# Patient Record
Sex: FEMALE | Race: WHITE | NOT HISPANIC OR LATINO | Employment: OTHER | URBAN - METROPOLITAN AREA
[De-identification: names, ages, dates, MRNs, and addresses within clinical notes are randomized per-mention and may not be internally consistent; named-entity substitution may affect disease eponyms.]

---

## 2017-01-17 ENCOUNTER — APPOINTMENT (EMERGENCY)
Dept: CT IMAGING | Facility: HOSPITAL | Age: 62
End: 2017-01-17
Payer: COMMERCIAL

## 2017-01-17 ENCOUNTER — HOSPITAL ENCOUNTER (EMERGENCY)
Facility: HOSPITAL | Age: 62
Discharge: HOME | End: 2017-01-17
Attending: EMERGENCY MEDICINE | Admitting: EMERGENCY MEDICINE
Payer: COMMERCIAL

## 2017-01-17 ENCOUNTER — APPOINTMENT (EMERGENCY)
Dept: RADIOLOGY | Facility: HOSPITAL | Age: 62
End: 2017-01-17
Payer: COMMERCIAL

## 2017-01-17 VITALS
SYSTOLIC BLOOD PRESSURE: 136 MMHG | BODY MASS INDEX: 42.11 KG/M2 | TEMPERATURE: 98.3 F | DIASTOLIC BLOOD PRESSURE: 68 MMHG | RESPIRATION RATE: 16 BRPM | WEIGHT: 200.62 LBS | HEIGHT: 58 IN | OXYGEN SATURATION: 98 % | HEART RATE: 83 BPM

## 2017-01-17 DIAGNOSIS — S32.020A COMPRESSION FRACTURE OF L2 LUMBAR VERTEBRA, CLOSED, INITIAL ENCOUNTER (HCC): Primary | ICD-10-CM

## 2017-01-17 DIAGNOSIS — S80.01XA CONTUSION OF RIGHT KNEE, INITIAL ENCOUNTER: ICD-10-CM

## 2017-01-17 LAB
ANION GAP BLD CALC-SCNC: 19 MMOL/L (ref 4–13)
BUN BLD-MCNC: 13 MG/DL (ref 5–25)
CA-I BLD-SCNC: 1.18 MMOL/L (ref 1.12–1.32)
CHLORIDE BLD-SCNC: 96 MMOL/L (ref 100–108)
CREAT BLD-MCNC: 0.7 MG/DL (ref 0.6–1.3)
GFR SERPL CREATININE-BSD FRML MDRD: >60 ML/MIN/1.73SQ M
GLUCOSE SERPL-MCNC: 102 MG/DL (ref 65–140)
HCT VFR BLD CALC: 40 % (ref 34.8–46.1)
HGB BLDA-MCNC: 13.6 G/DL (ref 11.5–15.4)
PCO2 BLD: 29 MMOL/L (ref 21–32)
POTASSIUM BLD-SCNC: 4.1 MMOL/L (ref 3.5–5.3)
SODIUM BLD-SCNC: 139 MMOL/L (ref 136–145)
SPECIMEN SOURCE: ABNORMAL

## 2017-01-17 PROCEDURE — 85014 HEMATOCRIT: CPT

## 2017-01-17 PROCEDURE — 99284 EMERGENCY DEPT VISIT MOD MDM: CPT

## 2017-01-17 PROCEDURE — 73564 X-RAY EXAM KNEE 4 OR MORE: CPT

## 2017-01-17 PROCEDURE — 74177 CT ABD & PELVIS W/CONTRAST: CPT

## 2017-01-17 PROCEDURE — 80047 BASIC METABLC PNL IONIZED CA: CPT

## 2017-01-17 RX ORDER — PANTOPRAZOLE SODIUM 40 MG/1
40 TABLET, DELAYED RELEASE ORAL DAILY
COMMUNITY
End: 2018-06-08 | Stop reason: SDUPTHER

## 2017-01-17 RX ORDER — HYDROCHLOROTHIAZIDE 25 MG/1
25 TABLET ORAL DAILY
COMMUNITY
End: 2018-03-08 | Stop reason: SDUPTHER

## 2017-01-17 RX ORDER — ALPRAZOLAM 0.5 MG/1
0.5 TABLET ORAL
COMMUNITY
End: 2018-06-14 | Stop reason: SDUPTHER

## 2017-01-17 RX ORDER — LEVALBUTEROL TARTRATE 45 UG/1
1-2 AEROSOL, METERED ORAL EVERY 4 HOURS PRN
COMMUNITY
End: 2019-01-14

## 2017-01-17 RX ORDER — TRAMADOL HYDROCHLORIDE 50 MG/1
50 TABLET ORAL EVERY 6 HOURS PRN
COMMUNITY
End: 2017-01-17 | Stop reason: ALTCHOICE

## 2017-01-17 RX ORDER — POTASSIUM CHLORIDE 750 MG/1
10 TABLET, FILM COATED, EXTENDED RELEASE ORAL 2 TIMES DAILY
COMMUNITY
End: 2018-06-14

## 2017-01-17 RX ORDER — LEVOTHYROXINE SODIUM 0.07 MG/1
88 TABLET ORAL DAILY
COMMUNITY
End: 2018-06-14 | Stop reason: SDUPTHER

## 2017-01-17 RX ORDER — QUINAPRIL 10 MG/1
10 TABLET ORAL
COMMUNITY
End: 2020-08-22 | Stop reason: HOSPADM

## 2017-01-17 RX ORDER — OXYCODONE HYDROCHLORIDE AND ACETAMINOPHEN 5; 325 MG/1; MG/1
1 TABLET ORAL EVERY 4 HOURS PRN
Qty: 12 TABLET | Refills: 0 | Status: SHIPPED | OUTPATIENT
Start: 2017-01-17 | End: 2018-06-14

## 2017-01-17 RX ORDER — LEVOTHYROXINE SODIUM 0.05 MG/1
50 TABLET ORAL DAILY
COMMUNITY
End: 2018-01-20 | Stop reason: HOSPADM

## 2017-01-17 RX ORDER — TRAMADOL HYDROCHLORIDE 50 MG/1
100 TABLET ORAL ONCE
Status: COMPLETED | OUTPATIENT
Start: 2017-01-17 | End: 2017-01-17

## 2017-01-17 RX ADMIN — IOHEXOL 100 ML: 350 INJECTION, SOLUTION INTRAVENOUS at 12:20

## 2017-01-17 RX ADMIN — TRAMADOL HYDROCHLORIDE 100 MG: 50 TABLET, COATED ORAL at 11:41

## 2017-01-19 ENCOUNTER — GENERIC CONVERSION - ENCOUNTER (OUTPATIENT)
Dept: OTHER | Facility: OTHER | Age: 62
End: 2017-01-19

## 2017-02-10 ENCOUNTER — GENERIC CONVERSION - ENCOUNTER (OUTPATIENT)
Dept: OTHER | Facility: OTHER | Age: 62
End: 2017-02-10

## 2017-02-16 ENCOUNTER — GENERIC CONVERSION - ENCOUNTER (OUTPATIENT)
Dept: OTHER | Facility: OTHER | Age: 62
End: 2017-02-16

## 2017-03-17 ENCOUNTER — GENERIC CONVERSION - ENCOUNTER (OUTPATIENT)
Dept: OTHER | Facility: OTHER | Age: 62
End: 2017-03-17

## 2017-03-31 ENCOUNTER — GENERIC CONVERSION - ENCOUNTER (OUTPATIENT)
Dept: OTHER | Facility: OTHER | Age: 62
End: 2017-03-31

## 2017-04-04 ENCOUNTER — GENERIC CONVERSION - ENCOUNTER (OUTPATIENT)
Dept: OTHER | Facility: OTHER | Age: 62
End: 2017-04-04

## 2017-04-18 ENCOUNTER — GENERIC CONVERSION - ENCOUNTER (OUTPATIENT)
Dept: OTHER | Facility: OTHER | Age: 62
End: 2017-04-18

## 2017-05-02 ENCOUNTER — GENERIC CONVERSION - ENCOUNTER (OUTPATIENT)
Dept: OTHER | Facility: OTHER | Age: 62
End: 2017-05-02

## 2017-05-04 ENCOUNTER — GENERIC CONVERSION - ENCOUNTER (OUTPATIENT)
Dept: OTHER | Facility: OTHER | Age: 62
End: 2017-05-04

## 2017-05-04 LAB — AMBIG ABBREV LP DEFAULT (HISTORICAL): NORMAL

## 2017-05-05 LAB
ALBUMIN SERPL BCP-MCNC: 4.3 G/DL (ref 3.6–4.8)
ALP SERPL-CCNC: 82 IU/L (ref 39–117)
ALT SERPL W P-5'-P-CCNC: 13 IU/L (ref 0–32)
AST SERPL W P-5'-P-CCNC: 18 IU/L (ref 0–40)
BASOPHILS # BLD AUTO: 0 %
BASOPHILS # BLD AUTO: 0 X10E3/UL (ref 0–0.2)
BILIRUB SERPL-MCNC: 0.3 MG/DL (ref 0–1.2)
BILIRUBIN DIRECT (HISTORICAL): 0.09 MG/DL (ref 0–0.4)
BUN SERPL-MCNC: 16 MG/DL (ref 8–27)
BUN/CREA RATIO (HISTORICAL): 21 (ref 12–28)
CALCIUM SERPL-MCNC: 9.3 MG/DL (ref 8.7–10.3)
CHLORIDE SERPL-SCNC: 94 MMOL/L (ref 96–106)
CHOLEST SERPL-MCNC: 175 MG/DL (ref 100–199)
CO2 SERPL-SCNC: 27 MMOL/L (ref 18–29)
CREAT SERPL-MCNC: 0.76 MG/DL (ref 0.57–1)
DEPRECATED RDW RBC AUTO: 14.9 % (ref 12.3–15.4)
EGFR AFRICAN AMERICAN (HISTORICAL): 97 ML/MIN/1.73
EGFR-AMERICAN CALC (HISTORICAL): 84 ML/MIN/1.73
EOSINOPHIL # BLD AUTO: 0.2 X10E3/UL (ref 0–0.4)
EOSINOPHIL # BLD AUTO: 3 %
GLUCOSE SERPL-MCNC: 90 MG/DL (ref 65–99)
HCT VFR BLD AUTO: 37.6 % (ref 34–46.6)
HDLC SERPL-MCNC: 58 MG/DL
HGB BLD-MCNC: 12.2 G/DL (ref 11.1–15.9)
IMM.GRANULOCYTES (CD4/8) (HISTORICAL): 0 %
IMM.GRANULOCYTES (CD4/8) (HISTORICAL): 0 X10E3/UL (ref 0–0.1)
LDLC SERPL CALC-MCNC: 97 MG/DL (ref 0–99)
LYMPHOCYTES # BLD AUTO: 3.9 X10E3/UL (ref 0.7–3.1)
LYMPHOCYTES # BLD AUTO: 43 %
MCH RBC QN AUTO: 27.1 PG (ref 26.6–33)
MCHC RBC AUTO-ENTMCNC: 32.4 G/DL (ref 31.5–35.7)
MCV RBC AUTO: 83 FL (ref 79–97)
MONOCYTES # BLD AUTO: 0.8 X10E3/UL (ref 0.1–0.9)
MONOCYTES (HISTORICAL): 8 %
NEUTROPHILS # BLD AUTO: 4.1 X10E3/UL (ref 1.4–7)
NEUTROPHILS # BLD AUTO: 46 %
PLATELET # BLD AUTO: 266 X10E3/UL (ref 150–379)
POTASSIUM SERPL-SCNC: 4 MMOL/L (ref 3.5–5.2)
RBC (HISTORICAL): 4.51 X10E6/UL (ref 3.77–5.28)
SODIUM SERPL-SCNC: 140 MMOL/L (ref 134–144)
TOTAL PROTEIN (HISTORICAL): 7.3 G/DL (ref 6–8.5)
TRIGL SERPL-MCNC: 99 MG/DL (ref 0–149)
TSH SERPL DL<=0.05 MIU/L-ACNC: 4.15 UIU/ML (ref 0.45–4.5)
VLDLC SERPL CALC-MCNC: 20 MG/DL (ref 5–40)
WBC # BLD AUTO: 9 X10E3/UL (ref 3.4–10.8)

## 2017-05-11 ENCOUNTER — APPOINTMENT (OUTPATIENT)
Dept: LAB | Facility: CLINIC | Age: 62
End: 2017-05-11
Payer: COMMERCIAL

## 2017-05-11 ENCOUNTER — ALLSCRIPTS OFFICE VISIT (OUTPATIENT)
Dept: OTHER | Facility: OTHER | Age: 62
End: 2017-05-11

## 2017-05-11 DIAGNOSIS — R07.9 CHEST PAIN: ICD-10-CM

## 2017-05-11 LAB — DEPRECATED D DIMER PPP: 1159 NG/ML (FEU) (ref 0–424)

## 2017-05-11 PROCEDURE — 85379 FIBRIN DEGRADATION QUANT: CPT

## 2017-05-11 PROCEDURE — 36415 COLL VENOUS BLD VENIPUNCTURE: CPT

## 2017-05-12 ENCOUNTER — HOSPITAL ENCOUNTER (OUTPATIENT)
Dept: CT IMAGING | Facility: HOSPITAL | Age: 62
Discharge: HOME | End: 2017-05-12
Attending: INTERNAL MEDICINE
Payer: COMMERCIAL

## 2017-05-12 DIAGNOSIS — R07.9 CHEST PAIN: ICD-10-CM

## 2017-05-12 PROCEDURE — 71275 CT ANGIOGRAPHY CHEST: CPT

## 2017-05-12 RX ADMIN — IOHEXOL 85 ML: 350 INJECTION, SOLUTION INTRAVENOUS at 13:40

## 2017-05-17 ENCOUNTER — HOSPITAL ENCOUNTER (OUTPATIENT)
Dept: NON INVASIVE DIAGNOSTICS | Facility: CLINIC | Age: 62
Discharge: HOME | End: 2017-05-17
Payer: COMMERCIAL

## 2017-05-17 DIAGNOSIS — R07.9 CHEST PAIN: ICD-10-CM

## 2017-05-17 LAB
ARRHY DURING EX: NORMAL
CHEST PAIN STATEMENT: NORMAL
MAX DIASTOLIC BP: 64 MMHG
MAX HEART RATE: 114 BPM
MAX PREDICTED HEART RATE: 158 BPM
MAX. SYSTOLIC BP: 120 MMHG
PROTOCOL NAME: NORMAL
REASON FOR TERMINATION: NORMAL
TARGET HR FORMULA: NORMAL
TIME IN EXERCISE PHASE: 180 S

## 2017-05-17 PROCEDURE — A9502 TC99M TETROFOSMIN: HCPCS

## 2017-05-17 PROCEDURE — 78452 HT MUSCLE IMAGE SPECT MULT: CPT

## 2017-05-17 PROCEDURE — 93017 CV STRESS TEST TRACING ONLY: CPT

## 2017-05-17 RX ADMIN — REGADENOSON 0.4 MG: 0.08 INJECTION, SOLUTION INTRAVENOUS at 14:26

## 2017-08-03 ENCOUNTER — GENERIC CONVERSION - ENCOUNTER (OUTPATIENT)
Dept: OTHER | Facility: OTHER | Age: 62
End: 2017-08-03

## 2017-08-16 ENCOUNTER — GENERIC CONVERSION - ENCOUNTER (OUTPATIENT)
Dept: OTHER | Facility: OTHER | Age: 62
End: 2017-08-16

## 2017-09-25 ENCOUNTER — ALLSCRIPTS OFFICE VISIT (OUTPATIENT)
Dept: OTHER | Facility: OTHER | Age: 62
End: 2017-09-25

## 2017-09-25 DIAGNOSIS — M46.1 SACROILIITIS, NOT ELSEWHERE CLASSIFIED (HCC): ICD-10-CM

## 2017-10-19 ENCOUNTER — GENERIC CONVERSION - ENCOUNTER (OUTPATIENT)
Dept: OTHER | Facility: OTHER | Age: 62
End: 2017-10-19

## 2017-10-24 ENCOUNTER — GENERIC CONVERSION - ENCOUNTER (OUTPATIENT)
Dept: OTHER | Facility: OTHER | Age: 62
End: 2017-10-24

## 2017-11-01 DIAGNOSIS — I10 ESSENTIAL (PRIMARY) HYPERTENSION: ICD-10-CM

## 2017-11-01 DIAGNOSIS — E78.5 HYPERLIPIDEMIA: ICD-10-CM

## 2017-11-01 DIAGNOSIS — E03.9 HYPOTHYROIDISM: ICD-10-CM

## 2017-11-01 DIAGNOSIS — R73.01 IMPAIRED FASTING GLUCOSE: ICD-10-CM

## 2017-11-02 ENCOUNTER — GENERIC CONVERSION - ENCOUNTER (OUTPATIENT)
Dept: OTHER | Facility: OTHER | Age: 62
End: 2017-11-02

## 2017-11-07 ENCOUNTER — GENERIC CONVERSION - ENCOUNTER (OUTPATIENT)
Dept: OTHER | Facility: OTHER | Age: 62
End: 2017-11-07

## 2017-11-27 ENCOUNTER — TRANSCRIBE ORDERS (OUTPATIENT)
Dept: ADMINISTRATIVE | Facility: HOSPITAL | Age: 62
End: 2017-11-27

## 2017-11-27 ENCOUNTER — APPOINTMENT (OUTPATIENT)
Dept: LAB | Facility: HOSPITAL | Age: 62
End: 2017-11-27
Attending: INTERNAL MEDICINE
Payer: COMMERCIAL

## 2017-11-27 DIAGNOSIS — R73.01 IMPAIRED FASTING GLUCOSE: ICD-10-CM

## 2017-11-27 DIAGNOSIS — E78.5 HYPERLIPIDEMIA: ICD-10-CM

## 2017-11-27 DIAGNOSIS — E03.9 HYPOTHYROIDISM: ICD-10-CM

## 2017-11-27 DIAGNOSIS — I10 ESSENTIAL (PRIMARY) HYPERTENSION: ICD-10-CM

## 2017-11-27 LAB
ALBUMIN SERPL BCP-MCNC: 3.5 G/DL (ref 3.5–5)
ALP SERPL-CCNC: 77 U/L (ref 46–116)
ALT SERPL W P-5'-P-CCNC: 26 U/L (ref 12–78)
ANION GAP SERPL CALCULATED.3IONS-SCNC: 5 MMOL/L (ref 4–13)
AST SERPL W P-5'-P-CCNC: 23 U/L (ref 5–45)
BASOPHILS # BLD AUTO: 0.04 THOUSANDS/ΜL (ref 0–0.1)
BASOPHILS NFR BLD AUTO: 1 % (ref 0–1)
BILIRUB DIRECT SERPL-MCNC: 0.1 MG/DL (ref 0–0.2)
BILIRUB SERPL-MCNC: 0.3 MG/DL (ref 0.2–1)
BUN SERPL-MCNC: 14 MG/DL (ref 5–25)
CALCIUM SERPL-MCNC: 9.1 MG/DL (ref 8.3–10.1)
CHLORIDE SERPL-SCNC: 100 MMOL/L (ref 100–108)
CHOLEST SERPL-MCNC: 202 MG/DL (ref 50–200)
CO2 SERPL-SCNC: 33 MMOL/L (ref 21–32)
CREAT SERPL-MCNC: 0.78 MG/DL (ref 0.6–1.3)
EOSINOPHIL # BLD AUTO: 0.26 THOUSAND/ΜL (ref 0–0.61)
EOSINOPHIL NFR BLD AUTO: 3 % (ref 0–6)
ERYTHROCYTE [DISTWIDTH] IN BLOOD BY AUTOMATED COUNT: 14.6 % (ref 11.6–15.1)
EST. AVERAGE GLUCOSE BLD GHB EST-MCNC: 117 MG/DL
GFR SERPL CREATININE-BSD FRML MDRD: 82 ML/MIN/1.73SQ M
GLUCOSE P FAST SERPL-MCNC: 104 MG/DL (ref 65–99)
HBA1C MFR BLD: 5.7 % (ref 4.2–6.3)
HCT VFR BLD AUTO: 40.6 % (ref 34.8–46.1)
HDLC SERPL-MCNC: 53 MG/DL (ref 40–60)
HGB BLD-MCNC: 12.5 G/DL (ref 11.5–15.4)
LDLC SERPL CALC-MCNC: 105 MG/DL (ref 0–100)
LYMPHOCYTES # BLD AUTO: 2.72 THOUSANDS/ΜL (ref 0.6–4.47)
LYMPHOCYTES NFR BLD AUTO: 35 % (ref 14–44)
MCH RBC QN AUTO: 26.9 PG (ref 26.8–34.3)
MCHC RBC AUTO-ENTMCNC: 30.8 G/DL (ref 31.4–37.4)
MCV RBC AUTO: 88 FL (ref 82–98)
MONOCYTES # BLD AUTO: 0.72 THOUSAND/ΜL (ref 0.17–1.22)
MONOCYTES NFR BLD AUTO: 9 % (ref 4–12)
NEUTROPHILS # BLD AUTO: 3.97 THOUSANDS/ΜL (ref 1.85–7.62)
NEUTS SEG NFR BLD AUTO: 51 % (ref 43–75)
NRBC BLD AUTO-RTO: 0 /100 WBCS
PLATELET # BLD AUTO: 253 THOUSANDS/UL (ref 149–390)
PMV BLD AUTO: 8.7 FL (ref 8.9–12.7)
POTASSIUM SERPL-SCNC: 4 MMOL/L (ref 3.5–5.3)
PROT SERPL-MCNC: 8.1 G/DL (ref 6.4–8.2)
RBC # BLD AUTO: 4.64 MILLION/UL (ref 3.81–5.12)
SODIUM SERPL-SCNC: 138 MMOL/L (ref 136–145)
TRIGL SERPL-MCNC: 221 MG/DL
TSH SERPL DL<=0.05 MIU/L-ACNC: 9.82 UIU/ML (ref 0.36–3.74)
WBC # BLD AUTO: 7.75 THOUSAND/UL (ref 4.31–10.16)

## 2017-11-27 PROCEDURE — 80076 HEPATIC FUNCTION PANEL: CPT

## 2017-11-27 PROCEDURE — 83036 HEMOGLOBIN GLYCOSYLATED A1C: CPT

## 2017-11-27 PROCEDURE — 80048 BASIC METABOLIC PNL TOTAL CA: CPT

## 2017-11-27 PROCEDURE — 36415 COLL VENOUS BLD VENIPUNCTURE: CPT

## 2017-11-27 PROCEDURE — 84443 ASSAY THYROID STIM HORMONE: CPT

## 2017-11-27 PROCEDURE — 85025 COMPLETE CBC W/AUTO DIFF WBC: CPT

## 2017-11-27 PROCEDURE — 80061 LIPID PANEL: CPT

## 2017-11-30 ENCOUNTER — ALLSCRIPTS OFFICE VISIT (OUTPATIENT)
Dept: OTHER | Facility: OTHER | Age: 62
End: 2017-11-30

## 2017-12-05 NOTE — PROGRESS NOTES
Assessment    1  Asthma (493 90) (J45 909)   2  Allergic rhinitis (477 9) (J30 9)   3  Depression (311) (F32 9)   4  Hypothyroidism (244 9) (E03 9)   5  Hypertension (401 9) (I10)   6  Hyperlipidemia (272 4) (E78 5)   7  Impaired fasting glucose (790 21) (R73 01)    Plan  Acute maxillary sinusitis    · Doxycycline Monohydrate 100 MG Oral Tablet; TAKE 1 TABLET EVERY 12 HOURS DAILY  Asthma    · Dulera 100-5 MCG/ACT Inhalation Aerosol; INHALE 2 PUFFS AT 12 HOUR INTERVALS  (MORNING AND EVENING)  Depression    · Escitalopram Oxalate 10 MG Oral Tablet (Lexapro); TAKE 1/2  TABLET EVERY DAY  Hyperlipidemia, Hypertension, Hypothyroidism, Impaired fasting glucose    · (1) CBC/ PLT (NO DIFF); Status:Active; Requested for:58Gou2642;    · (1) COMPREHENSIVE METABOLIC PANEL; Status:Active; Requested for:26Kaa2464;    · (1) HEMOGLOBIN A1C; Status:Active; Requested for:27Rmj1638;    · (1) LIPID PANEL, FASTING; Status:Active; Requested for:71Ojp6797;    · (1) TSH; Status:Active; Requested for:10Fia3479;   Hypothyroidism    · Levothyroxine Sodium 75 MCG Oral Tablet; take 1 tablet every day   · (1) TSH WITH FT4 REFLEX; Status:Active; Requested for:66Ewl4645;   Impaired fasting glucose    · Famotidine 10 MG Oral Tablet; TAKE 1 TABLET AT BEDTIME  Morbid or severe obesity due to excess calories    · 1 - Bonnie Robbins MD  (General Surgery) Co-Management  *  Status: Active  Requested for:  44KOF6911  Care Summary provided  : Yes  Peripheral neuropathy    · From  DULoxetine HCl - 60 MG Oral Capsule Delayed Release Particles take 1 capsule  daily To DULoxetine HCl - 60 MG Oral Capsule Delayed Release Particles take 1 capsule daily +  30MG TO TOTAL 90MG    Discussion/Summary  Discussion Summary:   1 hypertension- stable on rx  2  gerd stable on current medication  3  depression continue cymbalta and add lexapro follow-up 6 weeks  4  Sinusitis- start antibiotics continue current regimen  5  Hyperlipidemia- stable on current medication  6  Hypothryroidism- increase to 75mcg daily and recheck 6 weeks  7  Chronic pain following with back specialist  Lab 6 months  follow up in 6 weeks  Chief Complaint  Chief Complaint Free Text Note Form: routine follow up and review labs      History of Present Illness  HPI: NASAL CONGESTION  WHEEZING AM AND PM FOR OVER 1 MONTH  TAKING NASAL SPRAY AND DULERA  ALLERGY/PULM IN BETHLHEM  STILL W/ COUGHING AND BLOWING GREEN STUFF    GAINED WEIGHT B/C STRESS OF     CHRONIC BACK PAIN FOLLOWING W/ SPECIALIST  ARTHRITIS IN BOTH HIPS AND BACK, PELVIS ARTHRITIS  ALL PAIN W/ WEIGHT GAIN, VERY TIRED  CONSIDERING SURGERY for obesity    very depresssed      Review of Systems  Complete-Female:   Constitutional: as noted in HPI  Eyes: as noted in HPI    ENT: as noted in HPI  Cardiovascular: as noted in HPI  Respiratory: as noted in HPI  Gastrointestinal: as noted in HPI  Active Problems    1  Abdominal discomfort, epigastric (789 06) (R10 13)   2  Abnormal mammogram (793 80) (R92 8)   3  Acute maxillary sinusitis (461 0) (J01 00)   4  Allergic rhinitis (477 9) (J30 9)   5  Anxiety (300 00) (F41 9)   6  Asthma (493 90) (J45 909)   7  BRBPR (bright red blood per rectum) (569 3) (K62 5)   8  Breast pain (611 71) (N64 4)   9  Candidal intertrigo (112 3) (B37 2)   10  Chest pain (786 50) (R07 9)   11  Common cold (460) (J00)   12  Diarrhea (787 91) (R19 7)   13  Flu vaccine need (V04 81) (Z23)   14  Generalized pain (780 96) (R52)   15  Herpes simplex infection (054 9) (B00 9)   16  Hiccups (786 8) (R06 6)   17  Hyperlipidemia (272 4) (E78 5)   18  Hypertension (401 9) (I10)   19  Hypothyroidism (244 9) (E03 9)   20  Impaired fasting glucose (790 21) (R73 01)   21  Morbid or severe obesity due to excess calories (278 01) (E66 01)   22  Nausea (787 02) (R11 0)   23  Need for pneumococcal vaccine (V03 82) (Z23)   24  Need for prophylactic vaccination and inoculation against influenza (V04 81) (Z23)   25   Need for zoster vaccine (V04 89) (Z23)   26  Overactive bladder (596 51) (N32 81)   27  Peripheral neuropathy (356 9) (G62 9)   28  Preop examination (V72 84) (Z01 818)   29  Rash (782 1) (R21)   30  Sacroiliitis (720 2) (M46 1)   31  Sciatica (724 3) (M54 30)   32  Screening for breast cancer (V76 10) (Z12 31)   33  Screening for cervical cancer (V76 2) (Z12 4)   34  Skin rash (782 1) (R21)   35  Urticaria, idiopathic (708 1) (L50 1)   36  Yeast infection of the vagina (112 1) (B37 3)    Past Medical History    1  History of Acute laryngopharyngitis (465 0) (J06 0)   2  History of Acute upper respiratory infection (465 9) (J06 9)   3  History of Cough (786 2) (R05)   4  History of Depression (311) (F32 9)   5  History of acute sinusitis (V12 69) (Z87 09)   6  History of allergic rhinitis (V12 69) (Z87 09)   7  History of anemia (V12 3) (Z86 2)   8  History of chest pain (V13 89) (Z87 898)   9  History of gastroesophageal reflux (GERD) (V12 79) (Z87 19)   10  History of herpes simplex infection (V12 09) (Z86 19)   11  History of hyperlipidemia (V12 29) (Z86 39)   12  History of hyperlipidemia (V12 29) (Z86 39)   13  History of hypertension (V12 59) (Z86 79)   14  History of hypertension (V12 59) (Z86 79)   15  History of hypothyroidism (V12 29) (Z86 39)   16  History of hypothyroidism (V12 29) (Z86 39)   17  History of obesity (V13 89) (Z86 39)   18  History of Joint Pain In Both Knees (719 46)   19  History of Nervousness (799 21) (R45 0)   20  History of Osteoarthritis (V13 4)  Active Problems And Past Medical History Reviewed: The active problems and past medical history were reviewed and updated today  Surgical History    1  History of Incisional Breast Biopsy   2  History of Knee Arthroplasty   3  History of Rotator Cuff Repair  Surgical History Reviewed: The surgical history was reviewed and updated today  Family History  Mother    1  Family history of Coronary Artery Disease (V17 49)   2   Family history of Essential Hypertension  Father    3  Family history of Coronary Artery Disease (V17 49)  Family History Reviewed: The family history was reviewed and updated today  Social History    · Living Independently With Spouse   · Never A Smoker   · Never A Smoker   · No drug use   · Social alcohol use (Z78 9)   · Tobacco non-user (V49 89) (Z78 9)  Social History Reviewed: The social history was reviewed and updated today  Current Meds   1  ALPRAZolam 1 MG Oral Tablet; TAKE ONE HALF (1/2) TO ONE (1) TABLET(S)THREE TIMES DAILY   AS NEEDED FOR ANXIETY; Therapy: 68QIF5064 to (Last Rx:24Nlp0728) Ordered   2  Aspirin EC 81 MG Oral Tablet Delayed Release; Take 1 tablet daily Recorded   3  Benadryl 25 MG TABS Recorded   4  Calmoseptine 0 44-20 6 % External Ointment; APPLY 0 5 INCH Every 4 hours PRN irritation and itch; Therapy: 74GPQ0354 to (Last Rx:10Yhr7858)  Requested for: 94Hhy8102 Ordered   5  Cholestyramine 4 GM Oral Packet; 1 PACKET 1 TO 3 TIMES DAILY AS NEEDED; Therapy: 90Hqn5594 to (Last Rx:07Xyw9866)  Requested for: 68Bwx7770 Ordered   6  Clotrimazole-Betamethasone 1-0 05 % External Cream; APPLY SPARINGLY TO AFFECTED AREA in   groin 2-3 TIMES A DAY as needed; Therapy: 91Cak2429 to (Last Rx:95Ned5637)  Requested for: 82Mgv4583 Ordered   7  DULoxetine HCl - 60 MG Oral Capsule Delayed Release Particles; take 1 capsule daily; Therapy: 90TBU6761 to (Jenniffer Goodpasture)  Requested for: 09UJM5556; Last Rx:67Wia8516   Ordered   8  HydroCHLOROthiazide 25 MG Oral Tablet; TAKE ONE (1) TABLET(S) DAILY; Therapy: 63JSP7015 to (Evaluate:82Jnu0673)  Requested for: 58NHS2774; Last Rx:37Npi7839   Ordered   9  Levothyroxine Sodium 75 MCG Oral Tablet; take 1 tablet every day; Therapy: 94HJJ2235 to (Evaluate:72Bqo2122)  Requested for: 15Gbt4453; Last Rx:20Sxt0101   Ordered   10  Nasacort Allergy 24HR 55 MCG/ACT Nasal Aerosol; Therapy: 27GBB0383 to Recorded   11   Nystop 162826 UNIT/GM External Powder; APPLY SPARINGLY TO THE AFFECTED AREA TWICE A    DAY; Therapy: 63JJB6693 to (Evaluate:23Nol4948)  Requested for: 84Xsr9726; Last Rx:84Brw4348    Ordered   12  Pantoprazole Sodium 40 MG Oral Tablet Delayed Release; Take 1 tablet by mouth  daily; Therapy: 62ZXR9471 to (Evaluate:20Mcj3583)  Requested for: 60QLX4395; Last Rx:21Aae1844    Ordered   13  Potassium Chloride Swati ER 20 MEQ Oral Tablet Extended Release; Take 1 tablet by mouth  daily; Therapy: 25Awa4846 to (Evaluate:57Hxl1318)  Requested for: 10JBL0492; Last Rx:37Lzi9165    Ordered   14  Quinapril HCl - 20 MG Oral Tablet; Take 1 tablet by mouth  every day; Therapy: 11ZIW1937 to (Evaluate:41Vtr8431)  Requested for: 65TLQ8991; Last Rx:62Obk5980    Ordered   15  Simvastatin 40 MG Oral Tablet; TAKE ONE (1) TABLET(S) DAILY; Therapy: 61FJE9869 to (Evaluate:01Ygb0468)  Requested for: 60KQZ0553; Last Rx:97Ejx9942    Ordered   16  TraMADol HCl - 50 MG Oral Tablet; TAKE ONE (1) TABLET(S) THREE TIMES DAILY; Therapy: 09Kos2401 to (Evaluate:37Kau6281)  Requested for: 21Nov2017; Last Rx:32Qgh2831    Ordered   17  Valtrex 1 GM Oral Tablet; TAKE 2 TABLETS TWICE DAILY; Last Rx:64Hrl8412 Ordered   18  Xopenex HFA 45 MCG/ACT Inhalation Aerosol; INHALE 1 TO 2 PUFFS EVERY 4 TO 6 HOURS AS    NEEDED Recorded  Medication List Reviewed: The medication list was reviewed and updated today  Allergies    1  Ciprofloxacin HCl TABS   2  cyproheptadine   3  NSAIDs   4  Oxybutynin Chloride TABS   5  Penicillins   6  Sulfa Drugs   7  Vancomycin HCl CAPS    Vitals  Vital Signs    Recorded: 99COF7711 02:52PM Recorded: 06LVE3533 02:00PM   Heart Rate  287   Systolic 481 985   Diastolic 72 82   Height  4 ft 10 in   Weight  220 lb 8 0 oz   BMI Calculated  46 08   BSA Calculated  1 9   O2 Saturation  98     Physical Exam    Constitutional   General appearance: No acute distress, well appearing and well nourished      Eyes   Conjunctiva and lids: No swelling, erythema or discharge  Ears, Nose, Mouth, and Throat   Otoscopic examination: Abnormal   Slight bulging  Nasal mucosa, septum, and turbinates: Abnormal   Erythematous edematous with thick yellow mucus more on the right than left  Oropharynx: Abnormal   Couple stone in  Pulmonary   Auscultation of lungs: Clear to auscultation  Cardiovascular   Auscultation of heart: Normal rate and rhythm, normal S1 and S2, without murmurs  Examination of extremities for edema and/or varicosities: Normal     Abdomen   Abdomen: Non-tender, no masses  Skin   Skin and subcutaneous tissue: Normal without rashes or lesions  Health Management  BRBPR (bright red blood per rectum)   COLONOSCOPY; every 5 years; Last 47LWB3291; Next Due: 89YNX4858; Active  Health Maintenance   *VB - Foot Exam; every 1 year; Last 23DLN5735; Next Due: 04IVP3557; Overdue    Future Appointments    Date/Time Provider Specialty Site   01/15/2018 02:45 PM Osmin Pack Internal Medicine [facility]   06/14/2018 03:15 PM PRASHANT Gardner   Internal Medicine [facility]     Signatures   Electronically signed by : Osmin Bradley; Nov 30 2017  3:34PM EST                       (Author)    Electronically signed by : PRASHANT Schwartz ; Nov 30 2017  7:05PM EST

## 2017-12-19 ENCOUNTER — HOSPITAL ENCOUNTER (OUTPATIENT)
Dept: RADIOLOGY | Facility: HOSPITAL | Age: 62
Discharge: HOME | End: 2017-12-22
Payer: COMMERCIAL

## 2017-12-19 ENCOUNTER — GENERIC CONVERSION - ENCOUNTER (OUTPATIENT)
Dept: OTHER | Facility: OTHER | Age: 62
End: 2017-12-19

## 2017-12-19 ENCOUNTER — TRANSCRIBE ORDERS (OUTPATIENT)
Dept: ADMINISTRATIVE | Facility: HOSPITAL | Age: 62
End: 2017-12-19

## 2017-12-19 DIAGNOSIS — R05.9 COUGH: ICD-10-CM

## 2017-12-19 PROCEDURE — 71020 HB CHEST X-RAY 2VW FRONTAL&LATL: CPT

## 2017-12-27 DIAGNOSIS — E03.9 HYPOTHYROIDISM: ICD-10-CM

## 2018-01-09 ENCOUNTER — APPOINTMENT (OUTPATIENT)
Dept: LAB | Facility: HOSPITAL | Age: 63
End: 2018-01-09
Payer: COMMERCIAL

## 2018-01-09 DIAGNOSIS — R73.01 IMPAIRED FASTING GLUCOSE: ICD-10-CM

## 2018-01-09 DIAGNOSIS — E78.5 HYPERLIPIDEMIA: ICD-10-CM

## 2018-01-09 DIAGNOSIS — I10 ESSENTIAL (PRIMARY) HYPERTENSION: ICD-10-CM

## 2018-01-09 DIAGNOSIS — E03.9 HYPOTHYROIDISM: ICD-10-CM

## 2018-01-09 LAB — TSH SERPL DL<=0.05 MIU/L-ACNC: 2.86 UIU/ML (ref 0.36–3.74)

## 2018-01-09 PROCEDURE — 84443 ASSAY THYROID STIM HORMONE: CPT

## 2018-01-09 PROCEDURE — 36415 COLL VENOUS BLD VENIPUNCTURE: CPT

## 2018-01-13 VITALS
HEART RATE: 80 BPM | DIASTOLIC BLOOD PRESSURE: 80 MMHG | HEIGHT: 58 IN | BODY MASS INDEX: 41.38 KG/M2 | WEIGHT: 197.13 LBS | RESPIRATION RATE: 14 BRPM | SYSTOLIC BLOOD PRESSURE: 124 MMHG

## 2018-01-14 VITALS
BODY MASS INDEX: 45.44 KG/M2 | HEART RATE: 108 BPM | DIASTOLIC BLOOD PRESSURE: 82 MMHG | SYSTOLIC BLOOD PRESSURE: 122 MMHG | WEIGHT: 216.5 LBS | HEIGHT: 58 IN | OXYGEN SATURATION: 97 %

## 2018-01-14 VITALS
BODY MASS INDEX: 46.29 KG/M2 | HEIGHT: 58 IN | SYSTOLIC BLOOD PRESSURE: 138 MMHG | OXYGEN SATURATION: 98 % | DIASTOLIC BLOOD PRESSURE: 72 MMHG | WEIGHT: 220.5 LBS | HEART RATE: 116 BPM

## 2018-01-15 ENCOUNTER — GENERIC CONVERSION - ENCOUNTER (OUTPATIENT)
Dept: OTHER | Facility: OTHER | Age: 63
End: 2018-01-15

## 2018-01-15 ENCOUNTER — HOSPITAL ENCOUNTER (INPATIENT)
Facility: HOSPITAL | Age: 63
LOS: 4 days | Discharge: HOME | DRG: 189 | End: 2018-01-20
Attending: EMERGENCY MEDICINE | Admitting: INTERNAL MEDICINE
Payer: COMMERCIAL

## 2018-01-15 ENCOUNTER — APPOINTMENT (EMERGENCY)
Dept: RADIOLOGY | Facility: HOSPITAL | Age: 63
DRG: 189 | End: 2018-01-15
Payer: COMMERCIAL

## 2018-01-15 DIAGNOSIS — R05.3 PERSISTENT COUGH FOR 3 WEEKS OR LONGER: ICD-10-CM

## 2018-01-15 DIAGNOSIS — J45.901 ASTHMA EXACERBATION: Primary | ICD-10-CM

## 2018-01-15 LAB
ANION GAP SERPL CALCULATED.3IONS-SCNC: 7 MMOL/L (ref 4–13)
BASE EX.OXY STD BLDV CALC-SCNC: 60.5 % (ref 60–80)
BASE EXCESS BLDV CALC-SCNC: 6 MMOL/L
BASOPHILS # BLD AUTO: 0.04 THOUSANDS/ΜL (ref 0–0.1)
BASOPHILS NFR BLD AUTO: 0 % (ref 0–1)
BUN SERPL-MCNC: 16 MG/DL (ref 5–25)
CALCIUM SERPL-MCNC: 9.4 MG/DL (ref 8.3–10.1)
CHLORIDE SERPL-SCNC: 98 MMOL/L (ref 100–108)
CO2 SERPL-SCNC: 34 MMOL/L (ref 21–32)
CREAT SERPL-MCNC: 0.88 MG/DL (ref 0.6–1.3)
EOSINOPHIL # BLD AUTO: 0.34 THOUSAND/ΜL (ref 0–0.61)
EOSINOPHIL NFR BLD AUTO: 4 % (ref 0–6)
ERYTHROCYTE [DISTWIDTH] IN BLOOD BY AUTOMATED COUNT: 14.8 % (ref 11.6–15.1)
GFR SERPL CREATININE-BSD FRML MDRD: 71 ML/MIN/1.73SQ M
GLUCOSE SERPL-MCNC: 98 MG/DL (ref 65–140)
HCO3 BLDV-SCNC: 31.8 MMOL/L (ref 24–30)
HCT VFR BLD AUTO: 38.6 % (ref 34.8–46.1)
HGB BLD-MCNC: 12.2 G/DL (ref 11.5–15.4)
LYMPHOCYTES # BLD AUTO: 3.32 THOUSANDS/ΜL (ref 0.6–4.47)
LYMPHOCYTES NFR BLD AUTO: 35 % (ref 14–44)
MCH RBC QN AUTO: 27.1 PG (ref 26.8–34.3)
MCHC RBC AUTO-ENTMCNC: 31.6 G/DL (ref 31.4–37.4)
MCV RBC AUTO: 86 FL (ref 82–98)
MONOCYTES # BLD AUTO: 0.81 THOUSAND/ΜL (ref 0.17–1.22)
MONOCYTES NFR BLD AUTO: 8 % (ref 4–12)
NEUTROPHILS # BLD AUTO: 5.07 THOUSANDS/ΜL (ref 1.85–7.62)
NEUTS SEG NFR BLD AUTO: 53 % (ref 43–75)
NRBC BLD AUTO-RTO: 0 /100 WBCS
NT-PROBNP SERPL-MCNC: 32 PG/ML
O2 CT BLDV-SCNC: 11.4 ML/DL
PCO2 BLDV: 50.9 MM HG (ref 42–50)
PH BLDV: 7.41 [PH] (ref 7.3–7.4)
PLATELET # BLD AUTO: 235 THOUSANDS/UL (ref 149–390)
PMV BLD AUTO: 8.6 FL (ref 8.9–12.7)
PO2 BLDV: 29.6 MM HG (ref 35–45)
POTASSIUM SERPL-SCNC: 4 MMOL/L (ref 3.5–5.3)
RBC # BLD AUTO: 4.51 MILLION/UL (ref 3.81–5.12)
SODIUM SERPL-SCNC: 139 MMOL/L (ref 136–145)
TROPONIN I SERPL-MCNC: <0.02 NG/ML
WBC # BLD AUTO: 9.63 THOUSAND/UL (ref 4.31–10.16)

## 2018-01-15 PROCEDURE — 96374 THER/PROPH/DIAG INJ IV PUSH: CPT

## 2018-01-15 PROCEDURE — 82805 BLOOD GASES W/O2 SATURATION: CPT | Performed by: EMERGENCY MEDICINE

## 2018-01-15 PROCEDURE — 94644 CONT INHLJ TX 1ST HOUR: CPT

## 2018-01-15 PROCEDURE — 85025 COMPLETE CBC W/AUTO DIFF WBC: CPT | Performed by: EMERGENCY MEDICINE

## 2018-01-15 PROCEDURE — 36415 COLL VENOUS BLD VENIPUNCTURE: CPT | Performed by: EMERGENCY MEDICINE

## 2018-01-15 PROCEDURE — 84484 ASSAY OF TROPONIN QUANT: CPT | Performed by: EMERGENCY MEDICINE

## 2018-01-15 PROCEDURE — 94640 AIRWAY INHALATION TREATMENT: CPT

## 2018-01-15 PROCEDURE — 71046 X-RAY EXAM CHEST 2 VIEWS: CPT

## 2018-01-15 PROCEDURE — 83880 ASSAY OF NATRIURETIC PEPTIDE: CPT | Performed by: EMERGENCY MEDICINE

## 2018-01-15 PROCEDURE — 93005 ELECTROCARDIOGRAM TRACING: CPT | Performed by: EMERGENCY MEDICINE

## 2018-01-15 PROCEDURE — 80048 BASIC METABOLIC PNL TOTAL CA: CPT | Performed by: EMERGENCY MEDICINE

## 2018-01-15 PROCEDURE — 94760 N-INVAS EAR/PLS OXIMETRY 1: CPT

## 2018-01-15 RX ORDER — SODIUM CHLORIDE FOR INHALATION 0.9 %
3 VIAL, NEBULIZER (ML) INHALATION ONCE
Status: COMPLETED | OUTPATIENT
Start: 2018-01-15 | End: 2018-01-15

## 2018-01-15 RX ORDER — METHYLPREDNISOLONE SODIUM SUCCINATE 125 MG/2ML
125 INJECTION, POWDER, LYOPHILIZED, FOR SOLUTION INTRAMUSCULAR; INTRAVENOUS ONCE
Status: COMPLETED | OUTPATIENT
Start: 2018-01-15 | End: 2018-01-15

## 2018-01-15 RX ADMIN — ALBUTEROL SULFATE 10 MG: 2.5 SOLUTION RESPIRATORY (INHALATION) at 23:03

## 2018-01-15 RX ADMIN — ISODIUM CHLORIDE 3 ML: 0.03 SOLUTION RESPIRATORY (INHALATION) at 23:03

## 2018-01-15 RX ADMIN — METHYLPREDNISOLONE SODIUM SUCCINATE 125 MG: 125 INJECTION, POWDER, FOR SOLUTION INTRAMUSCULAR; INTRAVENOUS at 23:09

## 2018-01-15 RX ADMIN — IPRATROPIUM BROMIDE 1 MG: 0.5 SOLUTION RESPIRATORY (INHALATION) at 23:03

## 2018-01-16 PROBLEM — J45.901 ASTHMA EXACERBATION: Status: ACTIVE | Noted: 2018-01-16

## 2018-01-16 PROBLEM — I10 HTN (HYPERTENSION): Chronic | Status: ACTIVE | Noted: 2018-01-16

## 2018-01-16 LAB
ANION GAP SERPL CALCULATED.3IONS-SCNC: 12 MMOL/L (ref 4–13)
ATRIAL RATE: 89 BPM
BUN SERPL-MCNC: 15 MG/DL (ref 5–25)
CALCIUM SERPL-MCNC: 9 MG/DL (ref 8.3–10.1)
CHLORIDE SERPL-SCNC: 100 MMOL/L (ref 100–108)
CO2 SERPL-SCNC: 28 MMOL/L (ref 21–32)
CREAT SERPL-MCNC: 0.99 MG/DL (ref 0.6–1.3)
ERYTHROCYTE [DISTWIDTH] IN BLOOD BY AUTOMATED COUNT: 14.8 % (ref 11.6–15.1)
GFR SERPL CREATININE-BSD FRML MDRD: 61 ML/MIN/1.73SQ M
GLUCOSE P FAST SERPL-MCNC: 171 MG/DL (ref 65–99)
GLUCOSE SERPL-MCNC: 171 MG/DL (ref 65–140)
HCT VFR BLD AUTO: 39 % (ref 34.8–46.1)
HGB BLD-MCNC: 12.3 G/DL (ref 11.5–15.4)
MCH RBC QN AUTO: 27 PG (ref 26.8–34.3)
MCHC RBC AUTO-ENTMCNC: 31.5 G/DL (ref 31.4–37.4)
MCV RBC AUTO: 86 FL (ref 82–98)
P AXIS: 51 DEGREES
PLATELET # BLD AUTO: 254 THOUSANDS/UL (ref 149–390)
PMV BLD AUTO: 8.9 FL (ref 8.9–12.7)
POTASSIUM SERPL-SCNC: 3.7 MMOL/L (ref 3.5–5.3)
PR INTERVAL: 192 MS
QRS AXIS: 98 DEGREES
QRSD INTERVAL: 86 MS
QT INTERVAL: 378 MS
QTC INTERVAL: 459 MS
RBC # BLD AUTO: 4.56 MILLION/UL (ref 3.81–5.12)
SODIUM SERPL-SCNC: 140 MMOL/L (ref 136–145)
T WAVE AXIS: 27 DEGREES
VENTRICULAR RATE: 89 BPM
WBC # BLD AUTO: 11.24 THOUSAND/UL (ref 4.31–10.16)

## 2018-01-16 PROCEDURE — 85027 COMPLETE CBC AUTOMATED: CPT | Performed by: PHYSICIAN ASSISTANT

## 2018-01-16 PROCEDURE — 36415 COLL VENOUS BLD VENIPUNCTURE: CPT | Performed by: PHYSICIAN ASSISTANT

## 2018-01-16 PROCEDURE — 99284 EMERGENCY DEPT VISIT MOD MDM: CPT

## 2018-01-16 PROCEDURE — 94640 AIRWAY INHALATION TREATMENT: CPT

## 2018-01-16 PROCEDURE — 80048 BASIC METABOLIC PNL TOTAL CA: CPT | Performed by: PHYSICIAN ASSISTANT

## 2018-01-16 RX ORDER — ASPIRIN 81 MG/1
81 TABLET, CHEWABLE ORAL DAILY
Status: DISCONTINUED | OUTPATIENT
Start: 2018-01-16 | End: 2018-01-20 | Stop reason: HOSPADM

## 2018-01-16 RX ORDER — HYDROCHLOROTHIAZIDE 25 MG/1
25 TABLET ORAL DAILY
Status: DISCONTINUED | OUTPATIENT
Start: 2018-01-16 | End: 2018-01-20 | Stop reason: HOSPADM

## 2018-01-16 RX ORDER — LEVALBUTEROL 1.25 MG/.5ML
1.25 SOLUTION, CONCENTRATE RESPIRATORY (INHALATION) EVERY 8 HOURS PRN
Status: DISCONTINUED | OUTPATIENT
Start: 2018-01-16 | End: 2018-01-16

## 2018-01-16 RX ORDER — ACETAMINOPHEN 325 MG/1
650 TABLET ORAL EVERY 6 HOURS PRN
Status: DISCONTINUED | OUTPATIENT
Start: 2018-01-16 | End: 2018-01-20 | Stop reason: HOSPADM

## 2018-01-16 RX ORDER — LEVALBUTEROL 1.25 MG/.5ML
1.25 SOLUTION, CONCENTRATE RESPIRATORY (INHALATION)
Status: DISCONTINUED | OUTPATIENT
Start: 2018-01-17 | End: 2018-01-20 | Stop reason: HOSPADM

## 2018-01-16 RX ORDER — QUINAPRIL 10 MG/1
10 TABLET ORAL
Status: DISCONTINUED | OUTPATIENT
Start: 2018-01-16 | End: 2018-01-20 | Stop reason: HOSPADM

## 2018-01-16 RX ORDER — SODIUM CHLORIDE FOR INHALATION 0.9 %
3 VIAL, NEBULIZER (ML) INHALATION
Status: DISCONTINUED | OUTPATIENT
Start: 2018-01-17 | End: 2018-01-20 | Stop reason: HOSPADM

## 2018-01-16 RX ORDER — CALCIUM CARBONATE 200(500)MG
1000 TABLET,CHEWABLE ORAL DAILY PRN
Status: DISCONTINUED | OUTPATIENT
Start: 2018-01-16 | End: 2018-01-20 | Stop reason: HOSPADM

## 2018-01-16 RX ORDER — POTASSIUM CHLORIDE 750 MG/1
10 TABLET, FILM COATED, EXTENDED RELEASE ORAL 2 TIMES DAILY
Status: DISCONTINUED | OUTPATIENT
Start: 2018-01-16 | End: 2018-01-16 | Stop reason: SDUPTHER

## 2018-01-16 RX ORDER — ONDANSETRON 2 MG/ML
4 INJECTION INTRAMUSCULAR; INTRAVENOUS EVERY 6 HOURS PRN
Status: DISCONTINUED | OUTPATIENT
Start: 2018-01-16 | End: 2018-01-20 | Stop reason: HOSPADM

## 2018-01-16 RX ORDER — METHYLPREDNISOLONE SODIUM SUCCINATE 40 MG/ML
40 INJECTION, POWDER, LYOPHILIZED, FOR SOLUTION INTRAMUSCULAR; INTRAVENOUS EVERY 12 HOURS SCHEDULED
Status: DISCONTINUED | OUTPATIENT
Start: 2018-01-16 | End: 2018-01-19

## 2018-01-16 RX ORDER — SODIUM CHLORIDE FOR INHALATION 0.9 %
VIAL, NEBULIZER (ML) INHALATION
Status: COMPLETED
Start: 2018-01-16 | End: 2018-01-16

## 2018-01-16 RX ORDER — OXYCODONE HYDROCHLORIDE AND ACETAMINOPHEN 5; 325 MG/1; MG/1
1 TABLET ORAL EVERY 4 HOURS PRN
Status: DISCONTINUED | OUTPATIENT
Start: 2018-01-16 | End: 2018-01-20 | Stop reason: HOSPADM

## 2018-01-16 RX ORDER — LEVOTHYROXINE SODIUM 0.07 MG/1
75 TABLET ORAL
Status: DISCONTINUED | OUTPATIENT
Start: 2018-01-16 | End: 2018-01-20 | Stop reason: HOSPADM

## 2018-01-16 RX ORDER — PANTOPRAZOLE SODIUM 40 MG/1
40 TABLET, DELAYED RELEASE ORAL
Status: DISCONTINUED | OUTPATIENT
Start: 2018-01-16 | End: 2018-01-20 | Stop reason: HOSPADM

## 2018-01-16 RX ORDER — POTASSIUM CHLORIDE 750 MG/1
10 TABLET, EXTENDED RELEASE ORAL 2 TIMES DAILY
Status: DISCONTINUED | OUTPATIENT
Start: 2018-01-16 | End: 2018-01-20 | Stop reason: HOSPADM

## 2018-01-16 RX ORDER — ALPRAZOLAM 0.5 MG/1
0.5 TABLET ORAL
Status: DISCONTINUED | OUTPATIENT
Start: 2018-01-16 | End: 2018-01-20 | Stop reason: HOSPADM

## 2018-01-16 RX ORDER — ALBUTEROL SULFATE 90 UG/1
2 AEROSOL, METERED RESPIRATORY (INHALATION) EVERY 4 HOURS PRN
Status: DISCONTINUED | OUTPATIENT
Start: 2018-01-16 | End: 2018-01-20 | Stop reason: HOSPADM

## 2018-01-16 RX ADMIN — LEVALBUTEROL HYDROCHLORIDE 1.25 MG: 1.25 SOLUTION, CONCENTRATE RESPIRATORY (INHALATION) at 13:53

## 2018-01-16 RX ADMIN — QUINAPRIL 10 MG: 10 TABLET ORAL at 21:12

## 2018-01-16 RX ADMIN — CEFTRIAXONE 1000 MG: 1 INJECTION, SOLUTION INTRAVENOUS at 05:29

## 2018-01-16 RX ADMIN — PANTOPRAZOLE SODIUM 40 MG: 40 TABLET, DELAYED RELEASE ORAL at 05:30

## 2018-01-16 RX ADMIN — ASPIRIN 81 MG 81 MG: 81 TABLET ORAL at 08:36

## 2018-01-16 RX ADMIN — POTASSIUM CHLORIDE 10 MEQ: 750 TABLET, EXTENDED RELEASE ORAL at 19:30

## 2018-01-16 RX ADMIN — ACETAMINOPHEN 650 MG: 325 TABLET ORAL at 15:51

## 2018-01-16 RX ADMIN — POTASSIUM CHLORIDE 10 MEQ: 750 TABLET, FILM COATED, EXTENDED RELEASE ORAL at 08:38

## 2018-01-16 RX ADMIN — METHYLPREDNISOLONE SODIUM SUCCINATE 40 MG: 40 INJECTION, POWDER, FOR SOLUTION INTRAMUSCULAR; INTRAVENOUS at 21:12

## 2018-01-16 RX ADMIN — ISODIUM CHLORIDE 3 ML: 0.03 SOLUTION RESPIRATORY (INHALATION) at 13:53

## 2018-01-16 RX ADMIN — HYDROCHLOROTHIAZIDE 25 MG: 25 TABLET ORAL at 08:36

## 2018-01-16 RX ADMIN — METHYLPREDNISOLONE SODIUM SUCCINATE 40 MG: 40 INJECTION, POWDER, FOR SOLUTION INTRAMUSCULAR; INTRAVENOUS at 08:35

## 2018-01-16 RX ADMIN — LEVOTHYROXINE SODIUM 75 MCG: 75 TABLET ORAL at 05:30

## 2018-01-16 RX ADMIN — ENOXAPARIN SODIUM 40 MG: 40 INJECTION SUBCUTANEOUS at 08:35

## 2018-01-16 RX ADMIN — IPRATROPIUM BROMIDE 0.5 MG: 0.5 SOLUTION RESPIRATORY (INHALATION) at 13:53

## 2018-01-16 NOTE — SOCIAL WORK
Spoke with patient in the ER and she states she lives with her  in a house with full flight of steps which she uses without difficulty  She is independent with ADL's and ambulation  She has a walker and a cane and a shower chair  She does use a cane to ambulate  She has used Odalis in the past  There is no history of mental illness or substance abuse  She purchases her medications at [facility] in [place] and can afford all of her medication  Her  is her POA  Patient drives and  can transfer home at [place]  Patient refuses to sign Observation Letter because she states she was on Observation before and it caused a big bill  I explained she is on Observation Level of care and needed to discuss this with her Doctor

## 2018-01-16 NOTE — CASE MANAGEMENT
Initial Clinical Review    Admission: Date/Time/Statement: IP Order entered 1/16   1139  Admitting Physician Catia Sahu [facility]    Estimated length of stay More than 2 Midnights    Certification I certify that inpatient services are medically necessary for this patient for a duration of greater than two midnights  See H&P and MD Progress Notes for additional information about the patient's course of treatment  OBS 1/16  0148    ED: Date/Time/Mode of Arrival:   ED Arrival Information     Expected Arrival Acuity Means of Arrival Escorted By Service Admission Type    - 1/15/2018 16:49 Urgent Walk-In Spouse General Medicine Urgent    Arrival Complaint    asthma          Chief Complaint:   Chief Complaint   Patient presents with    Cough     Pt reports being sent via PCP for evaluation of "fluid around heart or in lungs"  Pt reports productive coughing and wheezing for 1 month  History of Illness: Gabriella Stoll is a 58 y o  female who presents with complaints of persistent cough for the past month  Patient states she has persistent wheezing  Patient states that despite being on a dose of doxycycline and azithromycin she still has the same symptoms  Patient denies any fever, chills, abdominal pain, nausea, vomiting  Patient has history of asthma  PE : Pulmonary/Chest: No accessory muscle usage  Tachypnea noted  She is in respiratory distress (mild)  She has decreased breath sounds (significant)  She has wheezes (mild)     Speaking in 7-9 word sentences        ED Vital Signs:   ED Triage Vitals   Temperature Pulse Respirations Blood Pressure SpO2   01/15/18 1702 01/15/18 1702 01/15/18 1702 01/15/18 1702 01/15/18 1702   98 5 °F (36 9 °C) 102 22 149/68 96 %      Temp Source Heart Rate Source Patient Position - Orthostatic VS BP Location FiO2 (%)   01/15/18 1702 01/15/18 1702 01/15/18 1702 01/15/18 1702 --   Temporal Monitor Sitting Left arm       Pain Score       01/15/18 2939 8        Wt Readings from Last 1 Encounters:   01/15/18 99 8 kg (220 lb)       Vital Signs (abnormal): wnl    Abnormal Labs/Diagnostic Test Results: cl   98, Co2  34   pH, Juanito 7 414 (H) 7 300 - 7 400     pCO2, Juanito 50 9 (H) 42 0 - 50 0 mm Hg     pO2, Juanito 29 6 (L) 35 0 - 45 0 mm Hg     HCO3, Juanito 31 8 (H) 24 - 30 mmol/L     Base Excess, Juanito 6 0 mmol/L    CXR - wnl EKG - NSR       ED Treatment:   Medication Administration from 01/15/2018 1529 to 01/16/2018 1031       Date/Time Order Dose Route Action Action by Comments     01/15/2018 2303 albuterol inhalation solution 10 mg 10 mg Nebulization Given Barbi Epperson, RT      01/15/2018 2303 ipratropium (ATROVENT) 0 02 % inhalation solution 1 mg 1 mg Nebulization Given Barbi Epperson, RT      01/15/2018 2303 sodium chloride 0 9 % inhalation solution 3 mL 3 mL Nebulization Given Barbi Epperson, RT      01/15/2018 2309 methylPREDNISolone sodium succinate (Solu-MEDROL) injection 125 mg 125 mg Intravenous Given Enid Cushing, RN      01/16/2018 0836 aspirin chewable tablet 81 mg 81 mg Oral Given Karena Wheeler RN      01/16/2018 0836 hydrochlorothiazide (HYDRODIURIL) tablet 25 mg 25 mg Oral Given Karena Wheeler RN      01/16/2018 0530 levothyroxine tablet 75 mcg 75 mcg Oral Given Jacinta Salinas, MITUL      01/16/2018 0530 pantoprazole (PROTONIX) EC tablet 40 mg 40 mg Oral Given Enid Cushing, RN      01/16/2018 0838 potassium chloride (K-DUR) CR tablet 10 mEq 10 mEq Oral Given Karena Wheeler RN      01/16/2018 0835 methylPREDNISolone sodium succinate (Solu-MEDROL) injection 40 mg 40 mg Intravenous Given Karena Wheeler RN      01/16/2018 0529 cefTRIAXone (ROCEPHIN) IVPB (premix) 1,000 mg 1,000 mg Intravenous New Bag Jacinta Salinas, RN      01/16/2018 0835 enoxaparin (LOVENOX) subcutaneous injection 40 mg 40 mg Subcutaneous Given Karena Wheeler RN      01/16/2018 0838 fluticasone-salmeterol (ADVAIR) 250-50 mcg/dose inhaler 1 puff 1 puff Inhalation Not Given Jannet Corona RN           Past Medical/Surgical History: Active Ambulatory Problems     Diagnosis Date Noted    No Active Ambulatory Problems     Resolved Ambulatory Problems     Diagnosis Date Noted    No Resolved Ambulatory Problems     Past Medical History:   Diagnosis Date    Asthma     Disease of thyroid gland     GERD (gastroesophageal reflux disease)     Hypertension        Admitting Diagnosis: Cough [R05]    Age/Sex: 58 y o  female   Assessment/Plan:   Hospital Problem List:    Principal Problem:    Asthma exacerbation  Active Problems:    HTN (hypertension)   Plan for the Primary Problem(s):  · Asthma exacerbation  ? Admit  ? Respiratory protocol  ? IV Rocephin  ? Nebulizer treatment  ? IV Solu-Medrol  ? Advair   Plan for Additional Problems:   · Hypertension - continue HCTZ and quinapril     VTE Prophylaxis: Enoxaparin (Lovenox)  / sequential compression device   Code Status:  Full  POLST: There is no POLST form on file for this patient (pre-hospital)   Anticipated Length of Stay:  Patient will be admitted on an Observation basis with an anticipated length of stay of  less than 2 midnights     Justification for Hospital Stay:  Respiratory support, IV antibiotics         Admission Orders:  Scheduled Meds:   aspirin 81 mg Oral Daily   cefTRIAXone 1,000 mg Intravenous Q24H   enoxaparin 40 mg Subcutaneous Daily   fluticasone-salmeterol 1 puff Inhalation Q12H ALONZO   hydrochlorothiazide 25 mg Oral Daily   levothyroxine 75 mcg Oral Early Morning   methylPREDNISolone sodium succinate 40 mg Intravenous Q12H ALONZO   pantoprazole 40 mg Oral Early Morning   potassium chloride 10 mEq Oral BID   quinapril 10 mg Oral HS     Continuous Infusions:    PRN Meds:   acetaminophen    ALPRAZolam    calcium carbonate    levalbuterol **AND** ipratropium    ondansetron    oxyCODONE-acetaminophen     Reg diet   SCD  Up w/ assist   Bmp, cbc x3 days   Gluc   161, wbc 11 24  1/16 plt ct       Thank you,  [address] [facility] in the [facility] by [address] for 2017  Network Utilization Review Department  Phone: [phone]; Fax [phone]  ATTENTION: The Network Utilization Review Department is now centralized for our 7 Facilities  Make a note that we have a new phone and fax numbers for our Department  Please call with any questions or concerns to [phone] and carefully follow the prompts so that you are directed to the right person  All voicemails are confidential  Fax any determinations, approvals, denials, and requests for initial or continue stay review clinical to [phone]  Due to HIGH CALL volume, it would be easier if you could please send faxed requests to expedite your requests and in part, help us provide discharge notifications faster

## 2018-01-16 NOTE — PLAN OF CARE
Problem: DISCHARGE PLANNING  Goal: Discharge to home or other facility with appropriate resources  INTERVENTIONS:  - Identify barriers to discharge w/patient and caregiver  - Arrange for needed discharge resources and transportation as appropriate  - Identify discharge learning needs (meds, wound care, etc )  - Arrange for interpretive services to assist at discharge as needed  - Refer to Case Management Department for coordinating discharge planning if the patient needs post-hospital services based on physician/advanced practitioner order or complex needs related to functional status, cognitive ability, or social support system   Outcome: Progressing  Spoke with patient in the ER and she states she lives with her  in a house with full flight of steps which she uses without difficulty  She is independent with ADL's and ambulation  She has a walker and a cane and a shower chair  She does use a cane to ambulate  She has used Odalis in the past  There is no history of mental illness or substance abuse  She purchases her medications at [employer] in [place] and can afford all of her medication  Her  is her POA  Patient drives and  can transfer home at [place]  Patient refuses to sign Observation Letter because she states she was on Observation before and it caused a big bill  I explained she is on Observation Level of care and needed to discuss this with her Doctor

## 2018-01-16 NOTE — PROGRESS NOTES
[facility] Progress Note  Patient: Kelsy Edwards 58 y o  female   MRN: [id number]  PCP: Abraham Bueno MD  Unit/Bed#: ED 18 Encounter: [id number]  Date Of Visit: 18    Assessment:    Principal Problem:    Asthma exacerbation  Active Problems:    HTN (hypertension)      Plan:    · 1  SOB secondary to asthma exacerbation vs  Acute bronchitis- patient has been treated for 2 rounds oral abx as outpatient but is has cough and SOB-  Patient still wheezing  Will place on IV steroids, dounebs and IV abx  · 2  HTN- on quinapril  · 3  Hypothyroidism- on levothyroxine  · 4  H/o Asthma       VTE Pharmacologic Prophylaxis:   Pharmacologic: Enoxaparin (Lovenox)  Mechanical VTE Prophylaxis in Place: Yes    Patient Centered Rounds: I have performed bedside rounds with nursing staff today  Discussions with Specialists or Other Care Team Provider:     Education and Discussions with Family / Patient:     Time Spent for Care: 20 minutes  More than 50% of total time spent on counseling and coordination of care as described above  Current Length of Stay: 0 day(s)    Current Patient Status: Inpatient   Certification Statement: The patient will continue to require additional inpatient hospital stay due to SOB     Discharge Plan / Estimated Discharge Date: Once SOB improves    Code Status: Level 1 - Full Code      Subjective:   Patient seen and examined at bedside  Patient still wheezing    Objective:     Vitals:   Temp (24hrs), Av 5 °F (36 9 °C), Min:98 5 °F (36 9 °C), Max:98 5 °F (36 9 °C)    HR:  [100-102] 100  Resp:  [19-24] 19  BP: (112-149)/(58-87) 112/58  SpO2:  [95 %-97 %] 95 %  Body mass index is 45 98 kg/m²  Input and Output Summary (last 24 hours):     No intake or output data in the 24 hours ending 18 1311    Physical Exam:     Physical Exam   Constitutional: She is oriented to person, place, and time  She appears well-developed and well-nourished     HENT:   Head: Normocephalic and atraumatic  Eyes: Conjunctivae and EOM are normal  Pupils are equal, round, and reactive to light  Neck: Normal range of motion  Neck supple  No JVD present  No tracheal deviation present  No thyromegaly present  Cardiovascular: Normal rate, regular rhythm and normal heart sounds  Exam reveals no gallop and no friction rub  No murmur heard  Pulmonary/Chest: Effort normal  No respiratory distress  She has wheezes  She has no rales  She exhibits no tenderness  Abdominal: Soft  Bowel sounds are normal  She exhibits no distension  There is no tenderness  There is no rebound  Musculoskeletal: Normal range of motion  She exhibits no edema  Neurological: She is alert and oriented to person, place, and time  Vitals reviewed  Additional Data:     Labs:      Results from last 7 days  Lab Units 01/16/18  0636 01/15/18  2306   WBC Thousand/uL 11 24* 9 63   HEMOGLOBIN g/dL 12 3 12 2   HEMATOCRIT % 39 0 38 6   PLATELETS Thousands/uL 254 235   NEUTROS PCT %  --  53   LYMPHS PCT %  --  35   MONOS PCT %  --  8   EOS PCT %  --  4       Results from last 7 days  Lab Units 01/16/18  0636   SODIUM mmol/L 140   POTASSIUM mmol/L 3 7   CHLORIDE mmol/L 100   CO2 mmol/L 28   BUN mg/dL 15   CREATININE mg/dL 0 99   CALCIUM mg/dL 9 0   GLUCOSE RANDOM mg/dL 171*           * I Have Reviewed All Lab Data Listed Above  * Additional Pertinent Lab Tests Reviewed:  [facility] Admission Reviewed    Imaging:    Imaging Reports Reviewed Today Include:   Imaging Personally Reviewed by Myself Includes:      Recent Cultures (last 7 days):           Last 24 Hours Medication List:     aspirin 81 mg Oral Daily   cefTRIAXone 1,000 mg Intravenous Q24H   enoxaparin 40 mg Subcutaneous Daily   fluticasone-salmeterol 1 puff Inhalation Q12H [facility]   hydrochlorothiazide 25 mg Oral Daily   levothyroxine 75 mcg Oral Early Morning   methylPREDNISolone sodium succinate 40 mg Intravenous Q12H [facility]   pantoprazole 40 mg Oral Early Morning   potassium chloride 10 mEq Oral BID   quinapril 10 mg Oral HS        Today, Patient Was Seen By: Mitesh Kemp MD    ** Please Note: This note has been constructed using a voice recognition system   **

## 2018-01-16 NOTE — ED PROVIDER NOTES
History  Chief Complaint   Patient presents with    Cough     Pt reports being sent via PCP for evaluation of "fluid around heart or in lungs"  Pt reports productive coughing and wheezing for 1 month  HPI    Prior to Admission Medications   Prescriptions Last Dose Informant Patient Reported? Taking? ALPRAZolam (XANAX) 0 5 mg tablet   Yes No   Sig: Take 0 5 mg by mouth daily at bedtime as needed for anxiety   Mometasone Furo-Formoterol Fum (DULERA) 100-5 MCG/ACT AERO   Yes No   Sig: Inhale   aspirin 81 MG tablet   Yes No   Sig: Take 81 mg by mouth daily   hydrochlorothiazide (HYDRODIURIL) 25 mg tablet   Yes No   Sig: Take 25 mg by mouth daily   levalbuterol (XOPENEX HFA) 45 mcg/act inhaler   Yes No   Sig: Inhale 1-2 puffs every 4 (four) hours as needed for wheezing   levothyroxine (SYNTHROID) 50 mcg tablet   Yes No   Sig: Take 50 mcg by mouth daily   levothyroxine 75 mcg tablet   Yes No   Sig: Take 75 mcg by mouth daily   oxyCODONE-acetaminophen (PERCOCET) 5-325 mg per tablet   No No   Sig: Take 1 tablet by mouth every 4 (four) hours as needed for moderate pain for up to 12 doses Max Daily Amount: 6 tablets   pantoprazole (PROTONIX) 40 mg tablet   Yes No   Sig: Take 40 mg by mouth daily   potassium chloride (K-DUR) 10 mEq tablet   Yes No   Sig: Take 10 mEq by mouth 2 (two) times a day   quinapril (ACCUPRIL) 10 mg tablet   Yes No   Sig: Take 10 mg by mouth daily at bedtime      Facility-Administered Medications: None       Past Medical History:   Diagnosis Date    Asthma     Disease of thyroid gland     GERD (gastroesophageal reflux disease)     Hypertension        Past Surgical History:   Procedure Laterality Date    HYSTERECTOMY      ROTATOR CUFF REPAIR      SHOULDER SURGERY         History reviewed  No pertinent family history  I have reviewed and agree with the history as documented      Social History   Substance Use Topics    Smoking status: Never Smoker    Smokeless tobacco: Never Used   Vena Moshe Alcohol use Yes      Comment: socially         Review of Systems    Physical Exam  ED Triage Vitals   Temperature Pulse Respirations Blood Pressure SpO2   01/15/18 1702 01/15/18 1702 01/15/18 1702 01/15/18 1702 01/15/18 1702   98 5 °F (36 9 °C) 102 22 149/68 96 %      Temp Source Heart Rate Source Patient Position - Orthostatic VS BP Location FiO2 (%)   01/15/18 1702 01/15/18 1702 01/15/18 1702 01/15/18 1702 --   Temporal Monitor Sitting Left arm       Pain Score       01/15/18 2216       8           Orthostatic Vital Signs  Vitals:    01/15/18 1702 01/15/18 2216   BP: 149/68 147/87   Pulse: 102 101   Patient Position - Orthostatic VS: Sitting Sitting       Physical Exam   Constitutional: She is oriented to person, place, and time  She appears well-developed and well-nourished  She appears distressed  HENT:   Head: Normocephalic and atraumatic  Mouth/Throat: Oropharynx is clear and moist    Eyes: Conjunctivae are normal  Pupils are equal, round, and reactive to light  Neck: Normal range of motion  No tracheal deviation present  Cardiovascular: Normal rate, regular rhythm, normal heart sounds and intact distal pulses  Pulmonary/Chest: No accessory muscle usage  Tachypnea noted  She is in respiratory distress (mild)  She has decreased breath sounds (significant)  She has wheezes (mild)  Speaking in 7-9 word sentences   Abdominal: Soft  Bowel sounds are normal  She exhibits no distension  There is no tenderness  Musculoskeletal: She exhibits no edema  Lymphadenopathy:     She has no cervical adenopathy  Neurological: She is alert and oriented to person, place, and time  She has normal strength  GCS eye subscore is 4  GCS verbal subscore is 5  GCS motor subscore is 6  Skin: Skin is warm and dry  Psychiatric: She has a normal mood and affect  Her behavior is normal    Nursing note and vitals reviewed        ED Medications  Medications   albuterol inhalation solution 10 mg (not administered) ipratropium (ATROVENT) 0 02 % inhalation solution 1 mg (not administered)   sodium chloride 0 9 % inhalation solution 3 mL (not administered)   methylPREDNISolone sodium succinate (Solu-MEDROL) injection 125 mg (not administered)       Diagnostic Studies  Results Reviewed     Procedure Component Value Units Date/Time    Platelet count [[id number]]     Lab Status:  No result Specimen:  Blood     Basic metabolic panel [[id number]]  (Abnormal) Collected:  01/15/18 2306    Lab Status:  Final result Specimen:  Blood from Arm, Left Updated:  01/15/18 2333     Sodium 139 mmol/L      Potassium 4 0 mmol/L      Chloride 98 (L) mmol/L      CO2 34 (H) mmol/L      Anion Gap 7 mmol/L      BUN 16 mg/dL      Creatinine 0 88 mg/dL      Glucose 98 mg/dL      Calcium 9 4 mg/dL      eGFR 71 ml/min/1 73sq m     Narrative:         National Kidney Disease Education Program recommendations are as follows:  GFR calculation is accurate only with a steady state creatinine  Chronic Kidney disease less than 60 ml/min/1 73 sq  meters  Kidney failure less than 15 ml/min/1 73 sq  meters  B-type natriuretic peptide [[id number]]  (Normal) Collected:  01/15/18 2306    Lab Status:  Final result Specimen:  Blood from Arm, Left Updated:  01/15/18 2333     NT-proBNP 32 pg/mL     Troponin I [[id number]]  (Normal) Collected:  01/15/18 2306    Lab Status:  Final result Specimen:  Blood from Arm, Left Updated:  01/15/18 2331     Troponin I <0 02 ng/mL     Narrative:         [employer] Chemistry analyzer 99% cutoff is > 0 04 ng/mL in network labs    o cTnI 99% cutoff is useful only when applied to patients in the clinical setting of myocardial ischemia  o cTnI 99% cutoff should be interpreted in the context of clinical history, ECG findings and possibly cardiac imaging to establish correct diagnosis  o cTnI 99% cutoff may be suggestive but clearly not indicative of a coronary event without the clinical setting of myocardial ischemia      Blood gas, venous [[id number]]  (Abnormal) Collected:  01/15/18 2306    Lab Status:  Final result Specimen:  Blood from Arm, Left Updated:  01/15/18 2312     pH, Juanito 7 414 (H)     pCO2, Juanito 50 9 (H) mm Hg      pO2, Juanito 29 6 (L) mm Hg      HCO3, Juanito 31 8 (H) mmol/L      Base Excess, Juanito 6 0 mmol/L      O2 Content, Juanito 11 4 ml/dL      O2 HGB, VENOUS 60 5 %     CBC and differential [[id number]]  (Abnormal) Collected:  01/15/18 2306    Lab Status:  Final result Specimen:  Blood from Arm, Left Updated:  01/15/18 2311     WBC 9 63 Thousand/uL      RBC 4 51 Million/uL      Hemoglobin 12 2 g/dL      Hematocrit 38 6 %      MCV 86 fL      MCH 27 1 pg      MCHC 31 6 g/dL      RDW 14 8 %      MPV 8 6 (L) fL      Platelets 796 Thousands/uL      nRBC 0 /100 WBCs      Neutrophils Relative 53 %      Lymphocytes Relative 35 %      Monocytes Relative 8 %      Eosinophils Relative 4 %      Basophils Relative 0 %      Neutrophils Absolute 5 07 Thousands/µL      Lymphocytes Absolute 3 32 Thousands/µL      Monocytes Absolute 0 81 Thousand/µL      Eosinophils Absolute 0 34 Thousand/µL      Basophils Absolute 0 04 Thousands/µL                  No orders to display              Procedures  ECG 12 Lead Documentation  Date/Time: 1/15/2018 11:21 PM  Performed by: Onel Hernández  Authorized by: Onel Hernández     Indications / Diagnosis:  SOB  ECG reviewed by me, the ED Provider: yes    Patient location:  ED  Previous ECG:     Previous ECG:  Compared to current    Comparison ECG info:  08/26/13    Similarity:  No change  Interpretation:     Interpretation: non-specific    Rate:     ECG rate:  89    ECG rate assessment: normal    Rhythm:     Rhythm: sinus rhythm    Ectopy:     Ectopy: none    QRS:     QRS axis:  Right    QRS intervals:  Normal  Conduction:     Conduction: normal    ST segments:     ST segments:  Normal  T waves:     T waves: normal      CriticalCare Time  Performed by: Onel Hernández  Authorized by: Hasmukh Florian A     Critical care provider statement:     Critical care time (minutes):  30    Critical care time was exclusive of:  Separately billable procedures and treating other patients and teaching time    Critical care was necessary to treat or prevent imminent or life-threatening deterioration of the following conditions:  Respiratory failure    Critical care was time spent personally by me on the following activities:  Obtaining history from patient or surrogate, development of treatment plan with patient or surrogate, discussions with consultants, evaluation of patient's response to treatment, examination of patient, re-evaluation of patient's condition, ordering and review of radiographic studies, ordering and review of laboratory studies and ordering and performing treatments and interventions    I assumed direction of critical care for this patient from another provider in my specialty: no             Phone Contacts  ED Phone Contact    ED Course  ED Course                                MDM  Number of Diagnoses or Management Options  Asthma exacerbation: new and requires workup  Persistent cough for 3 weeks or longer: new and requires workup  Diagnosis management comments: This is a 19-year-old female who presents here today with a persistent cough at home  She states shortly before Christmas she developed fevers, URI symptoms, and a cough  She states it started initially as upper URI symptoms and then progressed into my lungs    She had been on two rounds of antibiotics, and steroids, initially felt like she was getting better, however her cough persisted  She has been intermittently short of breath over the past several weeks  Prior to today, she had not seen her doctor since December, despite her persistent symptoms    She states she followed up with her doctor today and was told that it sounded like she might have fluid on either her lungs or her heart, and she needed to be evaluated in the emergency department with extensive testing and several days admission  She denies any fevers  Her cough is nonproductive  She occasionally has trouble breathing, and this is worse with exertion  She denies any chest pain, lower extremity edema, weight gain, other complaints  She has been using her rescue inhaler about three to 4 times a day, with mild improvement of her symptoms  It has been several weeks since she was on steroids  She does have a history of asthma, and states it is tends to act up in the setting of infections  She denies prior admissions for her asthma before  She denies any underlying cardiac disease  She has no known sick contacts  ROS: Otherwise negative, unless stated as above  She is well-appearing, in mild respiratory distress  She is speaking in about 7-9 word sentences, and has significantly decreased breath sounds with mild wheezing  The remainder of her exam is unremarkable  This is most consistent with viral URI causing persistent cough and asthma exacerbation  She has no symptoms suggestive of CHF exacerbation, particularly without history of this  She does not sound like she is fluid overloaded on exam   We will treat her here with nebulizers and steroids, get a chest x-ray and lab work to evaluate  Her chest x-ray was reviewed by myself, and shows no acute abnormalities  The bicarb was slightly elevated on her BMP, but the remainder of her labs are unremarkable  She did feel better after nebulizers, but walked to the bathroom and became short of breath again  She still has normal vital signs, and has significantly improved air movement, though still has mild wheezing on exam   She is speaking easily in full sentences  Given her complaints of persistent symptoms and continued wheezing here, we will admit her as observation for further management of her symptoms         Amount and/or Complexity of Data Reviewed  Clinical lab tests: reviewed and ordered  Tests in the radiology section of CPT®: ordered and reviewed  Discuss the patient with other providers: yes  Independent visualization of images, tracings, or specimens: yes      CritCare Time    Disposition  Final diagnoses:   Asthma exacerbation   Persistent cough for 3 weeks or longer     Time reflects when diagnosis was documented in both MDM as applicable and the Disposition within this note     Time User Action Codes Description Comment    1/16/2018  1:14 AM Mahoney-Tesoriero, Launie Schirmer Add [J45 901] Asthma exacerbation     1/16/2018  1:14 AM Mahoney-Tesoriero, Launie Schirmer Add [R05] Persistent cough     1/16/2018  1:15 AM Mahoney-Tesoriero, Saintclair Collier [R05] Persistent cough     1/16/2018  1:15 AM Mahoney-Tesoriero, Launie Schirmer Add [R05] Persistent cough for 3 weeks or longer       ED Disposition     ED Disposition Condition Comment    Admit  Case was discussed with Manuel Rosales and the patient's admission status was agreed to be Admission Status: observation status to the service of Dr García Poth   Follow-up Information    None       Patient's Medications   Discharge Prescriptions    No medications on file     No discharge procedures on file      ED Provider  Electronically Signed by           Jesusita Sneed MD  01/16/18 3444       Jesusita Sneed MD  01/16/18 7292

## 2018-01-16 NOTE — H&P
History and Physical - [facility] Internal Medicine    Patient Information: Chris Jolly 58 y o  female MRN: [id number]  Unit/Bed#: ED 12 Encounter: [id number]  Admitting Physician: Heidi Paulson PA-C  PCP: Fadi Vasquez MD  Date of Admission:  01/16/18    Assessment/Plan:    Hospital Problem List:     Principal Problem:    Asthma exacerbation  Active Problems:    HTN (hypertension)      Plan for the Primary Problem(s):  · Asthma exacerbation  · Admit  · Respiratory protocol  · IV Rocephin  · Nebulizer treatment  · IV Solu-Medrol  · Advair    Plan for Additional Problems:   · Hypertension - continue HCTZ and quinapril    VTE Prophylaxis: Enoxaparin (Lovenox)  / sequential compression device   Code Status:  Full  POLST: There is no POLST form on file for this patient (pre-hospital)    Anticipated Length of Stay:  Patient will be admitted on an Observation basis with an anticipated length of stay of  less than 2 midnights  Justification for Hospital Stay:  Respiratory support, IV antibiotics    Total Time for Visit, including Counseling / Coordination of Care: 30 minutes  Greater than 50% of this total time spent on direct patient counseling and coordination of care  Chief Complaint:   Cough and shortness of breath    History of Present Illness:    Chris Jolly is a 58 y o  female who presents with complaints of persistent cough for the past month  Patient states she has persistent wheezing  Patient states that despite being on a dose of doxycycline and azithromycin she still has the same symptoms  Patient denies any fever, chills, abdominal pain, nausea, vomiting  Patient has history of asthma  Review of Systems:    Review of Systems   Respiratory: Positive for shortness of breath  All other systems reviewed and are negative        Past Medical and Surgical History:     Past Medical History:   Diagnosis Date    Asthma     Disease of thyroid gland     GERD (gastroesophageal reflux disease)     Hypertension        Past Surgical History:   Procedure Laterality Date    HYSTERECTOMY      ROTATOR CUFF REPAIR      SHOULDER SURGERY         Meds/Allergies:    Prior to Admission medications    Medication Sig Start Date End Date Taking? Authorizing Provider   ALPRAZolam [place]) 0 5 mg tablet Take 0 5 mg by mouth daily at bedtime as needed for anxiety    Historical Provider, MD   aspirin 81 MG tablet Take 81 mg by mouth daily    Historical Provider, MD   hydrochlorothiazide (HYDRODIURIL) 25 mg tablet Take 25 mg by mouth daily    Historical Provider, MD   levalbuterol (XOPENEX HFA) 45 mcg/act inhaler Inhale 1-2 puffs every 4 (four) hours as needed for wheezing    Historical Provider, MD   levothyroxine (SYNTHROID) 50 mcg tablet Take 50 mcg by mouth daily    Historical Provider, MD   levothyroxine 75 mcg tablet Take 75 mcg by mouth daily    Historical Provider, MD   Mometasone Furo-Formoterol Fum (DULERA) 100-5 MCG/ACT AERO Inhale    Historical Provider, MD   oxyCODONE-acetaminophen (PERCOCET) 5-325 mg per tablet Take 1 tablet by mouth every 4 (four) hours as needed for moderate pain for up to 12 doses Max Daily Amount: 6 tablets 1/17/17   Hammad Monteiro MD   pantoprazole (PROTONIX) 40 mg tablet Take 40 mg by mouth daily    Historical Provider, MD   potassium chloride (K-DUR) 10 mEq tablet Take 10 mEq by mouth 2 (two) times a day    Historical Provider, MD   quinapril (ACCUPRIL) 10 mg tablet Take 10 mg by mouth daily at bedtime    Historical Provider, MD     I have reviewed home medications with patient personally  Allergies:    Allergies   Allergen Reactions    Ciprofloxacin Hives    Penicillins Hives    Sulfa Antibiotics Hives    Vancomycin Hives       Social History:     Marital Status: /Civil Union   Occupation:  Retired  Patient Pre-hospital Living Situation:  Lives at home  Patient Pre-hospital Level of Mobility:  Ambulatory  Patient Pre-hospital Diet Restrictions: None  Substance Use History:   History   Alcohol Use    Yes     Comment: socially      History   Smoking Status    Never Smoker   Smokeless Tobacco    Never Used     History   Drug Use No       Family History:    History reviewed  No pertinent family history  Physical Exam:     Vitals:   Blood Pressure: 112/58 (01/16/18 0015)  Pulse: 100 (01/16/18 0015)  Temperature: 98 5 °F (36 9 °C) (01/15/18 1702)  Temp Source: Temporal (01/15/18 1702)  Respirations: 19 (01/16/18 0015)  Weight - Scale: 99 8 kg (220 lb) (01/15/18 1702)  SpO2: 95 % (01/16/18 0015)    Physical Exam   Constitutional: She is oriented to person, place, and time  She appears well-developed and well-nourished  HENT:   Head: Normocephalic and atraumatic  Right Ear: External ear normal    Left Ear: External ear normal    Nose: Nose normal    Mouth/Throat: Oropharynx is clear and moist    Eyes: Conjunctivae and EOM are normal  Pupils are equal, round, and reactive to light  Neck: Normal range of motion  Cardiovascular: Normal rate, regular rhythm and normal heart sounds  Pulmonary/Chest: Effort normal  She has wheezes  expiratory wheezing noted bilateral bases   Abdominal: Soft  Bowel sounds are normal    Musculoskeletal: Normal range of motion  Neurological: She is alert and oriented to person, place, and time  Skin: Skin is warm and dry  Psychiatric: She has a normal mood and affect  Her behavior is normal  Judgment and thought content normal    Vitals reviewed  Additional Data:     Lab Results: I have personally reviewed pertinent reports          Results from last 7 days  Lab Units 01/15/18  2306   WBC Thousand/uL 9 63   HEMOGLOBIN g/dL 12 2   HEMATOCRIT % 38 6   PLATELETS Thousands/uL 235   NEUTROS PCT % 53   LYMPHS PCT % 35   MONOS PCT % 8   EOS PCT % 4       Results from last 7 days  Lab Units 01/15/18  2306   SODIUM mmol/L 139   POTASSIUM mmol/L 4 0   CHLORIDE mmol/L 98*   CO2 mmol/L 34*   BUN mg/dL 16   CREATININE mg/dL 0 88   CALCIUM mg/dL 9 4   GLUCOSE RANDOM mg/dL 98           Imaging: I have personally reviewed pertinent reports  Xr Chest 2 Views    Result Date: 1/16/2018  Narrative: CHEST INDICATION:  SOB  History taken directly from the electronic ordering system  COMPARISON:  12/19/2017  VIEWS:  Frontal and lateral projections IMAGES:  2 FINDINGS:     Cardiomediastinal silhouette appears unremarkable  The lungs are clear  No pneumothorax or pleural effusion  Visualized osseous structures appear within normal limits for the patient's age  Impression: No active pulmonary disease  Workstation performed: [id number]     Xr Chest Pa & Lateral    Result Date: 12/19/2017  Narrative: CHEST INDICATION:  Coughing congestion  COMPARISON:  7/14/2014 VIEWS:  Frontal and lateral projections IMAGES:  2 FINDINGS:     Cardiomediastinal silhouette appears unremarkable  The lungs are clear  No pneumothorax or pleural effusion  Visualized osseous structures appear within normal limits for the patient's age  Impression: No active pulmonary disease  Workstation performed: [id number]       EKG, Pathology, and Other Studies Reviewed on Admission:   · EKG: NSR    Allscripts / Epic Records Reviewed: Yes     ** Please Note: This note has been constructed using a voice recognition system   **

## 2018-01-17 ENCOUNTER — APPOINTMENT (INPATIENT)
Dept: NON INVASIVE DIAGNOSTICS | Facility: HOSPITAL | Age: 63
DRG: 189 | End: 2018-01-17
Payer: COMMERCIAL

## 2018-01-17 LAB
ANION GAP SERPL CALCULATED.3IONS-SCNC: 10 MMOL/L (ref 4–13)
BASOPHILS # BLD AUTO: 0.01 THOUSANDS/ΜL (ref 0–0.1)
BASOPHILS NFR BLD AUTO: 0 % (ref 0–1)
BUN SERPL-MCNC: 20 MG/DL (ref 5–25)
CALCIUM SERPL-MCNC: 9.4 MG/DL (ref 8.3–10.1)
CHLORIDE SERPL-SCNC: 100 MMOL/L (ref 100–108)
CO2 SERPL-SCNC: 29 MMOL/L (ref 21–32)
CREAT SERPL-MCNC: 1 MG/DL (ref 0.6–1.3)
EOSINOPHIL # BLD AUTO: 0 THOUSAND/ΜL (ref 0–0.61)
EOSINOPHIL NFR BLD AUTO: 0 % (ref 0–6)
ERYTHROCYTE [DISTWIDTH] IN BLOOD BY AUTOMATED COUNT: 15.4 % (ref 11.6–15.1)
GFR SERPL CREATININE-BSD FRML MDRD: 61 ML/MIN/1.73SQ M
GLUCOSE SERPL-MCNC: 184 MG/DL (ref 65–140)
HCT VFR BLD AUTO: 40.4 % (ref 34.8–46.1)
HGB BLD-MCNC: 12.7 G/DL (ref 11.5–15.4)
LYMPHOCYTES # BLD AUTO: 1.48 THOUSANDS/ΜL (ref 0.6–4.47)
LYMPHOCYTES NFR BLD AUTO: 10 % (ref 14–44)
MCH RBC QN AUTO: 26.7 PG (ref 26.8–34.3)
MCHC RBC AUTO-ENTMCNC: 31.4 G/DL (ref 31.4–37.4)
MCV RBC AUTO: 85 FL (ref 82–98)
MONOCYTES # BLD AUTO: 0.47 THOUSAND/ΜL (ref 0.17–1.22)
MONOCYTES NFR BLD AUTO: 3 % (ref 4–12)
NEUTROPHILS # BLD AUTO: 13.22 THOUSANDS/ΜL (ref 1.85–7.62)
NEUTS SEG NFR BLD AUTO: 86 % (ref 43–75)
NRBC BLD AUTO-RTO: 0 /100 WBCS
PLATELET # BLD AUTO: 307 THOUSANDS/UL (ref 149–390)
PMV BLD AUTO: 9 FL (ref 8.9–12.7)
POTASSIUM SERPL-SCNC: 3.7 MMOL/L (ref 3.5–5.3)
RBC # BLD AUTO: 4.75 MILLION/UL (ref 3.81–5.12)
SODIUM SERPL-SCNC: 139 MMOL/L (ref 136–145)
WBC # BLD AUTO: 15.32 THOUSAND/UL (ref 4.31–10.16)

## 2018-01-17 PROCEDURE — 93306 TTE W/DOPPLER COMPLETE: CPT

## 2018-01-17 PROCEDURE — 94760 N-INVAS EAR/PLS OXIMETRY 1: CPT

## 2018-01-17 PROCEDURE — 85025 COMPLETE CBC W/AUTO DIFF WBC: CPT | Performed by: INTERNAL MEDICINE

## 2018-01-17 PROCEDURE — 80048 BASIC METABOLIC PNL TOTAL CA: CPT | Performed by: INTERNAL MEDICINE

## 2018-01-17 PROCEDURE — 94640 AIRWAY INHALATION TREATMENT: CPT

## 2018-01-17 RX ORDER — DULOXETIN HYDROCHLORIDE 30 MG/1
90 CAPSULE, DELAYED RELEASE ORAL DAILY
Status: DISCONTINUED | OUTPATIENT
Start: 2018-01-17 | End: 2018-01-20 | Stop reason: HOSPADM

## 2018-01-17 RX ORDER — LORATADINE 10 MG/1
10 TABLET ORAL DAILY
Status: DISCONTINUED | OUTPATIENT
Start: 2018-01-17 | End: 2018-01-20 | Stop reason: HOSPADM

## 2018-01-17 RX ORDER — DULOXETIN HYDROCHLORIDE 60 MG/1
60 CAPSULE, DELAYED RELEASE ORAL DAILY
COMMUNITY
End: 2018-09-13 | Stop reason: SDUPTHER

## 2018-01-17 RX ADMIN — LEVALBUTEROL HYDROCHLORIDE 1.25 MG: 1.25 SOLUTION, CONCENTRATE RESPIRATORY (INHALATION) at 21:29

## 2018-01-17 RX ADMIN — ISODIUM CHLORIDE 3 ML: 0.03 SOLUTION RESPIRATORY (INHALATION) at 01:18

## 2018-01-17 RX ADMIN — ISODIUM CHLORIDE 3 ML: 0.03 SOLUTION RESPIRATORY (INHALATION) at 14:28

## 2018-01-17 RX ADMIN — POTASSIUM CHLORIDE 10 MEQ: 750 TABLET, EXTENDED RELEASE ORAL at 08:50

## 2018-01-17 RX ADMIN — POTASSIUM CHLORIDE 10 MEQ: 750 TABLET, EXTENDED RELEASE ORAL at 19:09

## 2018-01-17 RX ADMIN — ALPRAZOLAM 0.5 MG: 0.5 TABLET ORAL at 23:06

## 2018-01-17 RX ADMIN — METHYLPREDNISOLONE SODIUM SUCCINATE 40 MG: 40 INJECTION, POWDER, FOR SOLUTION INTRAMUSCULAR; INTRAVENOUS at 21:32

## 2018-01-17 RX ADMIN — HYDROCORTISONE 2.5%: 25 CREAM TOPICAL at 20:16

## 2018-01-17 RX ADMIN — PANTOPRAZOLE SODIUM 40 MG: 40 TABLET, DELAYED RELEASE ORAL at 05:08

## 2018-01-17 RX ADMIN — LEVALBUTEROL HYDROCHLORIDE 1.25 MG: 1.25 SOLUTION, CONCENTRATE RESPIRATORY (INHALATION) at 01:18

## 2018-01-17 RX ADMIN — ISODIUM CHLORIDE 3 ML: 0.03 SOLUTION RESPIRATORY (INHALATION) at 08:29

## 2018-01-17 RX ADMIN — ENOXAPARIN SODIUM 40 MG: 40 INJECTION SUBCUTANEOUS at 08:50

## 2018-01-17 RX ADMIN — LORATADINE 10 MG: 10 TABLET ORAL at 14:33

## 2018-01-17 RX ADMIN — ISODIUM CHLORIDE 3 ML: 0.03 SOLUTION RESPIRATORY (INHALATION) at 21:29

## 2018-01-17 RX ADMIN — LEVALBUTEROL HYDROCHLORIDE 1.25 MG: 1.25 SOLUTION, CONCENTRATE RESPIRATORY (INHALATION) at 14:29

## 2018-01-17 RX ADMIN — DULOXETINE 90 MG: 30 CAPSULE, DELAYED RELEASE ORAL at 11:36

## 2018-01-17 RX ADMIN — METHYLPREDNISOLONE SODIUM SUCCINATE 40 MG: 40 INJECTION, POWDER, FOR SOLUTION INTRAMUSCULAR; INTRAVENOUS at 08:50

## 2018-01-17 RX ADMIN — ASPIRIN 81 MG 81 MG: 81 TABLET ORAL at 08:50

## 2018-01-17 RX ADMIN — LEVALBUTEROL HYDROCHLORIDE 1.25 MG: 1.25 SOLUTION, CONCENTRATE RESPIRATORY (INHALATION) at 08:29

## 2018-01-17 RX ADMIN — CEFTRIAXONE 1000 MG: 1 INJECTION, SOLUTION INTRAVENOUS at 04:56

## 2018-01-17 RX ADMIN — HYDROCHLOROTHIAZIDE 25 MG: 25 TABLET ORAL at 08:50

## 2018-01-17 RX ADMIN — ALPRAZOLAM 0.5 MG: 0.5 TABLET ORAL at 00:24

## 2018-01-17 RX ADMIN — QUINAPRIL 10 MG: 10 TABLET ORAL at 21:32

## 2018-01-17 RX ADMIN — LEVOTHYROXINE SODIUM 75 MCG: 75 TABLET ORAL at 05:08

## 2018-01-17 NOTE — CONSULTS
Consultation - Pulmonary Medicine   Kelsy Edwards 58 y o  female MRN: [id number]  Unit/Bed#: -01 Encounter: [id number]      Assessment:  1  Mild persistent asthma in acute exacerbation   2  Acute tracheobronchitis    Plan:   1  Mild persistent asthma in acute exacerbation secondary to Acute tracheobronchitis  - currently saturating 96% on room air  - reviewed chest x-ray with no acute pulmonary process; will hold off on CT of chest on less patient's symptoms do not improve/worsen  - agree with IV Solu-Medrol 40 mg Q 12  - continue Advair 1 puff twice daily while in hospital, switch to [place] upon discharge  - continue Q 6 nebulizer treatments  - IV ceftriaxone added by SLIM, day 2; symptoms likely viral, however, not unreasonable to continue antibiotics given length of symptoms  - add in peak flows  - add in loratadine for allergies   - BNP normal, troponin normal; leukocytosis increased likely secondary to initiation of steroid  - echo pending    History of Present Illness   Physician Requesting Consult: Mitesh Kemp MD  Reason for Consult / Principal Problem:  Asthma exacerbation  Hx and PE limited by:  None  HPI: Kelsy Edwards is a 58y o  year old female never smoker with significant past medical history of asthma since childhood and hypertension who originally presented to the ED for worsening shortness of breath  Patient states her symptoms started at the beginning of December with an acute sinus infection for which she was given doxycycline  Her symptoms completely resolved within a week and she was feeling well  However, eventually she started again with the cough with associated fever, chills, myalgias and overall worsening of her asthma  She found herself using her rescue inhaler/nebulizer frequently  She was seen in the PCP office and was given a course of azithromycin  Patient states she felt like she never fully recovered from this    She again went into the PCP given worsening shortness of breath/wheezing unrelieved by her rescue inhaler  At this time, given concern for volume overload patient was sent to the ED for further evaluation  In the ED, patient was given nebulizer treatments and IV Solu-Medrol with improvement in her symptoms  Chest x-ray was normal  Patient was admitted to the med surge floor given persistence of symptoms despite adequate treatment in the ED  BNP and troponin was normal     Patient states she has been under a lot of stress over the last couple months doing with her 's cancer diagnosis and multiple surgeries  In regards to her asthma, patient follows with an allergist in [place]  She has been well controlled overall with last hospitalization being years ago  She takes Dulera 2 puffs twice daily every day regardless of symptoms  She uses her rescue inhaler/nebulizer infrequently (every few weeks) normally  She is not currently on any medications for her allergies  Inpatient consult to Pulmonology  Consult performed by: Ashley Guzman ordered by: Lazaro Pearl          Review of Systems   Constitutional: Positive for activity change and fatigue  Negative for chills and fever  HENT: Positive for congestion, postnasal drip and sinus pressure  Eyes: Negative for visual disturbance  Respiratory: Positive for cough, chest tightness, shortness of breath and wheezing  Cardiovascular: Negative for leg swelling  Gastrointestinal: Negative for abdominal pain, constipation and diarrhea  Genitourinary: Negative for difficulty urinating  Musculoskeletal: Negative for gait problem  Allergic/Immunologic: Negative for immunocompromised state  Neurological: Negative for dizziness and light-headedness  Hematological: Negative for adenopathy  Psychiatric/Behavioral: Negative for agitation and confusion         Historical Information   Past Medical History:   Diagnosis Date    Asthma     Disease of thyroid gland     GERD (gastroesophageal reflux disease)     Hypertension      Past Surgical History:   Procedure Laterality Date    HYSTERECTOMY      ROTATOR CUFF REPAIR      SHOULDER SURGERY       Social History   History   Alcohol Use    Yes     Comment: socially      History   Drug Use No     History   Smoking Status    Never Smoker   Smokeless Tobacco    Never Used     Occupational History:   company    Family History: Strong family history of asthma    Meds/Allergies   all current active meds have been reviewed    Allergies   Allergen Reactions    Ciprofloxacin Hives    Penicillins Hives    Sulfa Antibiotics Hives    Vancomycin Hives       Objective   Vitals: Blood pressure 151/82, pulse (!) 116, temperature 98 3 °F (36 8 °C), temperature source Oral, resp  rate 18, height 4' 10" (1 473 m), weight 101 kg (222 lb 3 6 oz), SpO2 96 %  ,Body mass index is 46 44 kg/m²  Intake/Output Summary (Last 24 hours) at 01/17/18 1243  Last data filed at 01/16/18 2019   Gross per 24 hour   Intake              360 ml   Output                0 ml   Net              360 ml     Invasive Devices     Peripheral Intravenous Line            Peripheral IV 01/15/18 Left Antecubital 1 day                Physical Exam   Constitutional: She is oriented to person, place, and time  Obese, no acute distress   HENT:   Head: Normocephalic and atraumatic  Neck: Normal range of motion  Cardiovascular: Normal rate and regular rhythm  No murmur heard  Pulmonary/Chest: Effort normal    Slightly diminished breath sounds bilaterally with prolonged expiratory phase/forced expiratory wheeze; no rhonchi   Abdominal: Soft  Very mild diffuse abdominal tenderness   Musculoskeletal: Normal range of motion  She exhibits no edema or tenderness  Lymphadenopathy:     She has no cervical adenopathy  Neurological: She is alert and oriented to person, place, and time  Skin: Skin is warm and dry  Psychiatric: She has a normal mood and affect   Her behavior is normal        Lab Results:   I have personally reviewed pertinent lab results  , BNP: No results found for: BNP, CBC:   Lab Results   Component Value Date    WBC 15 32 (H) 01/17/2018    HGB 12 7 01/17/2018    HCT 40 4 01/17/2018    MCV 85 01/17/2018     01/17/2018    MCH 26 7 (L) 01/17/2018    MCHC 31 4 01/17/2018    RDW 15 4 (H) 01/17/2018    MPV 9 0 01/17/2018    NRBC 0 01/17/2018   , CMP:   Lab Results   Component Value Date     01/17/2018    K 3 7 01/17/2018     01/17/2018    CO2 29 01/17/2018    ANIONGAP 10 01/17/2018    BUN 20 01/17/2018    CREATININE 1 00 01/17/2018    GLUCOSE 184 (H) 01/17/2018    CALCIUM 9 4 01/17/2018    EGFR 61 01/17/2018   , PT/INR: No results found for: PT, INR, Troponin: No results found for: TROPONINI  Imaging Studies: I have personally reviewed pertinent reports  EKG, Pathology, and Other Studies: I have personally reviewed pertinent reports  VTE Prophylaxis: Enoxaparin (Lovenox)    Code Status: Level 1 - Full Code  Advance Directive and Living Will:      Power of :    POLST:      Counseling/Coordination of Care: Total floor / unit time spent today Thirty-five minutes  Greater than 50% of total time was spent with the patient and / or family counseling and / or coordination of care   A description of the counseling / coordination of care: Discussion with SLIM, discussion with attending physician, review of diagnostic studies in detail with patient, discussion of treatment plan and regimen, review of clinical history and impressions

## 2018-01-17 NOTE — RESULT NOTES
Verified Results  (1) CBC/ PLT (NO DIFF) [id number] 02:38PM Ho Barton     Test Name Result Flag Reference   WBC 10 9 x10E3/uL H 3 4-10 8   RBC 4 71 x10E6/uL  3 77-5 28   Hemoglobin 12 9 g/dL  11 1-15 9   Hematocrit 40 5 %  34 0-46  6   MCV 86 fL  79-97   MCH 27 4 pg  26 6-33 0   MCHC 31 9 g/dL  31 5-35 7   RDW 15 3 %  12 3-15 4   Platelets 200 U66H0/LJ  150-379     (LC) Sedimentation Rate-Westergren [id number] 02:38PM Ho Barton     Test Name Result Flag Reference   Sedimentation Rate-Westergren 29 mm/hr  0-40

## 2018-01-17 NOTE — PLAN OF CARE
DISCHARGE PLANNING     Discharge to home or other facility with appropriate resources Progressing        INFECTION - ADULT     Absence or prevention of progression during hospitalization Progressing        Knowledge Deficit     Patient/family/caregiver demonstrates understanding of disease process, treatment plan, medications, and discharge instructions Progressing        PAIN - ADULT     Verbalizes/displays adequate comfort level or baseline comfort level Progressing        Potential for Falls     Patient will remain free of falls Progressing        RESPIRATORY - ADULT     Achieves optimal ventilation and oxygenation Progressing        SAFETY ADULT     Maintain or return to baseline ADL function Progressing     Maintain or return mobility status to optimal level Progressing

## 2018-01-17 NOTE — PROGRESS NOTES
[facility] Progress Note  Patient: Radha Cartwright 58 y o  female   MRN: [id number]  PCP: Vishnu Melara MD  Unit/Bed#: -01 Encounter: [id number]  Date Of Visit: 18    Assessment:    Principal Problem:    Asthma exacerbation  Active Problems:    HTN (hypertension)      Plan:    · 1  SOB secondary to asthma exacerbation vs  Acute bronchitis- Patient wheezing improving  Will continue IV steroids, dounebs and IV abx  Pulmonary following  · 2  HTN- on quinapril  · 3  Hypothyroidism- on levothyroxine  · 4  H/o Asthma  · 5  Morbid obesity secondary to excess calories  VTE Pharmacologic Prophylaxis:   Pharmacologic: Enoxaparin (Lovenox)  Mechanical VTE Prophylaxis in Place: Yes    Patient Centered Rounds: I have performed bedside rounds with nursing staff today  Discussions with Specialists or Other Care Team Provider:     Education and Discussions with Family / Patient:     Time Spent for Care: 20 minutes  More than 50% of total time spent on counseling and coordination of care as described above  Current Length of Stay: 1 day(s)    Current Patient Status: Inpatient   Certification Statement: The patient will continue to require additional inpatient hospital stay due to SOB     Discharge Plan / Estimated Discharge Date: Once SOB improves    Code Status: Level 1 - Full Code      Subjective:   Patient seen and examined at bedside  Patient still wheezing    Objective:     Vitals:   Temp (24hrs), Av 7 °F (37 1 °C), Min:98 3 °F (36 8 °C), Max:99 °F (37 2 °C)    HR:  [112-123] 116  Resp:  [18-22] 18  BP: (138-167)/(74-87) 151/82  SpO2:  [93 %-98 %] 96 %  Body mass index is 46 44 kg/m²  Input and Output Summary (last 24 hours):        Intake/Output Summary (Last 24 hours) at 18 1441  Last data filed at 18 2019   Gross per 24 hour   Intake              360 ml   Output                0 ml   Net              360 ml       Physical Exam:     Physical Exam Constitutional: She is oriented to person, place, and time  She appears well-developed and well-nourished  HENT:   Head: Normocephalic and atraumatic  Eyes: Conjunctivae and EOM are normal  Pupils are equal, round, and reactive to light  Neck: Normal range of motion  Neck supple  No JVD present  No tracheal deviation present  No thyromegaly present  Cardiovascular: Normal rate, regular rhythm and normal heart sounds  Exam reveals no gallop and no friction rub  No murmur heard  Pulmonary/Chest: Effort normal  No respiratory distress  She has wheezes  She has no rales  She exhibits no tenderness  Abdominal: Soft  Bowel sounds are normal  She exhibits no distension  There is no tenderness  There is no rebound  Musculoskeletal: Normal range of motion  She exhibits no edema  Neurological: She is alert and oriented to person, place, and time  Vitals reviewed  Additional Data:     Labs:      Results from last 7 days  Lab Units 01/17/18  0436   WBC Thousand/uL 15 32*   HEMOGLOBIN g/dL 12 7   HEMATOCRIT % 40 4   PLATELETS Thousands/uL 307   NEUTROS PCT % 86*   LYMPHS PCT % 10*   MONOS PCT % 3*   EOS PCT % 0       Results from last 7 days  Lab Units 01/17/18  0436   SODIUM mmol/L 139   POTASSIUM mmol/L 3 7   CHLORIDE mmol/L 100   CO2 mmol/L 29   BUN mg/dL 20   CREATININE mg/dL 1 00   CALCIUM mg/dL 9 4   GLUCOSE RANDOM mg/dL 184*           * I Have Reviewed All Lab Data Listed Above  * Additional Pertinent Lab Tests Reviewed:  [facility]    Imaging:    Imaging Reports Reviewed Today Include:   Imaging Personally Reviewed by Myself Includes:      Recent Cultures (last 7 days):           Last 24 Hours Medication List:     aspirin 81 mg Oral Daily   cefTRIAXone 1,000 mg Intravenous Q24H   DULoxetine 90 mg Oral Daily   enoxaparin 40 mg Subcutaneous Daily   fluticasone-salmeterol 1 puff Inhalation Q12H [facility]   hydrochlorothiazide 25 mg Oral Daily   levalbuterol 1 25 mg Nebulization Q6H   levothyroxine 75 mcg Oral Early Morning   loratadine 10 mg Oral Daily   methylPREDNISolone sodium succinate 40 mg Intravenous Q12H [facility]   pantoprazole 40 mg Oral Early Morning   potassium chloride 10 mEq Oral BID   quinapril 10 mg Oral HS   sodium chloride 3 mL Nebulization Q6H        Today, Patient Was Seen By: Ann Troy MD    ** Please Note: This note has been constructed using a voice recognition system   **

## 2018-01-18 LAB
ANION GAP SERPL CALCULATED.3IONS-SCNC: 11 MMOL/L (ref 4–13)
BASOPHILS # BLD AUTO: 0.02 THOUSANDS/ΜL (ref 0–0.1)
BASOPHILS NFR BLD AUTO: 0 % (ref 0–1)
BUN SERPL-MCNC: 21 MG/DL (ref 5–25)
CALCIUM SERPL-MCNC: 9.1 MG/DL (ref 8.3–10.1)
CHLORIDE SERPL-SCNC: 102 MMOL/L (ref 100–108)
CO2 SERPL-SCNC: 28 MMOL/L (ref 21–32)
CREAT SERPL-MCNC: 0.8 MG/DL (ref 0.6–1.3)
EOSINOPHIL # BLD AUTO: 0 THOUSAND/ΜL (ref 0–0.61)
EOSINOPHIL NFR BLD AUTO: 0 % (ref 0–6)
ERYTHROCYTE [DISTWIDTH] IN BLOOD BY AUTOMATED COUNT: 15.8 % (ref 11.6–15.1)
GFR SERPL CREATININE-BSD FRML MDRD: 79 ML/MIN/1.73SQ M
GLUCOSE SERPL-MCNC: 153 MG/DL (ref 65–140)
HCT VFR BLD AUTO: 40.5 % (ref 34.8–46.1)
HGB BLD-MCNC: 12.7 G/DL (ref 11.5–15.4)
LYMPHOCYTES # BLD AUTO: 1.8 THOUSANDS/ΜL (ref 0.6–4.47)
LYMPHOCYTES NFR BLD AUTO: 14 % (ref 14–44)
MCH RBC QN AUTO: 26.7 PG (ref 26.8–34.3)
MCHC RBC AUTO-ENTMCNC: 31.4 G/DL (ref 31.4–37.4)
MCV RBC AUTO: 85 FL (ref 82–98)
MONOCYTES # BLD AUTO: 0.65 THOUSAND/ΜL (ref 0.17–1.22)
MONOCYTES NFR BLD AUTO: 5 % (ref 4–12)
NEUTROPHILS # BLD AUTO: 10.02 THOUSANDS/ΜL (ref 1.85–7.62)
NEUTS SEG NFR BLD AUTO: 80 % (ref 43–75)
NRBC BLD AUTO-RTO: 0 /100 WBCS
PLATELET # BLD AUTO: 287 THOUSANDS/UL (ref 149–390)
PMV BLD AUTO: 9.3 FL (ref 8.9–12.7)
POTASSIUM SERPL-SCNC: 3.9 MMOL/L (ref 3.5–5.3)
RBC # BLD AUTO: 4.75 MILLION/UL (ref 3.81–5.12)
SODIUM SERPL-SCNC: 141 MMOL/L (ref 136–145)
WBC # BLD AUTO: 12.58 THOUSAND/UL (ref 4.31–10.16)

## 2018-01-18 PROCEDURE — 94760 N-INVAS EAR/PLS OXIMETRY 1: CPT

## 2018-01-18 PROCEDURE — 94150 VITAL CAPACITY TEST: CPT

## 2018-01-18 PROCEDURE — 94640 AIRWAY INHALATION TREATMENT: CPT

## 2018-01-18 PROCEDURE — 80048 BASIC METABOLIC PNL TOTAL CA: CPT | Performed by: INTERNAL MEDICINE

## 2018-01-18 PROCEDURE — 85025 COMPLETE CBC W/AUTO DIFF WBC: CPT | Performed by: INTERNAL MEDICINE

## 2018-01-18 RX ORDER — BUDESONIDE AND FORMOTEROL FUMARATE DIHYDRATE 80; 4.5 UG/1; UG/1
2 AEROSOL RESPIRATORY (INHALATION)
Status: DISCONTINUED | OUTPATIENT
Start: 2018-01-18 | End: 2018-01-20 | Stop reason: HOSPADM

## 2018-01-18 RX ADMIN — BUDESONIDE AND FORMOTEROL FUMARATE DIHYDRATE 2 PUFF: 80; 4.5 AEROSOL RESPIRATORY (INHALATION) at 21:30

## 2018-01-18 RX ADMIN — LEVOTHYROXINE SODIUM 75 MCG: 75 TABLET ORAL at 06:05

## 2018-01-18 RX ADMIN — POTASSIUM CHLORIDE 10 MEQ: 750 TABLET, EXTENDED RELEASE ORAL at 17:31

## 2018-01-18 RX ADMIN — METHYLPREDNISOLONE SODIUM SUCCINATE 40 MG: 40 INJECTION, POWDER, FOR SOLUTION INTRAMUSCULAR; INTRAVENOUS at 21:31

## 2018-01-18 RX ADMIN — POTASSIUM CHLORIDE 10 MEQ: 750 TABLET, EXTENDED RELEASE ORAL at 10:04

## 2018-01-18 RX ADMIN — ACETAMINOPHEN 650 MG: 325 TABLET ORAL at 22:29

## 2018-01-18 RX ADMIN — DULOXETINE 90 MG: 30 CAPSULE, DELAYED RELEASE ORAL at 10:03

## 2018-01-18 RX ADMIN — LEVALBUTEROL HYDROCHLORIDE 1.25 MG: 1.25 SOLUTION, CONCENTRATE RESPIRATORY (INHALATION) at 15:14

## 2018-01-18 RX ADMIN — ISODIUM CHLORIDE 3 ML: 0.03 SOLUTION RESPIRATORY (INHALATION) at 15:15

## 2018-01-18 RX ADMIN — ISODIUM CHLORIDE 3 ML: 0.03 SOLUTION RESPIRATORY (INHALATION) at 21:05

## 2018-01-18 RX ADMIN — ASPIRIN 81 MG 81 MG: 81 TABLET ORAL at 10:03

## 2018-01-18 RX ADMIN — CEFTRIAXONE 1000 MG: 1 INJECTION, SOLUTION INTRAVENOUS at 04:24

## 2018-01-18 RX ADMIN — ISODIUM CHLORIDE 3 ML: 0.03 SOLUTION RESPIRATORY (INHALATION) at 02:29

## 2018-01-18 RX ADMIN — LEVALBUTEROL HYDROCHLORIDE 1.25 MG: 1.25 SOLUTION, CONCENTRATE RESPIRATORY (INHALATION) at 21:06

## 2018-01-18 RX ADMIN — ISODIUM CHLORIDE 3 ML: 0.03 SOLUTION RESPIRATORY (INHALATION) at 08:00

## 2018-01-18 RX ADMIN — LORATADINE 10 MG: 10 TABLET ORAL at 10:03

## 2018-01-18 RX ADMIN — METHYLPREDNISOLONE SODIUM SUCCINATE 40 MG: 40 INJECTION, POWDER, FOR SOLUTION INTRAMUSCULAR; INTRAVENOUS at 10:03

## 2018-01-18 RX ADMIN — ALPRAZOLAM 0.5 MG: 0.5 TABLET ORAL at 21:31

## 2018-01-18 RX ADMIN — ENOXAPARIN SODIUM 40 MG: 40 INJECTION SUBCUTANEOUS at 10:03

## 2018-01-18 RX ADMIN — QUINAPRIL 10 MG: 10 TABLET ORAL at 21:31

## 2018-01-18 RX ADMIN — LEVALBUTEROL HYDROCHLORIDE 1.25 MG: 1.25 SOLUTION, CONCENTRATE RESPIRATORY (INHALATION) at 02:29

## 2018-01-18 RX ADMIN — LEVALBUTEROL HYDROCHLORIDE 1.25 MG: 1.25 SOLUTION, CONCENTRATE RESPIRATORY (INHALATION) at 08:00

## 2018-01-18 RX ADMIN — HYDROCHLOROTHIAZIDE 25 MG: 25 TABLET ORAL at 10:03

## 2018-01-18 RX ADMIN — PANTOPRAZOLE SODIUM 40 MG: 40 TABLET, DELAYED RELEASE ORAL at 06:06

## 2018-01-18 NOTE — PROGRESS NOTES
Progress Note - Pulmonary   Dion Persaud 58 y o  female MRN: [id number]  Unit/Bed#: -01 Encounter: [id number]    Assessment:  1  Mild persistent asthma in acute exacerbation   2  Acute tracheobronchitis    Plan:  1  Mild persistent asthma in acute exacerbation secondary to Acute tracheobronchitis  - currently saturating 96% on room air  - reviewed chest x-ray with no acute pulmonary process; will hold off on CT of chest unless patient's symptoms do not improve/worsen  - continue with IV Solu-Medrol 40 mg Q 12 another day, hopefully can decrease in a m   - continue Advair 1 puff twice daily while in hospital, switch to [place] upon discharge  - continue Q 6 nebulizer treatments  - IV ceftriaxone day 3; symptoms likely viral, however, not unreasonable to continue antibiotics given length of symptoms  - continue to monitor peak flows  - continue loratadine   - BNP normal, troponin normal; leukocytosis increased likely secondary to initiation of steroid  - echo normal with LVEF of 60% with no regional wall motion abnormalities    Chief Complaint:   Shortness of breath    Subjective:   Patient states her breathing has improved overall  However, she continues to have some wheezing  She is feeling very fatigued today and is falling asleep during the interview  She states she finally slept a little bit last night after not sleeping for 48 hours  Objective:     Vitals: Blood pressure 145/57, pulse 98, temperature 98 2 °F (36 8 °C), temperature source Oral, resp  rate 18, height 4' 10" (1 473 m), weight 101 kg (222 lb 3 6 oz), SpO2 97 %  ,Body mass index is 46 44 kg/m²        Intake/Output Summary (Last 24 hours) at 01/18/18 0904  Last data filed at 01/17/18 1900   Gross per 24 hour   Intake              240 ml   Output              475 ml   Net             -235 ml       Invasive Devices     Peripheral Intravenous Line            Peripheral IV 01/15/18 Left Antecubital 2 days                Physical Exam: BP 145/57 (BP Location: Right arm)   Pulse 98   Temp 98 2 °F (36 8 °C) (Oral)   Resp 18   Ht 4' 10" (1 473 m)   Wt 101 kg (222 lb 3 6 oz)   SpO2 97%   BMI 46 44 kg/m²   General appearance: alert and oriented, in no acute distress, cooperative and fatigued  Head: Normocephalic, without obvious abnormality, atraumatic  Lungs: Diffuse mild expiratory wheezes the upper and lower lobes  Heart: regular rate and rhythm, S1, S2 normal, no murmur, click, rub or gallop  Extremities: extremities normal, warm and well-perfused; no cyanosis, clubbing, or edema  Skin: Skin color, texture, turgor normal  No rashes or lesions  Neurologic: Mental status: Alert, oriented, thought content appropriate     Labs: I have personally reviewed pertinent lab results  , CBC:   Lab Results   Component Value Date    WBC 12 58 (H) 01/18/2018    HGB 12 7 01/18/2018    HCT 40 5 01/18/2018    MCV 85 01/18/2018     01/18/2018    MCH 26 7 (L) 01/18/2018    MCHC 31 4 01/18/2018    RDW 15 8 (H) 01/18/2018    MPV 9 3 01/18/2018    NRBC 0 01/18/2018   , CMP:   Lab Results   Component Value Date     01/18/2018    K 3 9 01/18/2018     01/18/2018    CO2 28 01/18/2018    ANIONGAP 11 01/18/2018    BUN 21 01/18/2018    CREATININE 0 80 01/18/2018    GLUCOSE 153 (H) 01/18/2018    CALCIUM 9 1 01/18/2018    EGFR 79 01/18/2018     Imaging and other studies: I have personally reviewed pertinent reports

## 2018-01-18 NOTE — PROGRESS NOTES
[facility] Progress Note  Patient: Sia Duarte 58 y o  female   MRN: [id number]  PCP: Joanne Mayfield MD  Unit/Bed#: -01 Encounter: [id number]  Date Of Visit: 18    Assessment:    Principal Problem:    Asthma exacerbation  Active Problems:    HTN (hypertension)      Plan:    · 1  SOB secondary to asthma exacerbation vs  Acute bronchitis- Patient wheezing improving  Will continue IV steroids, dounebs and IV abx  Pulmonary following and will possibly decrease steroids in am   Advair discontinued because patient cannot take it due to adverse reaction  Patient to continue Lubertharesh Baileyung on discharge  Patient 2d echo EF 60%  · 2  HTN- on quinapril  · 3  Hypothyroidism- on levothyroxine  · 4  H/o Asthma  · 5  Morbid obesity secondary to excess calories  VTE Pharmacologic Prophylaxis:   Pharmacologic: Enoxaparin (Lovenox)  Mechanical VTE Prophylaxis in Place: Yes    Patient Centered Rounds: I have performed bedside rounds with nursing staff today  Discussions with Specialists or Other Care Team Provider:     Education and Discussions with Family / Patient:     Time Spent for Care: 20 minutes  More than 50% of total time spent on counseling and coordination of care as described above  Current Length of Stay: 2 day(s)    Current Patient Status: Inpatient   Certification Statement: The patient will continue to require additional inpatient hospital stay due to SOB     Discharge Plan / Estimated Discharge Date: Once SOB improves    Code Status: Level 1 - Full Code      Subjective:   Patient seen and examined at bedside  Patient has no new complaints  Objective:     Vitals:   Temp (24hrs), Av 2 °F (36 8 °C), Min:97 9 °F (36 6 °C), Max:98 6 °F (37 °C)    HR:  [] 98  Resp:  [18] 18  BP: (140-175)/(57-80) 145/57  SpO2:  [92 %-97 %] 97 %  Body mass index is 46 44 kg/m²  Input and Output Summary (last 24 hours):        Intake/Output Summary (Last 24 hours) at 01/18/18 1334  Last data filed at 01/18/18 0700   Gross per 24 hour   Intake              500 ml   Output              475 ml   Net               25 ml       Physical Exam:     Physical Exam   Constitutional: She is oriented to person, place, and time  She appears well-developed and well-nourished  HENT:   Head: Normocephalic and atraumatic  Eyes: Conjunctivae and EOM are normal  Pupils are equal, round, and reactive to light  Neck: Normal range of motion  Neck supple  No JVD present  No tracheal deviation present  No thyromegaly present  Cardiovascular: Normal rate, regular rhythm and normal heart sounds  Exam reveals no gallop and no friction rub  No murmur heard  Pulmonary/Chest: Effort normal  No respiratory distress  She has wheezes  She has no rales  She exhibits no tenderness  Abdominal: Soft  Bowel sounds are normal  She exhibits no distension  There is no tenderness  There is no rebound  Musculoskeletal: Normal range of motion  She exhibits no edema  Neurological: She is alert and oriented to person, place, and time  Vitals reviewed  Additional Data:     Labs:      Results from last 7 days  Lab Units 01/18/18  0627   WBC Thousand/uL 12 58*   HEMOGLOBIN g/dL 12 7   HEMATOCRIT % 40 5   PLATELETS Thousands/uL 287   NEUTROS PCT % 80*   LYMPHS PCT % 14   MONOS PCT % 5   EOS PCT % 0       Results from last 7 days  Lab Units 01/18/18  0627   SODIUM mmol/L 141   POTASSIUM mmol/L 3 9   CHLORIDE mmol/L 102   CO2 mmol/L 28   BUN mg/dL 21   CREATININE mg/dL 0 80   CALCIUM mg/dL 9 1   GLUCOSE RANDOM mg/dL 153*           * I Have Reviewed All Lab Data Listed Above  * Additional Pertinent Lab Tests Reviewed:  [facility] Admission Reviewed    Imaging:    Imaging Reports Reviewed Today Include:   Imaging Personally Reviewed by Myself Includes:      Recent Cultures (last 7 days):           Last 24 Hours Medication List:     aspirin 81 mg Oral Daily   cefTRIAXone 1,000 mg Intravenous Q24H   DULoxetine 90 mg Oral Daily   enoxaparin 40 mg Subcutaneous Daily   hydrochlorothiazide 25 mg Oral Daily   levalbuterol 1 25 mg Nebulization Q6H   levothyroxine 75 mcg Oral Early Morning   loratadine 10 mg Oral Daily   methylPREDNISolone sodium succinate 40 mg Intravenous Q12H ALONZO   pantoprazole 40 mg Oral Early Morning   potassium chloride 10 mEq Oral BID   quinapril 10 mg Oral HS   sodium chloride 3 mL Nebulization Q6H        Today, Patient Was Seen By: Reyes Pierre MD    ** Please Note: This note has been constructed using a voice recognition system   **

## 2018-01-19 LAB
ANION GAP SERPL CALCULATED.3IONS-SCNC: 7 MMOL/L (ref 4–13)
BASOPHILS # BLD AUTO: 0.01 THOUSANDS/ΜL (ref 0–0.1)
BASOPHILS NFR BLD AUTO: 0 % (ref 0–1)
BUN SERPL-MCNC: 20 MG/DL (ref 5–25)
CALCIUM SERPL-MCNC: 9.1 MG/DL (ref 8.3–10.1)
CHLORIDE SERPL-SCNC: 101 MMOL/L (ref 100–108)
CO2 SERPL-SCNC: 32 MMOL/L (ref 21–32)
CREAT SERPL-MCNC: 0.83 MG/DL (ref 0.6–1.3)
EOSINOPHIL # BLD AUTO: 0.01 THOUSAND/ΜL (ref 0–0.61)
EOSINOPHIL NFR BLD AUTO: 0 % (ref 0–6)
ERYTHROCYTE [DISTWIDTH] IN BLOOD BY AUTOMATED COUNT: 15.8 % (ref 11.6–15.1)
GFR SERPL CREATININE-BSD FRML MDRD: 76 ML/MIN/1.73SQ M
GLUCOSE SERPL-MCNC: 168 MG/DL (ref 65–140)
HCT VFR BLD AUTO: 42.3 % (ref 34.8–46.1)
HGB BLD-MCNC: 12.9 G/DL (ref 11.5–15.4)
LYMPHOCYTES # BLD AUTO: 1.93 THOUSANDS/ΜL (ref 0.6–4.47)
LYMPHOCYTES NFR BLD AUTO: 16 % (ref 14–44)
MCH RBC QN AUTO: 26.3 PG (ref 26.8–34.3)
MCHC RBC AUTO-ENTMCNC: 30.5 G/DL (ref 31.4–37.4)
MCV RBC AUTO: 86 FL (ref 82–98)
MONOCYTES # BLD AUTO: 0.55 THOUSAND/ΜL (ref 0.17–1.22)
MONOCYTES NFR BLD AUTO: 5 % (ref 4–12)
NEUTROPHILS # BLD AUTO: 9.55 THOUSANDS/ΜL (ref 1.85–7.62)
NEUTS SEG NFR BLD AUTO: 78 % (ref 43–75)
NRBC BLD AUTO-RTO: 0 /100 WBCS
PLATELET # BLD AUTO: 296 THOUSANDS/UL (ref 149–390)
PMV BLD AUTO: 9 FL (ref 8.9–12.7)
POTASSIUM SERPL-SCNC: 4 MMOL/L (ref 3.5–5.3)
RBC # BLD AUTO: 4.91 MILLION/UL (ref 3.81–5.12)
SODIUM SERPL-SCNC: 140 MMOL/L (ref 136–145)
WBC # BLD AUTO: 12.17 THOUSAND/UL (ref 4.31–10.16)

## 2018-01-19 PROCEDURE — 80048 BASIC METABOLIC PNL TOTAL CA: CPT | Performed by: INTERNAL MEDICINE

## 2018-01-19 PROCEDURE — 94760 N-INVAS EAR/PLS OXIMETRY 1: CPT

## 2018-01-19 PROCEDURE — 94640 AIRWAY INHALATION TREATMENT: CPT

## 2018-01-19 PROCEDURE — 94150 VITAL CAPACITY TEST: CPT

## 2018-01-19 PROCEDURE — 85025 COMPLETE CBC W/AUTO DIFF WBC: CPT | Performed by: INTERNAL MEDICINE

## 2018-01-19 RX ORDER — METHYLPREDNISOLONE SODIUM SUCCINATE 40 MG/ML
40 INJECTION, POWDER, LYOPHILIZED, FOR SOLUTION INTRAMUSCULAR; INTRAVENOUS DAILY
Status: DISCONTINUED | OUTPATIENT
Start: 2018-01-20 | End: 2018-01-20 | Stop reason: HOSPADM

## 2018-01-19 RX ORDER — FLUTICASONE PROPIONATE 50 MCG
1 SPRAY, SUSPENSION (ML) NASAL DAILY
Status: DISCONTINUED | OUTPATIENT
Start: 2018-01-19 | End: 2018-01-20 | Stop reason: HOSPADM

## 2018-01-19 RX ADMIN — LORATADINE 10 MG: 10 TABLET ORAL at 10:14

## 2018-01-19 RX ADMIN — LEVOTHYROXINE SODIUM 75 MCG: 75 TABLET ORAL at 05:12

## 2018-01-19 RX ADMIN — HYDROCHLOROTHIAZIDE 25 MG: 25 TABLET ORAL at 10:14

## 2018-01-19 RX ADMIN — PANTOPRAZOLE SODIUM 40 MG: 40 TABLET, DELAYED RELEASE ORAL at 05:12

## 2018-01-19 RX ADMIN — LEVALBUTEROL HYDROCHLORIDE 1.25 MG: 1.25 SOLUTION, CONCENTRATE RESPIRATORY (INHALATION) at 08:08

## 2018-01-19 RX ADMIN — BUDESONIDE AND FORMOTEROL FUMARATE DIHYDRATE 2 PUFF: 80; 4.5 AEROSOL RESPIRATORY (INHALATION) at 21:25

## 2018-01-19 RX ADMIN — ISODIUM CHLORIDE 3 ML: 0.03 SOLUTION RESPIRATORY (INHALATION) at 14:05

## 2018-01-19 RX ADMIN — ALPRAZOLAM 0.5 MG: 0.5 TABLET ORAL at 12:48

## 2018-01-19 RX ADMIN — CEFTRIAXONE 1000 MG: 1 INJECTION, SOLUTION INTRAVENOUS at 03:29

## 2018-01-19 RX ADMIN — ISODIUM CHLORIDE 3 ML: 0.03 SOLUTION RESPIRATORY (INHALATION) at 20:44

## 2018-01-19 RX ADMIN — DULOXETINE 90 MG: 30 CAPSULE, DELAYED RELEASE ORAL at 10:14

## 2018-01-19 RX ADMIN — ISODIUM CHLORIDE 3 ML: 0.03 SOLUTION RESPIRATORY (INHALATION) at 08:08

## 2018-01-19 RX ADMIN — LEVALBUTEROL HYDROCHLORIDE 1.25 MG: 1.25 SOLUTION, CONCENTRATE RESPIRATORY (INHALATION) at 02:41

## 2018-01-19 RX ADMIN — ENOXAPARIN SODIUM 40 MG: 40 INJECTION SUBCUTANEOUS at 10:13

## 2018-01-19 RX ADMIN — POTASSIUM CHLORIDE 10 MEQ: 750 TABLET, EXTENDED RELEASE ORAL at 17:21

## 2018-01-19 RX ADMIN — QUINAPRIL 10 MG: 10 TABLET ORAL at 21:25

## 2018-01-19 RX ADMIN — ALPRAZOLAM 0.5 MG: 0.5 TABLET ORAL at 23:17

## 2018-01-19 RX ADMIN — LEVALBUTEROL HYDROCHLORIDE 1.25 MG: 1.25 SOLUTION, CONCENTRATE RESPIRATORY (INHALATION) at 14:05

## 2018-01-19 RX ADMIN — METHYLPREDNISOLONE SODIUM SUCCINATE 40 MG: 40 INJECTION, POWDER, FOR SOLUTION INTRAMUSCULAR; INTRAVENOUS at 10:15

## 2018-01-19 RX ADMIN — LEVALBUTEROL HYDROCHLORIDE 1.25 MG: 1.25 SOLUTION, CONCENTRATE RESPIRATORY (INHALATION) at 20:44

## 2018-01-19 RX ADMIN — ISODIUM CHLORIDE 3 ML: 0.03 SOLUTION RESPIRATORY (INHALATION) at 02:41

## 2018-01-19 RX ADMIN — FLUTICASONE PROPIONATE 1 SPRAY: 50 SPRAY, METERED NASAL at 12:48

## 2018-01-19 RX ADMIN — BUDESONIDE AND FORMOTEROL FUMARATE DIHYDRATE 2 PUFF: 80; 4.5 AEROSOL RESPIRATORY (INHALATION) at 10:15

## 2018-01-19 RX ADMIN — POTASSIUM CHLORIDE 10 MEQ: 750 TABLET, EXTENDED RELEASE ORAL at 10:14

## 2018-01-19 RX ADMIN — ASPIRIN 81 MG 81 MG: 81 TABLET ORAL at 10:15

## 2018-01-19 NOTE — PLAN OF CARE
DISCHARGE PLANNING     Discharge to home or other facility with appropriate resources Not Progressing        INFECTION - ADULT     Absence or prevention of progression during hospitalization Not Progressing        Knowledge Deficit     Patient/family/caregiver demonstrates understanding of disease process, treatment plan, medications, and discharge instructions Not Progressing        PAIN - ADULT     Verbalizes/displays adequate comfort level or baseline comfort level Not Progressing        Potential for Falls     Patient will remain free of falls Not Progressing        RESPIRATORY - ADULT     Achieves optimal ventilation and oxygenation Not Progressing        SAFETY ADULT     Maintain or return to baseline ADL function Not Progressing     Maintain or return mobility status to optimal level Not Progressing

## 2018-01-19 NOTE — PROGRESS NOTES
Progress Note - Pulmonary   Bob Pires 58 y o  female MRN: [id number]  Unit/Bed#: -01 Encounter: [id number]    Assessment:  1   Mild persistent asthma in acute exacerbation   2   Acute tracheobronchitis     Plan:  1   Mild persistent asthma in acute exacerbation secondary to Acute tracheobronchitis  - currently saturating 96% on room air  - reviewed chest x-ray with no acute pulmonary process  - decrease IV Solu-Medrol 40 mg to q day   - continue Symbicort 2 puffs twice daily while in hospital, switch to [facility] upon discharge  - continue Q 6 nebulizer treatments  - IV ceftriaxone day 4; symptoms likely viral, however, not unreasonable to continue antibiotics given length of symptoms  - continue to monitor peak flows; 325 LPM today  - continue loratadine; add in Flonase nasal spray   - BNP normal, troponin normal; leukocytosis increased likely secondary to initiation of steroid  - echo normal with LVEF of 60% with no regional wall motion abnormalities    Anticipate discharge in the next 24-36 hours    Chief Complaint:   Shortness of breath    Subjective:   Patient states she was feeling better yesterday until she found out that her paternal aunt   She is very upset about this and this precipitated her asthma/breathing difficulties last night  She required a dose of Xanax to calm herself down  States cough has significantly improved since admission  Breathing is also better  Objective:     Vitals: Blood pressure 145/70, pulse 80, temperature 98 1 °F (36 7 °C), temperature source Oral, resp  rate 18, height 4' 10" (1 473 m), weight 101 kg (222 lb 3 6 oz), SpO2 98 %  ,Body mass index is 46 44 kg/m²      No intake or output data in the 24 hours ending 18 1025    Invasive Devices     Peripheral Intravenous Line            Peripheral IV 18 Right Antecubital 1 day                Physical Exam: /70 (BP Location: Right arm)   Pulse 80   Temp 98 1 °F (36 7 °C) (Oral)   Resp 18   Ht 4' 10" (1 473 m)   Wt 101 kg (222 lb 3 6 oz)   SpO2 98%   BMI 46 44 kg/m²   General appearance: alert and oriented, in no acute distress and cooperative  Head: Normocephalic, without obvious abnormality, atraumatic  Lungs: Mild occasional expiratory wheeze with prolonged expiratory phase  Heart: regular rate and rhythm, S1, S2 normal, no murmur, click, rub or gallop  Extremities: extremities normal, warm and well-perfused; no cyanosis, clubbing, or edema  Skin: Skin color, texture, turgor normal  No rashes or lesions  Neurologic: Mental status: Alert, oriented, thought content appropriate     Labs: I have personally reviewed pertinent lab results  , CBC:   Lab Results   Component Value Date    WBC 12 17 (H) 01/19/2018    HGB 12 9 01/19/2018    HCT 42 3 01/19/2018    MCV 86 01/19/2018     01/19/2018    MCH 26 3 (L) 01/19/2018    MCHC 30 5 (L) 01/19/2018    RDW 15 8 (H) 01/19/2018    MPV 9 0 01/19/2018    NRBC 0 01/19/2018   , CMP:   Lab Results   Component Value Date     01/19/2018    K 4 0 01/19/2018     01/19/2018    CO2 32 01/19/2018    ANIONGAP 7 01/19/2018    BUN 20 01/19/2018    CREATININE 0 83 01/19/2018    GLUCOSE 168 (H) 01/19/2018    CALCIUM 9 1 01/19/2018    EGFR 76 01/19/2018     Imaging and other studies: I have personally reviewed pertinent reports

## 2018-01-19 NOTE — PROGRESS NOTES
Patient noted crying hysterically  Patient reports that she just found out that her family member passed away and wasn't told about it  Remained with person for 10mins to console her  Administered Xanax to help calm her down  Now talking to family on phone  Will continue to monitor  Nelson Weems RN  01/18/18  09:12PM  ==============================================================================================================================================================  Patient is now calmed  No further crying noted  Says she will be okay  Call bell in reach  Will continue to monitor    Nelson Weems RN  01/18/18  10:15PM

## 2018-01-19 NOTE — CASE MANAGEMENT
Continued Stay Review    Date: 1/19    Vital Signs: /70 (BP Location: Right arm)   Pulse 80   Temp 98 1 °F (36 7 °C) (Oral)   Resp 18   Ht 4' 10" (1 473 m)   Wt 101 kg (222 lb 3 6 oz)   SpO2 96%   BMI 46 44 kg/m²     Medications:   Scheduled Meds:   aspirin 81 mg Oral Daily   budesonide-formoterol 2 puff Inhalation BID   cefTRIAXone 1,000 mg Intravenous Q24H   DULoxetine 90 mg Oral Daily   enoxaparin 40 mg Subcutaneous Daily   fluticasone 1 spray Each Nare Daily   hydrochlorothiazide 25 mg Oral Daily   levalbuterol 1 25 mg Nebulization Q6H   levothyroxine 75 mcg Oral Early Morning   loratadine 10 mg Oral Daily   [START ON 1/20/2018] methylPREDNISolone sodium succinate 40 mg Intravenous Daily   pantoprazole 40 mg Oral Early Morning   potassium chloride 10 mEq Oral BID   quinapril 10 mg Oral HS   sodium chloride 3 mL Nebulization Q6H     Continuous Infusions:    PRN Meds:   acetaminophen    albuterol    ALPRAZolam    calcium carbonate    hydrocortisone    ondansetron    oxyCODONE-acetaminophen    witch hazel-glycerin    Abnormal Labs/Diagnostic Results:    WBC 12 17 (H) 4 31 - 10 16 Thousand/uL     RBC 4 91 3 81 - 5 12 Million/uL     Hemoglobin 12 9 11 5 - 15 4 g/dL     Hematocrit 42 3 34 8 - 46 1 %              Age/Sex: 58 y o  female     · Assessment/Plan: 1  SOB secondary to asthma exacerbation vs  Acute bronchitis- Patient wheezing improving  Will continue IV steroids, dounebs and IV abx  Pulmonary following and will possibly decrease steroids in am   Advair discontinued because patient cannot take it due to adverse reaction  Patient to continue Charissa Garcia on discharge  Patient 2d echo EF 60%  · 2  HTN- on quinapril  · 3  Hypothyroidism- on levothyroxine  · 4  H/o Asthma  · 5  Morbid obesity secondary to excess calories         VTE Pharmacologic Prophylaxis:   Pharmacologic: Enoxaparin (Lovenox)  Mechanical VTE Prophylaxis in Place:  Yes       Current Length of Stay: 2 day(s)     Current Patient Status: Inpatient     Certification Statement: The patient will continue to require additional inpatient hospital stay due to SOB     Discharge Plan: TBD      ============================================================================================================  Pulmonology progress note 1/19    Assessment:  1   Persistent asthma in acute exacerbation   2   Acute tracheobronchitis     Plan:  1  Persistent asthma in acute exacerbation secondary to Acute tracheobronchitis  - decrease IV Solu-Medrol 40 mg to q day   - continue Symbicort 2 puffs twice daily while in hospital, switch to [facility] upon discharge  - continue Q 6 nebulizer treatments  - IV ceftriaxone day 4; symptoms likely viral, however, not unreasonable to continue antibiotics given length of symptoms  - continue to monitor peak flows; 325 LPM today  - continue loratadine; add in Flonase nasal spray   - Chief Complaint:   Shortness of breath      Thank you,  [address] [facility] in the [facility] by [address] for 2017  Network Utilization Review Department  Phone: [phone]; Fax [phone]  ATTENTION: The Network Utilization Review Department is now centralized for our 7 Facilities  Make a note that we have a new phone and fax numbers for our Department  Please call with any questions or concerns to [phone] and carefully follow the prompts so that you are directed to the right person  All voicemails are confidential  Fax any determinations, approvals, denials, and requests for initial or continue stay review clinical to [phone]  Due to HIGH CALL volume, it would be easier if you could please send faxed requests to expedite your requests and in part, help us provide discharge notifications faster

## 2018-01-20 VITALS
SYSTOLIC BLOOD PRESSURE: 150 MMHG | BODY MASS INDEX: 46.65 KG/M2 | RESPIRATION RATE: 18 BRPM | WEIGHT: 222.22 LBS | TEMPERATURE: 98 F | OXYGEN SATURATION: 99 % | HEIGHT: 58 IN | HEART RATE: 99 BPM | DIASTOLIC BLOOD PRESSURE: 75 MMHG

## 2018-01-20 PROCEDURE — 94760 N-INVAS EAR/PLS OXIMETRY 1: CPT

## 2018-01-20 PROCEDURE — 94640 AIRWAY INHALATION TREATMENT: CPT

## 2018-01-20 RX ORDER — ALPRAZOLAM 0.5 MG/1
0.5 TABLET ORAL ONCE
Status: COMPLETED | OUTPATIENT
Start: 2018-01-20 | End: 2018-01-20

## 2018-01-20 RX ORDER — PREDNISONE 10 MG/1
10 TABLET ORAL DAILY
Qty: 30 TABLET | Refills: 0 | Status: SHIPPED | OUTPATIENT
Start: 2018-01-20 | End: 2018-06-14

## 2018-01-20 RX ADMIN — ISODIUM CHLORIDE 3 ML: 0.03 SOLUTION RESPIRATORY (INHALATION) at 07:52

## 2018-01-20 RX ADMIN — METHYLPREDNISOLONE SODIUM SUCCINATE 40 MG: 40 INJECTION, POWDER, FOR SOLUTION INTRAMUSCULAR; INTRAVENOUS at 08:58

## 2018-01-20 RX ADMIN — CEFTRIAXONE 1000 MG: 1 INJECTION, SOLUTION INTRAVENOUS at 04:07

## 2018-01-20 RX ADMIN — PANTOPRAZOLE SODIUM 40 MG: 40 TABLET, DELAYED RELEASE ORAL at 06:04

## 2018-01-20 RX ADMIN — HYDROCHLOROTHIAZIDE 25 MG: 25 TABLET ORAL at 08:58

## 2018-01-20 RX ADMIN — LEVALBUTEROL HYDROCHLORIDE 1.25 MG: 1.25 SOLUTION, CONCENTRATE RESPIRATORY (INHALATION) at 02:05

## 2018-01-20 RX ADMIN — ENOXAPARIN SODIUM 40 MG: 40 INJECTION SUBCUTANEOUS at 08:58

## 2018-01-20 RX ADMIN — LEVOTHYROXINE SODIUM 75 MCG: 75 TABLET ORAL at 06:04

## 2018-01-20 RX ADMIN — LEVALBUTEROL HYDROCHLORIDE 1.25 MG: 1.25 SOLUTION, CONCENTRATE RESPIRATORY (INHALATION) at 07:52

## 2018-01-20 RX ADMIN — DULOXETINE 90 MG: 30 CAPSULE, DELAYED RELEASE ORAL at 08:58

## 2018-01-20 RX ADMIN — ALPRAZOLAM 0.5 MG: 0.5 TABLET ORAL at 12:10

## 2018-01-20 RX ADMIN — ASPIRIN 81 MG 81 MG: 81 TABLET ORAL at 08:58

## 2018-01-20 RX ADMIN — POTASSIUM CHLORIDE 10 MEQ: 750 TABLET, EXTENDED RELEASE ORAL at 08:58

## 2018-01-20 RX ADMIN — BUDESONIDE AND FORMOTEROL FUMARATE DIHYDRATE 2 PUFF: 80; 4.5 AEROSOL RESPIRATORY (INHALATION) at 08:57

## 2018-01-20 RX ADMIN — ISODIUM CHLORIDE 3 ML: 0.03 SOLUTION RESPIRATORY (INHALATION) at 02:05

## 2018-01-20 RX ADMIN — LORATADINE 10 MG: 10 TABLET ORAL at 08:58

## 2018-01-20 RX ADMIN — FLUTICASONE PROPIONATE 1 SPRAY: 50 SPRAY, METERED NASAL at 08:59

## 2018-01-20 NOTE — DISCHARGE SUMMARY
[facility]    Patient Information: Chad Sanchez 58 y o  female MRN: [id number]  Unit/Bed#: -01 Encounter: [id number]    Discharging Physician / Practitioner: Mana Abbott MD  PCP: Gilma Sena MD  Admission Date: 1/15/2018  Discharge Date: 01/20/18    Reason for Admission: SOB    Discharge Diagnoses:     Principal Problem:    Asthma exacerbation  Active Problems:    HTN (hypertension)  Resolved Problems:    * No resolved hospital problems  *      Consultations During Hospital Stay:  · pulmonary    Procedures Performed:     · none    Significant Findings / Test Results:     · CXR-  · No active pulmonary disease    Incidental Findings:   ·      Test Results Pending at Discharge (will require follow up):   ·      Outpatient Tests Requested:  ·     Complications:  none    Hospital Course:     Chad Sanchez is a 58 y o  female patient who originally presented to the hospital on 1/15/2018 due to with complaints of increased SOB and found to have asthma exacerbation  Patient also had acute respiratory failure with hypoxia, POA  Pulmonary was consulted and patient was placed on IV antibiotics, dounebs and IV steroids  Her SOB gradually improved  Patient is currently hemodynamically stable and will be discharged home on steroid taper  Patient to followup with her pulmonologist as outpatient  Condition at Discharge: stable     Discharge Day Visit / Exam:     Subjective:  Patient seen and examined at bedside  Patient SOB improved  Vitals: Blood Pressure: 150/75 (01/20/18 0700)  Pulse: 99 (01/20/18 0700)  Temperature: 98 °F (36 7 °C) (01/20/18 0700)  Temp Source: Oral (01/20/18 0700)  Respirations: 18 (01/20/18 0700)  Height: 4' 10" (147 3 cm) (01/16/18 1835)  Weight - Scale: 101 kg (222 lb 3 6 oz) (01/16/18 1835)  SpO2: 99 % (01/20/18 0753)  Exam:   Physical Exam   Constitutional: She is oriented to person, place, and time   She appears well-developed and well-nourished  HENT:   Head: Normocephalic and atraumatic  Eyes: Conjunctivae and EOM are normal  Pupils are equal, round, and reactive to light  Neck: Normal range of motion  Neck supple  No JVD present  No tracheal deviation present  No thyromegaly present  Cardiovascular: Normal rate, regular rhythm and normal heart sounds  Exam reveals no gallop and no friction rub  No murmur heard  Pulmonary/Chest: Effort normal and breath sounds normal  No respiratory distress  She has no wheezes  She has no rales  Abdominal: Soft  Bowel sounds are normal  She exhibits no distension  There is no tenderness  There is no rebound  Musculoskeletal: Normal range of motion  She exhibits no edema  Neurological: She is alert and oriented to person, place, and time  Vitals reviewed  Discussion with Family:     Discharge instructions/Information to patient and family:   See after visit summary for information provided to patient and family  Provisions for Follow-Up Care:  See after visit summary for information related to follow-up care and any pertinent home health orders  Disposition:     Home    For Discharges to [facility] SNF:   · Not Applicable to this Patient - Not Applicable to this Patient    Planned Readmission: none     Discharge Statement:  I spent 50 minutes discharging the patient  This time was spent on the day of discharge  I had direct contact with the patient on the day of discharge  Greater than 50% of the total time was spent examining patient, answering all patient questions, arranging and discussing plan of care with patient as well as directly providing post-discharge instructions  Additional time then spent on discharge activities  Discharge Medications:  See after visit summary for reconciled discharge medications provided to patient and family        ** Please Note: This note has been constructed using a voice recognition system **

## 2018-01-22 ENCOUNTER — GENERIC CONVERSION - ENCOUNTER (OUTPATIENT)
Dept: OTHER | Facility: OTHER | Age: 63
End: 2018-01-22

## 2018-01-23 ENCOUNTER — GENERIC CONVERSION - ENCOUNTER (OUTPATIENT)
Dept: OTHER | Facility: OTHER | Age: 63
End: 2018-01-23

## 2018-01-24 ENCOUNTER — TRANSITIONAL CARE MANAGEMENT (OUTPATIENT)
Dept: INTERNAL MEDICINE CLINIC | Facility: CLINIC | Age: 63
End: 2018-01-24

## 2018-01-24 VITALS
SYSTOLIC BLOOD PRESSURE: 136 MMHG | HEART RATE: 114 BPM | BODY MASS INDEX: 47.73 KG/M2 | WEIGHT: 227.38 LBS | DIASTOLIC BLOOD PRESSURE: 74 MMHG | HEIGHT: 58 IN

## 2018-01-24 VITALS
SYSTOLIC BLOOD PRESSURE: 158 MMHG | HEIGHT: 58 IN | OXYGEN SATURATION: 96 % | BODY MASS INDEX: 45.58 KG/M2 | TEMPERATURE: 99 F | WEIGHT: 217.13 LBS | DIASTOLIC BLOOD PRESSURE: 80 MMHG | HEART RATE: 127 BPM

## 2018-01-25 NOTE — CASE MANAGEMENT
Notification of Discharge  This is a Notification of Discharge from our facility [address]  Please be advised that this patient has been discharge from our facility  Below you will find the admission and discharge date and time including the patients disposition  PRESENTATION DATE: 1/15/2018 10:13 PM  IP ADMISSION DATE: 1/16/18 1139  DISCHARGE DATE: 1/20/2018  1:01 PM  DISPOSITION: [facility] in the [facility] by [place] for 2017  Network Utilization Review Department  Phone: [phone]; Fax [phone]  ATTENTION: The Network Utilization Review Department is now centralized for our 7 Facilities  Make a note that we have a new phone and fax numbers for our Department  Please call with any questions or concerns to [phone] and carefully follow the prompts so that you are directed to the right person  All voicemails are confidential  Fax any determinations, approvals, denials, and requests for initial or continue stay review clinical to [phone]  Due to HIGH CALL volume, it would be easier if you could please send faxed requests to expedite your requests and in part, help us provide discharge notifications faster

## 2018-01-30 NOTE — MISCELLANEOUS
Assessment   1  Asthma exacerbation (493 92) (J45 901)1   2  Hypertension (401 9) (I10)1   3  Impaired fasting glucose (790 21) (R73 01)1   4  Hypokalemia (276 8) (E87 6)1      1 Amended By: Dalila Trammell; Jan 23 2018 4:57 PM EST    Discussion/Summary  Discussion Summary:   Significantly improved, continue prednisone taper as directed by pulmonologist  Call asap f any worsening of symptoms    Routine f/u after labs in May, sooner as needed  1        1 Amended By: Dalila Trammell; Jan 23 2018 4:57 PM EST    Chief Complaint  Chief Complaint Free Text Note Form: Hospital f/u1    Chief Complaint Chronic Condition [facility]:       1 Amended By: Dalila Trammell; Jan 23 2018 4:37 PM EST    History of Present Illness  TCM Communication [facility]: The patient is being contacted for follow-up after hospitalization and 1/22-spoke to pt, she is requesting CARMEN with Dr Faisal Antoine only  Taks sent to Dr Faisal Antoine for appt inquiry  Hospital records were reviewed  She was hospitalized at [facility])  The date of admission: 1/15/18, date of discharge: 1/20/18  Diagnosis: SOB=asthma exacerbation  She was discharged to home  Medications reviewed and updated today  Follow-up appointments with other specialists: pulm  Symptoms: weakness  c/o Counseling was provided to the patient  Topics counseled included importance of compliance with treatment  Communication performed and completed by   HPI: 80% better   Seen by pulm yesterday and he rapidly tapered the prenisone over next 5 days  Having issues w/ increased gerd and burning anus  Started w/ thrush, so had swish and swallow nystatin, now better  Lost aunt while hospitalized, also was off cymbalta a few days, hyperventillated and delayed d/c     HTN was poorly controlled d/t stress and steroids  Anxiety improved w/ increased xanax tid  Lost 13 lbs1        1 Amended By: Dalila Trammell; Jan 23 2018 4:47 PM EST    Review of Systems  Complete-Female:   Constitutional:1  No fever, no chills, feels well, no tiredness, no recent weight gain or weight loss1   Eyes:1  No complaints of eye pain, no red eyes, no eyesight problems, no discharge, no dry eyes, no itching of eyes1   ENT:1  no complaints of earache, no loss of hearing, no nose bleeds, no nasal discharge, no sore throat, no hoarseness1   Cardiovascular:1  No complaints of slow heart rate, no fast heart rate, no chest pain, no palpitations, no leg claudication, no lower extremity edema1   Respiratory:1  cough1  and shortness of breath during exertion1 , but no shortness of breath1 , no orthopnea1 , no wheezing1  and no PND1   Gastrointestinal:1  as noted in [address] [facility]   Genitourinary:1  No complaints of dysuria, no incontinence, no pelvic pain, no dysmenorrhea, no vaginal discharge or bleeding1   Musculoskeletal:1  as noted in [facility]   Integumentary:1  as noted in [facility]   Neurological:1  No complaints of headache, no confusion, no convulsions, no numbness, no dizziness or fainting, no tingling, no limb weakness, no difficulty walking1   Psychiatric:1  Not suicidal, no sleep disturbance, no anxiety or depression, no change in personality, no emotional problems1         1 Amended By: Michelle Almeida; Jan 23 2018 4:51 PM EST    Active Problems   1  Abdominal discomfort, epigastric (789 06) (R10 13)  2  Abnormal mammogram (793 80) (R92 8)  3  Acute maxillary sinusitis (461 0) (J01 00)  4  Allergic rhinitis (477 9) (J30 9)  5  Anxiety (300 00) (F41 9)  6  Asthma (493 90) (J45 909)  7  BRBPR (bright red blood per rectum) (569 3) (K62 5)  8  Breast pain (611 71) (N64 4)  9  Candidal intertrigo (112 3) (B37 2)  10  Chest pain (786 50) (R07 9)  11  Common cold (460) (J00)  12  Cough (786 2) (R05)  13  Depression (311) (F32 9)  14  Diarrhea (787 91) (R19 7)  15  Flu vaccine need (V04 81) (Z23)  16  Generalized pain (780 96) (R52)  17  Herpes simplex infection (054 9) (B00 9)  18  Hiccups (786 8) (R06 6)  19  Hyperlipidemia (272 4) (E78 5)  20  Hypertension (401 9) (I10)  21  Hypothyroidism (244 9) (E03 9)  22  Impaired fasting glucose (790 21) (R73 01)  23  Morbid or severe obesity due to excess calories (278 01) (E66 01)  24  Nausea (787 02) (R11 0)  25  Need for pneumococcal vaccine (V03 82) (Z23)  26  Need for prophylactic vaccination and inoculation against influenza (V04 81) (Z23)  27  Need for zoster vaccine (V04 89) (Z23)  28  Overactive bladder (596 51) (N32 81)  29  Peripheral neuropathy (356 9) (G62 9)  30  Preop examination (V72 84) (Z01 818)  31  Rash (782 1) (R21)  32  Sacroiliitis (720 2) (M46 1)  33  Sciatica (724 3) (M54 30)  34  Screening for breast cancer (V76 10) (Z12 31)  35  Screening for cervical cancer (V76 2) (Z12 4)  36  Skin rash (782 1) (R21)  37  Urticaria, idiopathic (708 1) (L50 1)  38  Wheeze (786 07) (R06 2)  39  Yeast infection of the vagina (112 1) (B37 3)    Past Medical History   1  History of Acute laryngopharyngitis (465 0) (J06 0)  2  History of Acute upper respiratory infection (465 9) (J06 9)  3  History of Cough (786 2) (R05)  4  History of Depression (311) (F32 9)  5  History of acute sinusitis (V12 69) (Z87 09)  6  History of allergic rhinitis (V12 69) (Z87 09)  7  History of anemia (V12 3) (Z86 2)  8  History of chest pain (V13 89) (Z87 898)  9  History of gastroesophageal reflux (GERD) (V12 79) (Z87 19)  10  History of herpes simplex infection (V12 09) (Z86 19)  11  History of hyperlipidemia (V12 29) (Z86 39)  12  History of hyperlipidemia (V12 29) (Z86 39)  13  History of hypertension (V12 59) (Z86 79)  14  History of hypertension (V12 59) (Z86 79)  15  History of hypothyroidism (V12 29) (Z86 39)  16  History of hypothyroidism (V12 29) (Z86 39)  17  History of obesity (V12 29) (Z86 39)  18  History of Joint Pain In Both Knees (719 46)  19  History of Nervousness (799 21) (R45 0)  20  History of Osteoarthritis (V13 4)    Surgical History   1  History of Incisional Breast Biopsy  2   History of Knee Arthroplasty  3  History of Rotator Cuff Repair  Surgical History Reviewed: The surgical history was reviewed and updated today  1        1 Amended By: Elizabeth Colbert; Jan 23 2018 4:51 PM EST    Family History  Mother   1  Family history of Coronary Artery Disease (V17 49)  2  Family history of Essential Hypertension  Father   3  Family history of Coronary Artery Disease (V17 49)  Family History Reviewed: The family history was reviewed and updated today  1        1 Amended By: Elizabeth Colbert; Jan 23 2018 4:51 PM EST    Social History    · Living Independently With Spouse   · Never A Smoker   · Never A Smoker   · No drug use   · Social alcohol use (Z78 9)   · Tobacco non-user (V49 89) (Z78 9)    Social History Reviewed: The social history was reviewed and updated today  1        1 Amended By: Elizabeth Colbert; Jan 23 2018 4:51 PM EST    Current Meds  1  ALPRAZolam 1 MG Oral Tablet; TAKE ONE HALF (1/2) TO ONE (1) TABLET(S)THREE   TIMES DAILY AS NEEDED FOR ANXIETY; Therapy: 40GZR4883 to (Last Rx:66Vre7417) Ordered  2  Aspirin EC 81 MG Oral Tablet Delayed Release; Take 1 tablet daily Recorded  3  Benadryl 25 MG TABS Recorded  4  Calmoseptine 0 44-20 6 % External Ointment; APPLY 0 5 INCH Every 4 hours PRN   irritation and itch; Therapy: 37PIM3894 to (Last Rx:79Qnp5649)  Requested for: 92Twe4703 Ordered  5  Cholestyramine 4 GM Oral Packet; 1 PACKET 1 TO 3 TIMES DAILY AS NEEDED; Therapy: 51Qwo0072 to (Last Rx:86Ten5202)  Requested for: 50Bdy1825 Ordered  6  Clotrimazole-Betamethasone 1-0 05 % External Cream; APPLY SPARINGLY TO   AFFECTED AREA in groin 2-3 TIMES A DAY as needed; Therapy: 61Dae4776 to (Last Rx:72Rtc8671)  Requested for: 69Hgo6015 Ordered  7  Dulera 100-5 MCG/ACT Inhalation Aerosol; INHALE 2 PUFFS AT 12 HOUR INTERVALS   (MORNING AND EVENING); Therapy: 82HYR3135 to (Last Rx:30Nov2017) Ordered  8   DULoxetine HCl - 60 MG Oral Capsule Delayed Release Particles; take 1 capsule daily +   30MG TO TOTAL 90MG; Therapy: 96MDH3070 to ([facility])  Requested for: 14PBR5675; Last   Rx:30Nov2017 Ordered  9  Escitalopram Oxalate 10 MG Oral Tablet; TAKE 1/2  TABLET EVERY DAY; Therapy: 30ZPK2908 to (Evaluate:30Mar2018)  Requested for: 07DGZ2195; Last   Rx:30Nov2017 Ordered  10  Famotidine 10 MG Oral Tablet; TAKE 1 TABLET AT BEDTIME; Therapy: 61GKW6781 to (Evaluate:68Qgg8095); Last Rx:30Nov2017 Ordered  11  HydroCHLOROthiazide 25 MG Oral Tablet; TAKE ONE (1) TABLET(S) DAILY; Therapy: 35VMQ1812 to (Evaluate:19Vxw9844)  Requested for: 07KOU4218; Last    Rx:90Twb3865 Ordered  12  Levothyroxine Sodium 75 MCG Oral Tablet; take 1 tablet every day; Therapy: 80MRE1322 to ([facility])  Requested for: 90EMN6003; Last    Rx:30Nov2017 Ordered  13  Nasacort Allergy 24HR 55 MCG/ACT Nasal Aerosol; Therapy: 66UWP0857 to Recorded  14  Nystop 455965 UNIT/GM External Powder; APPLY SPARINGLY TO THE AFFECTED AREA    TWICE A DAY; Therapy: 80GTL4289 to (Evaluate:35Jpl1378)  Requested for: 23Oct2017; Last    Rx:23Oct2017 Ordered  15  Pantoprazole Sodium 40 MG Oral Tablet Delayed Release; Take 1 tablet by mouth  daily; Therapy: 87MWR7383 to (Evaluate:30Jun2018)  Requested for: 53NBC0219; Last    Rx:60Uas2318 Ordered  16  Potassium Chloride Swati ER 20 MEQ Oral Tablet Extended Release; Take 1 tablet by    mouth  daily; Therapy: 71Cjt4996 to (Evaluate:04Tel0285)  Requested for: 22ESC6678; Last    Rx:83Imt6441 Ordered  17  PredniSONE 10 MG Oral Tablet; take 3 tabs daily x 2 days then 2 tabs daily x 2 days then    1 tab x 1 day; Therapy: 75BJX1205 to (Last Rx:17Juk3907)  Requested for: 07CRL5740 Ordered  18  Promethazine-Codeine 6 25-10 MG/5ML Oral Syrup; TAKE ONE (1) OR TWO (2)    TEASPOONFULEVERY 6 HOURS AS NEEDED; Therapy: 01PVS0941 to (Last Rx:03Jan2018) Ordered  19  Quinapril HCl - 20 MG Oral Tablet; Take 1 tablet by mouth  every day;     Therapy: 68EPL5410 to (Evaluate:15Nov2018)  Requested for: 37QMT7718; Last    Rx:20Nov2017 Ordered  20  Simvastatin 40 MG Oral Tablet; TAKE ONE (1) TABLET(S) DAILY; Therapy: 58WGI9567 to (Evaluate:01Yyh5916)  Requested for: 50JJP7468; Last    Rx:02Oct2017 Ordered  21  TraMADol HCl - 50 MG Oral Tablet; TAKE ONE (1) TABLET(S) THREE TIMES DAILY; Therapy: 53Bep9615 to (Evaluate:32Xlz3820)  Requested for: 21Nov2017; Last    Rx:21Nov2017 Ordered  22  Valtrex 1 GM Oral Tablet; TAKE 2 TABLETS TWICE DAILY; Last Rx:25Mar2014 Ordered  23  Xopenex HFA 45 MCG/ACT Inhalation Aerosol; INHALE 1 TO 2 PUFFS EVERY 4 TO 6    HOURS AS NEEDED Recorded  Medication List Reviewed: The medication list was reviewed and updated today  1        1 Amended By: Leilani Zarate; Jan 23 2018 4:51 PM EST    Allergies   1  Ciprofloxacin HCl TABS  2  cyproheptadine  3  NSAIDs  4  Oxybutynin Chloride TABS  5  Penicillins  6  Sulfa Drugs  7  Vancomycin HCl CAPS    Physical Exam    Constitutional1    General appearance: Abnormal  1  Morbidly obese1   Eyes1    Conjunctiva and lids: No swelling, erythema or discharge  1    Pupils and irises: Equal, round and reactive to light  1    Ears, Nose, Mouth, and Throat1    External inspection of ears and nose: Normal 1    Oropharynx: Normal with no erythema, edema, exudate or lesions  1    Pulmonary1    Respiratory effort: No increased work of breathing or signs of respiratory distress  1    Auscultation of lungs: Clear to auscultation  1    Cardiovascular1    Auscultation of heart: Normal rate and rhythm, normal S1 and S2, without murmurs  1    Examination of extremities for edema and/or varicosities: Normal 1    Carotid pulses: Normal 1    Lymphatic1    Palpation of lymph nodes in neck: No lymphadenopathy  1    Musculoskeletal1    Gait and station: Normal 1    Neurologic1    Cranial nerves: Cranial nerves 2-12 intact  1    Psychiatric1    Orientation to person, place, and time: Normal 1    Mood and affect: Normal 1          1 Amended By: Leilani Zarate; Jan 23 2018 4:51 PM EST    Health Management  BRBPR (bright red blood per rectum)   COLONOSCOPY; every 5 years; Last 00FPK8226; Next Due: 97JCV1924; Active  Health Maintenance   *VB - Foot Exam; every 1 year; Last 11REM1964; Next Due: 84YHN1371; Overdue    Future Appointments    Date/Time Provider Specialty Site   06/14/2018 03:15 PM PRASHANT Hawkins   Internal Medicine [facility]     Signatures   Electronically signed by : Bennye Halsted, M D ; Jan 22 2018  7:55PM EST                          Electronically signed by : Bennye Halsted, M D ; Jan 23 2018  4:59PM EST                       (Author)

## 2018-03-08 DIAGNOSIS — I10 ESSENTIAL HYPERTENSION: Primary | ICD-10-CM

## 2018-03-08 RX ORDER — HYDROCHLOROTHIAZIDE 25 MG/1
TABLET ORAL
Qty: 90 TABLET | Refills: 3 | Status: SHIPPED | OUTPATIENT
Start: 2018-03-08 | End: 2019-01-19 | Stop reason: SDUPTHER

## 2018-04-05 ENCOUNTER — TRANSCRIBE ORDERS (OUTPATIENT)
Dept: ADMINISTRATIVE | Facility: HOSPITAL | Age: 63
End: 2018-04-05

## 2018-04-05 ENCOUNTER — APPOINTMENT (OUTPATIENT)
Dept: LAB | Facility: HOSPITAL | Age: 63
End: 2018-04-05
Payer: COMMERCIAL

## 2018-04-05 DIAGNOSIS — J01.00 ACUTE MAXILLARY SINUSITIS, RECURRENCE NOT SPECIFIED: ICD-10-CM

## 2018-04-05 DIAGNOSIS — J45.40 MODERATE PERSISTENT ASTHMA, UNSPECIFIED WHETHER COMPLICATED: ICD-10-CM

## 2018-04-05 DIAGNOSIS — J45.40 MODERATE PERSISTENT ASTHMA, UNSPECIFIED WHETHER COMPLICATED: Primary | ICD-10-CM

## 2018-04-05 DIAGNOSIS — B37.9 YEAST INFECTION: Primary | ICD-10-CM

## 2018-04-05 LAB
BASOPHILS # BLD AUTO: 0.05 THOUSANDS/ΜL (ref 0–0.1)
BASOPHILS NFR BLD AUTO: 1 % (ref 0–1)
EOSINOPHIL # BLD AUTO: 0.26 THOUSAND/ΜL (ref 0–0.61)
EOSINOPHIL NFR BLD AUTO: 2 % (ref 0–6)
ERYTHROCYTE [DISTWIDTH] IN BLOOD BY AUTOMATED COUNT: 14.8 % (ref 11.6–15.1)
HCT VFR BLD AUTO: 40.8 % (ref 34.8–46.1)
HGB BLD-MCNC: 12.9 G/DL (ref 11.5–15.4)
LYMPHOCYTES # BLD AUTO: 2.94 THOUSANDS/ΜL (ref 0.6–4.47)
LYMPHOCYTES NFR BLD AUTO: 27 % (ref 14–44)
MCH RBC QN AUTO: 27.3 PG (ref 26.8–34.3)
MCHC RBC AUTO-ENTMCNC: 31.6 G/DL (ref 31.4–37.4)
MCV RBC AUTO: 86 FL (ref 82–98)
MONOCYTES # BLD AUTO: 0.92 THOUSAND/ΜL (ref 0.17–1.22)
MONOCYTES NFR BLD AUTO: 8 % (ref 4–12)
NEUTROPHILS # BLD AUTO: 6.73 THOUSANDS/ΜL (ref 1.85–7.62)
NEUTS SEG NFR BLD AUTO: 61 % (ref 43–75)
NRBC BLD AUTO-RTO: 0 /100 WBCS
PLATELET # BLD AUTO: 248 THOUSANDS/UL (ref 149–390)
PMV BLD AUTO: 8.6 FL (ref 8.9–12.7)
RBC # BLD AUTO: 4.73 MILLION/UL (ref 3.81–5.12)
WBC # BLD AUTO: 10.95 THOUSAND/UL (ref 4.31–10.16)

## 2018-04-05 PROCEDURE — 36415 COLL VENOUS BLD VENIPUNCTURE: CPT

## 2018-04-05 PROCEDURE — 85025 COMPLETE CBC W/AUTO DIFF WBC: CPT

## 2018-04-05 RX ORDER — NYSTATIN 100000 [USP'U]/G
POWDER TOPICAL
Qty: 60 G | Refills: 3 | Status: SHIPPED | OUTPATIENT
Start: 2018-04-05 | End: 2018-08-28 | Stop reason: SDUPTHER

## 2018-04-18 DIAGNOSIS — G62.9 NEUROPATHY: Primary | ICD-10-CM

## 2018-04-18 RX ORDER — DULOXETIN HYDROCHLORIDE 30 MG/1
30 CAPSULE, DELAYED RELEASE ORAL DAILY
Qty: 90 CAPSULE | Refills: 3 | Status: SHIPPED | OUTPATIENT
Start: 2018-04-18 | End: 2019-01-02 | Stop reason: SDUPTHER

## 2018-05-07 DIAGNOSIS — L30.9 DERMATITIS: Primary | ICD-10-CM

## 2018-05-07 RX ORDER — CLOTRIMAZOLE AND BETAMETHASONE DIPROPIONATE 10; .64 MG/G; MG/G
CREAM TOPICAL
Qty: 45 G | Refills: 1 | Status: SHIPPED | OUTPATIENT
Start: 2018-05-07 | End: 2019-01-02 | Stop reason: SDUPTHER

## 2018-05-22 DIAGNOSIS — G89.29 OTHER CHRONIC PAIN: Primary | ICD-10-CM

## 2018-05-24 RX ORDER — TRAMADOL HYDROCHLORIDE 50 MG/1
TABLET ORAL
Qty: 270 TABLET | Refills: 2 | Status: SHIPPED | OUTPATIENT
Start: 2018-05-24 | End: 2018-12-29 | Stop reason: SDUPTHER

## 2018-05-30 DIAGNOSIS — E78.5 HYPERLIPIDEMIA: ICD-10-CM

## 2018-05-30 DIAGNOSIS — I10 ESSENTIAL (PRIMARY) HYPERTENSION: ICD-10-CM

## 2018-05-30 DIAGNOSIS — R73.01 IMPAIRED FASTING GLUCOSE: ICD-10-CM

## 2018-05-30 DIAGNOSIS — E03.9 HYPOTHYROIDISM: ICD-10-CM

## 2018-06-03 DIAGNOSIS — I10 ESSENTIAL HYPERTENSION: Primary | ICD-10-CM

## 2018-06-04 RX ORDER — POTASSIUM CHLORIDE 20 MEQ/1
TABLET, EXTENDED RELEASE ORAL
Qty: 90 TABLET | Refills: 3 | Status: SHIPPED | OUTPATIENT
Start: 2018-06-04 | End: 2019-04-28 | Stop reason: SDUPTHER

## 2018-06-08 DIAGNOSIS — K21.9 GASTROESOPHAGEAL REFLUX DISEASE WITHOUT ESOPHAGITIS: Primary | ICD-10-CM

## 2018-06-08 RX ORDER — PANTOPRAZOLE SODIUM 40 MG/1
TABLET, DELAYED RELEASE ORAL
Qty: 90 TABLET | Refills: 3 | Status: SHIPPED | OUTPATIENT
Start: 2018-06-08 | End: 2019-07-07 | Stop reason: SDUPTHER

## 2018-06-12 ENCOUNTER — APPOINTMENT (OUTPATIENT)
Dept: LAB | Facility: HOSPITAL | Age: 63
End: 2018-06-12
Payer: COMMERCIAL

## 2018-06-12 DIAGNOSIS — E78.5 HYPERLIPIDEMIA: ICD-10-CM

## 2018-06-12 DIAGNOSIS — R73.01 IMPAIRED FASTING GLUCOSE: ICD-10-CM

## 2018-06-12 DIAGNOSIS — I10 ESSENTIAL (PRIMARY) HYPERTENSION: ICD-10-CM

## 2018-06-12 DIAGNOSIS — E03.9 HYPOTHYROIDISM: ICD-10-CM

## 2018-06-12 LAB
ALBUMIN SERPL BCP-MCNC: 3.3 G/DL (ref 3.5–5)
ALP SERPL-CCNC: 83 U/L (ref 46–116)
ALT SERPL W P-5'-P-CCNC: 23 U/L (ref 12–78)
ANION GAP SERPL CALCULATED.3IONS-SCNC: 6 MMOL/L (ref 4–13)
AST SERPL W P-5'-P-CCNC: 17 U/L (ref 5–45)
BILIRUB SERPL-MCNC: 0.4 MG/DL (ref 0.2–1)
BUN SERPL-MCNC: 15 MG/DL (ref 5–25)
CALCIUM SERPL-MCNC: 9.3 MG/DL (ref 8.3–10.1)
CHLORIDE SERPL-SCNC: 99 MMOL/L (ref 100–108)
CHOLEST SERPL-MCNC: 205 MG/DL (ref 50–200)
CO2 SERPL-SCNC: 34 MMOL/L (ref 21–32)
CREAT SERPL-MCNC: 0.83 MG/DL (ref 0.6–1.3)
ERYTHROCYTE [DISTWIDTH] IN BLOOD BY AUTOMATED COUNT: 14.7 % (ref 11.6–15.1)
EST. AVERAGE GLUCOSE BLD GHB EST-MCNC: 126 MG/DL
GFR SERPL CREATININE-BSD FRML MDRD: 75 ML/MIN/1.73SQ M
GLUCOSE P FAST SERPL-MCNC: 98 MG/DL (ref 65–99)
HBA1C MFR BLD: 6 % (ref 4.2–6.3)
HCT VFR BLD AUTO: 41.4 % (ref 34.8–46.1)
HDLC SERPL-MCNC: 56 MG/DL (ref 40–60)
HGB BLD-MCNC: 12.7 G/DL (ref 11.5–15.4)
LDLC SERPL CALC-MCNC: 122 MG/DL (ref 0–100)
MCH RBC QN AUTO: 26.3 PG (ref 26.8–34.3)
MCHC RBC AUTO-ENTMCNC: 30.7 G/DL (ref 31.4–37.4)
MCV RBC AUTO: 86 FL (ref 82–98)
NONHDLC SERPL-MCNC: 149 MG/DL
PLATELET # BLD AUTO: 245 THOUSANDS/UL (ref 149–390)
PMV BLD AUTO: 8.6 FL (ref 8.9–12.7)
POTASSIUM SERPL-SCNC: 4.1 MMOL/L (ref 3.5–5.3)
PROT SERPL-MCNC: 7.9 G/DL (ref 6.4–8.2)
RBC # BLD AUTO: 4.83 MILLION/UL (ref 3.81–5.12)
SODIUM SERPL-SCNC: 139 MMOL/L (ref 136–145)
T4 FREE SERPL-MCNC: 0.74 NG/DL (ref 0.76–1.46)
TRIGL SERPL-MCNC: 135 MG/DL
TSH SERPL DL<=0.05 MIU/L-ACNC: 4.06 UIU/ML (ref 0.36–3.74)
WBC # BLD AUTO: 8.41 THOUSAND/UL (ref 4.31–10.16)

## 2018-06-12 PROCEDURE — 36415 COLL VENOUS BLD VENIPUNCTURE: CPT

## 2018-06-12 PROCEDURE — 84443 ASSAY THYROID STIM HORMONE: CPT

## 2018-06-12 PROCEDURE — 80053 COMPREHEN METABOLIC PANEL: CPT

## 2018-06-12 PROCEDURE — 84439 ASSAY OF FREE THYROXINE: CPT

## 2018-06-12 PROCEDURE — 83036 HEMOGLOBIN GLYCOSYLATED A1C: CPT

## 2018-06-12 PROCEDURE — 80061 LIPID PANEL: CPT

## 2018-06-12 PROCEDURE — 85027 COMPLETE CBC AUTOMATED: CPT

## 2018-06-12 RX ORDER — SIMVASTATIN 40 MG
1 TABLET ORAL DAILY
COMMUNITY
Start: 2014-07-25 | End: 2018-09-28 | Stop reason: SDUPTHER

## 2018-06-12 RX ORDER — ESCITALOPRAM OXALATE 10 MG/1
TABLET ORAL
COMMUNITY
Start: 2017-11-30 | End: 2018-10-21 | Stop reason: SDUPTHER

## 2018-06-13 RX ORDER — CHOLESTYRAMINE 4 G/9G
POWDER, FOR SUSPENSION ORAL AS NEEDED
COMMUNITY
Start: 2014-08-22

## 2018-06-13 RX ORDER — FAMOTIDINE 10 MG
1 TABLET ORAL
Status: ON HOLD | COMMUNITY
Start: 2017-11-30 | End: 2020-08-13

## 2018-06-13 RX ORDER — MONTELUKAST SODIUM 10 MG/1
10 TABLET ORAL EVERY EVENING
Refills: 5 | COMMUNITY
Start: 2018-03-28 | End: 2018-06-14

## 2018-06-13 RX ORDER — TRIAMCINOLONE ACETONIDE 55 UG/1
SPRAY, METERED NASAL AS NEEDED
COMMUNITY
Start: 2017-05-11

## 2018-06-14 ENCOUNTER — OFFICE VISIT (OUTPATIENT)
Dept: INTERNAL MEDICINE CLINIC | Facility: CLINIC | Age: 63
End: 2018-06-14
Payer: COMMERCIAL

## 2018-06-14 VITALS
BODY MASS INDEX: 47.02 KG/M2 | DIASTOLIC BLOOD PRESSURE: 80 MMHG | HEIGHT: 58 IN | HEART RATE: 100 BPM | WEIGHT: 224 LBS | OXYGEN SATURATION: 96 % | SYSTOLIC BLOOD PRESSURE: 142 MMHG

## 2018-06-14 DIAGNOSIS — J45.21 MILD INTERMITTENT ASTHMA WITH EXACERBATION: ICD-10-CM

## 2018-06-14 DIAGNOSIS — E03.9 ACQUIRED HYPOTHYROIDISM: ICD-10-CM

## 2018-06-14 DIAGNOSIS — F32.A DEPRESSION, UNSPECIFIED DEPRESSION TYPE: ICD-10-CM

## 2018-06-14 DIAGNOSIS — N64.89 BREAST HEMATOMA: ICD-10-CM

## 2018-06-14 DIAGNOSIS — I10 ESSENTIAL HYPERTENSION: Chronic | ICD-10-CM

## 2018-06-14 DIAGNOSIS — F41.9 ANXIETY: Primary | ICD-10-CM

## 2018-06-14 DIAGNOSIS — E78.2 MIXED HYPERLIPIDEMIA: ICD-10-CM

## 2018-06-14 DIAGNOSIS — E03.9 HYPOTHYROIDISM, UNSPECIFIED TYPE: Primary | ICD-10-CM

## 2018-06-14 DIAGNOSIS — G61.9 INFLAMMATORY NEUROPATHY (HCC): ICD-10-CM

## 2018-06-14 DIAGNOSIS — J30.1 SEASONAL ALLERGIC RHINITIS DUE TO POLLEN: ICD-10-CM

## 2018-06-14 DIAGNOSIS — B37.2 CANDIDAL INTERTRIGO: ICD-10-CM

## 2018-06-14 DIAGNOSIS — E66.01 MORBID OBESITY (HCC): ICD-10-CM

## 2018-06-14 DIAGNOSIS — R73.01 IMPAIRED FASTING GLUCOSE: ICD-10-CM

## 2018-06-14 DIAGNOSIS — L98.9 NON-HEALING SKIN LESION: ICD-10-CM

## 2018-06-14 PROCEDURE — 99215 OFFICE O/P EST HI 40 MIN: CPT | Performed by: INTERNAL MEDICINE

## 2018-06-14 RX ORDER — ALPRAZOLAM 0.5 MG/1
0.5 TABLET ORAL
Qty: 90 TABLET | Refills: 0 | Status: SHIPPED | OUTPATIENT
Start: 2018-06-14 | End: 2018-06-19 | Stop reason: SDUPTHER

## 2018-06-14 RX ORDER — LEVOTHYROXINE SODIUM 88 UG/1
88 TABLET ORAL DAILY
Qty: 90 TABLET | Refills: 3 | Status: SHIPPED | OUTPATIENT
Start: 2018-06-14 | End: 2018-08-13 | Stop reason: SDUPTHER

## 2018-06-14 NOTE — PATIENT INSTRUCTIONS
Lab data reviewed in detail in compared to prior     Depression and anxiety -continue on Cymbalta and Lexapro with  Alprazolam as needed    Impaired fasting glucose -A1c up from 5 7 to 6 0- continue with low carb diet, increased aerobic exercise and weight loss efforts as below    Hypothyroidism -TSH is elevated and low free T4, patient has fatigue and there has been recent weight gain, will increase levothyroxine to 88 mcg daily, okay to take an extra 75 mcg tablet once per week until you run out and then start the 88  Repeat your TSH in 6 weeks and we will phone you with the results    Hypertension is stable on present regimen    Hyperlipidemia - LDL has increased despite simvastatin -can consider changing to atorvastatin if not improved by follow-up     Peripheral neuropathy- continue on Cymbalta     Chronic back and lower extremity pains working with Orthopedics  Continue with analgesics as needed, weight loss efforts as below    Morbid obesity -I am in agreement with bariatric surgery consideration  Weight loss can help with all of the above  Continue with dietary modification, increased exercise as  Discussed and learn what to can from the seminar in July  Contact me with any questions along the way  Nonhealing skin lesion on forehead -referral to Dermatology    History consistent with breast hematoma -this can show up on mammogram, mammogram due next month, will follow accordingly, it may be reasonable to wait an extra month before doing the mammogram to avoid confusion by scarring  Routine office follow-up after labs in 6 months, but sooner as needed

## 2018-06-14 NOTE — PROGRESS NOTES
Assessment/Plan:    Patient Instructions   Lab data reviewed in detail in compared to prior     Depression and anxiety -continue on Cymbalta and Lexapro with  Alprazolam as needed    Impaired fasting glucose -A1c up from 5 7 to 6 0- continue with low carb diet, increased aerobic exercise and weight loss efforts as below    Hypothyroidism -TSH is elevated and low free T4, patient has fatigue and there has been recent weight gain, will increase levothyroxine to 88 mcg daily, okay to take an extra 75 mcg tablet once per week until you run out and then start the 88  Repeat your TSH in 6 weeks and we will phone you with the results    Hypertension is stable on present regimen    Hyperlipidemia - LDL has increased despite simvastatin -can consider changing to atorvastatin if not improved by follow-up     Peripheral neuropathy- continue on Cymbalta     Chronic back and lower extremity pains working with Orthopedics  Continue with analgesics as needed, weight loss efforts as below    Morbid obesity -I am in agreement with bariatric surgery consideration  Weight loss can help with all of the above  Continue with dietary modification, increased exercise as  Discussed and learn what to can from the seminar in July  Contact me with any questions along the way  Nonhealing skin lesion on forehead -referral to Dermatology    History consistent with breast hematoma -this can show up on mammogram, mammogram due next month, will follow accordingly, it may be reasonable to wait an extra month before doing the mammogram to avoid confusion by scarring  Routine office follow-up after labs in 6 months, but sooner as needed  Diagnoses and all orders for this visit:    Anxiety  -     ALPRAZolam (XANAX) 0 5 mg tablet; Take 1 tablet (0 5 mg total) by mouth daily at bedtime as needed for anxiety    Impaired fasting glucose  -     Hemoglobin A1c (w/out EAG);  Future    Acquired hypothyroidism  -     levothyroxine 88 mcg tablet; Take 1 tablet (88 mcg total) by mouth daily  -     TSH, 3rd generation with Free T4 reflex; Future    Mild intermittent asthma with exacerbation    Seasonal allergic rhinitis due to pollen    Essential hypertension  -     TSH, 3rd generation with Free T4 reflex; Future  -     CBC; Future  -     Comprehensive metabolic panel; Future    Inflammatory neuropathy (HCC)    Candidal intertrigo    Depression, unspecified depression type    Mixed hyperlipidemia  -     Lipid panel; Future    Morbid obesity ([address] )    Breast hematoma    Non-healing skin lesion  -     Ambulatory referral to Dermatology; Future    Other orders  -     escitalopram (LEXAPRO) 10 mg tablet; Take by mouth  -     simvastatin (ZOCOR) 40 mg tablet; Take 1 tablet by mouth daily  -     cholestyramine (QUESTRAN) 4 g packet; Take by mouth  -     famotidine (PEPCID) 10 mg tablet; Take 1 tablet by mouth  -     Discontinue: montelukast (SINGULAIR) 10 mg tablet; Take 10 mg by mouth every evening  -     Triamcinolone Acetonide (NASACORT ALLERGY 24HR) 55 MCG/ACT AERO; into each nostril  -     Multiple Vitamins-Minerals (OCUVITE ADULT 50+ PO); Take by mouth         Subjective:      Patient ID: Ale Chairez is a 61 y o  female    Depressed re multiple deaths, Ramiro's surgery, chronic pains in back and R leg  Working w/ Dr Portia Browning for back at [facility] who rec'd gastric sleeve  Working w/ Dr Sharri Aparicio for knee/leg and states knee looks fine  Taking tramadol for pain, no oxy  Started walking regimen, but limited by pain  Just started 2 d per week x 2 wks  Lost a few lbs  No candy, cut soda  Asthma flaring, doesn't tolerate singulair or antihistamine  Depression-taking rx but situation  Hypothyroid-taking rx, recent increase  HTN/HPL-taking rx as directed  Home bp's ~120/70  Injured R breast falling in bedroom  Notes irritated lesion on forehead x several mos             Current Outpatient Prescriptions:     ALPRAZolam (XANAX) 0 5 mg tablet, Take 1 tablet (0 5 mg total) by mouth daily at bedtime as needed for anxiety, Disp: 90 tablet, Rfl: 0    aspirin 81 MG tablet, Take 81 mg by mouth daily, Disp: , Rfl:     cholestyramine (QUESTRAN) 4 g packet, Take by mouth, Disp: , Rfl:     clotrimazole-betamethasone (LOTRISONE) 1-0 05 % cream, APPLY SPARINGLY TO AFFECTED AREA IN GROIN 2-3 TIMES A DAY AS NEEDED, Disp: 45 g, Rfl: 1    DULoxetine (CYMBALTA) 30 mg delayed release capsule, Take 1 capsule (30 mg total) by mouth daily, Disp: 90 capsule, Rfl: 3    DULoxetine (CYMBALTA) 60 mg delayed release capsule, Take 60 mg by mouth daily 90 mg, Disp: , Rfl:     escitalopram (LEXAPRO) 10 mg tablet, Take by mouth, Disp: , Rfl:     famotidine (PEPCID) 10 mg tablet, Take 1 tablet by mouth, Disp: , Rfl:     hydrochlorothiazide (HYDRODIURIL) 25 mg tablet, TAKE ONE TABLET BY MOUTH EVERY DAY, Disp: 90 tablet, Rfl: 3    levalbuterol (XOPENEX HFA) 45 mcg/act inhaler, Inhale 1-2 puffs every 4 (four) hours as needed for wheezing, Disp: , Rfl:     levothyroxine 88 mcg tablet, Take 1 tablet (88 mcg total) by mouth daily, Disp: 90 tablet, Rfl: 3    Mometasone Furo-Formoterol Fum (DULERA) 100-5 MCG/ACT AERO, Inhale, Disp: , Rfl:     Multiple Vitamins-Minerals (OCUVITE ADULT 50+ PO), Take by mouth, Disp: , Rfl:     NYSTATIN powder, APPLY SPARINGLY TO THE AFFECTED AREA TWICE A DAY, Disp: 60 g, Rfl: 3    pantoprazole (PROTONIX) 40 mg tablet, TAKE 1 TABLET BY MOUTH  DAILY, Disp: 90 tablet, Rfl: 3    potassium chloride (K-DUR,KLOR-CON) 20 mEq tablet, TAKE 1 TABLET BY MOUTH  DAILY, Disp: 90 tablet, Rfl: 3    quinapril (ACCUPRIL) 10 mg tablet, Take 10 mg by mouth daily at bedtime, Disp: , Rfl:     simvastatin (ZOCOR) 40 mg tablet, Take 1 tablet by mouth daily, Disp: , Rfl:     traMADol (ULTRAM) 50 mg tablet, TAKE ONE TABLET BY MOUTH THREE TIMES A DAY, Disp: 270 tablet, Rfl: 2    Triamcinolone Acetonide (NASACORT ALLERGY 24HR) 55 MCG/ACT AERO, into each nostril, Disp: , Rfl: Recent Results (from the past 1008 hour(s))   CBC    Collection Time: 06/12/18 12:37 PM   Result Value Ref Range    WBC 8 41 4 31 - 10 16 Thousand/uL    RBC 4 83 3 81 - 5 12 Million/uL    Hemoglobin 12 7 11 5 - 15 4 g/dL    Hematocrit 41 4 34 8 - 46 1 %    MCV 86 82 - 98 fL    MCH 26 3 (L) 26 8 - 34 3 pg    MCHC 30 7 (L) 31 4 - 37 4 g/dL    RDW 14 7 11 6 - 15 1 %    Platelets 990 161 - 922 Thousands/uL    MPV 8 6 (L) 8 9 - 12 7 fL   Comprehensive metabolic panel    Collection Time: 06/12/18 12:37 PM   Result Value Ref Range    Sodium 139 136 - 145 mmol/L    Potassium 4 1 3 5 - 5 3 mmol/L    Chloride 99 (L) 100 - 108 mmol/L    CO2 34 (H) 21 - 32 mmol/L    Anion Gap 6 4 - 13 mmol/L    BUN 15 5 - 25 mg/dL    Creatinine 0 83 0 60 - 1 30 mg/dL    Glucose, Fasting 98 65 - 99 mg/dL    Calcium 9 3 8 3 - 10 1 mg/dL    AST 17 5 - 45 U/L    ALT 23 12 - 78 U/L    Alkaline Phosphatase 83 46 - 116 U/L    Total Protein 7 9 6 4 - 8 2 g/dL    Albumin 3 3 (L) 3 5 - 5 0 g/dL    Total Bilirubin 0 40 0 20 - 1 00 mg/dL    eGFR 75 ml/min/1 73sq m   Hemoglobin A1c    Collection Time: 06/12/18 12:37 PM   Result Value Ref Range    Hemoglobin A1C 6 0 4 2 - 6 3 %     mg/dl   Lipid panel    Collection Time: 06/12/18 12:37 PM   Result Value Ref Range    Cholesterol 205 (H) 50 - 200 mg/dL    Triglycerides 135 <=150 mg/dL    HDL, Direct 56 40 - 60 mg/dL    LDL Calculated 122 (H) 0 - 100 mg/dL    Non-HDL-Chol (CHOL-HDL) 149 mg/dl   TSH, 3rd generation with T4 reflex    Collection Time: 06/12/18 12:37 PM   Result Value Ref Range    TSH 3RD GENERATON 4 057 (H) 0 358 - 3 740 uIU/mL   T4, free    Collection Time: 06/12/18 12:37 PM   Result Value Ref Range    Free T4 0 74 (L) 0 76 - 1 46 ng/dL       The following portions of the patient's history were reviewed and updated as appropriate: allergies, current medications, past family history, past medical history, past social history, past surgical history and problem list      Review of Systems Constitutional: Negative for appetite change, chills, diaphoresis, fatigue, fever and unexpected weight change  HENT: Negative for congestion, hearing loss and rhinorrhea  Eyes: Negative for visual disturbance  Respiratory: Negative for cough, chest tightness, shortness of breath and wheezing  Cardiovascular: Negative for chest pain, palpitations and leg swelling  Gastrointestinal: Negative for abdominal pain and blood in stool  Endocrine: Negative for cold intolerance, heat intolerance, polydipsia and polyuria  Genitourinary: Negative for difficulty urinating, dysuria, frequency and urgency  Musculoskeletal: Positive for arthralgias and back pain  Negative for myalgias  Skin: Negative for rash  Non healing lesion on forehead   Neurological: Negative for dizziness, weakness, light-headedness and headaches  Hematological: Bruises/bleeds easily  Psychiatric/Behavioral: Negative for dysphoric mood and sleep disturbance           Objective:      Vitals:    06/14/18 1513   BP: 142/80   Pulse: 100   SpO2: 96%          Physical Exam   Skin:   Macular erythematous lesion ~8mm on center of forehead

## 2018-06-19 ENCOUNTER — TELEPHONE (OUTPATIENT)
Dept: INTERNAL MEDICINE CLINIC | Facility: CLINIC | Age: 63
End: 2018-06-19

## 2018-06-19 DIAGNOSIS — F41.9 ANXIETY: ICD-10-CM

## 2018-06-19 RX ORDER — ALPRAZOLAM 0.5 MG/1
TABLET ORAL
Qty: 90 TABLET | Refills: 1 | Status: SHIPPED | OUTPATIENT
Start: 2018-06-19 | End: 2019-01-14

## 2018-06-19 NOTE — TELEPHONE ENCOUNTER
Patient called and said that the Script for Alprazolam sent to [facility] was never received and asked it be sent again

## 2018-06-19 NOTE — TELEPHONE ENCOUNTER
EVELYN Proctor: Ricky Butler takes Potassium Chloride 20 mcg 1 x day  Also she made the appt with the dermatologist you recommended but they can't see her until 12/4  Is there someone else you can recommend her to or should she just wait for the 6 mos? Let her know any suggestions or what you want her to do

## 2018-07-23 ENCOUNTER — TELEPHONE (OUTPATIENT)
Dept: INTERNAL MEDICINE CLINIC | Facility: CLINIC | Age: 63
End: 2018-07-23

## 2018-07-23 DIAGNOSIS — F41.9 ANXIETY: Primary | ICD-10-CM

## 2018-07-23 RX ORDER — ALPRAZOLAM 1 MG/1
TABLET ORAL
Qty: 90 TABLET | Refills: 3 | Status: SHIPPED | OUTPATIENT
Start: 2018-07-23 | End: 2018-11-28 | Stop reason: SDUPTHER

## 2018-07-23 NOTE — TELEPHONE ENCOUNTER
She requested her Xanax to be sent to [facility]    But she said it went to mail order    And she said it wasn't written right    And needs to be    1/2 - 1 tab 0 5 mg TID as needed @ bedtime    90 day supply  Nathanael Burch said no refill till mid aug  But she's already out of the med        [facility]   [phone]  Elisabeth Sanford

## 2018-08-01 ENCOUNTER — APPOINTMENT (OUTPATIENT)
Dept: LAB | Facility: HOSPITAL | Age: 63
End: 2018-08-01
Attending: INTERNAL MEDICINE
Payer: COMMERCIAL

## 2018-08-01 ENCOUNTER — TELEPHONE (OUTPATIENT)
Dept: INTERNAL MEDICINE CLINIC | Facility: CLINIC | Age: 63
End: 2018-08-01

## 2018-08-01 DIAGNOSIS — E03.9 HYPOTHYROIDISM, UNSPECIFIED TYPE: ICD-10-CM

## 2018-08-01 LAB — TSH SERPL DL<=0.05 MIU/L-ACNC: 1.88 UIU/ML (ref 0.36–3.74)

## 2018-08-01 PROCEDURE — 36415 COLL VENOUS BLD VENIPUNCTURE: CPT

## 2018-08-01 PROCEDURE — 84443 ASSAY THYROID STIM HORMONE: CPT

## 2018-08-01 NOTE — TELEPHONE ENCOUNTER
----- Message from Alejandra Francis MD sent at 8/1/2018  6:04 PM EDT -----  Notify-thyroid has normalized, continue current dose

## 2018-08-01 NOTE — TELEPHONE ENCOUNTER
Called patient to inform her of lab result  Spoke to patient  Franklyn Herrmann and informed him of message  Kate Mac is not sure what the current dose should be  Please specify what current dose should be

## 2018-08-02 NOTE — TELEPHONE ENCOUNTER
CALLED PT  STATED SHE WAS TOLD TO TAKE 75MCG'S Q DAY FOR 6 WEEKS EXCEPT ON FRIDAYS TO TAKE 2 PILLS, WHICH SHE DID AND THEN AFTER THE 6 WEEKS TO HAVE BW AND IF LEVELED OUT SHE IS TO TAKE THE 88MCG'S , SHE DID TAKE AN 88MCG TODAY -PLEASE ADVISE IF SHE IS TO STAY ON THE 88MCG'S

## 2018-08-08 ENCOUNTER — OFFICE VISIT (OUTPATIENT)
Dept: INTERNAL MEDICINE CLINIC | Facility: CLINIC | Age: 63
End: 2018-08-08
Payer: COMMERCIAL

## 2018-08-08 VITALS
WEIGHT: 219 LBS | SYSTOLIC BLOOD PRESSURE: 140 MMHG | BODY MASS INDEX: 45.97 KG/M2 | HEIGHT: 58 IN | DIASTOLIC BLOOD PRESSURE: 80 MMHG | HEART RATE: 106 BPM

## 2018-08-08 DIAGNOSIS — J45.21 MILD INTERMITTENT ASTHMA WITH EXACERBATION: ICD-10-CM

## 2018-08-08 DIAGNOSIS — M62.08 DIASTASIS RECTI: Primary | ICD-10-CM

## 2018-08-08 DIAGNOSIS — R58 ECCHYMOSIS: ICD-10-CM

## 2018-08-08 DIAGNOSIS — R10.13 EPIGASTRIC PAIN: ICD-10-CM

## 2018-08-08 PROCEDURE — 3008F BODY MASS INDEX DOCD: CPT | Performed by: INTERNAL MEDICINE

## 2018-08-08 PROCEDURE — 99214 OFFICE O/P EST MOD 30 MIN: CPT | Performed by: INTERNAL MEDICINE

## 2018-08-08 NOTE — PROGRESS NOTES
Assessment/Plan:    Patient Instructions    Spontaneous ecchymosis  Is of unclear etiology, may be precipitated by coughing versus acute stretch of the diastasis  Will check lab work and stat CT to rule out any acute intra-abdominal pathology  I will call you with results to determine the next step  Diagnoses and all orders for this visit:    Diastasis recti  -     CBC; Future  -     Comprehensive metabolic panel; Future  -     Amylase; Future  -     Lipase; Future  -     CT abdomen pelvis wo contrast; Future    Ecchymosis  -     CBC; Future  -     Comprehensive metabolic panel; Future  -     Amylase; Future  -     Lipase; Future  -     CT abdomen pelvis wo contrast; Future    Epigastric pain  -     CBC; Future  -     Comprehensive metabolic panel; Future  -     Amylase; Future  -     Lipase; Future  -     CT abdomen pelvis wo contrast; Future    Mild intermittent asthma with exacerbation         Subjective:      Patient ID: Bob Pires is a 61 y o  female    Abisai Rojoe presents today for evaluation of black and blue abdomen which she noticed 3 or 4 days ago  She denies any trauma to the abdomen  She denies any nausea or vomiting  She does note that her asthma has been flaring and she has been coughing and wheezing quite a bit  She has noted over the last several months a large lump in the center of her abdomen that she only really notices when she lies back  She has been somewhat constipated lately and she has been straining to move her bowels lately            Current Outpatient Prescriptions:     ALPRAZolam (XANAX) 0 5 mg tablet, TAKE 1 TABLET BY MOUTH  DAILY AT BEDTIME AS NEEDED  FOR ANXIETY, Disp: 90 tablet, Rfl: 1    ALPRAZolam (XANAX) 1 mg tablet, TAKE 1/2 TO 1 TABLET BY MOUTH THREE TIMES A DAY AS NEEDED, Disp: 90 tablet, Rfl: 3    aspirin 81 MG tablet, Take 81 mg by mouth daily, Disp: , Rfl:     cholestyramine (QUESTRAN) 4 g packet, Take by mouth, Disp: , Rfl:    clotrimazole-betamethasone (LOTRISONE) 1-0 05 % cream, APPLY SPARINGLY TO AFFECTED AREA IN GROIN 2-3 TIMES A DAY AS NEEDED, Disp: 45 g, Rfl: 1    DULoxetine (CYMBALTA) 30 mg delayed release capsule, Take 1 capsule (30 mg total) by mouth daily, Disp: 90 capsule, Rfl: 3    DULoxetine (CYMBALTA) 60 mg delayed release capsule, Take 60 mg by mouth daily 90 mg, Disp: , Rfl:     escitalopram (LEXAPRO) 10 mg tablet, Take by mouth, Disp: , Rfl:     famotidine (PEPCID) 10 mg tablet, Take 1 tablet by mouth, Disp: , Rfl:     hydrochlorothiazide (HYDRODIURIL) 25 mg tablet, TAKE ONE TABLET BY MOUTH EVERY DAY, Disp: 90 tablet, Rfl: 3    levalbuterol (XOPENEX HFA) 45 mcg/act inhaler, Inhale 1-2 puffs every 4 (four) hours as needed for wheezing, Disp: , Rfl:     levothyroxine 88 mcg tablet, Take 1 tablet (88 mcg total) by mouth daily, Disp: 90 tablet, Rfl: 3    Mometasone Furo-Formoterol Fum (DULERA) 100-5 MCG/ACT AERO, Inhale, Disp: , Rfl:     Multiple Vitamins-Minerals (OCUVITE ADULT 50+ PO), Take by mouth, Disp: , Rfl:     NYSTATIN powder, APPLY SPARINGLY TO THE AFFECTED AREA TWICE A DAY, Disp: 60 g, Rfl: 3    pantoprazole (PROTONIX) 40 mg tablet, TAKE 1 TABLET BY MOUTH  DAILY, Disp: 90 tablet, Rfl: 3    potassium chloride (K-DUR,KLOR-CON) 20 mEq tablet, TAKE 1 TABLET BY MOUTH  DAILY, Disp: 90 tablet, Rfl: 3    quinapril (ACCUPRIL) 10 mg tablet, Take 10 mg by mouth daily at bedtime, Disp: , Rfl:     simvastatin (ZOCOR) 40 mg tablet, Take 1 tablet by mouth daily, Disp: , Rfl:     traMADol (ULTRAM) 50 mg tablet, TAKE ONE TABLET BY MOUTH THREE TIMES A DAY, Disp: 270 tablet, Rfl: 2    Triamcinolone Acetonide (NASACORT ALLERGY 24HR) 55 MCG/ACT AERO, into each nostril, Disp: , Rfl:     Recent Results (from the past 1008 hour(s))   TSH, 3rd generation with T4 reflex    Collection Time: 08/01/18  3:22 PM   Result Value Ref Range    TSH 3RD GENERATON 1 879 0 358 - 3 740 uIU/mL       The following portions of the patient's history were reviewed and updated as appropriate: allergies, current medications, past family history, past medical history, past social history, past surgical history and problem list      Review of Systems   Constitutional: Negative for appetite change, chills, diaphoresis, fatigue, fever and unexpected weight change  HENT: Negative for congestion, hearing loss and rhinorrhea  Eyes: Negative for visual disturbance  Respiratory: Negative for cough, chest tightness, shortness of breath and wheezing  Cardiovascular: Negative for chest pain, palpitations and leg swelling  Gastrointestinal: Negative for abdominal pain and blood in stool  Endocrine: Negative for cold intolerance, heat intolerance, polydipsia and polyuria  Genitourinary: Negative for difficulty urinating, dysuria, frequency and urgency  Musculoskeletal: Negative for arthralgias and myalgias  Skin: Negative for rash  Neurological: Negative for dizziness, weakness, light-headedness and headaches  Hematological: Bruises/bleeds easily  Psychiatric/Behavioral: Negative for dysphoric mood and sleep disturbance  Objective:      Vitals:    08/08/18 1840   BP: 140/80   Pulse: (!) 106          Physical Exam   Constitutional: She is oriented to person, place, and time  She appears well-developed and well-nourished  HENT:   Head: Normocephalic and atraumatic  Nose: Nose normal    Mouth/Throat: Oropharynx is clear and moist    Eyes: Conjunctivae and EOM are normal  Pupils are equal, round, and reactive to light  No scleral icterus  Neck: Normal range of motion  Neck supple  No JVD present  No tracheal deviation present  No thyromegaly present  Cardiovascular: Normal rate, regular rhythm and intact distal pulses  Exam reveals no gallop and no friction rub  No murmur heard  Pulmonary/Chest: Effort normal and breath sounds normal  No respiratory distress  She has no wheezes  She has no rales  Abdominal: Soft   Bowel sounds are normal  She exhibits distension  She exhibits no mass  There is tenderness  There is no rebound and no guarding  Large midline diastasis w/o evidence of hernia  Ecchymosis across mid abdomen in a band 5 x 15 cm  No periumbilical or flank ecchymosis   Musculoskeletal: She exhibits no edema or tenderness  Lymphadenopathy:     She has no cervical adenopathy  Neurological: She is alert and oriented to person, place, and time  No cranial nerve deficit  Skin: Skin is warm and dry  No rash noted  No erythema  Psychiatric: She has a normal mood and affect   Her behavior is normal  Judgment and thought content normal

## 2018-08-08 NOTE — PATIENT INSTRUCTIONS
Spontaneous ecchymosis  Is of unclear etiology, may be precipitated by coughing versus acute stretch of the diastasis  Will check lab work and stat CT to rule out any acute intra-abdominal pathology  I will call you with results to determine the next step

## 2018-08-09 ENCOUNTER — APPOINTMENT (OUTPATIENT)
Dept: LAB | Facility: HOSPITAL | Age: 63
End: 2018-08-09
Attending: INTERNAL MEDICINE
Payer: COMMERCIAL

## 2018-08-09 ENCOUNTER — TELEPHONE (OUTPATIENT)
Dept: INTERNAL MEDICINE CLINIC | Facility: CLINIC | Age: 63
End: 2018-08-09

## 2018-08-09 ENCOUNTER — HOSPITAL ENCOUNTER (OUTPATIENT)
Dept: CT IMAGING | Facility: HOSPITAL | Age: 63
Discharge: HOME | End: 2018-08-09
Attending: INTERNAL MEDICINE
Payer: COMMERCIAL

## 2018-08-09 DIAGNOSIS — M62.08 DIASTASIS RECTI: ICD-10-CM

## 2018-08-09 DIAGNOSIS — R10.13 EPIGASTRIC PAIN: ICD-10-CM

## 2018-08-09 DIAGNOSIS — R58 ECCHYMOSIS: ICD-10-CM

## 2018-08-09 LAB
ALBUMIN SERPL BCP-MCNC: 3.4 G/DL (ref 3.5–5)
ALP SERPL-CCNC: 80 U/L (ref 46–116)
ALT SERPL W P-5'-P-CCNC: 17 U/L (ref 12–78)
AMYLASE SERPL-CCNC: 26 IU/L (ref 25–115)
ANION GAP SERPL CALCULATED.3IONS-SCNC: 6 MMOL/L (ref 4–13)
AST SERPL W P-5'-P-CCNC: 11 U/L (ref 5–45)
BILIRUB SERPL-MCNC: 0.4 MG/DL (ref 0.2–1)
BUN SERPL-MCNC: 12 MG/DL (ref 5–25)
CALCIUM SERPL-MCNC: 9.2 MG/DL (ref 8.3–10.1)
CHLORIDE SERPL-SCNC: 100 MMOL/L (ref 100–108)
CO2 SERPL-SCNC: 34 MMOL/L (ref 21–32)
CREAT SERPL-MCNC: 0.78 MG/DL (ref 0.6–1.3)
ERYTHROCYTE [DISTWIDTH] IN BLOOD BY AUTOMATED COUNT: 15.8 % (ref 11.6–15.1)
GFR SERPL CREATININE-BSD FRML MDRD: 81 ML/MIN/1.73SQ M
GLUCOSE SERPL-MCNC: 97 MG/DL (ref 65–140)
HCT VFR BLD AUTO: 41.7 % (ref 34.8–46.1)
HGB BLD-MCNC: 12.8 G/DL (ref 11.5–15.4)
LIPASE SERPL-CCNC: 86 U/L (ref 73–393)
MCH RBC QN AUTO: 26.7 PG (ref 26.8–34.3)
MCHC RBC AUTO-ENTMCNC: 30.7 G/DL (ref 31.4–37.4)
MCV RBC AUTO: 87 FL (ref 82–98)
PLATELET # BLD AUTO: 268 THOUSANDS/UL (ref 149–390)
PMV BLD AUTO: 9.1 FL (ref 8.9–12.7)
POTASSIUM SERPL-SCNC: 3.8 MMOL/L (ref 3.5–5.3)
PROT SERPL-MCNC: 8 G/DL (ref 6.4–8.2)
RBC # BLD AUTO: 4.79 MILLION/UL (ref 3.81–5.12)
SODIUM SERPL-SCNC: 140 MMOL/L (ref 136–145)
WBC # BLD AUTO: 9.07 THOUSAND/UL (ref 4.31–10.16)

## 2018-08-09 PROCEDURE — 74176 CT ABD & PELVIS W/O CONTRAST: CPT

## 2018-08-09 PROCEDURE — 36415 COLL VENOUS BLD VENIPUNCTURE: CPT

## 2018-08-09 PROCEDURE — 82150 ASSAY OF AMYLASE: CPT

## 2018-08-09 PROCEDURE — 80053 COMPREHEN METABOLIC PANEL: CPT

## 2018-08-09 PROCEDURE — 85027 COMPLETE CBC AUTOMATED: CPT

## 2018-08-09 PROCEDURE — 83690 ASSAY OF LIPASE: CPT

## 2018-08-09 RX ADMIN — IOHEXOL 50 ML: 240 INJECTION, SOLUTION INTRATHECAL; INTRAVASCULAR; INTRAVENOUS; ORAL at 12:38

## 2018-08-09 NOTE — TELEPHONE ENCOUNTER
Kaylee Jain from [facility] radiology called to let Dr Jose Newman know pt's STAT CT is finalized   Please advise

## 2018-08-09 NOTE — TELEPHONE ENCOUNTER
----- Message from Ann Alaniz MD sent at 8/9/2018  5:23 PM EDT -----  Please notify labs are unremarkable  CT shows no evidence of hernia or any intra-abdominal process or organs that are out of the norm  She does have some swelling in the soft tissue where she has her bruising and the diastasis as we discussed  No further workup or treatment is indicated at this time  Just monitor symptoms

## 2018-08-13 DIAGNOSIS — E03.9 ACQUIRED HYPOTHYROIDISM: ICD-10-CM

## 2018-08-13 RX ORDER — LEVOTHYROXINE SODIUM 88 UG/1
88 TABLET ORAL DAILY
Qty: 90 TABLET | Refills: 0 | Status: SHIPPED | OUTPATIENT
Start: 2018-08-13 | End: 2018-08-30 | Stop reason: SDUPTHER

## 2018-08-21 ENCOUNTER — TELEPHONE (OUTPATIENT)
Dept: INTERNAL MEDICINE CLINIC | Facility: CLINIC | Age: 63
End: 2018-08-21

## 2018-08-21 NOTE — TELEPHONE ENCOUNTER
This seems to be an ongoing problem, I know marlon called the other day and asked the  but I dont know if we every got an answer  You can either call pt or look thru old message   I would assume the med list is correct but I havent seen her lately

## 2018-08-21 NOTE — TELEPHONE ENCOUNTER
[facility] CALLED AND SAID THAT THEY NEED TO VERIFY THE STRENGTH AND DIRECTIONS ON THE LEVOTHYROXINE MEDICATION    [address]   [phone]

## 2018-08-22 NOTE — TELEPHONE ENCOUNTER
CALLED [facility] AND THEY ALREADY HAD SHIPPED OUT THE 80 MCG'S AS THIS IS WHAT PT  IS TAKING SPOKE TO  AND HE VERIFIED DOSE

## 2018-08-28 DIAGNOSIS — B37.9 YEAST INFECTION: ICD-10-CM

## 2018-08-28 RX ORDER — NYSTATIN 100000 [USP'U]/G
POWDER TOPICAL
Qty: 60 G | Refills: 3 | Status: SHIPPED | OUTPATIENT
Start: 2018-08-28 | End: 2019-01-27 | Stop reason: SDUPTHER

## 2018-08-30 ENCOUNTER — TELEPHONE (OUTPATIENT)
Dept: INTERNAL MEDICINE CLINIC | Facility: CLINIC | Age: 63
End: 2018-08-30

## 2018-08-30 DIAGNOSIS — E03.9 ACQUIRED HYPOTHYROIDISM: ICD-10-CM

## 2018-08-30 RX ORDER — LEVOTHYROXINE SODIUM 88 UG/1
TABLET ORAL
Qty: 90 TABLET | Refills: 3 | Status: SHIPPED | OUTPATIENT
Start: 2018-08-30 | End: 2019-07-10 | Stop reason: SDUPTHER

## 2018-09-13 DIAGNOSIS — F32.A ANXIETY AND DEPRESSION: Primary | ICD-10-CM

## 2018-09-13 DIAGNOSIS — F41.9 ANXIETY AND DEPRESSION: Primary | ICD-10-CM

## 2018-09-13 RX ORDER — DULOXETIN HYDROCHLORIDE 60 MG/1
60 CAPSULE, DELAYED RELEASE ORAL DAILY
Qty: 90 CAPSULE | Refills: 1 | Status: SHIPPED | OUTPATIENT
Start: 2018-09-13 | End: 2018-09-17 | Stop reason: SDUPTHER

## 2018-09-13 NOTE — TELEPHONE ENCOUNTER
Pharmacy called [phone] option 2    Needing a refill for DULOXETINE 60MG    Please fax to [phone]

## 2018-09-17 DIAGNOSIS — F41.9 ANXIETY AND DEPRESSION: ICD-10-CM

## 2018-09-17 DIAGNOSIS — F32.A ANXIETY AND DEPRESSION: ICD-10-CM

## 2018-09-17 RX ORDER — DULOXETIN HYDROCHLORIDE 60 MG/1
60 CAPSULE, DELAYED RELEASE ORAL DAILY
Qty: 90 CAPSULE | Refills: 0 | Status: SHIPPED | OUTPATIENT
Start: 2018-09-17 | End: 2019-01-02 | Stop reason: SDUPTHER

## 2018-09-28 DIAGNOSIS — E78.5 HYPERLIPIDEMIA, UNSPECIFIED HYPERLIPIDEMIA TYPE: Primary | ICD-10-CM

## 2018-10-01 RX ORDER — SIMVASTATIN 40 MG
TABLET ORAL
Qty: 90 TABLET | Refills: 3 | Status: SHIPPED | OUTPATIENT
Start: 2018-10-01 | End: 2019-09-09 | Stop reason: SDUPTHER

## 2018-10-21 DIAGNOSIS — F32.A DEPRESSION, UNSPECIFIED DEPRESSION TYPE: Primary | ICD-10-CM

## 2018-10-22 RX ORDER — ESCITALOPRAM OXALATE 10 MG/1
TABLET ORAL
Qty: 30 TABLET | Refills: 3 | Status: SHIPPED | OUTPATIENT
Start: 2018-10-22 | End: 2019-08-09 | Stop reason: SDUPTHER

## 2018-11-21 ENCOUNTER — HOSPITAL ENCOUNTER (EMERGENCY)
Facility: HOSPITAL | Age: 63
Discharge: HOME | End: 2018-11-21
Attending: EMERGENCY MEDICINE | Admitting: EMERGENCY MEDICINE
Payer: COMMERCIAL

## 2018-11-21 ENCOUNTER — APPOINTMENT (EMERGENCY)
Dept: RADIOLOGY | Facility: HOSPITAL | Age: 63
End: 2018-11-21
Payer: COMMERCIAL

## 2018-11-21 VITALS
TEMPERATURE: 98.1 F | HEIGHT: 58 IN | DIASTOLIC BLOOD PRESSURE: 76 MMHG | HEART RATE: 106 BPM | OXYGEN SATURATION: 95 % | RESPIRATION RATE: 18 BRPM | SYSTOLIC BLOOD PRESSURE: 148 MMHG | WEIGHT: 218.92 LBS | BODY MASS INDEX: 45.95 KG/M2

## 2018-11-21 DIAGNOSIS — S69.92XA INJURY OF LEFT HAND, INITIAL ENCOUNTER: Primary | ICD-10-CM

## 2018-11-21 PROCEDURE — 73130 X-RAY EXAM OF HAND: CPT

## 2018-11-21 PROCEDURE — 99283 EMERGENCY DEPT VISIT LOW MDM: CPT

## 2018-11-21 RX ORDER — NAPROXEN 250 MG/1
500 TABLET ORAL ONCE
Status: COMPLETED | OUTPATIENT
Start: 2018-11-21 | End: 2018-11-21

## 2018-11-21 RX ORDER — ACETAMINOPHEN 325 MG/1
975 TABLET ORAL ONCE
Status: COMPLETED | OUTPATIENT
Start: 2018-11-21 | End: 2018-11-21

## 2018-11-21 RX ORDER — TRAMADOL HYDROCHLORIDE 50 MG/1
50 TABLET ORAL EVERY 6 HOURS PRN
Qty: 15 TABLET | Refills: 0 | Status: SHIPPED | OUTPATIENT
Start: 2018-11-21 | End: 2018-11-26

## 2018-11-21 RX ADMIN — NAPROXEN 500 MG: 250 TABLET ORAL at 17:49

## 2018-11-21 RX ADMIN — ACETAMINOPHEN 975 MG: 325 TABLET, FILM COATED ORAL at 17:48

## 2018-11-21 NOTE — DISCHARGE INSTRUCTIONS
Please return if you worsening or other concerning symptoms view health set pain or discomfort 1 week please follow up with Orthopedics for re-evaluation as instructed as discussed      Contusion in [facility]:   A contusion is a bruise that appears on your skin after an injury  A bruise happens when small blood vessels tear but skin does not  When blood vessels tear, blood leaks into nearby tissue, such as soft tissue or muscle  DISCHARGE INSTRUCTIONS:   Return to the emergency department if:   · You have new trouble moving the injured area  · You have tingling or numbness in or near the injured area  · Your hand or foot below the bruise gets cold or turns pale  Contact your healthcare provider if:   · You find a new lump in the injured area  · Your symptoms do not improve with treatment after 4 to 5 days  · You have questions or concerns about your condition or care  Medicines: You may need any of the following:  · NSAIDs  help decrease swelling and pain or fever  This medicine is available with or without a doctor's order  NSAIDs can cause stomach bleeding or kidney problems in certain people  If you take blood thinner medicine, always ask your healthcare provider if NSAIDs are safe for you  Always read the medicine label and follow directions  · Prescription pain medicine  may be given  Do not wait until the pain is severe before you take your medicine  · Take your medicine as directed  Contact your healthcare provider if you think your medicine is not helping or if you have side effects  Tell him of her if you are allergic to any medicine  Keep a list of the medicines, vitamins, and herbs you take  Include the amounts, and when and why you take them  Bring the list or the pill bottles to follow-up visits  Carry your medicine list with you in case of an emergency  Follow up with your healthcare provider as directed:   You may need to return within a week to check your injury again  Write down your questions so you remember to ask them during your visits  Help a contusion heal:   · Rest the injured area  or use it less than usual  If you bruised your leg or foot, you may need crutches or a cane to help you walk  This will help you keep weight off your injured body part  · Apply ice  to decrease swelling and pain  Ice may also help prevent tissue damage  Use an ice pack, or put crushed ice in a plastic bag  Cover it with a towel and place it on your bruise for 15 to 20 minutes every hour or as directed  · Use compression  to support the area and decrease swelling  Wrap an elastic bandage around the area over the bruised muscle  Make sure the bandage is not too tight  You should be able to fit 1 finger between the bandage and your skin  · Elevate (raise) your injured body part  above the level of your heart to help decrease pain and swelling  Use pillows, blankets, or rolled towels to elevate the area as often as you can  · Do not drink alcohol  as directed  Alcohol may slow healing  · Do not stretch injured muscles  right after your injury  Ask your healthcare provider when and how you may safely stretch after your injury  Gentle stretches can help increase your flexibility  · Do not massage the area or put heating pads  on the bruise right after your injury  Heat and massage may slow healing  Your healthcare provider may tell you to apply heat after several days  At that time, heat will start to help the injury heal   Prevent another contusion:   · Stretch and warm up before you play sports or exercise  · Wear protective gear when you play sports  Examples are shin guards and padding  · If you begin a new physical activity, start slowly to give your body a chance to adjust   © 2017 [employer] Information is for End User's use only and may not be sold, redistributed or otherwise used for commercial purposes   All illustrations and images included in [employer] are the copyrighted property of [employer] , [employer]  or [employer]  The above information is an  only  It is not intended as medical advice for individual conditions or treatments  Talk to your doctor, nurse or pharmacist before following any medical regimen to see if it is safe and effective for you

## 2018-11-21 NOTE — ED PROVIDER NOTES
History  Chief Complaint   Patient presents with    Fall     Patient states she had a fall at home in the glass sliding door with the dog approx 2 hours ago  Patient went to Dr Nereida Martins office who advised her to come here to the ED for furthur evaluation  Patient has noticeable swelling and bruising of her left hand  66-year-old female right-hand dominant with a history of arthritis on baby aspirin presenting for evaluation of left hand injury  Patient was at home approximately 3-4 hours ago when 1 of her dogs ran into her causing her to stumble and strike her left hand on the sliding glass window and subsequently a grill  She did not fall the ground strike her head or lose consciousness although she had immediate pain localized to the dorsum of her left hand, is nonradiating is worse when she tries to make a fist, it is better with ice she has not taken anything for this although she did put ice on it  The area of concern is localized to the dorsum of her left hand only over the 3rd through 5th metacarpals with significant ecchymosis  She otherwise denies weakness paresthesias or anesthesia other muscle skeletal complaints or injuries otherwise denies a complete review systems as noted  Prior to Admission Medications   Prescriptions Last Dose Informant Patient Reported? Taking?    ALPRAZolam (XANAX) 0 5 mg tablet   No No   Sig: TAKE 1 TABLET BY MOUTH  DAILY AT BEDTIME AS NEEDED  FOR ANXIETY   ALPRAZolam (XANAX) 1 mg tablet   No No   Sig: TAKE 1/2 TO 1 TABLET BY MOUTH THREE TIMES A DAY AS NEEDED   DULoxetine (CYMBALTA) 30 mg delayed release capsule   No No   Sig: Take 1 capsule (30 mg total) by mouth daily   DULoxetine (CYMBALTA) 60 mg delayed release capsule   No No   Sig: Take 1 capsule (60 mg total) by mouth daily for 90 days 90 mg   Mometasone Furo-Formoterol Fum (DULERA) 100-5 MCG/ACT AERO   Yes No   Sig: Inhale   Multiple Vitamins-Minerals (OCUVITE ADULT 50+ PO)   Yes No   Sig: Take by mouth   NYSTATIN powder   No No   Sig: APPLY SPARINGLY TO THE AFFECTED AREA TWICE A DAY   Triamcinolone Acetonide (NASACORT ALLERGY 24HR) 55 MCG/ACT AERO   Yes No   Sig: into each nostril   aspirin 81 MG tablet   Yes No   Sig: Take 81 mg by mouth daily   cholestyramine (QUESTRAN) 4 g packet   Yes No   Sig: Take by mouth   clotrimazole-betamethasone (LOTRISONE) 1-0 05 % cream   No No   Sig: APPLY SPARINGLY TO AFFECTED AREA IN GROIN 2-3 TIMES A DAY AS NEEDED   escitalopram (LEXAPRO) 10 mg tablet   No No   Sig: TAKE 1/2 TABLET ONCE DAILY   famotidine (PEPCID) 10 mg tablet   Yes No   Sig: Take 1 tablet by mouth   hydrochlorothiazide (HYDRODIURIL) 25 mg tablet   No No   Sig: TAKE ONE TABLET BY MOUTH EVERY DAY   levalbuterol (XOPENEX HFA) 45 mcg/act inhaler   Yes No   Sig: Inhale 1-2 puffs every 4 (four) hours as needed for wheezing   levothyroxine 88 mcg tablet   No No   Sig: TAKE 1 TABLET BY MOUTH  DAILY   pantoprazole (PROTONIX) 40 mg tablet   No No   Sig: TAKE 1 TABLET BY MOUTH  DAILY   potassium chloride (K-DUR,KLOR-CON) 20 mEq tablet   No No   Sig: TAKE 1 TABLET BY MOUTH  DAILY   quinapril (ACCUPRIL) 10 mg tablet   Yes No   Sig: Take 10 mg by mouth daily at bedtime   simvastatin (ZOCOR) 40 mg tablet   No No   Sig: TAKE ONE TABLET BY MOUTH EVERY DAY   traMADol (ULTRAM) 50 mg tablet   No No   Sig: TAKE ONE TABLET BY MOUTH THREE TIMES A DAY      Facility-Administered Medications: None       Past Medical History:   Diagnosis Date    Allergic rhinitis     Anemia     Depression     Disease of thyroid gland     GERD (gastroesophageal reflux disease)     Hyperlipemia     Hypertension     Hypothyroidism     Last assessed: 3/25/14    Obesity     Osteoarthritis     Pre-operative laboratory examination        Past Surgical History:   Procedure Laterality Date    HYSTERECTOMY      INCISIONAL BREAST BIOPSY      KNEE ARTHROPLASTY      ROTATOR CUFF REPAIR      SHOULDER SURGERY         Family History   Problem Relation Age of Onset    Coronary artery disease Mother     Hypertension Mother         Essential    Coronary artery disease Father      I have reviewed and agree with the history as documented  Social History   Substance Use Topics    Smoking status: Never Smoker    Smokeless tobacco: Never Used    Alcohol use Yes      Comment: socially         Review of Systems   Constitutional: Negative for activity change, appetite change, chills and fever  Respiratory: Negative for cough and shortness of breath  Cardiovascular: Negative for chest pain and palpitations  Gastrointestinal: Negative for abdominal pain, nausea and vomiting  Musculoskeletal: Positive for joint swelling  Negative for arthralgias, back pain and myalgias  Skin: Positive for color change  Negative for rash and wound  Neurological: Negative for dizziness, weakness, light-headedness and numbness  Hematological: Negative for adenopathy  Does not bruise/bleed easily  Psychiatric/Behavioral: Negative for agitation and behavioral problems  All other systems reviewed and are negative  Physical Exam  Physical Exam   Constitutional: She is oriented to person, place, and time  She appears well-developed and well-nourished  No distress  Very well appearing pleasant in NAD   HENT:   Head: Normocephalic and atraumatic  Eyes: Pupils are equal, round, and reactive to light  EOM are normal    Neck: Normal range of motion  Neck supple  No tracheal deviation present  Cardiovascular: Normal rate, regular rhythm and normal heart sounds  Exam reveals no gallop and no friction rub  No murmur heard  Pulmonary/Chest: Effort normal and breath sounds normal  She has no wheezes  She has no rales     Musculoskeletal:   Patient has moderate swelling over the dorsum of her left 3rd 4th and 5th metacarpals with minimal associated tenderness skin is intact there is no tenderness or pain on the palmar surface of her hand there is no tenderness over the wrist or the anatomic snuffbox distal fingers are well perfused again skin is intact there are no other acute traumatic ischemic infectious or inflammatory findings on exam consistent with likely subcutaneous hematoma   Neurological: She is alert and oriented to person, place, and time  No cranial nerve deficit  She exhibits normal muscle tone  Coordination normal    Skin: Skin is warm and dry  No rash noted  Psychiatric: She has a normal mood and affect  Her behavior is normal    Nursing note and vitals reviewed  Vital Signs  ED Triage Vitals   Temperature Pulse Respirations Blood Pressure SpO2   11/21/18 1738 11/21/18 1733 11/21/18 1733 11/21/18 1733 11/21/18 1733   98 1 °F (36 7 °C) (!) 106 18 148/76 95 %      Temp Source Heart Rate Source Patient Position - Orthostatic VS BP Location FiO2 (%)   11/21/18 1738 11/21/18 1733 11/21/18 1733 11/21/18 1733 --   Oral Monitor Sitting Right arm       Pain Score       11/21/18 1748       8           Vitals:    11/21/18 1733   BP: 148/76   Pulse: (!) 106   Patient Position - Orthostatic VS: Sitting       Visual Acuity      ED Medications  Medications   acetaminophen (TYLENOL) tablet 975 mg (975 mg Oral Given 11/21/18 1748)   naproxen (NAPROSYN) tablet 500 mg (500 mg Oral Given 11/21/18 1749)       Diagnostic Studies  Results Reviewed     None                 XR hand 3+ views LEFT   Final Result by Lydia Gottlieb MD (11/21 1844)      No acute osseous abnormality  Moderate soft tissue swelling at the dorsal metacarpal region        Workstation performed: [id number]                    Procedures  Procedures       Phone Contacts  ED Phone Contact    ED Course  ED Course as of Nov 24 2114 Wed Nov 21, 2018   3258 Patient placed in volar splint for comfort by technician neurovascularly intact after evaluation by myself understands agrees to discharge return follow-up instructions                                MDM  Number of Diagnoses or Management Options  Injury of left hand, initial encounter:   Diagnosis management comments: 43-year-old female on baby aspirin accidentally struck her left hand on glass door did not break, had pain and swelling localized to the dorsum of her hand no other injuries complaints or concerns on exam here she is afebrile normal vital signs the exception of mildly elevated heart rate she is clinically well-appearing her complete head-to-toe exam was significant for some mild-to-moderate swelling over the dorsum of her left hand which likely represents closed hematoma/contusion, no bony abnormality on x-ray patient has some mild discomfort with range of motion, placed in volar splint for comfort neurovascularly intact after evaluation by myself will continue NSAIDs pain control ice elevation, Hand surgery follow-up/orthopedic follow-up in 1 week if still symptomatic close return instructions patient agreeable plan    CritCare Time    Disposition  Final diagnoses:   Injury of left hand, initial encounter     Time reflects when diagnosis was documented in both MDM as applicable and the Disposition within this note     Time User Action Codes Description Comment    11/21/2018  6:46 PM Steff Burgess Add [[id number]] Injury of left hand, initial encounter       ED Disposition     ED Disposition Condition Comment    Discharge  Natalie Barahona discharge to home/self care      Condition at discharge: Good        Follow-up Information     Follow up With Specialties Details Why Contact Info Additional Information    [address] [place] [address] Emergency Department Emergency Medicine  If symptoms worsen [address]  [phone] [place] [facility], [address] [place], [place], [address] [facility] [place] [facility] In 1 week As needed [address] [facility] [address] [id number]  [facility] [address] [facility], [address], [place], [place], [address]          Discharge Medication List as of 11/21/2018  6:48 PM      START taking these medications    Details   !! traMADol (ULTRAM) 50 mg tablet Take 1 tablet (50 mg total) by mouth every 6 (six) hours as needed for moderate pain for up to 5 days, Starting Wed 11/21/2018, Until Mon 11/26/2018, Print       !! - Potential duplicate medications found  Please discuss with provider  CONTINUE these medications which have NOT CHANGED    Details   !! ALPRAZolam (XANAX) 0 5 mg tablet TAKE 1 TABLET BY MOUTH  DAILY AT BEDTIME AS NEEDED  FOR ANXIETY, Normal      !! ALPRAZolam (XANAX) 1 mg tablet TAKE 1/2 TO 1 TABLET BY MOUTH THREE TIMES A DAY AS NEEDED, Normal      aspirin 81 MG tablet Take 81 mg by mouth daily, Until Discontinued, Historical Med      cholestyramine (QUESTRAN) 4 g packet Take by mouth, Starting Fri 8/22/2014, Historical Med      clotrimazole-betamethasone (LOTRISONE) 1-0 05 % cream APPLY SPARINGLY TO AFFECTED AREA IN GROIN 2-3 TIMES A DAY AS NEEDED, Normal      !! DULoxetine (CYMBALTA) 30 mg delayed release capsule Take 1 capsule (30 mg total) by mouth daily, Starting Wed 4/18/2018, Print      !!  DULoxetine (CYMBALTA) 60 mg delayed release capsule Take 1 capsule (60 mg total) by mouth daily for 90 days 90 mg, Starting Mon 9/17/2018, Until Sun 12/16/2018, Normal      escitalopram (LEXAPRO) 10 mg tablet TAKE 1/2 TABLET ONCE DAILY, Normal      famotidine (PEPCID) 10 mg tablet Take 1 tablet by mouth, Starting u 11/30/2017, Historical Med      hydrochlorothiazide (HYDRODIURIL) 25 mg tablet TAKE ONE TABLET BY MOUTH EVERY DAY, Normal      levalbuterol (XOPENEX HFA) 45 mcg/act inhaler Inhale 1-2 puffs every 4 (four) hours as needed for wheezing, Until Discontinued, Historical Med      levothyroxine 88 mcg tablet TAKE 1 TABLET BY MOUTH  DAILY, Normal      Mometasone Furo-Formoterol Fum (DULERA) 100-5 MCG/ACT AERO Inhale, Until Discontinued, Historical Med      Multiple Vitamins-Minerals (OCUVITE ADULT 50+ PO) Take by mouth, Historical Med      NYSTATIN powder APPLY SPARINGLY TO THE AFFECTED AREA TWICE A DAY, Normal      pantoprazole (PROTONIX) 40 mg tablet TAKE 1 TABLET BY MOUTH  DAILY, Normal      potassium chloride (K-DUR,KLOR-CON) 20 mEq tablet TAKE 1 TABLET BY MOUTH  DAILY, Normal      quinapril (ACCUPRIL) 10 mg tablet Take 10 mg by mouth daily at bedtime, Until Discontinued, Historical Med      simvastatin (ZOCOR) 40 mg tablet TAKE ONE TABLET BY MOUTH EVERY DAY, Normal      !! traMADol (ULTRAM) 50 mg tablet TAKE ONE TABLET BY MOUTH THREE TIMES A DAY, Normal      Triamcinolone Acetonide (NASACORT ALLERGY 24HR) 55 MCG/ACT AERO into each nostril, Starting u 5/11/2017, Historical Med       !! - Potential duplicate medications found  Please discuss with provider  No discharge procedures on file      ED Provider  Electronically Signed by           Saqib Abdalla DO  11/24/18 1296

## 2018-11-28 DIAGNOSIS — F41.9 ANXIETY: ICD-10-CM

## 2018-11-29 RX ORDER — ALPRAZOLAM 1 MG/1
TABLET ORAL
Qty: 90 TABLET | Refills: 3 | Status: SHIPPED | OUTPATIENT
Start: 2018-11-29 | End: 2019-04-01 | Stop reason: SDUPTHER

## 2018-12-06 ENCOUNTER — APPOINTMENT (OUTPATIENT)
Dept: OCCUPATIONAL THERAPY | Facility: CLINIC | Age: 63
End: 2018-12-06
Payer: COMMERCIAL

## 2018-12-10 ENCOUNTER — EVALUATION (OUTPATIENT)
Dept: OCCUPATIONAL THERAPY | Facility: CLINIC | Age: 63
End: 2018-12-10
Payer: COMMERCIAL

## 2018-12-10 DIAGNOSIS — M79.642 PAIN IN LEFT HAND: Primary | ICD-10-CM

## 2018-12-10 PROCEDURE — 97165 OT EVAL LOW COMPLEX 30 MIN: CPT

## 2018-12-10 PROCEDURE — G8984 CARRY CURRENT STATUS: HCPCS

## 2018-12-10 PROCEDURE — G8985 CARRY GOAL STATUS: HCPCS

## 2018-12-10 PROCEDURE — 97035 APP MDLTY 1+ULTRASOUND EA 15: CPT

## 2018-12-10 NOTE — PROGRESS NOTES
OT Evaluation     Today's date: 12/10/2018  Patient name: Tk Gallo  : 1955  MRN: [id number]  Referring provider: Shira Grove MD  Dx:   Encounter Diagnosis     ICD-10-CM    1  Pain in left hand M79 [id number]            18:  Patient requested discharge from therapy on 18  Therapist spoke to patient's  on 18 and he stated patient was experiencing severe depression and was nt able to participate in therapy at this time  Therapist left message for Dr Binu Celestin on 18 informing him of patient's health status  Discharge to HEP at this time  Assessment  Assessment details: This is a 61year old, right hand dominant female being seen for persistent left hand pain and edema following an injury sustained in a fall against a glass door on 18  Patient did not sustain any fractures, but she has brawny edema on the dorsum of the left hand along the metacarpals of digits 2-5  This area is also dusky in color and painful with palpation  Patient has impaired left hand ROM and strength  Minor deficit noted in wrist ROM  Patient reports she is not able to use the left hand for most ADL tasks due to pain and cannot lift even a coffee cup  This patient is a good candidate for OT services to decrease pain and edema and increase left hand ROM and strength for increased function  Impairments: abnormal or restricted ROM, activity intolerance, impaired physical strength, lacks appropriate home exercise program and pain with function  Other impairment: Edema  Functional limitations: Impaired ADLs/IADLsBarriers to therapy: Hx arthritis  Understanding of Dx/Px/POC: good   Prognosis: good    Goals  ST GOALS ( 4 weeks)  1  Patient will be independent in HEP for ROM  2  Patient will demonstrate 30 degree improvement in DHALIWAL left digits 2-5  3  Patient will demonstrate left wrist AROM WNL  4   Patient will report average pain level of 5/10 in the left hand    LT GOALS ( 8 weeks)  1  Patient will demonstrate composite fist in the left hand for independence in self care tasks  2  Patient will demonstrate left hand  and pinch strength within 20% of the right hand to be MI for home management tasks  3  Patient will report an average pain level of 2-3/10 in the left hand during ADL tasks  4  Patient will demonstrate resolution of left hand edema    Plan  Patient would benefit from: skilled occupational therapy  Planned modality interventions: thermotherapy: hydrocollator packs, thermotherapy: paraffin bath and ultrasound  Planned therapy interventions: activity modification, compression, fine motor coordination training, home exercise program, therapeutic activities, therapeutic exercise, strengthening, patient education, manual therapy and joint mobilization  Frequency: 2-3x/wk  Duration in weeks: 8  Plan of Care beginning date: 12/10/2018  Plan of Care expiration date: 3/4/2019  Treatment plan discussed with: patient        Subjective Evaluation    History of Present Illness  Date of onset: 11/21/2018  Mechanism of injury: trauma  Mechanism of injury: 58-year-old female right-hand dominant with a history of arthritis  On 11/21/18 she was at home when one of her dogs ran into her causing her to stumble and strike her left hand on the sliding glass window and subsequently a grill  She did not fall the ground strike her head or lose consciousness although she had immediate pain localized to the dorsum of her left hand on the 3rd through 5th metacarpals  She had significant ecchymosis and edema in this area  Patient seen in the ED   xrays negative  Patient placed in a volar hand splint and referred to a hand surgeon  She was seen by Dr Bubba Cockayne  X rays repeated and again negative for fracture or dislocation  Patient received a compression glove and exercises at another hand therapy facility  Patient has persistent pain and edema and weakness in the left hand   She presents this date for OT evaluation and treatment  Recurrent probem    Quality of life: fair    Pain  Current pain ratin  At best pain rating: 3  At worst pain ratin  Quality: dull ache, discomfort, needle-like and pulling  Relieving factors: medications and change in position  Aggravating factors: lifting (dependent positioning)  Progression: improved    Social Support  Lives in: multiple-level home  Lives with: spouse    Employment status: not working  Hand dominance: right  Life stress: Multiple health problems      Diagnostic Tests  X-ray: normal  Treatments  Previous treatment: immobilization and occupational therapy  Current treatment: occupational therapy  Patient Goals  Patient goals for therapy: decreased edema, decreased pain, increased motion, increased strength and independence with ADLs/IADLs  Patient goal: Be able to use my had again to hold a cup and do other things        Objective     Observations     Left Wrist/Hand   Positive for edema and trophic changes  Additional Observation Details  Dusky color dorsum of the hand 6x6 cm area  Edematous/fibrous in this area    Palpation     Additional Palpation Details  EDC, EDM painful with palpation    Neurological Testing     Sensation     Wrist/Hand   Left   Diminished: light touch    Additional Neurological Details  [place] Monofilaments:  Volar left hand WNL thumb, diminished light touch D2-5    Dorsum left diminished protective sensation    Active Range of Motion     Left Wrist   Wrist flexion: 55 degrees   Wrist extension: 65 degrees   Radial deviation: 0 degrees   Ulnar deviation: 25 degrees     Left Digits    Flexion   Index     MCP: 65    PIP: 95    DIP: 0  Middle     MCP: 45    PIP: 90    DIP: 0  Ring     MCP: 45    PIP: 85    DIP: 45  Little     MCP: 40    PIP: 75    DIP: 45  Extension   Index     MCP: -10  Middle     MCP: -10  Ring     MCP: -10  Little     MCP: -10    PIP: 15    Additional Active Range of Motion Details  DHALIWAL Index = 150, long = 125, ring = 160, small = 135  DIPs index and long fingers fused in extension    Strength/Myotome Testing     Left Wrist/Hand      (2nd hand position)     Trial 1: 15    Thumb Strength  Key/Lateral Pinch     Trail 1: 3  Tip/Two-Point Pinch     Trial 1: 2  Palmar/Three-Point Pinch     Trial 1: 3    Right Wrist/Hand      (2nd hand position)     Trial 1: 10    Thumb Strength   Key/Lateral Pinch     Trial 1: 3  Tip/Two-Point Pinch     Trial 1: 6  Palmar/Three-Point Pinch     Trial 1: 4 5    Additional Strength Details  Dusky color dorsum of the hand 6x6 cm area  Edematous/fibrous in this area    Swelling     Left Wrist/Hand   Circumference MCP: 18 8 cm    Additional Swelling Details  Palm at 1st web space = 19 8 cm    Right Wrist/Hand   Circumference MCP: 18 cm  Circumference wrist: 15 5 cm    Additional Swelling Details  Palm at 1st webspace = 19 8 cm          Precautions Arthritis    Specialty Daily Treatment Diary     Manual  12/10       Edema massage 3'    Issued ribbed padding to wear under isotoner glove                                           Exercise Diary                                                                                                                                                                             Modalities 12/10       US to dorsum L hand @ 50%, 3 3 MHz, 0 8w/cm2 8'

## 2018-12-10 NOTE — LETTER
2018    Stefani Leiva MD  [facility] [place] [address]    Patient: Raman Elizondo   YOB: 1955   Date of Visit: 12/10/2018     Encounter Diagnosis     ICD-10-CM    1  Pain in left hand M79 [id number]        Dear Dr Maged Gomez:    Please review the attached Plan of Care from [place] [address] recent visit  Please verify that you agree therapy should continue by signing the attached document and sending it back to our office  If you have any questions or concerns, please don't hesitate to call  Sincerely,    Jerald Hutchinson OT      Referring Provider:     I certify that I have read the below Plan of Care and certify the need for these services furnished under this plan of treatment while under my care  Stefani Leiva MD  [facility]: [phone]        OT Evaluation     Today's date: 12/10/2018  Patient name: Raman Elizondo  : 1955  MRN: [id number]  Referring provider: Winston Marie MD  Dx:   Encounter Diagnosis     ICD-10-CM    1  Pain in left hand M79 642                   Assessment  Assessment details: This is a 61year old, right hand dominant female being seen for persistent left hand pain and edema following an injury sustained in a fall against a glass door on 18  Patient did not sustain any fractures, but she has brawny edema on the dorsum of the left hand along the metacarpals of digits 2-5  This area is also dusky in color and painful with palpation  Patient has impaired left hand ROM and strength  Minor deficit noted in wrist ROM  Patient reports she is not able to use the left hand for most ADL tasks due to pain and cannot lift even a coffee cup    This patient is a good candidate for OT services to decrease pain and edema and increase left hand ROM and strength for increased function  Impairments: abnormal or restricted ROM, activity intolerance, impaired physical strength, lacks appropriate home exercise program and pain with function  Other impairment: Edema  Functional limitations: Impaired ADLs/IADLsBarriers to therapy: Hx arthritis  Understanding of Dx/Px/POC: good   Prognosis: good    Goals  ST GOALS ( 4 weeks)  1  Patient will be independent in HEP for ROM  2  Patient will demonstrate 30 degree improvement in DHALIWAL left digits 2-5  3  Patient will demonstrate left wrist AROM WNL  4   Patient will report average pain level of 5/10 in the left hand    LT GOALS ( 8 weeks)  1  Patient will demonstrate composite fist in the left hand for independence in self care tasks  2  Patient will demonstrate left hand  and pinch strength within 20% of the right hand to be MI for home management tasks  3  Patient will report an average pain level of 2-3/10 in the left hand during ADL tasks  4  Patient will demonstrate resolution of left hand edema    Plan  Patient would benefit from: skilled occupational therapy  Planned modality interventions: thermotherapy: hydrocollator packs, thermotherapy: paraffin bath and ultrasound  Planned therapy interventions: activity modification, compression, fine motor coordination training, home exercise program, therapeutic activities, therapeutic exercise, strengthening, patient education, manual therapy and joint mobilization  Frequency: 2-3x/wk  Duration in weeks: 8  Plan of Care beginning date: 12/10/2018  Plan of Care expiration date: 3/4/2019  Treatment plan discussed with: patient        Subjective Evaluation    History of Present Illness  Date of onset: 11/21/2018  Mechanism of injury: trauma  Mechanism of injury: 60-year-old female right-hand dominant with a history of arthritis  On 11/21/18 she was at home when one of her dogs ran into her causing her to stumble and strike her left hand on the sliding glass window and subsequently a grill    She did not fall the ground strike her head or lose consciousness although she had immediate pain localized to the dorsum of her left hand on the 3rd through 5th metacarpals  She had significant ecchymosis and edema in this area  Patient seen in the ED   xrays negative  Patient placed in a volar hand splint and referred to a hand surgeon  She was seen by Dr Arango Spore  X rays repeated and again negative for fracture or dislocation  Patient received a compression glove and exercises at another hand therapy facility  Patient has persistent pain and edema and weakness in the left hand  She presents this date for OT evaluation and treatment  Recurrent probem    Quality of life: fair    Pain  Current pain ratin  At best pain rating: 3  At worst pain ratin  Quality: dull ache, discomfort, needle-like and pulling  Relieving factors: medications and change in position  Aggravating factors: lifting (dependent positioning)  Progression: improved    Social Support  Lives in: multiple-level home  Lives with: spouse    Employment status: not working  Hand dominance: right  Life stress: Multiple health problems      Diagnostic Tests  X-ray: normal  Treatments  Previous treatment: immobilization and occupational therapy  Current treatment: occupational therapy  Patient Goals  Patient goals for therapy: decreased edema, decreased pain, increased motion, increased strength and independence with ADLs/IADLs  Patient goal: Be able to use my had again to hold a cup and do other things        Objective     Observations     Left Wrist/Hand   Positive for edema and trophic changes  Additional Observation Details  Dusky color dorsum of the hand 6x6 cm area  Edematous/fibrous in this area    Palpation     Additional Palpation Details  EDC, EDM painful with palpation    Neurological Testing     Sensation     Wrist/Hand   Left   Diminished: light touch    Additional Neurological Details  Molena-Macro Monofilaments:  Volar left hand WNL thumb, diminished light touch D2-5    Dorsum left diminished protective sensation    Active Range of Motion     Left Wrist   Wrist flexion: 55 degrees   Wrist extension: 65 degrees   Radial deviation: 0 degrees   Ulnar deviation: 25 degrees     Left Digits    Flexion   Index     MCP: 65    PIP: 95    DIP: 0  Middle     MCP: 45    PIP: 90    DIP: 0  Ring     MCP: 45    PIP: 85    DIP: 45  Little     MCP: 40    PIP: 75    DIP: 45  Extension   Index     MCP: -10  Middle     MCP: -10  Ring     MCP: -10  Little     MCP: -10    PIP: 15    Additional Active Range of Motion Details  DHALIWAL Index = 150, long = 125, ring = 160, small = 135  DIPs index and long fingers fused in extension    Strength/Myotome Testing     Left Wrist/Hand      (2nd hand position)     Trial 1: 15    Thumb Strength  Key/Lateral Pinch     Trail 1: 3  Tip/Two-Point Pinch     Trial 1: 2  Palmar/Three-Point Pinch     Trial 1: 3    Right Wrist/Hand      (2nd hand position)     Trial 1: 10    Thumb Strength   Key/Lateral Pinch     Trial 1: 3  Tip/Two-Point Pinch     Trial 1: 6  Palmar/Three-Point Pinch     Trial 1: 4 5    Additional Strength Details  Dusky color dorsum of the hand 6x6 cm area  Edematous/fibrous in this area    Swelling     Left Wrist/Hand   Circumference MCP: 18 8 cm    Additional Swelling Details  Palm at 1st web space = 19 8 cm    Right Wrist/Hand   Circumference MCP: 18 cm  Circumference wrist: 15 5 cm    Additional Swelling Details  Palm at 1st webspace = 19 8 cm          Precautions Arthritis    Specialty Daily Treatment Diary     Manual  12/10       Edema massage 3'    Issued ribbed padding to wear under isotoner glove                                           Exercise Diary                                                                                                                                                                             Modalities 12/10       US to dorsum L hand @ 50%, 3 3 MHz, 0 8w/cm2 8'

## 2018-12-11 ENCOUNTER — TRANSCRIBE ORDERS (OUTPATIENT)
Dept: OCCUPATIONAL THERAPY | Facility: CLINIC | Age: 63
End: 2018-12-11

## 2018-12-11 DIAGNOSIS — M79.642 PAIN IN LEFT HAND: Primary | ICD-10-CM

## 2018-12-13 ENCOUNTER — APPOINTMENT (OUTPATIENT)
Dept: OCCUPATIONAL THERAPY | Facility: CLINIC | Age: 63
End: 2018-12-13
Payer: COMMERCIAL

## 2018-12-14 ENCOUNTER — TELEPHONE (OUTPATIENT)
Dept: OCCUPATIONAL THERAPY | Facility: CLINIC | Age: 63
End: 2018-12-14

## 2018-12-14 NOTE — TELEPHONE ENCOUNTER
Therapist spoke to patient's   He states she is going through severe depression and will not seek help for her depression  Therapist informed  she will contact Dr Tariq Burks about the patient's depression as a reason for not attending therapy

## 2018-12-17 ENCOUNTER — APPOINTMENT (OUTPATIENT)
Dept: OCCUPATIONAL THERAPY | Facility: CLINIC | Age: 63
End: 2018-12-17
Payer: COMMERCIAL

## 2018-12-20 ENCOUNTER — APPOINTMENT (OUTPATIENT)
Dept: OCCUPATIONAL THERAPY | Facility: CLINIC | Age: 63
End: 2018-12-20
Payer: COMMERCIAL

## 2018-12-26 ENCOUNTER — APPOINTMENT (OUTPATIENT)
Dept: OCCUPATIONAL THERAPY | Facility: CLINIC | Age: 63
End: 2018-12-26
Payer: COMMERCIAL

## 2018-12-29 DIAGNOSIS — G89.29 OTHER CHRONIC PAIN: ICD-10-CM

## 2018-12-31 RX ORDER — TRAMADOL HYDROCHLORIDE 50 MG/1
TABLET ORAL
Qty: 270 TABLET | Refills: 2 | Status: SHIPPED | OUTPATIENT
Start: 2018-12-31 | End: 2019-05-14 | Stop reason: SDUPTHER

## 2019-01-02 DIAGNOSIS — F32.A ANXIETY AND DEPRESSION: ICD-10-CM

## 2019-01-02 DIAGNOSIS — G62.9 NEUROPATHY: ICD-10-CM

## 2019-01-02 DIAGNOSIS — F41.9 ANXIETY AND DEPRESSION: ICD-10-CM

## 2019-01-02 DIAGNOSIS — L30.9 DERMATITIS: ICD-10-CM

## 2019-01-02 RX ORDER — DULOXETIN HYDROCHLORIDE 60 MG/1
CAPSULE, DELAYED RELEASE ORAL
Qty: 90 CAPSULE | Refills: 0 | Status: SHIPPED | OUTPATIENT
Start: 2019-01-02 | End: 2019-01-14 | Stop reason: SDUPTHER

## 2019-01-02 RX ORDER — CLOTRIMAZOLE AND BETAMETHASONE DIPROPIONATE 10; .64 MG/G; MG/G
CREAM TOPICAL
Qty: 45 G | Refills: 1 | Status: SHIPPED | OUTPATIENT
Start: 2019-01-02 | End: 2019-07-10 | Stop reason: SDUPTHER

## 2019-01-02 RX ORDER — DULOXETIN HYDROCHLORIDE 30 MG/1
CAPSULE, DELAYED RELEASE ORAL
Qty: 90 CAPSULE | Refills: 2 | Status: SHIPPED | OUTPATIENT
Start: 2019-01-02 | End: 2019-01-14 | Stop reason: SDUPTHER

## 2019-01-10 ENCOUNTER — APPOINTMENT (OUTPATIENT)
Dept: LAB | Facility: HOSPITAL | Age: 64
End: 2019-01-10
Attending: INTERNAL MEDICINE
Payer: COMMERCIAL

## 2019-01-10 DIAGNOSIS — R73.01 IMPAIRED FASTING GLUCOSE: ICD-10-CM

## 2019-01-10 DIAGNOSIS — E78.2 MIXED HYPERLIPIDEMIA: ICD-10-CM

## 2019-01-10 DIAGNOSIS — I10 ESSENTIAL HYPERTENSION: Chronic | ICD-10-CM

## 2019-01-10 LAB
ALBUMIN SERPL BCP-MCNC: 3.5 G/DL (ref 3.5–5)
ALP SERPL-CCNC: 93 U/L (ref 46–116)
ALT SERPL W P-5'-P-CCNC: 19 U/L (ref 12–78)
ANION GAP SERPL CALCULATED.3IONS-SCNC: 8 MMOL/L (ref 4–13)
AST SERPL W P-5'-P-CCNC: 14 U/L (ref 5–45)
BILIRUB SERPL-MCNC: 0.4 MG/DL (ref 0.2–1)
BUN SERPL-MCNC: 12 MG/DL (ref 5–25)
CALCIUM SERPL-MCNC: 9.6 MG/DL (ref 8.3–10.1)
CHLORIDE SERPL-SCNC: 99 MMOL/L (ref 100–108)
CHOLEST SERPL-MCNC: 185 MG/DL (ref 50–200)
CO2 SERPL-SCNC: 32 MMOL/L (ref 21–32)
CREAT SERPL-MCNC: 0.73 MG/DL (ref 0.6–1.3)
EST. AVERAGE GLUCOSE BLD GHB EST-MCNC: 120 MG/DL
GFR SERPL CREATININE-BSD FRML MDRD: 88 ML/MIN/1.73SQ M
GLUCOSE P FAST SERPL-MCNC: 86 MG/DL (ref 65–99)
HBA1C MFR BLD: 5.8 % (ref 4.2–6.3)
HDLC SERPL-MCNC: 54 MG/DL (ref 40–60)
LDLC SERPL CALC-MCNC: 106 MG/DL (ref 0–100)
NONHDLC SERPL-MCNC: 131 MG/DL
POTASSIUM SERPL-SCNC: 4 MMOL/L (ref 3.5–5.3)
PROT SERPL-MCNC: 8.2 G/DL (ref 6.4–8.2)
SODIUM SERPL-SCNC: 139 MMOL/L (ref 136–145)
TRIGL SERPL-MCNC: 124 MG/DL
TSH SERPL DL<=0.05 MIU/L-ACNC: 1.82 UIU/ML (ref 0.36–3.74)

## 2019-01-10 PROCEDURE — 84443 ASSAY THYROID STIM HORMONE: CPT

## 2019-01-10 PROCEDURE — 80053 COMPREHEN METABOLIC PANEL: CPT

## 2019-01-10 PROCEDURE — 83036 HEMOGLOBIN GLYCOSYLATED A1C: CPT

## 2019-01-10 PROCEDURE — 36415 COLL VENOUS BLD VENIPUNCTURE: CPT

## 2019-01-10 PROCEDURE — 80061 LIPID PANEL: CPT

## 2019-01-14 ENCOUNTER — OFFICE VISIT (OUTPATIENT)
Dept: INTERNAL MEDICINE CLINIC | Facility: CLINIC | Age: 64
End: 2019-01-14
Payer: COMMERCIAL

## 2019-01-14 VITALS
WEIGHT: 213.2 LBS | HEIGHT: 58 IN | SYSTOLIC BLOOD PRESSURE: 134 MMHG | BODY MASS INDEX: 44.75 KG/M2 | DIASTOLIC BLOOD PRESSURE: 80 MMHG | RESPIRATION RATE: 12 BRPM | HEART RATE: 88 BPM

## 2019-01-14 DIAGNOSIS — E66.01 MORBID OBESITY (HCC): ICD-10-CM

## 2019-01-14 DIAGNOSIS — F41.9 ANXIETY: ICD-10-CM

## 2019-01-14 DIAGNOSIS — F41.9 ANXIETY AND DEPRESSION: ICD-10-CM

## 2019-01-14 DIAGNOSIS — F32.A ANXIETY AND DEPRESSION: ICD-10-CM

## 2019-01-14 DIAGNOSIS — E78.5 HYPERLIPIDEMIA, UNSPECIFIED HYPERLIPIDEMIA TYPE: ICD-10-CM

## 2019-01-14 DIAGNOSIS — Z01.818 PREOP EXAMINATION: ICD-10-CM

## 2019-01-14 DIAGNOSIS — I10 ESSENTIAL HYPERTENSION: Chronic | ICD-10-CM

## 2019-01-14 DIAGNOSIS — F32.1 CURRENT MODERATE EPISODE OF MAJOR DEPRESSIVE DISORDER WITHOUT PRIOR EPISODE (HCC): ICD-10-CM

## 2019-01-14 DIAGNOSIS — G62.9 NEUROPATHY: ICD-10-CM

## 2019-01-14 DIAGNOSIS — R73.01 IMPAIRED FASTING GLUCOSE: ICD-10-CM

## 2019-01-14 DIAGNOSIS — E03.9 ACQUIRED HYPOTHYROIDISM: Primary | ICD-10-CM

## 2019-01-14 PROCEDURE — 3008F BODY MASS INDEX DOCD: CPT | Performed by: INTERNAL MEDICINE

## 2019-01-14 PROCEDURE — 99215 OFFICE O/P EST HI 40 MIN: CPT | Performed by: INTERNAL MEDICINE

## 2019-01-14 PROCEDURE — 3079F DIAST BP 80-89 MM HG: CPT | Performed by: INTERNAL MEDICINE

## 2019-01-14 PROCEDURE — 1036F TOBACCO NON-USER: CPT | Performed by: INTERNAL MEDICINE

## 2019-01-14 PROCEDURE — 93000 ELECTROCARDIOGRAM COMPLETE: CPT | Performed by: INTERNAL MEDICINE

## 2019-01-14 PROCEDURE — 3075F SYST BP GE 130 - 139MM HG: CPT | Performed by: INTERNAL MEDICINE

## 2019-01-14 RX ORDER — DULOXETIN HYDROCHLORIDE 30 MG/1
30 CAPSULE, DELAYED RELEASE ORAL DAILY
Qty: 90 CAPSULE | Refills: 1 | Status: SHIPPED | OUTPATIENT
Start: 2019-01-14 | End: 2020-03-09 | Stop reason: SDUPTHER

## 2019-01-14 RX ORDER — ALBUTEROL SULFATE 90 UG/1
2 AEROSOL, METERED RESPIRATORY (INHALATION) EVERY 6 HOURS PRN
COMMUNITY

## 2019-01-14 RX ORDER — DULOXETIN HYDROCHLORIDE 60 MG/1
60 CAPSULE, DELAYED RELEASE ORAL DAILY
Qty: 90 CAPSULE | Refills: 1 | Status: SHIPPED | OUTPATIENT
Start: 2019-01-14 | End: 2019-10-16 | Stop reason: SDUPTHER

## 2019-01-14 NOTE — PATIENT INSTRUCTIONS
Lab data reviewed in detail and compared to prior    Morbid obesity complicated by below-patient has decided against bariatric surgery, continue with dieting and weight loss efforts, congratulations on your success thus far, do not lose momentum    Depressive disorder-continue Cymbalta and Lexapro was Xanax as needed, referral to local therapist    Hypertension and hyperlipidemia stable on present regimen    Impaired fasting glucose improved with A1c down to 5 8    Hypothyroidism stable on present regimen    Recurrent sinusitis-continue with humidification, Neti pot and nasal steroid    GERD stable on pantoprazole and famotidine    Preop for blepharoplasty-EKG reviewed and stable, I see no medical contraindication to proceeding with planned surgery    Routine follow-up after labs in 6 months, sooner as needed

## 2019-01-14 NOTE — PROGRESS NOTES
Assessment/Plan:    Patient Instructions   Lab data reviewed in detail and compared to prior    Morbid obesity complicated by below-patient has decided against bariatric surgery, continue with dieting and weight loss efforts, congratulations on your success thus far, do not lose momentum    Depressive disorder-continue Cymbalta and Lexapro was Xanax as needed, referral to local therapist    Hypertension and hyperlipidemia stable on present regimen    Impaired fasting glucose improved with A1c down to 5 8    Hypothyroidism stable on present regimen    Recurrent sinusitis-continue with humidification, Neti pot and nasal steroid    GERD stable on pantoprazole and famotidine    Preop for blepharoplasty-EKG reviewed and stable, I see no medical contraindication to proceeding with planned surgery    Routine follow-up after labs in 6 months, sooner as needed  Diagnoses and all orders for this visit:    Acquired hypothyroidism  -     TSH, 3rd generation with Free T4 reflex; Future    Hyperlipidemia, unspecified hyperlipidemia type  -     Lipid panel; Future    Neuropathy  -     DULoxetine (CYMBALTA) 30 mg delayed release capsule; Take 1 capsule (30 mg total) by mouth daily    Anxiety and depression  -     DULoxetine (CYMBALTA) 60 mg delayed release capsule; Take 1 capsule (60 mg total) by mouth daily    Impaired fasting glucose  -     Hemoglobin A1C; Future    Essential hypertension  -     CBC and differential; Future  -     Comprehensive metabolic panel; Future    Current moderate episode of major depressive disorder without prior episode ([facility] [address] )    Anxiety    Morbid obesity ([facility] [address] )    Preop examination  -     POCT ECG    Other orders  -     albuterol (PROAIR HFA) 90 mcg/act inhaler; Inhale 2 puffs every 6 (six) hours as needed for wheezing         Subjective:      Patient ID: Michaela Schuster is a 61 y o  female    Notes recurrent sinus infection over past 3 wks  Notes same every yr when heat comes on  Humidifying, nasal steroids, neti pot, saline spray  No f/c  Seems to be recurrent now  Depressed re family issues  Brother in law recently passed  Taking rx as directed  Xanax    HTN-taking rx as directed, home bp's    HPL-Tolerating rx  GERD-stable on rx  Taking pantoprazole am, pepcid pm       Hypothyroid-taking rx as directed  Chronic pain in feet r/t arthritis  Recent fall from trip over door jam w/ bruising in R shin  Chronic lbp w/ R radiculopathy + PN  Obesity-decided against bariatric surgery b/c she can't bare to consider another surgery  Loosing wt gradually, following Atkins like diet              Current Outpatient Prescriptions:     albuterol (PROAIR HFA) 90 mcg/act inhaler, Inhale 2 puffs every 6 (six) hours as needed for wheezing, Disp: , Rfl:     ALPRAZolam (XANAX) 1 mg tablet, TAKE 1/2-1 TABLET THREE TIMES A DAY AS NEEDED, Disp: 90 tablet, Rfl: 3    aspirin 81 MG tablet, Take 81 mg by mouth daily, Disp: , Rfl:     cholestyramine (QUESTRAN) 4 g packet, Take by mouth, Disp: , Rfl:     clotrimazole-betamethasone (LOTRISONE) 1-0 05 % cream, APPLY SPARINGLY TO AFFECTED AREA IN GROIN 2-3 TIMES A DAY AS NEEDED, Disp: 45 g, Rfl: 1    DULoxetine (CYMBALTA) 30 mg delayed release capsule, Take 1 capsule (30 mg total) by mouth daily, Disp: 90 capsule, Rfl: 1    DULoxetine (CYMBALTA) 60 mg delayed release capsule, Take 1 capsule (60 mg total) by mouth daily, Disp: 90 capsule, Rfl: 1    escitalopram (LEXAPRO) 10 mg tablet, TAKE 1/2 TABLET ONCE DAILY, Disp: 30 tablet, Rfl: 3    famotidine (PEPCID) 10 mg tablet, Take 1 tablet by mouth, Disp: , Rfl:     hydrochlorothiazide (HYDRODIURIL) 25 mg tablet, TAKE ONE TABLET BY MOUTH EVERY DAY, Disp: 90 tablet, Rfl: 3    levothyroxine 88 mcg tablet, TAKE 1 TABLET BY MOUTH  DAILY, Disp: 90 tablet, Rfl: 3    Mometasone Furo-Formoterol Fum (DULERA) 100-5 MCG/ACT AERO, Inhale, Disp: , Rfl:     Multiple Vitamins-Minerals ([facility] ADULT 50+ PO), Take by mouth, Disp: , Rfl:     NYSTATIN powder, APPLY SPARINGLY TO THE AFFECTED AREA TWICE A DAY, Disp: 60 g, Rfl: 3    pantoprazole (PROTONIX) 40 mg tablet, TAKE 1 TABLET BY MOUTH  DAILY, Disp: 90 tablet, Rfl: 3    potassium chloride (K-DUR,KLOR-CON) 20 mEq tablet, TAKE 1 TABLET BY MOUTH  DAILY, Disp: 90 tablet, Rfl: 3    quinapril (ACCUPRIL) 10 mg tablet, Take 10 mg by mouth daily at bedtime, Disp: , Rfl:     simvastatin (ZOCOR) 40 mg tablet, TAKE ONE TABLET BY MOUTH EVERY DAY, Disp: 90 tablet, Rfl: 3    traMADol (ULTRAM) 50 mg tablet, TAKE ONE TABLET BY MOUTH THREE TIMES A DAY, Disp: 270 tablet, Rfl: 2    Triamcinolone Acetonide (NASACORT ALLERGY 24HR) 55 MCG/ACT AERO, into each nostril, Disp: , Rfl:     Recent Results (from the past 1008 hour(s))   TSH, 3rd generation with Free T4 reflex    Collection Time: 01/10/19 12:54 PM   Result Value Ref Range    TSH 3RD GENERATON 1 823 0 358 - 3 740 uIU/mL   Comprehensive metabolic panel    Collection Time: 01/10/19 12:54 PM   Result Value Ref Range    Sodium 139 136 - 145 mmol/L    Potassium 4 0 3 5 - 5 3 mmol/L    Chloride 99 (L) 100 - 108 mmol/L    CO2 32 21 - 32 mmol/L    ANION GAP 8 4 - 13 mmol/L    BUN 12 5 - 25 mg/dL    Creatinine 0 73 0 60 - 1 30 mg/dL    Glucose, Fasting 86 65 - 99 mg/dL    Calcium 9 6 8 3 - 10 1 mg/dL    AST 14 5 - 45 U/L    ALT 19 12 - 78 U/L    Alkaline Phosphatase 93 46 - 116 U/L    Total Protein 8 2 6 4 - 8 2 g/dL    Albumin 3 5 3 5 - 5 0 g/dL    Total Bilirubin 0 40 0 20 - 1 00 mg/dL    eGFR 88 ml/min/1 73sq m   Lipid panel    Collection Time: 01/10/19 12:54 PM   Result Value Ref Range    Cholesterol 185 50 - 200 mg/dL    Triglycerides 124 <=150 mg/dL    HDL, Direct 54 40 - 60 mg/dL    LDL Calculated 106 (H) 0 - 100 mg/dL    Non-HDL-Chol (CHOL-HDL) 131 mg/dl   Hemoglobin A1C    Collection Time: 01/10/19 12:54 PM   Result Value Ref Range    Hemoglobin A1C 5 8 4 2 - 6 3 %     mg/dl       The following portions of the patient's history were reviewed and updated as appropriate: allergies, current medications, past family history, past medical history, past social history, past surgical history and problem list      Review of Systems   Constitutional: Negative for appetite change, chills, diaphoresis, fatigue, fever and unexpected weight change  HENT: Negative for congestion, hearing loss and rhinorrhea  Eyes: Negative for visual disturbance  Respiratory: Negative for cough, chest tightness, shortness of breath and wheezing  Cardiovascular: Negative for chest pain, palpitations and leg swelling  Gastrointestinal: Negative for abdominal pain and blood in stool  Endocrine: Negative for cold intolerance, heat intolerance, polydipsia and polyuria  Genitourinary: Negative for difficulty urinating, dysuria, frequency and urgency  Musculoskeletal: Negative for arthralgias and myalgias  Skin: Negative for rash  Neurological: Negative for dizziness, weakness, light-headedness and headaches  Hematological: Does not bruise/bleed easily  Psychiatric/Behavioral: Positive for dysphoric mood  Negative for sleep disturbance  Objective:      Vitals:    01/14/19 1450   BP: 134/80   Pulse: 88   Resp: 12          Physical Exam   Constitutional: She is oriented to person, place, and time  She appears well-developed and well-nourished  HENT:   Head: Normocephalic and atraumatic  Nose: Nose normal    Mouth/Throat: Oropharynx is clear and moist    Eyes: Pupils are equal, round, and reactive to light  Conjunctivae and EOM are normal  No scleral icterus  Neck: Normal range of motion  Neck supple  No JVD present  No tracheal deviation present  No thyromegaly present  Cardiovascular: Normal rate, regular rhythm and intact distal pulses  Exam reveals no gallop and no friction rub  No murmur heard  Pulmonary/Chest: Effort normal and breath sounds normal  No respiratory distress  She has no wheezes   She has no rales    Musculoskeletal: She exhibits no edema or deformity  Lymphadenopathy:     She has no cervical adenopathy  Neurological: She is alert and oriented to person, place, and time  No cranial nerve deficit  Skin: Skin is warm and dry  No rash noted  No erythema  No pallor  Psychiatric: She has a normal mood and affect   Her behavior is normal  Judgment and thought content normal

## 2019-01-19 DIAGNOSIS — I10 ESSENTIAL HYPERTENSION: ICD-10-CM

## 2019-01-21 RX ORDER — QUINAPRIL 20 MG/1
TABLET ORAL
Qty: 90 TABLET | Refills: 3 | Status: SHIPPED | OUTPATIENT
Start: 2019-01-21 | End: 2019-05-31

## 2019-01-21 RX ORDER — HYDROCHLOROTHIAZIDE 25 MG/1
TABLET ORAL
Qty: 90 TABLET | Refills: 3 | Status: SHIPPED | OUTPATIENT
Start: 2019-01-21 | End: 2019-11-27 | Stop reason: SDUPTHER

## 2019-01-27 DIAGNOSIS — B37.9 YEAST INFECTION: ICD-10-CM

## 2019-01-28 RX ORDER — NYSTATIN 100000 [USP'U]/G
POWDER TOPICAL
Qty: 60 G | Refills: 3 | Status: SHIPPED | OUTPATIENT
Start: 2019-01-28 | End: 2019-06-08 | Stop reason: SDUPTHER

## 2019-04-01 DIAGNOSIS — F41.9 ANXIETY: ICD-10-CM

## 2019-04-01 RX ORDER — ALPRAZOLAM 1 MG/1
TABLET ORAL
Qty: 90 TABLET | Refills: 3 | Status: SHIPPED | OUTPATIENT
Start: 2019-04-01 | End: 2019-05-31

## 2019-04-02 DIAGNOSIS — F41.9 ANXIETY: ICD-10-CM

## 2019-04-02 RX ORDER — ALPRAZOLAM 1 MG/1
TABLET ORAL
Qty: 90 TABLET | Refills: 3 | Status: SHIPPED | OUTPATIENT
Start: 2019-04-02 | End: 2019-07-10 | Stop reason: SDUPTHER

## 2019-04-23 ENCOUNTER — TELEPHONE (OUTPATIENT)
Dept: INTERNAL MEDICINE CLINIC | Facility: CLINIC | Age: 64
End: 2019-04-23

## 2019-04-23 DIAGNOSIS — M79.672 LEFT FOOT PAIN: Primary | ICD-10-CM

## 2019-04-24 ENCOUNTER — HOSPITAL ENCOUNTER (OUTPATIENT)
Dept: RADIOLOGY | Facility: HOSPITAL | Age: 64
Discharge: HOME | End: 2019-04-24
Attending: INTERNAL MEDICINE
Payer: COMMERCIAL

## 2019-04-24 DIAGNOSIS — M79.672 LEFT FOOT PAIN: ICD-10-CM

## 2019-04-24 PROCEDURE — 73630 X-RAY EXAM OF FOOT: CPT

## 2019-04-26 ENCOUNTER — TELEPHONE (OUTPATIENT)
Dept: INTERNAL MEDICINE CLINIC | Facility: CLINIC | Age: 64
End: 2019-04-26

## 2019-04-28 DIAGNOSIS — I10 ESSENTIAL HYPERTENSION: ICD-10-CM

## 2019-04-29 RX ORDER — POTASSIUM CHLORIDE 20 MEQ/1
TABLET, EXTENDED RELEASE ORAL
Qty: 90 TABLET | Refills: 3 | Status: SHIPPED | OUTPATIENT
Start: 2019-04-29 | End: 2020-03-05

## 2019-05-02 ENCOUNTER — TELEPHONE (OUTPATIENT)
Dept: INTERNAL MEDICINE CLINIC | Facility: CLINIC | Age: 64
End: 2019-05-02

## 2019-05-14 DIAGNOSIS — G89.29 OTHER CHRONIC PAIN: ICD-10-CM

## 2019-05-14 RX ORDER — TRAMADOL HYDROCHLORIDE 50 MG/1
50 TABLET ORAL 3 TIMES DAILY
Qty: 270 TABLET | Refills: 2 | Status: SHIPPED | OUTPATIENT
Start: 2019-05-14 | End: 2020-03-31

## 2019-05-28 ENCOUNTER — TELEPHONE (OUTPATIENT)
Dept: INTERNAL MEDICINE CLINIC | Facility: CLINIC | Age: 64
End: 2019-05-28

## 2019-05-31 ENCOUNTER — CONSULT (OUTPATIENT)
Dept: INTERNAL MEDICINE CLINIC | Facility: CLINIC | Age: 64
End: 2019-05-31
Payer: COMMERCIAL

## 2019-05-31 ENCOUNTER — TELEPHONE (OUTPATIENT)
Dept: INTERNAL MEDICINE CLINIC | Facility: CLINIC | Age: 64
End: 2019-05-31

## 2019-05-31 VITALS
SYSTOLIC BLOOD PRESSURE: 130 MMHG | HEIGHT: 58 IN | BODY MASS INDEX: 44.71 KG/M2 | TEMPERATURE: 98.9 F | HEART RATE: 80 BPM | DIASTOLIC BLOOD PRESSURE: 74 MMHG | WEIGHT: 213 LBS | OXYGEN SATURATION: 96 %

## 2019-05-31 DIAGNOSIS — D05.12 DUCTAL CARCINOMA IN SITU (DCIS) OF LEFT BREAST: ICD-10-CM

## 2019-05-31 DIAGNOSIS — Z01.818 PREOPERATIVE CLEARANCE: Primary | ICD-10-CM

## 2019-05-31 PROCEDURE — 99214 OFFICE O/P EST MOD 30 MIN: CPT | Performed by: PHYSICIAN ASSISTANT

## 2019-05-31 RX ORDER — METRONIDAZOLE 500 MG/1
TABLET ORAL
COMMUNITY
End: 2019-05-31

## 2019-05-31 RX ORDER — METHOCARBAMOL 750 MG/1
TABLET, FILM COATED ORAL
COMMUNITY
End: 2019-05-31

## 2019-05-31 RX ORDER — CLOTRIMAZOLE 1 %
CREAM (GRAM) TOPICAL AS NEEDED
COMMUNITY

## 2019-05-31 RX ORDER — VALACYCLOVIR HYDROCHLORIDE 1 G/1
TABLET, FILM COATED ORAL AS NEEDED
Status: ON HOLD | COMMUNITY
End: 2020-08-13

## 2019-05-31 RX ORDER — ERYTHROMYCIN 5 MG/G
OINTMENT OPHTHALMIC AS NEEDED
Refills: 3 | COMMUNITY
Start: 2019-04-12 | End: 2021-08-12 | Stop reason: ALTCHOICE

## 2019-06-08 DIAGNOSIS — B37.9 YEAST INFECTION: ICD-10-CM

## 2019-06-10 RX ORDER — NYSTATIN 100000 [USP'U]/G
POWDER TOPICAL
Qty: 60 G | Refills: 3 | Status: SHIPPED | OUTPATIENT
Start: 2019-06-10 | End: 2019-10-12 | Stop reason: SDUPTHER

## 2019-07-01 ENCOUNTER — APPOINTMENT (OUTPATIENT)
Dept: LAB | Facility: HOSPITAL | Age: 64
End: 2019-07-01
Attending: INTERNAL MEDICINE
Payer: COMMERCIAL

## 2019-07-01 DIAGNOSIS — I10 ESSENTIAL HYPERTENSION: Chronic | ICD-10-CM

## 2019-07-01 DIAGNOSIS — R73.01 IMPAIRED FASTING GLUCOSE: ICD-10-CM

## 2019-07-01 DIAGNOSIS — E03.9 ACQUIRED HYPOTHYROIDISM: ICD-10-CM

## 2019-07-01 DIAGNOSIS — E78.5 HYPERLIPIDEMIA, UNSPECIFIED HYPERLIPIDEMIA TYPE: ICD-10-CM

## 2019-07-01 LAB
ALBUMIN SERPL BCP-MCNC: 3.4 G/DL (ref 3.5–5)
ALP SERPL-CCNC: 90 U/L (ref 46–116)
ALT SERPL W P-5'-P-CCNC: 15 U/L (ref 12–78)
ANION GAP SERPL CALCULATED.3IONS-SCNC: 9 MMOL/L (ref 4–13)
AST SERPL W P-5'-P-CCNC: 7 U/L (ref 5–45)
BASOPHILS # BLD AUTO: 0.05 THOUSANDS/ΜL (ref 0–0.1)
BASOPHILS NFR BLD AUTO: 1 % (ref 0–1)
BILIRUB SERPL-MCNC: 0.4 MG/DL (ref 0.2–1)
BUN SERPL-MCNC: 13 MG/DL (ref 5–25)
CALCIUM SERPL-MCNC: 9.2 MG/DL (ref 8.3–10.1)
CHLORIDE SERPL-SCNC: 99 MMOL/L (ref 100–108)
CHOLEST SERPL-MCNC: 200 MG/DL (ref 50–200)
CO2 SERPL-SCNC: 32 MMOL/L (ref 21–32)
CREAT SERPL-MCNC: 0.75 MG/DL (ref 0.6–1.3)
EOSINOPHIL # BLD AUTO: 0.31 THOUSAND/ΜL (ref 0–0.61)
EOSINOPHIL NFR BLD AUTO: 4 % (ref 0–6)
ERYTHROCYTE [DISTWIDTH] IN BLOOD BY AUTOMATED COUNT: 14.5 % (ref 11.6–15.1)
EST. AVERAGE GLUCOSE BLD GHB EST-MCNC: 131 MG/DL
GFR SERPL CREATININE-BSD FRML MDRD: 85 ML/MIN/1.73SQ M
GLUCOSE P FAST SERPL-MCNC: 94 MG/DL (ref 65–99)
HBA1C MFR BLD: 6.2 % (ref 4.2–6.3)
HCT VFR BLD AUTO: 41.5 % (ref 34.8–46.1)
HDLC SERPL-MCNC: 47 MG/DL (ref 40–60)
HGB BLD-MCNC: 12.8 G/DL (ref 11.5–15.4)
IMM GRANULOCYTES # BLD AUTO: 0.03 THOUSAND/UL (ref 0–0.2)
IMM GRANULOCYTES NFR BLD AUTO: 0 % (ref 0–2)
LDLC SERPL CALC-MCNC: 115 MG/DL (ref 0–100)
LYMPHOCYTES # BLD AUTO: 2.62 THOUSANDS/ΜL (ref 0.6–4.47)
LYMPHOCYTES NFR BLD AUTO: 30 % (ref 14–44)
MCH RBC QN AUTO: 27.1 PG (ref 26.8–34.3)
MCHC RBC AUTO-ENTMCNC: 30.8 G/DL (ref 31.4–37.4)
MCV RBC AUTO: 88 FL (ref 82–98)
MONOCYTES # BLD AUTO: 0.81 THOUSAND/ΜL (ref 0.17–1.22)
MONOCYTES NFR BLD AUTO: 9 % (ref 4–12)
NEUTROPHILS # BLD AUTO: 4.93 THOUSANDS/ΜL (ref 1.85–7.62)
NEUTS SEG NFR BLD AUTO: 56 % (ref 43–75)
NONHDLC SERPL-MCNC: 153 MG/DL
NRBC BLD AUTO-RTO: 0 /100 WBCS
PLATELET # BLD AUTO: 294 THOUSANDS/UL (ref 149–390)
PMV BLD AUTO: 9 FL (ref 8.9–12.7)
POTASSIUM SERPL-SCNC: 3.8 MMOL/L (ref 3.5–5.3)
PROT SERPL-MCNC: 8.1 G/DL (ref 6.4–8.2)
RBC # BLD AUTO: 4.72 MILLION/UL (ref 3.81–5.12)
SODIUM SERPL-SCNC: 140 MMOL/L (ref 136–145)
TRIGL SERPL-MCNC: 190 MG/DL
TSH SERPL DL<=0.05 MIU/L-ACNC: 3.01 UIU/ML (ref 0.36–3.74)
WBC # BLD AUTO: 8.75 THOUSAND/UL (ref 4.31–10.16)

## 2019-07-01 PROCEDURE — 80061 LIPID PANEL: CPT

## 2019-07-01 PROCEDURE — 83036 HEMOGLOBIN GLYCOSYLATED A1C: CPT

## 2019-07-01 PROCEDURE — 36415 COLL VENOUS BLD VENIPUNCTURE: CPT

## 2019-07-01 PROCEDURE — 85025 COMPLETE CBC W/AUTO DIFF WBC: CPT

## 2019-07-01 PROCEDURE — 84443 ASSAY THYROID STIM HORMONE: CPT

## 2019-07-01 PROCEDURE — 80053 COMPREHEN METABOLIC PANEL: CPT

## 2019-07-07 DIAGNOSIS — K21.9 GASTROESOPHAGEAL REFLUX DISEASE WITHOUT ESOPHAGITIS: ICD-10-CM

## 2019-07-08 RX ORDER — PANTOPRAZOLE SODIUM 40 MG/1
TABLET, DELAYED RELEASE ORAL
Qty: 90 TABLET | Refills: 3 | Status: SHIPPED | OUTPATIENT
Start: 2019-07-08 | End: 2020-03-31

## 2019-07-10 ENCOUNTER — OFFICE VISIT (OUTPATIENT)
Dept: INTERNAL MEDICINE CLINIC | Facility: CLINIC | Age: 64
End: 2019-07-10
Payer: COMMERCIAL

## 2019-07-10 VITALS
RESPIRATION RATE: 16 BRPM | HEART RATE: 110 BPM | BODY MASS INDEX: 44.67 KG/M2 | HEIGHT: 58 IN | WEIGHT: 212.8 LBS | DIASTOLIC BLOOD PRESSURE: 88 MMHG | SYSTOLIC BLOOD PRESSURE: 146 MMHG

## 2019-07-10 DIAGNOSIS — F32.1 CURRENT MODERATE EPISODE OF MAJOR DEPRESSIVE DISORDER WITHOUT PRIOR EPISODE (HCC): ICD-10-CM

## 2019-07-10 DIAGNOSIS — E03.9 ACQUIRED HYPOTHYROIDISM: ICD-10-CM

## 2019-07-10 DIAGNOSIS — D05.12 DUCTAL CARCINOMA IN SITU (DCIS) OF LEFT BREAST: ICD-10-CM

## 2019-07-10 DIAGNOSIS — E78.2 MIXED HYPERLIPIDEMIA: ICD-10-CM

## 2019-07-10 DIAGNOSIS — R73.01 IMPAIRED FASTING GLUCOSE: Primary | ICD-10-CM

## 2019-07-10 DIAGNOSIS — J45.21 MILD INTERMITTENT ASTHMA WITH EXACERBATION: ICD-10-CM

## 2019-07-10 DIAGNOSIS — M62.08 DIASTASIS RECTI: ICD-10-CM

## 2019-07-10 DIAGNOSIS — I10 ESSENTIAL HYPERTENSION: Chronic | ICD-10-CM

## 2019-07-10 DIAGNOSIS — F41.9 ANXIETY: ICD-10-CM

## 2019-07-10 DIAGNOSIS — G61.9 INFLAMMATORY NEUROPATHY (HCC): ICD-10-CM

## 2019-07-10 DIAGNOSIS — L30.9 DERMATITIS: ICD-10-CM

## 2019-07-10 PROCEDURE — 3008F BODY MASS INDEX DOCD: CPT | Performed by: INTERNAL MEDICINE

## 2019-07-10 PROCEDURE — 99214 OFFICE O/P EST MOD 30 MIN: CPT | Performed by: INTERNAL MEDICINE

## 2019-07-10 PROCEDURE — 1036F TOBACCO NON-USER: CPT | Performed by: INTERNAL MEDICINE

## 2019-07-10 RX ORDER — CLOTRIMAZOLE AND BETAMETHASONE DIPROPIONATE 10; .64 MG/G; MG/G
CREAM TOPICAL
Qty: 45 G | Refills: 1 | Status: SHIPPED | OUTPATIENT
Start: 2019-07-10 | End: 2019-10-08 | Stop reason: SDUPTHER

## 2019-07-10 RX ORDER — LEVOTHYROXINE SODIUM 88 UG/1
88 TABLET ORAL DAILY
Qty: 90 TABLET | Refills: 3 | Status: SHIPPED | OUTPATIENT
Start: 2019-07-10 | End: 2020-07-23 | Stop reason: SDUPTHER

## 2019-07-10 RX ORDER — ALPRAZOLAM 1 MG/1
1 TABLET ORAL 3 TIMES DAILY PRN
Qty: 90 TABLET | Refills: 3 | Status: SHIPPED | OUTPATIENT
Start: 2019-07-10 | End: 2020-04-23

## 2019-07-10 NOTE — PATIENT INSTRUCTIONS
Lab data reviewed in detail and compared prior, surgical pathology reports and bone density reviewed    DCIS following at [facility] with Surgical and Medical Oncology with plans to complete radiation and 5 years of tamoxifen  No medical contraindication to proceeding with tamoxifen  Bone density was excellent  Anxiety-continue on Cymbalta with alprazolam as needed  Continue with therapy  Impaired fasting glucose-A1c up from 5 8 to 6 2, get back to exercise regimen when able, weight loss efforts as able, low carb diet  Hypertension and hyperlipidemia are stable on present regimen    Diastasis recti-we reviewed CT scan from August of 2018 indicating mild diastasis recti consistent with patient's abdominal complaint and bulge  No further workup or treatment at this time  Monitor symptoms  Pathophysiology was discussed  Asthma and allergic rhinitis following with Dr Kanu Castillo    Hypothyroidism stable on present regimen    GERD stable with pantoprazole and Pepcid    Routine follow-up after labs in 6 months, sooner as needed

## 2019-07-10 NOTE — PROGRESS NOTES
Assessment/Plan:    Diagnoses and all orders for this visit:    Impaired fasting glucose  -     Hemoglobin A1C; Future    Anxiety  -     ALPRAZolam (XANAX) 1 mg tablet; Take 1 tablet (1 mg total) by mouth 3 (three) times a day as needed for anxiety    Acquired hypothyroidism  -     levothyroxine 88 mcg tablet; Take 1 tablet (88 mcg total) by mouth daily    Mild intermittent asthma with exacerbation    Essential hypertension  -     CBC and differential; Future  -     Comprehensive metabolic panel; Future  -     TSH, 3rd generation with Free T4 reflex; Future    Inflammatory neuropathy (HCC)    Mixed hyperlipidemia  -     Lipid panel; Future    Ductal carcinoma in situ (DCIS) of left breast    Current moderate episode of major depressive disorder without prior episode ([facility] [address] )    Diastasis recti         Patient Instructions   Lab data reviewed in detail and compared prior, surgical pathology reports and bone density reviewed    DCIS following at [facility] with Surgical and Medical Oncology with plans to complete radiation and 5 years of tamoxifen  No medical contraindication to proceeding with tamoxifen  Bone density was excellent  Anxiety-continue on Cymbalta with alprazolam as needed  Continue with therapy  Impaired fasting glucose-A1c up from 5 8 to 6 2, get back to exercise regimen when able, weight loss efforts as able, low carb diet  Hypertension and hyperlipidemia are stable on present regimen    Diastasis recti-we reviewed CT scan from August of 2018 indicating mild diastasis recti consistent with patient's abdominal complaint and bulge  No further workup or treatment at this time  Monitor symptoms  Pathophysiology was discussed  Asthma and allergic rhinitis following with Dr Lanie Elizalde    Hypothyroidism stable on present regimen    GERD stable with pantoprazole and Pepcid    Routine follow-up after labs in 6 months, sooner as needed        Subjective:      Patient ID: Ale Patel is a 59 y o  female    Very anxious re recent dx dcis, had surgery w/ Dr Vik Limon, plans XRT in 1 mo  Following w/ Dr Juliana Farias for medical oncology  Plans to start tamoxifen x 5 yrs  Seeing a therapist now, Chikis Soni has her working on limiting family issues  Had panic attack in [facility] [address]  Concerned w/ epigastric pain and bulge  HTN-taking rx as directed, home bp's have been high which she relates to stress  HPL-Tolerating rx  GERD-stable on rx  Taking pantoprazole am, pepcid pm       Hypothyroid-taking rx as directed  Asthma/allergy-following w/ Dr Frannie Sparks, taking rx as directed  Chronic pain in feet r/t arthritis  Chronic lbp w/ R radiculopathy + PN  Stable w/ current regimen    Obesity-decided against bariatric surgery, wt has been stable after recent loss  Following [facility] plan           Current Outpatient Medications:     albuterol (PROAIR HFA) 90 mcg/act inhaler, Inhale 2 puffs every 6 (six) hours as needed for wheezing, Disp: , Rfl:     ALPRAZolam (XANAX) 1 mg tablet, Take 1 tablet (1 mg total) by mouth 3 (three) times a day as needed for anxiety, Disp: 90 tablet, Rfl: 3    aspirin 81 MG tablet, Take 81 mg by mouth daily, Disp: , Rfl:     cholestyramine (QUESTRAN) 4 g packet, Take by mouth, Disp: , Rfl:     clotrimazole (LOTRIMIN) 1 % cream, clotrimazole 1 % topical cream, Disp: , Rfl:     clotrimazole-betamethasone (LOTRISONE) 1-0 05 % cream, APPLY SPARINGLY TO THE AFFECTED AREA IN GROIN 2-3 TIMES A DAY AS NEEDED, Disp: 45 g, Rfl: 1    DULoxetine (CYMBALTA) 30 mg delayed release capsule, Take 1 capsule (30 mg total) by mouth daily, Disp: 90 capsule, Rfl: 1    DULoxetine (CYMBALTA) 60 mg delayed release capsule, Take 1 capsule (60 mg total) by mouth daily, Disp: 90 capsule, Rfl: 1    erythromycin (ILOTYCIN) ophthalmic ointment, APPLY THREE TIMES A DAY TO WOUND, Disp: , Rfl: 3    escitalopram (LEXAPRO) 10 mg tablet, TAKE 1/2 TABLET ONCE DAILY, Disp: 30 tablet, Rfl: 3   famotidine (PEPCID) 10 mg tablet, Take 1 tablet by mouth, Disp: , Rfl:     hydrochlorothiazide (HYDRODIURIL) 25 mg tablet, TAKE 1 TABLET BY MOUTH  DAILY, Disp: 90 tablet, Rfl: 3    levothyroxine 88 mcg tablet, Take 1 tablet (88 mcg total) by mouth daily, Disp: 90 tablet, Rfl: 3    Mometasone Furo-Formoterol Fum (DULERA) 100-5 MCG/ACT AERO, Inhale, Disp: , Rfl:     Multiple Vitamins-Minerals (OCUVITE ADULT 50+ PO), Take by mouth, Disp: , Rfl:     NYSTATIN powder, APPLY SPARINGLY TO THE AFFECTED AREA TWICE A DAY, Disp: 60 g, Rfl: 3    pantoprazole (PROTONIX) 40 mg tablet, TAKE 1 TABLET BY MOUTH  DAILY, Disp: 90 tablet, Rfl: 3    potassium chloride (K-DUR,KLOR-CON) 20 mEq tablet, TAKE 1 TABLET BY MOUTH  DAILY, Disp: 90 tablet, Rfl: 3    quinapril (ACCUPRIL) 10 mg tablet, Take 10 mg by mouth daily at bedtime, Disp: , Rfl:     simvastatin (ZOCOR) 40 mg tablet, TAKE ONE TABLET BY MOUTH EVERY DAY, Disp: 90 tablet, Rfl: 3    traMADol (ULTRAM) 50 mg tablet, Take 1 tablet (50 mg total) by mouth 3 (three) times a day, Disp: 270 tablet, Rfl: 2    Triamcinolone Acetonide (NASACORT ALLERGY 24HR) 55 MCG/ACT AERO, into each nostril, Disp: , Rfl:     valACYclovir (VALTREX) 1,000 mg tablet, valacyclovir 1 gram tablet  TAKE 2 TABLETS TWICE A DAY, Disp: , Rfl:     Recent Results (from the past 1008 hour(s))   CBC and differential    Collection Time: 07/01/19 11:09 AM   Result Value Ref Range    WBC 8 75 4 31 - 10 16 Thousand/uL    RBC 4 72 3 81 - 5 12 Million/uL    Hemoglobin 12 8 11 5 - 15 4 g/dL    Hematocrit 41 5 34 8 - 46 1 %    MCV 88 82 - 98 fL    MCH 27 1 26 8 - 34 3 pg    MCHC 30 8 (L) 31 4 - 37 4 g/dL    RDW 14 5 11 6 - 15 1 %    MPV 9 0 8 9 - 12 7 fL    Platelets 564 483 - 330 Thousands/uL    nRBC 0 /100 WBCs    Neutrophils Relative 56 43 - 75 %    Immat GRANS % 0 0 - 2 %    Lymphocytes Relative 30 14 - 44 %    Monocytes Relative 9 4 - 12 %    Eosinophils Relative 4 0 - 6 %    Basophils Relative 1 0 - 1 % Neutrophils Absolute 4 93 1 85 - 7 62 Thousands/µL    Immature Grans Absolute 0 03 0 00 - 0 20 Thousand/uL    Lymphocytes Absolute 2 62 0 60 - 4 47 Thousands/µL    Monocytes Absolute 0 81 0 17 - 1 22 Thousand/µL    Eosinophils Absolute 0 31 0 00 - 0 61 Thousand/µL    Basophils Absolute 0 05 0 00 - 0 10 Thousands/µL   Comprehensive metabolic panel    Collection Time: 07/01/19 11:09 AM   Result Value Ref Range    Sodium 140 136 - 145 mmol/L    Potassium 3 8 3 5 - 5 3 mmol/L    Chloride 99 (L) 100 - 108 mmol/L    CO2 32 21 - 32 mmol/L    ANION GAP 9 4 - 13 mmol/L    BUN 13 5 - 25 mg/dL    Creatinine 0 75 0 60 - 1 30 mg/dL    Glucose, Fasting 94 65 - 99 mg/dL    Calcium 9 2 8 3 - 10 1 mg/dL    AST 7 5 - 45 U/L    ALT 15 12 - 78 U/L    Alkaline Phosphatase 90 46 - 116 U/L    Total Protein 8 1 6 4 - 8 2 g/dL    Albumin 3 4 (L) 3 5 - 5 0 g/dL    Total Bilirubin 0 40 0 20 - 1 00 mg/dL    eGFR 85 ml/min/1 73sq m   Lipid panel    Collection Time: 07/01/19 11:09 AM   Result Value Ref Range    Cholesterol 200 50 - 200 mg/dL    Triglycerides 190 (H) <=150 mg/dL    HDL, Direct 47 40 - 60 mg/dL    LDL Calculated 115 (H) 0 - 100 mg/dL    Non-HDL-Chol (CHOL-HDL) 153 mg/dl   Hemoglobin A1C    Collection Time: 07/01/19 11:09 AM   Result Value Ref Range    Hemoglobin A1C 6 2 4 2 - 6 3 %     mg/dl   TSH, 3rd generation with Free T4 reflex    Collection Time: 07/01/19 11:09 AM   Result Value Ref Range    TSH 3RD GENERATON 3 006 0 358 - 3 740 uIU/mL       The following portions of the patient's history were reviewed and updated as appropriate: allergies, current medications, past family history, past medical history, past social history, past surgical history and problem list      Review of Systems   Constitutional: Negative for appetite change, chills, diaphoresis, fatigue, fever and unexpected weight change  HENT: Negative for congestion, hearing loss and rhinorrhea  Eyes: Negative for visual disturbance     Respiratory: Negative for cough, chest tightness, shortness of breath and wheezing  Cardiovascular: Negative for chest pain, palpitations and leg swelling  Gastrointestinal: Negative for abdominal pain and blood in stool  Endocrine: Negative for cold intolerance, heat intolerance, polydipsia and polyuria  Genitourinary: Negative for difficulty urinating, dysuria, frequency and urgency  Musculoskeletal: Positive for arthralgias and myalgias  Skin: Negative for rash  Neurological: Negative for dizziness, weakness, light-headedness and headaches  Hematological: Does not bruise/bleed easily  Psychiatric/Behavioral: Negative for dysphoric mood and sleep disturbance  Objective:      Vitals:    07/10/19 1320   BP: 146/88   Pulse: (!) 110   Resp: 16          Physical Exam   Constitutional: She is oriented to person, place, and time  She appears well-developed and well-nourished  HENT:   Head: Normocephalic and atraumatic  Nose: Nose normal    Mouth/Throat: Oropharynx is clear and moist    Eyes: Pupils are equal, round, and reactive to light  Conjunctivae and EOM are normal  No scleral icterus  Neck: Normal range of motion  Neck supple  No JVD present  No tracheal deviation present  No thyromegaly present  Cardiovascular: Normal rate, regular rhythm and intact distal pulses  Exam reveals no gallop and no friction rub  No murmur heard  Pulmonary/Chest: Effort normal and breath sounds normal  No respiratory distress  She has no wheezes  She has no rales  Abdominal: Soft  Bowel sounds are normal  She exhibits distension  She exhibits no mass  There is no tenderness  There is no rebound and no guarding  Diastasis recti, no obvious hernia   Musculoskeletal: She exhibits no edema, tenderness or deformity  Lymphadenopathy:     She has no cervical adenopathy  Neurological: She is alert and oriented to person, place, and time  No cranial nerve deficit  Skin: Skin is warm and dry  No rash noted   No erythema  No pallor  Psychiatric: She has a normal mood and affect   Her behavior is normal  Judgment and thought content normal

## 2019-07-28 DIAGNOSIS — F41.9 ANXIETY: ICD-10-CM

## 2019-07-29 RX ORDER — ALPRAZOLAM 1 MG/1
TABLET ORAL
Qty: 90 TABLET | Refills: 3 | Status: SHIPPED | OUTPATIENT
Start: 2019-07-29 | End: 2019-10-08 | Stop reason: ALTCHOICE

## 2019-08-09 DIAGNOSIS — F32.A DEPRESSION, UNSPECIFIED DEPRESSION TYPE: ICD-10-CM

## 2019-08-09 RX ORDER — ESCITALOPRAM OXALATE 10 MG/1
TABLET ORAL
Qty: 30 TABLET | Refills: 3 | Status: SHIPPED | OUTPATIENT
Start: 2019-08-09 | End: 2019-10-08 | Stop reason: ALTCHOICE

## 2019-09-09 DIAGNOSIS — E78.5 HYPERLIPIDEMIA, UNSPECIFIED HYPERLIPIDEMIA TYPE: ICD-10-CM

## 2019-09-09 RX ORDER — SIMVASTATIN 40 MG
TABLET ORAL
Qty: 90 TABLET | Refills: 3 | Status: SHIPPED | OUTPATIENT
Start: 2019-09-09 | End: 2020-09-14

## 2019-10-07 ENCOUNTER — TELEPHONE (OUTPATIENT)
Dept: INTERNAL MEDICINE CLINIC | Facility: CLINIC | Age: 64
End: 2019-10-07

## 2019-10-07 NOTE — TELEPHONE ENCOUNTER
CALLED PT  AND IS NOT HAVING FEVER AND HAS CALLED DR HATCH AND WILL WAIT FOR HER CALL BACK AND IF SHE NEEDS US SHE WILL CALL BACK

## 2019-10-07 NOTE — TELEPHONE ENCOUNTER
Patient called says she has been having a reaction to her tamoxifen  Cold sweats, jung palpitations, fatigue, lathargic, shakes, diarrhea  She stated she called Dr Shiela Agee and she advised her to stop the medication right away which was Thursday  Patient said she is still feeling the same  Spoke to [place] she stated it would be better for the patient to contact Dr Shiela Agee  Patient wanted Dr Jessica Garza to know what was going on

## 2019-10-07 NOTE — TELEPHONE ENCOUNTER
I'm in the loop  I would expect her symptoms to improve in a few days, but Dr Corbin Yen would be more knowledgeable in that regard  If she is having fever or signs of infection, I would like to see her

## 2019-10-08 ENCOUNTER — APPOINTMENT (OUTPATIENT)
Dept: LAB | Facility: CLINIC | Age: 64
End: 2019-10-08
Payer: COMMERCIAL

## 2019-10-08 ENCOUNTER — OFFICE VISIT (OUTPATIENT)
Dept: INTERNAL MEDICINE CLINIC | Facility: CLINIC | Age: 64
End: 2019-10-08
Payer: COMMERCIAL

## 2019-10-08 VITALS
BODY MASS INDEX: 44.67 KG/M2 | SYSTOLIC BLOOD PRESSURE: 124 MMHG | DIASTOLIC BLOOD PRESSURE: 80 MMHG | RESPIRATION RATE: 16 BRPM | HEIGHT: 58 IN | HEART RATE: 100 BPM | WEIGHT: 212.8 LBS

## 2019-10-08 DIAGNOSIS — L30.9 DERMATITIS: ICD-10-CM

## 2019-10-08 DIAGNOSIS — M79.10 MYALGIA: ICD-10-CM

## 2019-10-08 DIAGNOSIS — R50.9 FEVER, UNSPECIFIED FEVER CAUSE: Primary | ICD-10-CM

## 2019-10-08 DIAGNOSIS — R50.9 FEVER, UNSPECIFIED FEVER CAUSE: ICD-10-CM

## 2019-10-08 LAB
ALBUMIN SERPL BCP-MCNC: 3.7 G/DL (ref 3.5–5)
ALP SERPL-CCNC: 63 U/L (ref 46–116)
ALT SERPL W P-5'-P-CCNC: 25 U/L (ref 12–78)
ANION GAP SERPL CALCULATED.3IONS-SCNC: 5 MMOL/L (ref 4–13)
AST SERPL W P-5'-P-CCNC: 23 U/L (ref 5–45)
BASOPHILS # BLD AUTO: 0.04 THOUSANDS/ΜL (ref 0–0.1)
BASOPHILS NFR BLD AUTO: 1 % (ref 0–1)
BILIRUB SERPL-MCNC: 0.34 MG/DL (ref 0.2–1)
BILIRUB UR QL STRIP: NEGATIVE
BUN SERPL-MCNC: 15 MG/DL (ref 5–25)
CALCIUM SERPL-MCNC: 9.6 MG/DL (ref 8.3–10.1)
CHLORIDE SERPL-SCNC: 102 MMOL/L (ref 100–108)
CLARITY UR: CLEAR
CO2 SERPL-SCNC: 29 MMOL/L (ref 21–32)
COLOR UR: YELLOW
CREAT SERPL-MCNC: 0.85 MG/DL (ref 0.6–1.3)
CRP SERPL QL: 5.1 MG/L
EOSINOPHIL # BLD AUTO: 0.18 THOUSAND/ΜL (ref 0–0.61)
EOSINOPHIL NFR BLD AUTO: 3 % (ref 0–6)
ERYTHROCYTE [DISTWIDTH] IN BLOOD BY AUTOMATED COUNT: 15.2 % (ref 11.6–15.1)
ERYTHROCYTE [SEDIMENTATION RATE] IN BLOOD: 17 MM/HOUR (ref 0–20)
GFR SERPL CREATININE-BSD FRML MDRD: 73 ML/MIN/1.73SQ M
GLUCOSE SERPL-MCNC: 117 MG/DL (ref 65–140)
GLUCOSE UR STRIP-MCNC: NEGATIVE MG/DL
HCT VFR BLD AUTO: 42.9 % (ref 34.8–46.1)
HGB BLD-MCNC: 13.2 G/DL (ref 11.5–15.4)
HGB UR QL STRIP.AUTO: NEGATIVE
IMM GRANULOCYTES # BLD AUTO: 0.03 THOUSAND/UL (ref 0–0.2)
IMM GRANULOCYTES NFR BLD AUTO: 0 % (ref 0–2)
KETONES UR STRIP-MCNC: NEGATIVE MG/DL
LDH SERPL-CCNC: 161 U/L (ref 81–234)
LEUKOCYTE ESTERASE UR QL STRIP: NEGATIVE
LYMPHOCYTES # BLD AUTO: 1.44 THOUSANDS/ΜL (ref 0.6–4.47)
LYMPHOCYTES NFR BLD AUTO: 21 % (ref 14–44)
MAGNESIUM SERPL-MCNC: 2.2 MG/DL (ref 1.6–2.6)
MCH RBC QN AUTO: 27.7 PG (ref 26.8–34.3)
MCHC RBC AUTO-ENTMCNC: 30.8 G/DL (ref 31.4–37.4)
MCV RBC AUTO: 90 FL (ref 82–98)
MONOCYTES # BLD AUTO: 0.57 THOUSAND/ΜL (ref 0.17–1.22)
MONOCYTES NFR BLD AUTO: 8 % (ref 4–12)
NEUTROPHILS # BLD AUTO: 4.53 THOUSANDS/ΜL (ref 1.85–7.62)
NEUTS SEG NFR BLD AUTO: 67 % (ref 43–75)
NITRITE UR QL STRIP: NEGATIVE
NRBC BLD AUTO-RTO: 0 /100 WBCS
PH UR STRIP.AUTO: 6 [PH]
PLATELET # BLD AUTO: 258 THOUSANDS/UL (ref 149–390)
PMV BLD AUTO: 9.3 FL (ref 8.9–12.7)
POTASSIUM SERPL-SCNC: 4.1 MMOL/L (ref 3.5–5.3)
PROT SERPL-MCNC: 8.1 G/DL (ref 6.4–8.2)
PROT UR STRIP-MCNC: NEGATIVE MG/DL
RBC # BLD AUTO: 4.76 MILLION/UL (ref 3.81–5.12)
SODIUM SERPL-SCNC: 136 MMOL/L (ref 136–145)
SP GR UR STRIP.AUTO: 1.02 (ref 1–1.03)
T4 FREE SERPL-MCNC: 0.85 NG/DL (ref 0.76–1.46)
TSH SERPL DL<=0.05 MIU/L-ACNC: 5.07 UIU/ML (ref 0.36–3.74)
UROBILINOGEN UR QL STRIP.AUTO: 0.2 E.U./DL
WBC # BLD AUTO: 6.79 THOUSAND/UL (ref 4.31–10.16)

## 2019-10-08 PROCEDURE — 84439 ASSAY OF FREE THYROXINE: CPT

## 2019-10-08 PROCEDURE — 86140 C-REACTIVE PROTEIN: CPT

## 2019-10-08 PROCEDURE — 86618 LYME DISEASE ANTIBODY: CPT

## 2019-10-08 PROCEDURE — 83615 LACTATE (LD) (LDH) ENZYME: CPT

## 2019-10-08 PROCEDURE — 36415 COLL VENOUS BLD VENIPUNCTURE: CPT

## 2019-10-08 PROCEDURE — 84443 ASSAY THYROID STIM HORMONE: CPT

## 2019-10-08 PROCEDURE — 85652 RBC SED RATE AUTOMATED: CPT

## 2019-10-08 PROCEDURE — 87086 URINE CULTURE/COLONY COUNT: CPT

## 2019-10-08 PROCEDURE — 83735 ASSAY OF MAGNESIUM: CPT

## 2019-10-08 PROCEDURE — 99213 OFFICE O/P EST LOW 20 MIN: CPT | Performed by: NURSE PRACTITIONER

## 2019-10-08 PROCEDURE — 81003 URINALYSIS AUTO W/O SCOPE: CPT | Performed by: NURSE PRACTITIONER

## 2019-10-08 PROCEDURE — 85025 COMPLETE CBC W/AUTO DIFF WBC: CPT

## 2019-10-08 PROCEDURE — 3008F BODY MASS INDEX DOCD: CPT | Performed by: NURSE PRACTITIONER

## 2019-10-08 PROCEDURE — 80053 COMPREHEN METABOLIC PANEL: CPT

## 2019-10-08 RX ORDER — CLOTRIMAZOLE AND BETAMETHASONE DIPROPIONATE 10; .64 MG/G; MG/G
CREAM TOPICAL 2 TIMES DAILY
Qty: 45 G | Refills: 3 | Status: SHIPPED | OUTPATIENT
Start: 2019-10-08 | End: 2020-07-16

## 2019-10-08 NOTE — PATIENT INSTRUCTIONS
History of DCIS recently placed on tamoxifen  About a month after starting medication she experience most full side effects or what she thinks are side effects including profound sweating fatigue body aches and pain diarrhea  She has been off medication for approximately a week and with no improvement in symptoms  She did discuss with Hematology who recommend continue off medication she is scheduled to see them again approximately 1 week  At this time it is unclear of all of her symptoms are secondary to side effects of tamoxifen versus maybe a viral syndrome  Check labs as noted we will follow results by phone, I recommend holding her quinapril and hydrochlorothiazide in the setting of diarrhea until labs are completed    Increase hydration rest Tylenol as needed

## 2019-10-08 NOTE — PROGRESS NOTES
Assessment/Plan:    Patient Instructions     History of DCIS recently placed on tamoxifen  About a month after starting medication she experience most full side effects or what she thinks are side effects including profound sweating fatigue body aches and pain diarrhea  She has been off medication for approximately a week and with no improvement in symptoms  She did discuss with Hematology who recommend continue off medication she is scheduled to see them again approximately 1 week  At this time it is unclear of all of her symptoms are secondary to side effects of tamoxifen versus maybe a viral syndrome  Check labs as noted we will follow results by phone, I recommend holding her quinapril and hydrochlorothiazide in the setting of diarrhea until labs are completed  Increase hydration rest Tylenol as needed         Diagnoses and all orders for this visit:    Fever, unspecified fever cause  -     CBC and differential; Future  -     Comprehensive metabolic panel; Future  -     Sedimentation rate, automated; Future  -     TSH, 3rd generation with Free T4 reflex; Future  -     Urinalysis with reflex to microscopic  -     Urine culture; Future  -     C-reactive protein; Future  -     LD,Blood; Future  -     Lyme Antibody Profile with reflex to WB; Future    Dermatitis  -     clotrimazole-betamethasone (LOTRISONE) 1-0 05 % cream; Apply topically 2 (two) times a day    Myalgia  -     Magnesium; Future  -     LD,Blood; Future  -     Lyme Antibody Profile with reflex to WB; Future         Subjective:      Patient ID: Luis Hare is a 59 y o  female     Patient has not been feeling well for about 2-3 weeks  She states that about a month ago she was placed on some  tamoxifen and did not notice any issues then gradually she started to notice generally feeling achy, fatigued then diarrhea now she is breaking out in sweats has hot flashes feels cold chills  No fevers    She stopped medication on Thursday symptoms are not improving does have history it to history of IBS and diarrhea but she has not had a flare in a while  No other change in medication  No bug bites tick bites or rashes          Current Outpatient Medications:     albuterol (PROAIR HFA) 90 mcg/act inhaler, Inhale 2 puffs every 6 (six) hours as needed for wheezing, Disp: , Rfl:     ALPRAZolam (XANAX) 1 mg tablet, Take 1 tablet (1 mg total) by mouth 3 (three) times a day as needed for anxiety, Disp: 90 tablet, Rfl: 3    aspirin 81 MG tablet, Take 81 mg by mouth daily, Disp: , Rfl:     cholestyramine (QUESTRAN) 4 g packet, Take by mouth, Disp: , Rfl:     clotrimazole (LOTRIMIN) 1 % cream, as needed , Disp: , Rfl:     clotrimazole-betamethasone (LOTRISONE) 1-0 05 % cream, Apply topically 2 (two) times a day, Disp: 45 g, Rfl: 3    DULoxetine (CYMBALTA) 30 mg delayed release capsule, Take 1 capsule (30 mg total) by mouth daily, Disp: 90 capsule, Rfl: 1    DULoxetine (CYMBALTA) 60 mg delayed release capsule, Take 1 capsule (60 mg total) by mouth daily, Disp: 90 capsule, Rfl: 1    erythromycin (ILOTYCIN) ophthalmic ointment, APPLY THREE TIMES A DAY TO WOUND, Disp: , Rfl: 3    famotidine (PEPCID) 10 mg tablet, Take 1 tablet by mouth, Disp: , Rfl:     hydrochlorothiazide (HYDRODIURIL) 25 mg tablet, TAKE 1 TABLET BY MOUTH  DAILY, Disp: 90 tablet, Rfl: 3    levothyroxine 88 mcg tablet, Take 1 tablet (88 mcg total) by mouth daily, Disp: 90 tablet, Rfl: 3    Mometasone Furo-Formoterol Fum (DULERA) 100-5 MCG/ACT AERO, Inhale, Disp: , Rfl:     Multiple Vitamins-Minerals (OCUVITE ADULT 50+ PO), Take by mouth, Disp: , Rfl:     NYSTATIN powder, APPLY SPARINGLY TO THE AFFECTED AREA TWICE A DAY, Disp: 60 g, Rfl: 3    pantoprazole (PROTONIX) 40 mg tablet, TAKE 1 TABLET BY MOUTH  DAILY, Disp: 90 tablet, Rfl: 3    potassium chloride (K-DUR,KLOR-CON) 20 mEq tablet, TAKE 1 TABLET BY MOUTH  DAILY, Disp: 90 tablet, Rfl: 3    quinapril (ACCUPRIL) 10 mg tablet, Take 10 mg by mouth daily at bedtime, Disp: , Rfl:     simvastatin (ZOCOR) 40 mg tablet, TAKE ONE TABLET BY MOUTH EVERY DAY, Disp: 90 tablet, Rfl: 3    traMADol (ULTRAM) 50 mg tablet, Take 1 tablet (50 mg total) by mouth 3 (three) times a day, Disp: 270 tablet, Rfl: 2    Triamcinolone Acetonide (NASACORT ALLERGY 24HR) 55 MCG/ACT AERO, into each nostril, Disp: , Rfl:     valACYclovir (VALTREX) 1,000 mg tablet, valacyclovir 1 gram tablet  TAKE 2 TABLETS TWICE A DAY, Disp: , Rfl:     No results found for this or any previous visit (from the past 1008 hour(s))  The following portions of the patient's history were reviewed and updated as appropriate: allergies, current medications, past family history, past medical history, past social history, past surgical history and problem list      Review of Systems   Constitutional: Positive for diaphoresis and fatigue  Negative for appetite change, chills, fever and unexpected weight change  HENT: Positive for postnasal drip  Negative for sneezing  Eyes: Negative for visual disturbance  Respiratory: Negative for chest tightness and shortness of breath  Cardiovascular: Negative for chest pain, palpitations and leg swelling  Gastrointestinal: Positive for diarrhea  Negative for abdominal pain and blood in stool  Endocrine: Negative for cold intolerance, heat intolerance, polydipsia, polyphagia and polyuria  Genitourinary: Positive for frequency  Negative for difficulty urinating, dysuria and urgency  Musculoskeletal: Positive for arthralgias, back pain and myalgias  Skin: Negative for rash and wound  Neurological: Positive for dizziness, weakness and light-headedness  Negative for headaches  Hematological: Negative for adenopathy  Psychiatric/Behavioral: Negative for confusion, dysphoric mood and sleep disturbance  The patient is not nervous/anxious            Objective:      /80 (BP Location: Right arm, Patient Position: Sitting, Cuff Size: Standard) Pulse 100   Resp 16   Ht 4' 10" (1 473 m)   Wt 96 5 kg (212 lb 12 8 oz)   BMI 44 48 kg/m²        Physical Exam   Constitutional: She is oriented to person, place, and time  She appears well-developed  No distress  HENT:   Head: Normocephalic and atraumatic  Nose: Nose normal    Mouth/Throat: Oropharynx is clear and moist    Eyes: Pupils are equal, round, and reactive to light  Conjunctivae and EOM are normal    Neck: Normal range of motion  Neck supple  No JVD present  No tracheal deviation present  No thyromegaly present  Cardiovascular: Normal rate, regular rhythm, normal heart sounds and intact distal pulses  Exam reveals no gallop and no friction rub  No murmur heard  Pulmonary/Chest: Effort normal and breath sounds normal  No respiratory distress  She has no wheezes  She has no rales  Abdominal: Soft  Bowel sounds are normal  She exhibits no distension  There is no tenderness  Musculoskeletal: Normal range of motion  She exhibits no edema  Lymphadenopathy:     She has no cervical adenopathy  Neurological: She is alert and oriented to person, place, and time  No cranial nerve deficit  Skin: Skin is warm  No rash noted  She is diaphoretic  Psychiatric: She has a normal mood and affect  Her behavior is normal  Judgment and thought content normal    BMI Counseling: Body mass index is 44 48 kg/m²  The BMI is above normal  Nutrition recommendations include decreasing portion sizes, consuming healthier snacks and limiting drinks that contain sugar  Exercise recommendations include exercising 3-5 times per week  No pharmacotherapy was ordered

## 2019-10-09 ENCOUNTER — TELEPHONE (OUTPATIENT)
Dept: INTERNAL MEDICINE CLINIC | Facility: CLINIC | Age: 64
End: 2019-10-09

## 2019-10-09 LAB
B BURGDOR IGG+IGM SER-ACNC: <0.91 ISR (ref 0–0.9)
BACTERIA UR CULT: NORMAL

## 2019-10-09 NOTE — TELEPHONE ENCOUNTER
Yes we are aware and delaney already address her concerns in the message  We don't see any evidence of infection- it may be viral and if that is the case we just try the sx ie fluids, tylenol, rest, BRAT diet

## 2019-10-09 NOTE — TELEPHONE ENCOUNTER
Spoke w/pt, Dr Virginia Deluna message relayed in full  Pt stated she saw Jaye Aranda yesterday, but now feels worse today than yesterday  C/o:  Shakes, chills, fever, hot flashes, sweating, weakness, tingling of feet, no appetite, weakness; intermittent diarrhea x 3 weeks, dry cough and sore throat  Pt stated she called Dr Ken Kaufman (oncology-pt has stage 1 breast CA)) last week, who said if may be the flu or reaction to tamoxifen  Dr Ken Kaufman instructed pt to d/c tamoxifen 10/3, which she did, but is still getting progressively worse    Lab results in chart  Please advise

## 2019-10-09 NOTE — TELEPHONE ENCOUNTER
Notify -labs are fine, no evidence of significant infection  She may be coming down with a bit of a cold for which no prescription is necessary, can use over-the-counter cold remedies    She can start back on her antihypertensives as long as blood pressure is not less than 787 systolic

## 2019-10-09 NOTE — TELEPHONE ENCOUNTER
Patient called- she was in to see [facility] yesterday  She said she has all her symptoms she told Becki about yesterday but also, overnight her throat began to burn badly, she's now coughing and her fever spiked to 100 4  She said no meds were prescribed yesterday- [facility] wanted to see how her labs came back  Patient stated she had them done yesterday  [facility] ph# [phone]    Ph[facility]acy: [facility] in [address] [place] [address] meds are prescribed

## 2019-10-09 NOTE — TELEPHONE ENCOUNTER
CALLED PT   AND WAS NOTIFIED AND ALSO STATED THE TORN MUSCLE IN STOMACH IS BOTHERING HER , HAS BEEN IN BED ALL DAY TODAY

## 2019-10-10 NOTE — TELEPHONE ENCOUNTER
Spoke to pt now w/ uri, nasal congestion, cough, st, fevers and chills   Increaeing fluids, resting- will call tomorrow w/ how she is doing

## 2019-10-11 ENCOUNTER — HOSPITAL ENCOUNTER (OUTPATIENT)
Dept: CT IMAGING | Facility: HOSPITAL | Age: 64
Discharge: HOME | End: 2019-10-11
Payer: COMMERCIAL

## 2019-10-11 ENCOUNTER — TELEPHONE (OUTPATIENT)
Dept: INTERNAL MEDICINE CLINIC | Facility: CLINIC | Age: 64
End: 2019-10-11

## 2019-10-11 ENCOUNTER — HOSPITAL ENCOUNTER (OUTPATIENT)
Dept: RADIOLOGY | Facility: HOSPITAL | Age: 64
Discharge: HOME | End: 2019-10-11
Payer: COMMERCIAL

## 2019-10-11 DIAGNOSIS — R50.9 FEVER, UNSPECIFIED FEVER CAUSE: ICD-10-CM

## 2019-10-11 DIAGNOSIS — R10.9 ABDOMINAL PAIN, UNSPECIFIED ABDOMINAL LOCATION: ICD-10-CM

## 2019-10-11 DIAGNOSIS — R05.9 COUGH: Primary | ICD-10-CM

## 2019-10-11 DIAGNOSIS — R05.9 COUGH: ICD-10-CM

## 2019-10-11 PROCEDURE — 74176 CT ABD & PELVIS W/O CONTRAST: CPT

## 2019-10-11 PROCEDURE — 71046 X-RAY EXAM CHEST 2 VIEWS: CPT

## 2019-10-11 NOTE — TELEPHONE ENCOUNTER
Does she think she needs to go to ER- I only ask b/c maybe she would feel better w/ some iv fluids and then they could do a cxr and CT abdomen  Orin Gupta

## 2019-10-11 NOTE — TELEPHONE ENCOUNTER
----- Message from Sandra Pablo, [address]  sent at 10/11/2019  4:24 PM EDT -----  Her ct and cxr are normal  So it all must be viral  Continue with rest, tylenol and hydration

## 2019-10-11 NOTE — TELEPHONE ENCOUNTER
No pre-cert for STAT x-ray ordered today by [employer]  As long as it's done out pt     3:08PM [facility] directly  Call ref#: [id number]  10/11  [place]  [address]  Not through [facility] w/[place]

## 2019-10-11 NOTE — TELEPHONE ENCOUNTER
Stalin Rowe from [place] [address] [facility] called to let Porsha Lara know that the STAT x-ray results are in Epic and the STAT CT Abd/Pelvis is also in Epic

## 2019-10-11 NOTE — TELEPHONE ENCOUNTER
Called pt   And was notified and does not want to go to er will go for cxr and ct scan asking for you to put these orders in and she will go for these later this afternoon

## 2019-10-11 NOTE — TELEPHONE ENCOUNTER
PATIENT CALLED AND SAID HER COUGH IS VERY CROUPY, STILL HAS STOMACH PAIN, HOT FLASHES, NUMBNESS IN FEET, LOW GRADE FEVER   STILL HAS THE PAIN IN THE MUSCLE IN HER BACK    HER #   [phone]

## 2019-10-12 DIAGNOSIS — B37.9 YEAST INFECTION: ICD-10-CM

## 2019-10-14 RX ORDER — NYSTATIN 100000 [USP'U]/G
POWDER TOPICAL
Qty: 60 G | Refills: 3 | Status: SHIPPED | OUTPATIENT
Start: 2019-10-14 | End: 2020-02-20

## 2019-10-15 ENCOUNTER — TELEPHONE (OUTPATIENT)
Dept: INTERNAL MEDICINE CLINIC | Facility: CLINIC | Age: 64
End: 2019-10-15

## 2019-10-15 DIAGNOSIS — J45.909 UNCOMPLICATED ASTHMA, UNSPECIFIED ASTHMA SEVERITY, UNSPECIFIED WHETHER PERSISTENT: Primary | ICD-10-CM

## 2019-10-15 RX ORDER — PREDNISONE 10 MG/1
TABLET ORAL
Qty: 18 TABLET | Refills: 0 | Status: SHIPPED | OUTPATIENT
Start: 2019-10-15 | End: 2020-01-14

## 2019-10-15 NOTE — TELEPHONE ENCOUNTER
Pt was in last week  Still not feeling better, she has taken albuterol, OTC musinex, inhaler etc  Is there something else  She is still very sick, bad respirotory deep cough, didn't sleep, ribs hurt    Call [phone]

## 2019-10-16 DIAGNOSIS — F32.A ANXIETY AND DEPRESSION: ICD-10-CM

## 2019-10-16 DIAGNOSIS — F41.9 ANXIETY AND DEPRESSION: ICD-10-CM

## 2019-10-16 RX ORDER — DULOXETIN HYDROCHLORIDE 60 MG/1
60 CAPSULE, DELAYED RELEASE ORAL DAILY
Qty: 90 CAPSULE | Refills: 1 | Status: SHIPPED | OUTPATIENT
Start: 2019-10-16 | End: 2020-04-10

## 2019-10-17 ENCOUNTER — TELEPHONE (OUTPATIENT)
Dept: INTERNAL MEDICINE CLINIC | Facility: CLINIC | Age: 64
End: 2019-10-17

## 2019-10-17 DIAGNOSIS — R05.9 COUGH: Primary | ICD-10-CM

## 2019-10-17 RX ORDER — AZITHROMYCIN 250 MG/1
TABLET, FILM COATED ORAL
Qty: 6 TABLET | Refills: 0 | Status: SHIPPED | OUTPATIENT
Start: 2019-10-17 | End: 2019-10-22

## 2019-10-17 NOTE — TELEPHONE ENCOUNTER
Since she start the prednisone everything is loosening up a lot of coughing, yellow mucus  Still very tired, temp is   She was told to call John Kat today 48 hours     Pt # [phone]

## 2019-11-27 DIAGNOSIS — I10 ESSENTIAL HYPERTENSION: ICD-10-CM

## 2019-11-28 RX ORDER — QUINAPRIL 20 MG/1
TABLET ORAL
Qty: 90 TABLET | Refills: 3 | Status: SHIPPED | OUTPATIENT
Start: 2019-11-28 | End: 2020-01-14

## 2019-11-28 RX ORDER — HYDROCHLOROTHIAZIDE 25 MG/1
TABLET ORAL
Qty: 90 TABLET | Refills: 3 | Status: SHIPPED | OUTPATIENT
Start: 2019-11-28 | End: 2020-08-22 | Stop reason: HOSPADM

## 2019-12-06 ENCOUNTER — TELEPHONE (OUTPATIENT)
Dept: INTERNAL MEDICINE CLINIC | Facility: CLINIC | Age: 64
End: 2019-12-06

## 2019-12-06 DIAGNOSIS — G61.9 INFLAMMATORY NEUROPATHY (HCC): Primary | ICD-10-CM

## 2019-12-06 NOTE — TELEPHONE ENCOUNTER
Pt has been dealing with neuropathy in her right leg, pain now getting worse   Wants a referral to see a neurologist      Mentioned she does not want to see Dr Katia Ortiz     [phone]

## 2019-12-22 DIAGNOSIS — F41.9 ANXIETY: ICD-10-CM

## 2019-12-23 RX ORDER — ALPRAZOLAM 1 MG/1
TABLET ORAL
Qty: 90 TABLET | Refills: 3 | Status: SHIPPED | OUTPATIENT
Start: 2019-12-23 | End: 2020-01-14

## 2020-01-06 ENCOUNTER — TELEPHONE (OUTPATIENT)
Dept: NEUROLOGY | Facility: CLINIC | Age: 65
End: 2020-01-06

## 2020-01-06 NOTE — TELEPHONE ENCOUNTER
Best contact number for [facility]MIOGKN:[phone]    Emergency Contact name and number:    Referring provider:James Proctor    Referring provider Telephone:________________    Primary Care Provider Name: Swapnil Kay    Reason for Appointment/Dx: Inflammatory neuropathy    Neurology Location patient would like to be seen:    Order received? Yes                                                 Records Received? No    Have you ever seen another Neurologist? No    Insurance [facility]    ID/Policy #:    Secondary Insurance:    ID/Policy#: Workman's Comp/ Accident/ School  Information      Workman's Comp/Accident/School related?  No    If yes name of Insurance company:    Date of Injury:    Open Claim:N     Name and Telephone Number:    Notes:Deepthi Mars/lashawn pt pack sent                    Appointment date:03/20/20 12:00pm _____________________________

## 2020-01-07 ENCOUNTER — APPOINTMENT (OUTPATIENT)
Dept: LAB | Facility: HOSPITAL | Age: 65
End: 2020-01-07
Attending: INTERNAL MEDICINE
Payer: COMMERCIAL

## 2020-01-07 DIAGNOSIS — I10 ESSENTIAL HYPERTENSION: Chronic | ICD-10-CM

## 2020-01-07 DIAGNOSIS — E78.2 MIXED HYPERLIPIDEMIA: ICD-10-CM

## 2020-01-07 DIAGNOSIS — R73.01 IMPAIRED FASTING GLUCOSE: ICD-10-CM

## 2020-01-07 LAB
ALBUMIN SERPL BCP-MCNC: 3.5 G/DL (ref 3.5–5)
ALP SERPL-CCNC: 79 U/L (ref 46–116)
ALT SERPL W P-5'-P-CCNC: 19 U/L (ref 12–78)
ANION GAP SERPL CALCULATED.3IONS-SCNC: 9 MMOL/L (ref 4–13)
AST SERPL W P-5'-P-CCNC: 17 U/L (ref 5–45)
BASOPHILS # BLD AUTO: 0.03 THOUSANDS/ΜL (ref 0–0.1)
BASOPHILS NFR BLD AUTO: 0 % (ref 0–1)
BILIRUB SERPL-MCNC: 0.4 MG/DL (ref 0.2–1)
BUN SERPL-MCNC: 15 MG/DL (ref 5–25)
CALCIUM SERPL-MCNC: 8.9 MG/DL (ref 8.3–10.1)
CHLORIDE SERPL-SCNC: 100 MMOL/L (ref 100–108)
CHOLEST SERPL-MCNC: 192 MG/DL (ref 50–200)
CO2 SERPL-SCNC: 30 MMOL/L (ref 21–32)
CREAT SERPL-MCNC: 0.73 MG/DL (ref 0.6–1.3)
EOSINOPHIL # BLD AUTO: 0.22 THOUSAND/ΜL (ref 0–0.61)
EOSINOPHIL NFR BLD AUTO: 3 % (ref 0–6)
ERYTHROCYTE [DISTWIDTH] IN BLOOD BY AUTOMATED COUNT: 13.9 % (ref 11.6–15.1)
EST. AVERAGE GLUCOSE BLD GHB EST-MCNC: 111 MG/DL
GFR SERPL CREATININE-BSD FRML MDRD: 87 ML/MIN/1.73SQ M
GLUCOSE P FAST SERPL-MCNC: 98 MG/DL (ref 65–99)
HBA1C MFR BLD: 5.5 % (ref 4.2–6.3)
HCT VFR BLD AUTO: 41.8 % (ref 34.8–46.1)
HDLC SERPL-MCNC: 51 MG/DL
HGB BLD-MCNC: 13 G/DL (ref 11.5–15.4)
IMM GRANULOCYTES # BLD AUTO: 0.03 THOUSAND/UL (ref 0–0.2)
IMM GRANULOCYTES NFR BLD AUTO: 0 % (ref 0–2)
LDLC SERPL CALC-MCNC: 119 MG/DL (ref 0–100)
LYMPHOCYTES # BLD AUTO: 2.17 THOUSANDS/ΜL (ref 0.6–4.47)
LYMPHOCYTES NFR BLD AUTO: 30 % (ref 14–44)
MCH RBC QN AUTO: 28 PG (ref 26.8–34.3)
MCHC RBC AUTO-ENTMCNC: 31.1 G/DL (ref 31.4–37.4)
MCV RBC AUTO: 90 FL (ref 82–98)
MONOCYTES # BLD AUTO: 0.63 THOUSAND/ΜL (ref 0.17–1.22)
MONOCYTES NFR BLD AUTO: 9 % (ref 4–12)
NEUTROPHILS # BLD AUTO: 4.17 THOUSANDS/ΜL (ref 1.85–7.62)
NEUTS SEG NFR BLD AUTO: 58 % (ref 43–75)
NONHDLC SERPL-MCNC: 141 MG/DL
NRBC BLD AUTO-RTO: 0 /100 WBCS
PLATELET # BLD AUTO: 243 THOUSANDS/UL (ref 149–390)
PMV BLD AUTO: 8.7 FL (ref 8.9–12.7)
POTASSIUM SERPL-SCNC: 3.8 MMOL/L (ref 3.5–5.3)
PROT SERPL-MCNC: 7.9 G/DL (ref 6.4–8.2)
RBC # BLD AUTO: 4.65 MILLION/UL (ref 3.81–5.12)
SODIUM SERPL-SCNC: 139 MMOL/L (ref 136–145)
TRIGL SERPL-MCNC: 109 MG/DL
TSH SERPL DL<=0.05 MIU/L-ACNC: 3.34 UIU/ML (ref 0.36–3.74)
WBC # BLD AUTO: 7.25 THOUSAND/UL (ref 4.31–10.16)

## 2020-01-07 PROCEDURE — 83036 HEMOGLOBIN GLYCOSYLATED A1C: CPT

## 2020-01-07 PROCEDURE — 80053 COMPREHEN METABOLIC PANEL: CPT

## 2020-01-07 PROCEDURE — 85025 COMPLETE CBC W/AUTO DIFF WBC: CPT

## 2020-01-07 PROCEDURE — 84443 ASSAY THYROID STIM HORMONE: CPT

## 2020-01-07 PROCEDURE — 80061 LIPID PANEL: CPT

## 2020-01-07 PROCEDURE — 36415 COLL VENOUS BLD VENIPUNCTURE: CPT

## 2020-01-14 ENCOUNTER — OFFICE VISIT (OUTPATIENT)
Dept: INTERNAL MEDICINE CLINIC | Facility: CLINIC | Age: 65
End: 2020-01-14
Payer: COMMERCIAL

## 2020-01-14 VITALS
DIASTOLIC BLOOD PRESSURE: 74 MMHG | HEART RATE: 100 BPM | RESPIRATION RATE: 14 BRPM | BODY MASS INDEX: 44.04 KG/M2 | SYSTOLIC BLOOD PRESSURE: 110 MMHG | HEIGHT: 58 IN | WEIGHT: 209.8 LBS

## 2020-01-14 DIAGNOSIS — E78.2 MIXED HYPERLIPIDEMIA: ICD-10-CM

## 2020-01-14 DIAGNOSIS — I10 ESSENTIAL HYPERTENSION: Chronic | ICD-10-CM

## 2020-01-14 DIAGNOSIS — J06.9 VIRAL URI: ICD-10-CM

## 2020-01-14 DIAGNOSIS — F41.9 ANXIETY: ICD-10-CM

## 2020-01-14 DIAGNOSIS — D05.12 DUCTAL CARCINOMA IN SITU (DCIS) OF LEFT BREAST: ICD-10-CM

## 2020-01-14 DIAGNOSIS — G61.9 INFLAMMATORY NEUROPATHY (HCC): ICD-10-CM

## 2020-01-14 DIAGNOSIS — Z11.4 SCREENING FOR HIV (HUMAN IMMUNODEFICIENCY VIRUS): ICD-10-CM

## 2020-01-14 DIAGNOSIS — J30.1 SEASONAL ALLERGIC RHINITIS DUE TO POLLEN: ICD-10-CM

## 2020-01-14 DIAGNOSIS — E03.9 ACQUIRED HYPOTHYROIDISM: Primary | ICD-10-CM

## 2020-01-14 DIAGNOSIS — R73.01 IMPAIRED FASTING GLUCOSE: ICD-10-CM

## 2020-01-14 DIAGNOSIS — E66.01 MORBID OBESITY (HCC): ICD-10-CM

## 2020-01-14 PROCEDURE — 3074F SYST BP LT 130 MM HG: CPT | Performed by: INTERNAL MEDICINE

## 2020-01-14 PROCEDURE — 3078F DIAST BP <80 MM HG: CPT | Performed by: INTERNAL MEDICINE

## 2020-01-14 PROCEDURE — 4040F PNEUMOC VAC/ADMIN/RCVD: CPT | Performed by: INTERNAL MEDICINE

## 2020-01-14 PROCEDURE — 99214 OFFICE O/P EST MOD 30 MIN: CPT | Performed by: INTERNAL MEDICINE

## 2020-01-14 PROCEDURE — 3008F BODY MASS INDEX DOCD: CPT | Performed by: INTERNAL MEDICINE

## 2020-01-14 PROCEDURE — 1036F TOBACCO NON-USER: CPT | Performed by: INTERNAL MEDICINE

## 2020-01-14 NOTE — PATIENT INSTRUCTIONS
Lab data reviewed in detail and compared prior    Impaired fasting glucose-dramatic improvement in A1c, continue with low carb diet and weight loss efforts    Hypertension and hyperlipidemia stable on present regimen    Anxiety stable with Cymbalta and alprazolam    Lumbar radiculopathy and neuropathy-continue on Cymbalta, seeing neurology in March    DCIS status post lumpectomy and XRT following with Oncology with negative mammogram on January 7th    Viral URI-exam unremarkable, continue nasal spray and antihistamine as needed      GERD stable with pantoprazole    Hypothyroidism at goal on levothyroxine    Health maintenance-HIV screening discussed and accepted    Routine follow-up after labs in 6 months, sooner as needed

## 2020-01-14 NOTE — PROGRESS NOTES
Assessment/Plan:    Diagnoses and all orders for this visit:    Acquired hypothyroidism  -     TSH, 3rd generation with Free T4 reflex; Future    Impaired fasting glucose  -     Hemoglobin A1C; Future    Seasonal allergic rhinitis due to pollen    Essential hypertension  -     CBC and differential; Future  -     Comprehensive metabolic panel; Future    Inflammatory neuropathy (HCC)    Anxiety    Mixed hyperlipidemia  -     Lipid panel; Future    Morbid obesity ([facility] [address] )    Ductal carcinoma in situ (DCIS) of left breast    Screening for HIV (human immunodeficiency virus)  -     HIV 1/2 AG-AB combo; Future    Viral URI         Patient Instructions   Lab data reviewed in detail and compared prior    Impaired fasting glucose-dramatic improvement in A1c, continue with low carb diet and weight loss efforts    Hypertension and hyperlipidemia stable on present regimen    Anxiety stable with Cymbalta and alprazolam    Lumbar radiculopathy and neuropathy-continue on Cymbalta, seeing neurology in March    DCIS status post lumpectomy and XRT following with Oncology with negative mammogram on January 7th    Viral URI-exam unremarkable, continue nasal spray and antihistamine as needed  GERD stable with pantoprazole    Hypothyroidism at goal on levothyroxine    Health maintenance-HIV screening discussed and accepted    Routine follow-up after labs in 6 months, sooner as needed      Subjective:      Patient ID: Preethi Purdy is a 59 y o  female    Here to f/u MMP and review labs  Gained wt after dx breast ca d/t stress eating, but was able to lose it  Notes cold sx x 1 wk, 101 fever 4 days ago, using nasocort, benadryl or xyzal,     DCIS- had surgery w/ Dr Callie Waller, completed XRT  Following w/ Dr Argentina Carmona for medical oncology  Stopped Tamoxifen d/t side effects, no rx  Neg mammo last week  HTN-taking rx as directed, home bp's have been stable    HPL-Tolerating rx  GERD-stable on rx   Taking pantoprazole am, pepcid pm       Hypothyroid-taking rx as directed  Asthma/allergy-following w/ Dr Latoya Campbell, taking rx as directed  Chronic pain in feet r/t arthritis  Chronic lbp w/ R radiculopathy + PN getting worse, seeing neuro in March  Following w/ orhto w/ post tka pain in right tibia  Obesity-decided against bariatric surgery, wt has been stable after recent loss  Following Atkins plan  Wasn't able to get Shingrix yet  BMI Counseling: Body mass index is 43 85 kg/m²  The BMI is above normal  Nutrition recommendations include decreasing portion sizes  Exercise recommendations include exercising 3-5 times per week             Current Outpatient Medications:     albuterol (PROAIR HFA) 90 mcg/act inhaler, Inhale 2 puffs every 6 (six) hours as needed for wheezing, Disp: , Rfl:     ALPRAZolam (XANAX) 1 mg tablet, Take 1 tablet (1 mg total) by mouth 3 (three) times a day as needed for anxiety, Disp: 90 tablet, Rfl: 3    aspirin 81 MG tablet, Take 81 mg by mouth daily, Disp: , Rfl:     cholestyramine (QUESTRAN) 4 g packet, Take by mouth, Disp: , Rfl:     clotrimazole (LOTRIMIN) 1 % cream, as needed , Disp: , Rfl:     clotrimazole-betamethasone (LOTRISONE) 1-0 05 % cream, Apply topically 2 (two) times a day, Disp: 45 g, Rfl: 3    DULoxetine (CYMBALTA) 30 mg delayed release capsule, Take 1 capsule (30 mg total) by mouth daily, Disp: 90 capsule, Rfl: 1    DULoxetine (CYMBALTA) 60 mg delayed release capsule, Take 1 capsule (60 mg total) by mouth daily, Disp: 90 capsule, Rfl: 1    famotidine (PEPCID) 10 mg tablet, Take 1 tablet by mouth, Disp: , Rfl:     hydrochlorothiazide (HYDRODIURIL) 25 mg tablet, TAKE 1 TABLET BY MOUTH  DAILY, Disp: 90 tablet, Rfl: 3    levothyroxine 88 mcg tablet, Take 1 tablet (88 mcg total) by mouth daily, Disp: 90 tablet, Rfl: 3    Mometasone Furo-Formoterol Fum (DULERA) 100-5 MCG/ACT AERO, Inhale, Disp: , Rfl:     Multiple Vitamins-Minerals (OCUVITE ADULT 50+ PO), Take by mouth, Disp: , Rfl:     NYSTATIN powder, APPLY SPARINGLY TO THE AFFECTED AREA TWICE A DAY, Disp: 60 g, Rfl: 3    pantoprazole (PROTONIX) 40 mg tablet, TAKE 1 TABLET BY MOUTH  DAILY, Disp: 90 tablet, Rfl: 3    potassium chloride (K-DUR,KLOR-CON) 20 mEq tablet, TAKE 1 TABLET BY MOUTH  DAILY, Disp: 90 tablet, Rfl: 3    quinapril (ACCUPRIL) 10 mg tablet, Take 10 mg by mouth daily at bedtime, Disp: , Rfl:     simvastatin (ZOCOR) 40 mg tablet, TAKE ONE TABLET BY MOUTH EVERY DAY, Disp: 90 tablet, Rfl: 3    traMADol (ULTRAM) 50 mg tablet, Take 1 tablet (50 mg total) by mouth 3 (three) times a day, Disp: 270 tablet, Rfl: 2    Triamcinolone Acetonide (NASACORT ALLERGY 24HR) 55 MCG/ACT AERO, into each nostril, Disp: , Rfl:     valACYclovir (VALTREX) 1,000 mg tablet, valacyclovir 1 gram tablet  TAKE 2 TABLETS TWICE A DAY, Disp: , Rfl:     erythromycin (ILOTYCIN) ophthalmic ointment, APPLY THREE TIMES A DAY TO WOUND, Disp: , Rfl: 3    Recent Results (from the past 1008 hour(s))   CBC and differential    Collection Time: 01/07/20 11:58 AM   Result Value Ref Range    WBC 7 25 4 31 - 10 16 Thousand/uL    RBC 4 65 3 81 - 5 12 Million/uL    Hemoglobin 13 0 11 5 - 15 4 g/dL    Hematocrit 41 8 34 8 - 46 1 %    MCV 90 82 - 98 fL    MCH 28 0 26 8 - 34 3 pg    MCHC 31 1 (L) 31 4 - 37 4 g/dL    RDW 13 9 11 6 - 15 1 %    MPV 8 7 (L) 8 9 - 12 7 fL    Platelets 059 399 - 960 Thousands/uL    nRBC 0 /100 WBCs    Neutrophils Relative 58 43 - 75 %    Immat GRANS % 0 0 - 2 %    Lymphocytes Relative 30 14 - 44 %    Monocytes Relative 9 4 - 12 %    Eosinophils Relative 3 0 - 6 %    Basophils Relative 0 0 - 1 %    Neutrophils Absolute 4 17 1 85 - 7 62 Thousands/µL    Immature Grans Absolute 0 03 0 00 - 0 20 Thousand/uL    Lymphocytes Absolute 2 17 0 60 - 4 47 Thousands/µL    Monocytes Absolute 0 63 0 17 - 1 22 Thousand/µL    Eosinophils Absolute 0 22 0 00 - 0 61 Thousand/µL    Basophils Absolute 0 03 0 00 - 0 10 Thousands/µL   Comprehensive metabolic panel    Collection Time: 01/07/20 11:58 AM   Result Value Ref Range    Sodium 139 136 - 145 mmol/L    Potassium 3 8 3 5 - 5 3 mmol/L    Chloride 100 100 - 108 mmol/L    CO2 30 21 - 32 mmol/L    ANION GAP 9 4 - 13 mmol/L    BUN 15 5 - 25 mg/dL    Creatinine 0 73 0 60 - 1 30 mg/dL    Glucose, Fasting 98 65 - 99 mg/dL    Calcium 8 9 8 3 - 10 1 mg/dL    AST 17 5 - 45 U/L    ALT 19 12 - 78 U/L    Alkaline Phosphatase 79 46 - 116 U/L    Total Protein 7 9 6 4 - 8 2 g/dL    Albumin 3 5 3 5 - 5 0 g/dL    Total Bilirubin 0 40 0 20 - 1 00 mg/dL    eGFR 87 ml/min/1 73sq m   Lipid panel    Collection Time: 01/07/20 11:58 AM   Result Value Ref Range    Cholesterol 192 50 - 200 mg/dL    Triglycerides 109 <=150 mg/dL    HDL, Direct 51 >=40 mg/dL    LDL Calculated 119 (H) 0 - 100 mg/dL    Non-HDL-Chol (CHOL-HDL) 141 mg/dl   Hemoglobin A1C    Collection Time: 01/07/20 11:58 AM   Result Value Ref Range    Hemoglobin A1C 5 5 4 2 - 6 3 %     mg/dl   TSH, 3rd generation with Free T4 reflex    Collection Time: 01/07/20 11:58 AM   Result Value Ref Range    TSH 3RD GENERATON 3 337 0 358 - 3 740 uIU/mL       The following portions of the patient's history were reviewed and updated as appropriate: allergies, current medications, past family history, past medical history, past social history, past surgical history and problem list      Review of Systems   Constitutional: Negative for appetite change, chills, diaphoresis, fatigue, fever and unexpected weight change  HENT: Positive for postnasal drip and rhinorrhea  Negative for congestion and hearing loss  Eyes: Negative for visual disturbance  Respiratory: Positive for cough  Negative for chest tightness, shortness of breath and wheezing  Cardiovascular: Negative for chest pain, palpitations and leg swelling  Gastrointestinal: Negative for abdominal pain and blood in stool  Endocrine: Negative for cold intolerance, heat intolerance, polydipsia and polyuria  Genitourinary: Negative for difficulty urinating, dysuria, frequency and urgency  Musculoskeletal: Positive for arthralgias  Negative for myalgias  Skin: Negative for rash  Neurological: Negative for dizziness, weakness, light-headedness and headaches  Hematological: Does not bruise/bleed easily  Psychiatric/Behavioral: Negative for dysphoric mood and sleep disturbance  Objective:      Vitals:    01/14/20 1356   BP: 110/74   Pulse: 100   Resp: 14          Physical Exam   Constitutional: She is oriented to person, place, and time  She appears well-developed and well-nourished  HENT:   Head: Normocephalic and atraumatic  Nose: Nose normal    Mouth/Throat: Oropharynx is clear and moist    Eyes: Pupils are equal, round, and reactive to light  Conjunctivae and EOM are normal  No scleral icterus  Neck: Normal range of motion  Neck supple  No JVD present  No tracheal deviation present  No thyromegaly present  Cardiovascular: Normal rate, regular rhythm and intact distal pulses  Exam reveals no gallop and no friction rub  No murmur heard  Pulmonary/Chest: Effort normal and breath sounds normal  No respiratory distress  She has no wheezes  She has no rales  Musculoskeletal: She exhibits no edema or deformity  Lymphadenopathy:     She has no cervical adenopathy  Neurological: She is alert and oriented to person, place, and time  No cranial nerve deficit  Skin: Skin is warm and dry  No rash noted  No erythema  No pallor  Psychiatric: She has a normal mood and affect   Her behavior is normal  Judgment and thought content normal

## 2020-02-20 ENCOUNTER — TRANSCRIBE ORDERS (OUTPATIENT)
Dept: ADMINISTRATIVE | Facility: HOSPITAL | Age: 65
End: 2020-02-20

## 2020-02-20 DIAGNOSIS — B37.9 YEAST INFECTION: ICD-10-CM

## 2020-02-20 DIAGNOSIS — M47.817 LUMBOSACRAL SPONDYLOSIS WITHOUT MYELOPATHY: Primary | ICD-10-CM

## 2020-02-20 RX ORDER — NYSTATIN 100000 [USP'U]/G
POWDER TOPICAL
Qty: 60 G | Refills: 3 | Status: SHIPPED | OUTPATIENT
Start: 2020-02-20 | End: 2020-07-16

## 2020-02-23 ENCOUNTER — HOSPITAL ENCOUNTER (OUTPATIENT)
Dept: MRI IMAGING | Facility: HOSPITAL | Age: 65
Discharge: HOME | End: 2020-02-23
Payer: COMMERCIAL

## 2020-02-23 DIAGNOSIS — M47.817 LUMBOSACRAL SPONDYLOSIS WITHOUT MYELOPATHY: ICD-10-CM

## 2020-02-23 PROCEDURE — 72148 MRI LUMBAR SPINE W/O DYE: CPT

## 2020-03-02 ENCOUNTER — TELEPHONE (OUTPATIENT)
Dept: INTERNAL MEDICINE CLINIC | Facility: CLINIC | Age: 65
End: 2020-03-02

## 2020-03-02 NOTE — TELEPHONE ENCOUNTER
Just an EVELYN Perez She wanted to let  know that case management is available to patient, through her ins ,  In case there is anything they can assist with

## 2020-03-04 DIAGNOSIS — I10 ESSENTIAL HYPERTENSION: ICD-10-CM

## 2020-03-05 RX ORDER — POTASSIUM CHLORIDE 20 MEQ/1
TABLET, EXTENDED RELEASE ORAL
Qty: 90 TABLET | Refills: 3 | Status: SHIPPED | OUTPATIENT
Start: 2020-03-05 | End: 2020-08-22 | Stop reason: HOSPADM

## 2020-03-09 DIAGNOSIS — G62.9 NEUROPATHY: ICD-10-CM

## 2020-03-09 RX ORDER — DULOXETIN HYDROCHLORIDE 30 MG/1
30 CAPSULE, DELAYED RELEASE ORAL DAILY
Qty: 90 CAPSULE | Refills: 1 | Status: ON HOLD | OUTPATIENT
Start: 2020-03-09 | End: 2020-08-13

## 2020-03-24 DIAGNOSIS — F41.9 ANXIETY: ICD-10-CM

## 2020-03-24 RX ORDER — ALPRAZOLAM 1 MG/1
TABLET ORAL
Qty: 90 TABLET | Refills: 3 | OUTPATIENT
Start: 2020-03-24

## 2020-03-31 DIAGNOSIS — G89.29 OTHER CHRONIC PAIN: ICD-10-CM

## 2020-03-31 DIAGNOSIS — K21.9 GASTROESOPHAGEAL REFLUX DISEASE WITHOUT ESOPHAGITIS: ICD-10-CM

## 2020-03-31 RX ORDER — PANTOPRAZOLE SODIUM 40 MG/1
TABLET, DELAYED RELEASE ORAL
Qty: 90 TABLET | Refills: 3 | Status: SHIPPED | OUTPATIENT
Start: 2020-03-31 | End: 2021-05-07

## 2020-03-31 RX ORDER — TRAMADOL HYDROCHLORIDE 50 MG/1
TABLET ORAL
Qty: 90 TABLET | Refills: 3 | Status: SHIPPED | OUTPATIENT
Start: 2020-03-31 | End: 2020-09-16 | Stop reason: SDUPTHER

## 2020-04-10 DIAGNOSIS — F32.A ANXIETY AND DEPRESSION: ICD-10-CM

## 2020-04-10 DIAGNOSIS — F41.9 ANXIETY AND DEPRESSION: ICD-10-CM

## 2020-04-10 RX ORDER — DULOXETIN HYDROCHLORIDE 60 MG/1
CAPSULE, DELAYED RELEASE ORAL
Qty: 90 CAPSULE | Refills: 0 | Status: SHIPPED | OUTPATIENT
Start: 2020-04-10 | End: 2020-08-06 | Stop reason: SDUPTHER

## 2020-04-16 ENCOUNTER — TELEPHONE (OUTPATIENT)
Dept: NEUROLOGY | Facility: CLINIC | Age: 65
End: 2020-04-16

## 2020-04-23 DIAGNOSIS — F41.9 ANXIETY: ICD-10-CM

## 2020-04-23 RX ORDER — ALPRAZOLAM 1 MG/1
TABLET ORAL
Qty: 90 TABLET | Refills: 3 | Status: SHIPPED | OUTPATIENT
Start: 2020-04-23 | End: 2020-07-22

## 2020-05-13 ENCOUNTER — TELEPHONE (OUTPATIENT)
Dept: INTERNAL MEDICINE CLINIC | Facility: CLINIC | Age: 65
End: 2020-05-13

## 2020-05-14 ENCOUNTER — HOSPITAL ENCOUNTER (OUTPATIENT)
Dept: CT IMAGING | Facility: HOSPITAL | Age: 65
Discharge: HOME | End: 2020-05-14
Attending: INTERNAL MEDICINE
Payer: MEDICARE

## 2020-05-14 ENCOUNTER — OFFICE VISIT (OUTPATIENT)
Dept: INTERNAL MEDICINE CLINIC | Facility: CLINIC | Age: 65
End: 2020-05-14
Payer: MEDICARE

## 2020-05-14 ENCOUNTER — TELEPHONE (OUTPATIENT)
Dept: INTERNAL MEDICINE CLINIC | Facility: CLINIC | Age: 65
End: 2020-05-14

## 2020-05-14 VITALS
WEIGHT: 215.8 LBS | TEMPERATURE: 98.2 F | DIASTOLIC BLOOD PRESSURE: 86 MMHG | HEART RATE: 102 BPM | RESPIRATION RATE: 14 BRPM | BODY MASS INDEX: 45.3 KG/M2 | HEIGHT: 58 IN | SYSTOLIC BLOOD PRESSURE: 148 MMHG

## 2020-05-14 DIAGNOSIS — K11.7 DROOLING: ICD-10-CM

## 2020-05-14 DIAGNOSIS — S09.93XA FACIAL INJURY, INITIAL ENCOUNTER: Primary | ICD-10-CM

## 2020-05-14 DIAGNOSIS — S09.93XA FACIAL INJURY, INITIAL ENCOUNTER: ICD-10-CM

## 2020-05-14 DIAGNOSIS — M62.830 BACK SPASM: ICD-10-CM

## 2020-05-14 PROCEDURE — 1036F TOBACCO NON-USER: CPT | Performed by: INTERNAL MEDICINE

## 2020-05-14 PROCEDURE — 3079F DIAST BP 80-89 MM HG: CPT | Performed by: INTERNAL MEDICINE

## 2020-05-14 PROCEDURE — 99214 OFFICE O/P EST MOD 30 MIN: CPT | Performed by: INTERNAL MEDICINE

## 2020-05-14 PROCEDURE — 3077F SYST BP >= 140 MM HG: CPT | Performed by: INTERNAL MEDICINE

## 2020-05-14 PROCEDURE — 1100F PTFALLS ASSESS-DOCD GE2>/YR: CPT | Performed by: INTERNAL MEDICINE

## 2020-05-14 PROCEDURE — 3008F BODY MASS INDEX DOCD: CPT | Performed by: INTERNAL MEDICINE

## 2020-05-14 PROCEDURE — 3288F FALL RISK ASSESSMENT DOCD: CPT | Performed by: INTERNAL MEDICINE

## 2020-05-14 PROCEDURE — 70486 CT MAXILLOFACIAL W/O DYE: CPT

## 2020-05-14 PROCEDURE — 4040F PNEUMOC VAC/ADMIN/RCVD: CPT | Performed by: INTERNAL MEDICINE

## 2020-07-10 ENCOUNTER — TELEPHONE (OUTPATIENT)
Dept: INTERNAL MEDICINE CLINIC | Facility: CLINIC | Age: 65
End: 2020-07-10

## 2020-07-10 NOTE — TELEPHONE ENCOUNTER
Britta wanted to give Dr Lucas Zelaya an update on patient prior to her upcoming appt    She has been back & forth, since last April, when she started to see her    She was having family conflicts, then was making progress,  then she got breast ca  Her treatment interrupted them getting together (but took care of the ca) they resumed counseling couple mo's later    She became more depressed    2 x's fell out of bed in last mo    Not due to dizziness   she slipped   hurt her back was getting inj's for pain    Was starting to go out again, then COVID happened    She now gets anxious when she has to go out     Would  prescribe an anti anxiety & depression med ? ??  She does take something PRN    But until she can get through the depression, she may need med on a regular basis  Her bed is her safe place and she spends a lot of time there  Britta also wanted to thank  for the referrals he sends her

## 2020-07-13 ENCOUNTER — TELEPHONE (OUTPATIENT)
Dept: INTERNAL MEDICINE CLINIC | Facility: CLINIC | Age: 65
End: 2020-07-13

## 2020-07-13 NOTE — TELEPHONE ENCOUNTER
FYI:    Pt is seeing psychologist Jayant Carbone   For her anxiety/ depression    She may be calling dr Moni Quiroz tomorrow      Pt rescheduled appt to thelver 07/23/20 @ 2:00 [place]

## 2020-07-15 DIAGNOSIS — L30.9 DERMATITIS: ICD-10-CM

## 2020-07-15 DIAGNOSIS — B37.9 YEAST INFECTION: ICD-10-CM

## 2020-07-16 ENCOUNTER — TELEPHONE (OUTPATIENT)
Dept: INTERNAL MEDICINE CLINIC | Facility: CLINIC | Age: 65
End: 2020-07-16

## 2020-07-16 RX ORDER — NYSTATIN 100000 [USP'U]/G
POWDER TOPICAL
Qty: 60 G | Refills: 3 | Status: SHIPPED | OUTPATIENT
Start: 2020-07-16 | End: 2020-07-20

## 2020-07-16 RX ORDER — CLOTRIMAZOLE AND BETAMETHASONE DIPROPIONATE 10; .64 MG/G; MG/G
CREAM TOPICAL
Qty: 45 G | Refills: 1 | Status: SHIPPED | OUTPATIENT
Start: 2020-07-16 | End: 2020-07-20

## 2020-07-16 NOTE — TELEPHONE ENCOUNTER
Spinal injection procedure steroid injection  Immediately had bad reaction  Dr Angie ANDERSON pain management doc for spine  She was having hot flashes, hyper, sweating, upset stomach and shaking  Taking benadryl as of now  Feeling a little better  She is also having depression through all of this as well

## 2020-07-16 NOTE — TELEPHONE ENCOUNTER
Notified patient  Patient states that she will go and get her blood done on Monday if she feels better from the pain

## 2020-07-16 NOTE — TELEPHONE ENCOUNTER
I spoke with Dr Mone Mercado today    Let her know I am aware and that symptoms generally pass within a few days though it can take longer for some  They do generally improve with time

## 2020-07-17 DIAGNOSIS — L30.9 DERMATITIS: ICD-10-CM

## 2020-07-17 DIAGNOSIS — B37.9 YEAST INFECTION: ICD-10-CM

## 2020-07-20 RX ORDER — NYSTATIN 100000 [USP'U]/G
POWDER TOPICAL
Qty: 60 G | Refills: 3 | Status: SHIPPED | OUTPATIENT
Start: 2020-07-20 | End: 2021-03-12

## 2020-07-20 RX ORDER — CLOTRIMAZOLE AND BETAMETHASONE DIPROPIONATE 10; .64 MG/G; MG/G
CREAM TOPICAL
Qty: 45 G | Refills: 1 | Status: SHIPPED | OUTPATIENT
Start: 2020-07-20 | End: 2020-12-21 | Stop reason: SDUPTHER

## 2020-07-22 ENCOUNTER — TELEPHONE (OUTPATIENT)
Dept: ADMINISTRATIVE | Facility: OTHER | Age: 65
End: 2020-07-22

## 2020-07-22 ENCOUNTER — TELEPHONE (OUTPATIENT)
Dept: INTERNAL MEDICINE CLINIC | Facility: CLINIC | Age: 65
End: 2020-07-22

## 2020-07-22 DIAGNOSIS — F41.9 ANXIETY: ICD-10-CM

## 2020-07-22 RX ORDER — ALPRAZOLAM 1 MG/1
TABLET ORAL
Qty: 90 TABLET | Refills: 3 | Status: SHIPPED | OUTPATIENT
Start: 2020-07-22 | End: 2020-11-19

## 2020-07-22 NOTE — TELEPHONE ENCOUNTER
Upon review of the In Basket request we were able to locate, review, and update the patient chart as requested for Mammogram     Any additional questions or concerns should be emailed to the Practice Liaisons via [email or web address] email, please do not reply via In Basket      Thank you  Collin Anton MA

## 2020-07-22 NOTE — TELEPHONE ENCOUNTER
----- Message from Jennifer Mcallister MA sent at 7/22/2020 10:42 AM EDT -----  Regarding: [facility] internal; mammo and dexa  Contact: [phone]  07/22/20 10:42 AM    Hello, our patient Marcellus Padilla has had DEXA Scan and Mammogram completed/performed  Please assist in updating the patient chart by pulling the Care Everywhere (CE) document  The date of service is 05/08/2020 and 07/01/2019       Thank you,  Jennifer Mcallister MA  [facility]

## 2020-07-23 ENCOUNTER — APPOINTMENT (OUTPATIENT)
Dept: LAB | Facility: HOSPITAL | Age: 65
End: 2020-07-23
Attending: INTERNAL MEDICINE
Payer: MEDICARE

## 2020-07-23 ENCOUNTER — OFFICE VISIT (OUTPATIENT)
Dept: INTERNAL MEDICINE CLINIC | Facility: CLINIC | Age: 65
End: 2020-07-23
Payer: MEDICARE

## 2020-07-23 VITALS
HEIGHT: 58 IN | HEART RATE: 92 BPM | SYSTOLIC BLOOD PRESSURE: 128 MMHG | TEMPERATURE: 98.4 F | WEIGHT: 213.6 LBS | BODY MASS INDEX: 44.84 KG/M2 | DIASTOLIC BLOOD PRESSURE: 86 MMHG | RESPIRATION RATE: 14 BRPM

## 2020-07-23 DIAGNOSIS — Z23 ENCOUNTER FOR IMMUNIZATION: ICD-10-CM

## 2020-07-23 DIAGNOSIS — F32.1 CURRENT MODERATE EPISODE OF MAJOR DEPRESSIVE DISORDER WITHOUT PRIOR EPISODE (HCC): ICD-10-CM

## 2020-07-23 DIAGNOSIS — F41.9 ANXIETY: ICD-10-CM

## 2020-07-23 DIAGNOSIS — M54.9 BACK PAIN, UNSPECIFIED BACK LOCATION, UNSPECIFIED BACK PAIN LATERALITY, UNSPECIFIED CHRONICITY: ICD-10-CM

## 2020-07-23 DIAGNOSIS — E78.2 MIXED HYPERLIPIDEMIA: ICD-10-CM

## 2020-07-23 DIAGNOSIS — D05.12 DUCTAL CARCINOMA IN SITU (DCIS) OF LEFT BREAST: ICD-10-CM

## 2020-07-23 DIAGNOSIS — R73.01 IMPAIRED FASTING GLUCOSE: ICD-10-CM

## 2020-07-23 DIAGNOSIS — J45.21 MILD INTERMITTENT ASTHMA WITH EXACERBATION: ICD-10-CM

## 2020-07-23 DIAGNOSIS — E03.9 ACQUIRED HYPOTHYROIDISM: ICD-10-CM

## 2020-07-23 DIAGNOSIS — Z11.4 SCREENING FOR HIV (HUMAN IMMUNODEFICIENCY VIRUS): ICD-10-CM

## 2020-07-23 DIAGNOSIS — Z78.0 POSTMENOPAUSAL: ICD-10-CM

## 2020-07-23 DIAGNOSIS — H44.001 INFECTION OF RIGHT EYE: ICD-10-CM

## 2020-07-23 DIAGNOSIS — E03.9 ACQUIRED HYPOTHYROIDISM: Primary | ICD-10-CM

## 2020-07-23 DIAGNOSIS — I10 ESSENTIAL HYPERTENSION: Chronic | ICD-10-CM

## 2020-07-23 LAB
ALBUMIN SERPL BCP-MCNC: 3.4 G/DL (ref 3.5–5)
ALP SERPL-CCNC: 86 U/L (ref 46–116)
ALT SERPL W P-5'-P-CCNC: 19 U/L (ref 12–78)
ANION GAP SERPL CALCULATED.3IONS-SCNC: 5 MMOL/L (ref 4–13)
AST SERPL W P-5'-P-CCNC: 14 U/L (ref 5–45)
BASOPHILS # BLD AUTO: 0.03 THOUSANDS/ΜL (ref 0–0.1)
BASOPHILS NFR BLD AUTO: 0 % (ref 0–1)
BILIRUB SERPL-MCNC: 0.4 MG/DL (ref 0.2–1)
BUN SERPL-MCNC: 14 MG/DL (ref 5–25)
CALCIUM SERPL-MCNC: 9.2 MG/DL (ref 8.3–10.1)
CHLORIDE SERPL-SCNC: 101 MMOL/L (ref 100–108)
CHOLEST SERPL-MCNC: 220 MG/DL (ref 50–200)
CO2 SERPL-SCNC: 34 MMOL/L (ref 21–32)
CREAT SERPL-MCNC: 0.78 MG/DL (ref 0.6–1.3)
EOSINOPHIL # BLD AUTO: 0.19 THOUSAND/ΜL (ref 0–0.61)
EOSINOPHIL NFR BLD AUTO: 2 % (ref 0–6)
ERYTHROCYTE [DISTWIDTH] IN BLOOD BY AUTOMATED COUNT: 14.9 % (ref 11.6–15.1)
EST. AVERAGE GLUCOSE BLD GHB EST-MCNC: 123 MG/DL
GFR SERPL CREATININE-BSD FRML MDRD: 80 ML/MIN/1.73SQ M
GLUCOSE P FAST SERPL-MCNC: 92 MG/DL (ref 65–99)
HBA1C MFR BLD: 5.9 %
HCT VFR BLD AUTO: 44 % (ref 34.8–46.1)
HDLC SERPL-MCNC: 67 MG/DL
HGB BLD-MCNC: 13.5 G/DL (ref 11.5–15.4)
IMM GRANULOCYTES # BLD AUTO: 0.06 THOUSAND/UL (ref 0–0.2)
IMM GRANULOCYTES NFR BLD AUTO: 1 % (ref 0–2)
LDLC SERPL CALC-MCNC: 131 MG/DL (ref 0–100)
LYMPHOCYTES # BLD AUTO: 2.87 THOUSANDS/ΜL (ref 0.6–4.47)
LYMPHOCYTES NFR BLD AUTO: 27 % (ref 14–44)
MCH RBC QN AUTO: 27.3 PG (ref 26.8–34.3)
MCHC RBC AUTO-ENTMCNC: 30.7 G/DL (ref 31.4–37.4)
MCV RBC AUTO: 89 FL (ref 82–98)
MONOCYTES # BLD AUTO: 0.8 THOUSAND/ΜL (ref 0.17–1.22)
MONOCYTES NFR BLD AUTO: 8 % (ref 4–12)
NEUTROPHILS # BLD AUTO: 6.66 THOUSANDS/ΜL (ref 1.85–7.62)
NEUTS SEG NFR BLD AUTO: 62 % (ref 43–75)
NONHDLC SERPL-MCNC: 153 MG/DL
NRBC BLD AUTO-RTO: 0 /100 WBCS
PLATELET # BLD AUTO: 284 THOUSANDS/UL (ref 149–390)
PMV BLD AUTO: 8.2 FL (ref 8.9–12.7)
POTASSIUM SERPL-SCNC: 4.1 MMOL/L (ref 3.5–5.3)
PROT SERPL-MCNC: 8.1 G/DL (ref 6.4–8.2)
RBC # BLD AUTO: 4.94 MILLION/UL (ref 3.81–5.12)
SODIUM SERPL-SCNC: 140 MMOL/L (ref 136–145)
T4 FREE SERPL-MCNC: 0.88 NG/DL (ref 0.76–1.46)
TRIGL SERPL-MCNC: 108 MG/DL
TSH SERPL DL<=0.05 MIU/L-ACNC: 5.22 UIU/ML (ref 0.36–3.74)
WBC # BLD AUTO: 10.61 THOUSAND/UL (ref 4.31–10.16)

## 2020-07-23 PROCEDURE — 36415 COLL VENOUS BLD VENIPUNCTURE: CPT

## 2020-07-23 PROCEDURE — 83036 HEMOGLOBIN GLYCOSYLATED A1C: CPT

## 2020-07-23 PROCEDURE — 4040F PNEUMOC VAC/ADMIN/RCVD: CPT | Performed by: NURSE PRACTITIONER

## 2020-07-23 PROCEDURE — 3074F SYST BP LT 130 MM HG: CPT | Performed by: NURSE PRACTITIONER

## 2020-07-23 PROCEDURE — 84443 ASSAY THYROID STIM HORMONE: CPT

## 2020-07-23 PROCEDURE — 3008F BODY MASS INDEX DOCD: CPT | Performed by: NURSE PRACTITIONER

## 2020-07-23 PROCEDURE — 99214 OFFICE O/P EST MOD 30 MIN: CPT | Performed by: NURSE PRACTITIONER

## 2020-07-23 PROCEDURE — 3079F DIAST BP 80-89 MM HG: CPT | Performed by: NURSE PRACTITIONER

## 2020-07-23 PROCEDURE — 90670 PCV13 VACCINE IM: CPT | Performed by: NURSE PRACTITIONER

## 2020-07-23 PROCEDURE — 80053 COMPREHEN METABOLIC PANEL: CPT

## 2020-07-23 PROCEDURE — 85025 COMPLETE CBC W/AUTO DIFF WBC: CPT

## 2020-07-23 PROCEDURE — 84439 ASSAY OF FREE THYROXINE: CPT

## 2020-07-23 PROCEDURE — 87389 HIV-1 AG W/HIV-1&-2 AB AG IA: CPT

## 2020-07-23 PROCEDURE — G0009 ADMIN PNEUMOCOCCAL VACCINE: HCPCS | Performed by: NURSE PRACTITIONER

## 2020-07-23 PROCEDURE — 80061 LIPID PANEL: CPT

## 2020-07-23 PROCEDURE — 1036F TOBACCO NON-USER: CPT | Performed by: NURSE PRACTITIONER

## 2020-07-23 RX ORDER — METHOCARBAMOL 500 MG/1
500 TABLET, FILM COATED ORAL 4 TIMES DAILY
Qty: 60 TABLET | Refills: 0
Start: 2020-07-23 | End: 2021-07-08 | Stop reason: SDUPTHER

## 2020-07-23 RX ORDER — LEVOTHYROXINE SODIUM 88 UG/1
TABLET ORAL
Qty: 90 TABLET | Refills: 3
Start: 2020-07-23 | End: 2020-09-28 | Stop reason: SDUPTHER

## 2020-07-23 NOTE — PATIENT INSTRUCTIONS
Breast cancer stable no evidence of reoccurance    Lumbago s/p injection following w/ Dr Bethanie Hernandez recently passed away - increase stress folloiwng w/ therapy    Hypertension- stable on current     Hyperlipidemia - slight bump in LDL- continue lifestyle modification    Health maintenance - up to date    Hypothyroidism- increase to 88mcg daily except on Sunday take 2 tablets

## 2020-07-23 NOTE — PROGRESS NOTES
Assessment/Plan:    Patient Instructions   Breast cancer stable no evidence of reoccurance    Lumbago s/p injection following w/ Dr Kerry Mart - dog recently passed away - increase stress folloiwng w/ therapy    Hypertension- stable on current     Hyperlipidemia - slight bump in LDL- continue lifestyle modification    Health maintenance - up to date    Hypothyroidism- increase to 88mcg daily except on Sunday take 2 tablets          Diagnoses and all orders for this visit:    Acquired hypothyroidism  -     TSH, 3rd generation with Free T4 reflex; Future  -     levothyroxine 88 mcg tablet; Take 1 po daily except on Sunday take 2    Postmenopausal    Encounter for immunization  -     PNEUMOCOCCAL CONJUGATE VACCINE 13-VALENT GREATER THAN 6 MONTHS    Impaired fasting glucose    Mild intermittent asthma with exacerbation    Anxiety    Ductal carcinoma in situ (DCIS) of left breast    Back pain, unspecified back location, unspecified back pain laterality, unspecified chronicity  -     methocarbamol (ROBAXIN) 500 mg tablet; Take 1 tablet (500 mg total) by mouth 4 (four) times a day    Infection of right eye  -     Ambulatory referral to Ophthalmology; Future    [facility]    Other orders  -     Cancel: DXA bone density spine hip and pelvis; Future         Subjective:      Patient ID: Mary Mallory is a 72 y o  female    Continued lower back pain radiating into groin and down the leg following   Following w/ Dr Stan Bailon for epidural injections of the lower back had 4 injections  Had on July 10th- had reaction to injection - shaking, hot and cold in bed 8 days     Breast ca stable following w onc  Chronic pain   Taking lt4 daily  Asthma stable on current medication  Taking chol meds  No home bps but taking meds   Lots of stress following w/ therapist Deanne Ibrahim  Dog passed away/fighting w/   Eye infection started eye oint slowly getting better        Current Outpatient Medications:     albuterol (PROAIR HFA) 90 mcg/act inhaler, Inhale 2 puffs every 6 (six) hours as needed for wheezing, Disp: , Rfl:     ALPRAZolam (XANAX) 1 mg tablet, TAKE 1/2-1 TABLET THREE TIMES A DAY AS NEEDED, Disp: 90 tablet, Rfl: 3    aspirin 81 MG tablet, Take 81 mg by mouth daily, Disp: , Rfl:     clotrimazole (LOTRIMIN) 1 % cream, as needed , Disp: , Rfl:     clotrimazole-betamethasone (LOTRISONE) 1-0 05 % cream, APPLY SPARINGLY TO THE AFFECTED AREA IN GROIN 2-3 TIMES A DAY AS NEEDED, Disp: 45 g, Rfl: 1    DULoxetine (CYMBALTA) 30 mg delayed release capsule, Take 1 capsule (30 mg total) by mouth daily, Disp: 90 capsule, Rfl: 1    DULoxetine (CYMBALTA) 60 mg delayed release capsule, Take 1 capsule (60 mg total) by mouth every day, Disp: 90 capsule, Rfl: 0    famotidine (PEPCID) 10 mg tablet, Take 1 tablet by mouth, Disp: , Rfl:     hydrochlorothiazide (HYDRODIURIL) 25 mg tablet, TAKE 1 TABLET BY MOUTH  DAILY, Disp: 90 tablet, Rfl: 3    levothyroxine 88 mcg tablet, Take 1 po daily except on Sunday take 2, Disp: 90 tablet, Rfl: 3    Mometasone Furo-Formoterol Fum (DULERA) 100-5 MCG/ACT AERO, Inhale, Disp: , Rfl:     Multiple Vitamins-Minerals (OCUVITE ADULT 50+ PO), Take by mouth, Disp: , Rfl:     NYSTATIN powder, APPLY TO AFFECTED AREA(S) SPARINGLY  TWICE A DAY, Disp: 60 g, Rfl: 3    pantoprazole (PROTONIX) 40 mg tablet, TAKE 1 TABLET BY MOUTH  DAILY, Disp: 90 tablet, Rfl: 3    potassium chloride (K-DUR,KLOR-CON) 20 mEq tablet, TAKE 1 TABLET BY MOUTH  DAILY, Disp: 90 tablet, Rfl: 3    quinapril (ACCUPRIL) 10 mg tablet, Take 10 mg by mouth daily at bedtime, Disp: , Rfl:     simvastatin (ZOCOR) 40 mg tablet, TAKE ONE TABLET BY MOUTH EVERY DAY, Disp: 90 tablet, Rfl: 3    traMADol (ULTRAM) 50 mg tablet, TAKE 1 TABLET BY MOUTH 3  TIMES A DAY, Disp: 90 tablet, Rfl: 3    Triamcinolone Acetonide (NASACORT ALLERGY 24HR) 55 MCG/ACT AERO, into each nostril as needed , Disp: , Rfl:     cholestyramine (QUESTRAN) 4 g packet, Take by mouth, Disp: , Rfl:     erythromycin (ILOTYCIN) ophthalmic ointment, APPLY THREE TIMES A DAY TO WOUND, Disp: , Rfl: 3    methocarbamol (ROBAXIN) 500 mg tablet, Take 1 tablet (500 mg total) by mouth 4 (four) times a day, Disp: 60 tablet, Rfl: 0    valACYclovir (VALTREX) 1,000 mg tablet, as needed , Disp: , Rfl:     Recent Results (from the past 1008 hour(s))   CBC and differential    Collection Time: 07/23/20 12:25 PM   Result Value Ref Range    WBC 10 61 (H) 4 31 - 10 16 Thousand/uL    RBC 4 94 3 81 - 5 12 Million/uL    Hemoglobin 13 5 11 5 - 15 4 g/dL    Hematocrit 44 0 34 8 - 46 1 %    MCV 89 82 - 98 fL    MCH 27 3 26 8 - 34 3 pg    MCHC 30 7 (L) 31 4 - 37 4 g/dL    RDW 14 9 11 6 - 15 1 %    MPV 8 2 (L) 8 9 - 12 7 fL    Platelets 395 678 - 552 Thousands/uL    nRBC 0 /100 WBCs    Neutrophils Relative 62 43 - 75 %    Immat GRANS % 1 0 - 2 %    Lymphocytes Relative 27 14 - 44 %    Monocytes Relative 8 4 - 12 %    Eosinophils Relative 2 0 - 6 %    Basophils Relative 0 0 - 1 %    Neutrophils Absolute 6 66 1 85 - 7 62 Thousands/µL    Immature Grans Absolute 0 06 0 00 - 0 20 Thousand/uL    Lymphocytes Absolute 2 87 0 60 - 4 47 Thousands/µL    Monocytes Absolute 0 80 0 17 - 1 22 Thousand/µL    Eosinophils Absolute 0 19 0 00 - 0 61 Thousand/µL    Basophils Absolute 0 03 0 00 - 0 10 Thousands/µL   Comprehensive metabolic panel    Collection Time: 07/23/20 12:25 PM   Result Value Ref Range    Sodium 140 136 - 145 mmol/L    Potassium 4 1 3 5 - 5 3 mmol/L    Chloride 101 100 - 108 mmol/L    CO2 34 (H) 21 - 32 mmol/L    ANION GAP 5 4 - 13 mmol/L    BUN 14 5 - 25 mg/dL    Creatinine 0 78 0 60 - 1 30 mg/dL    Glucose, Fasting 92 65 - 99 mg/dL    Calcium 9 2 8 3 - 10 1 mg/dL    AST 14 5 - 45 U/L    ALT 19 12 - 78 U/L    Alkaline Phosphatase 86 46 - 116 U/L    Total Protein 8 1 6 4 - 8 2 g/dL    Albumin 3 4 (L) 3 5 - 5 0 g/dL    Total Bilirubin 0  40 0 20 - 1 00 mg/dL    eGFR 80 ml/min/1 73sq m   Lipid panel    Collection Time: 07/23/20 12:25 PM   Result Value Ref Range    Cholesterol 220 (H) 50 - 200 mg/dL    Triglycerides 108 <=150 mg/dL    HDL, Direct 67 >=40 mg/dL    LDL Calculated 131 (H) 0 - 100 mg/dL    Non-HDL-Chol (CHOL-HDL) 153 mg/dl   TSH, 3rd generation with Free T4 reflex    Collection Time: 07/23/20 12:25 PM   Result Value Ref Range    TSH 3RD GENERATON 5 224 (H) 0 358 - 3 740 uIU/mL       The following portions of the patient's history were reviewed and updated as appropriate: allergies, current medications, past family history, past medical history, past social history, past surgical history and problem list      Review of Systems   Constitutional: Negative for appetite change, chills, diaphoresis, fatigue, fever and unexpected weight change  HENT: Negative for postnasal drip and sneezing  Eyes: Negative for visual disturbance  Respiratory: Negative for chest tightness and shortness of breath  Cardiovascular: Negative for chest pain, palpitations and leg swelling  Gastrointestinal: Negative for abdominal pain and blood in stool  Endocrine: Negative for cold intolerance, heat intolerance, polydipsia, polyphagia and polyuria  Genitourinary: Negative for difficulty urinating, dysuria, frequency and urgency  Musculoskeletal: Positive for arthralgias and back pain  Negative for myalgias  Skin: Negative for rash and wound  Neurological: Negative for dizziness, weakness, light-headedness and headaches  Hematological: Negative for adenopathy  Psychiatric/Behavioral: Negative for confusion, dysphoric mood and sleep disturbance  The patient is not nervous/anxious            Objective:      /86 (BP Location: Left arm, Patient Position: Sitting, Cuff Size: Standard)   Pulse 92   Temp 98 4 °F (36 9 °C) (Temporal) Comment: no nsaids  Resp 14   Ht 4' 10" (1 473 m)   Wt 96 9 kg (213 lb 9 6 oz)   BMI 44 64 kg/m² Physical Exam   Constitutional: She is oriented to person, place, and time  She appears well-developed  No distress  HENT:   Head: Normocephalic and atraumatic  Nose: Nose normal    Mouth/Throat: Oropharynx is clear and moist    Eyes: Pupils are equal, round, and reactive to light  Conjunctivae and EOM are normal    Neck: Normal range of motion  Neck supple  No JVD present  No tracheal deviation present  No thyromegaly present  Cardiovascular: Normal rate, regular rhythm, normal heart sounds and intact distal pulses  Exam reveals no gallop and no friction rub  No murmur heard  Pulmonary/Chest: Effort normal and breath sounds normal  No respiratory distress  She has no wheezes  She has no rales  Abdominal: Soft  Bowel sounds are normal  She exhibits no distension  There is no tenderness  Musculoskeletal: Normal range of motion  She exhibits no edema  Lymphadenopathy:     She has no cervical adenopathy  Neurological: She is alert and oriented to person, place, and time  No cranial nerve deficit  Skin: Skin is warm and dry  No rash noted  She is not diaphoretic  Psychiatric: She has a normal mood and affect   Her behavior is normal  Judgment and thought content normal

## 2020-07-24 LAB — HIV 1+2 AB+HIV1 P24 AG SERPL QL IA: NORMAL

## 2020-07-27 ENCOUNTER — TELEPHONE (OUTPATIENT)
Dept: INTERNAL MEDICINE CLINIC | Facility: CLINIC | Age: 65
End: 2020-07-27

## 2020-07-27 DIAGNOSIS — R73.01 IMPAIRED FASTING GLUCOSE: ICD-10-CM

## 2020-07-27 DIAGNOSIS — E03.9 ACQUIRED HYPOTHYROIDISM: ICD-10-CM

## 2020-07-27 DIAGNOSIS — Z11.59 NEED FOR HEPATITIS C SCREENING TEST: Primary | ICD-10-CM

## 2020-07-27 DIAGNOSIS — E78.2 MIXED HYPERLIPIDEMIA: ICD-10-CM

## 2020-07-27 NOTE — TELEPHONE ENCOUNTER
----- Message from Leander Urbano, 10 Kami Amezcua sent at 7/27/2020 12:38 PM EDT -----  Notify sugar was a little higher at 5 9 we will continue to monitor  Her chol was  A little higher as well- diet and weight loss encouraged  Recheck labs in 6 months   pleae  Mail slip

## 2020-08-06 DIAGNOSIS — F32.A ANXIETY AND DEPRESSION: ICD-10-CM

## 2020-08-06 DIAGNOSIS — F41.9 ANXIETY AND DEPRESSION: ICD-10-CM

## 2020-08-06 RX ORDER — DULOXETIN HYDROCHLORIDE 60 MG/1
CAPSULE, DELAYED RELEASE ORAL
Qty: 90 CAPSULE | Refills: 3 | Status: ON HOLD | OUTPATIENT
Start: 2020-08-06 | End: 2020-08-13

## 2020-08-06 NOTE — TELEPHONE ENCOUNTER
Refill : DULoxetine (CYMBALTA) 60 mg delayed release capsule    90 day supply    [facility] # [phone]

## 2020-08-11 ENCOUNTER — TELEPHONE (OUTPATIENT)
Dept: INTERNAL MEDICINE CLINIC | Facility: CLINIC | Age: 65
End: 2020-08-11

## 2020-08-11 NOTE — TELEPHONE ENCOUNTER
Pt had a panic attack after last Fri, had something removed from her eyelid with [facility]  Asthma issues; since then- when she moves she huffs and puffs  When she presses on middle of stomach; she feels like something is there- are muscles pressing on stomach    She's also shaky and sweaty from the shots she gets in her back at the beginning of every month    What should she do? Should she make an appt?

## 2020-08-12 ENCOUNTER — APPOINTMENT (EMERGENCY)
Dept: CT IMAGING | Facility: HOSPITAL | Age: 65
DRG: 175 | End: 2020-08-12
Payer: MEDICARE

## 2020-08-12 ENCOUNTER — APPOINTMENT (INPATIENT)
Dept: CT IMAGING | Facility: HOSPITAL | Age: 65
DRG: 175 | End: 2020-08-12
Payer: MEDICARE

## 2020-08-12 ENCOUNTER — HOSPITAL ENCOUNTER (INPATIENT)
Facility: HOSPITAL | Age: 65
LOS: 10 days | Discharge: HOME HEALTH | DRG: 175 | End: 2020-08-22
Attending: EMERGENCY MEDICINE | Admitting: ANESTHESIOLOGY
Payer: MEDICARE

## 2020-08-12 ENCOUNTER — TELEPHONE (OUTPATIENT)
Dept: ADMINISTRATIVE | Facility: OTHER | Age: 65
End: 2020-08-12

## 2020-08-12 ENCOUNTER — TELEPHONE (OUTPATIENT)
Dept: INTERNAL MEDICINE CLINIC | Facility: CLINIC | Age: 65
End: 2020-08-12

## 2020-08-12 ENCOUNTER — APPOINTMENT (INPATIENT)
Dept: ULTRASOUND IMAGING | Facility: HOSPITAL | Age: 65
DRG: 175 | End: 2020-08-12
Payer: MEDICARE

## 2020-08-12 DIAGNOSIS — J96.01 ACUTE RESPIRATORY FAILURE WITH HYPOXIA (HCC): ICD-10-CM

## 2020-08-12 DIAGNOSIS — J18.9 RIGHT MIDDLE LOBE PNEUMONIA: ICD-10-CM

## 2020-08-12 DIAGNOSIS — N17.9 AKI (ACUTE KIDNEY INJURY) (HCC): ICD-10-CM

## 2020-08-12 DIAGNOSIS — I26.09 PULMONARY EMBOLISM WITH ACUTE COR PULMONALE (HCC): Primary | ICD-10-CM

## 2020-08-12 DIAGNOSIS — K21.9 GERD (GASTROESOPHAGEAL REFLUX DISEASE): ICD-10-CM

## 2020-08-12 DIAGNOSIS — I10 ESSENTIAL HYPERTENSION: Chronic | ICD-10-CM

## 2020-08-12 DIAGNOSIS — R65.10 SIRS (SYSTEMIC INFLAMMATORY RESPONSE SYNDROME) (HCC): ICD-10-CM

## 2020-08-12 DIAGNOSIS — R31.0 GROSS HEMATURIA: ICD-10-CM

## 2020-08-12 PROBLEM — R73.9 HYPERGLYCEMIA: Status: ACTIVE | Noted: 2020-08-12

## 2020-08-12 PROBLEM — J45.909 ASTHMA: Status: ACTIVE | Noted: 2018-01-16

## 2020-08-12 PROBLEM — I26.99 ACUTE PULMONARY EMBOLISM (HCC): Status: ACTIVE | Noted: 2020-08-12

## 2020-08-12 LAB
ALBUMIN SERPL BCP-MCNC: 3.2 G/DL (ref 3.5–5)
ALP SERPL-CCNC: 92 U/L (ref 46–116)
ALT SERPL W P-5'-P-CCNC: 46 U/L (ref 12–78)
ANION GAP SERPL CALCULATED.3IONS-SCNC: 12 MMOL/L (ref 4–13)
APTT PPP: 22 SECONDS (ref 23–37)
AST SERPL W P-5'-P-CCNC: 42 U/L (ref 5–45)
BASE EX.OXY STD BLDV CALC-SCNC: 91.3 % (ref 60–80)
BASE EXCESS BLDV CALC-SCNC: -3.2 MMOL/L
BASOPHILS # BLD AUTO: 0.04 THOUSANDS/ΜL (ref 0–0.1)
BASOPHILS NFR BLD AUTO: 0 % (ref 0–1)
BILIRUB SERPL-MCNC: 0.4 MG/DL (ref 0.2–1)
BUN SERPL-MCNC: 18 MG/DL (ref 5–25)
CALCIUM SERPL-MCNC: 8.7 MG/DL (ref 8.3–10.1)
CHLORIDE SERPL-SCNC: 103 MMOL/L (ref 100–108)
CO2 SERPL-SCNC: 26 MMOL/L (ref 21–32)
CREAT SERPL-MCNC: 0.87 MG/DL (ref 0.6–1.3)
EOSINOPHIL # BLD AUTO: 0.11 THOUSAND/ΜL (ref 0–0.61)
EOSINOPHIL NFR BLD AUTO: 1 % (ref 0–6)
ERYTHROCYTE [DISTWIDTH] IN BLOOD BY AUTOMATED COUNT: 15.8 % (ref 11.6–15.1)
GFR SERPL CREATININE-BSD FRML MDRD: 70 ML/MIN/1.73SQ M
GLUCOSE SERPL-MCNC: 158 MG/DL (ref 65–140)
GLUCOSE SERPL-MCNC: 260 MG/DL (ref 65–140)
HCO3 BLDV-SCNC: 22.8 MMOL/L (ref 24–30)
HCT VFR BLD AUTO: 42.7 % (ref 34.8–46.1)
HGB BLD-MCNC: 13.3 G/DL (ref 11.5–15.4)
IMM GRANULOCYTES # BLD AUTO: 0.11 THOUSAND/UL (ref 0–0.2)
IMM GRANULOCYTES NFR BLD AUTO: 1 % (ref 0–2)
INR PPP: 1.09 (ref 0.84–1.19)
LACTATE SERPL-SCNC: 2.4 MMOL/L (ref 0.5–2)
LACTATE SERPL-SCNC: 2.7 MMOL/L (ref 0.5–2)
LIPASE SERPL-CCNC: 62 U/L (ref 73–393)
LYMPHOCYTES # BLD AUTO: 2.4 THOUSANDS/ΜL (ref 0.6–4.47)
LYMPHOCYTES NFR BLD AUTO: 17 % (ref 14–44)
MCH RBC QN AUTO: 27.9 PG (ref 26.8–34.3)
MCHC RBC AUTO-ENTMCNC: 31.1 G/DL (ref 31.4–37.4)
MCV RBC AUTO: 90 FL (ref 82–98)
MONOCYTES # BLD AUTO: 0.9 THOUSAND/ΜL (ref 0.17–1.22)
MONOCYTES NFR BLD AUTO: 6 % (ref 4–12)
NEUTROPHILS # BLD AUTO: 10.9 THOUSANDS/ΜL (ref 1.85–7.62)
NEUTS SEG NFR BLD AUTO: 75 % (ref 43–75)
NRBC BLD AUTO-RTO: 0 /100 WBCS
NT-PROBNP SERPL-MCNC: 4969 PG/ML
O2 CT BLDV-SCNC: 18.6 ML/DL
PCO2 BLDV: 44.6 MM HG (ref 42–50)
PH BLDV: 7.33 [PH] (ref 7.3–7.4)
PLATELET # BLD AUTO: 271 THOUSANDS/UL (ref 149–390)
PMV BLD AUTO: 8.8 FL (ref 8.9–12.7)
PO2 BLDV: 75.1 MM HG (ref 35–45)
POTASSIUM SERPL-SCNC: 4.2 MMOL/L (ref 3.5–5.3)
PROCALCITONIN SERPL-MCNC: <0.05 NG/ML
PROT SERPL-MCNC: 7.3 G/DL (ref 6.4–8.2)
PROTHROMBIN TIME: 13.6 SECONDS (ref 11.6–14.5)
RBC # BLD AUTO: 4.77 MILLION/UL (ref 3.81–5.12)
SARS-COV-2 RNA RESP QL NAA+PROBE: NEGATIVE
SODIUM SERPL-SCNC: 141 MMOL/L (ref 136–145)
TROPONIN I SERPL-MCNC: 0.08 NG/ML
WBC # BLD AUTO: 14.46 THOUSAND/UL (ref 4.31–10.16)

## 2020-08-12 PROCEDURE — 85730 THROMBOPLASTIN TIME PARTIAL: CPT | Performed by: PHYSICIAN ASSISTANT

## 2020-08-12 PROCEDURE — 87635 SARS-COV-2 COVID-19 AMP PRB: CPT | Performed by: PHYSICIAN ASSISTANT

## 2020-08-12 PROCEDURE — 83880 ASSAY OF NATRIURETIC PEPTIDE: CPT | Performed by: PHYSICIAN ASSISTANT

## 2020-08-12 PROCEDURE — 93005 ELECTROCARDIOGRAM TRACING: CPT

## 2020-08-12 PROCEDURE — 36415 COLL VENOUS BLD VENIPUNCTURE: CPT | Performed by: PHYSICIAN ASSISTANT

## 2020-08-12 PROCEDURE — 84484 ASSAY OF TROPONIN QUANT: CPT | Performed by: PHYSICIAN ASSISTANT

## 2020-08-12 PROCEDURE — 87040 BLOOD CULTURE FOR BACTERIA: CPT | Performed by: PHYSICIAN ASSISTANT

## 2020-08-12 PROCEDURE — 94760 N-INVAS EAR/PLS OXIMETRY 1: CPT

## 2020-08-12 PROCEDURE — 70450 CT HEAD/BRAIN W/O DYE: CPT

## 2020-08-12 PROCEDURE — 96366 THER/PROPH/DIAG IV INF ADDON: CPT

## 2020-08-12 PROCEDURE — 96365 THER/PROPH/DIAG IV INF INIT: CPT

## 2020-08-12 PROCEDURE — 99291 CRITICAL CARE FIRST HOUR: CPT | Performed by: PHYSICIAN ASSISTANT

## 2020-08-12 PROCEDURE — 82805 BLOOD GASES W/O2 SATURATION: CPT | Performed by: PHYSICIAN ASSISTANT

## 2020-08-12 PROCEDURE — 99292 CRITICAL CARE ADDL 30 MIN: CPT | Performed by: ANESTHESIOLOGY

## 2020-08-12 PROCEDURE — G1004 CDSM NDSC: HCPCS

## 2020-08-12 PROCEDURE — 83605 ASSAY OF LACTIC ACID: CPT | Performed by: PHYSICIAN ASSISTANT

## 2020-08-12 PROCEDURE — 80053 COMPREHEN METABOLIC PANEL: CPT | Performed by: PHYSICIAN ASSISTANT

## 2020-08-12 PROCEDURE — 85610 PROTHROMBIN TIME: CPT | Performed by: PHYSICIAN ASSISTANT

## 2020-08-12 PROCEDURE — 96361 HYDRATE IV INFUSION ADD-ON: CPT

## 2020-08-12 PROCEDURE — 87147 CULTURE TYPE IMMUNOLOGIC: CPT | Performed by: PHYSICIAN ASSISTANT

## 2020-08-12 PROCEDURE — 3E05317 INTRODUCTION OF OTHER THROMBOLYTIC INTO PERIPHERAL ARTERY, PERCUTANEOUS APPROACH: ICD-10-PCS | Performed by: INTERNAL MEDICINE

## 2020-08-12 PROCEDURE — 83690 ASSAY OF LIPASE: CPT | Performed by: PHYSICIAN ASSISTANT

## 2020-08-12 PROCEDURE — 96367 TX/PROPH/DG ADDL SEQ IV INF: CPT

## 2020-08-12 PROCEDURE — 74177 CT ABD & PELVIS W/CONTRAST: CPT

## 2020-08-12 PROCEDURE — 71275 CT ANGIOGRAPHY CHEST: CPT

## 2020-08-12 PROCEDURE — 85025 COMPLETE CBC W/AUTO DIFF WBC: CPT | Performed by: PHYSICIAN ASSISTANT

## 2020-08-12 PROCEDURE — 96368 THER/DIAG CONCURRENT INF: CPT

## 2020-08-12 PROCEDURE — 84145 PROCALCITONIN (PCT): CPT | Performed by: PHYSICIAN ASSISTANT

## 2020-08-12 PROCEDURE — 82948 REAGENT STRIP/BLOOD GLUCOSE: CPT

## 2020-08-12 PROCEDURE — 94660 CPAP INITIATION&MGMT: CPT

## 2020-08-12 PROCEDURE — 99291 CRITICAL CARE FIRST HOUR: CPT

## 2020-08-12 RX ORDER — LEVALBUTEROL 1.25 MG/.5ML
1.25 SOLUTION, CONCENTRATE RESPIRATORY (INHALATION) EVERY 8 HOURS PRN
Status: DISCONTINUED | OUTPATIENT
Start: 2020-08-12 | End: 2020-08-13

## 2020-08-12 RX ORDER — ALBUTEROL SULFATE 2.5 MG/3ML
2 SOLUTION RESPIRATORY (INHALATION) ONCE
Status: COMPLETED | OUTPATIENT
Start: 2020-08-12 | End: 2020-08-12

## 2020-08-12 RX ORDER — PRAVASTATIN SODIUM 80 MG/1
80 TABLET ORAL
Status: DISCONTINUED | OUTPATIENT
Start: 2020-08-13 | End: 2020-08-22 | Stop reason: HOSPADM

## 2020-08-12 RX ORDER — HEPARIN SODIUM 1000 [USP'U]/ML
8000 INJECTION, SOLUTION INTRAVENOUS; SUBCUTANEOUS ONCE
Status: COMPLETED | OUTPATIENT
Start: 2020-08-12 | End: 2020-08-12

## 2020-08-12 RX ORDER — FLUTICASONE FUROATE AND VILANTEROL 100; 25 UG/1; UG/1
1 POWDER RESPIRATORY (INHALATION)
Status: DISCONTINUED | OUTPATIENT
Start: 2020-08-13 | End: 2020-08-22 | Stop reason: HOSPADM

## 2020-08-12 RX ORDER — FAMOTIDINE 20 MG/1
10 TABLET, FILM COATED ORAL DAILY
Status: DISCONTINUED | OUTPATIENT
Start: 2020-08-13 | End: 2020-08-22 | Stop reason: HOSPADM

## 2020-08-12 RX ORDER — HEPARIN SODIUM 1000 [USP'U]/ML
8000 INJECTION, SOLUTION INTRAVENOUS; SUBCUTANEOUS
Status: DISCONTINUED | OUTPATIENT
Start: 2020-08-12 | End: 2020-08-12

## 2020-08-12 RX ORDER — DULOXETIN HYDROCHLORIDE 30 MG/1
30 CAPSULE, DELAYED RELEASE ORAL DAILY
Status: DISCONTINUED | OUTPATIENT
Start: 2020-08-13 | End: 2020-08-13

## 2020-08-12 RX ORDER — METHYLPREDNISOLONE SOD SUCC 125 MG
1 VIAL (EA) INJECTION ONCE
Status: COMPLETED | OUTPATIENT
Start: 2020-08-12 | End: 2020-08-12

## 2020-08-12 RX ORDER — LORAZEPAM 2 MG/ML
0.5 INJECTION INTRAMUSCULAR ONCE
Status: DISCONTINUED | OUTPATIENT
Start: 2020-08-12 | End: 2020-08-12

## 2020-08-12 RX ORDER — LEVOTHYROXINE SODIUM 88 UG/1
88 TABLET ORAL
Status: DISCONTINUED | OUTPATIENT
Start: 2020-08-13 | End: 2020-08-22 | Stop reason: HOSPADM

## 2020-08-12 RX ORDER — SODIUM CHLORIDE 9 MG/ML
50 INJECTION, SOLUTION INTRAVENOUS ONCE
Status: DISCONTINUED | OUTPATIENT
Start: 2020-08-12 | End: 2020-08-19

## 2020-08-12 RX ORDER — HEPARIN SODIUM 10000 [USP'U]/100ML
3-30 INJECTION, SOLUTION INTRAVENOUS
Status: DISCONTINUED | OUTPATIENT
Start: 2020-08-12 | End: 2020-08-12

## 2020-08-12 RX ORDER — HEPARIN SODIUM 1000 [USP'U]/ML
4000 INJECTION, SOLUTION INTRAVENOUS; SUBCUTANEOUS
Status: DISCONTINUED | OUTPATIENT
Start: 2020-08-12 | End: 2020-08-12

## 2020-08-12 RX ORDER — MAGNESIUM SULFATE HEPTAHYDRATE 40 MG/ML
2 INJECTION, SOLUTION INTRAVENOUS ONCE
Status: COMPLETED | OUTPATIENT
Start: 2020-08-12 | End: 2020-08-12

## 2020-08-12 RX ORDER — CHLORHEXIDINE GLUCONATE 0.12 MG/ML
15 RINSE ORAL EVERY 12 HOURS SCHEDULED
Status: DISCONTINUED | OUTPATIENT
Start: 2020-08-12 | End: 2020-08-12

## 2020-08-12 RX ADMIN — MAGNESIUM SULFATE IN WATER 2 G: 40 INJECTION, SOLUTION INTRAVENOUS at 16:50

## 2020-08-12 RX ADMIN — HEPARIN SODIUM AND DEXTROSE 18 UNITS/KG/HR: 10000; 5 INJECTION INTRAVENOUS at 18:47

## 2020-08-12 RX ADMIN — HEPARIN SODIUM 8000 UNITS: 1000 INJECTION INTRAVENOUS; SUBCUTANEOUS at 18:47

## 2020-08-12 RX ADMIN — IOHEXOL 100 ML: 350 INJECTION, SOLUTION INTRAVENOUS at 17:50

## 2020-08-12 RX ADMIN — ALTEPLASE 100 MG: KIT at 21:10

## 2020-08-12 RX ADMIN — CEFTRIAXONE SODIUM 1000 MG: 10 INJECTION, POWDER, FOR SOLUTION INTRAVENOUS at 18:07

## 2020-08-12 RX ADMIN — AZITHROMYCIN MONOHYDRATE 500 MG: 500 INJECTION, POWDER, LYOPHILIZED, FOR SOLUTION INTRAVENOUS at 18:33

## 2020-08-12 RX ADMIN — SODIUM CHLORIDE 1000 ML: 0.9 INJECTION, SOLUTION INTRAVENOUS at 16:51

## 2020-08-12 NOTE — TELEPHONE ENCOUNTER
FYI  is taking her to [facility] having trouble breathing  Pt has appt tomorrow with Kimi Abts at 1:30 what should she do

## 2020-08-12 NOTE — TELEPHONE ENCOUNTER
Patient is at [facility] being admitted for pneumonia   I cancelled her appt for 8/12

## 2020-08-12 NOTE — ED PROVIDER NOTES
History  Chief Complaint   Patient presents with    Shortness of Breath     patient present to ED with shortness of breath x2 days     72yo female with a history of breast cancer currently in remission, asthma, hypertension, hyperlipidemia, GERD, and depression presenting via EMS for evaluation of shortness of breath for the past few days  Patient states she bent over to pick something up when she felt epigastric pain in the area of her ventral hernia  Since that time, she has been having shortness of breath  Her dyspnea was worsening to the point where she could barely walk today  Patient called her PCP and was advised to call 911  Patient was found to be hypoxic to the low 80s when EMS arrived  She was placed on oxygen and given nebulizer treatments and Solumedrol prior to arrival  Patient reports her breathing has improved after receiving the nebulizer treatments  EMS also reports improvement in breath sounds  She continues to have epigastric pain  Patient denies fevers, chills, cough, vomiting, diarrhea  No known sick contacts  History provided by:  Patient, medical records and EMS personnel   used: No    Shortness of Breath   Severity:  Severe  Onset quality:  Gradual  Duration:  2 days  Timing:  Constant  Progression:  Worsening  Chronicity:  New  Relieved by:  Nothing  Worsened by:  Exertion  Ineffective treatments:  Inhaler  Associated symptoms: wheezing    Associated symptoms: no abdominal pain, no chest pain, no cough, no diaphoresis, no ear pain, no fever, no hemoptysis, no neck pain, no rash, no sore throat, no sputum production, no syncope and no vomiting        Prior to Admission Medications   Prescriptions Last Dose Informant Patient Reported? Taking?    ALPRAZolam (XANAX) 1 mg tablet 8/11/2020 at 2300 Self No Yes   Sig: TAKE 1/2-1 TABLET THREE TIMES A DAY AS NEEDED   DULoxetine (CYMBALTA) 30 mg delayed release capsule  Self No No   Sig: Take 1 capsule (30 mg total) by mouth daily   DULoxetine (CYMBALTA) 60 mg delayed release capsule  Self No No   Si po qd   Mometasone Furo-Formoterol Fum (DULERA) 100-5 MCG/ACT AERO 2020 at 1530 Self Yes Yes   Sig: Inhale   Multiple Vitamins-Minerals (OCUVITE ADULT 50+ PO)  Self Yes No   Sig: Take by mouth   NYSTATIN powder  Self No No   Sig: APPLY TO AFFECTED AREA(S) SPARINGLY  TWICE A DAY   Triamcinolone Acetonide (Nasacort Allergy 24HR) 55 MCG/ACT AERO  Self Yes No   Sig: into each nostril as needed    albuterol (PROAIR HFA) 90 mcg/act inhaler 2020 at 1530 Self Yes Yes   Sig: Inhale 2 puffs every 6 (six) hours as needed for wheezing   aspirin 81 MG tablet 2020 at Unknown time Self Yes Yes   Sig: Take 81 mg by mouth daily   cholestyramine (QUESTRAN) 4 g packet Past Month at Unknown time Self Yes Yes   Sig: Take by mouth   clotrimazole (LOTRIMIN) 1 % cream  Self Yes No   Sig: as needed    clotrimazole-betamethasone (LOTRISONE) 1-0 05 % cream  Self No No   Sig: APPLY SPARINGLY TO THE AFFECTED AREA IN GROIN 2-3 TIMES A DAY AS NEEDED   erythromycin (ILOTYCIN) ophthalmic ointment  Self Yes No   Sig: APPLY THREE TIMES A DAY TO WOUND   famotidine (PEPCID) 10 mg tablet  Self Yes No   Sig: Take 1 tablet by mouth   hydrochlorothiazide (HYDRODIURIL) 25 mg tablet  Self No No   Sig: TAKE 1 TABLET BY MOUTH  DAILY   levothyroxine 88 mcg tablet  Self No No   Sig: Take 1 po daily except on  take 2   methocarbamol (ROBAXIN) 500 mg tablet  Self No No   Sig: Take 1 tablet (500 mg total) by mouth 4 (four) times a day   pantoprazole (PROTONIX) 40 mg tablet  Self No No   Sig: TAKE 1 TABLET BY MOUTH  DAILY   potassium chloride (K-DUR,KLOR-CON) 20 mEq tablet  Self No No   Sig: TAKE 1 TABLET BY MOUTH  DAILY   quinapril (ACCUPRIL) 10 mg tablet  Self Yes No   Sig: Take 10 mg by mouth daily at bedtime   simvastatin (ZOCOR) 40 mg tablet  Self No No   Sig: TAKE ONE TABLET BY MOUTH EVERY DAY   traMADol (ULTRAM) 50 mg tablet  Self No No   Sig: TAKE 1 TABLET BY MOUTH 3  TIMES A DAY   valACYclovir (VALTREX) 1,000 mg tablet  Self Yes No   Sig: as needed       Facility-Administered Medications: None       Past Medical History:   Diagnosis Date    Allergic rhinitis     Anemia     Cancer (HCC)     breast cancer, Left side    Depression     Disease of thyroid gland     GERD (gastroesophageal reflux disease)     Hyperlipemia     Hypertension     Hypothyroidism     Last assessed: 3/25/14    Obesity     Osteoarthritis     Pre-operative laboratory examination        Past Surgical History:   Procedure Laterality Date    HAND SURGERY Left 03/2020    HYSTERECTOMY      INCISIONAL BREAST BIOPSY      KNEE ARTHROPLASTY      ROTATOR CUFF REPAIR      SHOULDER SURGERY      TRIGGER FINGER RELEASE      TRIGGER FINGER RELEASE         Family History   Problem Relation Age of Onset    Coronary artery disease Mother     Hypertension Mother         Essential    Coronary artery disease Father      I have reviewed and agree with the history as documented  E-Cigarette/Vaping    E-Cigarette Use Never User      E-Cigarette/Vaping Substances    Nicotine No     THC No     CBD No     Flavoring No     Other No     Unknown No      Social History     Tobacco Use    Smoking status: Never Smoker    Smokeless tobacco: Never Used   Substance Use Topics    Alcohol use: Yes     Frequency: Monthly or less     Drinks per session: 1 or 2     Binge frequency: Never     Comment: socially     Drug use: No       Review of Systems   Constitutional: Positive for fatigue  Negative for diaphoresis and fever  HENT: Negative for drooling, ear pain and sore throat  Eyes: Negative for discharge and redness  Respiratory: Positive for shortness of breath and wheezing  Negative for cough, hemoptysis and sputum production  Cardiovascular: Negative for chest pain, palpitations and syncope  Gastrointestinal: Negative for abdominal pain and vomiting     Musculoskeletal: Negative for neck pain and neck stiffness  Skin: Negative for color change and rash  Allergic/Immunologic: Negative for immunocompromised state  Neurological: Positive for weakness  Negative for syncope  Psychiatric/Behavioral: Negative for confusion  The patient is not nervous/anxious  Physical Exam  Physical Exam  Vitals signs and nursing note reviewed  Constitutional:       Appearance: She is well-developed  She is ill-appearing  She is not diaphoretic  Comments: Patient tachypneic with conversational dyspnea   HENT:      Head: Normocephalic and atraumatic  Right Ear: External ear normal       Left Ear: External ear normal       Nose: Nose normal    Eyes:      General: No scleral icterus  Right eye: No discharge  Left eye: No discharge  Conjunctiva/sclera: Conjunctivae normal    Neck:      Musculoskeletal: Normal range of motion and neck supple  Cardiovascular:      Rate and Rhythm: Regular rhythm  Tachycardia present  Heart sounds: Normal heart sounds  No murmur  Comments: HR in 130s, regular  Pulmonary:      Effort: Pulmonary effort is normal  Tachypnea present  No respiratory distress  Breath sounds: Normal breath sounds  No stridor  No wheezing or rales  Abdominal:      General: Bowel sounds are normal  There is no distension  Palpations: Abdomen is soft  Tenderness: There is no abdominal tenderness  There is no guarding  Hernia: A hernia is present  Hernia is present in the ventral area  Musculoskeletal: Normal range of motion  General: No deformity  Right lower leg: No edema  Left lower leg: No edema  Lymphadenopathy:      Cervical: No cervical adenopathy  Skin:     General: Skin is warm and dry  Neurological:      General: No focal deficit present  Mental Status: She is alert and oriented to person, place, and time  She is not disoriented  GCS: GCS eye subscore is 4  GCS verbal subscore is 5   GCS motor subscore is 6     Psychiatric:         Behavior: Behavior normal          Vital Signs  ED Triage Vitals   Temperature Pulse Respirations Blood Pressure SpO2   08/12/20 1607 08/12/20 1607 08/12/20 1607 08/12/20 1607 08/12/20 1607   98 2 °F (36 8 °C) (!) 125 (!) 30 113/81 93 %      Temp Source Heart Rate Source Patient Position - Orthostatic VS BP Location FiO2 (%)   08/12/20 1607 08/12/20 1607 08/12/20 1607 08/12/20 1607 08/12/20 2000   Oral Monitor Lying Right arm 70      Pain Score       --                  Vitals:    08/12/20 1800 08/12/20 1830 08/12/20 1900 08/12/20 1945   BP: 128/75 117/70 116/83 124/97   Pulse: (!) 130 (!) 135 (!) 136 (!) 133   Patient Position - Orthostatic VS:  Sitting Lying Lying         Visual Acuity      ED Medications  Medications   ALTEPLASE (ACTIVASE) IV INFUSION (PE) infusion 100 mg (has no administration in time range)     Followed by   sodium chloride 0 9 % infusion (has no administration in time range)   magnesium sulfate 2 g/50 mL IVPB (premix) 2 g (0 g Intravenous Stopped 8/12/20 1720)   albuterol (FOR EMS ONLY) (2 5 mg/3 mL) 0 083 % inhalation solution 5 mg (0 mg Does not apply Given to EMS 8/12/20 1619)   methylPREDNISolone sodium succinate (FOR EMS ONLY) (Solu-MEDROL) 125 MG injection 125 mg (0 mg Does not apply Given to EMS 8/12/20 1619)   ceftriaxone (ROCEPHIN) 1 g/50 mL in dextrose IVPB (0 mg Intravenous Stopped 8/12/20 1830)   iohexol (OMNIPAQUE) 350 MG/ML injection (MULTI-DOSE) 100 mL (100 mL Intravenous Given 8/12/20 1750)   azithromycin (ZITHROMAX) 500 mg in sodium chloride 0 9% 250mL IVPB 500 mg (0 mg Intravenous Stopped 8/12/20 2012)   heparin (porcine) injection 8,000 Units (8,000 Units Intravenous Given 8/12/20 1847)       Diagnostic Studies  Results Reviewed     Procedure Component Value Units Date/Time    Blood gas, venous [[id number]]  (Abnormal) Collected:  08/12/20 2020    Lab Status:  Final result Specimen:  Blood from Hand, Right Updated:  08/12/20 2026     pH, Juanito 7 327     pCO2, Juanito 44 6 mm Hg      pO2, Juanito 75 1 mm Hg      HCO3, Juanito 22 8 mmol/L      Base Excess, Juanito -3 2 mmol/L      O2 Content, Juanito 18 6 ml/dL      O2 HGB, VENOUS 91 3 %     Blood culture #1 [[id number]] Collected:  08/12/20 1621    Lab Status:  Preliminary result Specimen:  Blood from Hand, Right Updated:  08/12/20 2001     Blood Culture Received in Microbiology Lab  Culture in Progress  Blood culture #2 [[id number]] Collected:  08/12/20 1626    Lab Status:  Preliminary result Specimen:  Blood from Hand, Right Updated:  08/12/20 2001     Blood Culture Received in Microbiology Lab  Culture in Progress  Lactic acid 2 Hours [[id number]]  (Abnormal) Collected:  08/12/20 1821    Lab Status:  Final result Specimen:  Blood from Arm, Left Updated:  08/12/20 1911     LACTIC ACID 2 4 mmol/L     Narrative:       Result may be elevated if tourniquet was used during collection  APTT [[id number]]     Lab Status:  No result Specimen:  Blood     Novel Coronavirus Carson Juarez [facility] [[id number]]  (Normal) Collected:  08/12/20 1626    Lab Status:  Final result Specimen:  Nares from Nose Updated:  08/12/20 1726     SARS-CoV-2 Negative    Narrative: The specimen collection materials, transport medium, and/or testing methodology utilized in the production of these test results have been proven to be reliable in a limited validation with an abbreviated program under the Emergency Utilization Authorization provided by the FDA  Testing reported as "Presumptive positive" will be confirmed with secondary testing with a reference laboratory to ensure result accuracy  Clinical caution and judgement should be used with the interpretation of these results with consideration of the clinical impression and other laboratory testing  Testing reported as "Positive" or "Negative" has been proven to be accurate according to standard laboratory validation requirements    All testing is performed with control materials showing appropriate reactivity at standard intervals  Lipase [[id number]]  (Abnormal) Collected:  08/12/20 1621    Lab Status:  Final result Specimen:  Blood from Hand, Right Updated:  08/12/20 1719     Lipase 62 u/L     Comprehensive metabolic panel [[id number]]  (Abnormal) Collected:  08/12/20 1621    Lab Status:  Final result Specimen:  Blood from Hand, Right Updated:  08/12/20 1719     Sodium 141 mmol/L      Potassium 4 2 mmol/L      Chloride 103 mmol/L      CO2 26 mmol/L      ANION GAP 12 mmol/L      BUN 18 mg/dL      Creatinine 0 87 mg/dL      Glucose 158 mg/dL      Calcium 8 7 mg/dL      AST 42 U/L      ALT 46 U/L      Alkaline Phosphatase 92 U/L      Total Protein 7 3 g/dL      Albumin 3 2 g/dL      Total Bilirubin 0 40 mg/dL      eGFR 70 ml/min/1 73sq m     Narrative:       [facility] guidelines for Chronic Kidney Disease (CKD):     Stage 1 with normal or high GFR (GFR > 90 mL/min/1 73 square meters)    Stage 2 Mild CKD (GFR = 60-89 mL/min/1 73 square meters)    Stage 3A Moderate CKD (GFR = 45-59 mL/min/1 73 square meters)    Stage 3B Moderate CKD (GFR = 30-44 mL/min/1 73 square meters)    Stage 4 Severe CKD (GFR = 15-29 mL/min/1 73 square meters)    Stage 5 End Stage CKD (GFR <15 mL/min/1 73 square meters)  Note: GFR calculation is accurate only with a steady state creatinine    NT-BNP PRO [[id number]]  (Abnormal) Collected:  08/12/20 1621    Lab Status:  Final result Specimen:  Blood from Hand, Right Updated:  08/12/20 1719     NT-proBNP 4,969 pg/mL     Lactic acid [[id number]]  (Abnormal) Collected:  08/12/20 1621    Lab Status:  Final result Specimen:  Blood from Arm, Right Updated:  08/12/20 1704     LACTIC ACID 2 7 mmol/L     Narrative:       Result may be elevated if tourniquet was used during collection      Troponin I [[id number]]  (Abnormal) Collected:  08/12/20 1621    Lab Status:  Final result Specimen:  Blood from Arm, Right Updated:  08/12/20 1659     Troponin I 0 08 ng/mL Protime-INR [[id number]]  (Normal) Collected:  08/12/20 1621    Lab Status:  Final result Specimen:  Blood from Arm, Right Updated:  08/12/20 1658     Protime 13 6 seconds      INR 1 09    APTT [[id number]]  (Abnormal) Collected:  08/12/20 1621    Lab Status:  Final result Specimen:  Blood from Arm, Right Updated:  08/12/20 1658     PTT 22 seconds     CBC and differential [[id number]]  (Abnormal) Collected:  08/12/20 1621    Lab Status:  Final result Specimen:  Blood from Arm, Right Updated:  08/12/20 1635     WBC 14 46 Thousand/uL      RBC 4 77 Million/uL      Hemoglobin 13 3 g/dL      Hematocrit 42 7 %      MCV 90 fL      MCH 27 9 pg      MCHC 31 1 g/dL      RDW 15 8 %      MPV 8 8 fL      Platelets 519 Thousands/uL      nRBC 0 /100 WBCs      Neutrophils Relative 75 %      Immat GRANS % 1 %      Lymphocytes Relative 17 %      Monocytes Relative 6 %      Eosinophils Relative 1 %      Basophils Relative 0 %      Neutrophils Absolute 10 90 Thousands/µL      Immature Grans Absolute 0 11 Thousand/uL      Lymphocytes Absolute 2 40 Thousands/µL      Monocytes Absolute 0 90 Thousand/µL      Eosinophils Absolute 0 11 Thousand/µL      Basophils Absolute 0 04 Thousands/µL     Procalcitonin [[id number]] Collected:  08/12/20 1621    Lab Status: In process Specimen:  Blood from Arm, Right Updated:  08/12/20 1629                 CT head without contrast   Final Result by Heidi Finley MD (08/12 2043)      No acute intracranial abnormality  Workstation performed: [id number]         PE Study with CT Abdomen and Pelvis with contrast   Final Result by Adolfo Garcia MD (08/12 1835)      PULMONARY ARTERIAL TREE:  Bilateral pulmonary emboli in the main, lobar, segmental and subsegmental branches  RV/LV ratio measures 1 7 suggesting right heart strain  LUNGS:  Right middle lobe consolidation could relate to pneumonia and/or pulmonary infarct   Posttreatment follow-up to radiographic resolution is recommended with unenhanced chest CT in 3 months               I personally discussed this study with Micheline Mccray on 8/12/2020 at 6:33 PM                                   Workstation performed: [id number]                    Procedures  ECG 12 Lead Documentation Only    Date/Time: 8/12/2020 10:09 PM  Performed by: Jasiel Miner PA-C  Authorized by: Jasiel Miner PA-C     Indications / Diagnosis:  SOB  ECG reviewed by me, the ED Provider: yes    Patient location:  ED  Previous ECG:     Previous ECG:  Compared to current    Comparison ECG info:  1/15/18    Similarity:  Changes noted  Interpretation:     Interpretation: abnormal    Rate:     ECG rate:  144    ECG rate assessment: tachycardic    Rhythm:     Rhythm: sinus rhythm    Ectopy:     Ectopy: none    QRS:     QRS axis:  Right  Conduction:     Conduction: normal    ST segments:     ST segments:  Normal  T waves:     T waves: non-specific    CriticalCare Time  Performed by: Jasiel Miner PA-C  Authorized by: Jasiel Miner PA-C     Critical care provider statement:     Critical care time (minutes):  45    Critical care time was exclusive of:  Separately billable procedures and treating other patients    Critical care was necessary to treat or prevent imminent or life-threatening deterioration of the following conditions:  Respiratory failure (Submassive PE that required TPA administration)    Critical care was time spent personally by me on the following activities:  Blood draw for specimens, obtaining history from patient or surrogate, ordering and performing treatments and interventions, development of treatment plan with patient or surrogate, ordering and review of laboratory studies, discussions with consultants, ordering and review of radiographic studies, re-evaluation of patient's condition, evaluation of patient's response to treatment, review of old charts and examination of patient             ED Course  ED Course as of Aug 12 2050   Wed Aug 12, 2020   1703 Troponin I(!): 0 08   1707 LACTIC ACID(!!): 2 7   1734 Lactic acid noted to be elevated at 2 7  Patient is increasingly tachypneic after receiving 500cc IV fluids here  Patient received 1L fluid by EMS  She remains of 4L NC  Risks of further fluid administration outweigh benefits  Will hold on further fluid resuscitation  1846 Critical care to evaluate patient at bedside  1948 On re-evaluation, patient is now complaining of worsening chest pain and appears diaphoretic  Critical care notified  Patient being placed on HFNC        2007 Critical care at bedside  Patient appears lethargic  VBG and CT head ordered  Given worsening of clinical picture, plan for systemic TPA  US AUDIT      Most Recent Value   Initial Alcohol Screen: US AUDIT-C    1  How often do you have a drink containing alcohol? 2 Filed at: 08/12/2020 1626   2  How many drinks containing alcohol do you have on a typical day you are drinking? 0 Filed at: 08/12/2020 1626   3b  FEMALE Any Age, or MALE 65+: How often do you have 4 or more drinks on one occassion? 0 Filed at: 08/12/2020 1626   Audit-C Score  2 Filed at: 08/12/2020 1626                  DALIA/DAST-10      Most Recent Value   How many times in the past year have you    Used an illegal drug or used a prescription medication for non-medical reasons? Never Filed at: 08/12/2020 1626              Initial Sepsis Screening     Row Name 08/12/20 1835                Is the patient's history suggestive of a new or worsening infection? (!) Yes (Proceed)  -MM        Suspected source of infection  pneumonia  -MM        Are two or more of the following signs & symptoms of infection both present and new to the patient?   (!) Yes (Proceed)  -MM        Indicate SIRS criteria  Tachycardia > 90 bpm;Tachypnea > 20 resp per min;Leukocytosis (WBC > 78972 IJL)  -MM        If the answer is yes to both questions, suspicion of sepsis is present          If severe sepsis is present AND tissue hypoperfusion perists in the hour after fluid resuscitation or lactate > 4, the patient meets criteria for SEPTIC SHOCK          Are any of the following organ dysfunction criteria present within 6 hours of suspected infection and SIRS criteria that are NOT considered to be chronic conditions? (!) Yes  -MM        Organ dysfunction  Lactate > 2 0 mmol/L  -MM        Date of presentation of severe sepsis          Time of presentation of severe sepsis          Tissue hypoperfusion persists in the hour after crystalloid fluid administration, evidenced, by either:          Was hypotension present within one hour of the conclusion of crystalloid fluid administration?           Date of presentation of septic shock          Time of presentation of septic shock            User Key  (r) = Recorded By, (t) = Taken By, (c) = Cosigned By    Initials Name Provider Type    RUSLAN Watts PA-C Physician Assistant            Nabeel Ramirez Criteria for PE      Most Recent Value   Wells' Criteria for PE   Clinical signs and symptoms of DVT  0 Filed at: 08/12/2020 1618   PE is primary diagnosis or equally likely  0 Filed at: 08/12/2020 1618   HR >100  1 5 Filed at: 08/12/2020 1618   Immobilization at least 3 days or Surgery in the previous 4 weeks  0 Filed at: 08/12/2020 1618   Previous, objectively diagnosed PE or DVT  0 Filed at: 08/12/2020 1618   Hemoptysis  0 Filed at: 08/12/2020 1618   Malignancy with treatment within 6 months or palliative  1 Filed at: 08/12/2020 1618   Wells' Criteria Total  2 5 Filed at: 08/12/2020 1618                  MDM  Number of Diagnoses or Management Options  Acute respiratory failure with hypoxia ([address] ): new and requires workup  Pulmonary embolism with acute cor pulmonale ([address] ): new and requires workup  Right middle lobe pneumonia: new and requires workup  SIRS (systemic inflammatory response syndrome) ([address] ): new and requires workup  Diagnosis management comments: 70-year-old female with a remote history of breast cancer and asthma presenting via EMS for evaluation of shortness of breath  Patient has been having dyspnea for the past few days  She acutely worsened today and call 911  EMS found patient to be hypoxic in the low 80s  Patient was given a nebulizer treatment and Solu-Medrol prior to arrival with improvement of symptoms  Patient is tachypneic and tachycardic to the 130s on initial evaluation  Lungs are clear  Patient placed on 3 L nasal cannula for hypoxia  Differential diagnosis includes was not limited to:  Pneumonia, COVID-19, ACS, PE, asthma exacerbation, pneumothorax, pleural effusion, heart failure exacerbation, sepsis, dehydration, anxiety    Initial ED plan: Check cardiac labs, blood cultures, lactate, COVID-19 swab, and CTA CAP  IV fluid bolus and magnesium for possible bronchospasm  Final assessment: Labs significant for an elevated troponin at 0 08 and BNP of 4900  White count 14 and lactic acid 2 7  EKG reveals sinus tachycardia with HR of 144 with nonspecific T wave changes and right axis deviation  CTA chest reveals bilateral pulmonary embolism with right heart strain and RML infiltrate  Patient given 500cc IV fluids and became increasingly dyspneic  Risks of additional fluids outweigh benefits and fluids held  Patient given IV Rocephin and azithro for CAP treatment  She was started on a heparin gtt  Patient discussed with critical care attending, Dr Ramin Denny  While in ED, patient acutely worsened and became diaphoretic with increased respiratory effort  Patient transitioned to HFNC and decision was made to administer TPA for submassive PE  Patient and  updated on care plan  Patient admitted to ICU for further management          Amount and/or Complexity of Data Reviewed  Clinical lab tests: ordered and reviewed  Tests in the radiology section of CPT®: ordered and reviewed  Tests in the medicine section of CPT®: ordered and reviewed  Discussion of test results with the performing providers: yes  Decide to obtain previous medical records or to obtain history from someone other than the patient: yes  Obtain history from someone other than the patient: yes  Review and summarize past medical records: yes  Discuss the patient with other providers: yes  Independent visualization of images, tracings, or specimens: yes    Risk of Complications, Morbidity, and/or Mortality  Presenting problems: high  Diagnostic procedures: high  Management options: high          Disposition  Final diagnoses:   Pulmonary embolism with acute cor pulmonale (HCC)   Right middle lobe pneumonia   SIRS (systemic inflammatory response syndrome) ([facility] [address] )   Acute respiratory failure with hypoxia ([address] )     Time reflects when diagnosis was documented in both MDM as applicable and the Disposition within this note     Time User Action Codes Description Comment    8/12/2020  6:51  [facility] [address], [place] [I26 [id number]] Bilateral pulmonary embolism ([facility] [address] )     8/12/2020  6:51  [facility] [address] [I26 [id number]] Bilateral pulmonary embolism ([facility] [address] )     8/12/2020  6:51  [facility] [place] [I26 [id number]] Pulmonary embolism with acute cor pulmonale ([facility] [address] )     8/12/2020  6:51  [facility] [place] [J18 [id number]] Right middle lobe pneumonia     8/12/2020  6:52 PM Maryam Figueroa Add [R65 [id number]] SIRS (systemic inflammatory response syndrome) ([facility] [address] )     8/12/2020  7:49  [facility] [address] [J96 [id number]] Acute respiratory failure with hypoxia [facility])       ED Disposition     ED Disposition Condition Date/Time Comment    Admit Stable Wed Aug 12, 2020  8:11 PM Case was discussed with Dr Madan Chamberlain and the patient's admission status was agreed to be Admission Status: inpatient status to the service of Dr Madan Chamberlain   Follow-up Information    None         Patient's Medications   Discharge Prescriptions    No medications on file     No discharge procedures on file      PDMP Review       Value Time User PDMP Reviewed  Yes 7/22/2020  1:33 PM Morgan Garcia MD          ED Provider  Electronically Signed by           Elisa Norton PA-C  08/12/20 2216

## 2020-08-13 ENCOUNTER — APPOINTMENT (INPATIENT)
Dept: ULTRASOUND IMAGING | Facility: HOSPITAL | Age: 65
DRG: 175 | End: 2020-08-13
Payer: MEDICARE

## 2020-08-13 ENCOUNTER — APPOINTMENT (INPATIENT)
Dept: CT IMAGING | Facility: HOSPITAL | Age: 65
DRG: 175 | End: 2020-08-13
Payer: MEDICARE

## 2020-08-13 ENCOUNTER — APPOINTMENT (INPATIENT)
Dept: NON INVASIVE DIAGNOSTICS | Facility: HOSPITAL | Age: 65
DRG: 175 | End: 2020-08-13
Payer: MEDICARE

## 2020-08-13 PROBLEM — R65.10 SIRS (SYSTEMIC INFLAMMATORY RESPONSE SYNDROME) (HCC): Status: ACTIVE | Noted: 2020-08-13

## 2020-08-13 LAB
ANION GAP SERPL CALCULATED.3IONS-SCNC: 9 MMOL/L (ref 4–13)
APTT PPP: 160 SECONDS (ref 23–37)
APTT PPP: 65 SECONDS (ref 23–37)
APTT PPP: 77 SECONDS (ref 23–37)
APTT PPP: 77 SECONDS (ref 23–37)
BASOPHILS # BLD AUTO: 0.01 THOUSANDS/ΜL (ref 0–0.1)
BASOPHILS NFR BLD AUTO: 0 % (ref 0–1)
BUN SERPL-MCNC: 16 MG/DL (ref 5–25)
CA-I BLD-SCNC: 1.16 MMOL/L (ref 1.12–1.32)
CALCIUM SERPL-MCNC: 8.9 MG/DL (ref 8.3–10.1)
CHLORIDE SERPL-SCNC: 106 MMOL/L (ref 100–108)
CO2 SERPL-SCNC: 30 MMOL/L (ref 21–32)
CREAT SERPL-MCNC: 0.96 MG/DL (ref 0.6–1.3)
EOSINOPHIL # BLD AUTO: 0 THOUSAND/ΜL (ref 0–0.61)
EOSINOPHIL NFR BLD AUTO: 0 % (ref 0–6)
ERYTHROCYTE [DISTWIDTH] IN BLOOD BY AUTOMATED COUNT: 15.9 % (ref 11.6–15.1)
GFR SERPL CREATININE-BSD FRML MDRD: 62 ML/MIN/1.73SQ M
GLUCOSE SERPL-MCNC: 111 MG/DL (ref 65–140)
GLUCOSE SERPL-MCNC: 118 MG/DL (ref 65–140)
GLUCOSE SERPL-MCNC: 127 MG/DL (ref 65–140)
GLUCOSE SERPL-MCNC: 148 MG/DL (ref 65–140)
GLUCOSE SERPL-MCNC: 168 MG/DL (ref 65–140)
HCT VFR BLD AUTO: 40.6 % (ref 34.8–46.1)
HGB BLD-MCNC: 12.7 G/DL (ref 11.5–15.4)
IMM GRANULOCYTES # BLD AUTO: 0.09 THOUSAND/UL (ref 0–0.2)
IMM GRANULOCYTES NFR BLD AUTO: 1 % (ref 0–2)
INR PPP: 1.69 (ref 0.84–1.19)
LYMPHOCYTES # BLD AUTO: 1.1 THOUSANDS/ΜL (ref 0.6–4.47)
LYMPHOCYTES NFR BLD AUTO: 9 % (ref 14–44)
MAGNESIUM SERPL-MCNC: 2.4 MG/DL (ref 1.6–2.6)
MCH RBC QN AUTO: 27.9 PG (ref 26.8–34.3)
MCHC RBC AUTO-ENTMCNC: 31.3 G/DL (ref 31.4–37.4)
MCV RBC AUTO: 89 FL (ref 82–98)
MONOCYTES # BLD AUTO: 0.31 THOUSAND/ΜL (ref 0.17–1.22)
MONOCYTES NFR BLD AUTO: 3 % (ref 4–12)
NEUTROPHILS # BLD AUTO: 10.63 THOUSANDS/ΜL (ref 1.85–7.62)
NEUTS SEG NFR BLD AUTO: 87 % (ref 43–75)
NRBC BLD AUTO-RTO: 0 /100 WBCS
PHOSPHATE SERPL-MCNC: 4.7 MG/DL (ref 2.3–4.1)
PLATELET # BLD AUTO: 248 THOUSANDS/UL (ref 149–390)
PMV BLD AUTO: 8.7 FL (ref 8.9–12.7)
POTASSIUM SERPL-SCNC: 3.7 MMOL/L (ref 3.5–5.3)
PROTHROMBIN TIME: 19.1 SECONDS (ref 11.6–14.5)
RBC # BLD AUTO: 4.55 MILLION/UL (ref 3.81–5.12)
SODIUM SERPL-SCNC: 145 MMOL/L (ref 136–145)
TROPONIN I SERPL-MCNC: 0.11 NG/ML
WBC # BLD AUTO: 12.14 THOUSAND/UL (ref 4.31–10.16)

## 2020-08-13 PROCEDURE — 80048 BASIC METABOLIC PNL TOTAL CA: CPT | Performed by: PHYSICIAN ASSISTANT

## 2020-08-13 PROCEDURE — 93306 TTE W/DOPPLER COMPLETE: CPT | Performed by: INTERNAL MEDICINE

## 2020-08-13 PROCEDURE — 84100 ASSAY OF PHOSPHORUS: CPT | Performed by: PHYSICIAN ASSISTANT

## 2020-08-13 PROCEDURE — 85025 COMPLETE CBC W/AUTO DIFF WBC: CPT | Performed by: PHYSICIAN ASSISTANT

## 2020-08-13 PROCEDURE — 82330 ASSAY OF CALCIUM: CPT | Performed by: PHYSICIAN ASSISTANT

## 2020-08-13 PROCEDURE — 93306 TTE W/DOPPLER COMPLETE: CPT

## 2020-08-13 PROCEDURE — 83735 ASSAY OF MAGNESIUM: CPT | Performed by: PHYSICIAN ASSISTANT

## 2020-08-13 PROCEDURE — 85610 PROTHROMBIN TIME: CPT | Performed by: PHYSICIAN ASSISTANT

## 2020-08-13 PROCEDURE — 94760 N-INVAS EAR/PLS OXIMETRY 1: CPT

## 2020-08-13 PROCEDURE — 84484 ASSAY OF TROPONIN QUANT: CPT | Performed by: PHYSICIAN ASSISTANT

## 2020-08-13 PROCEDURE — 82948 REAGENT STRIP/BLOOD GLUCOSE: CPT

## 2020-08-13 PROCEDURE — 94660 CPAP INITIATION&MGMT: CPT

## 2020-08-13 PROCEDURE — 85730 THROMBOPLASTIN TIME PARTIAL: CPT | Performed by: NURSE PRACTITIONER

## 2020-08-13 PROCEDURE — 93970 EXTREMITY STUDY: CPT | Performed by: SURGERY

## 2020-08-13 PROCEDURE — 93970 EXTREMITY STUDY: CPT

## 2020-08-13 PROCEDURE — 99233 SBSQ HOSP IP/OBS HIGH 50: CPT | Performed by: PHYSICIAN ASSISTANT

## 2020-08-13 PROCEDURE — 85730 THROMBOPLASTIN TIME PARTIAL: CPT | Performed by: PHYSICIAN ASSISTANT

## 2020-08-13 RX ORDER — DULOXETIN HYDROCHLORIDE 30 MG/1
90 CAPSULE, DELAYED RELEASE ORAL DAILY
Status: DISCONTINUED | OUTPATIENT
Start: 2020-08-14 | End: 2020-08-13

## 2020-08-13 RX ORDER — ALBUTEROL SULFATE 2.5 MG/3ML
2.5 SOLUTION RESPIRATORY (INHALATION) EVERY 6 HOURS PRN
Status: DISCONTINUED | OUTPATIENT
Start: 2020-08-13 | End: 2020-08-22 | Stop reason: HOSPADM

## 2020-08-13 RX ORDER — ALPRAZOLAM 0.25 MG/1
0.25 TABLET ORAL 2 TIMES DAILY PRN
Status: DISCONTINUED | OUTPATIENT
Start: 2020-08-13 | End: 2020-08-22 | Stop reason: HOSPADM

## 2020-08-13 RX ORDER — HEPARIN SODIUM 10000 [USP'U]/100ML
3-30 INJECTION, SOLUTION INTRAVENOUS
Status: DISCONTINUED | OUTPATIENT
Start: 2020-08-13 | End: 2020-08-20

## 2020-08-13 RX ORDER — POTASSIUM CHLORIDE 20 MEQ/1
40 TABLET, EXTENDED RELEASE ORAL ONCE
Status: COMPLETED | OUTPATIENT
Start: 2020-08-13 | End: 2020-08-13

## 2020-08-13 RX ORDER — ALBUTEROL SULFATE 90 UG/1
2 AEROSOL, METERED RESPIRATORY (INHALATION) EVERY 4 HOURS PRN
Status: DISCONTINUED | OUTPATIENT
Start: 2020-08-13 | End: 2020-08-22 | Stop reason: HOSPADM

## 2020-08-13 RX ORDER — DULOXETIN HYDROCHLORIDE 60 MG/1
90 CAPSULE, DELAYED RELEASE ORAL DAILY
COMMUNITY
End: 2020-08-25 | Stop reason: SDUPTHER

## 2020-08-13 RX ADMIN — LEVOTHYROXINE SODIUM 88 MCG: 88 TABLET ORAL at 06:16

## 2020-08-13 RX ADMIN — ALPRAZOLAM 0.25 MG: 0.25 TABLET ORAL at 14:09

## 2020-08-13 RX ADMIN — FAMOTIDINE 10 MG: 20 TABLET, FILM COATED ORAL at 09:51

## 2020-08-13 RX ADMIN — ALPRAZOLAM 0.25 MG: 0.25 TABLET ORAL at 22:43

## 2020-08-13 RX ADMIN — HEPARIN SODIUM AND DEXTROSE 15 UNITS/KG/HR: 10000; 5 INJECTION INTRAVENOUS at 15:47

## 2020-08-13 RX ADMIN — FLUTICASONE FUROATE AND VILANTEROL TRIFENATATE 1 PUFF: 100; 25 POWDER RESPIRATORY (INHALATION) at 08:48

## 2020-08-13 RX ADMIN — DULOXETINE HYDROCHLORIDE 90 MG: 60 CAPSULE, DELAYED RELEASE ORAL at 10:07

## 2020-08-13 RX ADMIN — POTASSIUM CHLORIDE 40 MEQ: 1500 TABLET, EXTENDED RELEASE ORAL at 06:16

## 2020-08-13 RX ADMIN — PRAVASTATIN SODIUM 80 MG: 80 TABLET ORAL at 15:48

## 2020-08-13 RX ADMIN — HEPARIN SODIUM AND DEXTROSE 18 UNITS/KG/HR: 10000; 5 INJECTION INTRAVENOUS at 04:45

## 2020-08-13 NOTE — PLAN OF CARE
Problem: Potential for Falls  Goal: Patient will remain free of falls  Description: INTERVENTIONS:  - Assess patient frequently for physical needs  -  Identify cognitive and physical deficits and behaviors that affect risk of falls    -  [place] fall precautions as indicated by assessment   - Educate patient/family on patient safety including physical limitations  - Instruct patient to call for assistance with activity based on assessment  - Modify environment to reduce risk of injury  - Consider OT/PT consult to assist with strengthening/mobility  Outcome: Progressing     Problem: DISCHARGE PLANNING - CARE MANAGEMENT  Goal: Discharge to post-acute care or home with appropriate resources  Description: INTERVENTIONS:  - Conduct assessment to determine patient/family and health care team treatment goals, and need for post-acute services based on payer coverage, community resources, and patient preferences, and barriers to discharge  - Address psychosocial, clinical, and financial barriers to discharge as identified in assessment in conjunction with the patient/family and health care team  - Arrange appropriate level of post-acute services according to patients   needs and preference and payer coverage in collaboration with the physician and health care team  - Communicate with and update the patient/family, physician, and health care team regarding progress on the discharge plan  - Arrange appropriate transportation to post-acute venues  Outcome: Progressing     Problem: PAIN - ADULT  Goal: Verbalizes/displays adequate comfort level or baseline comfort level  Description: Interventions:  - Encourage patient to monitor pain and request assistance  - Assess pain using appropriate pain scale  - Administer analgesics based on type and severity of pain and evaluate response  - Implement non-pharmacological measures as appropriate and evaluate response  - Consider cultural and social influences on pain and pain management  - Notify physician/advanced practitioner if interventions unsuccessful or patient reports new pain  Outcome: Progressing     Problem: SAFETY ADULT  Goal: Patient will remain free of falls  Description: INTERVENTIONS:  - Assess patient frequently for physical needs  -  Identify cognitive and physical deficits and behaviors that affect risk of falls    -  [place] fall precautions as indicated by assessment   - Educate patient/family on patient safety including physical limitations  - Instruct patient to call for assistance with activity based on assessment  - Modify environment to reduce risk of injury  - Consider OT/PT consult to assist with strengthening/mobility  Outcome: Progressing  Goal: Maintain or return to baseline ADL function  Description: INTERVENTIONS:  -  Assess patient's ability to carry out ADLs; assess patient's baseline for ADL function and identify physical deficits which impact ability to perform ADLs (bathing, care of mouth/teeth, toileting, grooming, dressing, etc )  - Assess/evaluate cause of self-care deficits   - Assess range of motion  - Assess patient's mobility; develop plan if impaired  - Assess patient's need for assistive devices and provide as appropriate  - Encourage maximum independence but intervene and supervise when necessary  - Involve family in performance of ADLs  - Assess for home care needs following discharge   - Consider OT consult to assist with ADL evaluation and planning for discharge  - Provide patient education as appropriate  Outcome: Progressing  Goal: Maintain or return mobility status to optimal level  Description: INTERVENTIONS:  - Assess patient's baseline mobility status (ambulation, transfers, stairs, etc )    - Identify cognitive and physical deficits and behaviors that affect mobility  - Identify mobility aids required to assist with transfers and/or ambulation (gait belt, sit-to-stand, lift, walker, cane, etc )  - [place] fall precautions as indicated by assessment  - Record patient progress and toleration of activity level on Mobility SBAR; progress patient to next Phase/Stage  - Instruct patient to call for assistance with activity based on assessment  - Consider rehabilitation consult to assist with strengthening/weightbearing, etc   Outcome: Progressing     Problem: DISCHARGE PLANNING  Goal: Discharge to home or other facility with appropriate resources  Description: INTERVENTIONS:  - Identify barriers to discharge w/patient and caregiver  - Arrange for needed discharge resources and transportation as appropriate  - Identify discharge learning needs (meds, wound care, etc )  - Arrange for interpretive services to assist at discharge as needed  - Refer to Case Management Department for coordinating discharge planning if the patient needs post-hospital services based on physician/advanced practitioner order or complex needs related to functional status, cognitive ability, or social support system  Outcome: Progressing     Problem: Knowledge Deficit  Goal: Patient/family/caregiver demonstrates understanding of disease process, treatment plan, medications, and discharge instructions  Description: Complete learning assessment and assess knowledge base    Interventions:  - Provide teaching at level of understanding  - Provide teaching via preferred learning methods  Outcome: Progressing     Problem: RESPIRATORY - ADULT  Goal: Achieves optimal ventilation and oxygenation  Description: INTERVENTIONS:  - Assess for changes in respiratory status  - Assess for changes in mentation and behavior  - Position to facilitate oxygenation and minimize respiratory effort  - Oxygen administered by appropriate delivery if ordered  - Initiate smoking cessation education as indicated  - Encourage broncho-pulmonary hygiene including cough, deep breathe, Incentive Spirometry  - Assess the need for suctioning and aspirate as needed  - Assess and instruct to report SOB or any respiratory difficulty  - Respiratory Therapy support as indicated  Outcome: Progressing     Problem: METABOLIC, FLUID AND ELECTROLYTES - ADULT  Goal: Electrolytes maintained within normal limits  Description: INTERVENTIONS:  - Monitor labs and assess patient for signs and symptoms of electrolyte imbalances  - Administer electrolyte replacement as ordered  - Monitor response to electrolyte replacements, including repeat lab results as appropriate  - Instruct patient on fluid and nutrition as appropriate  Outcome: Progressing  Goal: Fluid balance maintained  Description: INTERVENTIONS:  - Monitor labs   - Monitor I/O and WT  - Instruct patient on fluid and nutrition as appropriate  - Assess for signs & symptoms of volume excess or deficit  Outcome: Progressing  Goal: Glucose maintained within target range  Description: INTERVENTIONS:  - Monitor Blood Glucose as ordered  - Assess for signs and symptoms of hyperglycemia and hypoglycemia  - Administer ordered medications to maintain glucose within target range  - Assess nutritional intake and initiate nutrition service referral as needed  Outcome: Progressing     Problem: HEMATOLOGIC - ADULT  Goal: Maintains hematologic stability  Description: INTERVENTIONS  - Assess for signs and symptoms of bleeding or hemorrhage  - Monitor labs  - Administer supportive blood products/factors as ordered and appropriate  Outcome: Progressing

## 2020-08-13 NOTE — ASSESSMENT & PLAN NOTE
· Suspect all 2/2 pulmonary embolism  · Do not suspect active infection  · Procalcitonin negative  · Monitor fever curve, WBC count  · Lactic mildly elevated and downtrending

## 2020-08-13 NOTE — PROGRESS NOTES
Pt skin assessed in AM with DTIs noticed on buttocks  Picture taken, documented, Allevyn applied, and pressure offloaded with wedge  According to night shift RNs only slight redness noted during 0400 assessment  Pt stated she was on a bedpan during the night and a piece of plastic was poking her  When she told the RN it was removed and a new bedpan placed  CC AP made aware and because pt received TPA and Heparin infusion there is an increased risk of bruising which most likely precipitated the DTIs  Wound care consulted

## 2020-08-13 NOTE — PLAN OF CARE
Problem: Potential for Falls  Goal: Patient will remain free of falls  Description: INTERVENTIONS:  - Assess patient frequently for physical needs  -  Identify cognitive and physical deficits and behaviors that affect risk of falls    -  [place] fall precautions as indicated by assessment   - Educate patient/family on patient safety including physical limitations  - Instruct patient to call for assistance with activity based on assessment  - Modify environment to reduce risk of injury  - Consider OT/PT consult to assist with strengthening/mobility  Outcome: Progressing     Problem: DISCHARGE PLANNING - CARE MANAGEMENT  Goal: Discharge to post-acute care or home with appropriate resources  Description: INTERVENTIONS:  - Conduct assessment to determine patient/family and health care team treatment goals, and need for post-acute services based on payer coverage, community resources, and patient preferences, and barriers to discharge  - Address psychosocial, clinical, and financial barriers to discharge as identified in assessment in conjunction with the patient/family and health care team  - Arrange appropriate level of post-acute services according to patients   needs and preference and payer coverage in collaboration with the physician and health care team  - Communicate with and update the patient/family, physician, and health care team regarding progress on the discharge plan  - Arrange appropriate transportation to post-acute venues  Outcome: Progressing     Problem: PAIN - ADULT  Goal: Verbalizes/displays adequate comfort level or baseline comfort level  Description: Interventions:  - Encourage patient to monitor pain and request assistance  - Assess pain using appropriate pain scale  - Administer analgesics based on type and severity of pain and evaluate response  - Implement non-pharmacological measures as appropriate and evaluate response  - Consider cultural and social influences on pain and pain management  - Notify physician/advanced practitioner if interventions unsuccessful or patient reports new pain  Outcome: Progressing     Problem: SAFETY ADULT  Goal: Patient will remain free of falls  Description: INTERVENTIONS:  - Assess patient frequently for physical needs  -  Identify cognitive and physical deficits and behaviors that affect risk of falls    -  [place] fall precautions as indicated by assessment   - Educate patient/family on patient safety including physical limitations  - Instruct patient to call for assistance with activity based on assessment  - Modify environment to reduce risk of injury  - Consider OT/PT consult to assist with strengthening/mobility  Outcome: Progressing  Goal: Maintain or return to baseline ADL function  Description: INTERVENTIONS:  -  Assess patient's ability to carry out ADLs; assess patient's baseline for ADL function and identify physical deficits which impact ability to perform ADLs (bathing, care of mouth/teeth, toileting, grooming, dressing, etc )  - Assess/evaluate cause of self-care deficits   - Assess range of motion  - Assess patient's mobility; develop plan if impaired  - Assess patient's need for assistive devices and provide as appropriate  - Encourage maximum independence but intervene and supervise when necessary  - Involve family in performance of ADLs  - Assess for home care needs following discharge   - Consider OT consult to assist with ADL evaluation and planning for discharge  - Provide patient education as appropriate  Outcome: Progressing  Goal: Maintain or return mobility status to optimal level  Description: INTERVENTIONS:  - Assess patient's baseline mobility status (ambulation, transfers, stairs, etc )    - Identify cognitive and physical deficits and behaviors that affect mobility  - Identify mobility aids required to assist with transfers and/or ambulation (gait belt, sit-to-stand, lift, walker, cane, etc )  - [place] fall precautions as indicated by assessment  - Record patient progress and toleration of activity level on Mobility SBAR; progress patient to next Phase/Stage  - Instruct patient to call for assistance with activity based on assessment  - Consider rehabilitation consult to assist with strengthening/weightbearing, etc   Outcome: Progressing     Problem: DISCHARGE PLANNING  Goal: Discharge to home or other facility with appropriate resources  Description: INTERVENTIONS:  - Identify barriers to discharge w/patient and caregiver  - Arrange for needed discharge resources and transportation as appropriate  - Identify discharge learning needs (meds, wound care, etc )  - Arrange for interpretive services to assist at discharge as needed  - Refer to Case Management Department for coordinating discharge planning if the patient needs post-hospital services based on physician/advanced practitioner order or complex needs related to functional status, cognitive ability, or social support system  Outcome: Progressing     Problem: Knowledge Deficit  Goal: Patient/family/caregiver demonstrates understanding of disease process, treatment plan, medications, and discharge instructions  Description: Complete learning assessment and assess knowledge base    Interventions:  - Provide teaching at level of understanding  - Provide teaching via preferred learning methods  Outcome: Progressing     Problem: RESPIRATORY - ADULT  Goal: Achieves optimal ventilation and oxygenation  Description: INTERVENTIONS:  - Assess for changes in respiratory status  - Assess for changes in mentation and behavior  - Position to facilitate oxygenation and minimize respiratory effort  - Oxygen administered by appropriate delivery if ordered  - Initiate smoking cessation education as indicated  - Encourage broncho-pulmonary hygiene including cough, deep breathe, Incentive Spirometry  - Assess the need for suctioning and aspirate as needed  - Assess and instruct to report SOB or any respiratory difficulty  - Respiratory Therapy support as indicated  Outcome: Progressing     Problem: METABOLIC, FLUID AND ELECTROLYTES - ADULT  Goal: Electrolytes maintained within normal limits  Description: INTERVENTIONS:  - Monitor labs and assess patient for signs and symptoms of electrolyte imbalances  - Administer electrolyte replacement as ordered  - Monitor response to electrolyte replacements, including repeat lab results as appropriate  - Instruct patient on fluid and nutrition as appropriate  Outcome: Progressing  Goal: Fluid balance maintained  Description: INTERVENTIONS:  - Monitor labs   - Monitor I/O and WT  - Instruct patient on fluid and nutrition as appropriate  - Assess for signs & symptoms of volume excess or deficit  Outcome: Progressing  Goal: Glucose maintained within target range  Description: INTERVENTIONS:  - Monitor Blood Glucose as ordered  - Assess for signs and symptoms of hyperglycemia and hypoglycemia  - Administer ordered medications to maintain glucose within target range  - Assess nutritional intake and initiate nutrition service referral as needed  Outcome: Progressing     Problem: HEMATOLOGIC - ADULT  Goal: Maintains hematologic stability  Description: INTERVENTIONS  - Assess for signs and symptoms of bleeding or hemorrhage  - Monitor labs  - Administer supportive blood products/factors as ordered and appropriate  Outcome: Progressing

## 2020-08-13 NOTE — ASSESSMENT & PLAN NOTE
· Does not appear to be in acute exacerbation  · Takes dulera and prn albuterol at home  · [facility] non-formulary, will initiate breo-ellipta while inpatient  · PRN levalbuterol

## 2020-08-13 NOTE — PROGRESS NOTES
Progress Note - [employer] 1955, 72 y o  female MRN: [id number]    Unit/Bed#:  Encounter: [id number]    Primary Care Provider: Sindhu Mckeon MD   Date and time admitted to hospital: 8/12/2020  4:03 PM         SIRS (systemic inflammatory response syndrome) ([facility] [address] )  Assessment & Plan  · Suspect all 2/2 pulmonary embolism  · Do not suspect active infection  · WBC 12 from 14  · Procalcitonin negative  · Monitor fever curve, WBC count  · Lactic mildly elevated and downtrending    Hyperglycemia  Assessment & Plan  · Most recent Hgb A1C in 07/2020 was 5 9  · Will start SSI    Ductal carcinoma in situ (DCIS) of left breast  Assessment & Plan  · S/p left lumpectomy 06/06/2019  · Finished radiation therapy on 09/13/2019  · Initially on tamoxifen which was d/c'd 2/2 adverse effects  · Most recent mammogram in 05/2020 was benign    Hypothyroidism  Assessment & Plan  · Continue home synthroid    Hyperlipidemia  Assessment & Plan  · Simvastatin non-formulary  · Continue pravastatin while inpatient    Anxiety  Assessment & Plan  · Continue home duloxetine  · PRN low-dose lorazepam    HTN (hypertension)  Assessment & Plan  · Holding home anti-HTN during acute resuscitation period  · Will consider re-introducing as patient stabilizes  · PRN hydralazine for SBP >160    Asthma  Assessment & Plan  · Does not appear to be in acute exacerbation  · Takes dulera and prn albuterol at home  · [facility] non-formulary, will initiate breo-ellipta while inpatient  · PRN levalbuterol    * Acute pulmonary embolism (HCC)  Assessment & Plan  · Sub-massive bilateral pulmonary emboli on CTA chest  · Risk factors include hx breast cancer, limited mobility  · Patient did notice some cramping in R  Calf over past several days  · SBP stable in 110s-140s in ED  · Initiated on heparin gtt while in ED  · Evidence of R   Heart strain on CT (RV/LV ratio 1 7), elevated troponin (0 08), BNP 4969, and tachycardic in 130s  · RV dilation and septal bowing noted on cardiac POCUS  · Despite stable HD status, patient with increasing respiratory distress and decision made to proceed with systemic tPA  · CT Head pre-tPA negative for intracranial hemorrhage  · Patient c/o throbbing headache 20 minutes into tPA infusion  · No changes in neuro exam  · Repeat CT Head at that time negative for intracranial hemorrhage  · Administered 100mg alteplase  · Significant improvement in respiratory status  · Continue heparin gtt  · Close monitoring of respiratory status  · Obtain formal TTE  · Obtain bilateral LE duplex      ----------------------------------------------------------------------------------------  HPI/24hr events:  Patient received full 100 mg dose of alteplase  Heparin drip restarted  Patient with significant improvement in work of breathing  Minimal high-flow nasal cannula settings and likely will be able to be transitioned traditional nasal cannula  Disposition: Continue Critical Care   Code Status: Level 1 - Full Code  ---------------------------------------------------------------------------------------  SUBJECTIVE  Reports that breathing feels much improved from initial presentation to ED  Does admit still to some pain over hernia site      Review of Systems  Review of systems was reviewed and negative unless stated above in HPI/24-hour events   ---------------------------------------------------------------------------------------  OBJECTIVE    Vitals   Vitals:    20 0345 20 0400 20 0430 08/13/20 0445   BP: 120/62 126/68 112/57 107/59   BP Location: Left arm Left arm Left arm Left arm   Pulse: 96 97 99 97   Resp: 20 20 21 (!) 37   Temp:   98 2 °F (36 8 °C)    TempSrc:       SpO2: 98% 98% 96% 96%   Weight:         Temp (24hrs), Av 1 °F (36 7 °C), Min:97 9 °F (36 6 °C), Max:98 3 °F (36 8 °C)  Current: Temperature: 98 2 °F (36 8 °C)          Respiratory:  SpO2: SpO2: 96 %, SpO2 Activity: SpO2 Activity: At Rest, SpO2 Device: O2 Device: High flow nasal cannula(vapotherm )  Nasal Cannula O2 Flow Rate (L/min): 2 L/min    Invasive/non-invasive ventilation settings   Respiratory    Lab Data (Last 4 hours)    None         O2/Vent Data (Last 4 hours)      08/13 0400          Non-Invasive Ventilation Mode HFNC  Comment: vapotherm                    Physical Exam  Vitals signs reviewed  Constitutional:       General: She is not in acute distress  Appearance: She is obese  HENT:      Head: Normocephalic and atraumatic  Nose:      Comments: Dried blood around nares     Mouth/Throat:      Mouth: Mucous membranes are moist    Eyes:      Pupils: Pupils are equal, round, and reactive to light  Cardiovascular:      Rate and Rhythm: Normal rate and regular rhythm  Heart sounds: No murmur  Pulmonary:      Effort: No respiratory distress  Breath sounds: No wheezing, rhonchi or rales  Comments: Mild tachypnea  Abdominal:      General: Abdomen is flat  Bowel sounds are normal  There is no distension  Palpations: Abdomen is soft  Tenderness: There is abdominal tenderness (over hernia)  Hernia: A hernia (ventral) is present  Musculoskeletal:      Right lower leg: No edema  Left lower leg: No edema  Skin:     General: Skin is warm  Capillary Refill: Capillary refill takes less than 2 seconds  Neurological:      General: No focal deficit present  Mental Status: She is alert and oriented to person, place, and time           Laboratory and Diagnostics:  Results from last 7 days   Lab Units 08/13/20  0450 08/12/20  1621   WBC Thousand/uL 12 14* 14 46*   HEMOGLOBIN g/dL 12 7 13 3   HEMATOCRIT % 40 6 42 7   PLATELETS Thousands/uL 248 271   NEUTROS PCT % 87* 75   MONOS PCT % 3* 6     Results from last 7 days   Lab Units 08/12/20  1621   SODIUM mmol/L 141   POTASSIUM mmol/L 4 2   CHLORIDE mmol/L 103   CO2 mmol/L 26   ANION GAP mmol/L 12   BUN mg/dL 18   CREATININE mg/dL 0 87   CALCIUM mg/dL 8 7 GLUCOSE RANDOM mg/dL 158*   ALT U/L 46   AST U/L 42   ALK PHOS U/L 92   ALBUMIN g/dL 3 2*   TOTAL BILIRUBIN mg/dL 0 40          Results from last 7 days   Lab Units 08/13/20  0119 08/12/20  1621   INR   --  1 09   PTT seconds 65* 22*      Results from last 7 days   Lab Units 08/12/20  1621   TROPONIN I ng/mL 0 08*     Results from last 7 days   Lab Units 08/12/20  1821 08/12/20  1621   LACTIC ACID mmol/L 2 4* 2 7*     ABG:    VBG:  Results from last 7 days   Lab Units 08/12/20  2020   PH RONIT  7 327   PCO2 RONIT mm Hg 44 6   PO2 RONIT mm Hg 75 1*   HCO3 RONIT mmol/L 22 8*   BASE EXC RONIT mmol/L -3 2     Results from last 7 days   Lab Units 08/12/20  1621   PROCALCITONIN ng/ml <0 05       Micro  Results from last 7 days   Lab Units 08/12/20  1626 08/12/20  1621   BLOOD CULTURE  Received in Microbiology Lab  Culture in Progress  Received in Microbiology Lab  Culture in Progress  Imaging: I have personally reviewed pertinent reports  and I have personally reviewed pertinent films in PACS    Intake and Output  I/O       08/11 0701 - 08/12 0700 08/12 0701 - 08/13 0700    IV Piggyback  1750    Total Intake(mL/kg)  1750 (17 3)    Urine (mL/kg/hr)  100    Total Output  100    Net  +1650                Height and Weights      IBW: -92 5 kg  Body mass index is 46 77 kg/m²  Weight (last 2 days)     Date/Time   Weight    08/12/20 2330   101 (223 77)    08/12/20 1607   101 (223 55)                Nutrition       Diet Orders   (From admission, onward)             Start     Ordered    08/12/20 2056  Diet NPO  Diet effective now     Question Answer Comment   Diet Type NPO    RD to adjust diet per protocol?  No        08/12/20 2055                  Active Medications  Scheduled Meds:  Current Facility-Administered Medications   Medication Dose Route Frequency Provider Last Rate    albuterol  2 puff Inhalation Q4H PRN Bailey Gilman MD      albuterol  2 5 mg Nebulization Q6H PRN Bailey Gilman MD      DULoxetine  30 mg Oral Daily [place], [place]      famotidine  10 mg Oral Daily [place]      fluticasone-vilanterol  1 puff Inhalation Daily [place]      heparin (porcine)  3-30 Units/kg/hr (Order-Specific) Intravenous Titrated [place]      insulin lispro  1-6 Units Subcutaneous Q6H [address] [place]      levothyroxine  88 mcg Oral Early Morning [place]      pravastatin  80 mg Oral Daily With [employer] PRASHANT Acosta PA-C      sodium chloride  50 mL/hr Intravenous Once Soraida Hurst PA-C       Continuous Infusions:  heparin (porcine), 3-30 Units/kg/hr (Order-Specific)      PRN Meds:   albuterol, 2 puff, Q4H PRN  albuterol, 2 5 mg, Q6H PRN        Invasive Devices Review  Invasive Devices     Peripheral Intravenous Line            Peripheral IV 08/12/20 Dorsal (posterior); Right Hand 1 day    Peripheral IV 08/12/20 Left Antecubital 1 day    Peripheral IV 08/12/20 Right Antecubital 1 day                Rationale for remaining devices: n/a  ---------------------------------------------------------------------------------------  Advance Directive and Living Will:      Power of :    POLST:    ---------------------------------------------------------------------------------------  Care Time Delivered:   Upon my evaluation, this patient had a high probability of imminent or life-threatening deterioration due to AHRF, which required my direct attention, intervention, and personal management  I have personally provided 14 minutes (05:15 to 05:29) of critical care time, exclusive of procedures, teaching, family meetings, and any prior time recorded by providers other than myself  Alanna Jackson PA-C      Portions of the record may have been created with voice recognition software  Occasional wrong word or "sound a like" substitutions may have occurred due to the inherent limitations of voice recognition software    Read the chart carefully and recognize, using context, where substitutions have occurred

## 2020-08-13 NOTE — RESPIRATORY THERAPY NOTE
08/13/20 0400   Non-Invasive Information   Interface HFNC prongs   Non-Invasive Ventilation Mode HFNC  (vapotherm )   $ Pulse Oximetry Spot Check Charge Completed   Resp Comments p talseep kati well    Non-Invasive Settings   FiO2 (%) 40   Flow (lpm) 20   Temperature (Set) 33   Non-Invasive Readings   Heater Temperature (Obs) 33   Skin Intervention Skin intact   Maintenance   Water bag changed No

## 2020-08-13 NOTE — ED NOTES
Spoke to [place] from ICU asking when should I bring patient upstairs was told once CT is read they will call me   Pt then can get restarted on TPA and brought right up to ICU      Nadeem Lamar RN  08/12/20 [id number]

## 2020-08-13 NOTE — ASSESSMENT & PLAN NOTE
· Holding home anti-HTN during acute resuscitation period  · Will consider re-introducing as patient stabilizes  · PRN hydralazine for SBP >160

## 2020-08-13 NOTE — SOCIAL WORK
Pt resides with Franco Sosa in a 2 story home with the use of a cane, roller walker, shower chair and 4 steps to enter the home  Pt reported being independent with ADLs, had [facility] in the past but no hx of rehab, mental health or drug/alcohol placements  Pt denied having a living will, reported the use of [facility] in [place] and has [facility] as a PCP  Family to transport upon d/c     CM reviewed d/c planning process including the following: identifying help at home, patient preference for d/c planning needs, Discharge Lounge, [facility] Meds to Bed program, availability of treatment team to discuss questions or concerns patient and/or family may have regarding understanding medications and recognizing signs and symptoms once discharged  CM also encouraged patient to follow up with all recommended appointments after discharge  Patient advised of importance for patient and family to participate in managing patients medical well being

## 2020-08-13 NOTE — ED NOTES
Pt states she is having a headache and its worse then what she's had during the time she's been here  Informed BRIGETTE Brower and was told to hold alteplase        Valeria Landry RN  08/12/20 [id number]

## 2020-08-13 NOTE — RESPIRATORY THERAPY NOTE
Transitioned from [place] to [facility] then weaned to Patrick Carranza  Pt  Tolerating well  No distress

## 2020-08-13 NOTE — ASSESSMENT & PLAN NOTE
· Suspect all 2/2 pulmonary embolism  · Do not suspect active infection  · WBC 12 from 14  · Procalcitonin negative  · Monitor fever curve, WBC count  · Lactic mildly elevated and downtrending

## 2020-08-13 NOTE — ASSESSMENT & PLAN NOTE
· Does not appear to be in acute exacerbation  · Takes dulera and prn albuterol at home  · [facility] non-formulary, will initiate breo-ellipta while inpatient  · PRN levalbuterol

## 2020-08-13 NOTE — H&P
H&P- Gilberto [place] 1955, 72 y o  female MRN: [id number]    Unit/Bed#:  Encounter: [id number]    Primary Care Provider: Melany Can MD   Date and time admitted to hospital: 8/12/2020  4:03 PM        SIRS (systemic inflammatory response syndrome) ([facility] [address] )  Assessment & Plan  · Suspect all 2/2 pulmonary embolism  · Do not suspect active infection  · Procalcitonin negative  · Monitor fever curve, WBC count  · Lactic mildly elevated and downtrending    Hyperglycemia  Assessment & Plan  · Most recent Hgb A1C in 07/2020 was 5 9  · Will start SSI    Ductal carcinoma in situ (DCIS) of left breast  Assessment & Plan  · S/p left lumpectomy 06/06/2019  · Finished radiation therapy on 09/13/2019  · Initially on tamoxifen which was d/c'd 2/2 adverse effects  · Most recent mammogram in 05/2020 was benign    Hypothyroidism  Assessment & Plan  · Continue home synthroid    Hyperlipidemia  Assessment & Plan  · Simvastatin non-formulary  · Continue pravastatin while inpatient    Anxiety  Assessment & Plan  · Continue home duloxetine  · PRN low-dose lorazepam    HTN (hypertension)  Assessment & Plan  · Holding home anti-HTN during acute resuscitation period  · Will consider re-introducing as patient stabilizes  · PRN hydralazine for SBP >160    Asthma  Assessment & Plan  · Does not appear to be in acute exacerbation  · Takes dulera and prn albuterol at home  · [facility] non-formulary, will initiate breo-ellipta while inpatient  · PRN levalbuterol    * Acute pulmonary embolism (HCC)  Assessment & Plan  · Sub-massive bilateral pulmonary emboli on CTA chest  · Risk factors include hx breast cancer, limited mobility  · Patient did notice some cramping in R  Calf over past several days  · SBP stable in 110s-140s in ED  · Initiated on heparin gtt while in ED  · Evidence of R   Heart strain on CT (RV/LV ratio 1 7), elevated troponin (0 08), BNP 4969, and tachycardic in 130s  · RV dilation and septal bowing noted on cardiac POCUS  · Despite stable HD status, patient with increasing respiratory distress and decision made to proceed with systemic tPA  · CT Head pre-tPA negative for intracranial hemorrhage  · Patient c/o throbbing headache 20 minutes into tPA infusion  · No changes in neuro exam  · Repeat CT Head at that time negative for intracranial hemorrhage  · Plan to finish alteplase infusion and then restart heparin gtt following  · Close monitoring of respiratory status  · Obtain formal TTE  · Obtain bilateral LE duplex    -------------------------------------------------------------------------------------------------------------  Chief Complaint: Shortness of breath    History of Present Illness   HX and PE limited by: n/a  Thang Jimenez is a 72 y o  female with PMH HTN, HLD, asthma, anxiety, breast cancer s/p left lumpectomy and radiation tx who presents to [facility] ED c/o shortness of breath over past several days  Patient states that she initially bent over to pick something up off the ground when she had worsening epigastric pain in the area of her ventral hernia  Since then, SOB has progressively worsened to the point that she could barely walk anywhere today  PCP advised patient to call 911  EMS found patient to be hypoxic with SpO2 in low 80s and concerned that she was possibly having asthma exacerbation- administered nebulizer txs and solumedrol  In ED, patient with elevated troponin, elevated BNP, and significant tachycardia in 130s  Underwent CTA C/A/P which revealed bilatera pulmonary emboli in the main, lobar, segmental, and subsegmental branches with RV/LV ratio 1 7 suggesting right heart strain  Additionally showed RML consolidation suspected to be pulmonary infarct  Patient started on heparin gtt  Critical care attending and I evaluated patient in ED  Required 5L NC, however appeared to be in respiratory distress with tachypnea in 30s and noticeable work of breathing   Bedside cardiac POCUS revealed RV dilation and septal bowing  Discussion had with IR physician and [facility] intensivist for possible thrombolysis despite stable hemodynamics  Decision to observe on heparin gtt and provide thrombolysis if decompensation vs  Initiate thrombolysis now  On re-evaluation of patient, worsening clinical status with increased work of breathing, lethargy, and diaphoresis  Required initiation of HFNC  Risks and benefits of thrombolysis discussed with patient and   Decision made to proceed with thrombolysis  CT Head pre-thrombolysis negative for intracranial hemorrhage  20 minutes into alteplase infusion, patient c/o severe throbbing headache  Alteplase infusion stopped  Neuro examination non-focal  Repeat CT head obtained which was negative for acute intracranial hemorrhage  Alteplase infusion re-initiated  Patient reported some improvement in SOB since initiation of thrombolysis  Admitted to ICU for close respiratory, neurological, and hemodynamic monitoring  History obtained from chart review and the patient   -------------------------------------------------------------------------------------------------------------  Dispo: Admit to Critical Care     Code Status: Level 1 - Full Code  --------------------------------------------------------------------------------------------------------------  Review of Systems    A 12-point, complete review of systems was reviewed and negative except as stated above     Physical Exam  Constitutional:       General: She is in acute distress  Appearance: She is obese  She is diaphoretic  HENT:      Head: Normocephalic and atraumatic  Mouth/Throat:      Mouth: Mucous membranes are moist    Eyes:      Extraocular Movements: Extraocular movements intact  Pupils: Pupils are equal, round, and reactive to light  Neck:      Musculoskeletal: Normal range of motion  Cardiovascular:      Rate and Rhythm: Regular rhythm  Tachycardia present  Pulses: Normal pulses        Heart sounds: No murmur  No friction rub  Pulmonary:      Effort: Respiratory distress present  Breath sounds: No wheezing, rhonchi or rales  Abdominal:      General: Abdomen is flat  Bowel sounds are normal  There is no distension  Tenderness: There is abdominal tenderness (Over hernia)  There is no guarding  Hernia: A hernia (Sub-xiphoid ventral) is present  Musculoskeletal:      Right lower leg: No edema  Left lower leg: No edema  Skin:     General: Skin is warm  Capillary Refill: Capillary refill takes less than 2 seconds  Neurological:      General: No focal deficit present  Mental Status: She is alert and oriented to person, place, and time  Motor: Weakness (Some weakness in R  hand which patient states is chronic from multiple surgeries) present        --------------------------------------------------------------------------------------------------------------  Vitals:   Vitals:    08/12/20 2230 08/12/20 2245 08/12/20 2301 08/12/20 2317   BP: 123/66 119/65 122/66    BP Location: Left arm Left arm Right arm    Pulse: (!) 113 (!) 114 (!) 114    Resp: 22 (!) 24 22    Temp:       TempSrc:       SpO2: 99% 98% 97% 100%   Weight:         Temp  Min: 98 2 °F (36 8 °C)  Max: 98 2 °F (36 8 °C)  IBW: -92 5 kg     Body mass index is 46 72 kg/m²      Laboratory and Diagnostics:  Results from last 7 days   Lab Units 08/12/20  1621   WBC Thousand/uL 14 46*   HEMOGLOBIN g/dL 13 3   HEMATOCRIT % 42 7   PLATELETS Thousands/uL 271   NEUTROS PCT % 75   MONOS PCT % 6     Results from last 7 days   Lab Units 08/12/20  1621   SODIUM mmol/L 141   POTASSIUM mmol/L 4 2   CHLORIDE mmol/L 103   CO2 mmol/L 26   ANION GAP mmol/L 12   BUN mg/dL 18   CREATININE mg/dL 0 87   CALCIUM mg/dL 8 7   GLUCOSE RANDOM mg/dL 158*   ALT U/L 46   AST U/L 42   ALK PHOS U/L 92   ALBUMIN g/dL 3 2*   TOTAL BILIRUBIN mg/dL 0 40          Results from last 7 days   Lab Units 08/12/20  1621   INR  1 09   PTT seconds 22* Results from last 7 days   Lab Units 08/12/20  1621   TROPONIN I ng/mL 0 08*     Results from last 7 days   Lab Units 08/12/20  1821 08/12/20  1621   LACTIC ACID mmol/L 2 4* 2 7*     ABG:    VBG:  Results from last 7 days   Lab Units 08/12/20 2020   PH RONIT  7 327   PCO2 RONIT mm Hg 44 6   PO2 RONIT mm Hg 75 1*   HCO3 RONIT mmol/L 22 8*   BASE EXC RONIT mmol/L -3 2     Results from last 7 days   Lab Units 08/12/20  1621   PROCALCITONIN ng/ml <0 05       Micro:  Results from last 7 days   Lab Units 08/12/20  1626 08/12/20  1621   BLOOD CULTURE  Received in Microbiology Lab  Culture in Progress  Received in Microbiology Lab  Culture in Progress  EKG: Sinus tachycardia, non-specific T-wave changes  Imaging: I have personally reviewed pertinent reports     and I have personally reviewed pertinent films in PACS      Historical Information   Past Medical History:   Diagnosis Date    Allergic rhinitis     Anemia     Cancer ([address] )     breast cancer, Left side    Depression     Disease of thyroid gland     GERD (gastroesophageal reflux disease)     Hyperlipemia     Hypertension     Hypothyroidism     Last assessed: 3/25/14    Obesity     Osteoarthritis     Pre-operative laboratory examination      Past Surgical History:   Procedure Laterality Date    HAND SURGERY Left 03/2020    HYSTERECTOMY      INCISIONAL BREAST BIOPSY      KNEE ARTHROPLASTY      ROTATOR CUFF REPAIR      SHOULDER SURGERY      TRIGGER FINGER RELEASE      TRIGGER FINGER RELEASE       Social History   Social History     Substance and Sexual Activity   Alcohol Use Yes    Frequency: Monthly or less    Drinks per session: 1 or 2    Binge frequency: Never    Comment: socially      Social History     Substance and Sexual Activity   Drug Use No     Social History     Tobacco Use   Smoking Status Never Smoker   Smokeless Tobacco Never Used     Family History:   Family History   Problem Relation Age of Onset    Coronary artery disease Mother  Hypertension Mother         Essential    Coronary artery disease Father      I have reviewed this patient's family history and commented on sigificant items within the HPI      Medications:  Current Facility-Administered Medications   Medication Dose Route Frequency    DULoxetine (CYMBALTA) delayed release capsule 30 mg  30 mg Oral Daily    famotidine (PEPCID) tablet 10 mg  10 mg Oral Daily    fluticasone-vilanterol (BREO ELLIPTA) 100-25 mcg/inh inhaler 1 puff  1 puff Inhalation Daily    insulin lispro (HumaLOG) 100 units/mL subcutaneous injection 1-6 Units  1-6 Units Subcutaneous Q6H [facility]    levalbuterol (XOPENEX) inhalation solution 1 25 mg  1 25 mg Nebulization Q8H PRN    levothyroxine tablet 88 mcg  88 mcg Oral Early Morning    pravastatin (PRAVACHOL) tablet 80 mg  80 mg Oral Daily With Dinner    sodium chloride 0 9 % infusion  50 mL/hr Intravenous Once     Home medications:  Prior to Admission Medications   Prescriptions Last Dose Informant Patient Reported? Taking?    ALPRAZolam (XANAX) 1 mg tablet 2020 at 2300 Self No Yes   Sig: TAKE 1/2-1 TABLET THREE TIMES A DAY AS NEEDED   DULoxetine (CYMBALTA) 30 mg delayed release capsule  Self No No   Sig: Take 1 capsule (30 mg total) by mouth daily   DULoxetine (CYMBALTA) 60 mg delayed release capsule  Self No No   Si po qd   Mometasone Furo-Formoterol Fum (DULERA) 100-5 MCG/ACT AERO 2020 at 1530 Self Yes Yes   Sig: Inhale   Multiple Vitamins-Minerals (OCUVITE ADULT 50+ PO)  Self Yes No   Sig: Take by mouth   NYSTATIN powder  Self No No   Sig: APPLY TO AFFECTED AREA(S) SPARINGLY  TWICE A DAY   Triamcinolone Acetonide (Nasacort Allergy 24HR) 55 MCG/ACT AERO  Self Yes No   Sig: into each nostril as needed    albuterol (PROAIR HFA) 90 mcg/act inhaler 2020 at 1530 Self Yes Yes   Sig: Inhale 2 puffs every 6 (six) hours as needed for wheezing   aspirin 81 MG tablet 2020 at Unknown time Self Yes Yes   Sig: Take 81 mg by mouth daily cholestyramine (QUESTRAN) 4 g packet Past Month at Unknown time Self Yes Yes   Sig: Take by mouth   clotrimazole (LOTRIMIN) 1 % cream  Self Yes No   Sig: as needed    clotrimazole-betamethasone (LOTRISONE) 1-0 05 % cream  Self No No   Sig: APPLY SPARINGLY TO THE AFFECTED AREA IN GROIN 2-3 TIMES A DAY AS NEEDED   erythromycin (ILOTYCIN) ophthalmic ointment  Self Yes No   Sig: APPLY THREE TIMES A DAY TO WOUND   famotidine (PEPCID) 10 mg tablet  Self Yes No   Sig: Take 1 tablet by mouth   hydrochlorothiazide (HYDRODIURIL) 25 mg tablet  Self No No   Sig: TAKE 1 TABLET BY MOUTH  DAILY   levothyroxine 88 mcg tablet  Self No No   Sig: Take 1 po daily except on Sunday take 2   methocarbamol (ROBAXIN) 500 mg tablet  Self No No   Sig: Take 1 tablet (500 mg total) by mouth 4 (four) times a day   pantoprazole (PROTONIX) 40 mg tablet  Self No No   Sig: TAKE 1 TABLET BY MOUTH  DAILY   potassium chloride (K-DUR,KLOR-CON) 20 mEq tablet  Self No No   Sig: TAKE 1 TABLET BY MOUTH  DAILY   quinapril (ACCUPRIL) 10 mg tablet  Self Yes No   Sig: Take 10 mg by mouth daily at bedtime   simvastatin (ZOCOR) 40 mg tablet  Self No No   Sig: TAKE ONE TABLET BY MOUTH EVERY DAY   traMADol (ULTRAM) 50 mg tablet  Self No No   Sig: TAKE 1 TABLET BY MOUTH 3  TIMES A DAY   valACYclovir (VALTREX) 1,000 mg tablet  Self Yes No   Sig: as needed       Facility-Administered Medications: None     Allergies:   Allergies   Allergen Reactions    Tamoxifen Other (See Comments)     Headaches, stomach pain, sweats,     Nsaids Hives    Singulair [Montelukast] Swelling    Ciprofloxacin Hives    Penicillins Hives    Sulfa Antibiotics Hives    Vancomycin Hives       ------------------------------------------------------------------------------------------------------------  Advance Directive and Living Will:      Power of :    POLST: ------------------------------------------------------------------------------------------------------------  Anticipated Length of Stay is > 2 midnights    Care Time Delivered:   Upon my evaluation, this patient had a high probability of imminent or life-threatening deterioration due to submassive pulmonary embolism, acute hypoxic respiratory failure, which required my direct attention, intervention, and personal management  I have personally provided 53 minutes (20:42 to 21:35) of critical care time, exclusive of procedures, teaching, family meetings, and any prior time recorded by providers other than myself  Wesley Tran PA-C        Portions of the record may have been created with voice recognition software  Occasional wrong word or "sound a like" substitutions may have occurred due to the inherent limitations of voice recognition software    Read the chart carefully and recognize, using context, where substitutions have occurred

## 2020-08-13 NOTE — ASSESSMENT & PLAN NOTE
· Sub-massive bilateral pulmonary emboli on CTA chest  · Risk factors include hx breast cancer, limited mobility  · Patient did notice some cramping in R  Calf over past several days  · SBP stable in 110s-140s in ED  · Initiated on heparin gtt while in ED  · Evidence of R   Heart strain on CT (RV/LV ratio 1 7), elevated troponin (0 08), BNP 4969, and tachycardic in 130s  · RV dilation and septal bowing noted on cardiac POCUS  · Despite stable HD status, patient with increasing respiratory distress and decision made to proceed with systemic tPA  · CT Head pre-tPA negative for intracranial hemorrhage  · Patient c/o throbbing headache 20 minutes into tPA infusion  · No changes in neuro exam  · Repeat CT Head at that time negative for intracranial hemorrhage  · Administered 100mg alteplase  · Significant improvement in respiratory status  · Continue heparin gtt  · Close monitoring of respiratory status  · Obtain formal TTE  · Obtain bilateral LE duplex

## 2020-08-13 NOTE — ASSESSMENT & PLAN NOTE
· Sub-massive bilateral pulmonary emboli on CTA chest  · Risk factors include hx breast cancer, limited mobility  · Patient did notice some cramping in R  Calf over past several days  · SBP stable in 110s-140s in ED  · Initiated on heparin gtt while in ED  · Evidence of R   Heart strain on CT (RV/LV ratio 1 7), elevated troponin (0 08), BNP 4969, and tachycardic in 130s  · RV dilation and septal bowing noted on cardiac POCUS  · Despite stable HD status, patient with increasing respiratory distress and decision made to proceed with systemic tPA  · CT Head pre-tPA negative for intracranial hemorrhage  · Patient c/o throbbing headache 20 minutes into tPA infusion  · No changes in neuro exam  · Repeat CT Head at that time negative for intracranial hemorrhage  · Plan to finish alteplase infusion and then restart heparin gtt following  · Close monitoring of respiratory status  · Obtain formal TTE  · Obtain bilateral LE duplex

## 2020-08-13 NOTE — PROGRESS NOTES
Critical care asked to see Ms Puga for Submassive PE  On initial encounter in the ED, patient was tachypnic, diaphoretic, with NC5L sp02 95%  HR 130s SR, BP 140s/60s  Labs reviewed  Notable for tropinemia, elevated lactic acid, elevated BNP  Bedside cardiac ultrasound notable for small LV with RV-> LV septal bowing on parasternal long axis  IVC dilated with no respiratory variation  Unable to obtain good view of apical 4 chamber but RV appeared dilated with preserved to slightly decreased RV sys fx  Case discussed with IR attending and [facility] ICU  Despite her normal BP I feel that patient is clinically decompensating from clot burden and would benefit from thrombolysis  Given her hypoxemia and somnolence I do not feel she would be safe to transfer without ETT  In addition, given exam and ultrasound findings, I am worried about positive pressure ventilation in setting of RV dysfx  For these reasons I feel patient would benefit from systemic tPA here at [facility]  The risks/alternatives were discussed in detail with patient and her   Of note, patient recently had superficial eyelid surgery on 8/7  We discussed risk of bleeding from eyelid and family wishes to proceed  CT head obtained Prior to initiation of tPA  Read is pending      Critical care time 68 minutes

## 2020-08-13 NOTE — RESPIRATORY THERAPY NOTE
RT Protocol Note  Michaela Schuster 72 y o  female MRN: [id number]  Unit/Bed#:  Encounter: [id number]    Assessment    Principal Problem:    Acute pulmonary embolism (HCC)  Active Problems:    Asthma    HTN (hypertension)    Anxiety    Hyperlipidemia    Hypothyroidism    Ductal carcinoma in situ (DCIS) of left breast    Hyperglycemia    SIRS (systemic inflammatory response syndrome) (HCC)      Home Pulmonary Medications:    Home Devices/Therapy: (P) Other (Comment)(MDI albuterol, Albuterol neb prn, Dulera daily )    Past Medical History:   Diagnosis Date    Allergic rhinitis     Anemia     Cancer (HCC)     breast cancer, Left side    Depression     Disease of thyroid gland     GERD (gastroesophageal reflux disease)     Hyperlipemia     Hypertension     Hypothyroidism     Last assessed: 3/25/14    Obesity     Osteoarthritis     Pre-operative laboratory examination      Social History     Socioeconomic History    Marital status: /Civil Union     Spouse name: Not on file    Number of children: Not on file    Years of education: Not on file    Highest education level: Not on file   Occupational History    Not on file   Social Needs    Financial resource strain: Not on file    Food insecurity     Worry: Not on file     Inability: Not on file   [employer] needs     Medical: Not on file     Non-medical: Not on file   Tobacco Use    Smoking status: Never Smoker    Smokeless tobacco: Never Used   Substance and Sexual Activity    Alcohol use: Yes     Frequency: Monthly or less     Drinks per session: 1 or 2     Binge frequency: Never     Comment: socially     Drug use: No    Sexual activity: Not Currently     Partners: Male   Lifestyle    Physical activity     Days per week: 0 days     Minutes per session: 0 min    Stress: Very much   Relationships    Social connections     Talks on phone: Not on file     Gets together: Not on file     Attends Yazidi service: Not on file Active member of club or organization: Not on file     Attends meetings of clubs or organizations: Not on file     Relationship status: Not on file    Intimate partner violence     Fear of current or ex partner: Not on file     Emotionally abused: Not on file     Physically abused: Not on file     Forced sexual activity: Not on file   Other Topics Concern    Not on file   Social History Narrative    Living independently w/spouse       Subjective         Objective    Physical Exam:   Assessment Type: Assess only  General Appearance: Alert, Awake  Respiratory Pattern: Normal, Dyspnea with exertion  Chest Assessment: Chest expansion symmetrical  Bilateral Breath Sounds: Clear  Cough: Strong, Dry, Non-productive  O2 Device: HFNC     Vitals:  Blood pressure 126/68, pulse 97, temperature 98 1 °F (36 7 °C), resp  rate 20, weight 101 kg (223 lb 12 3 oz), SpO2 98 %  Imaging and other studies: I have personally reviewed pertinent reports        O2 Device: HFNC      Plan    Respiratory Plan: (P) Home Bronchodilator Patient pathway        Resp Comments: pt alseep kati well

## 2020-08-13 NOTE — ED NOTES
[facility]     Clement Mclaughlin RN  08/12/20 1962

## 2020-08-13 NOTE — ASSESSMENT & PLAN NOTE
· S/p left lumpectomy 06/06/2019  · Finished radiation therapy on 09/13/2019  · Initially on tamoxifen which was d/c'd 2/2 adverse effects  · Most recent mammogram in 05/2020 was benign

## 2020-08-14 ENCOUNTER — APPOINTMENT (INPATIENT)
Dept: CT IMAGING | Facility: HOSPITAL | Age: 65
DRG: 175 | End: 2020-08-14
Payer: MEDICARE

## 2020-08-14 PROBLEM — S30.0XXA TRAUMATIC ECCHYMOSIS OF BUTTOCK: Status: ACTIVE | Noted: 2020-08-14

## 2020-08-14 PROBLEM — R31.0 GROSS HEMATURIA: Status: ACTIVE | Noted: 2020-08-14

## 2020-08-14 PROBLEM — R78.81 GRAM-POSITIVE BACTEREMIA: Status: ACTIVE | Noted: 2020-08-14

## 2020-08-14 PROBLEM — R19.7 ACUTE DIARRHEA: Status: ACTIVE | Noted: 2020-08-14

## 2020-08-14 LAB
ANION GAP SERPL CALCULATED.3IONS-SCNC: 7 MMOL/L (ref 4–13)
APTT PPP: 84 SECONDS (ref 23–37)
ATRIAL RATE: 133 BPM
ATRIAL RATE: 416 BPM
ATRIAL RATE: 98 BPM
BACTERIA UR QL AUTO: ABNORMAL /HPF
BASOPHILS # BLD AUTO: 0.03 THOUSANDS/ΜL (ref 0–0.1)
BASOPHILS NFR BLD AUTO: 0 % (ref 0–1)
BILIRUB UR QL STRIP: ABNORMAL
BUN SERPL-MCNC: 15 MG/DL (ref 5–25)
CALCIUM SERPL-MCNC: 8.5 MG/DL (ref 8.3–10.1)
CHLORIDE SERPL-SCNC: 104 MMOL/L (ref 100–108)
CLARITY UR: CLEAR
CO2 SERPL-SCNC: 31 MMOL/L (ref 21–32)
COLOR UR: YELLOW
CREAT SERPL-MCNC: 0.84 MG/DL (ref 0.6–1.3)
EOSINOPHIL # BLD AUTO: 0.05 THOUSAND/ΜL (ref 0–0.61)
EOSINOPHIL NFR BLD AUTO: 0 % (ref 0–6)
ERYTHROCYTE [DISTWIDTH] IN BLOOD BY AUTOMATED COUNT: 16 % (ref 11.6–15.1)
GFR SERPL CREATININE-BSD FRML MDRD: 73 ML/MIN/1.73SQ M
GLUCOSE SERPL-MCNC: 106 MG/DL (ref 65–140)
GLUCOSE SERPL-MCNC: 116 MG/DL (ref 65–140)
GLUCOSE SERPL-MCNC: 116 MG/DL (ref 65–140)
GLUCOSE SERPL-MCNC: 245 MG/DL (ref 65–140)
GLUCOSE SERPL-MCNC: 83 MG/DL (ref 65–140)
GLUCOSE SERPL-MCNC: 97 MG/DL (ref 65–140)
GLUCOSE UR STRIP-MCNC: NEGATIVE MG/DL
HCT VFR BLD AUTO: 38.2 % (ref 34.8–46.1)
HCT VFR BLD AUTO: 38.6 % (ref 34.8–46.1)
HGB BLD-MCNC: 11.7 G/DL (ref 11.5–15.4)
HGB BLD-MCNC: 12.1 G/DL (ref 11.5–15.4)
HGB UR QL STRIP.AUTO: ABNORMAL
IMM GRANULOCYTES # BLD AUTO: 0.08 THOUSAND/UL (ref 0–0.2)
IMM GRANULOCYTES NFR BLD AUTO: 1 % (ref 0–2)
KETONES UR STRIP-MCNC: NEGATIVE MG/DL
LDH SERPL-CCNC: 218 U/L (ref 81–234)
LEUKOCYTE ESTERASE UR QL STRIP: ABNORMAL
LYMPHOCYTES # BLD AUTO: 3.37 THOUSANDS/ΜL (ref 0.6–4.47)
LYMPHOCYTES NFR BLD AUTO: 24 % (ref 14–44)
MCH RBC QN AUTO: 27.7 PG (ref 26.8–34.3)
MCHC RBC AUTO-ENTMCNC: 30.6 G/DL (ref 31.4–37.4)
MCV RBC AUTO: 90 FL (ref 82–98)
MONOCYTES # BLD AUTO: 0.84 THOUSAND/ΜL (ref 0.17–1.22)
MONOCYTES NFR BLD AUTO: 6 % (ref 4–12)
NEUTROPHILS # BLD AUTO: 9.59 THOUSANDS/ΜL (ref 1.85–7.62)
NEUTS SEG NFR BLD AUTO: 69 % (ref 43–75)
NITRITE UR QL STRIP: NEGATIVE
NON-SQ EPI CELLS URNS QL MICRO: ABNORMAL /HPF
NRBC BLD AUTO-RTO: 0 /100 WBCS
P AXIS: 30 DEGREES
PH UR STRIP.AUTO: 7 [PH]
PLATELET # BLD AUTO: 252 THOUSANDS/UL (ref 149–390)
PMV BLD AUTO: 8.6 FL (ref 8.9–12.7)
POTASSIUM SERPL-SCNC: 3.5 MMOL/L (ref 3.5–5.3)
PR INTERVAL: 202 MS
PROCALCITONIN SERPL-MCNC: <0.05 NG/ML
PROT UR STRIP-MCNC: ABNORMAL MG/DL
QRS AXIS: 122 DEGREES
QRS AXIS: 125 DEGREES
QRS AXIS: 98 DEGREES
QRSD INTERVAL: 82 MS
QRSD INTERVAL: 82 MS
QRSD INTERVAL: 90 MS
QT INTERVAL: 352 MS
QT INTERVAL: 354 MS
QT INTERVAL: 396 MS
QTC INTERVAL: 451 MS
QTC INTERVAL: 545 MS
QTC INTERVAL: 589 MS
RBC # BLD AUTO: 4.23 MILLION/UL (ref 3.81–5.12)
RBC #/AREA URNS AUTO: ABNORMAL /HPF
SODIUM SERPL-SCNC: 142 MMOL/L (ref 136–145)
SP GR UR STRIP.AUTO: 1.01 (ref 1–1.03)
T WAVE AXIS: 21 DEGREES
T WAVE AXIS: 33 DEGREES
T WAVE AXIS: 34 DEGREES
UROBILINOGEN UR QL STRIP.AUTO: 0.2 E.U./DL
VENTRICULAR RATE: 133 BPM
VENTRICULAR RATE: 144 BPM
VENTRICULAR RATE: 98 BPM
WBC # BLD AUTO: 13.96 THOUSAND/UL (ref 4.31–10.16)
WBC #/AREA URNS AUTO: ABNORMAL /HPF

## 2020-08-14 PROCEDURE — 87493 C DIFF AMPLIFIED PROBE: CPT | Performed by: INTERNAL MEDICINE

## 2020-08-14 PROCEDURE — G1004 CDSM NDSC: HCPCS

## 2020-08-14 PROCEDURE — 80048 BASIC METABOLIC PNL TOTAL CA: CPT | Performed by: PHYSICIAN ASSISTANT

## 2020-08-14 PROCEDURE — 99233 SBSQ HOSP IP/OBS HIGH 50: CPT | Performed by: INTERNAL MEDICINE

## 2020-08-14 PROCEDURE — 94664 DEMO&/EVAL PT USE INHALER: CPT

## 2020-08-14 PROCEDURE — 85730 THROMBOPLASTIN TIME PARTIAL: CPT | Performed by: PHYSICIAN ASSISTANT

## 2020-08-14 PROCEDURE — 93010 ELECTROCARDIOGRAM REPORT: CPT | Performed by: INTERNAL MEDICINE

## 2020-08-14 PROCEDURE — 84145 PROCALCITONIN (PCT): CPT | Performed by: INTERNAL MEDICINE

## 2020-08-14 PROCEDURE — 97166 OT EVAL MOD COMPLEX 45 MIN: CPT

## 2020-08-14 PROCEDURE — 81001 URINALYSIS AUTO W/SCOPE: CPT | Performed by: INTERNAL MEDICINE

## 2020-08-14 PROCEDURE — 99223 1ST HOSP IP/OBS HIGH 75: CPT | Performed by: NURSE PRACTITIONER

## 2020-08-14 PROCEDURE — 87040 BLOOD CULTURE FOR BACTERIA: CPT | Performed by: INTERNAL MEDICINE

## 2020-08-14 PROCEDURE — 85025 COMPLETE CBC W/AUTO DIFF WBC: CPT | Performed by: PHYSICIAN ASSISTANT

## 2020-08-14 PROCEDURE — 85018 HEMOGLOBIN: CPT | Performed by: PHYSICIAN ASSISTANT

## 2020-08-14 PROCEDURE — 97163 PT EVAL HIGH COMPLEX 45 MIN: CPT

## 2020-08-14 PROCEDURE — 83615 LACTATE (LD) (LDH) ENZYME: CPT | Performed by: INTERNAL MEDICINE

## 2020-08-14 PROCEDURE — 85014 HEMATOCRIT: CPT | Performed by: PHYSICIAN ASSISTANT

## 2020-08-14 PROCEDURE — 70450 CT HEAD/BRAIN W/O DYE: CPT

## 2020-08-14 PROCEDURE — 93005 ELECTROCARDIOGRAM TRACING: CPT

## 2020-08-14 PROCEDURE — 74176 CT ABD & PELVIS W/O CONTRAST: CPT

## 2020-08-14 PROCEDURE — 94760 N-INVAS EAR/PLS OXIMETRY 1: CPT

## 2020-08-14 PROCEDURE — 82948 REAGENT STRIP/BLOOD GLUCOSE: CPT

## 2020-08-14 RX ORDER — PANTOPRAZOLE SODIUM 40 MG/1
40 TABLET, DELAYED RELEASE ORAL
Status: DISCONTINUED | OUTPATIENT
Start: 2020-08-14 | End: 2020-08-22 | Stop reason: HOSPADM

## 2020-08-14 RX ORDER — NYSTATIN 100000 [USP'U]/G
POWDER TOPICAL 2 TIMES DAILY
Status: DISCONTINUED | OUTPATIENT
Start: 2020-08-14 | End: 2020-08-22 | Stop reason: HOSPADM

## 2020-08-14 RX ORDER — LANOLIN ALCOHOL/MO/W.PET/CERES
3 CREAM (GRAM) TOPICAL
Status: DISCONTINUED | OUTPATIENT
Start: 2020-08-14 | End: 2020-08-22 | Stop reason: HOSPADM

## 2020-08-14 RX ORDER — TRAMADOL HYDROCHLORIDE 50 MG/1
50 TABLET ORAL EVERY 6 HOURS PRN
Status: DISCONTINUED | OUTPATIENT
Start: 2020-08-14 | End: 2020-08-22 | Stop reason: HOSPADM

## 2020-08-14 RX ORDER — VANCOMYCIN HYDROCHLORIDE 1 G/200ML
15 INJECTION, SOLUTION INTRAVENOUS EVERY 12 HOURS
Status: DISCONTINUED | OUTPATIENT
Start: 2020-08-14 | End: 2020-08-14

## 2020-08-14 RX ADMIN — PANTOPRAZOLE SODIUM 40 MG: 40 TABLET, DELAYED RELEASE ORAL at 12:31

## 2020-08-14 RX ADMIN — VANCOMYCIN HYDROCHLORIDE 1000 MG: 1 INJECTION, SOLUTION INTRAVENOUS at 12:31

## 2020-08-14 RX ADMIN — DULOXETINE HYDROCHLORIDE 90 MG: 60 CAPSULE, DELAYED RELEASE ORAL at 09:21

## 2020-08-14 RX ADMIN — INSULIN LISPRO 3 UNITS: 100 INJECTION, SOLUTION INTRAVENOUS; SUBCUTANEOUS at 12:48

## 2020-08-14 RX ADMIN — HEPARIN SODIUM AND DEXTROSE 15 UNITS/KG/HR: 10000; 5 INJECTION INTRAVENOUS at 09:25

## 2020-08-14 RX ADMIN — ALPRAZOLAM 0.25 MG: 0.25 TABLET ORAL at 09:22

## 2020-08-14 RX ADMIN — PRAVASTATIN SODIUM 80 MG: 80 TABLET ORAL at 17:29

## 2020-08-14 RX ADMIN — LEVOTHYROXINE SODIUM 88 MCG: 88 TABLET ORAL at 06:36

## 2020-08-14 RX ADMIN — MELATONIN 3 MG: 3 TAB ORAL at 23:16

## 2020-08-14 RX ADMIN — NYSTATIN 1 APPLICATION: 100000 POWDER TOPICAL at 17:30

## 2020-08-14 RX ADMIN — FLUTICASONE FUROATE AND VILANTEROL TRIFENATATE 1 PUFF: 100; 25 POWDER RESPIRATORY (INHALATION) at 11:15

## 2020-08-14 RX ADMIN — TRAMADOL HYDROCHLORIDE 50 MG: 50 TABLET, FILM COATED ORAL at 17:29

## 2020-08-14 RX ADMIN — NYSTATIN: 100000 POWDER TOPICAL at 11:15

## 2020-08-14 RX ADMIN — FAMOTIDINE 10 MG: 20 TABLET, FILM COATED ORAL at 09:20

## 2020-08-14 RX ADMIN — ALPRAZOLAM 0.25 MG: 0.25 TABLET ORAL at 23:16

## 2020-08-14 NOTE — ASSESSMENT & PLAN NOTE
1/2 set of blood culture positive for Gram-positive cocci in clusters  Although patient remained afebrile, she is having mild leukocytosis, tachycardia and tachypnea that could be attributed to pulmonary embolism also  We have started patient on vancomycin  Check procalcitonin  Repeat blood cultures

## 2020-08-14 NOTE — ASSESSMENT & PLAN NOTE
On physical exam today patient has some ecchymotic lesions on the buttocks that seem to be combination of IV heparin use and increase pressure points due to remained immobile bile for prolonged periods of times  There was no evidence of skin breaks at any point  Continue local wound care, monitor closely

## 2020-08-14 NOTE — PROGRESS NOTES
Vancomycin Assessment    Bola Martin is a 72 y o  female who is ordered vancomycin 1500 mg IV q12h for bacteremia     Relevant clinical data and objective history reviewed:  Creatinine   Date Value Ref Range Status   08/14/2020 0 84 0 60 - 1 30 mg/dL Final     Comment:     Standardized to IDMS reference method   08/13/2020 0 96 0 60 - 1 30 mg/dL Final     Comment:     Standardized to IDMS reference method   08/12/2020 0 87 0 60 - 1 30 mg/dL Final     Comment:     Standardized to IDMS reference method   05/04/2017 0 76 0 57 - 1 00 mg/dL Final   11/01/2016 0 81 0 57 - 1 00 mg/dL Final   05/06/2016 0 69 0 57 - 1 00 mg/dL Final     /70 (BP Location: Left arm)   Pulse 98   Temp 98 7 °F (37 1 °C) (Oral)   Resp 13   Ht 4' 10" (1 473 m)   Wt 101 kg (223 lb 12 3 oz)   SpO2 97%   BMI 46 77 kg/m²   I/O last 3 completed shifts: In: 714 4 [I V :464 4; IV Piggyback:250]  Out: 7768 [Urine:1375]  Lab Results   Component Value Date/Time    BUN 15 08/14/2020 06:41 AM    BUN 16 05/04/2017 10:31 AM    WBC 13 96 (H) 08/14/2020 06:41 AM    WBC 9 0 05/04/2017 10:31 AM    HGB 11 7 08/14/2020 06:41 AM    HGB 12 2 05/04/2017 10:31 AM    HCT 38 2 08/14/2020 06:41 AM    HCT 37 6 05/04/2017 10:31 AM    MCV 90 08/14/2020 06:41 AM    MCV 83 05/04/2017 10:31 AM     08/14/2020 06:41 AM     05/04/2017 10:31 AM     Temp Readings from Last 3 Encounters:   08/14/20 98 7 °F (37 1 °C) (Oral)   07/23/20 98 4 °F (36 9 °C) (Temporal)   05/14/20 98 2 °F (36 8 °C) (Oral)     Vancomycin Days of Therapy: 1    Assessment/Plan  The patient is currently on vancomycin utilizing scheduled dosing based on adjusted body weight (due to obesity)  Baseline risks associated with therapy include: concomitant nephrotoxic medications  The patient is ordered 1500 mg IV q12h and after clinical evaluation will be changed to 1000 mg IV q12h    Pharmacy will also follow closely for s/sx of nephrotoxicity, infusion reactions, and appropriateness of therapy  BMP and CBC will be ordered per protocol  Plan for trough as patient approaches steady state, 30 min before dose due at 2330 tomorrow 8-  Due to infection severity, will target a trough of 15-20 (appropriate for most indications)   Pharmacy will continue to follow the patients culture results and clinical progress daily      Antoine Kilpatrick, Pharmacist

## 2020-08-14 NOTE — ASSESSMENT & PLAN NOTE
Patient breathing ambient air, saturating 97%  On physical exam lungs were clear to auscultation  Continue Breo Ellipta and albuterol nebulizers

## 2020-08-14 NOTE — WOUND OSTOMY CARE
Progress Note - Wound   Fannin Nipper 72 y o  female MRN: [id number]  Unit/Bed#:  Encounter: [id number]      Assessment: Wound care consulted for assessment of wounds to the b/l buttocks and shoulder  Patient seen in bed in the ICU  On low air loss mattress  Patient admitted with acute pulmonary embolism  Patient is alert and oriented x 4, cooperative and agreeable for the assessment  Patient self reports she is continent and ambulatory at baseline  She reports she has been having frequently BMs during her hospital stay  Minimal assist of one with turning for the assessment  B/l heels are intact with no redness or wounds  1  R posterior shoulder with area of intact ecchymosis - no open aspects  Skin surrounding the ecchymosis is intact with no redness  Outline drawn by ICU team  Dispersing edges noted on the original photo  Patient was given TPA for treatment of her PE and is currently on heparin, likely attributing to her bruising easily  Low suspicion for DTI- patient is not bed-bound, and is not located over a bony prominence of the shoulder prominence or scapular, there is also no pain with palpation, no temperature difference, firmness/boggyness noted to the area   This area was documented within 24 hours of admission  Will need to monitor for s/s of evolution  Patient likes that the area is protect with a foam dressing  2  B/l buttocks/sacrum - scattered areas of intact irregular shaped ecchymosis with dispersing edges  All measured together  No open aspects  Skin surrounding the areas of ecchymosis is intact with no redness  Patient was given TPA for treatment of her PE and is currently on heparin, likely attributing to her bruising easily  Patient believes these were caused from her getting on and off of the bedpan to have multiple BMs and the bed pan being hard plastic    Low suspicion for DTI- patient is not bed-bound, areas are scattered/irregular shaped and located over the fleshy aspects of the buttocks, and there is also no pain with palpation, no temperature differences, firmness/boggyness noted to the area  This area was documented within 24 hours of admission  Will need to monitor for s/s of evolution  Due to location will recommend hydraguard cream, and foam dressings were soiled/dislodged at time of assessment  No maceration to the sacrum/buttocks  - the mid sacrum is intact and dry with no redness, ecchymosis or wounds - will recommend foam dressing to this location for prevention  If patient soils too often (greater than once per shift) nursing can remove and use hydraguard as well  Educated patient on the importance of frequent offloading of pressure via turning, repositioning and weight-shifting  Verbalized understanding of plan of care  No induration, fluctuance, odor, warmth, redness, or purulence noted to the above noted areas assessed  New dressings applied  Patient tolerated well  Primary nurse aware of plan of care  See flow sheets for more detailed assessment findings  Will follow along  Plan:   1  Apply hydraguard to b/l buttocks and heels BID and PRN for prevention and protection  2  Apply skin nourishing cream the entire skin daily for moisture  3  Turn and reposition patient every  2 hours   4  Elevate heels off of bed with pillows to offload pressure   5  Apply EHOB waffle cushion to chair when OOB, if able  6  Allevyn foam to sacrum and R shoulder ecchymotic area, rbittaney w/P, peel foam check skin integrity q-shift  Change every other day and as needed for soilage/dislodgement  If patient soils sacral dressing too often (greater than once per shift) please d/c and use hydraguard cream BID and PRN  7  Monitor the b/l buttocks and R shoulder closely - any changes please notify wound care  8  Will reassess patient next week for follow-up   Please call with questions/concerns      Objective:    Vitals: Blood pressure 133/70, pulse 98, temperature 98 7 °F (37 1 °C), temperature source Oral, resp  rate 13, height 4' 10" (1 473 m), weight 101 kg (223 lb 12 3 oz), SpO2 97 %  ,Body mass index is 46 77 kg/m²  Wound 08/13/20 Shoulder Right;Posterior (Active)   Wound Image    08/14/20 0756   Wound Description Dry; Intact; Light purple 08/14/20 0756   Theresa-wound Assessment Dry; Intact 08/14/20 0756   Wound Length (cm) 2 5 cm 08/14/20 0756   Wound Width (cm) 2 5 cm 08/14/20 0756   Wound Depth (cm) 0 cm 08/14/20 0756   Wound Surface Area (cm^2) 6 25 cm^2 08/14/20 0756   Wound Volume (cm^3) 0 cm^3 08/14/20 0756   Calculated Wound Volume (cm^3) 0 cm^3 08/14/20 0756   Tunneling 0 cm 08/14/20 0756   Tunneling in depth located at 0 08/14/20 0756   Undermining 0 08/14/20 0756   Undermining is depth extending from 0 08/14/20 0756   Closure None 08/14/20 0756   Drainage Amount None 08/14/20 0756   Non-staged Wound Description Not applicable 16/22/75 8411   Treatments Cleansed;Site care 08/14/20 0756   Dressing Foam, Silicon (eg  Allevyn, etc) 08/14/20 0756   Wound packed? No 08/14/20 0756   Packing- # removed 0 08/14/20 0756   Packing- # inserted 0 08/14/20 0756   Dressing Changed New 08/14/20 0756   Patient Tolerance Tolerated well 08/14/20 0756   Dressing Status Clean;Dry; Intact 08/14/20 0756       Wound 08/14/20 Buttocks Left;Right (Active)   Wound Description Dry; Intact; Light purple 08/14/20 0756   Theresa-wound Assessment Dry; Intact 08/14/20 0756   Wound Length (cm) 10 cm 08/14/20 0756   Wound Width (cm) 14 cm 08/14/20 0756   Wound Depth (cm) 0 cm 08/14/20 0756   Wound Surface Area (cm^2) 140 cm^2 08/14/20 0756   Wound Volume (cm^3) 0 cm^3 08/14/20 0756   Calculated Wound Volume (cm^3) 0 cm^3 08/14/20 0756   Tunneling 0 cm 08/14/20 0756   Tunneling in depth located at 0 08/14/20 0756   Undermining 0 08/14/20 0756   Undermining is depth extending from 0 08/14/20 0756   Closure None 08/14/20 0756   Drainage Amount None 08/14/20 0756   Non-staged Wound Description Not applicable 94/55/49 0756   Treatments Cleansed;Site care;Elevated 08/14/20 0756   Dressing Moisture barrier 08/14/20 0756   Wound packed? No 08/14/20 0756   Packing- # removed 0 08/14/20 0756   Packing- # inserted 0 08/14/20 0756   Patient Tolerance Tolerated well 08/14/20 0756       Recommendations written as orders  AVS updated    Gonzalo Hadley RN BSN CWON

## 2020-08-14 NOTE — PROGRESS NOTES
Progress Note - Teri Phelps 1955, 72 y o  female MRN: [id number]    Unit/Bed#:  Encounter: [id number]    Primary Care Provider: Nilda Zendejas MD   Date and time admitted to hospital: 8/12/2020  4:03 PM        * Acute pulmonary embolism [facility])  Assessment & Plan  CTA Chest Bilateral pulmonary emboli in the main, lobar, segmental and subsegmental branches  Status post tPA on August 12, 7770 with no complications  Patient currently on IV heparin drip  Breathing on ambient air, saturating 97% at the time of assessment  Lungs clear to auscultation bilaterally  For now will continue heparin drip, if patient remained hemodynamically stable will consider transitioning to p o  NOACs within the next 24 hours  Continue monitoring hemodynamics parameters    Gram-positive bacteremia  Assessment & Plan  1/2 set of blood culture positive for Gram-positive cocci in clusters  Although patient remained afebrile, she is having mild leukocytosis, tachycardia and tachypnea that could be attributed to pulmonary embolism also  We have started patient on vancomycin  Check procalcitonin  Repeat blood cultures  Acute diarrhea  Assessment & Plan  Patient complains of having lower abdominal pain, as well as multiple episodes of loose bowel movement  Patient has remained afebrile, mild elevation of WBC count-13 9  Tenderness to palpation over the lower abdomen with no rebound or guarding  Will order C difficile toxin PCR  Traumatic ecchymosis of buttock  Assessment & Plan  On physical exam today patient has some ecchymotic lesions on the buttocks that seem to be combination of IV heparin use and increase pressure points due to remained immobile bile for prolonged periods of times  There was no evidence of skin breaks at any point  Continue local wound care, monitor closely      Gross hematuria  Assessment & Plan  Patient complained of gross hematuria that could be secondary to the use of tPA and subsequently being on IV heparin drip for pulmonary embolism  Patient's hemoglobin has remained stable since admission, HGB today 11 7  Benefits of anticoagulation so far outweighed the risks  Will order urinalysis with reflex culture  Urology has been involved in patient care, CT abdomen and pelvis has been ordered by urologist   Follow CT abdomen results as well as urology recommendations, monitor for active bleeding  Repeat hemoglobin hematocrit on a m  Labs  SIRS (systemic inflammatory response syndrome) (HCC)  Assessment & Plan  During the last 24 hours patient has remained tachycardic with maximum heart rate recorded 103, tachypneic with respiratory rate of 23, patient WBC count elevated at 13 9   1/2 sets of blood culture Gram-positive cocci in cluster  Will repeat blood cultures since patient remained afebrile and recorded tachycardia, tachypnea and mild leukocytosis could be reactive in the setting of pulmonary embolism  Hypothyroidism  Assessment & Plan  Continue levothyroxine 80 mcg oral daily  Hyperlipidemia  Assessment & Plan  Continue pravastatin 80 mg oral daily  Anxiety  Assessment & Plan  On assessment today patient looks a little bit anxious use  Continue duloxetine 90 mg oral daily, alprazolam 0 25 mg twice a day as needed  Asthma  Assessment & Plan  Patient breathing ambient air, saturating 97%  On physical exam lungs were clear to auscultation  Continue Breo Ellipta and albuterol nebulizers  I have discussed with patient the blood cx results for Gram Positive Cocci in clusters, patient was made aware that could be true infection but also could be contaminant  We have also discussed with her the Hx of Hives with prior vancomycin administration  Patient states what she had was Red Man Syndrome with no anaphylaxis or other severe complications  Risk and benefits of starting Vancomycin were discussed with patient on details, she was agreeable to start Vancomycin  Will start vancomycin at low infusion rate,  If any reaction happens will stop Vancomycin and will consider Daptomycin  VTE Pharmacologic Prophylaxis:   Pharmacologic: Heparin Drip for PE  Mechanical VTE Prophylaxis in Place: Yes    Discussions with Specialists or Other Care Team Provider:   Urologist, involved in patient care due to gross hematuria  Education and Discussions with Family / Patient:  Patient's  is been informed of patient current condition assessment and plan  Current Length of Stay: 2 day(s)    Current Patient Status: Inpatient       Code Status: Level 1 - Full Code      Subjective:   Patient has been seen at bedside, she was alert and cooperative, patient was breathing in him being here, saturating 97%  Vital signs at the time of encounter and overnight hemodynamically stable  Denies any shortness of breath  Patient complain of lower abdominal pain, diarrhea  and gross hematuria  Has remained afebrile  Objective:     Vitals:   Temp (24hrs), Av 2 °F (36 8 °C), Min:97 7 °F (36 5 °C), Max:98 7 °F (37 1 °C)    Temp:  [97 7 °F (36 5 °C)-98 7 °F (37 1 °C)] 98 7 °F (37 1 °C)  HR:  [] 98  Resp:  [13-30] 13  BP: ()/(57-78) 133/70  SpO2:  [93 %-100 %] 97 %  Body mass index is 46 77 kg/m²  Input and Output Summary (last 24 hours): Intake/Output Summary (Last 24 hours) at 2020 1040  Last data filed at 2020 0600  Gross per 24 hour   Intake 318 ml   Output 650 ml   Net -332 ml       Physical Exam:     Physical Exam  Constitutional:       Appearance: She is obese  She is not toxic-appearing or diaphoretic  HENT:      Head: Normocephalic and atraumatic  Eyes:      General: No scleral icterus  Conjunctiva/sclera: Conjunctivae normal       Pupils: Pupils are equal, round, and reactive to light  Neck:      Musculoskeletal: Normal range of motion  No neck rigidity or muscular tenderness     Cardiovascular:      Rate and Rhythm: Normal rate and regular rhythm  Pulses: Normal pulses  Heart sounds: No murmur  No friction rub  No gallop  Pulmonary:      Effort: Pulmonary effort is normal  No respiratory distress  Breath sounds: Normal breath sounds  No wheezing, rhonchi or rales  Abdominal:      General: Bowel sounds are normal  There is no distension  Palpations: Abdomen is soft  Tenderness: There is abdominal tenderness (Tenderness to palpation on the lower abdomen with no rebound or guarding)  There is no guarding or rebound  Musculoskeletal: Normal range of motion  General: No swelling, tenderness or deformity  Skin:     General: Skin is warm and dry  Capillary Refill: Capillary refill takes less than 2 seconds  Findings: Lesion (Ecchymotic lesions on the buttocks) present  Comments: There was no evidence of skin breaks  Neurological:      Mental Status: She is alert and oriented to person, place, and time  Psychiatric:         Mood and Affect: Mood normal          Additional Data:     Labs:    Results from last 7 days   Lab Units 08/14/20  0641   WBC Thousand/uL 13 96*   HEMOGLOBIN g/dL 11 7   HEMATOCRIT % 38 2   PLATELETS Thousands/uL 252   NEUTROS PCT % 69   LYMPHS PCT % 24   MONOS PCT % 6   EOS PCT % 0     Results from last 7 days   Lab Units 08/14/20  0641  08/12/20  1621   POTASSIUM mmol/L 3 5   < > 4 2   CHLORIDE mmol/L 104   < > 103   CO2 mmol/L 31   < > 26   BUN mg/dL 15   < > 18   CREATININE mg/dL 0 84   < > 0 87   CALCIUM mg/dL 8 5   < > 8 7   ALK PHOS U/L  --   --  92   ALT U/L  --   --  46   AST U/L  --   --  42    < > = values in this interval not displayed  Results from last 7 days   Lab Units 08/13/20  0836   INR  1 69*       * I Have Reviewed All Lab Data Listed Above  * Additional Pertinent Lab Tests Reviewed:  [facility]    Imaging:    Imaging Reports Reviewed Today Include:  Overnight patient underwent CT of the head, patient was complaining of headache there was concern for intracranial bleeding in the setting of recent use of tPA and continue IV heparin drip for PE   CT head showed no evidence of intracranial bleeding  Recent Cultures (last 7 days):     Results from last 7 days   Lab Units 08/12/20  1626 08/12/20  1621   BLOOD CULTURE   --  No Growth at 24 hrs  GRAM STAIN RESULT  Gram positive cocci in clusters*  --        Last 24 Hours Medication List:   Current Facility-Administered Medications   Medication Dose Route Frequency Provider Last Rate    albuterol  2 puff Inhalation Q4H PRN Surprise Isaban, CRNP      albuterol  2 5 mg Nebulization Q6H PRN Surprise Isaban, CRNP      ALPRAZolam  0 25 mg Oral BID PRN Wilfredo Isaban, CRNP      DULoxetine  90 mg Oral Daily Wilfredo Isaban, CRNP      famotidine  10 mg Oral Daily Surprise Isaban, CRNP      fluticasone-vilanterol  1 puff Inhalation Daily Wilfredo Isaban, CRNP      heparin (porcine)  3-30 Units/kg/hr (Order-Specific) Intravenous Titrated Wilfredo Isaban, CRNP 15 Units/kg/hr (08/14/20 0925)    insulin lispro  1-5 Units Subcutaneous HS Cheron Geraldo CrossMJ      insulin lispro  1-6 Units Subcutaneous TID AC Rigo Mari PA-C      levothyroxine  88 mcg Oral Early Morning Wilfredo Botello, BERNARD      nystatin   Topical BID Juanitaon Darfur CrossMJ      pravastatin  80 mg Oral Daily With [employer], CRNP      sodium chloride  50 mL/hr Intravenous Once Wilfredo Botello, CEZARNP      vancomycin  15 mg/kg Intravenous Q12H Bruce Thakur MD          Today, Patient Was Seen By: Bruce Thakur MD    Please Note: This note has been constructed using a voice recognition system

## 2020-08-14 NOTE — ASSESSMENT & PLAN NOTE
On assessment today patient looks a little bit anxious use  Continue duloxetine 90 mg oral daily, alprazolam 0 25 mg twice a day as needed

## 2020-08-14 NOTE — CONSULTS
CONSULT    Patient Name: Darek Prado  Patient MRN: [id number]  Date of Service: 8/14/2020   Date / Time Note Created: 8/14/2020 10:39 AM   Referring Provider: Nigel Richard MD    Provider Creating Note: BERNARD Blanco    PCP: Nigel Richard  Attending Provider:  Nigel Richard MD    Reason for Consult: Hematuria    HPI --Darek Prado is a very pleasant 80-year-old female with history of urinary retention x1 during hospitalization in the past but without prior history of  surgical manipulation, chronic/recurrent UTI, nephrolithiasis or other urologic derangement; admitted for PE  Patient was started on heparin infusion in the ICU  Maximum PTT exceeding 160  Patient reports the onset of gross hematuria last evening  She denies hematuria prior to admission, flank, abdominal or suprapubic pain  She remains afebrile, normotensive and pain controlled  CT of the abdomen and pelvis pending  Urologic consultation requested by Internal Medicine service for further management recommendations  Source:the patient and chart         Active Problems:    Patient Active Problem List   Diagnosis    Asthma    HTN (hypertension)    Allergic rhinitis    Anxiety    Candidal intertrigo    Current moderate episode of major depressive disorder without prior episode (HCC)    Herpes simplex infection    Hyperlipidemia    Hypothyroidism    Impaired fasting glucose    Morbid obesity (HCC)    Peripheral neuropathy    Ductal carcinoma in situ (DCIS) of left breast    Diastasis recti    Acute pulmonary embolism (HCC)    Hyperglycemia    SIRS (systemic inflammatory response syndrome) (HCC)    Gross hematuria    Traumatic ecchymosis of buttock    Acute diarrhea             Impressions  Hematuria--iatrogenic and secondary to supratherapeutic PTT while on heparin drip for treatment/management of PE    Recommendations  1   3-- way jeffries    2  Manually irrigate catheter until pink efflux     3  Maintain jeffries catheter to CBI  4  Manually irrigate every 2-4hrs to prevent cath obstruction   5  Monitor H&H   6  Ancef x3 for frequent  manipulation  7  CT of the abdomen and pelvis pending  8  If urine does not improve or patient shows deterioration will perform cystoscopy, clot evacuation & fulguration  Patient does not meet clinical criteria for emergent  surgical intervention at this time  Bleeding will likely be self-limited and will resolve once PTT is within therapeutic window  9  Will follow & update  10  Patient will require non urgent cystoscopic examination as an outpatient once discharged              Past Medical History:   Diagnosis Date    Allergic rhinitis     Anemia     Cancer ([address] )     breast cancer, Left side    Depression     Disease of thyroid gland     GERD (gastroesophageal reflux disease)     Hyperlipemia     Hypertension     Hypothyroidism     Last assessed: 3/25/14    Obesity     Osteoarthritis     Pre-operative laboratory examination        Past Surgical History:   Procedure Laterality Date    HAND SURGERY Left 03/2020    HYSTERECTOMY      INCISIONAL BREAST BIOPSY      JOINT REPLACEMENT      KNEE ARTHROPLASTY      ROTATOR CUFF REPAIR      SHOULDER SURGERY      TRIGGER FINGER RELEASE      TRIGGER FINGER RELEASE         Family History   Problem Relation Age of Onset    Coronary artery disease Mother     Hypertension Mother         Essential    Coronary artery disease Father        Social History     Socioeconomic History    Marital status: /Civil Union     Spouse name: Not on file    Number of children: Not on file    Years of education: Not on file    Highest education level: Not on file   Occupational History    Not on file   Social Needs    Financial resource strain: Not on file    Food insecurity     Worry: Not on file     Inability: Not on file    Transportation needs     Medical: Not on file     Non-medical: Not on file   Tobacco Use  Smoking status: Never Smoker    Smokeless tobacco: Never Used   Substance and Sexual Activity    Alcohol use: Yes     Frequency: Monthly or less     Drinks per session: 1 or 2     Binge frequency: Never     Comment: socially     Drug use: No    Sexual activity: Not Currently     Partners: Male   Lifestyle    Physical activity     Days per week: 0 days     Minutes per session: 0 min    Stress: Very much   Relationships    Social connections     Talks on phone: Not on file     Gets together: Not on file     Attends [employer] service: Not on file     Active member of club or organization: Not on file     Attends meetings of clubs or organizations: Not on file     Relationship status: Not on file    Intimate partner violence     Fear of current or ex partner: Not on file     Emotionally abused: Not on file     Physically abused: Not on file     Forced sexual activity: Not on file   Other Topics Concern    Not on file   Social History Narrative    Living independently w/spouse       Allergies   Allergen Reactions    Tamoxifen Other (See Comments)     Headaches, stomach pain, sweats,     Nsaids Hives    Singulair [Montelukast] Swelling    Ciprofloxacin Hives    Penicillins Hives    Sulfa Antibiotics Hives    Vancomycin Hives       Review of Systems  Review of Systems - General ROS: negative for - chills or fever  Respiratory ROS: no cough, shortness of breath, or wheezing  Cardiovascular ROS: positive for - chest pain and dyspnea on exertion  Gastrointestinal ROS: positive for - abdominal pain and resolved  Genito-Urinary ROS: positive for - hematuria  negative for - change in urinary stream, dysuria, pelvic pain or urinary frequency/urgency  Neurological ROS: no TIA or stroke symptoms       Chart Review   Allergies, current medications, history, problem list    Vital Signs  /70 (BP Location: Left arm)   Pulse 98   Temp 98 7 °F (37 1 °C) (Oral)   Resp 13   Ht 4' 10" (1 473 m)   Wt 101 kg (223 lb 12 3 oz)   SpO2 97%   BMI 46 77 kg/m²     Physical Exam  General appearance: alert and oriented, in no acute distress, appears stated age, cooperative and no distress  Head: Normocephalic, without obvious abnormality, atraumatic  Neck: no adenopathy, no carotid bruit, no JVD, supple, symmetrical, trachea midline and thyroid not enlarged, symmetric, no tenderness/mass/nodules  Lungs: clear to auscultation bilaterally  Heart: regular rate and rhythm, S1, S2 normal, no murmur, click, rub or gallop  Abdomen: soft, non-tender; bowel sounds normal; no masses,  no organomegaly  Extremities: extremities normal, warm and well-perfused; no cyanosis, clubbing, or edema  Pulses: 2+ and symmetric  Neurologic: Grossly normal  No urinary drains     Laboratory Studies  Lab Results   Component Value Date    HGBA1C 5 9 (H) 07/23/2020    HGBA1C 5 8 (H) 05/06/2016     05/04/2017    K 3 5 08/14/2020    K 4 0 05/04/2017     08/14/2020    CL 94 (L) 05/04/2017    CO2 31 08/14/2020    CO2 27 05/04/2017    CO2 29 01/17/2017    GLUCOSE 90 05/04/2017    CREATININE 0 84 08/14/2020    CREATININE 0 76 05/04/2017    BUN 15 08/14/2020    BUN 16 05/04/2017    MG 2 4 08/13/2020    PHOS 4 7 (H) 08/13/2020     Lab Results   Component Value Date    WBC 13 96 (H) 08/14/2020    WBC 9 0 05/04/2017    RBC 4 23 08/14/2020    HGB 11 7 08/14/2020    HGB 12 2 05/04/2017    HCT 38 2 08/14/2020    HCT 37 6 05/04/2017    MCV 90 08/14/2020    MCV 83 05/04/2017    MCH 27 7 08/14/2020    MCH 27 1 05/04/2017    RDW 16 0 (H) 08/14/2020    RDW 14 9 05/04/2017     08/14/2020     05/04/2017       Imaging and Other Studies  )Ct Head Wo Contrast    Result Date: 8/14/2020  Narrative: CT BRAIN - WITHOUT CONTRAST INDICATION:   Follow-up CT status post TPA  COMPARISON:  Head CT from August 12, 2020  TECHNIQUE:  CT examination of the brain was performed    In addition to axial images, coronal 2D reformatted images were created and submitted for interpretation  Radiation dose length product (DLP) for this visit:  742 mGy-cm   This examination, like all CT scans performed in the [facility], was performed utilizing techniques to minimize radiation dose exposure, including the use of iterative reconstruction and automated exposure control  IMAGE QUALITY:  Diagnostic  FINDINGS: PARENCHYMA:  Stable attenuation of the parenchyma  No intracranial hemorrhage or acute transcortical infarction  Mild periventricular hypoattenuation is suggestive of chronic microangiopathy  VENTRICLES AND EXTRA-AXIAL SPACES:  Ventricles and extra-axial CSF spaces are prominent commensurate with the degree of volume loss  No hydrocephalus  No acute extra-axial hemorrhage  VISUALIZED ORBITS AND PARANASAL SINUSES: Orbits are unremarkable  Rightward nasal septal deviation  CALVARIUM AND EXTRACRANIAL SOFT TISSUES:  Normal      Impression: No acute intracranial abnormality  Mild cerebral chronic microangiopathy  Workstation performed: [id number]     Ct Head Wo Contrast    Result Date: 8/12/2020  Narrative: CT BRAIN - WITHOUT CONTRAST INDICATION:   Headache, acute, normal neuro exam  COMPARISON:  CT head dated 8/12/2020 TECHNIQUE:  CT examination of the brain was performed  In addition to axial images, coronal 2D reformatted images were created and submitted for interpretation  Radiation dose length product (DLP) for this visit:  763 19 mGy-cm   This examination, like all CT scans performed in the [facility], was performed utilizing techniques to minimize radiation dose exposure, including the use of iterative  reconstruction and automated exposure control  IMAGE QUALITY:  Diagnostic  FINDINGS: PARENCHYMA: Decreased attenuation is noted in periventricular and subcortical white matter demonstrating an appearance that is statistically most likely to represent mild microangiopathic change  No CT signs of acute territorial infarction    No intracranial mass, mass effect or midline shift  No acute parenchymal hemorrhage  Gray-white differentiation appears maintained  VENTRICLES AND EXTRA-AXIAL SPACES:  Normal for the patient's age  VISUALIZED ORBITS AND PARANASAL SINUSES:  Unremarkable  CALVARIUM AND EXTRACRANIAL SOFT TISSUES:  Normal      Impression: No acute intracranial abnormality is seen  Workstation performed: [id number]     Ct Head Without Contrast    Result Date: 8/12/2020  Narrative: CT BRAIN - WITHOUT CONTRAST INDICATION:   Altered mental status  COMPARISON:  5/14/2020  TECHNIQUE:  CT examination of the brain was performed  In addition to axial images, coronal 2D reformatted images were created and submitted for interpretation  Radiation dose length product (DLP) for this visit:  785 mGy-cm   This examination, like all CT scans performed in the [facility], was performed utilizing techniques to minimize radiation dose exposure, including the use of iterative reconstruction and automated exposure control  IMAGE QUALITY:  Diagnostic  FINDINGS: PARENCHYMA:  No evidence of acute vascular territorial infarction  Minimal periventricular hypoattenuating foci suggesting early microangiopathic disease  No acute intracranial hemorrhage or mass effect  VENTRICLES AND EXTRA-AXIAL SPACES:  No hydrocephalus fracture axial collection  Few punctate foci of gas in the bilateral cavernous sinuses and gas and at least one visualized left facial vein, likely secondary to recent venous injection  VISUALIZED ORBITS AND PARANASAL SINUSES:  Intact globes and orbits  Clear paranasal sinuses  CALVARIUM AND EXTRACRANIAL SOFT TISSUES:  No lytic or blastic lesion  Impression: No acute intracranial abnormality   Workstation performed: [id number]     Pe Study With Ct Abdomen And Pelvis With Contrast    Result Date: 8/12/2020  Narrative: CT PULMONARY ANGIOGRAM OF THE CHEST AND CT ABDOMEN AND PELVIS WITH INTRAVENOUS CONTRAST INDICATION:   PE suspected, high pretest prob Epigastric pain, known ventral hernia  COMPARISON:  None  TECHNIQUE:  CT examination of the chest, abdomen and pelvis was performed  Thin section CT angiographic technique was used in the chest in order to evaluate for pulmonary embolus and coronal 3D MIP postprocessing was performed on the acquisition scanner  Axial, sagittal, and coronal 2D reformatted images were created from the source data and submitted for interpretation  Radiation dose length product (DLP) for this visit:  1478 24 mGy-cm   This examination, like all CT scans performed in the [facility], was performed utilizing techniques to minimize radiation dose exposure, including the use of iterative reconstruction and automated exposure control  IV Contrast:  100 mL of iohexol (OMNIPAQUE) Enteric Contrast:  Enteric contrast was not administered  FINDINGS: CHEST PULMONARY ARTERIAL TREE:  Bilateral pulmonary emboli in the main, lobar, segmental and subsegmental branches  RV/LV ratio measures 1 7 suggesting right heart strain  LUNGS:  Right middle lobe consolidation could relate to pneumonia and/or pulmonary infarct  Posttreatment follow-up to radiographic resolution is recommended with unenhanced chest CT in 3 months  PLEURA:  Unremarkable  HEART/AORTA:  Unremarkable for patient's age  MEDIASTINUM AND DODIE:  Unremarkable  CHEST WALL AND LOWER NECK:   Unremarkable  ABDOMEN LIVER/BILIARY TREE:  Unremarkable  GALLBLADDER:  No calcified gallstones  No pericholecystic inflammatory change  SPLEEN:  Unremarkable  PANCREAS:  Unremarkable  ADRENAL GLANDS:  Unremarkable  KIDNEYS/URETERS:  Unremarkable  No hydronephrosis  STOMACH AND BOWEL:  Unremarkable  APPENDIX:  No findings to suggest appendicitis  ABDOMINOPELVIC CAVITY:  No ascites  No pneumoperitoneum  No lymphadenopathy  VESSELS:  Unremarkable for patient's age  PELVIS REPRODUCTIVE ORGANS:  Unremarkable for patient's age  URINARY BLADDER:  Unremarkable  ABDOMINAL WALL/INGUINAL REGIONS:  Unremarkable  OSSEOUS STRUCTURES:  Superior endplate L2 mild depression is unchanged likely related to Schmorl's nodes as seen on prior MRI October 23, 2020  Impression: PULMONARY ARTERIAL TREE:  Bilateral pulmonary emboli in the main, lobar, segmental and subsegmental branches  RV/LV ratio measures 1 7 suggesting right heart strain  LUNGS:  Right middle lobe consolidation could relate to pneumonia and/or pulmonary infarct  Posttreatment follow-up to radiographic resolution is recommended with unenhanced chest CT in 3 months  I personally discussed this study with Neymar Walter on 8/12/2020 at 6:33 PM   Workstation performed: [id number]     Vas Lower Limb Venous Duplex Study, Complete Bilateral    Result Date: 8/13/2020  Narrative:  [facility] REPORT [facility]INICAL: Indications: Bilateral Pulmonary Emboli [I26 99]  Right Limb Pain [M79 609] calf  Patient has a history of breast cancer (in remission)  Risk Factors The patient has history of Obesity  She has no history of DVT or Recent Trauma  The patient current BMI is 46 6, Weight is 223 lb and height is 58 in  FINDINGS:  Segment         Right            Left                            Impression       Impression       CFV             Normal (Patent)  Normal (Patent)  GSV Prox Thigh  Not Visualized                    Peroneal        Not Visualized                       CONCLUSION:  Impression: RIGHT LOWER LIMB: No evidence of acute or chronic deep vein thrombosis  No evidence of superficial thrombophlebitis noted  Doppler evaluation shows a normal response to augmentation maneuvers  Popliteal, posterior tibial and anterior tibial arterial Doppler waveforms are biphasic  LEFT LOWER LIMB: No evidence of acute or chronic deep vein thrombosis  No evidence of superficial thrombophlebitis noted  Doppler evaluation shows a normal response to augmentation maneuvers   Popliteal, posterior tibial and anterior tibial arterial Doppler waveforms are biphasic  Tech Note: Pulsatile waveforms seen in the Common Femoral Veins bilaterally which may suggest heart failure or tricuspid valve insufficiency  Technical findings were given to covering RN at time of study    SIGNATURE: Electronically Signed by: Emily Gomez MD, RPVI on 2020-08-13 04:54:43 PM        Medications   Current Facility-Administered Medications   Medication Dose Route Frequency Provider Last Rate    albuterol  2 puff Inhalation Q4H PRN BERNARD Haji      albuterol  2 5 mg Nebulization Q6H PRN BERNARD Haji      ALPRAZolam  0 25 mg Oral BID PRN BERNARD Haji      DULoxetine  90 mg Oral Daily BERNARD Haji      famotidine  10 mg Oral Daily BERNARD Haji      fluticasone-vilanterol  1 puff Inhalation Daily BERNARD Haji      heparin (porcine)  3-30 Units/kg/hr (Order-Specific) Intravenous Titrated BERNARD Haji 15 Units/kg/hr (08/14/20 0925)    insulin lispro  1-5 Units Subcutaneous HS Ed Acosta PA-C      insulin lispro  1-6 Units Subcutaneous TID AC Rigo Mari PA-C      levothyroxine  88 mcg Oral Early Morning BERNARD Haji      nystatin   Topical BID Ed Mari PA-C      pravastatin  80 mg Oral Daily With [employer], BERNARD      sodium chloride  50 mL/hr Intravenous Once BERNARD Haji      vancomycin  15 mg/kg Intravenous Q12H 4250 MD Bryanna Figueredo CRNP

## 2020-08-14 NOTE — PLAN OF CARE
Problem: Potential for Falls  Goal: Patient will remain free of falls  Description: INTERVENTIONS:  - Assess patient frequently for physical needs  -  Identify cognitive and physical deficits and behaviors that affect risk of falls    -  [place] fall precautions as indicated by assessment   - Educate patient/family on patient safety including physical limitations  - Instruct patient to call for assistance with activity based on assessment  - Modify environment to reduce risk of injury  - Consider OT/PT consult to assist with strengthening/mobility  Outcome: Progressing     Problem: DISCHARGE PLANNING - CARE MANAGEMENT  Goal: Discharge to post-acute care or home with appropriate resources  Description: INTERVENTIONS:  - Conduct assessment to determine patient/family and health care team treatment goals, and need for post-acute services based on payer coverage, community resources, and patient preferences, and barriers to discharge  - Address psychosocial, clinical, and financial barriers to discharge as identified in assessment in conjunction with the patient/family and health care team  - Arrange appropriate level of post-acute services according to patients   needs and preference and payer coverage in collaboration with the physician and health care team  - Communicate with and update the patient/family, physician, and health care team regarding progress on the discharge plan  - Arrange appropriate transportation to post-acute venues  Outcome: Progressing     Problem: PAIN - ADULT  Goal: Verbalizes/displays adequate comfort level or baseline comfort level  Description: Interventions:  - Encourage patient to monitor pain and request assistance  - Assess pain using appropriate pain scale  - Administer analgesics based on type and severity of pain and evaluate response  - Implement non-pharmacological measures as appropriate and evaluate response  - Consider cultural and social influences on pain and pain management  - Notify physician/advanced practitioner if interventions unsuccessful or patient reports new pain  Outcome: Progressing     Problem: SAFETY ADULT  Goal: Patient will remain free of falls  Description: INTERVENTIONS:  - Assess patient frequently for physical needs  -  Identify cognitive and physical deficits and behaviors that affect risk of falls    -  [place] fall precautions as indicated by assessment   - Educate patient/family on patient safety including physical limitations  - Instruct patient to call for assistance with activity based on assessment  - Modify environment to reduce risk of injury  - Consider OT/PT consult to assist with strengthening/mobility  Outcome: Progressing  Goal: Maintain or return to baseline ADL function  Description: INTERVENTIONS:  -  Assess patient's ability to carry out ADLs; assess patient's baseline for ADL function and identify physical deficits which impact ability to perform ADLs (bathing, care of mouth/teeth, toileting, grooming, dressing, etc )  - Assess/evaluate cause of self-care deficits   - Assess range of motion  - Assess patient's mobility; develop plan if impaired  - Assess patient's need for assistive devices and provide as appropriate  - Encourage maximum independence but intervene and supervise when necessary  - Involve family in performance of ADLs  - Assess for home care needs following discharge   - Consider OT consult to assist with ADL evaluation and planning for discharge  - Provide patient education as appropriate  Outcome: Progressing  Goal: Maintain or return mobility status to optimal level  Description: INTERVENTIONS:  - Assess patient's baseline mobility status (ambulation, transfers, stairs, etc )    - Identify cognitive and physical deficits and behaviors that affect mobility  - Identify mobility aids required to assist with transfers and/or ambulation (gait belt, sit-to-stand, lift, walker, cane, etc )  - [place] fall precautions as indicated by assessment  - Record patient progress and toleration of activity level on Mobility SBAR; progress patient to next Phase/Stage  - Instruct patient to call for assistance with activity based on assessment  - Consider rehabilitation consult to assist with strengthening/weightbearing, etc   Outcome: Progressing     Problem: DISCHARGE PLANNING  Goal: Discharge to home or other facility with appropriate resources  Description: INTERVENTIONS:  - Identify barriers to discharge w/patient and caregiver  - Arrange for needed discharge resources and transportation as appropriate  - Identify discharge learning needs (meds, wound care, etc )  - Arrange for interpretive services to assist at discharge as needed  - Refer to Case Management Department for coordinating discharge planning if the patient needs post-hospital services based on physician/advanced practitioner order or complex needs related to functional status, cognitive ability, or social support system  Outcome: Progressing     Problem: Knowledge Deficit  Goal: Patient/family/caregiver demonstrates understanding of disease process, treatment plan, medications, and discharge instructions  Description: Complete learning assessment and assess knowledge base    Interventions:  - Provide teaching at level of understanding  - Provide teaching via preferred learning methods  Outcome: Progressing     Problem: RESPIRATORY - ADULT  Goal: Achieves optimal ventilation and oxygenation  Description: INTERVENTIONS:  - Assess for changes in respiratory status  - Assess for changes in mentation and behavior  - Position to facilitate oxygenation and minimize respiratory effort  - Oxygen administered by appropriate delivery if ordered  - Initiate smoking cessation education as indicated  - Encourage broncho-pulmonary hygiene including cough, deep breathe, Incentive Spirometry  - Assess the need for suctioning and aspirate as needed  - Assess and instruct to report SOB or any respiratory difficulty  - Respiratory Therapy support as indicated  Outcome: Progressing     Problem: METABOLIC, FLUID AND ELECTROLYTES - ADULT  Goal: Electrolytes maintained within normal limits  Description: INTERVENTIONS:  - Monitor labs and assess patient for signs and symptoms of electrolyte imbalances  - Administer electrolyte replacement as ordered  - Monitor response to electrolyte replacements, including repeat lab results as appropriate  - Instruct patient on fluid and nutrition as appropriate  Outcome: Progressing  Goal: Fluid balance maintained  Description: INTERVENTIONS:  - Monitor labs   - Monitor I/O and WT  - Instruct patient on fluid and nutrition as appropriate  - Assess for signs & symptoms of volume excess or deficit  Outcome: Progressing  Goal: Glucose maintained within target range  Description: INTERVENTIONS:  - Monitor Blood Glucose as ordered  - Assess for signs and symptoms of hyperglycemia and hypoglycemia  - Administer ordered medications to maintain glucose within target range  - Assess nutritional intake and initiate nutrition service referral as needed  Outcome: Progressing     Problem: HEMATOLOGIC - ADULT  Goal: Maintains hematologic stability  Description: INTERVENTIONS  - Assess for signs and symptoms of bleeding or hemorrhage  - Monitor labs  - Administer supportive blood products/factors as ordered and appropriate  Outcome: Progressing

## 2020-08-14 NOTE — ASSESSMENT & PLAN NOTE
CTA Chest Bilateral pulmonary emboli in the main, lobar, segmental and subsegmental branches  Status post tPA on August 12, 7090 with no complications  Patient currently on IV heparin drip  Breathing on ambient air, saturating 97% at the time of assessment  Lungs clear to auscultation bilaterally  For now will continue heparin drip, if patient remained hemodynamically stable will consider transitioning to p o  NOACs within the next 24 hours    Continue monitoring hemodynamics parameters

## 2020-08-14 NOTE — PLAN OF CARE
Problem: OCCUPATIONAL THERAPY ADULT  Goal: Performs self-care activities at highest level of function for planned discharge setting  See evaluation for individualized goals  Description: Treatment Interventions: ADL retraining, Functional transfer training, UE strengthening/ROM, Endurance training, Patient/family training, Energy conservation, Activityengagement          See flowsheet documentation for full assessment, interventions and recommendations  Note: Limitation: Decreased ADL status, Decreased endurance, Decreased self-care trans, Decreased high-level ADLs  Prognosis: Good  Assessment: Patient is a 72 y o  female seen for OT evaluation s/p admit to [address] [place] [address] on 8/12/2020 w/Acute pulmonary embolism ([facility] [address] )  Commorbidities affecting patient's functional performance at time of assessment include: gram-positive bacteremia, acute diarrhea, traumatic ecchymosis of buttock, gross hematuria, SIRS, hypothyroidism, hyperlipidemia, anxiety, and asthma  Orders placed for OT evaluation and treatment  Performed at least two patient identifiers during session including name and wristband  Prior to admission, patient was living with her spouse in a two-story home with 4 MARIBEL and the bedroom and bathroom on the second level  Personal factors affecting patient at time of initial evaluation include: steps to enter, difficulty performing ADLs and difficulty performing IADLs  Upon evaluation, patient requires supervision assist for UB ADLs, minimal  assist for LB ADLs, transfers and functional ambulation in room and bathroom with supervision assist, with the use of Rolling Walker  Occupational performance is affected by the following deficits: dynamic sit/ stand balance deficit with poor standing tolerance time for self care and functional mobility, decreased activity tolerance and postural control and postural alignment deficit, requiring external assistance to complete transitional movements   Therapist completed expanded review of medical records and additional review of physical, cognitive or psychosocial history, clinical examination identifying 3-5 performance deficits, clinical decision making of a moderate complexity, consistent with moderate complexity level evaluation  Patient to benefit from continued Occupational Therapy treatment while in the hospital to address deficits as defined above and maximize level of functional independence with ADLs and functional mobility  Occupational Performance areas to address include: grooming , bathing/ shower, dressing, toilet hygiene, transfer to all surfaces, functional mobility, community mobility, health maintenance, medication routine/ management, IADLS: Household maintenance, IADLs: safety procedures, IADLs: meal prep/ clean up, Leisure Participation and Social participation  From OT standpoint, recommendation at time of d/c would be Home with family support and Home OT  OT Discharge Recommendation: Home with skilled therapy(Home with skilled OT)  OT - OK to Discharge:  Yes

## 2020-08-14 NOTE — ASSESSMENT & PLAN NOTE
Patient complains of having lower abdominal pain, as well as multiple episodes of loose bowel movement  Patient has remained afebrile, mild elevation of WBC count-13 9  Tenderness to palpation over the lower abdomen with no rebound or guarding  Will order C difficile toxin PCR

## 2020-08-14 NOTE — PHYSICAL THERAPY NOTE
Physical Therapy Evaluation     Patient's Name: Marcellus Padilla    Admitting Diagnosis  Shortness of breath [R06 02]  SIRS (systemic inflammatory response syndrome) (HCC) [R65 10]  Acute respiratory failure with hypoxia (HCC) [J96 01]  Right middle lobe pneumonia [J18 9]  Pulmonary embolism with acute cor pulmonale ([facility]) [I26 09]    Problem List  Patient Active Problem List   Diagnosis    Asthma    HTN (hypertension)    Allergic rhinitis    Anxiety    Candidal intertrigo    Current moderate episode of major depressive disorder without prior episode ([facility] [place] [facility] )    Herpes simplex infection    Hyperlipidemia    Hypothyroidism    Impaired fasting glucose    Morbid obesity ([email or web address]    Peripheral neuropathy    Ductal carcinoma in situ (DCIS) of left breast    Diastasis recti    Acute pulmonary embolism (HCC)    Hyperglycemia    SIRS (systemic inflammatory response syndrome) ([facility])    Gross hematuria    Traumatic ecchymosis of buttock    Acute diarrhea    Gram-positive bacteremia       Past Medical History  Past Medical History:   Diagnosis Date    Allergic rhinitis     Anemia     Cancer ([email or web address]     breast cancer, Left side    Depression     Disease of thyroid gland     GERD (gastroesophageal reflux disease)     Hyperlipemia     Hypertension     Hypothyroidism     Last assessed: 3/25/14    Obesity     Osteoarthritis     Pre-operative laboratory examination        Past Surgical History  Past Surgical History:   Procedure Laterality Date    HAND SURGERY Left 03/2020    HYSTERECTOMY      INCISIONAL BREAST BIOPSY      JOINT REPLACEMENT      KNEE ARTHROPLASTY      ROTATOR CUFF REPAIR      SHOULDER SURGERY      TRIGGER FINGER RELEASE      TRIGGER FINGER RELEASE            08/14/20 1113   Note Type   Note type Eval only   Pain Assessment   Pain Assessment Tool 0-10   Pain Score 6   Pain Location/Orientation Location: Abdomen; Location: Back   Pain Onset/Description Descriptor: Dull;Descriptor: Discomfort   Home Living   Type of [facility] Two level;Bed/bath upstairs; Performs ADLs on one level  (4 MARIBEL  also full bathroom on 1st floor)   Bathroom Shower/Tub Tub/shower unit  (bathroom being remodeled)   Bathroom Equipment Grab bars around toilet; Shower chair   Bathroom Accessibility Accessible   Home Equipment Walker;Cane   Additional Comments pt uses cane at baseline indoors/outdoors   Prior Function   Level of [place] Independent with ADLs and functional mobility   Lives With Spouse  ( working from home)   Arnie [address] 32 in the last 6 months 1 to 4  (2-3 falls per pt, "when I go to run and my leg doesn't go with")   Comments (+) driving   Restrictions/Precautions   Weight Bearing Precautions Per Order No   Braces or Orthoses   (none per pt)   Other Precautions Chair Alarm; Bed Alarm;Multiple lines; Fall Risk;Pain;Contact/isolation  (back safety precautions; r/o cdiff)   General   Additional Pertinent History pt reports she had gotten shots in her back @ [facility]   Family/Caregiver Present No   Cognition   Overall Cognitive Status WFL   Arousal/Participation Alert   Orientation Level Oriented X4   Memory Within functional limits   Following Commands Follows all commands and directions without difficulty   Comments pt agreeable to PT evaluation   RUE Assessment   RUE Assessment   (defer to OT eval for comments)   LUE Assessment   LUE Assessment   (defer to OT eval for comments)   RLE Assessment   RLE Assessment WFL  (grossly 3+/5)   LLE Assessment   LLE Assessment WFL  (grossly 3+/5)   Coordination   Movements are Fluid and Coordinated 1   Sensation WFL   Light Touch   RLE Light Touch Grossly intact   LLE Light Touch Grossly intact   Bed Mobility   Supine to Sit 5  Supervision  (log roll technique)   Additional items Assist x 1;HOB elevated; Increased time required;Verbal cues   Transfers   Sit to Stand 5  Supervision Stand to Sit 5  Supervision   Ambulation/Elevation   Gait pattern Decreased foot clearance; Short stride; Step to   Gait Assistance 5  Supervision   Additional items Assist x 1;Verbal cues   Assistive Device Rolling walker   Distance 15'   Stair Management Assistance Not tested   Balance   Static Sitting Good   Dynamic Sitting Fair +   Static Standing Fair   Dynamic Standing Fair   Ambulatory Fair   Endurance Deficit   Endurance Deficit Yes   Activity Tolerance   Activity Tolerance Patient limited by fatigue   Medical Staff Made Aware OT Christiano Sherwood   Nurse Made Aware RN Ml Sarah verbalized pt appropriate to see, made aware of session outcome/recs   Assessment   Prognosis Good   Problem List Decreased strength;Decreased endurance; Impaired balance;Decreased mobility;Pain   Assessment Pt is 72 y o  female seen for PT evaluation on 8/14/2020 s/p admit to [facility] on 8/12/2020 w/ Acute pulmonary embolism ([facility] [address] )  PT consulted to assess pt's functional mobility and d/c needs  Order placed for PT eval and tx, w/ up w/ A order  Performed at least 2 patient identifiers during session: Name and wristband  Comorbidities affecting pt's physical performance at time of assessment include: HTN, HLD, asthma, anxiety, breast cancer s/p left lumpectomy and radiation tx  PTA, pt was independent w/ all functional mobility w/ cane, ambulates unrestricted distances and all terrain, has 4 MARIBEL and lives w/ spouse in 2 level home  Personal factors affecting pt at time of IE include: inaccessible home environment, ambulating w/ assistive device, stairs to enter home, inability to navigate community distances and positive fall history   Please find objective findings from PT assessment regarding body systems outlined above with impairments and limitations including weakness, impaired balance, decreased endurance, gait deviations, decreased activity tolerance and fall risk, as well as mobility assessment (need for SBA assist w/ all phases of mobility when usually ambulating independently and need for cueing for mobility technique)  The following objective measures performed on IE also reveal limitations: Barthel Index: 55/100 and Modified Carloz: 3 (moderate disability)  Pt's clinical presentation is currently unstable/unpredictable seen in pt's presentation of abnormal lab value(s), need for input for task focus and mobility technique and ongoing medical assessment  Pt to benefit from continued PT tx to address deficits as defined above and maximize level of functional independent mobility and consistency  From PT/mobility standpoint, recommendation at time of d/c would be Home PT with family support pending progress in order to facilitate return to PLOF  Barriers to Discharge Inaccessible home environment   Goals   Patient Goals "to get more exercise"   STG Expiration Date 08/24/20   Short Term Goal #1 In 7-10 days: Increase bilateral LE strength 1/2 grade to facilitate independent mobility, Perform all bed mobility tasks modified independent to decrease caregiver burden, Perform all transfers modified independent to improve independence, Ambulate > 150 ft  with least restrictive assistive device modified independent w/o LOB and w/ normalized gait pattern 100% of the time, Navigate 13 stairs modified independent with unilateral handrail to facilitate return to previous living environment, Increase all balance 1/2 grade to decrease risk for falls, Tolerate 4 hr OOB to faciliate upright tolerance, Improve Barthel Index score to 70 or greater to facilitate independence and PT provider will perform functional balance assessment to determine fall risk   PT Treatment Day 0   Plan   Treatment/Interventions Functional transfer training;LE strengthening/ROM; Elevations; Therapeutic exercise; Endurance training;Patient/family training;Equipment eval/education; Bed mobility;Gait training;Spoke to case management;Spoke to nursing   PT Frequency (3-5x/wk)   Recommendation   PT Discharge Recommendation Home with skilled therapy; Return to previous environment with social support  (home PT)   Equipment Recommended Walker  (RW)   PT - OK to Discharge No   Modified [place] Scale   Modified [place] Scale 3   Barthel Index   Feeding 10   Bathing 0   Grooming Score 0   Dressing Score 10   Bladder Score 10   Bowels Score 10   Toilet Use Score 5   Transfers (Bed/Chair) Score 10   Mobility (Level Surface) Score 0   Stairs Score 0   Barthel Index Score 55         Mode Hanna, PT, DPT

## 2020-08-14 NOTE — ASSESSMENT & PLAN NOTE
During the last 24 hours patient has remained tachycardic with maximum heart rate recorded 103, tachypneic with respiratory rate of 23, patient WBC count elevated at 13 9   1/2 sets of blood culture Gram-positive cocci in cluster  Will repeat blood cultures since patient remained afebrile and recorded tachycardia, tachypnea and mild leukocytosis could be reactive in the setting of pulmonary embolism

## 2020-08-14 NOTE — ASSESSMENT & PLAN NOTE
Patient complained of gross hematuria that could be secondary to the use of tPA and subsequently being on IV heparin drip for pulmonary embolism  Patient's hemoglobin has remained stable since admission, HGB today 11 7  Benefits of anticoagulation so far outweighed the risks  Will order urinalysis with reflex culture  Urology has been involved in patient care, CT abdomen and pelvis has been ordered by urologist   Follow CT abdomen results as well as urology recommendations, monitor for active bleeding  Repeat hemoglobin hematocrit on a m  Labs

## 2020-08-14 NOTE — OCCUPATIONAL THERAPY NOTE
Occupational Therapy Evaluation Note        Patient Name: Teri Phelps  DTPVN'V Date: 8/14/2020 08/14/20 1112   Note Type   Note type Eval only   Restrictions/Precautions   Weight Bearing Precautions Per Order No   Braces or Orthoses Other (Comment)  (none per pt)   Other Precautions Chair Alarm; Bed Alarm;Multiple lines; Fall Risk;Contact/isolation  (Back safety precautions, r/o c diff)   Pain Assessment   Pain Assessment Tool 0-10   Pain Score 6   Pain Location/Orientation Location: Abdomen; Location: Back   Pain Onset/Description Descriptor: Dull;Descriptor: Discomfort   Hospital Pain Intervention(s) Repositioned; Ambulation/increased activity   Multiple Pain Sites No   Home Living   Type of Home House   Home Layout Two level;Bed/bath upstairs; Performs ADLs on one level; Other (Comment)  (4 MARIBEL, full bathroom on 1st floor)   Bathroom Shower/Tub Tub/shower unit  (bathroom being remodeled)   Bathroom Toilet Standard   Bathroom Equipment Grab bars around toilet; Shower chair   Bathroom Accessibility Accessible   Home Equipment Walker;Cane   Additional Comments pt uses cane at baseline both in home and in community   Prior Function   Level of [place] Independent with ADLs and functional mobility   Lives With Spouse  ( working from home)   Arnie Mark 32 in the last 6 months 1 to 4  (2-3 falls per pt, reports R leg gives out)   Vocational Retired   Comments pt drives   Lifestyle   Autonomy Per patient, patient lives with her spouse in a two-story home with the bedroom and bathroom upstairs and 4 MARIBEL  Patient reports that she is independent in both ADLs and IADLs prior to admission  Patient reports 2-3 recent falls     Reciprocal Relationships Supportive spouse   Service to Others Retired   Intrinsic Gratification Patient enjoys taking care of her dogs   ADL   Eating Assistance 5  Supervision/Setup   Grooming Assistance 5 Supervision/Setup   UB Bathing Assistance 5  Supervision/Setup   LB Bathing Assistance 4  Minimal Assistance   UB Dressing Assistance 5  Supervision/Setup   LB [place]  4  Minimal Assistance   Functional Assistance 4  Minimal Assistance   Additional Comments ADL assist levels based on pt's functional performance during OT evaluation   Bed Mobility   Rolling R 5  Supervision   Additional items Assist x 1; Increased time required;Verbal cues   Supine to Sit 5  Supervision   Additional items Assist x 1;HOB elevated; Increased time required;Verbal cues   Additional Comments Pt received lying supine in bed upon OT arrival; at end of session: pt seated OOB to recliner chair w/ all needs within reach and pt with verbal understanding to utilize call bell prior to standing up   Transfers   Sit to Stand 5  Supervision   Additional items Assist x 1;Verbal cues   Stand to Sit 5  Supervision   Additional items Assist x 1;Verbal cues   Additional Comments Performed w/ RW   Functional Mobility   Functional Mobility 5  Supervision   Additional items Rolling walker   Balance   Static Sitting Good   Dynamic Sitting Fair +   Static Standing Fair   Dynamic Standing Fair   Ambulatory Fair -   Activity Tolerance   Activity Tolerance Patient limited by fatigue   Medical Staff Made Aware PT Cresenciajesusita Schwab   Nurse Made Aware MITUL Bergeron confirmed pt appropriate for therapy and made aware of session outcomes   RUE Assessment   RUE Assessment WFL  (AROM and strength WFL)   LUE Assessment   LUE Assessment WFL  (AROM and strength WFL)   Hand Function   Gross Motor Coordination Functional   Fine Motor Coordination Functional   Sensation   Light Touch No apparent deficits   Vision-Basic Assessment   Current Vision Wears glasses only for reading   Cognition   Overall Cognitive Status [facility]   Arousal/Participation Alert; Responsive; Cooperative   Attention Attends with cues to redirect   Orientation Level Oriented X4   Memory Within functional limits   Following Commands Follows all commands and directions without difficulty   Comments Pt agreeable to OT evaluation   Assessment   Limitation Decreased ADL status; Decreased endurance;Decreased self-care trans;Decreased high-level ADLs   Prognosis Good   Assessment Patient is a 72 y o  female seen for OT evaluation s/p admit to [facility] on 8/12/2020 w/Acute pulmonary embolism ([address] )  Commorbidities affecting patient's functional performance at time of assessment include: gram-positive bacteremia, acute diarrhea, traumatic ecchymosis of buttock, gross hematuria, SIRS, hypothyroidism, hyperlipidemia, anxiety, and asthma  Orders placed for OT evaluation and treatment  Performed at least two patient identifiers during session including name and wristband  Prior to admission, patient was living with her spouse in a two-story home with 4 MARIBEL and the bedroom and bathroom on the second level  Personal factors affecting patient at time of initial evaluation include: steps to enter, difficulty performing ADLs and difficulty performing IADLs  Upon evaluation, patient requires supervision assist for UB ADLs, minimal  assist for LB ADLs, transfers and functional ambulation in room and bathroom with supervision assist, with the use of Rolling Walker  Occupational performance is affected by the following deficits: dynamic sit/ stand balance deficit with poor standing tolerance time for self care and functional mobility, decreased activity tolerance and postural control and postural alignment deficit, requiring external assistance to complete transitional movements  Therapist completed expanded review of medical records and additional review of physical, cognitive or psychosocial history, clinical examination identifying 3-5 performance deficits, clinical decision making of a moderate complexity, consistent with moderate complexity level evaluation   Patient to benefit from continued Occupational Therapy treatment while in the hospital to address deficits as defined above and maximize level of functional independence with ADLs and functional mobility  Occupational Performance areas to address include: grooming , bathing/ shower, dressing, toilet hygiene, transfer to all surfaces, functional mobility, community mobility, health maintenance, medication routine/ management, IADLS: Household maintenance, IADLs: safety procedures, IADLs: meal prep/ clean up, Leisure Participation and Social participation  From OT standpoint, recommendation at time of d/c would be Home with family support and Home OT  Goals   Patient Goals "to get more exercise"   Plan   Treatment Interventions ADL retraining;Functional transfer training;UE strengthening/ROM; Endurance training;Patient/family training;Energy conservation; Activityengagement   Goal Expiration Date 08/28/20   OT Frequency 2-3x/wk   Recommendation   OT Discharge Recommendation Home with skilled therapy  (Home with skilled OT)   OT - OK to Discharge Yes   Barthel Index   Feeding 10   Bathing 0   Grooming Score 5   Dressing Score 10   Bladder Score 10   Bowels Score 10   Toilet Use Score 5   Transfers (Bed/Chair) Score 10   Mobility (Level Surface) Score 0   Stairs Score 0   Barthel Index Score 60   Modified [place] Scale   Modified Carloz Scale 3     Occupational Therapy Goals to be completed in 7-14 Days:    1- Patient will verbalize and demonstrate use of energy conservation/ deep breathing technique and work simplification skills during functional activity with no verbal cues  2- Patient will verbalize and demonstrate good body mechanics and joint protection techniques during ADLs/ IADLs with no verbal cues  3- Patient will increase OOB/ sitting tolerance to 4-6 hours per day for increased participation in self care and leisure tasks with no s/s of exertion      4- Patient will identify s/s of exertion during ADL and functional mobility with no verbal cues  5-Patient will verbalize/ demonstrate compensatory strategies to recover from exertion with no verbal cues  6-Patient will increase standing tolerance time to 10 minutes with No UE support to complete sink level ADLs @ Mod I level  7- Patient will increase sitting tolerance at edge of bed to 30 minutes to complete UB ADLs @ Indep  level  8- Patient/ Family will demonstrate competency with UE Home Exercise Program     9- Patient will perform functional transfers at Mod I level with least restrictive AD      Katt Simpson, OTR/L

## 2020-08-14 NOTE — DISCHARGE INSTR - OTHER ORDERS
1  Apply hydraguard to b/l buttocks and heels BID and PRN for prevention and protection  2  Apply skin nourishing cream the entire skin daily for moisture  3  Turn and reposition patient every  2 hours   4  Elevate heels off of bed with pillows to offload pressure   5  Apply EHOB waffle cushion to chair when OOB, if able  6  Allevyn foam to sacrum and R shoulder ecchymotic area, brittaney w/P, peel foam check skin integrity q-shift  Change every other day and as needed for soilage/dislodgement  If patient soils sacral dressing too often (greater than once per shift) please d/c and use hydraguard cream BID and PRN

## 2020-08-14 NOTE — PLAN OF CARE
Problem: PHYSICAL THERAPY ADULT  Goal: Performs mobility at highest level of function for planned discharge setting  See evaluation for individualized goals  Description: Treatment/Interventions: Functional transfer training, LE strengthening/ROM, Elevations, Therapeutic exercise, Endurance training, Patient/family training, Equipment eval/education, Bed mobility, Gait training, Spoke to case management, Spoke to nursing  Equipment Recommended: Walker(RW)       See flowsheet documentation for full assessment, interventions and recommendations  Note: Prognosis: Good  Problem List: Decreased strength, Decreased endurance, Impaired balance, Decreased mobility, Pain  Assessment: Pt is 72 y o  female seen for PT evaluation on 8/14/2020 s/p admit to [facility] on 8/12/2020 w/ Acute pulmonary embolism ([facility] [address] )  PT consulted to assess pt's functional mobility and d/c needs  Order placed for PT eval and tx, w/ up w/ A order  Performed at least 2 patient identifiers during session: Name and wristband  Comorbidities affecting pt's physical performance at time of assessment include: HTN, HLD, asthma, anxiety, breast cancer s/p left lumpectomy and radiation tx  PTA, pt was independent w/ all functional mobility w/ cane, ambulates unrestricted distances and all terrain, has 4 MARIBEL and lives w/ spouse in 2 level home  Personal factors affecting pt at time of IE include: inaccessible home environment, ambulating w/ assistive device, stairs to enter home, inability to navigate community distances and positive fall history  Please find objective findings from PT assessment regarding body systems outlined above with impairments and limitations including weakness, impaired balance, decreased endurance, gait deviations, decreased activity tolerance and fall risk, as well as mobility assessment (need for SBA assist w/ all phases of mobility when usually ambulating independently and need for cueing for mobility technique)   The following objective measures performed on IE also reveal limitations: Barthel Index: 55/100 and Modified Carloz: 3 (moderate disability)  Pt's clinical presentation is currently unstable/unpredictable seen in pt's presentation of abnormal lab value(s), need for input for task focus and mobility technique and ongoing medical assessment  Pt to benefit from continued PT tx to address deficits as defined above and maximize level of functional independent mobility and consistency  From PT/mobility standpoint, recommendation at time of d/c would be Home PT with family support pending progress in order to facilitate return to PLOF  Barriers to Discharge: Inaccessible home environment     PT Discharge Recommendation: Home with skilled therapy, Return to previous environment with social support(home PT)     PT - OK to Discharge: No    See flowsheet documentation for full assessment

## 2020-08-15 LAB
ALBUMIN SERPL BCP-MCNC: 2.8 G/DL (ref 3.5–5)
ALP SERPL-CCNC: 76 U/L (ref 46–116)
ALT SERPL W P-5'-P-CCNC: 33 U/L (ref 12–78)
ANION GAP SERPL CALCULATED.3IONS-SCNC: 6 MMOL/L (ref 4–13)
APTT PPP: 80 SECONDS (ref 23–37)
AST SERPL W P-5'-P-CCNC: 16 U/L (ref 5–45)
BACTERIA BLD CULT: ABNORMAL
BASOPHILS # BLD AUTO: 0.05 THOUSANDS/ΜL (ref 0–0.1)
BASOPHILS NFR BLD AUTO: 0 % (ref 0–1)
BILIRUB SERPL-MCNC: 0.4 MG/DL (ref 0.2–1)
BUN SERPL-MCNC: 9 MG/DL (ref 5–25)
C DIFF TOX GENS STL QL NAA+PROBE: NEGATIVE
CALCIUM SERPL-MCNC: 8.8 MG/DL (ref 8.3–10.1)
CHLORIDE SERPL-SCNC: 105 MMOL/L (ref 100–108)
CO2 SERPL-SCNC: 31 MMOL/L (ref 21–32)
CREAT SERPL-MCNC: 0.78 MG/DL (ref 0.6–1.3)
EOSINOPHIL # BLD AUTO: 0.17 THOUSAND/ΜL (ref 0–0.61)
EOSINOPHIL NFR BLD AUTO: 2 % (ref 0–6)
ERYTHROCYTE [DISTWIDTH] IN BLOOD BY AUTOMATED COUNT: 15.8 % (ref 11.6–15.1)
GFR SERPL CREATININE-BSD FRML MDRD: 80 ML/MIN/1.73SQ M
GLUCOSE SERPL-MCNC: 105 MG/DL (ref 65–140)
GLUCOSE SERPL-MCNC: 113 MG/DL (ref 65–140)
GLUCOSE SERPL-MCNC: 159 MG/DL (ref 65–140)
GLUCOSE SERPL-MCNC: 89 MG/DL (ref 65–140)
GRAM STN SPEC: ABNORMAL
HCT VFR BLD AUTO: 39.2 % (ref 34.8–46.1)
HGB BLD-MCNC: 12.1 G/DL (ref 11.5–15.4)
IMM GRANULOCYTES # BLD AUTO: 0.09 THOUSAND/UL (ref 0–0.2)
IMM GRANULOCYTES NFR BLD AUTO: 1 % (ref 0–2)
LYMPHOCYTES # BLD AUTO: 3.45 THOUSANDS/ΜL (ref 0.6–4.47)
LYMPHOCYTES NFR BLD AUTO: 30 % (ref 14–44)
MCH RBC QN AUTO: 27.8 PG (ref 26.8–34.3)
MCHC RBC AUTO-ENTMCNC: 30.9 G/DL (ref 31.4–37.4)
MCV RBC AUTO: 90 FL (ref 82–98)
MONOCYTES # BLD AUTO: 0.8 THOUSAND/ΜL (ref 0.17–1.22)
MONOCYTES NFR BLD AUTO: 7 % (ref 4–12)
NEUTROPHILS # BLD AUTO: 6.91 THOUSANDS/ΜL (ref 1.85–7.62)
NEUTS SEG NFR BLD AUTO: 60 % (ref 43–75)
NRBC BLD AUTO-RTO: 0 /100 WBCS
PLATELET # BLD AUTO: 245 THOUSANDS/UL (ref 149–390)
PMV BLD AUTO: 8.5 FL (ref 8.9–12.7)
POTASSIUM SERPL-SCNC: 3.6 MMOL/L (ref 3.5–5.3)
PROT SERPL-MCNC: 6.9 G/DL (ref 6.4–8.2)
RBC # BLD AUTO: 4.36 MILLION/UL (ref 3.81–5.12)
SODIUM SERPL-SCNC: 142 MMOL/L (ref 136–145)
WBC # BLD AUTO: 11.47 THOUSAND/UL (ref 4.31–10.16)

## 2020-08-15 PROCEDURE — 85025 COMPLETE CBC W/AUTO DIFF WBC: CPT | Performed by: INTERNAL MEDICINE

## 2020-08-15 PROCEDURE — 80053 COMPREHEN METABOLIC PANEL: CPT | Performed by: INTERNAL MEDICINE

## 2020-08-15 PROCEDURE — 99232 SBSQ HOSP IP/OBS MODERATE 35: CPT | Performed by: UROLOGY

## 2020-08-15 PROCEDURE — 85730 THROMBOPLASTIN TIME PARTIAL: CPT | Performed by: NURSE PRACTITIONER

## 2020-08-15 PROCEDURE — 94664 DEMO&/EVAL PT USE INHALER: CPT

## 2020-08-15 PROCEDURE — 94760 N-INVAS EAR/PLS OXIMETRY 1: CPT

## 2020-08-15 PROCEDURE — 99232 SBSQ HOSP IP/OBS MODERATE 35: CPT | Performed by: INTERNAL MEDICINE

## 2020-08-15 PROCEDURE — 82948 REAGENT STRIP/BLOOD GLUCOSE: CPT

## 2020-08-15 RX ORDER — LISINOPRIL 10 MG/1
10 TABLET ORAL DAILY
Status: DISCONTINUED | OUTPATIENT
Start: 2020-08-15 | End: 2020-08-19

## 2020-08-15 RX ORDER — SIMETHICONE 80 MG
80 TABLET,CHEWABLE ORAL EVERY 6 HOURS PRN
Status: DISCONTINUED | OUTPATIENT
Start: 2020-08-15 | End: 2020-08-22 | Stop reason: HOSPADM

## 2020-08-15 RX ADMIN — FAMOTIDINE 10 MG: 20 TABLET, FILM COATED ORAL at 08:38

## 2020-08-15 RX ADMIN — INSULIN LISPRO 1 UNITS: 100 INJECTION, SOLUTION INTRAVENOUS; SUBCUTANEOUS at 17:20

## 2020-08-15 RX ADMIN — DULOXETINE HYDROCHLORIDE 90 MG: 60 CAPSULE, DELAYED RELEASE ORAL at 11:11

## 2020-08-15 RX ADMIN — LISINOPRIL 10 MG: 10 TABLET ORAL at 08:36

## 2020-08-15 RX ADMIN — HEPARIN SODIUM AND DEXTROSE 15 UNITS/KG/HR: 10000; 5 INJECTION INTRAVENOUS at 01:20

## 2020-08-15 RX ADMIN — MELATONIN 3 MG: 3 TAB ORAL at 23:00

## 2020-08-15 RX ADMIN — HEPARIN SODIUM AND DEXTROSE 15 UNITS/KG/HR: 10000; 5 INJECTION INTRAVENOUS at 17:17

## 2020-08-15 RX ADMIN — ALPRAZOLAM 0.25 MG: 0.25 TABLET ORAL at 23:00

## 2020-08-15 RX ADMIN — TRAMADOL HYDROCHLORIDE 50 MG: 50 TABLET, FILM COATED ORAL at 14:25

## 2020-08-15 RX ADMIN — PRAVASTATIN SODIUM 80 MG: 80 TABLET ORAL at 17:19

## 2020-08-15 RX ADMIN — PANTOPRAZOLE SODIUM 40 MG: 40 TABLET, DELAYED RELEASE ORAL at 05:47

## 2020-08-15 RX ADMIN — SIMETHICONE 80 MG: 80 TABLET, CHEWABLE ORAL at 14:25

## 2020-08-15 RX ADMIN — TRAMADOL HYDROCHLORIDE 50 MG: 50 TABLET, FILM COATED ORAL at 23:19

## 2020-08-15 RX ADMIN — LEVOTHYROXINE SODIUM 88 MCG: 88 TABLET ORAL at 05:47

## 2020-08-15 RX ADMIN — NYSTATIN 1 APPLICATION: 100000 POWDER TOPICAL at 17:19

## 2020-08-15 RX ADMIN — ALPRAZOLAM 0.25 MG: 0.25 TABLET ORAL at 08:37

## 2020-08-15 NOTE — PROGRESS NOTES
Progress Note - Urology Progress  Marcellus Padilla 72 y o  female MRN: [id number]  Unit/Bed#:  Encounter: [id number]    Assessment:  Gross hematuria-improving-urine is presently light pink with moderate CBI- likely related to anticoagulation  Plan:  Would continue CBI an additional 24 hours prior to consideration of transition to Eliquis  Subjective/Objective       Subjective:  No new complaints  She is comfortable with the Ko catheter  She is not having any clotting in her urine  She is tolerating CBI well  Objective:     Blood pressure 151/79, pulse 85, temperature 98 6 °F (37 °C), temperature source Oral, resp  rate 20, height 4' 10" (1 473 m), weight 99 kg (218 lb 4 1 oz), SpO2 98 %  ,Body mass index is 45 62 kg/m²  Intake/Output Summary (Last 24 hours) at 8/15/2020 1038  Last data filed at 8/15/2020 0937  Gross per 24 hour   Intake 1128 25 ml   Output 3530 ml   Net -2401 75 ml       Invasive Devices     Peripheral Intravenous Line            Peripheral IV 08/12/20 Dorsal (posterior); Right Hand 3 days    Peripheral IV 08/12/20 Left Antecubital 3 days    Peripheral IV 08/12/20 Right Antecubital 3 days          Drain            Continuous Bladder Irrigation Three-way less than 1 day    Urethral Catheter Three way 22 Fr  less than 1 day                Review of Systems   Constitutional: Negative for activity change, appetite change, fatigue and fever  HENT: Negative  Eyes: Negative  Respiratory: Negative  Cardiovascular: Negative  Gastrointestinal: Positive for diarrhea  Negative for blood in stool, constipation and vomiting  Endocrine: Negative  Genitourinary:        See HPI   Musculoskeletal: Negative  Skin: Positive for rash  Allergic/Immunologic: Negative  Neurological: Negative  Hematological: Negative  Psychiatric/Behavioral: Negative          Physical Exam: General appearance: alert and oriented, in no acute distress  Head: Normocephalic, without obvious abnormality, atraumatic  Neck: no JVD and supple, symmetrical, trachea midline  Back: symmetric, no curvature  ROM normal  No CVA tenderness  Lungs: Normal respiratory effort  Heart: regular rate and rhythm  Abdomen: soft, non-tender; bowel sounds normal; no masses,  no organomegaly and Obese  Extremities: extremities normal, warm and well-perfused; no cyanosis, clubbing, or edema and Blisters on shins bilaterally  Neurologic: Grossly normal    Lab, Imaging and other studies:  I have personally reviewed pertinent lab results  , CBC:   Lab Results   Component Value Date    WBC 11 47 (H) 08/15/2020    HGB 12 1 08/15/2020    HCT 39 2 08/15/2020    MCV 90 08/15/2020     08/15/2020    MCH 27 8 08/15/2020    MCHC 30 9 (L) 08/15/2020    RDW 15 8 (H) 08/15/2020    MPV 8 5 (L) 08/15/2020    NRBC 0 08/15/2020   , CMP:   Lab Results   Component Value Date    SODIUM 142 08/15/2020    K 3 6 08/15/2020     08/15/2020    CO2 31 08/15/2020    BUN 9 08/15/2020    CREATININE 0 78 08/15/2020    CALCIUM 8 8 08/15/2020    AST 16 08/15/2020    ALT 33 08/15/2020    ALKPHOS 76 08/15/2020    EGFR 80 08/15/2020     CT abdomen pelvis non-contrast-no renal mass, stone or hydronephrosis  Urinary bladder was unremarkable

## 2020-08-15 NOTE — PLAN OF CARE
Problem: Potential for Falls  Goal: Patient will remain free of falls  Description: INTERVENTIONS:  - Assess patient frequently for physical needs  -  Identify cognitive and physical deficits and behaviors that affect risk of falls    -  [place] fall precautions as indicated by assessment   - Educate patient/family on patient safety including physical limitations  - Instruct patient to call for assistance with activity based on assessment  - Modify environment to reduce risk of injury  - Consider OT/PT consult to assist with strengthening/mobility  Outcome: Progressing     Problem: DISCHARGE PLANNING - CARE MANAGEMENT  Goal: Discharge to post-acute care or home with appropriate resources  Description: INTERVENTIONS:  - Conduct assessment to determine patient/family and health care team treatment goals, and need for post-acute services based on payer coverage, community resources, and patient preferences, and barriers to discharge  - Address psychosocial, clinical, and financial barriers to discharge as identified in assessment in conjunction with the patient/family and health care team  - Arrange appropriate level of post-acute services according to patients   needs and preference and payer coverage in collaboration with the physician and health care team  - Communicate with and update the patient/family, physician, and health care team regarding progress on the discharge plan  - Arrange appropriate transportation to post-acute venues  Outcome: Progressing     Problem: PAIN - ADULT  Goal: Verbalizes/displays adequate comfort level or baseline comfort level  Description: Interventions:  - Encourage patient to monitor pain and request assistance  - Assess pain using appropriate pain scale  - Administer analgesics based on type and severity of pain and evaluate response  - Implement non-pharmacological measures as appropriate and evaluate response  - Consider cultural and social influences on pain and pain management  - Notify physician/advanced practitioner if interventions unsuccessful or patient reports new pain  Outcome: Progressing     Problem: SAFETY ADULT  Goal: Patient will remain free of falls  Description: INTERVENTIONS:  - Assess patient frequently for physical needs  -  Identify cognitive and physical deficits and behaviors that affect risk of falls    -  [place] fall precautions as indicated by assessment   - Educate patient/family on patient safety including physical limitations  - Instruct patient to call for assistance with activity based on assessment  - Modify environment to reduce risk of injury  - Consider OT/PT consult to assist with strengthening/mobility  Outcome: Progressing  Goal: Maintain or return to baseline ADL function  Description: INTERVENTIONS:  -  Assess patient's ability to carry out ADLs; assess patient's baseline for ADL function and identify physical deficits which impact ability to perform ADLs (bathing, care of mouth/teeth, toileting, grooming, dressing, etc )  - Assess/evaluate cause of self-care deficits   - Assess range of motion  - Assess patient's mobility; develop plan if impaired  - Assess patient's need for assistive devices and provide as appropriate  - Encourage maximum independence but intervene and supervise when necessary  - Involve family in performance of ADLs  - Assess for home care needs following discharge   - Consider OT consult to assist with ADL evaluation and planning for discharge  - Provide patient education as appropriate  Outcome: Progressing  Goal: Maintain or return mobility status to optimal level  Description: INTERVENTIONS:  - Assess patient's baseline mobility status (ambulation, transfers, stairs, etc )    - Identify cognitive and physical deficits and behaviors that affect mobility  - Identify mobility aids required to assist with transfers and/or ambulation (gait belt, sit-to-stand, lift, walker, cane, etc )  - [place] fall precautions as indicated by assessment  - Record patient progress and toleration of activity level on Mobility SBAR; progress patient to next Phase/Stage  - Instruct patient to call for assistance with activity based on assessment  - Consider rehabilitation consult to assist with strengthening/weightbearing, etc   Outcome: Progressing     Problem: DISCHARGE PLANNING  Goal: Discharge to home or other facility with appropriate resources  Description: INTERVENTIONS:  - Identify barriers to discharge w/patient and caregiver  - Arrange for needed discharge resources and transportation as appropriate  - Identify discharge learning needs (meds, wound care, etc )  - Arrange for interpretive services to assist at discharge as needed  - Refer to Case Management Department for coordinating discharge planning if the patient needs post-hospital services based on physician/advanced practitioner order or complex needs related to functional status, cognitive ability, or social support system  Outcome: Progressing     Problem: Knowledge Deficit  Goal: Patient/family/caregiver demonstrates understanding of disease process, treatment plan, medications, and discharge instructions  Description: Complete learning assessment and assess knowledge base    Interventions:  - Provide teaching at level of understanding  - Provide teaching via preferred learning methods  Outcome: Progressing

## 2020-08-15 NOTE — PROGRESS NOTES
Progress Note - Job Elias 72 y o  female MRN: [id number]  Unit/Bed#:  Encounter: [id number]    Subjective:   Dionne Little is feeling generally well this morning  She is not having any chest pain, heaviness, shortness of breath, PND, orthopnea  She is ambulatory to the bathroom without any exertion related dyspnea  She denies any lightheadedness or near syncope  She had multiple loose bowel movements yesterday and early this morning  She continues to have some abdominal cramping and gas but no nausea or vomiting  She has not had any hematochezia or melena  She is tolerating continuous bladder irrigation  Back pain improved with resumption of tramadol  She notes blood blisters on her shins bilaterally but denies pain  All other ROS are negative  Objective:   Vitals: Blood pressure 159/72, pulse 93, temperature 98 6 °F (37 °C), temperature source Oral, resp  rate 20, height 4' 10" (1 473 m), weight 99 kg (218 lb 4 1 oz), SpO2 97 %  ,Body mass index is 45 62 kg/m²    SPO2 RA Rest      ED to Hosp-Admission (Current) from 8/12/2020 in [facility] Intensive Care Unit   SpO2  97 %   SpO2 Activity  At Rest   O2 Device  None (Room air)   O2 Flow Rate  20 L/min        I&O:     Intake/Output Summary (Last 24 hours) at 8/15/2020 0811  Last data filed at 8/15/2020 2849  Gross per 24 hour   Intake 1128 25 ml   Output 3330 ml   Net -2201 75 ml         Physical Exam:       General Appearance:    Alert, cooperative, no distress, morbidly obese   Head:    Normocephalic, without obvious abnormality, atraumatic   Eyes:    PERRL, conjunctiva/corneas clear, EOM's intact, hematoma under right lid       Nose:   Moist mucous membranes, no drainage or sinus tenderness   Throat:   No tenderness, no exudates   Neck:   Supple, symmetrical, trachea midline, no JVD   Lungs:     Clear to auscultation bilaterally, respirations unlabored   Heart:    Regular rate and rhythm, S1 and S2 normal, no murmur, rub   or gallop   Abdomen: Soft, non-tender, positive bowel sounds, no masses, no organomegaly   Extremities:  No pedal edema, calf tenderness  Distal pulses palpable  Multiple hemorrhagic vesicles approximately 6-7 mm in diameter on the shins bilaterally   Neurologic:     CNII-XII intact  Invasive Devices     Peripheral Intravenous Line            Peripheral IV 08/12/20 Dorsal (posterior); Right Hand 3 days    Peripheral IV 08/12/20 Left Antecubital 3 days    Peripheral IV 08/12/20 Right Antecubital 3 days          Drain            Continuous Bladder Irrigation Three-way less than 1 day    Urethral Catheter Three way 25 Fr  less than 1 day                      Social History  reviewed  Family History   Problem Relation Age of Onset    Coronary artery disease Mother     Hypertension Mother         Essential    Coronary artery disease Father     reviewed    Meds:  Current Facility-Administered Medications   Medication Dose Route Frequency Provider Last Rate Last Dose    albuterol (PROVENTIL HFA,VENTOLIN HFA) inhaler 2 puff  2 puff Inhalation Q4H PRN RaginiBERNARD Mendez        albuterol inhalation solution 2 5 mg  2 5 mg Nebulization Q6H PRN Ragini BERNARD Bear        ALPRAZolam Dalbert Carton) tablet 0 25 mg  0 25 mg Oral BID PRN Ragini Chema CRNP   0 25 mg at 08/14/20 2316    DULoxetine (CYMBALTA) delayed release capsule 90 mg  90 mg Oral Daily Ragini Chema CRNP   90 mg at 08/14/20 0921    famotidine (PEPCID) tablet 10 mg  10 mg Oral Daily Ragini Chema CRNP   10 mg at 08/14/20 0920    fluticasone-vilanterol (BREO ELLIPTA) 100-25 mcg/inh inhaler 1 puff  1 puff Inhalation Daily Ragini CEZAR BearNP   1 puff at 08/14/20 1115    heparin (porcine) 25,000 units in 250 mL infusion (premix)  3-30 Units/kg/hr (Order-Specific) Intravenous Titrated Ragini Chema CRNP 15 mL/hr at 08/15/20 0120 15 Units/kg/hr at 08/15/20 0120    insulin lispro (HumaLOG) 100 units/mL subcutaneous injection 1-5 Units  1-5 Units Subcutaneous HS Ari Acosta PA-C        insulin lispro (HumaLOG) 100 units/mL subcutaneous injection 1-6 Units  1-6 Units Subcutaneous TID [facility] Aimee Kerr PA-C   3 Units at 08/14/20 1248    levothyroxine tablet 88 mcg  88 mcg Oral Early Morning Dontae PellCEZAR corralesNP   88 mcg at 08/15/20 0547    lisinopril (ZESTRIL) tablet 10 mg  10 mg Oral Daily Margarita Huff MD        melatonin tablet 3 mg  3 mg Oral HS Margarita Huff MD   3 mg at 08/14/20 2316    nystatin (MYCOSTATIN) powder   Topical BID Aimee Kerr PA-C   1 application at 81/44/65 1730    pantoprazole (PROTONIX) EC tablet 40 mg  40 mg Oral Early Morning Margarita Huff MD   40 mg at 08/15/20 0547    pravastatin (PRAVACHOL) tablet 80 mg  80 mg Oral Daily With [employer], CRNP   80 mg at 08/14/20 1729    sodium chloride 0 9 % infusion  50 mL/hr Intravenous Once Dontae BERNARD Llanos        traMADol Alfreida Kida) tablet 50 mg  50 mg Oral Q6H PRN Margarita Huff MD   50 mg at 08/14/20 1729      Medications Prior to Admission   Medication    albuterol (PROAIR HFA) 90 mcg/act inhaler    ALPRAZolam (XANAX) 1 mg tablet    aspirin 81 MG tablet    cholestyramine (QUESTRAN) 4 g packet    DULoxetine (CYMBALTA) 60 mg delayed release capsule    hydrochlorothiazide (HYDRODIURIL) 25 mg tablet    levothyroxine 88 mcg tablet    Mometasone Furo-Formoterol Fum (DULERA) 100-5 MCG/ACT AERO    Multiple Vitamins-Minerals (OCUVITE ADULT 50+ PO)    NYSTATIN powder    pantoprazole (PROTONIX) 40 mg tablet    potassium chloride (K-DUR,KLOR-CON) 20 mEq tablet    quinapril (ACCUPRIL) 10 mg tablet    simvastatin (ZOCOR) 40 mg tablet    clotrimazole (LOTRIMIN) 1 % cream    clotrimazole-betamethasone (LOTRISONE) 1-0 05 % cream    erythromycin (ILOTYCIN) ophthalmic ointment    methocarbamol (ROBAXIN) 500 mg tablet    traMADol (ULTRAM) 50 mg tablet    Triamcinolone Acetonide (Nasacort Allergy 24HR) 55 MCG/ACT AERO       Labs:  Results from last 7 days   Lab Units 08/15/20  0506 08/14/20  0641 08/14/20  0156 08/13/20  0450   WBC Thousand/uL 11 47* 13 96*  --  12 14*   HEMOGLOBIN g/dL 12 1 11 7 12 1 12 7   HEMATOCRIT % 39 2 38 2 38 6 40 6   PLATELETS Thousands/uL 245 252  --  248   NEUTROS PCT % 60 69  --  87*   LYMPHS PCT % 30 24  --  9*   MONOS PCT % 7 6  --  3*   EOS PCT % 2 0  --  0     Results from last 7 days   Lab Units 08/15/20  0506 08/14/20  0641 08/13/20  0450 08/12/20  1621   POTASSIUM mmol/L 3 6 3 5 3 7 4 2   CHLORIDE mmol/L 105 104 106 103   CO2 mmol/L 31 31 30 26   BUN mg/dL 9 15 16 18   CREATININE mg/dL 0 78 0 84 0 96 0 87   CALCIUM mg/dL 8 8 8 5 8 9 8 7   ALK PHOS U/L 76  --   --  92   ALT U/L 33  --   --  46   AST U/L 16  --   --  42     Lab Results   Component Value Date    TROPONINI 0 11 (H) 08/13/2020    TROPONINI 0 08 (H) 08/12/2020    TROPONINI <0 02 01/15/2018     Results from last 7 days   Lab Units 08/13/20  0836 08/12/20  1621   INR  1 69* 1 09     Lab Results   Component Value Date    BLOODCX Received in Microbiology Lab  Culture in Progress  08/14/2020    BLOODCX Received in Microbiology Lab  Culture in Progress  08/14/2020    BLOODCX Staphylococcus coagulase negative (A) 08/12/2020    URINECX 30,000-39,000 cfu/ml  10/08/2019       Imaging:  No results found for this or any previous visit  Results for orders placed during the hospital encounter of 10/11/19   XR chest pa & lateral    Narrative CHEST     INDICATION:   R05: Cough  COMPARISON:  1/15/2018    EXAM PERFORMED/VIEWS:  XR CHEST PA & LATERAL      FINDINGS:    Cardiomediastinal silhouette appears unremarkable  The lungs are clear  No pneumothorax or pleural effusion  Osseous structures appear within normal limits for patient age  Impression No acute cardiopulmonary disease          Workstation performed: [id number]         VTE Pharmacologic Prophylaxis: Heparin      Code Status:   Level 1 - Full Code    Assessment:  Principal Problem:    Acute pulmonary embolism [facility])  Active Problems:    Asthma    HTN (hypertension)    Anxiety    Hyperlipidemia    Hypothyroidism    Hyperglycemia    SIRS (systemic inflammatory response syndrome) (HCC)    Gross hematuria    Traumatic ecchymosis of buttock    Acute diarrhea    Gram-positive bacteremia  Resolved Problems:    * No resolved hospital problems  *      Plan:  Submassive pulmonary embolism status post tPA on August 12th  Patient remains hemodynamically stable, oxygenating 97% on room air at rest with no complaint of dyspnea  Hemoglobin remains stable/normal   Will continue IV heparin and transition to Eliquis once hematuria is deemed stable  Gross hematuria-urology to re-evaluate this a m     CT abdomen pelvis is unremarkable for bleeding source  Currently receiving continuous bladder irrigation with strawberry lemonade colored urine  Gram-positive bacteremia -1 of 2 blood cultures on admission growing coag-negative staph, likely contaminant  Vancomycin was given for 1 dose and then discontinued when coag-negative staph isolated  Surveillance cultures pending  Procalcitonin remains normal   White blood cell count trending downward  Patient remains afebrile  Diarrhea-improving, C diff negative, CT abdomen pelvis negative    Hypertension-resume lisinopril in place of quinapril  Continue to hold thiazide diuretic    Traumatic ecchymosis of buttock related to morbid obesity, tPA, anticoagulation  Continue with local wound care    Hemorrhagic blisters on legs likely related to SCD trauma with tPA  No indication for SCDs  Will monitor clinically    Sirs criteria on admission likely related to pulmonary embolism    Hypothyroidism stable on levothyroxine    Hyperlipidemia-continue pravastatin    Anxiety-continue duloxetine and alprazolam    Back pain-continue tramadol p r n  Asthma-lungs are clear, continue Breo and nebulizers as needed    Disposition-discharge when stable and tolerating oral anticoagulant      Jaqueline Ibarra MD [place]  8/15/2020,8:11 AM

## 2020-08-15 NOTE — MALNUTRITION/BMI
This medical record reflects one or more clinical indicators suggestive of malnutrition and/or morbid obesity  Malnutrition Findings:   none             BMI Findings:  BMI Classifications: Morbid Obesity 45-49 9     Body mass index is 45 62 kg/m²  See Nutrition note dated 8/15/20 for additional details  Completed nutrition assessment is viewable in the nutrition documentation

## 2020-08-15 NOTE — PLAN OF CARE
Problem: Potential for Falls  Goal: Patient will remain free of falls  Description: INTERVENTIONS:  - Assess patient frequently for physical needs  -  Identify cognitive and physical deficits and behaviors that affect risk of falls    -  [place] fall precautions as indicated by assessment   - Educate patient/family on patient safety including physical limitations  - Instruct patient to call for assistance with activity based on assessment  - Modify environment to reduce risk of injury  - Consider OT/PT consult to assist with strengthening/mobility  Outcome: Progressing

## 2020-08-16 LAB
ANION GAP SERPL CALCULATED.3IONS-SCNC: 7 MMOL/L (ref 4–13)
APTT PPP: 68 SECONDS (ref 23–37)
BASOPHILS # BLD AUTO: 0.04 THOUSANDS/ΜL (ref 0–0.1)
BASOPHILS NFR BLD AUTO: 0 % (ref 0–1)
BUN SERPL-MCNC: 9 MG/DL (ref 5–25)
CALCIUM SERPL-MCNC: 8.8 MG/DL (ref 8.3–10.1)
CHLORIDE SERPL-SCNC: 104 MMOL/L (ref 100–108)
CO2 SERPL-SCNC: 30 MMOL/L (ref 21–32)
CREAT SERPL-MCNC: 0.8 MG/DL (ref 0.6–1.3)
EOSINOPHIL # BLD AUTO: 0.22 THOUSAND/ΜL (ref 0–0.61)
EOSINOPHIL NFR BLD AUTO: 2 % (ref 0–6)
ERYTHROCYTE [DISTWIDTH] IN BLOOD BY AUTOMATED COUNT: 15.5 % (ref 11.6–15.1)
GFR SERPL CREATININE-BSD FRML MDRD: 78 ML/MIN/1.73SQ M
GLUCOSE SERPL-MCNC: 104 MG/DL (ref 65–140)
GLUCOSE SERPL-MCNC: 109 MG/DL (ref 65–140)
GLUCOSE SERPL-MCNC: 111 MG/DL (ref 65–140)
GLUCOSE SERPL-MCNC: 124 MG/DL (ref 65–140)
GLUCOSE SERPL-MCNC: 146 MG/DL (ref 65–140)
HCT VFR BLD AUTO: 38.4 % (ref 34.8–46.1)
HGB BLD-MCNC: 12 G/DL (ref 11.5–15.4)
IMM GRANULOCYTES # BLD AUTO: 0.11 THOUSAND/UL (ref 0–0.2)
IMM GRANULOCYTES NFR BLD AUTO: 1 % (ref 0–2)
LYMPHOCYTES # BLD AUTO: 2.88 THOUSANDS/ΜL (ref 0.6–4.47)
LYMPHOCYTES NFR BLD AUTO: 23 % (ref 14–44)
MCH RBC QN AUTO: 27.9 PG (ref 26.8–34.3)
MCHC RBC AUTO-ENTMCNC: 31.3 G/DL (ref 31.4–37.4)
MCV RBC AUTO: 89 FL (ref 82–98)
MONOCYTES # BLD AUTO: 0.74 THOUSAND/ΜL (ref 0.17–1.22)
MONOCYTES NFR BLD AUTO: 6 % (ref 4–12)
NEUTROPHILS # BLD AUTO: 8.33 THOUSANDS/ΜL (ref 1.85–7.62)
NEUTS SEG NFR BLD AUTO: 68 % (ref 43–75)
NRBC BLD AUTO-RTO: 0 /100 WBCS
PLATELET # BLD AUTO: 240 THOUSANDS/UL (ref 149–390)
PMV BLD AUTO: 8.2 FL (ref 8.9–12.7)
POTASSIUM SERPL-SCNC: 3.6 MMOL/L (ref 3.5–5.3)
RBC # BLD AUTO: 4.3 MILLION/UL (ref 3.81–5.12)
SODIUM SERPL-SCNC: 141 MMOL/L (ref 136–145)
WBC # BLD AUTO: 12.32 THOUSAND/UL (ref 4.31–10.16)

## 2020-08-16 PROCEDURE — 99232 SBSQ HOSP IP/OBS MODERATE 35: CPT | Performed by: PHYSICIAN ASSISTANT

## 2020-08-16 PROCEDURE — 85730 THROMBOPLASTIN TIME PARTIAL: CPT | Performed by: NURSE PRACTITIONER

## 2020-08-16 PROCEDURE — 82948 REAGENT STRIP/BLOOD GLUCOSE: CPT

## 2020-08-16 PROCEDURE — 80048 BASIC METABOLIC PNL TOTAL CA: CPT | Performed by: INTERNAL MEDICINE

## 2020-08-16 PROCEDURE — 85025 COMPLETE CBC W/AUTO DIFF WBC: CPT | Performed by: INTERNAL MEDICINE

## 2020-08-16 RX ORDER — MORPHINE SULFATE 4 MG/ML
4 INJECTION, SOLUTION INTRAMUSCULAR; INTRAVENOUS EVERY 4 HOURS PRN
Status: DISCONTINUED | OUTPATIENT
Start: 2020-08-16 | End: 2020-08-17

## 2020-08-16 RX ORDER — ONDANSETRON 2 MG/ML
4 INJECTION INTRAMUSCULAR; INTRAVENOUS EVERY 6 HOURS PRN
Status: DISCONTINUED | OUTPATIENT
Start: 2020-08-16 | End: 2020-08-22 | Stop reason: HOSPADM

## 2020-08-16 RX ORDER — ATROPA BELLADONNA AND OPIUM 16.2; 3 MG/1; MG/1
30 SUPPOSITORY RECTAL EVERY 8 HOURS PRN
Status: DISCONTINUED | OUTPATIENT
Start: 2020-08-16 | End: 2020-08-17

## 2020-08-16 RX ADMIN — LEVOTHYROXINE SODIUM 88 MCG: 88 TABLET ORAL at 08:14

## 2020-08-16 RX ADMIN — SIMETHICONE 80 MG: 80 TABLET, CHEWABLE ORAL at 13:27

## 2020-08-16 RX ADMIN — MELATONIN 3 MG: 3 TAB ORAL at 21:44

## 2020-08-16 RX ADMIN — HEPARIN SODIUM AND DEXTROSE 15 UNITS/KG/HR: 10000; 5 INJECTION INTRAVENOUS at 11:27

## 2020-08-16 RX ADMIN — LISINOPRIL 10 MG: 10 TABLET ORAL at 08:10

## 2020-08-16 RX ADMIN — ONDANSETRON 4 MG: 2 INJECTION INTRAMUSCULAR; INTRAVENOUS at 21:25

## 2020-08-16 RX ADMIN — NYSTATIN: 100000 POWDER TOPICAL at 08:13

## 2020-08-16 RX ADMIN — NYSTATIN 1 APPLICATION: 100000 POWDER TOPICAL at 17:11

## 2020-08-16 RX ADMIN — DULOXETINE HYDROCHLORIDE 90 MG: 60 CAPSULE, DELAYED RELEASE ORAL at 08:11

## 2020-08-16 RX ADMIN — PRAVASTATIN SODIUM 80 MG: 80 TABLET ORAL at 17:21

## 2020-08-16 RX ADMIN — PANTOPRAZOLE SODIUM 40 MG: 40 TABLET, DELAYED RELEASE ORAL at 08:14

## 2020-08-16 RX ADMIN — SIMETHICONE 80 MG: 80 TABLET, CHEWABLE ORAL at 20:46

## 2020-08-16 RX ADMIN — ALPRAZOLAM 0.25 MG: 0.25 TABLET ORAL at 11:34

## 2020-08-16 RX ADMIN — FAMOTIDINE 10 MG: 20 TABLET, FILM COATED ORAL at 08:10

## 2020-08-16 RX ADMIN — ATROPA BELLADONNA AND OPIUM 1 SUPPOSITORY: 16.2; 3 SUPPOSITORY RECTAL at 21:25

## 2020-08-16 RX ADMIN — ALPRAZOLAM 0.25 MG: 0.25 TABLET ORAL at 21:44

## 2020-08-16 RX ADMIN — TRAMADOL HYDROCHLORIDE 50 MG: 50 TABLET, FILM COATED ORAL at 11:34

## 2020-08-16 RX ADMIN — TRAMADOL HYDROCHLORIDE 50 MG: 50 TABLET, FILM COATED ORAL at 21:43

## 2020-08-16 RX ADMIN — FLUTICASONE FUROATE AND VILANTEROL TRIFENATATE 1 PUFF: 100; 25 POWDER RESPIRATORY (INHALATION) at 08:14

## 2020-08-16 NOTE — ASSESSMENT & PLAN NOTE
· Does not appear to be in acute exacerbation at this time   · Continues to saturate well on RA  · Continue Breo Ellipta and albuterol nebulizers

## 2020-08-16 NOTE — PROGRESS NOTES
Progress Note - Gilberto [place] 1955, 72 y o  female MRN: [id number]    Unit/Bed#:  Encounter: [id number]    Primary Care Provider: Melany Can MD   Date and time admitted to hospital: 8/12/2020  4:03 PM      DOS: 8/16/2020    * Acute pulmonary embolism ([address] )  Assessment & Plan  · Pt presented with shortness of breath, hx of breast cancer and limited mobility   · CTA Chest with submassive bilateral PE  · S/p tPA on 8/12 in the ED and admitted to the ICU  · Transitioned to med surg on 8/14  · Currently maintained on IV heparin gtt  · Pt developed gross hematuria after tPA administration, therefore urology is following  · Plan to continue CBI, awaiting for clearance to transition to PO Eliquis  · Urine with worsening hematuria throughout the day, urine is a red color rather than pink  No clots noted  CBI increased per nursing protocol  Urology to evaluate in the AM  Continue IV heparin for now  · Continues to saturate in the high 90s on RA  · CT scan with evidence of RV strain, however ECHO obtained that was unremarkable   · Hgb stable at 12 0  · Venous duplex negative for DVT bilaterally    Gross hematuria  Assessment & Plan  · Pt with gross hematuria that could be secondary to the use of tPA and subsequently being on IV heparin drip for pulmonary embolism  · Patient's hemoglobin has remained stable since admission, HGB today 12 0  · Urology following,  · CT abd/pelvis unremarkable   · Currently on CBI, darkening of urine today to a red color/no clots, continue CBI at this time, not cleared to transition to PO Eliquis  Continue IV heparin gtt today  Acute diarrhea  Assessment & Plan  · Persistent, benign abdominal exam  · C   Diff PCR negative   · Reports history of IBS as an outpatient, consider adding questran if needed  · Continue to monitor stool output      Traumatic ecchymosis of buttock  Assessment & Plan  · Likely related to underlying morbid obesity, tPA and heparin gtt   · Continue local wound care    SIRS (systemic inflammatory response syndrome) (HCC)  Assessment & Plan  · POA, likely in setting of acute PE with tachycardia, tachypnea and leukocytosis  · Now significantly improved, pt no longer tachycardic or tachypneic   · 1/2 BC (+) for staph coag negative, likely contaminant   · Repeat BC negative x 24 hours, no need for abx at this time  · WBC 12 32 today  · Continue to trend CBC and temps closely    Hyperglycemia  Assessment & Plan  · Mild, last hgbA1c 5 9% on 7/23  · Appears to have been given IV solu-medrol prior to admission on review of records   · Continue low dose SSI coverage while inpatient   · Monitor    Hypothyroidism  Assessment & Plan  · Continue levothyroxine 88 mcg oral daily  Hyperlipidemia  Assessment & Plan  · Continue pravastatin 80 mg oral daily  Anxiety  Assessment & Plan  · Mood appears stable  · Continue Duloxetine 90 mg daily and PRN xanax     HTN (hypertension)  Assessment & Plan  · BP appears fairly stable on review   · Continue Lisinopril 10 mg daily in place of patient's quinapril   · HCTZ continues to be on hold for now  · Monitor    Asthma  Assessment & Plan  · Does not appear to be in acute exacerbation at this time   · Continues to saturate well on RA  · Continue Breo Ellipta and albuterol nebulizers  VTE Pharmacologic Prophylaxis:   Pharmacologic: Heparin Drip  Mechanical VTE Prophylaxis in Place: No    Patient Centered Rounds: I have evaluated patient without nursing staff present due to speaking to nurse outside patient's room, [facility] with Specialists or Other Care Team Provider: Discussed with urology, RN, CM and reviewed previous notes     Education and Discussions with Family / Patient: Discussed with patient and patient's  at bedside regarding plan of care  Time Spent for Care: 30 minutes  More than 50% of total time spent on counseling and coordination of care as described above      Current Length of Stay: 4 day(s)    Current Patient Status: Inpatient   Certification Statement: The patient will continue to require additional inpatient hospital stay due to continued CBI, IV heparin gtt, urology evaluation    Discharge Plan: Not medically stable as above, awaiting for improvement of hematuria and urology re-evaluation     Code Status: Level 1 - Full Code      Subjective:   Pt reports that she is feeling better  However she continues to have episodes of diarrhea after she eats  States that she has a history of IBS in the past and does have intermittent episodes of diarrhea for which she typically takes Misa outpatient  Currently denies any nausea, vomiting, chest pain or shortness of breath  Does have some suprapubic discomfort likely related to the CBI and epigastric cramping  States that the epigastric discomfort improved with simethicone, continue as PRN  Objective:     Vitals:   Temp (24hrs), Av 3 °F (36 8 °C), Min:98 2 °F (36 8 °C), Max:98 5 °F (36 9 °C)    Temp:  [98 2 °F (36 8 °C)-98 5 °F (36 9 °C)] 98 5 °F (36 9 °C)  HR:  [82-96] 92  Resp:  [14-20] 18  BP: (145-158)/(68-86) 158/86  SpO2:  [93 %-98 %] 95 %  Body mass index is 45 8 kg/m²  Input and Output Summary (last 24 hours): Intake/Output Summary (Last 24 hours) at 2020 1409  Last data filed at 2020 1134  Gross per 24 hour   Intake 621 75 ml   Output 4325 ml   Net -3703 25 ml       Physical Exam:     Physical Exam  Vitals signs reviewed  Constitutional:       General: She is not in acute distress  Appearance: Normal appearance  She is not toxic-appearing  Comments: Pt is in no acute distress sitting in her hospital bed resting comfortably accompanied by her   Ko catheter in place with CBI  Urine is bright red without clots  HENT:      Head: Normocephalic and atraumatic  Eyes:      Conjunctiva/sclera: Conjunctivae normal       Pupils: Pupils are equal, round, and reactive to light     Cardiovascular: Rate and Rhythm: Normal rate and regular rhythm  Pulses: Normal pulses  Heart sounds: Normal heart sounds  Pulmonary:      Effort: Pulmonary effort is normal  No respiratory distress  Breath sounds: Normal breath sounds  No wheezing or rales  Abdominal:      General: Abdomen is flat  Bowel sounds are normal  There is no distension  Palpations: Abdomen is soft  Tenderness: There is no abdominal tenderness  There is no guarding  Musculoskeletal:         General: No swelling  Skin:     General: Skin is warm and dry  Findings: No erythema  Comments: Few blood blisters noted lower extremities bilaterally   Neurological:      Mental Status: She is alert and oriented to person, place, and time  Mental status is at baseline  Psychiatric:      Comments: Mildly anxious at times         Additional Data:     Labs:    Results from last 7 days   Lab Units 08/16/20  0506   WBC Thousand/uL 12 32*   HEMOGLOBIN g/dL 12 0   HEMATOCRIT % 38 4   PLATELETS Thousands/uL 240   NEUTROS PCT % 68   LYMPHS PCT % 23   MONOS PCT % 6   EOS PCT % 2     Results from last 7 days   Lab Units 08/16/20  0506 08/15/20  0506   POTASSIUM mmol/L 3 6 3 6   CHLORIDE mmol/L 104 105   CO2 mmol/L 30 31   BUN mg/dL 9 9   CREATININE mg/dL 0 80 0 78   CALCIUM mg/dL 8 8 8 8   ALK PHOS U/L  --  76   ALT U/L  --  33   AST U/L  --  16     Results from last 7 days   Lab Units 08/13/20  0836   INR  1 69*       * I Have Reviewed All Lab Data Listed Above  * Additional Pertinent Lab Tests Reviewed: All Labs Within Last 24 Hours Reviewed    Imaging:    Imaging Reports Reviewed Today Include: CT a/p, venous duplex, ECHO, CT head  Imaging Personally Reviewed by Myself Includes:  None    Recent Cultures (last 7 days):     Results from last 7 days   Lab Units 08/14/20  1241 08/14/20  1230 08/14/20  1130 08/12/20  1626 08/12/20  1621   BLOOD CULTURE  No Growth at 24 hrs   --  No Growth at 24 hrs   Staphylococcus coagulase negative* No Growth at 72 hrs  GRAM STAIN RESULT   --   --   --  Gram positive cocci in clusters*  --    C DIFF TOXIN B   --  Negative  --   --   --        Last 24 Hours Medication List:   Current Facility-Administered Medications   Medication Dose Route Frequency Provider Last Rate    albuterol  2 puff Inhalation Q4H PRN Dipika Bay, CRNP      albuterol  2 5 mg Nebulization Q6H PRN Dipika Bay, CRNP      ALPRAZolam  0 25 mg Oral BID PRN Dipika Bay, CRNP      DULoxetine  90 mg Oral Daily Dipika Bay, CRNP      famotidine  10 mg Oral Daily Dipika Bay, CRNP      fluticasone-vilanterol  1 puff Inhalation Daily Dipika Bay, CRNP      heparin (porcine)  3-30 Units/kg/hr (Order-Specific) Intravenous Titrated Dipika Bay, CRNP 15 Units/kg/hr (08/16/20 1127)    insulin lispro  1-5 Units Subcutaneous HS Danuta Steve Acosta, MJ      insulin lispro  1-6 Units Subcutaneous TID AC Rigo Mari PA-C      levothyroxine  88 mcg Oral Early Morning Dipika Bay, BERNARD      lisinopril  10 mg Oral Daily John Callaway MD      melatonin  3 mg Oral HS John Callaway MD      nystatin   Topical BID Danuta Acosta PA-C      pantoprazole  40 mg Oral Early Morning John Callaway MD      pravastatin  80 mg Oral Daily With [employer], BERNARD      simethicone  80 mg Oral Q6H PRN [facility]      sodium chloride  50 mL/hr Intravenous Once [facility], BERNARD      traMADol  50 mg Oral Q6H PRN John Callaway MD          Today, Patient Was Seen By: Concepcion Snow PA-C    ** Please Note: Dictation voice to text software may have been used in the creation of this document   **

## 2020-08-16 NOTE — ASSESSMENT & PLAN NOTE
· Persistent, benign abdominal exam  · C   Diff PCR negative   · Reports history of IBS as an outpatient, consider adding questran if needed  · Continue to monitor stool output

## 2020-08-16 NOTE — ASSESSMENT & PLAN NOTE
· Pt with gross hematuria that could be secondary to the use of tPA and subsequently being on IV heparin drip for pulmonary embolism  · Patient's hemoglobin has remained stable since admission, HGB today 12 0  · Urology following,  · CT abd/pelvis unremarkable   · Currently on CBI, darkening of urine today to a red color/no clots, continue CBI at this time, not cleared to transition to PO Eliquis  Continue IV heparin gtt today

## 2020-08-16 NOTE — ASSESSMENT & PLAN NOTE
· POA, likely in setting of acute PE with tachycardia, tachypnea and leukocytosis  · Now significantly improved, pt no longer tachycardic or tachypneic   · 1/2 BC (+) for staph coag negative, likely contaminant   · Repeat BC negative x 24 hours, no need for abx at this time  · WBC 12 32 today  · Continue to trend CBC and temps closely

## 2020-08-16 NOTE — ASSESSMENT & PLAN NOTE
· Mild, last hgbA1c 5 9% on 7/23  · Appears to have been given IV solu-medrol prior to admission on review of records   · Continue low dose SSI coverage while inpatient   · Monitor

## 2020-08-16 NOTE — PLAN OF CARE
Problem: PAIN - ADULT  Goal: Verbalizes/displays adequate comfort level or baseline comfort level  Description: Interventions:  - Encourage patient to monitor pain and request assistance  - Assess pain using appropriate pain scale  - Administer analgesics based on type and severity of pain and evaluate response  - Implement non-pharmacological measures as appropriate and evaluate response  - Consider cultural and social influences on pain and pain management  - Notify physician/advanced practitioner if interventions unsuccessful or patient reports new pain  Outcome: Progressing     Problem: SAFETY ADULT  Goal: Patient will remain free of falls  Description: INTERVENTIONS:  - Assess patient frequently for physical needs  -  Identify cognitive and physical deficits and behaviors that affect risk of falls    -  [place] fall precautions as indicated by assessment   - Educate patient/family on patient safety including physical limitations  - Instruct patient to call for assistance with activity based on assessment  - Modify environment to reduce risk of injury  - Consider OT/PT consult to assist with strengthening/mobility  Outcome: Progressing     Problem: SAFETY ADULT  Goal: Maintain or return mobility status to optimal level  Description: INTERVENTIONS:  - Assess patient's baseline mobility status (ambulation, transfers, stairs, etc )    - Identify cognitive and physical deficits and behaviors that affect mobility  - Identify mobility aids required to assist with transfers and/or ambulation (gait belt, sit-to-stand, lift, walker, cane, etc )  - [place] fall precautions as indicated by assessment  - Record patient progress and toleration of activity level on Mobility SBAR; progress patient to next Phase/Stage  - Instruct patient to call for assistance with activity based on assessment  - Consider rehabilitation consult to assist with strengthening/weightbearing, etc   Outcome: Progressing     Problem: DISCHARGE PLANNING  Goal: Discharge to home or other facility with appropriate resources  Description: INTERVENTIONS:  - Identify barriers to discharge w/patient and caregiver  - Arrange for needed discharge resources and transportation as appropriate  - Identify discharge learning needs (meds, wound care, etc )  - Arrange for interpretive services to assist at discharge as needed  - Refer to Case Management Department for coordinating discharge planning if the patient needs post-hospital services based on physician/advanced practitioner order or complex needs related to functional status, cognitive ability, or social support system  Outcome: Progressing     Problem: METABOLIC, FLUID AND ELECTROLYTES - ADULT  Goal: Electrolytes maintained within normal limits  Description: INTERVENTIONS:  - Monitor labs and assess patient for signs and symptoms of electrolyte imbalances  - Administer electrolyte replacement as ordered  - Monitor response to electrolyte replacements, including repeat lab results as appropriate  - Instruct patient on fluid and nutrition as appropriate  Outcome: Progressing

## 2020-08-16 NOTE — ASSESSMENT & PLAN NOTE
· BP appears fairly stable on review   · Continue Lisinopril 10 mg daily in place of patient's quinapril   · HCTZ continues to be on hold for now  · Monitor

## 2020-08-16 NOTE — PLAN OF CARE
Problem: Potential for Falls  Goal: Patient will remain free of falls  Description: INTERVENTIONS:  - Assess patient frequently for physical needs  -  Identify cognitive and physical deficits and behaviors that affect risk of falls    -  [place] fall precautions as indicated by assessment   - Educate patient/family on patient safety including physical limitations  - Instruct patient to call for assistance with activity based on assessment  - Modify environment to reduce risk of injury  - Consider OT/PT consult to assist with strengthening/mobility  Outcome: Progressing     Problem: DISCHARGE PLANNING - CARE MANAGEMENT  Goal: Discharge to post-acute care or home with appropriate resources  Description: INTERVENTIONS:  - Conduct assessment to determine patient/family and health care team treatment goals, and need for post-acute services based on payer coverage, community resources, and patient preferences, and barriers to discharge  - Address psychosocial, clinical, and financial barriers to discharge as identified in assessment in conjunction with the patient/family and health care team  - Arrange appropriate level of post-acute services according to patients   needs and preference and payer coverage in collaboration with the physician and health care team  - Communicate with and update the patient/family, physician, and health care team regarding progress on the discharge plan  - Arrange appropriate transportation to post-acute venues  Outcome: Progressing     Problem: PAIN - ADULT  Goal: Verbalizes/displays adequate comfort level or baseline comfort level  Description: Interventions:  - Encourage patient to monitor pain and request assistance  - Assess pain using appropriate pain scale  - Administer analgesics based on type and severity of pain and evaluate response  - Implement non-pharmacological measures as appropriate and evaluate response  - Consider cultural and social influences on pain and pain management  - Notify physician/advanced practitioner if interventions unsuccessful or patient reports new pain  Outcome: Progressing     Problem: SAFETY ADULT  Goal: Patient will remain free of falls  Description: INTERVENTIONS:  - Assess patient frequently for physical needs  -  Identify cognitive and physical deficits and behaviors that affect risk of falls    -  [place] fall precautions as indicated by assessment   - Educate patient/family on patient safety including physical limitations  - Instruct patient to call for assistance with activity based on assessment  - Modify environment to reduce risk of injury  - Consider OT/PT consult to assist with strengthening/mobility  Outcome: Progressing  Goal: Maintain or return to baseline ADL function  Description: INTERVENTIONS:  -  Assess patient's ability to carry out ADLs; assess patient's baseline for ADL function and identify physical deficits which impact ability to perform ADLs (bathing, care of mouth/teeth, toileting, grooming, dressing, etc )  - Assess/evaluate cause of self-care deficits   - Assess range of motion  - Assess patient's mobility; develop plan if impaired  - Assess patient's need for assistive devices and provide as appropriate  - Encourage maximum independence but intervene and supervise when necessary  - Involve family in performance of ADLs  - Assess for home care needs following discharge   - Consider OT consult to assist with ADL evaluation and planning for discharge  - Provide patient education as appropriate  Outcome: Progressing  Goal: Maintain or return mobility status to optimal level  Description: INTERVENTIONS:  - Assess patient's baseline mobility status (ambulation, transfers, stairs, etc )    - Identify cognitive and physical deficits and behaviors that affect mobility  - Identify mobility aids required to assist with transfers and/or ambulation (gait belt, sit-to-stand, lift, walker, cane, etc )  - [place] fall precautions as indicated by assessment  - Record patient progress and toleration of activity level on Mobility SBAR; progress patient to next Phase/Stage  - Instruct patient to call for assistance with activity based on assessment  - Consider rehabilitation consult to assist with strengthening/weightbearing, etc   Outcome: Progressing     Problem: DISCHARGE PLANNING  Goal: Discharge to home or other facility with appropriate resources  Description: INTERVENTIONS:  - Identify barriers to discharge w/patient and caregiver  - Arrange for needed discharge resources and transportation as appropriate  - Identify discharge learning needs (meds, wound care, etc )  - Arrange for interpretive services to assist at discharge as needed  - Refer to Case Management Department for coordinating discharge planning if the patient needs post-hospital services based on physician/advanced practitioner order or complex needs related to functional status, cognitive ability, or social support system  Outcome: Progressing     Problem: Knowledge Deficit  Goal: Patient/family/caregiver demonstrates understanding of disease process, treatment plan, medications, and discharge instructions  Description: Complete learning assessment and assess knowledge base    Interventions:  - Provide teaching at level of understanding  - Provide teaching via preferred learning methods  Outcome: Progressing

## 2020-08-16 NOTE — ASSESSMENT & PLAN NOTE
· Pt presented with shortness of breath, hx of breast cancer and limited mobility   · CTA Chest with submassive bilateral PE  · S/p tPA on 8/12 in the ED and admitted to the ICU  · Transitioned to med surg on 8/14  · Currently maintained on IV heparin gtt  · Pt developed gross hematuria after tPA administration, therefore urology is following  · Plan to continue CBI, awaiting for clearance to transition to PO Eliquis  · Urine with worsening hematuria throughout the day, urine is a red color rather than pink  No clots noted  CBI increased per nursing protocol  Urology to evaluate in the AM  Continue IV heparin for now    · Continues to saturate in the high 90s on RA  · CT scan with evidence of RV strain, however ECHO obtained that was unremarkable   · Hgb stable at 12 0

## 2020-08-17 LAB
APTT PPP: 68 SECONDS (ref 23–37)
APTT PPP: 69 SECONDS (ref 23–37)
APTT PPP: 96 SECONDS (ref 23–37)
BACTERIA BLD CULT: NORMAL
ERYTHROCYTE [DISTWIDTH] IN BLOOD BY AUTOMATED COUNT: 15.3 % (ref 11.6–15.1)
GLUCOSE SERPL-MCNC: 122 MG/DL (ref 65–140)
GLUCOSE SERPL-MCNC: 139 MG/DL (ref 65–140)
GLUCOSE SERPL-MCNC: 163 MG/DL (ref 65–140)
GLUCOSE SERPL-MCNC: 166 MG/DL (ref 65–140)
HCT VFR BLD AUTO: 37.3 % (ref 34.8–46.1)
HGB BLD-MCNC: 11.9 G/DL (ref 11.5–15.4)
MCH RBC QN AUTO: 28.3 PG (ref 26.8–34.3)
MCHC RBC AUTO-ENTMCNC: 31.9 G/DL (ref 31.4–37.4)
MCV RBC AUTO: 89 FL (ref 82–98)
PLATELET # BLD AUTO: 253 THOUSANDS/UL (ref 149–390)
PMV BLD AUTO: 8.2 FL (ref 8.9–12.7)
RBC # BLD AUTO: 4.2 MILLION/UL (ref 3.81–5.12)
WBC # BLD AUTO: 17.8 THOUSAND/UL (ref 4.31–10.16)

## 2020-08-17 PROCEDURE — 99233 SBSQ HOSP IP/OBS HIGH 50: CPT | Performed by: INTERNAL MEDICINE

## 2020-08-17 PROCEDURE — 85730 THROMBOPLASTIN TIME PARTIAL: CPT | Performed by: NURSE PRACTITIONER

## 2020-08-17 PROCEDURE — 85027 COMPLETE CBC AUTOMATED: CPT | Performed by: PHYSICIAN ASSISTANT

## 2020-08-17 PROCEDURE — 85730 THROMBOPLASTIN TIME PARTIAL: CPT | Performed by: INTERNAL MEDICINE

## 2020-08-17 PROCEDURE — 82948 REAGENT STRIP/BLOOD GLUCOSE: CPT

## 2020-08-17 PROCEDURE — 99232 SBSQ HOSP IP/OBS MODERATE 35: CPT | Performed by: PHYSICIAN ASSISTANT

## 2020-08-17 RX ORDER — AMLODIPINE BESYLATE 2.5 MG/1
2.5 TABLET ORAL DAILY
Status: DISCONTINUED | OUTPATIENT
Start: 2020-08-17 | End: 2020-08-18

## 2020-08-17 RX ORDER — HYDROMORPHONE HCL/PF 1 MG/ML
0.2 SYRINGE (ML) INJECTION
Status: DISCONTINUED | OUTPATIENT
Start: 2020-08-17 | End: 2020-08-17

## 2020-08-17 RX ORDER — HYDROMORPHONE HCL/PF 1 MG/ML
0.5 SYRINGE (ML) INJECTION
Status: DISCONTINUED | OUTPATIENT
Start: 2020-08-17 | End: 2020-08-22 | Stop reason: HOSPADM

## 2020-08-17 RX ORDER — OXYBUTYNIN CHLORIDE 5 MG/1
5 TABLET ORAL 3 TIMES DAILY PRN
Status: DISCONTINUED | OUTPATIENT
Start: 2020-08-17 | End: 2020-08-18

## 2020-08-17 RX ADMIN — MELATONIN 3 MG: 3 TAB ORAL at 21:44

## 2020-08-17 RX ADMIN — DULOXETINE HYDROCHLORIDE 90 MG: 60 CAPSULE, DELAYED RELEASE ORAL at 08:30

## 2020-08-17 RX ADMIN — HYDROMORPHONE HYDROCHLORIDE 0.2 MG: 1 INJECTION, SOLUTION INTRAMUSCULAR; INTRAVENOUS; SUBCUTANEOUS at 12:43

## 2020-08-17 RX ADMIN — ALPRAZOLAM 0.25 MG: 0.25 TABLET ORAL at 21:44

## 2020-08-17 RX ADMIN — LISINOPRIL 10 MG: 10 TABLET ORAL at 08:29

## 2020-08-17 RX ADMIN — ONDANSETRON 4 MG: 2 INJECTION INTRAMUSCULAR; INTRAVENOUS at 09:33

## 2020-08-17 RX ADMIN — PANTOPRAZOLE SODIUM 40 MG: 40 TABLET, DELAYED RELEASE ORAL at 08:29

## 2020-08-17 RX ADMIN — ALPRAZOLAM 0.25 MG: 0.25 TABLET ORAL at 15:45

## 2020-08-17 RX ADMIN — FAMOTIDINE 10 MG: 20 TABLET, FILM COATED ORAL at 08:29

## 2020-08-17 RX ADMIN — HYDROMORPHONE HYDROCHLORIDE 0.5 MG: 1 INJECTION, SOLUTION INTRAMUSCULAR; INTRAVENOUS; SUBCUTANEOUS at 21:44

## 2020-08-17 RX ADMIN — PRAVASTATIN SODIUM 80 MG: 80 TABLET ORAL at 17:12

## 2020-08-17 RX ADMIN — HYDROMORPHONE HYDROCHLORIDE 0.2 MG: 1 INJECTION, SOLUTION INTRAMUSCULAR; INTRAVENOUS; SUBCUTANEOUS at 09:38

## 2020-08-17 RX ADMIN — NYSTATIN: 100000 POWDER TOPICAL at 17:12

## 2020-08-17 RX ADMIN — HYDROMORPHONE HYDROCHLORIDE 0.2 MG: 1 INJECTION, SOLUTION INTRAMUSCULAR; INTRAVENOUS; SUBCUTANEOUS at 15:39

## 2020-08-17 RX ADMIN — INSULIN LISPRO 1 UNITS: 100 INJECTION, SOLUTION INTRAVENOUS; SUBCUTANEOUS at 21:51

## 2020-08-17 RX ADMIN — INSULIN LISPRO 1 UNITS: 100 INJECTION, SOLUTION INTRAVENOUS; SUBCUTANEOUS at 12:22

## 2020-08-17 RX ADMIN — ONDANSETRON 4 MG: 2 INJECTION INTRAMUSCULAR; INTRAVENOUS at 15:39

## 2020-08-17 RX ADMIN — AMLODIPINE BESYLATE 2.5 MG: 2.5 TABLET ORAL at 12:21

## 2020-08-17 RX ADMIN — LEVOTHYROXINE SODIUM 88 MCG: 88 TABLET ORAL at 08:28

## 2020-08-17 RX ADMIN — MORPHINE SULFATE 4 MG: 4 INJECTION INTRAVENOUS at 00:38

## 2020-08-17 RX ADMIN — INSULIN LISPRO 1 UNITS: 100 INJECTION, SOLUTION INTRAVENOUS; SUBCUTANEOUS at 17:12

## 2020-08-17 RX ADMIN — HYDROMORPHONE HYDROCHLORIDE 0.5 MG: 1 INJECTION, SOLUTION INTRAMUSCULAR; INTRAVENOUS; SUBCUTANEOUS at 18:37

## 2020-08-17 RX ADMIN — ONDANSETRON 4 MG: 2 INJECTION INTRAMUSCULAR; INTRAVENOUS at 21:44

## 2020-08-17 RX ADMIN — HEPARIN SODIUM AND DEXTROSE 13 UNITS/KG/HR: 10000; 5 INJECTION INTRAVENOUS at 21:44

## 2020-08-17 RX ADMIN — HEPARIN SODIUM AND DEXTROSE 15 UNITS/KG/HR: 10000; 5 INJECTION INTRAVENOUS at 03:32

## 2020-08-17 RX ADMIN — ONDANSETRON 4 MG: 2 INJECTION INTRAMUSCULAR; INTRAVENOUS at 03:32

## 2020-08-17 RX ADMIN — NYSTATIN: 100000 POWDER TOPICAL at 09:33

## 2020-08-17 RX ADMIN — TRAMADOL HYDROCHLORIDE 50 MG: 50 TABLET, FILM COATED ORAL at 03:32

## 2020-08-17 NOTE — PROGRESS NOTES
Assessment and plan    Acute pulmonary embolism  Status post tPA on admission (08/12/2020)  Currently on IV heparin drip  Denies chest pain or shortness of breath, denied cough  Breathing ambient air, saturating greater than the 95%  Patient has remained tachycardic with maximum heart rate recorded today 106 ppm  Since patient developed hematuria status post tPA and with continued heparin drip we will wait until hematuria resolves or urology is clear patient to transition to oral anticoagulation      Gross hematuria  Patient developed gross hematuria status post tPA and active IV heparin drip  Complain of suprapubic pain  CT abdomen and pelvis  August 14, 2020 showed no calculi or obstructive uropathy  No acute intra-abdominal or intrapelvic process detected  Urology is on board, patient currently on continue bladder irrigation  Urine this morning pink colored, improved compared to 24 hours ago  Tramadol and hydromorphone as needed for suprapubic pain  Belladonna-opium suppository 16 2-30 q 8 hours p r n  Follow urology recommendations      Uncontrolled Hypertension  Patient blood pressure remained elevated, that could be secondary to persistent suprapubic pain  Blood pressure this morning 172/86  At home patient was taking hydrochlorothiazide 25 mg oral daily and quinapril 10 mg daily  Continue lisinopril 10 mg oral daily  Start patient small dose of calcium channel blocker amlodipine 2 5 mg p o  Q a m  Continue monitor blood pressure as per unitprotocol    Asthma  Patient diet respiratory symptoms   Continue Breo Ellipta 1 puff daily and albuterol nebulizer p r n  Anxiety  Continue duloxetine 90 mg oral daily  Alprazolam 0 25 mg b i d  P r n  Hyperlipidemia  Continue pravastatin 80 mg oral daily    Levothyroxine  Continue levothyroxine 88 mcg oral daily    Systemic inflammatory response syndrome  Patient remains tachycardic with rates heart rate recorded 106 per minute  WBC count trending up 17 8  Patient has remained afebrile  No obvious source of infection, blood culture August 12, 2020 1/2 sets positive for Staphylococcus aureus coagulase negative, likely contaminant  Repeat blood culture from August 14, 2020 showed no growth after 48 hours  Continue trending WBC count and temperature curve  Acute diarrhea  Reports around 3 small soft bowel movement the last 24 hours  Patient has history of [employer]  Denied seen blood in stools    Glucose intolerance  Blood glucose this morning 122  Hemoglobin A1c 5 point night on July 2020  Continue sliding scale insulin coverage  Monitor blood sugar as per unit protocol      Multiple ecchymotic lesions  Patient has multiple bruises on her buttocks, right lower abdomen and some hemorrhagic blister in bilateral legs  It could be secondary to status post tPA and continued IV heparin drip  Benefit outweighs risk in the setting of acute pulmonary embolism  Patient hemoglobin remained stable, there is no evidence of internal bleeding  Continue monitoring hemoglobin/hematocrit daily  VTE Pharmacologic Prophylaxis:   Pharmacologic: Heparin Drip  Mechanical VTE Prophylaxis in Place: No    Discussions with Specialists or Other Care Team Provider: We are discussing plan of care with urologist who was involved in patient care  Education and Discussions with Family / Patient:  Patient has been informed of current condition, plan of care has been discussed with patient  Current Length of Stay: 5 day(s)    Current Patient Status: Inpatient     Discharge Plan / Estimated Discharge Date:  Patient could be discharged in the next 24-48 hours if hematuria resolved and patient safely transition to oral anticoagulation      Code Status: Level 1 - Full Code      Subjective:   Has been a bedside commode was alert and cooperative, complaining of suprapubic abdominal pain partially relieved by belladonna-opium suppository, patient was not taking warfarin she stated make her nauseous  Patient remaining continue bladder irrigation, color of the urine has cleared compared to yesterday; today's pinkish  Complain of multiple small amount of soft stool the last 24 hours, denied seen blood in stools  Denies chest pain or shortness of breath  Denies cough  Breathing ambient air, saturating greater than 95%  No additional complaints  Objective:     Vitals:   Temp (24hrs), Av 7 °F (37 1 °C), Min:98 3 °F (36 8 °C), Max:99 °F (37 2 °C)    Temp:  [98 3 °F (36 8 °C)-99 °F (37 2 °C)] 98 8 °F (37 1 °C)  HR:  [] 106  Resp:  [16-20] 18  BP: (147-172)/(72-91) 172/86  SpO2:  [95 %-99 %] 97 %  Body mass index is 44 6 kg/m²  Input and Output Summary (last 24 hours): Intake/Output Summary (Last 24 hours) at 2020 1036  Last data filed at 2020 0947  Gross per 24 hour   Intake 595 23 ml   Output 2850 ml   Net -2254 77 ml       Physical Exam:     Physical Exam  Constitutional:       General: She is in acute distress (In mild distress due to suprapubic pain)  Appearance: She is obese  She is ill-appearing  HENT:      Head: Normocephalic and atraumatic  Eyes:      Conjunctiva/sclera: Conjunctivae normal       Pupils: Pupils are equal, round, and reactive to light  Neck:      Musculoskeletal: Normal range of motion  No neck rigidity or muscular tenderness  Cardiovascular:      Rate and Rhythm: Normal rate and regular rhythm  Pulses: Normal pulses  Heart sounds: No murmur  No friction rub  No gallop  Pulmonary:      Effort: Pulmonary effort is normal  No respiratory distress  Breath sounds: Normal breath sounds  No wheezing, rhonchi or rales  Abdominal:      General: Bowel sounds are normal  There is no distension  Palpations: Abdomen is soft  Tenderness: There is abdominal tenderness (Tenderness to palpation over suprapubic region with no rebound or guarding)  There is no guarding     Genitourinary:     Comments: Ko catheter in place, CBI running  Musculoskeletal: Normal range of motion  General: No swelling, tenderness or deformity  Skin:     General: Skin is warm and dry  Capillary Refill: Capillary refill takes less than 2 seconds  Neurological:      Mental Status: She is alert and oriented to person, place, and time  Psychiatric:         Mood and Affect: Mood normal            Additional Data:     Labs:    Results from last 7 days   Lab Units 08/17/20  0503 08/16/20  0506   WBC Thousand/uL 17 80* 12 32*   HEMOGLOBIN g/dL 11 9 12 0   HEMATOCRIT % 37 3 38 4   PLATELETS Thousands/uL 253 240   NEUTROS PCT %  --  68   LYMPHS PCT %  --  23   MONOS PCT %  --  6   EOS PCT %  --  2     Results from last 7 days   Lab Units 08/16/20  0506 08/15/20  0506   POTASSIUM mmol/L 3 6 3 6   CHLORIDE mmol/L 104 105   CO2 mmol/L 30 31   BUN mg/dL 9 9   CREATININE mg/dL 0 80 0 78   CALCIUM mg/dL 8 8 8 8   ALK PHOS U/L  --  76   ALT U/L  --  33   AST U/L  --  16     Results from last 7 days   Lab Units 08/13/20  0836   INR  1 69*       * I Have Reviewed All Lab Data Listed Above  * Additional Pertinent Lab Tests Reviewed: Sarika 66 Admission Reviewed    Imaging:      Recent Cultures (last 7 days):     Results from last 7 days   Lab Units 08/14/20  1241 08/14/20  1230 08/14/20  1130 08/12/20  1626 08/12/20  1621   BLOOD CULTURE  No Growth at 48 hrs  --  No Growth at 48 hrs  Staphylococcus coagulase negative* No Growth After 4 Days     GRAM STAIN RESULT   --   --   --  Gram positive cocci in clusters*  --    C DIFF TOXIN B   --  Negative  --   --   --        Last 24 Hours Medication List:   Current Facility-Administered Medications   Medication Dose Route Frequency Provider Last Rate    albuterol  2 puff Inhalation Q4H PRN BERNARD Reis      albuterol  2 5 mg Nebulization Q6H PRN BERNARD Reis      ALPRAZolam  0 25 mg Oral BID PRN BERNARD Reis      amLODIPine  2 5 mg Oral Daily Amrik Langley MD Gladis Melton-opium  30 mg Rectal Q8H PRN Curt Saeed PA-C      DULoxetine  90 mg Oral Daily Noah OsierBERNARD      famotidine  10 mg Oral Daily Noah Osier, BERNARD      fluticasone-vilanterol  1 puff Inhalation Daily Noah Osier, CRNP      heparin (porcine)  3-30 Units/kg/hr (Order-Specific) Intravenous Titrated Noah Osier, CRNP 13 Units/kg/hr (08/17/20 0537)    HYDROmorphone  0 2 mg Intravenous Q3H PRN Jason Veliz MD      insulin lispro  1-5 Units Subcutaneous HS Silke Mari PA-C      insulin lispro  1-6 Units Subcutaneous TID [facility] [place] CERDA [place], [place]      levothyroxine  88 mcg Oral Early Morning BERNARD [place]      lisinopril  10 mg Oral Daily Jason Veliz MD      melatonin  3 mg Oral HS Jason Veliz MD      nystatin   Topical BID Silke Acosta PA-C      ondansetron  4 mg Intravenous Q6H PRN Curt Saeed PA-C      pantoprazole  40 mg Oral Early Morning Jason Veliz MD      pravastatin  80 mg Oral Daily With [employer], BERNARD      simethicone  80 mg Oral Q6H PRN Devra Goldmann, PA-C      sodium chloride  50 mL/hr Intravenous Once Noahnicole Oliver, BERNARD      traMADol  50 mg Oral Q6H PRN Jason Veliz MD          Today, Patient Was Seen By: Katina Portillo MD    Please Note: This note has been constructed using a voice recognition system

## 2020-08-17 NOTE — PROGRESS NOTES
Progress Note - UROLOGY   Kevin Narayan 72 y o  female MRN: [id number]  Unit/Bed#:  Encounter: [id number]    Assessment/Plan  Hematuria, post heparin initiation for treatment of PE  Light punch colored urine, CBI on rapid gtt   -bladder scan   -cont CBI with irrigation  -d/c B & O   -add Oxybutynin prn spasm   -cont to monitor Hbg which is 11 and pt is asymptomatic     Chief Complaint: I have a lot of pain in my abdomen last night  They turned the gtt down and then it clotted off, that was very painful  They were able to get the clot out and turn of back on  They gave me a suppository and that helped but it only lasted a very short time  Objective: CBI on Rapid GTT,  Light punch colored output  Abdomen is tender throughout with increased tenderness in suprapubic area bladder does not feel distended     Blood pressure (!) 180/81, pulse 104, temperature 98 5 °F (36 9 °C), temperature source Oral, resp  rate 20, height 4' 10" (1 473 m), weight 96 8 kg (213 lb 6 5 oz), SpO2 98 %  ,Body mass index is 44 6 kg/m²        Intake/Output Summary (Last 24 hours) at 8/17/2020 1543  Last data filed at 8/17/2020 1526  Gross per 24 hour   Intake 355 23 ml   Output 3025 ml   Net -2669 77 ml       Invasive Devices     Peripheral Intravenous Line            Peripheral IV 08/16/20 Left;Upper Arm less than 1 day          Drain            Urethral Catheter Three way 22 Fr  3 days    Continuous Bladder Irrigation Three-way 2 days                Physical Exam:   General Appearance:    Alert and orientated x 3, cooperative, no distress   Lungs:     Clear to auscultation bilaterally, respirations unlabored    Heart:    Regular rate and rhythm   Abdomen:      :    Extremities:   Extremities normal,  no cyanosis or edema   Pulses:   2+ and symmetric all extremities, no calf tenderness   Skin:   Skin color, texture, turgor normal, no rashes or lesions   Neurologic:   CNII-XII intact, normal strength, sensation and reflexes Throughout, affect appropriate                             Lab, Imaging and other studies:   CBC with diff:   Lab Results   Component Value Date    WBC 17 80 (H) 08/17/2020    HGB 11 9 08/17/2020    HCT 37 3 08/17/2020    MCV 89 08/17/2020     08/17/2020    MCH 28 3 08/17/2020    MCHC 31 9 08/17/2020    RDW 15 3 (H) 08/17/2020    MPV 8 2 (L) 08/17/2020   , BMP/CMP: No results found for: SODIUM, K, CL, CO2, ANIONGAP, BUN, CREATININE, GLUCOSE, CALCIUM, AST, ALT, ALKPHOS, PROT, BILITOT, EGFR,  Ct Abdomen Pelvis Wo Contrast    Result Date: 8/14/2020  Impression: No radiopaque urinary tract calculi or obstructive uropathy  No acute intra-abdominal or intrapelvic process insofar as can be detected on a noncontrast CT  Workstation performed: [id number]     Ct Head Wo Contrast    Result Date: 8/14/2020  Impression: No acute intracranial abnormality  Mild cerebral chronic microangiopathy  Workstation performed: [id number]     Ct Head Wo Contrast    Result Date: 8/12/2020  Impression: No acute intracranial abnormality is seen  Workstation performed: [id number]     Ct Head Without Contrast    Result Date: 8/12/2020  Impression: No acute intracranial abnormality  Workstation performed: [id number]     Pe Study With Ct Abdomen And Pelvis With Contrast    Result Date: 8/12/2020  Impression: PULMONARY ARTERIAL TREE:  Bilateral pulmonary emboli in the main, lobar, segmental and subsegmental branches  RV/LV ratio measures 1 7 suggesting right heart strain  LUNGS:  Right middle lobe consolidation could relate to pneumonia and/or pulmonary infarct  Posttreatment follow-up to radiographic resolution is recommended with unenhanced chest CT in 3 months    I personally discussed this study with Greg Landers on 8/12/2020 at 6:33 PM   Workstation performed: [id number]         Farhana Song PA-C

## 2020-08-17 NOTE — PLAN OF CARE
Problem: Potential for Falls  Goal: Patient will remain free of falls  Description: INTERVENTIONS:  - Assess patient frequently for physical needs  -  Identify cognitive and physical deficits and behaviors that affect risk of falls    -  [place] fall precautions as indicated by assessment   - Educate patient/family on patient safety including physical limitations  - Instruct patient to call for assistance with activity based on assessment  - Modify environment to reduce risk of injury  - Consider OT/PT consult to assist with strengthening/mobility  Outcome: Progressing     Problem: DISCHARGE PLANNING - CARE MANAGEMENT  Goal: Discharge to post-acute care or home with appropriate resources  Description: INTERVENTIONS:  - Conduct assessment to determine patient/family and health care team treatment goals, and need for post-acute services based on payer coverage, community resources, and patient preferences, and barriers to discharge  - Address psychosocial, clinical, and financial barriers to discharge as identified in assessment in conjunction with the patient/family and health care team  - Arrange appropriate level of post-acute services according to patients   needs and preference and payer coverage in collaboration with the physician and health care team  - Communicate with and update the patient/family, physician, and health care team regarding progress on the discharge plan  - Arrange appropriate transportation to post-acute venues  Outcome: Progressing     Problem: PAIN - ADULT  Goal: Verbalizes/displays adequate comfort level or baseline comfort level  Description: Interventions:  - Encourage patient to monitor pain and request assistance  - Assess pain using appropriate pain scale  - Administer analgesics based on type and severity of pain and evaluate response  - Implement non-pharmacological measures as appropriate and evaluate response  - Consider cultural and social influences on pain and pain management  - Notify physician/advanced practitioner if interventions unsuccessful or patient reports new pain  Outcome: Progressing     Problem: SAFETY ADULT  Goal: Patient will remain free of falls  Description: INTERVENTIONS:  - Assess patient frequently for physical needs  -  Identify cognitive and physical deficits and behaviors that affect risk of falls    -  [place] fall precautions as indicated by assessment   - Educate patient/family on patient safety including physical limitations  - Instruct patient to call for assistance with activity based on assessment  - Modify environment to reduce risk of injury  - Consider OT/PT consult to assist with strengthening/mobility  Outcome: Progressing  Goal: Maintain or return to baseline ADL function  Description: INTERVENTIONS:  -  Assess patient's ability to carry out ADLs; assess patient's baseline for ADL function and identify physical deficits which impact ability to perform ADLs (bathing, care of mouth/teeth, toileting, grooming, dressing, etc )  - Assess/evaluate cause of self-care deficits   - Assess range of motion  - Assess patient's mobility; develop plan if impaired  - Assess patient's need for assistive devices and provide as appropriate  - Encourage maximum independence but intervene and supervise when necessary  - Involve family in performance of ADLs  - Assess for home care needs following discharge   - Consider OT consult to assist with ADL evaluation and planning for discharge  - Provide patient education as appropriate  Outcome: Progressing  Goal: Maintain or return mobility status to optimal level  Description: INTERVENTIONS:  - Assess patient's baseline mobility status (ambulation, transfers, stairs, etc )    - Identify cognitive and physical deficits and behaviors that affect mobility  - Identify mobility aids required to assist with transfers and/or ambulation (gait belt, sit-to-stand, lift, walker, cane, etc )  - [place] fall precautions as indicated by assessment  - Record patient progress and toleration of activity level on Mobility SBAR; progress patient to next Phase/Stage  - Instruct patient to call for assistance with activity based on assessment  - Consider rehabilitation consult to assist with strengthening/weightbearing, etc   Outcome: Progressing     Problem: DISCHARGE PLANNING  Goal: Discharge to home or other facility with appropriate resources  Description: INTERVENTIONS:  - Identify barriers to discharge w/patient and caregiver  - Arrange for needed discharge resources and transportation as appropriate  - Identify discharge learning needs (meds, wound care, etc )  - Arrange for interpretive services to assist at discharge as needed  - Refer to Case Management Department for coordinating discharge planning if the patient needs post-hospital services based on physician/advanced practitioner order or complex needs related to functional status, cognitive ability, or social support system  Outcome: Progressing     Problem: Knowledge Deficit  Goal: Patient/family/caregiver demonstrates understanding of disease process, treatment plan, medications, and discharge instructions  Description: Complete learning assessment and assess knowledge base    Interventions:  - Provide teaching at level of understanding  - Provide teaching via preferred learning methods  Outcome: Progressing     Problem: RESPIRATORY - ADULT  Goal: Achieves optimal ventilation and oxygenation  Description: INTERVENTIONS:  - Assess for changes in respiratory status  - Assess for changes in mentation and behavior  - Position to facilitate oxygenation and minimize respiratory effort  - Oxygen administered by appropriate delivery if ordered  - Initiate smoking cessation education as indicated  - Encourage broncho-pulmonary hygiene including cough, deep breathe, Incentive Spirometry  - Assess the need for suctioning and aspirate as needed  - Assess and instruct to report SOB or any respiratory difficulty  - Respiratory Therapy support as indicated  Outcome: Progressing     Problem: METABOLIC, FLUID AND ELECTROLYTES - ADULT  Goal: Electrolytes maintained within normal limits  Description: INTERVENTIONS:  - Monitor labs and assess patient for signs and symptoms of electrolyte imbalances  - Administer electrolyte replacement as ordered  - Monitor response to electrolyte replacements, including repeat lab results as appropriate  - Instruct patient on fluid and nutrition as appropriate  Outcome: Progressing  Goal: Fluid balance maintained  Description: INTERVENTIONS:  - Monitor labs   - Monitor I/O and WT  - Instruct patient on fluid and nutrition as appropriate  - Assess for signs & symptoms of volume excess or deficit  Outcome: Progressing  Goal: Glucose maintained within target range  Description: INTERVENTIONS:  - Monitor Blood Glucose as ordered  - Assess for signs and symptoms of hyperglycemia and hypoglycemia  - Administer ordered medications to maintain glucose within target range  - Assess nutritional intake and initiate nutrition service referral as needed  Outcome: Progressing     Problem: HEMATOLOGIC - ADULT  Goal: Maintains hematologic stability  Description: INTERVENTIONS  - Assess for signs and symptoms of bleeding or hemorrhage  - Monitor labs  - Administer supportive blood products/factors as ordered and appropriate  Outcome: Progressing     Problem: Prexisting or High Potential for Compromised Skin Integrity  Goal: Skin integrity is maintained or improved  Description: INTERVENTIONS:  - Identify patients at risk for skin breakdown  - Assess and monitor skin integrity  - Assess and monitor nutrition and hydration status  - Monitor labs   - Assess for incontinence   - Turn and reposition patient  - Assist with mobility/ambulation  - Relieve pressure over bony prominences  - Avoid friction and shearing  - Provide appropriate hygiene as needed including keeping skin clean and dry  - Evaluate need for skin moisturizer/barrier cream  - Collaborate with interdisciplinary team   - Patient/family teaching  - Consider wound care consult   Outcome: Progressing

## 2020-08-17 NOTE — PROGRESS NOTES
Pt c/o 10/10 abdominal/pelvic pain and an anxiety attack  CBI manually irrigated with some small clots removed  Re-attached to cbi and running light pink with a goal of clear  Continuing to require a fast rate to achieve goal  SLIM notified

## 2020-08-17 NOTE — TELEPHONE ENCOUNTER
Upon review of the In Basket request we were able to locate, review, and update the patient chart as requested for DEXA Scan  Any additional questions or concerns should be emailed to the Practice Liaisons via [email or web address] email, please do not reply via In Basket      Thank you  Dania Mccrary MA

## 2020-08-18 ENCOUNTER — APPOINTMENT (INPATIENT)
Dept: ULTRASOUND IMAGING | Facility: HOSPITAL | Age: 65
DRG: 175 | End: 2020-08-18
Payer: MEDICARE

## 2020-08-18 LAB
ANION GAP SERPL CALCULATED.3IONS-SCNC: 7 MMOL/L (ref 4–13)
APTT PPP: 62 SECONDS (ref 23–37)
BASOPHILS # BLD AUTO: 0.03 THOUSANDS/ΜL (ref 0–0.1)
BASOPHILS NFR BLD AUTO: 0 % (ref 0–1)
BUN SERPL-MCNC: 12 MG/DL (ref 5–25)
CALCIUM SERPL-MCNC: 8.5 MG/DL (ref 8.3–10.1)
CHLORIDE SERPL-SCNC: 96 MMOL/L (ref 100–108)
CO2 SERPL-SCNC: 31 MMOL/L (ref 21–32)
CREAT SERPL-MCNC: 1.28 MG/DL (ref 0.6–1.3)
EOSINOPHIL # BLD AUTO: 0.16 THOUSAND/ΜL (ref 0–0.61)
EOSINOPHIL NFR BLD AUTO: 1 % (ref 0–6)
ERYTHROCYTE [DISTWIDTH] IN BLOOD BY AUTOMATED COUNT: 15.3 % (ref 11.6–15.1)
GFR SERPL CREATININE-BSD FRML MDRD: 44 ML/MIN/1.73SQ M
GLUCOSE SERPL-MCNC: 117 MG/DL (ref 65–140)
GLUCOSE SERPL-MCNC: 119 MG/DL (ref 65–140)
GLUCOSE SERPL-MCNC: 130 MG/DL (ref 65–140)
GLUCOSE SERPL-MCNC: 135 MG/DL (ref 65–140)
GLUCOSE SERPL-MCNC: 143 MG/DL (ref 65–140)
GLUCOSE SERPL-MCNC: 168 MG/DL (ref 65–140)
HCT VFR BLD AUTO: 36 % (ref 34.8–46.1)
HGB BLD-MCNC: 11.3 G/DL (ref 11.5–15.4)
IMM GRANULOCYTES # BLD AUTO: 0.18 THOUSAND/UL (ref 0–0.2)
IMM GRANULOCYTES NFR BLD AUTO: 1 % (ref 0–2)
LYMPHOCYTES # BLD AUTO: 1.67 THOUSANDS/ΜL (ref 0.6–4.47)
LYMPHOCYTES NFR BLD AUTO: 9 % (ref 14–44)
MCH RBC QN AUTO: 27.7 PG (ref 26.8–34.3)
MCHC RBC AUTO-ENTMCNC: 31.4 G/DL (ref 31.4–37.4)
MCV RBC AUTO: 88 FL (ref 82–98)
MONOCYTES # BLD AUTO: 1.49 THOUSAND/ΜL (ref 0.17–1.22)
MONOCYTES NFR BLD AUTO: 8 % (ref 4–12)
NEUTROPHILS # BLD AUTO: 15.26 THOUSANDS/ΜL (ref 1.85–7.62)
NEUTS SEG NFR BLD AUTO: 81 % (ref 43–75)
NRBC BLD AUTO-RTO: 0 /100 WBCS
PLATELET # BLD AUTO: 295 THOUSANDS/UL (ref 149–390)
PMV BLD AUTO: 8.6 FL (ref 8.9–12.7)
POTASSIUM SERPL-SCNC: 3.9 MMOL/L (ref 3.5–5.3)
RBC # BLD AUTO: 4.08 MILLION/UL (ref 3.81–5.12)
SODIUM SERPL-SCNC: 134 MMOL/L (ref 136–145)
WBC # BLD AUTO: 18.79 THOUSAND/UL (ref 4.31–10.16)

## 2020-08-18 PROCEDURE — 85025 COMPLETE CBC W/AUTO DIFF WBC: CPT | Performed by: INTERNAL MEDICINE

## 2020-08-18 PROCEDURE — 85730 THROMBOPLASTIN TIME PARTIAL: CPT | Performed by: NURSE PRACTITIONER

## 2020-08-18 PROCEDURE — 80048 BASIC METABOLIC PNL TOTAL CA: CPT | Performed by: INTERNAL MEDICINE

## 2020-08-18 PROCEDURE — 76770 US EXAM ABDO BACK WALL COMP: CPT

## 2020-08-18 PROCEDURE — 82948 REAGENT STRIP/BLOOD GLUCOSE: CPT

## 2020-08-18 PROCEDURE — 99233 SBSQ HOSP IP/OBS HIGH 50: CPT | Performed by: INTERNAL MEDICINE

## 2020-08-18 PROCEDURE — 99232 SBSQ HOSP IP/OBS MODERATE 35: CPT | Performed by: NURSE PRACTITIONER

## 2020-08-18 RX ORDER — AMLODIPINE BESYLATE 5 MG/1
5 TABLET ORAL DAILY
Status: DISCONTINUED | OUTPATIENT
Start: 2020-08-18 | End: 2020-08-22 | Stop reason: HOSPADM

## 2020-08-18 RX ORDER — DIPHENHYDRAMINE HYDROCHLORIDE 50 MG/ML
25 INJECTION INTRAMUSCULAR; INTRAVENOUS EVERY 6 HOURS PRN
Status: DISCONTINUED | OUTPATIENT
Start: 2020-08-18 | End: 2020-08-22 | Stop reason: HOSPADM

## 2020-08-18 RX ORDER — ATROPA BELLADONNA AND OPIUM 16.2; 3 MG/1; MG/1
30 SUPPOSITORY RECTAL EVERY 8 HOURS PRN
Status: DISCONTINUED | OUTPATIENT
Start: 2020-08-18 | End: 2020-08-22 | Stop reason: HOSPADM

## 2020-08-18 RX ADMIN — DULOXETINE HYDROCHLORIDE 90 MG: 60 CAPSULE, DELAYED RELEASE ORAL at 08:39

## 2020-08-18 RX ADMIN — NYSTATIN: 100000 POWDER TOPICAL at 08:37

## 2020-08-18 RX ADMIN — FAMOTIDINE 10 MG: 20 TABLET, FILM COATED ORAL at 08:39

## 2020-08-18 RX ADMIN — INSULIN LISPRO 1 UNITS: 100 INJECTION, SOLUTION INTRAVENOUS; SUBCUTANEOUS at 21:59

## 2020-08-18 RX ADMIN — HYDROMORPHONE HYDROCHLORIDE 0.5 MG: 1 INJECTION, SOLUTION INTRAMUSCULAR; INTRAVENOUS; SUBCUTANEOUS at 07:14

## 2020-08-18 RX ADMIN — HEPARIN SODIUM AND DEXTROSE 13 UNITS/KG/HR: 10000; 5 INJECTION INTRAVENOUS at 19:56

## 2020-08-18 RX ADMIN — DIPHENHYDRAMINE HYDROCHLORIDE 25 MG: 50 INJECTION, SOLUTION INTRAMUSCULAR; INTRAVENOUS at 14:11

## 2020-08-18 RX ADMIN — ALPRAZOLAM 0.25 MG: 0.25 TABLET ORAL at 20:51

## 2020-08-18 RX ADMIN — ONDANSETRON 4 MG: 2 INJECTION INTRAMUSCULAR; INTRAVENOUS at 20:51

## 2020-08-18 RX ADMIN — ONDANSETRON 4 MG: 2 INJECTION INTRAMUSCULAR; INTRAVENOUS at 03:06

## 2020-08-18 RX ADMIN — PRAVASTATIN SODIUM 80 MG: 80 TABLET ORAL at 19:55

## 2020-08-18 RX ADMIN — LISINOPRIL 10 MG: 10 TABLET ORAL at 08:38

## 2020-08-18 RX ADMIN — LEVOTHYROXINE SODIUM 88 MCG: 88 TABLET ORAL at 05:12

## 2020-08-18 RX ADMIN — SIMETHICONE 80 MG: 80 TABLET, CHEWABLE ORAL at 19:59

## 2020-08-18 RX ADMIN — HYDROMORPHONE HYDROCHLORIDE 0.5 MG: 1 INJECTION, SOLUTION INTRAMUSCULAR; INTRAVENOUS; SUBCUTANEOUS at 03:07

## 2020-08-18 RX ADMIN — TRAMADOL HYDROCHLORIDE 50 MG: 50 TABLET, FILM COATED ORAL at 10:53

## 2020-08-18 RX ADMIN — AMLODIPINE BESYLATE 5 MG: 5 TABLET ORAL at 08:38

## 2020-08-18 RX ADMIN — PANTOPRAZOLE SODIUM 40 MG: 40 TABLET, DELAYED RELEASE ORAL at 05:12

## 2020-08-18 RX ADMIN — MELATONIN 3 MG: 3 TAB ORAL at 21:59

## 2020-08-18 NOTE — PLAN OF CARE
Problem: Potential for Falls  Goal: Patient will remain free of falls  Description: INTERVENTIONS:  - Assess patient frequently for physical needs  -  Identify cognitive and physical deficits and behaviors that affect risk of falls    -  [place] fall precautions as indicated by assessment   - Educate patient/family on patient safety including physical limitations  - Instruct patient to call for assistance with activity based on assessment  - Modify environment to reduce risk of injury  - Consider OT/PT consult to assist with strengthening/mobility  Outcome: Progressing     Problem: DISCHARGE PLANNING - CARE MANAGEMENT  Goal: Discharge to post-acute care or home with appropriate resources  Description: INTERVENTIONS:  - Conduct assessment to determine patient/family and health care team treatment goals, and need for post-acute services based on payer coverage, community resources, and patient preferences, and barriers to discharge  - Address psychosocial, clinical, and financial barriers to discharge as identified in assessment in conjunction with the patient/family and health care team  - Arrange appropriate level of post-acute services according to patients   needs and preference and payer coverage in collaboration with the physician and health care team  - Communicate with and update the patient/family, physician, and health care team regarding progress on the discharge plan  - Arrange appropriate transportation to post-acute venues  Outcome: Progressing     Problem: PAIN - ADULT  Goal: Verbalizes/displays adequate comfort level or baseline comfort level  Description: Interventions:  - Encourage patient to monitor pain and request assistance  - Assess pain using appropriate pain scale  - Administer analgesics based on type and severity of pain and evaluate response  - Implement non-pharmacological measures as appropriate and evaluate response  - Consider cultural and social influences on pain and pain management  - Notify physician/advanced practitioner if interventions unsuccessful or patient reports new pain  Outcome: Progressing     Problem: SAFETY ADULT  Goal: Patient will remain free of falls  Description: INTERVENTIONS:  - Assess patient frequently for physical needs  -  Identify cognitive and physical deficits and behaviors that affect risk of falls    -  [employer] fall precautions as indicated by assessment   - Educate patient/family on patient safety including physical limitations  - Instruct patient to call for assistance with activity based on assessment  - Modify environment to reduce risk of injury  - Consider OT/PT consult to assist with strengthening/mobility  Outcome: Progressing  Goal: Maintain or return to baseline ADL function  Description: INTERVENTIONS:  -  Assess patient's ability to carry out ADLs; assess patient's baseline for ADL function and identify physical deficits which impact ability to perform ADLs (bathing, care of mouth/teeth, toileting, grooming, dressing, etc )  - Assess/evaluate cause of self-care deficits   - Assess range of motion  - Assess patient's mobility; develop plan if impaired  - Assess patient's need for assistive devices and provide as appropriate  - Encourage maximum independence but intervene and supervise when necessary  - Involve family in performance of ADLs  - Assess for home care needs following discharge   - Consider OT consult to assist with ADL evaluation and planning for discharge  - Provide patient education as appropriate  Outcome: Progressing  Goal: Maintain or return mobility status to optimal level  Description: INTERVENTIONS:  - Assess patient's baseline mobility status (ambulation, transfers, stairs, etc )    - Identify cognitive and physical deficits and behaviors that affect mobility  - Identify mobility aids required to assist with transfers and/or ambulation (gait belt, sit-to-stand, lift, walker, cane, etc )  - [employer] fall precautions as indicated by assessment  - Record patient progress and toleration of activity level on Mobility SBAR; progress patient to next Phase/Stage  - Instruct patient to call for assistance with activity based on assessment  - Consider rehabilitation consult to assist with strengthening/weightbearing, etc   Outcome: Progressing     Problem: DISCHARGE PLANNING  Goal: Discharge to home or other facility with appropriate resources  Description: INTERVENTIONS:  - Identify barriers to discharge w/patient and caregiver  - Arrange for needed discharge resources and transportation as appropriate  - Identify discharge learning needs (meds, wound care, etc )  - Arrange for interpretive services to assist at discharge as needed  - Refer to Case Management Department for coordinating discharge planning if the patient needs post-hospital services based on physician/advanced practitioner order or complex needs related to functional status, cognitive ability, or social support system  Outcome: Progressing     Problem: Knowledge Deficit  Goal: Patient/family/caregiver demonstrates understanding of disease process, treatment plan, medications, and discharge instructions  Description: Complete learning assessment and assess knowledge base    Interventions:  - Provide teaching at level of understanding  - Provide teaching via preferred learning methods  Outcome: Progressing     Problem: RESPIRATORY - ADULT  Goal: Achieves optimal ventilation and oxygenation  Description: INTERVENTIONS:  - Assess for changes in respiratory status  - Assess for changes in mentation and behavior  - Position to facilitate oxygenation and minimize respiratory effort  - Oxygen administered by appropriate delivery if ordered  - Initiate smoking cessation education as indicated  - Encourage broncho-pulmonary hygiene including cough, deep breathe, Incentive Spirometry  - Assess the need for suctioning and aspirate as needed  - Assess and instruct to report SOB or any respiratory difficulty  - Respiratory Therapy support as indicated  Outcome: Progressing     Problem: METABOLIC, FLUID AND ELECTROLYTES - ADULT  Goal: Electrolytes maintained within normal limits  Description: INTERVENTIONS:  - Monitor labs and assess patient for signs and symptoms of electrolyte imbalances  - Administer electrolyte replacement as ordered  - Monitor response to electrolyte replacements, including repeat lab results as appropriate  - Instruct patient on fluid and nutrition as appropriate  Outcome: Progressing  Goal: Fluid balance maintained  Description: INTERVENTIONS:  - Monitor labs   - Monitor I/O and WT  - Instruct patient on fluid and nutrition as appropriate  - Assess for signs & symptoms of volume excess or deficit  Outcome: Progressing  Goal: Glucose maintained within target range  Description: INTERVENTIONS:  - Monitor Blood Glucose as ordered  - Assess for signs and symptoms of hyperglycemia and hypoglycemia  - Administer ordered medications to maintain glucose within target range  - Assess nutritional intake and initiate nutrition service referral as needed  Outcome: Progressing     Problem: HEMATOLOGIC - ADULT  Goal: Maintains hematologic stability  Description: INTERVENTIONS  - Assess for signs and symptoms of bleeding or hemorrhage  - Monitor labs  - Administer supportive blood products/factors as ordered and appropriate  Outcome: Progressing     Problem: Prexisting or High Potential for Compromised Skin Integrity  Goal: Skin integrity is maintained or improved  Description: INTERVENTIONS:  - Identify patients at risk for skin breakdown  - Assess and monitor skin integrity  - Assess and monitor nutrition and hydration status  - Monitor labs   - Assess for incontinence   - Turn and reposition patient  - Assist with mobility/ambulation  - Relieve pressure over bony prominences  - Avoid friction and shearing  - Provide appropriate hygiene as needed including keeping skin clean and dry  - Evaluate need for skin moisturizer/barrier cream  - Collaborate with interdisciplinary team   - Patient/family teaching  - Consider wound care consult   Outcome: Progressing

## 2020-08-18 NOTE — PROGRESS NOTES
Progress Note - Polina Tejada 72 y o  female MRN: [id number]    Unit/Bed#:  Encounter: [id number]      Assessment:  Polina Tejada is a pleasant 77-year-old female seen in consultation for gross hematuria after initial treatment for acute PE with heparin infusion  She acknowledges history of urinary retention and incontinence many years prior, managed remotely by urology-gynecology  Patient denies prior  surgical manipulation  She is afebrile with hypertension, managed by primary medical team   She has three-way Ko with CBI for several days  Titration challenging with occasional supratherapeutic PTT  Patient is offered vague complaints of bladder spasms, abdominal pain and diarrhea, resolving and now constipation  Hemoglobin has remained relatively stable  Creatinine within normal limits although rising from prior 0 8-1 2  Catheter is in-situ and patent for maricel urine  There were reports of pinkish urine earlier this a m  And overnight  Plan:  Continue medical optimization  Maintain CBI to slow drip rate  Obtain ultrasound to evaluate for upper tract obstruction and/or clot burden  However, have low suspicion for this at the present time  Transition to oral anticoagulant by tomorrow if urine is relatively this same  Will attempt a void trial to trial clamp CBI once again and proceed with trial of void once urine is clear for 24 hours with oral anticoagulation  Subjective:   Denies fever, chills  Endorses mild headache, occasional nausea, constipation after resolution of diarrhea  Objective:     Vitals: Blood pressure 144/64, pulse 103, temperature 99 °F (37 2 °C), temperature source Oral, resp  rate 18, height 4' 10" (1 473 m), weight 99 5 kg (219 lb 5 7 oz), SpO2 96 %  ,Body mass index is 45 85 kg/m²        Intake/Output Summary (Last 24 hours) at 8/18/2020 0849  Last data filed at 8/18/2020 0845  Gross per 24 hour   Intake 104 ml   Output 2675 ml   Net -2571 ml Physical Exam: General appearance: alert and oriented, in no acute distress, appears stated age, cooperative and no distress  Head: Normocephalic, without obvious abnormality, atraumatic  Neck: no adenopathy, no carotid bruit, no JVD, supple, symmetrical, trachea midline and thyroid not enlarged, symmetric, no tenderness/mass/nodules  Lungs: diminished breath sounds  Heart: regular rate and rhythm, S1, S2 normal, no murmur, click, rub or gallop  Abdomen: soft, non-tender; bowel sounds normal; no masses,  no organomegaly  Extremities: extremities normal, warm and well-perfused; no cyanosis, clubbing, or edema  Pulses: 2+ and symmetric  Neurologic: Grossly normal  Three-way Ko--CBI--secure and patent for maricel urine  Invasive Devices     Peripheral Intravenous Line            Peripheral IV 08/16/20 Left;Upper Arm 1 day          Drain            Continuous Bladder Irrigation Three-way 3 days    Urethral Catheter Three way 22 Fr  3 days              Lab Results   Component Value Date    WBC 18 79 (H) 08/18/2020    HGB 11 3 (L) 08/18/2020    HCT 36 0 08/18/2020    MCV 88 08/18/2020     08/18/2020     Lab Results   Component Value Date    SODIUM 134 (L) 08/18/2020    K 3 9 08/18/2020    CL 96 (L) 08/18/2020    CO2 31 08/18/2020    BUN 12 08/18/2020    CREATININE 1 28 08/18/2020    GLUC 143 (H) 08/18/2020    CALCIUM 8 5 08/18/2020       Lab, Imaging and other studies: I have personally reviewed pertinent reports

## 2020-08-18 NOTE — OCCUPATIONAL THERAPY NOTE
Occupational Therapy Cancellation Note        Patient Name: Julio Petty  QENUQ'E Date: 8/18/2020    OT order received, chart review performed  At this time, OT treatment session cancelled due to patient refusal to participate secondary to increased pain  OT will continue to follow patient and treat as appropriate      Link BRANDI Cao/L

## 2020-08-18 NOTE — PROGRESS NOTES
Progress Note   Teri Phelps 72 y o  female MRN: [id number]    Unit/Bed#:  Encounter: [id number]    Assessment & Plan:    Acute pulmonary embolism  -status post tPA on August 12, 2020, currently on IV heparin drip   -patient is hemodynamically stable, breathing ambient air, saturating greater than 95%  Denies chest pain shortness of breath or cough  -once gross hematuria resolve will transition her to oral anticoagulation  Gross hematuria  -status post tPA for pulmonary embolism, currently on IV heparin drip   -patient hematuria has improved significantly compared to 24 hours ago, urine is clear pinkish today   -patient is to continue complaining of suprapubic abdominal pain with no significant relief with tramadol, belladonna-opium suppository and Dilaudid as needed   -urologist on board, we have order kidney and bladder ultrasound, follow results   -continue pain management as needed  -patient's hemoglobin remained stable, denies bleeding from any other site  -continue trending hemoglobin/hematocrit daily  Acute kidney injury  -creatinine today 1 28, which is increased more than 0 3 in the last 24 hours  -creatinine baseline around 0 9  -BUN/creatinine Aredia 9 3, JENNIFER could be either intrarenal of postrenal  -follow kidney and bladder ultrasound results  -monitor renal function      Uncontrolled Hypertension  -there is mild improvement on blood pressure compared to 24 hours ago, most recent blood pressure record 144/64   -continue lisinopril 10 mg oral daily  -continue amlodipine 5 mg oral daily  -consider starting patient back on hydrochlorothiazide on discharge after resolution of JENNIFER, or even consider Ace or arbs  -continue monitor blood pressure as per unit protocol    Asthma  -lungs clear to auscultation the patient is not wheezing   -continue breathing treatment with Breo Ellipta and albuterol p r n      Hypothyroidism  -continue levothyroxine 80 mcg oral daily    Hyperlipidemia  -continue pravastatin 80 mg oral daily    Anxiety  -continue duloxetine 90 mg oral daily, alprazolam 0 25 mg b i d  P r n  Diarrhea  -patient report multiple small of soft bowel movements per day  -history of irritable bowel syndrome      Ecchymotic lesions  -patient has multiple ecchymosis on the buttock area, under abdominal wall in the left lower quadrant, on the right shoulder  -ecchymosis since to be secondary to traumatic extravasation of blood in the setting of tPA use and IV heparin drip, since ecchymosis are mainly on pressure points  -patient hemoglobin is stable   -continue trending hemoglobin/hematocrit daily  Glucose intolerance  -hemoglobin A1c 5 9 on July 2020   -glycemia levels acceptable, ranging from   -continue sliding scale insulin coverage  Systemic inflammatory response syndrome  -patient leukocytosis continue trending up 18 7, patient remained tachycardic with maximum heart rate recorded in last 24 hours 109 beats per minute   -patient remained afebrile, no overt source of infection  -follow kidney and bladder ultrasound  -continue trending WBC count and temperature curve  -since WBC count is trending up and patient continue complaining of suprapubic abdominal pain, will follow kidney and bladder ultrasound, on the differential for SIRSn will consider genitourinary tract infection, will consider start patient on antibiotics if no improvement of the pain and leukocyte continue trending up            Subjective:   Patient has been seen at bedside, she was alert and cooperative  Was still complaining of suprapubic pain partially relieved by Dilaudid and tramadol  Denies any chest pain or shortness of breath, denied coughing  Patient has remained afebrile  Hemodynamically stable  Patient remains on continue bladder irrigation, color has improved compared to 24 hours ago  Today's color of the bladder fluid is is light pink        Objective: Vitals: Blood pressure 144/64, pulse 103, temperature 99 °F (37 2 °C), temperature source Oral, resp  rate 18, height 4' 10" (1 473 m), weight 99 5 kg (219 lb 5 7 oz), SpO2 96 %  ,Body mass index is 45 85 kg/m²  Intake/Output Summary (Last 24 hours) at 8/18/2020 0931  Last data filed at 8/18/2020 0845  Gross per 24 hour   Intake 104 ml   Output 2675 ml   Net -2571 ml         Physical Exam:     Physical Exam  Constitutional:       General: She is in acute distress (Mild distress due to suprapubic pain)  Appearance: She is obese  She is ill-appearing  HENT:      Head: Normocephalic and atraumatic  Eyes:      Extraocular Movements: Extraocular movements intact  Conjunctiva/sclera: Conjunctivae normal       Pupils: Pupils are equal, round, and reactive to light  Neck:      Musculoskeletal: Normal range of motion and neck supple  No neck rigidity or muscular tenderness  Cardiovascular:      Rate and Rhythm: Normal rate and regular rhythm  Pulses: Normal pulses  Heart sounds: No murmur  No friction rub  No gallop  Pulmonary:      Effort: Pulmonary effort is normal  No respiratory distress  Breath sounds: Normal breath sounds  No wheezing, rhonchi or rales  Chest:      Chest wall: No tenderness  Abdominal:      General: Bowel sounds are normal  There is no distension  Palpations: There is no mass  Tenderness: There is abdominal tenderness (Tenderness to palpation over suprapubic region with no rebound or guarding)  There is no right CVA tenderness or left CVA tenderness  Musculoskeletal: Normal range of motion  General: No swelling, tenderness, deformity or signs of injury  Right lower leg: No edema  Left lower leg: No edema  Skin:     General: Skin is warm and dry  Coloration: Skin is not pale  Findings: Bruising (Multiple ecchymotic lesions on pressure points including buttocks, right shoulder    There is another ecchymotic lesion on the abdominal wall on the right lower quadrant ) present  No erythema or rash  Neurological:      General: No focal deficit present  Mental Status: She is alert and oriented to person, place, and time  Psychiatric:         Mood and Affect: Mood normal            Invasive Devices     Peripheral Intravenous Line            Peripheral IV 08/16/20 Left;Upper Arm 1 day          Drain            Continuous Bladder Irrigation Three-way 3 days    Urethral Catheter Three way 22 Fr  3 days                        CBC:   Lab Results   Component Value Date    WBC 18 79 (H) 08/18/2020    HGB 11 3 (L) 08/18/2020    HCT 36 0 08/18/2020    MCV 88 08/18/2020     08/18/2020    MCH 27 7 08/18/2020    MCHC 31 4 08/18/2020    RDW 15 3 (H) 08/18/2020    MPV 8 6 (L) 08/18/2020    NRBC 0 08/18/2020   ,   CMP:   Lab Results   Component Value Date    K 3 9 08/18/2020    CL 96 (L) 08/18/2020    CO2 31 08/18/2020    BUN 12 08/18/2020    CREATININE 1 28 08/18/2020    CALCIUM 8 5 08/18/2020    EGFR 44 08/18/2020   ,   PT/INR: No results found for: PT, INR,   Troponin: No results found for: TROPONINI,     Imaging Studies:   Kidney and bladder ultrasound ordered  Counseling / Coordination of Care  Total time spent today  30 minutes  Greater than 50% of total time was spent with the patient and / or family counseling and / or coordination of care

## 2020-08-19 ENCOUNTER — APPOINTMENT (INPATIENT)
Dept: RADIOLOGY | Facility: HOSPITAL | Age: 65
DRG: 175 | End: 2020-08-19
Payer: MEDICARE

## 2020-08-19 PROBLEM — N17.9 AKI (ACUTE KIDNEY INJURY) (HCC): Status: ACTIVE | Noted: 2020-08-19

## 2020-08-19 LAB
ALBUMIN SERPL BCP-MCNC: 2.6 G/DL (ref 3.5–5)
ALP SERPL-CCNC: 78 U/L (ref 46–116)
ALT SERPL W P-5'-P-CCNC: 23 U/L (ref 12–78)
ANION GAP SERPL CALCULATED.3IONS-SCNC: 6 MMOL/L (ref 4–13)
APTT PPP: 62 SECONDS (ref 23–37)
AST SERPL W P-5'-P-CCNC: 15 U/L (ref 5–45)
BACTERIA BLD CULT: NORMAL
BACTERIA BLD CULT: NORMAL
BACTERIA UR QL AUTO: ABNORMAL /HPF
BASOPHILS # BLD AUTO: 0.02 THOUSANDS/ΜL (ref 0–0.1)
BASOPHILS NFR BLD AUTO: 0 % (ref 0–1)
BILIRUB SERPL-MCNC: 0.5 MG/DL (ref 0.2–1)
BILIRUB UR QL STRIP: NEGATIVE
BUN SERPL-MCNC: 13 MG/DL (ref 5–25)
CALCIUM SERPL-MCNC: 9 MG/DL (ref 8.3–10.1)
CHLORIDE SERPL-SCNC: 98 MMOL/L (ref 100–108)
CLARITY UR: ABNORMAL
CO2 SERPL-SCNC: 32 MMOL/L (ref 21–32)
COLOR UR: ABNORMAL
CREAT SERPL-MCNC: 1.44 MG/DL (ref 0.6–1.3)
CREAT UR-MCNC: 14.2 MG/DL
EOSINOPHIL # BLD AUTO: 0.1 THOUSAND/ΜL (ref 0–0.61)
EOSINOPHIL NFR BLD AUTO: 1 % (ref 0–6)
ERYTHROCYTE [DISTWIDTH] IN BLOOD BY AUTOMATED COUNT: 15.1 % (ref 11.6–15.1)
GFR SERPL CREATININE-BSD FRML MDRD: 38 ML/MIN/1.73SQ M
GLUCOSE SERPL-MCNC: 116 MG/DL (ref 65–140)
GLUCOSE SERPL-MCNC: 131 MG/DL (ref 65–140)
GLUCOSE SERPL-MCNC: 134 MG/DL (ref 65–140)
GLUCOSE SERPL-MCNC: 97 MG/DL (ref 65–140)
GLUCOSE SERPL-MCNC: 99 MG/DL (ref 65–140)
GLUCOSE UR STRIP-MCNC: NEGATIVE MG/DL
HCT VFR BLD AUTO: 35.1 % (ref 34.8–46.1)
HGB BLD-MCNC: 11 G/DL (ref 11.5–15.4)
HGB UR QL STRIP.AUTO: ABNORMAL
IMM GRANULOCYTES # BLD AUTO: 0.17 THOUSAND/UL (ref 0–0.2)
IMM GRANULOCYTES NFR BLD AUTO: 1 % (ref 0–2)
KETONES UR STRIP-MCNC: NEGATIVE MG/DL
LEUKOCYTE ESTERASE UR QL STRIP: ABNORMAL
LYMPHOCYTES # BLD AUTO: 1.51 THOUSANDS/ΜL (ref 0.6–4.47)
LYMPHOCYTES NFR BLD AUTO: 9 % (ref 14–44)
MCH RBC QN AUTO: 27.6 PG (ref 26.8–34.3)
MCHC RBC AUTO-ENTMCNC: 31.3 G/DL (ref 31.4–37.4)
MCV RBC AUTO: 88 FL (ref 82–98)
MONOCYTES # BLD AUTO: 1.45 THOUSAND/ΜL (ref 0.17–1.22)
MONOCYTES NFR BLD AUTO: 9 % (ref 4–12)
NEUTROPHILS # BLD AUTO: 13.27 THOUSANDS/ΜL (ref 1.85–7.62)
NEUTS SEG NFR BLD AUTO: 80 % (ref 43–75)
NITRITE UR QL STRIP: NEGATIVE
NON-SQ EPI CELLS URNS QL MICRO: ABNORMAL /HPF
NRBC BLD AUTO-RTO: 0 /100 WBCS
OSMOLALITY UR: 300 MMOL/KG
PH UR STRIP.AUTO: 5.5 [PH]
PLATELET # BLD AUTO: 285 THOUSANDS/UL (ref 149–390)
PMV BLD AUTO: 8.5 FL (ref 8.9–12.7)
POTASSIUM SERPL-SCNC: 4.1 MMOL/L (ref 3.5–5.3)
PROT SERPL-MCNC: 7.5 G/DL (ref 6.4–8.2)
PROT UR STRIP-MCNC: ABNORMAL MG/DL
RBC # BLD AUTO: 3.98 MILLION/UL (ref 3.81–5.12)
RBC #/AREA URNS AUTO: ABNORMAL /HPF
SODIUM 24H UR-SCNC: 133 MOL/L
SODIUM SERPL-SCNC: 136 MMOL/L (ref 136–145)
SP GR UR STRIP.AUTO: 1.01 (ref 1–1.03)
UROBILINOGEN UR QL STRIP.AUTO: 0.2 E.U./DL
WBC # BLD AUTO: 16.52 THOUSAND/UL (ref 4.31–10.16)
WBC #/AREA URNS AUTO: ABNORMAL /HPF

## 2020-08-19 PROCEDURE — 84300 ASSAY OF URINE SODIUM: CPT | Performed by: INTERNAL MEDICINE

## 2020-08-19 PROCEDURE — 85730 THROMBOPLASTIN TIME PARTIAL: CPT | Performed by: INTERNAL MEDICINE

## 2020-08-19 PROCEDURE — 97530 THERAPEUTIC ACTIVITIES: CPT

## 2020-08-19 PROCEDURE — 81001 URINALYSIS AUTO W/SCOPE: CPT | Performed by: INTERNAL MEDICINE

## 2020-08-19 PROCEDURE — 99223 1ST HOSP IP/OBS HIGH 75: CPT | Performed by: INTERNAL MEDICINE

## 2020-08-19 PROCEDURE — 82948 REAGENT STRIP/BLOOD GLUCOSE: CPT

## 2020-08-19 PROCEDURE — 83935 ASSAY OF URINE OSMOLALITY: CPT | Performed by: INTERNAL MEDICINE

## 2020-08-19 PROCEDURE — 85025 COMPLETE CBC W/AUTO DIFF WBC: CPT | Performed by: INTERNAL MEDICINE

## 2020-08-19 PROCEDURE — 99233 SBSQ HOSP IP/OBS HIGH 50: CPT | Performed by: INTERNAL MEDICINE

## 2020-08-19 PROCEDURE — 80053 COMPREHEN METABOLIC PANEL: CPT | Performed by: INTERNAL MEDICINE

## 2020-08-19 PROCEDURE — 97110 THERAPEUTIC EXERCISES: CPT

## 2020-08-19 PROCEDURE — 82570 ASSAY OF URINE CREATININE: CPT | Performed by: INTERNAL MEDICINE

## 2020-08-19 PROCEDURE — 99232 SBSQ HOSP IP/OBS MODERATE 35: CPT | Performed by: NURSE PRACTITIONER

## 2020-08-19 PROCEDURE — 97535 SELF CARE MNGMENT TRAINING: CPT

## 2020-08-19 PROCEDURE — 74018 RADEX ABDOMEN 1 VIEW: CPT

## 2020-08-19 PROCEDURE — 97116 GAIT TRAINING THERAPY: CPT

## 2020-08-19 RX ORDER — SODIUM CHLORIDE 9 MG/ML
100 INJECTION, SOLUTION INTRAVENOUS CONTINUOUS
Status: DISCONTINUED | OUTPATIENT
Start: 2020-08-19 | End: 2020-08-22

## 2020-08-19 RX ADMIN — SIMETHICONE 80 MG: 80 TABLET, CHEWABLE ORAL at 23:55

## 2020-08-19 RX ADMIN — PRAVASTATIN SODIUM 80 MG: 80 TABLET ORAL at 19:34

## 2020-08-19 RX ADMIN — DULOXETINE HYDROCHLORIDE 90 MG: 60 CAPSULE, DELAYED RELEASE ORAL at 09:40

## 2020-08-19 RX ADMIN — AMLODIPINE BESYLATE 5 MG: 5 TABLET ORAL at 09:41

## 2020-08-19 RX ADMIN — TRAMADOL HYDROCHLORIDE 50 MG: 50 TABLET, FILM COATED ORAL at 06:19

## 2020-08-19 RX ADMIN — SIMETHICONE 80 MG: 80 TABLET, CHEWABLE ORAL at 16:23

## 2020-08-19 RX ADMIN — SODIUM CHLORIDE 100 ML/HR: 0.9 INJECTION, SOLUTION INTRAVENOUS at 14:34

## 2020-08-19 RX ADMIN — LEVOTHYROXINE SODIUM 88 MCG: 88 TABLET ORAL at 06:10

## 2020-08-19 RX ADMIN — HEPARIN SODIUM AND DEXTROSE 13 UNITS/KG/HR: 10000; 5 INJECTION INTRAVENOUS at 17:31

## 2020-08-19 RX ADMIN — FAMOTIDINE 10 MG: 20 TABLET, FILM COATED ORAL at 09:40

## 2020-08-19 RX ADMIN — MELATONIN 3 MG: 3 TAB ORAL at 22:31

## 2020-08-19 RX ADMIN — PANTOPRAZOLE SODIUM 40 MG: 40 TABLET, DELAYED RELEASE ORAL at 06:10

## 2020-08-19 RX ADMIN — SIMETHICONE 80 MG: 80 TABLET, CHEWABLE ORAL at 04:02

## 2020-08-19 RX ADMIN — NYSTATIN: 100000 POWDER TOPICAL at 19:34

## 2020-08-19 RX ADMIN — SIMETHICONE 80 MG: 80 TABLET, CHEWABLE ORAL at 09:40

## 2020-08-19 RX ADMIN — ALPRAZOLAM 0.25 MG: 0.25 TABLET ORAL at 22:31

## 2020-08-19 NOTE — PROGRESS NOTES
Progress Note - Natalie Altamirano 72 y o  female MRN: [id number]    Unit/Bed#:  Encounter: [id number]      Assessment:  Natalie Altamirano is a pleasant 70-year-old female seen in consultation for gross hematuria after initial treatment for acute PE with heparin infusion and tPA  She acknowledges history of urinary retention and incontinence many years prior, managed remotely by urology-gynecology  Patient denies prior  surgical manipulation  She is afebrile with hypertension, managed by primary medical team   She has three-way Ko with CBI for several days  Titration challenging with occasional supratherapeutic PTT  However, urine is alternating between clear yellow and occasionally pale blush without clots  Hemoglobin has remained stable  Creatinine rising from prior 0 8-1 2--1 44 today  Renal ultrasound obtained demonstrated mild right hydronephrosis  There was no suggestion of significant clot burden, stones or masses  Plan:  Continue medical optimization  ? Role of hydration  Appreciate medical management and will await nephrology input  Mild hydronephrosis on ultrasound is typically over read  Recommend against surgical  intervention at this time  Maintain CBI to slow drip  Await transition to oral anticoagulant  Will proceed with void trial once patient is transitioned on oral a/C with clear urine for 24 hours  Would reimage with CT scan if there is persistent worsening of renal function  Will follow  Subjective:   Denies fever, chills  Endorses mild headache, occasional nausea, constipation after resolution of diarrhea  Objective:     Vitals: Blood pressure 123/58, pulse 102, temperature 98 2 °F (36 8 °C), temperature source Oral, resp  rate 18, height 4' 10" (1 473 m), weight 97 1 kg (214 lb 1 1 oz), SpO2 94 %  ,Body mass index is 44 74 kg/m²        Intake/Output Summary (Last 24 hours) at 8/19/2020 1104  Last data filed at 8/19/2020 0949  Gross per 24 hour   Intake    Output 3100 ml   Net -3100 ml       Physical Exam:   General appearance: alert and oriented, in no acute distress, appears stated age, cooperative, no distress and moderately obese  Head: Normocephalic, without obvious abnormality, atraumatic  Neck: no adenopathy, no carotid bruit, no JVD, supple, symmetrical, trachea midline and thyroid not enlarged, symmetric, no tenderness/mass/nodules  Lungs: diminished breath sounds  Heart: regular rate and rhythm, S1, S2 normal, no murmur, click, rub or gallop  Abdomen: abnormal findings:  moderate tenderness in the lower abdomen  Extremities: extremities normal, warm and well-perfused; no cyanosis, clubbing, or edema  Pulses: 2+ and symmetric  Neurologic: Grossly normal  Three-way Ko to CBI-- patent for clear blush/peach urine without clots      Invasive Devices     Peripheral Intravenous Line            Peripheral IV 08/16/20 Left;Upper Arm 2 days          Drain            Continuous Bladder Irrigation Three-way 4 days    Urethral Catheter Three way 22 Fr  4 days              Lab Results   Component Value Date    WBC 16 52 (H) 08/19/2020    HGB 11 0 (L) 08/19/2020    HCT 35 1 08/19/2020    MCV 88 08/19/2020     08/19/2020     Lab Results   Component Value Date    SODIUM 136 08/19/2020    K 4 1 08/19/2020    CL 98 (L) 08/19/2020    CO2 32 08/19/2020    BUN 13 08/19/2020    CREATININE 1 44 (H) 08/19/2020    GLUC 134 08/19/2020    CALCIUM 9 0 08/19/2020       Lab, Imaging and other studies: I have personally reviewed pertinent reports

## 2020-08-19 NOTE — CONSULTS
Progress Note - Liane Asencio 1955, 72 y o  female MRN: [id number]    Unit/Bed#:  Encounter: [id number]    Primary Care Provider: Kwaku Lazaro MD   Date and time admitted to hospital: 8/12/2020  4:03 PM        JENNIFER (acute kidney injury) [facility])  Assessment & Plan  · Patient presented with creatinine of 0 87, creatinine today-1 44    · Creatinine worsened on 08/16 from 0 8-1 28 on 08/18, thereby meeting criteria for JENNIFER ( > / = 0 3 mg/dl increase over past 48 h)   · Etiology-could be postrenal, given evidence of mild right-sided hydronephrosis, likely obstructive uropathy due to clots from gross hematuria, no evidence of stones on prior CT abdo/pelvis done on 8/14  · UA performed on 08/14 showed gross hematuria, no evidence of possible UTI  · Prerenal etiology cannot be ruled out, given the patient dry mucous membranes, was on lisinopril before which was discontinued as the patient had an episode of angioedema (last dose was on 08/18)  · Very low suspicion for contrast induced nephropathy as the patient's creatinine remained stable for the 72 hours after contrast administration    PLAN :   · Obtain urinalysis with reflex culture  · Agree with IV fluid, normal saline at 100 cc/hour  · Obtain urine sodium, creatinine, osmolality  · X-ray KUB to assess for residual contrast  · Avoid nephrotoxic agents, NSAIDs, contrast administration   · Continue to hold hydrochlorothiazide  · Monitor creatinine in AM labs     Gross hematuria  Assessment & Plan  · Still has gross hematuria, however based on chart review seems to be improving  · Hemoglobin is stable  · Urology on board-patient is currently on continuous bladder irrigation  · Ultrasound kidney/bladder showed mild right-sided hydronephrosis, can be attributed to obstructive uropathy from clots  · Patient complains of suprapubic abdominal pain, with no significant relief with pain medications-UA obtained on 08/14 showed no evidence of UTI, however patient is tachycardic and has leukocytosis meeting SIRS criteria - will order UA to r/o UTI  · Agree with urology management of continuing CBI, with Ko removal and voiding trial once patient's urine has remained clear for 24 hours and the patient has been on oral anticoagulation for at least 24 hours  * Acute pulmonary embolism (HCC)  Assessment & Plan  · Status post tPA, currently on IV heparin drip   · Management per primary team -- plan is to switch to oral anticoagulation and closely monitor hematuria     HTN (hypertension)  Assessment & Plan  · Blood pressure well controlled/24 hours  · Lisinopril discontinued due to concern for allergic reaction  · Patient is currently on amlodipine 5 mg p o  Daily-hydrochlorothiazide on hold due to JENNIFER  · Agree with current antihypertensive regimen    Consultation - Nephrology   Thang Jimenez 72 y o  female MRN: [id number]  Unit/Bed#:  Encounter: [id number]      Assessment/Plan     History of Present Illness   Physician Requesting Consult: Esme Oneal MD  Reason for Consult / Principal Problem:  Acute kidney injury, gross hematuria  Hx and PE limited by:  No limitation  HPI: Thang Jimenez is a 72y o  year old female with a past medical history of hypertension, hyperlipidemia asthma, anxiety, breast cancer status post lumpectomy and radiation, past history of urine retention and incontinence many years ago, who presented to the emergency department on 08/12 with shortness of breath and worsening pain in the epigastric area  The patient was found to be hypoxic, with elevated troponin, BNP and persistently tachycardic  CT angiography performed on 08/12 showed bilateral pulmonary emboli, with features of right heart strain, for which the patient was started on IV heparin drip  The patient's creatinine on admission was 0 87, consistent with her prior baseline of 0 7    During the course of hospitalization, however the creatinine was stable until 8/18, when he had increased from 0 8-1 28 (over prior 48 hours) consistent with JENNIFER  Urinalysis performed on 08/14 showed gross hematuria with negative nitrite, 2+ protein and 4-10 WBC  The patient had a CT scan of the abdomen and pelvis without contrast on 08/14 which showed no stones, however an ultrasound kidney and bladder performed on 08/18 showed evidence of mild right-sided hydronephrosis that was not present on prior CT  The patient currently has 3 way catheter and is undergoing CBI, urology on board  Creatinine today has worsened from 1 28---> 1 44  Urine output is normal at 1 2 L/24 hours, the patient is an inmate negative balance of 2 8 L/24 hours  On assessment today, the patient reports suprapubic pain and cramping extending to bilateral lower quadrants  She denies any flank pain  She denies any nausea, vomiting, chest pain, shortness of breath-patient is saturating on room air  She has a decreased appetite, has not been eating as per her baseline, and complains of a dry mouth  Examination of the catheter back shows pinkish urine, with clots  History obtained from the patient    Consults    Review of Systems   Constitutional: Positive for appetite change  Negative for chills and fever  HENT: Negative for rhinorrhea and sore throat  Eyes: Negative for discharge  Respiratory: Negative for cough, shortness of breath and wheezing  Cardiovascular: Negative for chest pain, palpitations and leg swelling  Gastrointestinal: Positive for abdominal distention and abdominal pain (Suprapubic extending to bilateral lower quadrants)  Negative for diarrhea, nausea and vomiting  Genitourinary: Positive for hematuria  Negative for flank pain  Skin: Positive for rash (small dark purple colored blisters on bilateral lower extremities )  Neurological: Negative for dizziness, weakness, light-headedness and numbness         Historical Information   Past Medical History:   Diagnosis Date    Allergic rhinitis     Anemia     Cancer ([facility] [place] [address] )     breast cancer, Left side    Depression     Disease of thyroid gland     GERD (gastroesophageal reflux disease)     Hyperlipemia     Hypertension     Hypothyroidism     Last assessed: 3/25/14    Obesity     Osteoarthritis     Pre-operative laboratory examination      Past Surgical History:   Procedure Laterality Date    HAND SURGERY Left 03/2020    HYSTERECTOMY      INCISIONAL BREAST BIOPSY      JOINT REPLACEMENT      KNEE ARTHROPLASTY      ROTATOR CUFF REPAIR      SHOULDER SURGERY      TRIGGER FINGER RELEASE      TRIGGER FINGER RELEASE       Social History   Social History     Substance and Sexual Activity   Alcohol Use Yes    Frequency: Monthly or less    Drinks per session: 1 or 2    Binge frequency: Never    Comment: socially      Social History     Substance and Sexual Activity   Drug Use No     E-Cigarette/Vaping    E-Cigarette Use Never User      E-Cigarette/Vaping Substances    Nicotine No     THC No     CBD No     Flavoring No     Other No     Unknown No      Social History     Tobacco Use   Smoking Status Never Smoker   Smokeless Tobacco Never Used     Family History   Problem Relation Age of Onset    Coronary artery disease Mother     Hypertension Mother         Essential    Coronary artery disease Father        Meds/Allergies   all current active meds have been reviewed    Allergies   Allergen Reactions    Tamoxifen Other (See Comments)     Headaches, stomach pain, sweats,     Nsaids Hives    Oxybutynin     Singulair [Montelukast] Swelling    Ciprofloxacin Hives    Penicillins Hives    Sulfa Antibiotics Hives    Vancomycin Hives       Objective     Intake/Output Summary (Last 24 hours) at 8/19/2020 1152  Last data filed at 8/19/2020 0949  Gross per 24 hour   Intake    Output 3100 ml   Net -3100 ml       Invasive Devices:   Urethral Catheter Three way 22 Fr   (Active)   Reasons to continue Urinary Catheter  Accurate I&O assessment in critically ill patients (48 hr  max) 08/18/20 0801   Goal for Removal Will consult urology 08/18/20 0801   Site Assessment Clean;Skin intact 08/18/20 0801   Collection Container Standard drainage bag 08/18/20 0801   Securement Method Securing device (Describe) 08/18/20 0801   Irrigant Normal saline 08/18/20 0801   Output (mL) 250 mL 08/14/20 1354       Continuous Bladder Irrigation Three-way (Active)   Securement Method Securing device (Describe) 08/18/20 0345   Rate Fast 08/18/20 0345   Irrigant Normal saline 08/18/20 0345   CBI Irrigation Intake (mL) 1000 mL 08/19/20 0949   CBI Ko Output (mL) 1700 mL 08/19/20 0949   CBI Net Output (mL) 700 mL 08/19/20 0949   Ko Output Appearance Cloudy; Hazy;Bloody 08/19/20 0949       Physical Exam  Vitals signs and nursing note reviewed  Constitutional:       General: She is not in acute distress  Appearance: Normal appearance  She is obese  She is not ill-appearing  HENT:      Mouth/Throat:      Mouth: Mucous membranes are dry  Eyes:      General:         Right eye: No discharge  Left eye: No discharge  Pupils: Pupils are equal, round, and reactive to light  Neck:      Musculoskeletal: Neck supple  Cardiovascular:      Rate and Rhythm: Regular rhythm  Tachycardia present  Pulses: Normal pulses  Heart sounds: Normal heart sounds  No murmur  Pulmonary:      Effort: Pulmonary effort is normal  No respiratory distress  Breath sounds: Normal breath sounds  No wheezing or rales  Abdominal:      General: Bowel sounds are normal  There is distension  Palpations: Abdomen is soft  Tenderness: There is abdominal tenderness (Suprapubic and bilateral lower quadrant, epigastric)  Musculoskeletal:      Right lower leg: No edema  Left lower leg: No edema  Comments: Small 1-2 cm dark purple colored papules /blisters seen on b/l shins    Skin:     General: Skin is warm        Capillary Refill: Capillary refill takes less than 2 seconds  Neurological:      General: No focal deficit present  Mental Status: She is alert and oriented to person, place, and time  Current Weight: Weight - Scale: 97 1 kg (214 lb 1 1 oz)  First Weight: Weight - Scale: 101 kg (223 lb 8 7 oz)    Lab Results:  I have personally reviewed pertinent labs  Counseling / Coordination of Care  Total floor / unit time spent today 35 minutes  Greater than 50% of total time was spent with the patient and / or family counseling and / or coordination of care   A description of the counseling / coordination of care:

## 2020-08-19 NOTE — OCCUPATIONAL THERAPY NOTE
Occupational Therapy Treatment Note        Patient Name: Belle Solitario  [id number] Date: 8/19/2020 08/19/20 0608   Restrictions/Precautions   Weight Bearing Precautions Per Order No   Other Precautions Chair Alarm; Bed Alarm;Multiple lines; Fall Risk;Pain   Pain Assessment   Pain Assessment Tool 0-10   Pain Score 5   Pain Location/Orientation Orientation: Lower; Location: Abdomen   Pain Onset/Description Onset: Ongoing;Frequency: Constant/Continuous   Patient's Stated Pain Goal No pain   Hospital Pain Intervention(s) Repositioned; Ambulation/increased activity   Multiple Pain Sites No   ADL   Where Assessed Standing at sink   Grooming Assistance 6  Modified Independent   Grooming Deficit Increased time to complete;Standing with assistive device; Teeth care;Wash/dry face   UB Bathing Assistance 5  Supervision/Setup   UB Bathing Deficit Supervision/safety; Increased time to complete;Setup   Functional Standing Tolerance   Time ~8-10 minutes   Activity Functional transfers/mobility and grooming task/ADLs at sink level   Comments Performed w/ RW   Bed Mobility   Additional Comments Pt seated EOB upon OT arrival; at end of session: pt seated OOB to recliner chair w/ all needs within reach and RN alerted no chair alarm in room   Transfers   Sit to Stand 5  Supervision   Additional items Assist x 1;Verbal cues; Increased time required   Stand to Sit 5  Supervision   Additional items Assist x 1; Increased time required;Verbal cues   Additional Comments Performed w/ RW   Functional Mobility   Functional Mobility 5  Supervision   Additional items Rolling walker   Cognition   Overall Cognitive Status WFL   Arousal/Participation Alert; Responsive; Cooperative   Attention Attends with cues to redirect   Orientation Level Oriented X4   Memory Within functional limits   Following Commands Follows all commands and directions without difficulty   Comments Pt agreeable to OT treatment session   Patient presented with increased signs/symptoms of anxiety and depression and patient stated that Dr Vinayak Yen has been monitoring symptoms  Additional Activities   Additional Activities Comments Patient was educated and engaged in discussion about coping strategies for symptoms of both anxiety and depression, including deep breathing/diaphragmatic breathing techniques, engaging in leisure activities, and speaking with pastoral care  Activity Tolerance   Activity Tolerance Patient limited by pain; Patient limited by fatigue   Medical Staff Made Aware MITUL Huang confirmed pt appropriate for therapy and made aware of session outcomes   Assessment   Assessment Patient participated in Skilled OT session this date with interventions consisting of ADL re training with the use of correct body mechnaics, deep breathing technique, safety awareness and fall prevention techniques,  therapeutic activities to: increase activity tolerance, increase standing tolerance time with unilateral UE support to complete sink level ADLs, increase dynamic sit/ stand balance during functional activity , increase trunk control and increase OOB/ sitting tolerance, and education on coping strategies  Patient agreeable to OT treatment session, upon arrival patient was found seated at edge of bed, alert, responsive  and in no apparent distress  In comparison to previous session, patient with improvements in activity tolerance and ADL performance/participation  During session, patient expressed that she has been experiencing increased symptoms of anxiety and depression, which RN and MD are aware of  Patient engaged in discussion about positive coping strategies, including deep breathing technique, engagement in valued leisure occupations, and remaining active  Patient reported that she spoke to pastoral care, however would recommend follow-up and further sessions with pastoral care   Patient participated in UB bathing and grooming tasks while standing at sink level with no UE support  Patient was noted to be leaning on sink, likely indicating decreased endurance and postural/trunk control  Patient requiring frequent re direction, verbal cues for safety and frequent rest periods  Patient continues to be functioning below baseline level, occupational performance remains limited secondary to factors listed above and increased risk for falls and injury  From OT standpoint, recommendation at time of d/c would be home with skilled OT services and family support  Patient to benefit from continued Occupational Therapy treatment while in the hospital to address deficits as defined above and maximize level of functional independence with ADLs and functional mobility  Plan   Treatment Interventions ADL retraining;Functional transfer training;UE strengthening/ROM; Endurance training;Patient/family training;Energy conservation; Activityengagement   Goal Expiration Date 08/28/20   OT Treatment Day 1   OT Frequency 2-3x/wk   Recommendation   OT Discharge Recommendation Home with skilled therapy   OT - OK to Discharge Yes  (once medically cleared)   Modified [place] Scale   Modified [place] Scale 3     Fritzi Gitelman, OTR/L

## 2020-08-19 NOTE — PLAN OF CARE
Problem: Potential for Falls  Goal: Patient will remain free of falls  Description: INTERVENTIONS:  - Assess patient frequently for physical needs  -  Identify cognitive and physical deficits and behaviors that affect risk of falls    -  [place] fall precautions as indicated by assessment   - Educate patient/family on patient safety including physical limitations  - Instruct patient to call for assistance with activity based on assessment  - Modify environment to reduce risk of injury  - Consider OT/PT consult to assist with strengthening/mobility  Outcome: Progressing     Problem: DISCHARGE PLANNING - CARE MANAGEMENT  Goal: Discharge to post-acute care or home with appropriate resources  Description: INTERVENTIONS:  - Conduct assessment to determine patient/family and health care team treatment goals, and need for post-acute services based on payer coverage, community resources, and patient preferences, and barriers to discharge  - Address psychosocial, clinical, and financial barriers to discharge as identified in assessment in conjunction with the patient/family and health care team  - Arrange appropriate level of post-acute services according to patients   needs and preference and payer coverage in collaboration with the physician and health care team  - Communicate with and update the patient/family, physician, and health care team regarding progress on the discharge plan  - Arrange appropriate transportation to post-acute venues  Outcome: Progressing     Problem: PAIN - ADULT  Goal: Verbalizes/displays adequate comfort level or baseline comfort level  Description: Interventions:  - Encourage patient to monitor pain and request assistance  - Assess pain using appropriate pain scale  - Administer analgesics based on type and severity of pain and evaluate response  - Implement non-pharmacological measures as appropriate and evaluate response  - Consider cultural and social influences on pain and pain management  - Notify physician/advanced practitioner if interventions unsuccessful or patient reports new pain  Outcome: Progressing     Problem: SAFETY ADULT  Goal: Patient will remain free of falls  Description: INTERVENTIONS:  - Assess patient frequently for physical needs  -  Identify cognitive and physical deficits and behaviors that affect risk of falls    -  [place] fall precautions as indicated by assessment   - Educate patient/family on patient safety including physical limitations  - Instruct patient to call for assistance with activity based on assessment  - Modify environment to reduce risk of injury  - Consider OT/PT consult to assist with strengthening/mobility  Outcome: Progressing  Goal: Maintain or return to baseline ADL function  Description: INTERVENTIONS:  -  Assess patient's ability to carry out ADLs; assess patient's baseline for ADL function and identify physical deficits which impact ability to perform ADLs (bathing, care of mouth/teeth, toileting, grooming, dressing, etc )  - Assess/evaluate cause of self-care deficits   - Assess range of motion  - Assess patient's mobility; develop plan if impaired  - Assess patient's need for assistive devices and provide as appropriate  - Encourage maximum independence but intervene and supervise when necessary  - Involve family in performance of ADLs  - Assess for home care needs following discharge   - Consider OT consult to assist with ADL evaluation and planning for discharge  - Provide patient education as appropriate  Outcome: Progressing  Goal: Maintain or return mobility status to optimal level  Description: INTERVENTIONS:  - Assess patient's baseline mobility status (ambulation, transfers, stairs, etc )    - Identify cognitive and physical deficits and behaviors that affect mobility  - Identify mobility aids required to assist with transfers and/or ambulation (gait belt, sit-to-stand, lift, walker, cane, etc )  - [place] fall precautions as indicated by assessment  - Record patient progress and toleration of activity level on Mobility SBAR; progress patient to next Phase/Stage  - Instruct patient to call for assistance with activity based on assessment  - Consider rehabilitation consult to assist with strengthening/weightbearing, etc   Outcome: Progressing     Problem: DISCHARGE PLANNING  Goal: Discharge to home or other facility with appropriate resources  Description: INTERVENTIONS:  - Identify barriers to discharge w/patient and caregiver  - Arrange for needed discharge resources and transportation as appropriate  - Identify discharge learning needs (meds, wound care, etc )  - Arrange for interpretive services to assist at discharge as needed  - Refer to Case Management Department for coordinating discharge planning if the patient needs post-hospital services based on physician/advanced practitioner order or complex needs related to functional status, cognitive ability, or social support system  Outcome: Progressing     Problem: Knowledge Deficit  Goal: Patient/family/caregiver demonstrates understanding of disease process, treatment plan, medications, and discharge instructions  Description: Complete learning assessment and assess knowledge base    Interventions:  - Provide teaching at level of understanding  - Provide teaching via preferred learning methods  Outcome: Progressing     Problem: RESPIRATORY - ADULT  Goal: Achieves optimal ventilation and oxygenation  Description: INTERVENTIONS:  - Assess for changes in respiratory status  - Assess for changes in mentation and behavior  - Position to facilitate oxygenation and minimize respiratory effort  - Oxygen administered by appropriate delivery if ordered  - Initiate smoking cessation education as indicated  - Encourage broncho-pulmonary hygiene including cough, deep breathe, Incentive Spirometry  - Assess the need for suctioning and aspirate as needed  - Assess and instruct to report SOB or any respiratory difficulty  - Respiratory Therapy support as indicated  Outcome: Progressing     Problem: METABOLIC, FLUID AND ELECTROLYTES - ADULT  Goal: Electrolytes maintained within normal limits  Description: INTERVENTIONS:  - Monitor labs and assess patient for signs and symptoms of electrolyte imbalances  - Administer electrolyte replacement as ordered  - Monitor response to electrolyte replacements, including repeat lab results as appropriate  - Instruct patient on fluid and nutrition as appropriate  Outcome: Progressing  Goal: Fluid balance maintained  Description: INTERVENTIONS:  - Monitor labs   - Monitor I/O and WT  - Instruct patient on fluid and nutrition as appropriate  - Assess for signs & symptoms of volume excess or deficit  Outcome: Progressing  Goal: Glucose maintained within target range  Description: INTERVENTIONS:  - Monitor Blood Glucose as ordered  - Assess for signs and symptoms of hyperglycemia and hypoglycemia  - Administer ordered medications to maintain glucose within target range  - Assess nutritional intake and initiate nutrition service referral as needed  Outcome: Progressing     Problem: HEMATOLOGIC - ADULT  Goal: Maintains hematologic stability  Description: INTERVENTIONS  - Assess for signs and symptoms of bleeding or hemorrhage  - Monitor labs  - Administer supportive blood products/factors as ordered and appropriate  Outcome: Progressing     Problem: Prexisting or High Potential for Compromised Skin Integrity  Goal: Skin integrity is maintained or improved  Description: INTERVENTIONS:  - Identify patients at risk for skin breakdown  - Assess and monitor skin integrity  - Assess and monitor nutrition and hydration status  - Monitor labs   - Assess for incontinence   - Turn and reposition patient  - Assist with mobility/ambulation  - Relieve pressure over bony prominences  - Avoid friction and shearing  - Provide appropriate hygiene as needed including keeping skin clean and dry  - Evaluate need for skin moisturizer/barrier cream  - Collaborate with interdisciplinary team   - Patient/family teaching  - Consider wound care consult   Outcome: Progressing

## 2020-08-19 NOTE — SOCIAL WORK
CM met with pt and  at bedside to discuss dcp  Pt is agreeable to [facility] services with [facility] who she has used in the past   Understands that she has freedom of choice  CM will continue to follow

## 2020-08-19 NOTE — PHYSICAL THERAPY NOTE
08/19/20 1209   Pain Assessment   Pain Assessment Tool 0-10   Pain Score 4   Pain Location/Orientation Orientation: Lower; Location: Abdomen   Pain Onset/Description Onset: Ongoing   Hospital Pain Intervention(s) Medication (See MAR); Ambulation/increased activity   Restrictions/Precautions   Weight Bearing Precautions Per Order No   Other Precautions Chair Alarm; Bed Alarm;Multiple lines; Fall Risk;Pain   General   Chart Reviewed Yes   Family/Caregiver Present No   Cognition   Overall Cognitive Status WFL   Arousal/Participation Alert; Cooperative   Attention Attends with cues to redirect   Orientation Level Oriented X4   Memory Within functional limits   Following Commands Follows all commands and directions without difficulty   Comments pt agreed to PT session   Subjective   Subjective "I just went to the bathroom so I have less belly pain 4/10 '   Bed Mobility   Additional Comments pt coming out of BR to begin session, Oob in recliner post session   Transfers   Stand to Sit 5  Supervision   Additional items Assist x 1; Armrests; Verbal cues   Ambulation/Elevation   Gait pattern Decreased foot clearance; Short stride; Step to   Gait Assistance 5  Supervision   Additional items Assist x 1;Verbal cues   Assistive Device   (used IV pole for UE support )   Distance 90 feet x 1   Stair Management Assistance Not tested   Balance   Static Sitting Good   Dynamic Sitting Fair +   Static Standing Fair   Dynamic Standing Fair   Ambulatory Fair   Endurance Deficit   Endurance Deficit Yes   Activity Tolerance   Activity Tolerance Patient limited by fatigue;Patient limited by pain   Exercises   Hip Flexion Sitting;20 reps;AROM; Bilateral   Hip Abduction Sitting;20 reps;AROM; Bilateral   Hip Adduction Sitting;20 reps;AROM; Bilateral   Knee AROM Long Arc Quad Sitting;20 reps;AROM; Bilateral   Ankle Pumps Sitting;20 reps;AROM; Bilateral   Assessment   Prognosis Good   Problem List Decreased strength;Decreased endurance; Impaired balance;Decreased mobility;Pain   Assessment Pt seen for PT treatment session this date with interventions consisting of gait training w/ emphasis on improving pt's ability to ambulate level surfaces x 90 feet x 1 with close S provided by therapist with used IV pole for UE support and Therapeutic exercise consisting of: AROM 20 reps B LE in sitting position  Pt agreeable to PT treatment session upon arrival, pt found ambulating from BR, in no apparent distress  In comparison to previous session, pt with improvements in amb distance, activity tolerance  Post session: pt returned back to recliner and all needs in reach Continue to recommend Home PT with family support at time of d/c in order to maximize pt's functional independence and safety w/ mobility  Pt continues to be functioning below baseline level, and remains limited 2* factors listed above and including decreased strength, endurance & safe functional mobility  PT will continue to see pt while here in order to address the deficits listed above and provide interventions consistent w/ POC in effort to achieve STGs  Goals   Patient Goals to keep getting better   PT Treatment Day 1   Plan   Treatment/Interventions Functional transfer training;LE strengthening/ROM; Elevations; Therapeutic exercise; Endurance training;Patient/family training;Equipment eval/education; Bed mobility;Gait training;Spoke to nursing   Progress Progressing toward goals   PT Frequency   (3-5 x week)   Recommendation   PT Discharge Recommendation Home with skilled therapy; Return to previous environment with social support   Equipment Recommended Kimi Grace   PT - OK to Discharge Yes  (when med cleared)   Amparo Kauffman, PTA

## 2020-08-19 NOTE — PLAN OF CARE
Problem: PHYSICAL THERAPY ADULT  Goal: Performs mobility at highest level of function for planned discharge setting  See evaluation for individualized goals  Description: Treatment/Interventions: Functional transfer training, LE strengthening/ROM, Elevations, Therapeutic exercise, Endurance training, Patient/family training, Equipment eval/education, Bed mobility, Gait training, Spoke to case management, Spoke to nursing  Equipment Recommended: Walker(RW)       See flowsheet documentation for full assessment, interventions and recommendations  Outcome: Progressing  Note: Prognosis: Good  Problem List: Decreased strength, Decreased endurance, Impaired balance, Decreased mobility, Pain  Assessment: Pt seen for PT treatment session this date with interventions consisting of gait training w/ emphasis on improving pt's ability to ambulate level surfaces x 90 feet x 1 with close S provided by therapist with used IV pole for UE support and Therapeutic exercise consisting of: AROM 20 reps B LE in sitting position  Pt agreeable to PT treatment session upon arrival, pt found ambulating from BR, in no apparent distress  In comparison to previous session, pt with improvements in amb distance, activity tolerance  Post session: pt returned back to recliner and all needs in reach Continue to recommend Home PT with family support at time of d/c in order to maximize pt's functional independence and safety w/ mobility  Pt continues to be functioning below baseline level, and remains limited 2* factors listed above and including decreased strength, endurance & safe functional mobility  PT will continue to see pt while here in order to address the deficits listed above and provide interventions consistent w/ POC in effort to achieve STGs    Barriers to Discharge: Inaccessible home environment     PT Discharge Recommendation: Home with skilled therapy, Return to previous environment with social support     PT - OK to Discharge: Yes(when med cleared)    See flowsheet documentation for full assessment

## 2020-08-19 NOTE — ASSESSMENT & PLAN NOTE
· Still has gross hematuria, however based on chart review seems to be improving  · Hemoglobin is stable  · Urology on board-patient is currently on continuous bladder irrigation  · Ultrasound kidney/bladder showed mild right-sided hydronephrosis, can be attributed to obstructive uropathy from clots  · Patient complains of suprapubic abdominal pain, with no significant relief with pain medications-UA obtained on 08/14 showed no evidence of UTI, however patient is tachycardic and has leukocytosis meeting SIRS criteria - will order UA to r/o UTI  · Agree with urology management of continuing CBI, with Ko removal and voiding trial once patient's urine has remained clear for 24 hours and the patient has been on oral anticoagulation for at least 24 hours

## 2020-08-19 NOTE — PROGRESS NOTES
Progress Note   Norman Knott 72 y o  female MRN: [id number]    Unit/Bed#:  Encounter: [id number]    Assessment & Plan:    Acute pulmonary embolism  -status post tPA on August 12, 2020, currently on IV heparin drip   -patient is hemodynamically stable, breathing ambient air, saturating greater than 95%  Denies chest pain shortness of breath or cough  -once gross hematuria resolve will transition her to oral anticoagulation      Gross hematuria  -status post tPA for pulmonary embolism, currently on IV heparin drip   -patient hematuria has improved  compared to 24 hours ago, urine is light pink today  -Continue complaining of suprapubic abdominal pain with no significant relief with tramadol, belladonna-opium suppository and Dilaudid as needed   -urologist on board, pelvic and kidney ultrasound from August 18, 2020 show mild right side hydronephrosis with no visualized kidney stones, could be secondary to blood clot in the setting of gross hematuria    As per urologist hydronephrosis is generally over reading on kidney ultrasound, recommendation to continue CBI, with Ko removal and voiding trial 1 patient urine has remained clear for 24 hours and has been in oral anticoagulation for least 24 hours  -patient's hemoglobin remained stable, denied bleeding from any other site   -continue trending hemoglobin/hematocrit daily      Acute kidney injury  -patient's creatinine has worsened  to 1 44 today from 1 28  24 hours ago  -creatinine baseline around 0 9  -worsening of the kidney function could be post renal in the setting of recent kidney and bladder ultrasound showing mild right-sided hydronephrosis with no evident nephrolithiasis, likely secondary to blood clot in the setting of gross hematuria  -we have involved Nephrology in patient care for further assessment and recommendations  -We have increased IV fluids to 100 cc of Na Cl 0 9%  -avoid nephrotoxic medications; lisinopril has been discontinue since patient had some lip swelling yesterday and there was concern for allergic reaction, lisinopril DC will help with kidney function recovery  -Cntinue monitoring renal function closely        Hypertension  -in the last 24 hours blood pressure remained well controlled  -there was concern for lisinopril allergic reaction; patient hard swelling lip yesterday, will discontinue lisinopril  -continue blood pressure management amlodipine 5 mg oral daily  -consider starting patient back on hydrochlorothiazide on discharge after resolution of JENNIFER  -continue monitor blood pressure as per unit protocol     Asthma  -lungs clear to auscultation the patient is not wheezing   -continue breathing treatment with Breo Ellipta and albuterol p r n      Hypothyroidism  -continue levothyroxine 80 mcg oral daily     Hyperlipidemia  -continue pravastatin 80 mg oral daily     Anxiety  -continue duloxetine 90 mg oral daily, alprazolam 0 25 mg b i d  P r n      Diarrhea  -patient report multiple small of soft bowel movements per day  -C difficile negative  -history of irritable bowel syndrome  -continue simethicone        Ecchymotic lesions  -patient has multiple ecchymosis on the buttock area, under abdominal wall in the left lower quadrant, on the right shoulder  -ecchymosis since to be secondary to traumatic extravasation of blood in the setting of tPA use and IV heparin drip, since ecchymosis are mainly on pressure points    -patient hemoglobin is stable   -continue trending hemoglobin/hematocrit daily         Glucose intolerance  -hemoglobin A1c 5 9 on July 2020   -glycemia levels acceptable, ranging from , blood glucose  this morning 134  -continue sliding scale insulin coverage         Systemic inflammatory response syndrome  -leukocytosis has slightly improved in the last 24 hours, WBC count 16 5 today  -patient remained tachycardic  -patient remained afebrile, no overt source of infection   -kidney and bladder ultrasound August 18, 2020 show mild right-sided hydronephrosis, no imaging finding consistent with possible intra-abdominal source of infection  -continue trending WBC count and temperature curve    Subjective:   Patient has been seen at bedside, she was alert and cooperative  Patient was still complaining of suprapubic abdominal pain  Ko catheter in place with CBI running, urine light pink, slightly improved compared to 24 hours ago  Denies any chest pain or shortness of breath  History patient had an episode of swelling upper lip, there was concern for lisinopril reaction (angioedema)  Upper lip edema has resolved  Has remained hemodynamically stable  No incidents reported overnight  Objective:     Vitals: Blood pressure 123/58, pulse 102, temperature 98 2 °F (36 8 °C), temperature source Oral, resp  rate 18, height 4' 10" (1 473 m), weight 97 1 kg (214 lb 1 1 oz), SpO2 94 %  ,Body mass index is 44 74 kg/m²  Intake/Output Summary (Last 24 hours) at 8/19/2020 1132  Last data filed at 8/19/2020 0949  Gross per 24 hour   Intake    Output 3100 ml   Net -3100 ml         Physical Exam:     Physical Exam  Constitutional:       General: She is in acute distress (Mild distress due to suprapubic pain)  Appearance: She is obese  HENT:      Head: Normocephalic and atraumatic  Eyes:      Extraocular Movements: Extraocular movements intact  Conjunctiva/sclera: Conjunctivae normal       Pupils: Pupils are equal, round, and reactive to light  Neck:      Musculoskeletal: Normal range of motion and neck supple  No neck rigidity or muscular tenderness  Cardiovascular:      Rate and Rhythm: Normal rate and regular rhythm  Pulses: Normal pulses  Heart sounds: No murmur  No friction rub  No gallop  Pulmonary:      Effort: Pulmonary effort is normal  No respiratory distress  Breath sounds: Normal breath sounds  No wheezing, rhonchi or rales  Chest:      Chest wall: No tenderness  Abdominal:      General: Bowel sounds are normal  There is no distension  Palpations: There is no mass  Tenderness: There is abdominal tenderness (Tenderness to palpation over suprapubic area with no rebound or guarding)  There is no right CVA tenderness or left CVA tenderness  Musculoskeletal: Normal range of motion  General: No swelling, tenderness, deformity or signs of injury  Right lower leg: No edema  Left lower leg: No edema  Skin:     General: Skin is warm and dry  Coloration: Skin is not pale  Findings: Bruising (Multiple ecchymotic lesions on pressure points including buttocks, right shoulder  Also in the abdominal wall in the right lower quadrant  Hemorrhagic blister on bilateral lower extremities  ) present  No erythema or rash  Neurological:      General: No focal deficit present  Mental Status: She is alert and oriented to person, place, and time  Mental status is at baseline     Psychiatric:         Mood and Affect: Mood normal            Invasive Devices     Peripheral Intravenous Line            Peripheral IV 08/16/20 Left;Upper Arm 2 days          Drain            Urethral Catheter Three way 22 Fr  5 days    Continuous Bladder Irrigation Three-way 4 days                        CBC:   Lab Results   Component Value Date    WBC 16 52 (H) 08/19/2020    HGB 11 0 (L) 08/19/2020    HCT 35 1 08/19/2020    MCV 88 08/19/2020     08/19/2020    MCH 27 6 08/19/2020    MCHC 31 3 (L) 08/19/2020    RDW 15 1 08/19/2020    MPV 8 5 (L) 08/19/2020    NRBC 0 08/19/2020   ,   CMP:   Lab Results   Component Value Date    K 4 1 08/19/2020    CL 98 (L) 08/19/2020    CO2 32 08/19/2020    BUN 13 08/19/2020    CREATININE 1 44 (H) 08/19/2020    CALCIUM 9 0 08/19/2020    AST 15 08/19/2020    ALT 23 08/19/2020    ALKPHOS 78 08/19/2020    EGFR 38 08/19/2020   ,   PT/INR: No results found for: PT, INR,   Troponin: No results found for: TROPONINI,     VTE prophylaxis-patient currently on heparin drip for PE    Imaging Studies: I have personally reviewed pertinent reports  Counseling / Coordination of Care  Total time spent today  30 minutes  Greater than 50% of total time was spent with the patient and / or family counseling and / or coordination of care

## 2020-08-19 NOTE — WOUND OSTOMY CARE
Progress Note - Wound   Manual Ryann 72 y o  female MRN: [id number]  Unit/Bed#:  Encounter: [id number]      Assessment:  Wound care to see patient for weekly follow-up visit  Patient is in ICU, seen OOB sitting on EHOB waffle cushion  Low air loss mattress in use  Patient admitted with acute pulmonary embolism  Patient is alert and oriented x 4, cooperative and agreeable for the assessment  Patient self reports she is continent and ambulatory at baseline  Independent with turning in the chair for the assessment      B/l heels are intact with no redness or wounds  1  L anterior lower extremity with 4 intact blood blisters - suspect ecchymosis related to the TPA/heparin admin attributing to patient bruising easily  No open aspects/drainage/tenderness/redness   - okay to keep JERO - contact wound care if they open  2  Irregular / scattered dispersing ecchymosis noted to the R abdominal area  Suspect ecchymosis related to the TPA/heparin admin attributing to patient bruising easily  No open aspects/drainage/tenderness/redness  Outlined by the ICU team    - okay to keep JERO         3  R posterior shoulder with area of intact blanchable ecchymosis - no open aspects  Purple ecchymosis centrally appears to be dispersing/fading compared to last weeks assessment  Skin surrounding the purple ecchymosis is intact green/yellow hue confirming dispersing ecchymosis, No redness  Suspect ecchymosis related to the TPA/heparin admin attributing to patient bruising easily  Low suspicion for DTI- patient is not bed-bound, and area is not located over a bony prominence of the shoulder prominence or scapular  Area remains non-tender with no temperature differences, firmness/boggyness appreciated  - okay to keep JERO  4  B/l buttocks/sacrum - scattered areas of intact irregular shaped blanchable dispersing/fading ecchymosis  Decreased amount of ecchymotic areas noted from last weeks assessment   Dispersing edges to the ecchymosis, with green/yellow due (confirming ecchymosis to this location), no redness  There is a small area of irregular shaped dry adhered scabbing/re-epithelialized skin noted to the L buttock (shown in 3rd photo above) - area is blanchable intact with no open aspects or tenderness, suspect friction from getting on and off the bed pan  -Suspect ecchymosis related to the TPA/heparin admin attributing to patient bruising easily, in the setting of the patient using the bedpan frequently during the beginning of her admission causing friction  Low suspicion for DTI as the area is improving- patient is not bed-bound, areas are scattered/irregular shaped and located over the fleshy aspects of the buttocks  Areas remain non-tender with no temperature differences, firmness/boggyness appreciated  -- continue with hydraguard cream  Patient reports she is moving around more and does not want the foam on her sacral area anymore as it "bunches up"         Educated patient on the importance of frequent offloading of pressure via turning, repositioning and weight-shifting  Verbalized understanding of plan of care      No induration, fluctuance, odor, warmth, redness, or purulence noted to the above noted areas assessed  Patient tolerated well- denies pain to the areas assessed  Primary nurse aware of plan of care  See flow sheets for more detailed assessment findings  Will follow along      Plan:   1  Apply hydraguard to b/l buttocks, sacrum, and heels BID and PRN for prevention and protection  2  Apply skin nourishing cream the entire skin daily for moisture  3  Turn and reposition patient every  2 hours   4  Elevate heels off of bed with pillows to offload pressure   5  Apply EHOB waffle cushion to chair when OOB, if able  6  Monitor the ecchymotic areas closely - any changes or opening of the skin please notify wound care  7  Will follow along with patient weekly - please call with questions and concerns     Objective:    Vitals: Blood pressure 123/58, pulse 102, temperature 98 2 °F (36 8 °C), temperature source Oral, resp  rate 18, height 4' 10" (1 473 m), weight 97 1 kg (214 lb 1 1 oz), SpO2 94 %  ,Body mass index is 44 74 kg/m²  Recommendations written as orders     Alexa Turk RN BSN [facility]

## 2020-08-19 NOTE — ASSESSMENT & PLAN NOTE
· Patient presented with creatinine of 0 87, creatinine today-1 44    · Creatinine worsened on 08/16 from 0 8-1 28 on 08/18, thereby meeting criteria for JENNIFER ( > / = 0 3 mg/dl increase over past 48 h)   · Etiology-could be postrenal, given evidence of mild right-sided hydronephrosis, likely obstructive uropathy due to clots from gross hematuria, no evidence of stones on prior CT abdo/pelvis done on 8/14  · UA performed on 08/14 showed gross hematuria, no evidence of possible UTI  · Prerenal etiology cannot be ruled out, given the patient dry mucous membranes, was on lisinopril before which was discontinued as the patient had an episode of angioedema (last dose was on 08/18)  · Very low suspicion for contrast induced nephropathy as the patient's creatinine remained stable for the 72 hours after contrast administration    PLAN :   · Obtain urinalysis with reflex culture  · Agree with IV fluid, normal saline at 100 cc/hour  · Obtain urine sodium, creatinine, osmolality  · X-ray KUB to assess for residual contrast  · Avoid nephrotoxic agents, NSAIDs, contrast administration   · Continue to hold hydrochlorothiazide  · Monitor creatinine in AM labs

## 2020-08-19 NOTE — ASSESSMENT & PLAN NOTE
· Status post tPA, currently on IV heparin drip   · Management per primary team -- plan is to switch to oral anticoagulation and closely monitor hematuria

## 2020-08-19 NOTE — ASSESSMENT & PLAN NOTE
· Blood pressure well controlled/24 hours  · Lisinopril discontinued due to concern for allergic reaction  · Patient is currently on amlodipine 5 mg p o   Daily-hydrochlorothiazide on hold due to JENNIFER  · Agree with current antihypertensive regimen

## 2020-08-19 NOTE — PLAN OF CARE
Problem: OCCUPATIONAL THERAPY ADULT  Goal: Performs self-care activities at highest level of function for planned discharge setting  See evaluation for individualized goals  Description: Treatment Interventions: ADL retraining, Functional transfer training, UE strengthening/ROM, Endurance training, Patient/family training, Energy conservation, Activityengagement          See flowsheet documentation for full assessment, interventions and recommendations  Outcome: Progressing  Note: Limitation: Decreased ADL status, Decreased endurance, Decreased self-care trans, Decreased high-level ADLs  Prognosis: Good  Assessment: Patient participated in Skilled OT session this date with interventions consisting of ADL re training with the use of correct body mechnaics, deep breathing technique, safety awareness and fall prevention techniques,  therapeutic activities to: increase activity tolerance, increase standing tolerance time with unilateral UE support to complete sink level ADLs, increase dynamic sit/ stand balance during functional activity , increase trunk control and increase OOB/ sitting tolerance, and education on coping strategies  Patient agreeable to OT treatment session, upon arrival patient was found seated at edge of bed, alert, responsive  and in no apparent distress  In comparison to previous session, patient with improvements in activity tolerance and ADL performance/participation  During session, patient expressed that she has been experiencing increased symptoms of anxiety and depression, which RN and MD are aware of  Patient engaged in discussion about positive coping strategies, including deep breathing technique, engagement in valued leisure occupations, and remaining active  Patient reported that she spoke to pastoral care, however would recommend follow-up and further sessions with pastoral care   Patient participated in UB bathing and grooming tasks while standing at sink level with no UE support  Patient was noted to be leaning on sink, likely indicating decreased endurance and postural/trunk control  Patient requiring frequent re direction, verbal cues for safety and frequent rest periods  Patient continues to be functioning below baseline level, occupational performance remains limited secondary to factors listed above and increased risk for falls and injury  From OT standpoint, recommendation at time of d/c would be home with skilled OT services and family support  Patient to benefit from continued Occupational Therapy treatment while in the hospital to address deficits as defined above and maximize level of functional independence with ADLs and functional mobility       OT Discharge Recommendation: Home with skilled therapy  OT - OK to Discharge: Yes(once medically cleared)

## 2020-08-20 LAB
ANION GAP SERPL CALCULATED.3IONS-SCNC: 6 MMOL/L (ref 4–13)
APTT PPP: 70 SECONDS (ref 23–37)
BASOPHILS # BLD AUTO: 0.04 THOUSANDS/ΜL (ref 0–0.1)
BASOPHILS NFR BLD AUTO: 0 % (ref 0–1)
BUN SERPL-MCNC: 14 MG/DL (ref 5–25)
CALCIUM SERPL-MCNC: 8.7 MG/DL (ref 8.3–10.1)
CHLORIDE SERPL-SCNC: 98 MMOL/L (ref 100–108)
CO2 SERPL-SCNC: 30 MMOL/L (ref 21–32)
CREAT SERPL-MCNC: 1.28 MG/DL (ref 0.6–1.3)
EOSINOPHIL # BLD AUTO: 0.24 THOUSAND/ΜL (ref 0–0.61)
EOSINOPHIL NFR BLD AUTO: 2 % (ref 0–6)
ERYTHROCYTE [DISTWIDTH] IN BLOOD BY AUTOMATED COUNT: 15 % (ref 11.6–15.1)
GFR SERPL CREATININE-BSD FRML MDRD: 44 ML/MIN/1.73SQ M
GLUCOSE SERPL-MCNC: 108 MG/DL (ref 65–140)
GLUCOSE SERPL-MCNC: 111 MG/DL (ref 65–140)
GLUCOSE SERPL-MCNC: 121 MG/DL (ref 65–140)
GLUCOSE SERPL-MCNC: 129 MG/DL (ref 65–140)
HCT VFR BLD AUTO: 32.5 % (ref 34.8–46.1)
HGB BLD-MCNC: 10.1 G/DL (ref 11.5–15.4)
IMM GRANULOCYTES # BLD AUTO: 0.17 THOUSAND/UL (ref 0–0.2)
IMM GRANULOCYTES NFR BLD AUTO: 1 % (ref 0–2)
LYMPHOCYTES # BLD AUTO: 2.08 THOUSANDS/ΜL (ref 0.6–4.47)
LYMPHOCYTES NFR BLD AUTO: 16 % (ref 14–44)
MCH RBC QN AUTO: 27.4 PG (ref 26.8–34.3)
MCHC RBC AUTO-ENTMCNC: 31.1 G/DL (ref 31.4–37.4)
MCV RBC AUTO: 88 FL (ref 82–98)
MONOCYTES # BLD AUTO: 1.26 THOUSAND/ΜL (ref 0.17–1.22)
MONOCYTES NFR BLD AUTO: 10 % (ref 4–12)
NEUTROPHILS # BLD AUTO: 9.41 THOUSANDS/ΜL (ref 1.85–7.62)
NEUTS SEG NFR BLD AUTO: 71 % (ref 43–75)
NRBC BLD AUTO-RTO: 0 /100 WBCS
PLATELET # BLD AUTO: 341 THOUSANDS/UL (ref 149–390)
PMV BLD AUTO: 8.8 FL (ref 8.9–12.7)
POTASSIUM SERPL-SCNC: 3.9 MMOL/L (ref 3.5–5.3)
RBC # BLD AUTO: 3.68 MILLION/UL (ref 3.81–5.12)
SODIUM SERPL-SCNC: 134 MMOL/L (ref 136–145)
WBC # BLD AUTO: 13.2 THOUSAND/UL (ref 4.31–10.16)

## 2020-08-20 PROCEDURE — 99233 SBSQ HOSP IP/OBS HIGH 50: CPT | Performed by: INTERNAL MEDICINE

## 2020-08-20 PROCEDURE — 85730 THROMBOPLASTIN TIME PARTIAL: CPT | Performed by: INTERNAL MEDICINE

## 2020-08-20 PROCEDURE — 80048 BASIC METABOLIC PNL TOTAL CA: CPT | Performed by: INTERNAL MEDICINE

## 2020-08-20 PROCEDURE — 82948 REAGENT STRIP/BLOOD GLUCOSE: CPT

## 2020-08-20 PROCEDURE — 85025 COMPLETE CBC W/AUTO DIFF WBC: CPT | Performed by: INTERNAL MEDICINE

## 2020-08-20 PROCEDURE — 99232 SBSQ HOSP IP/OBS MODERATE 35: CPT | Performed by: NURSE PRACTITIONER

## 2020-08-20 RX ORDER — CIPROFLOXACIN 500 MG/1
500 TABLET, FILM COATED ORAL EVERY 12 HOURS SCHEDULED
Status: DISCONTINUED | OUTPATIENT
Start: 2020-08-20 | End: 2020-08-20

## 2020-08-20 RX ADMIN — TRAMADOL HYDROCHLORIDE 50 MG: 50 TABLET, FILM COATED ORAL at 19:48

## 2020-08-20 RX ADMIN — ONDANSETRON 4 MG: 2 INJECTION INTRAMUSCULAR; INTRAVENOUS at 05:22

## 2020-08-20 RX ADMIN — ALPRAZOLAM 0.25 MG: 0.25 TABLET ORAL at 21:44

## 2020-08-20 RX ADMIN — AMLODIPINE BESYLATE 5 MG: 5 TABLET ORAL at 09:24

## 2020-08-20 RX ADMIN — LEVOTHYROXINE SODIUM 88 MCG: 88 TABLET ORAL at 05:18

## 2020-08-20 RX ADMIN — PRAVASTATIN SODIUM 80 MG: 80 TABLET ORAL at 17:31

## 2020-08-20 RX ADMIN — APIXABAN 10 MG: 5 TABLET, FILM COATED ORAL at 17:31

## 2020-08-20 RX ADMIN — NYSTATIN: 100000 POWDER TOPICAL at 12:33

## 2020-08-20 RX ADMIN — TRAMADOL HYDROCHLORIDE 50 MG: 50 TABLET, FILM COATED ORAL at 05:23

## 2020-08-20 RX ADMIN — DULOXETINE HYDROCHLORIDE 90 MG: 60 CAPSULE, DELAYED RELEASE ORAL at 09:24

## 2020-08-20 RX ADMIN — SODIUM CHLORIDE 100 ML/HR: 0.9 INJECTION, SOLUTION INTRAVENOUS at 21:45

## 2020-08-20 RX ADMIN — TRAMADOL HYDROCHLORIDE 50 MG: 50 TABLET, FILM COATED ORAL at 12:32

## 2020-08-20 RX ADMIN — MELATONIN 3 MG: 3 TAB ORAL at 21:44

## 2020-08-20 RX ADMIN — SIMETHICONE 80 MG: 80 TABLET, CHEWABLE ORAL at 19:44

## 2020-08-20 RX ADMIN — NYSTATIN: 100000 POWDER TOPICAL at 17:32

## 2020-08-20 RX ADMIN — PANTOPRAZOLE SODIUM 40 MG: 40 TABLET, DELAYED RELEASE ORAL at 05:17

## 2020-08-20 RX ADMIN — FAMOTIDINE 10 MG: 20 TABLET, FILM COATED ORAL at 09:24

## 2020-08-20 RX ADMIN — APIXABAN 10 MG: 5 TABLET, FILM COATED ORAL at 10:13

## 2020-08-20 NOTE — PROGRESS NOTES
Progress Note - Darek Prado 72 y o  female MRN: [id number]  Unit/Bed#: -01 Encounter: [id number]    Subjective:   Gonzales Coe had a panic attack last night after having her room change late in the evening  Symptoms resolved with alprazolam   Abdominal pain has improved  She has no chest pain, shortness of breath, fevers, chills  She is generally upset with her clinical situation  All other ROS are negative  Objective:   Vitals: Blood pressure 154/88, pulse 87, temperature 98 1 °F (36 7 °C), resp  rate 18, height 4' 10" (1 473 m), weight 96 8 kg (213 lb 6 5 oz), SpO2 95 %  ,Body mass index is 44 6 kg/m²  SPO2 RA Rest      ED to Hosp-Admission (Current) from 8/12/2020 in [address] [place] [address] 4th Floor Med Surg Unit   SpO2  95 %   SpO2 Activity  At Rest   O2 Device  None (Room air)   O2 Flow Rate  20 L/min        I&O:     Intake/Output Summary (Last 24 hours) at 8/20/2020 0903  Last data filed at 8/20/2020 0736  Gross per 24 hour   Intake 584 92 ml   Output 3625 ml   Net -3040 08 ml         Physical Exam:       General Appearance:    Alert, cooperative, morbidly obese, no distress   Head:    Normocephalic, without obvious abnormality, atraumatic   Eyes:    PERRL, conjunctiva/corneas clear, EOM's intact       Nose:   Moist mucous membranes, no drainage or sinus tenderness   Throat:   No tenderness, no exudates   Neck:   Supple, symmetrical, trachea midline, no JVD   Lungs:     Clear to auscultation bilaterally, respirations unlabored   Heart:    Regular rate and rhythm, S1 and S2 normal, no murmur, rub   or gallop   Abdomen: Soft, non-tender, positive bowel sounds, no masses, no organomegaly, stable ecchymosis on right flank   Extremities:  No pedal edema, calf tenderness  Distal pulses palpable  Neurologic:     CNII-XII intact        Invasive Devices     Peripheral Intravenous Line            Peripheral IV 08/16/20 Left;Upper Arm 3 days    Peripheral IV 08/19/20 Right Wrist less than 1 day Drain            Continuous Bladder Irrigation Three-way 5 days    Urethral Catheter Three way 22 Fr  5 days                      Social History  reviewed  Family History   Problem Relation Age of Onset    Coronary artery disease Mother     Hypertension Mother         Essential    Coronary artery disease Father     reviewed    Meds:  Current Facility-Administered Medications   Medication Dose Route Frequency Provider Last Rate Last Dose    albuterol (PROVENTIL HFA,VENTOLIN HFA) inhaler 2 puff  2 puff Inhalation Q4H PRN Pura Rodriguez PA-C        albuterol inhalation solution 2 5 mg  2 5 mg Nebulization Q6H PRN Pura Rodriguez PA-C        ALPRAZolam Janice Starch) tablet 0 25 mg  0 25 mg Oral BID PRN Pura Rodriguez PA-C   0 25 mg at 08/19/20 2231    amLODIPine (NORVASC) tablet 5 mg  5 mg Oral Daily Pura Rodriguez PA-C   5 mg at 08/19/20 0941    apixaban (ELIQUIS) tablet 10 mg  10 mg Oral BID Benito Gupta MD        belladonna-opium (B&O SUPPOSITORY) 16 2-30 mg suppository 1 suppository  30 mg Rectal Q8H PRN Pura Rodriguez PA-C        diphenhydrAMINE (BENADRYL) injection 25 mg  25 mg Intravenous Q6H PRN Pura Rodriguez PA-C   25 mg at 08/18/20 1411    DULoxetine (CYMBALTA) delayed release capsule 90 mg  90 mg Oral Daily Pura Rodriguez PA-C   90 mg at 08/19/20 0940    famotidine (PEPCID) tablet 10 mg  10 mg Oral Daily Pura Rodriguez PA-C   10 mg at 08/19/20 0940    fluticasone-vilanterol (BREO ELLIPTA) 100-25 mcg/inh inhaler 1 puff  1 puff Inhalation Daily Pura Rodriguez PA-C   1 puff at 08/16/20 0814    HYDROmorphone (DILAUDID) injection 0 5 mg  0 5 mg Intravenous Q3H PRN Pura Rodriguez PA-C   0 5 mg at 08/18/20 0714    insulin lispro (HumaLOG) 100 units/mL subcutaneous injection 1-5 Units  1-5 Units Subcutaneous HS Pura Rodriguez PA-C   1 Units at 08/18/20 2159    insulin lispro (HumaLOG) 100 units/mL subcutaneous injection 1-6 Units  1-6 Units Subcutaneous TID [facility] Pura Rodriguez MJ   1 Units at 08/17/20 1712    levothyroxine tablet 88 mcg  88 mcg Oral Early Morning Betty Mann PA-C   88 mcg at 08/20/20 0518    melatonin tablet 3 mg  3 mg Oral HS Betty Mann PA-C   3 mg at 08/19/20 2231    nystatin (MYCOSTATIN) powder   Topical BID Betty Mann PA-C        ondansetron [facility]) injection 4 mg  4 mg Intravenous Q6H PRN Betty Mann PA-C   4 mg at 08/20/20 0522    pantoprazole (PROTONIX) EC tablet 40 mg  40 mg Oral Early Morning Betty Mann PA-C   40 mg at 08/20/20 7303    pravastatin (PRAVACHOL) tablet 80 mg  80 mg [facility]   80 mg at 08/19/20 1934    simethicone (MYLICON) chewable tablet 80 mg  80 mg Oral Q6H PRN Betty Mann PA-C   80 mg at 08/19/20 2355    sodium chloride 0 9 % infusion  100 mL/hr Intravenous Continuous Kwaku Lazaro  mL/hr at 08/19/20 1434 100 mL/hr at 08/19/20 1434    traMADol (ULTRAM) tablet 50 mg  50 mg Oral Q6H PRN Betty Mann PA-C   50 mg at 08/20/20 5750      Medications Prior to Admission   Medication    albuterol (PROAIR HFA) 90 mcg/act inhaler    ALPRAZolam (XANAX) 1 mg tablet    aspirin 81 MG tablet    cholestyramine (QUESTRAN) 4 g packet    DULoxetine (CYMBALTA) 60 mg delayed release capsule    hydrochlorothiazide (HYDRODIURIL) 25 mg tablet    levothyroxine 88 mcg tablet    Mometasone Furo-Formoterol Fum (DULERA) 100-5 MCG/ACT AERO    Multiple Vitamins-Minerals (OCUVITE ADULT 50+ PO)    NYSTATIN powder    pantoprazole (PROTONIX) 40 mg tablet    potassium chloride (K-DUR,KLOR-CON) 20 mEq tablet    quinapril (ACCUPRIL) 10 mg tablet    simvastatin (ZOCOR) 40 mg tablet    clotrimazole (LOTRIMIN) 1 % cream    clotrimazole-betamethasone (LOTRISONE) 1-0 05 % cream    erythromycin (ILOTYCIN) ophthalmic ointment    methocarbamol (ROBAXIN) 500 mg tablet    traMADol (ULTRAM) 50 mg tablet    Triamcinolone Acetonide (Nasacort Allergy 24HR) 55 MCG/ACT AERO Labs:  Results from last 7 days   Lab Units 08/20/20  0538 08/19/20  0504 08/18/20  0510   WBC Thousand/uL 13 20* 16 52* 18 79*   HEMOGLOBIN g/dL 10 1* 11 0* 11 3*   HEMATOCRIT % 32 5* 35 1 36 0   PLATELETS Thousands/uL 341 285 295   NEUTROS PCT % 71 80* 81*   LYMPHS PCT % 16 9* 9*   MONOS PCT % 10 9 8   EOS PCT % 2 1 1     Results from last 7 days   Lab Units 08/20/20  0538 08/19/20  0504 08/18/20  0510  08/15/20  0506   POTASSIUM mmol/L 3 9 4 1 3 9   < > 3 6   CHLORIDE mmol/L 98* 98* 96*   < > 105   CO2 mmol/L 30 32 31   < > 31   BUN mg/dL 14 13 12   < > 9   CREATININE mg/dL 1 28 1 44* 1 28   < > 0 78   CALCIUM mg/dL 8 7 9 0 8 5   < > 8 8   ALK PHOS U/L  --  78  --   --  76   ALT U/L  --  23  --   --  33   AST U/L  --  15  --   --  16    < > = values in this interval not displayed  Lab Results   Component Value Date    TROPONINI 0 11 (H) 08/13/2020    TROPONINI 0 08 (H) 08/12/2020    TROPONINI <0 02 01/15/2018         Lab Results   Component Value Date    BLOODCX No Growth After 5 Days  08/14/2020    BLOODCX No Growth After 5 Days  08/14/2020    BLOODCX Staphylococcus coagulase negative (A) 08/12/2020    URINECX 30,000-39,000 cfu/ml  10/08/2019       Imaging:  No results found for this or any previous visit  Results for orders placed during the hospital encounter of 10/11/19   XR chest pa & lateral    Narrative CHEST     INDICATION:   R05: Cough  COMPARISON:  1/15/2018    EXAM PERFORMED/VIEWS:  XR CHEST PA & LATERAL      FINDINGS:    Cardiomediastinal silhouette appears unremarkable  The lungs are clear  No pneumothorax or pleural effusion  Osseous structures appear within normal limits for patient age  Impression No acute cardiopulmonary disease          Workstation performed: [id number]         VTE Pharmacologic Prophylaxis: Heparin      Code Status:   Level 1 - Full Code    Assessment:  Principal Problem:    Acute pulmonary embolism ([address] )  Active Problems:    Asthma    HTN (hypertension)    Anxiety    Hyperlipidemia    Hypothyroidism    Hyperglycemia    SIRS (systemic inflammatory response syndrome) (HCC)    Gross hematuria    Traumatic ecchymosis of buttock    Acute diarrhea    JENNIFER (acute kidney injury) ([facility] [address] )  Resolved Problems:    * No resolved hospital problems  *      Plan:  Acute pulmonary embolism status post tPA-patient remains hemodynamically stable with excellent oxygen saturations greater than 95%, will transition to Eliquis today 10 mg b i d  For 1 week then 5 mg b i d     Gross hematuria-discussed with Urology  Plan to remove catheter tomorrow and have voiding trial as long as hematuria remained stable on the Eliquis    Acute kidney injury-discussed with Nephrology, Dr Natasha Velasquez  He favors ATN based on FENA and clinical history  Continue hydration and follow-up basic metabolic panel in the morning    Hypertension-fairly well controlled on amlodipine  Resume hydrochlorothiazide in a few days if necessary and kidney function remains stable    Anxiety-continue on Cymbalta and Xanax    Hypothyroidism-continue levothyroxine    Traumatic ecchymosis on buttock and flank related to tPA/heparin, debility and morbid obesity  Continue to monitor    Asthma stable without acute exacerbation-continue Breo and albuterol p r n      Hyperlipidemia-continue pravastatin    Diarrhea-bowel movements have improved, C diff negative    Disposition-hopeful for discharge home on Eliquis in 48 hours if stable    Melany Can MD  8/20/2020,9:03 AM

## 2020-08-20 NOTE — PROGRESS NOTES
Progress Note - Natalie Altamirano 1955, 72 y o  female MRN: [id number]    Unit/Bed#: -01 Encounter: [id number]    Primary Care Provider: Matt Romero MD   Date and time admitted to hospital: 8/12/2020  4:03 PM      JENNIFER (acute kidney injury) [facility])  Assessment & Plan  · Patient presented with creatinine of 0 87, trend: 0 87 -> until 08/16 when it increased from 0 87 -> 1 28 -> 1 44 -> 1 28 today   · Etiology likely ATN, given H/o antecedent contrast administration, according to urine lytes which show a urine sodium of 133  · UA showed no evidence of UTI   · Pt was also on lisinopril, which has been held due to concern for allergic reaction   ·   PLAN :   · Continue IV fluids (NS at 100 cc/hr) -- reassess tomorrow   · Avoid nephrotoxic agents, NSAIDs, contrast administration   · Continue to hold hydrochlorothiazide  · Patient is advised to increase oral hydration    Gross hematuria  Assessment & Plan  · Still has gross hematuria, worsened compared to yesterday  · Hemoglobin decreased from 11 to 10 1, could be dilutional secondary to IV fluids but needs to be monitored in her setting of gross hematuria   · Urology on board-patient is currently on continuous bladder irrigation  · Ultrasound kidney/bladder showed mild right-sided hydronephrosis, can be attributed to obstructive uropathy from clots  · Suprapubic pain improved, likely 2/2 irritation from CBI, no evidence of UTI   · Agree with urology and primary team's management of Ko catheter removal and voiding trial once hematuria remained stable on Eliquis, plan for removal is tomorrow       HTN (hypertension)  Assessment & Plan  · Blood pressure this morning : 154 / 88 mmHg, (309-490G systolic yesterday)   · Lisinopril discontinued due to concern for allergic reaction  · Patient is currently on amlodipine 5 mg p o   Daily-hydrochlorothiazide on hold due to JENNIFER  · Agree with current antihypertensive regime, can resume hydrochlorothiazide once creatinine remains stable        VTE Pharmacologic Prophylaxis:   Pharmacologic: Eliquis   Mechanical VTE Prophylaxis in Place: Yes    Patient Centered Rounds: I have performed bedside rounds with nursing staff today  Discussions with Specialists or Other Care Team Provider: Hospitalist     Time Spent for Care: 30 minutes  More than 50% of total time spent on counseling and coordination of care as described above  Current Length of Stay: 8 day(s)    Current Patient Status: Inpatient     Code Status: Level 1 - Full Code      Subjective: The patient was seen and examined by me at the bedside  She reports a slight improvement in her overall condition compared to yesterday  She had a panic attack last night when she was being moved out of the ICU, resolved with alprazolam   Heparin drip has been discontinued and the patient has been transitioned to oral anticoagulation with Eliquis  Ko catheter back reveals darker urine color compared to yesterday  She had nausea last night, no vomiting, however is able to eat/drink well this morning  Objective:     Vitals:   Temp (24hrs), Av 3 °F (36 8 °C), Min:98 1 °F (36 7 °C), Max:98 6 °F (37 °C)    Temp:  [98 1 °F (36 7 °C)-98 6 °F (37 °C)] 98 1 °F (36 7 °C)  HR:  [85-97] 87  Resp:  [18-19] 18  BP: (116-154)/(49-88) 154/88  SpO2:  [95 %-99 %] 95 %  Body mass index is 44 6 kg/m²  Input and Output Summary (last 24 hours): Intake/Output Summary (Last 24 hours) at 2020 1327  Last data filed at 2020 1300  Gross per 24 hour   Intake 952 92 ml   Output 5325 ml   Net -4372 08 ml       Physical Exam:     Physical Exam  Constitutional:       General: She is not in acute distress  Appearance: Normal appearance  She is obese  She is not ill-appearing  HENT:      Mouth/Throat:      Mouth: Mucous membranes are moist    Eyes:      General:         Right eye: No discharge  Left eye: No discharge        Pupils: Pupils are equal, round, and reactive to light  Neck:      Musculoskeletal: Neck supple  Cardiovascular:      Rate and Rhythm: Normal rate and regular rhythm  Pulses: Normal pulses  Heart sounds: Normal heart sounds  No murmur  Pulmonary:      Effort: Pulmonary effort is normal  No respiratory distress  Breath sounds: Normal breath sounds  No wheezing or rales  Abdominal:      General: Bowel sounds are normal  There is distension  Palpations: Abdomen is soft  Tenderness: There is no abdominal tenderness  Musculoskeletal:      Right lower leg: No edema  Left lower leg: No edema  Skin:     General: Skin is warm  Capillary Refill: Capillary refill takes less than 2 seconds  Neurological:      General: No focal deficit present  Mental Status: She is alert and oriented to person, place, and time  Additional Data:     Labs:    Results from last 7 days   Lab Units 08/20/20  0538   WBC Thousand/uL 13 20*   HEMOGLOBIN g/dL 10 1*   HEMATOCRIT % 32 5*   PLATELETS Thousands/uL 341   NEUTROS PCT % 71   LYMPHS PCT % 16   MONOS PCT % 10   EOS PCT % 2     Results from last 7 days   Lab Units 08/20/20  0538 08/19/20  0504   SODIUM mmol/L 134* 136   POTASSIUM mmol/L 3 9 4 1   CHLORIDE mmol/L 98* 98*   CO2 mmol/L 30 32   BUN mg/dL 14 13   CREATININE mg/dL 1 28 1 44*   ANION GAP mmol/L 6 6   CALCIUM mg/dL 8 7 9 0   ALBUMIN g/dL  --  2 6*   TOTAL BILIRUBIN mg/dL  --  0 50   ALK PHOS U/L  --  78   ALT U/L  --  23   AST U/L  --  15   GLUCOSE RANDOM mg/dL 108 134         Results from last 7 days   Lab Units 08/20/20  1104 08/20/20  0536 08/19/20  2101 08/19/20  1742 08/19/20  1124 08/19/20  0822 08/18/20  2045 08/18/20  1559 08/18/20  1314 08/18/20  0731 08/18/20  0708 08/17/20  2115   POC GLUCOSE mg/dl 129 111 131 99 97 116 168* 117 135 130 119 166*         Results from last 7 days   Lab Units 08/14/20  1250   PROCALCITONIN ng/ml <0 05           * I Have Reviewed All Lab Data Listed Above    * Additional Pertinent Lab Tests Reviewed: Sarika Ford Admission Reviewed      Recent Cultures (last 7 days):     Results from last 7 days   Lab Units 08/14/20  1241 08/14/20  1230 08/14/20  1130   BLOOD CULTURE  No Growth After 5 Days  --  No Growth After 5 Days     C DIFF TOXIN B   --  Negative  --        Last 24 Hours Medication List:   Current Facility-Administered Medications   Medication Dose Route Frequency Provider Last Rate    albuterol  2 puff Inhalation Q4H PRN Pura Rodriguez PA-C      albuterol  2 5 mg Nebulization Q6H PRN Pura Rodriguez PA-C      ALPRAZolam  0 25 mg Oral BID PRN Pura Rodriguez PA-C      amLODIPine  5 mg Oral Daily Pura Rodriguez PA-C      apixaban  10 mg Oral BID Benito Gupta MD      belladonna-opium  30 mg Rectal Q8H PRN Pura Rodriguez PA-C      diphenhydrAMINE  25 mg Intravenous Q6H PRN Pura Rodriguez PA-C      DULoxetine  90 mg Oral Daily [place], [place]      famotidine  10 mg Oral Daily [place], [place]      fluticasone-vilanterol  1 puff Inhalation Daily [place], [place]      HYDROmorphone  0 5 mg Intravenous Q3H PRN Pura Rodriguez PA-C      insulin lispro  1-5 Units Subcutaneous HS [place], [place]      insulin lispro  1-6 Units Subcutaneous TID AC [place], [place]      levothyroxine  88 mcg Oral Early Morning [place], [place]      melatonin  3 mg Oral HS [place], [place]      nystatin   Topical BID Pura Rodriguez PA-C      ondansetron  4 mg Intravenous Q6H PRN Pura Rodriguez PA-C      pantoprazole  40 mg Oral Early Morning [place], [place]      pravastatin  80 mg Oral Daily With [address] [place], [place]      simethicone  80 mg Oral Q6H PRN Pura Rodriguez PA-C      sodium chloride  100 mL/hr Intravenous Continuous Benito Gupta  mL/hr (08/19/20 1434)    traMADol  50 mg Oral Q6H PRN Pura Rodriguez PA-C          Today, Patient Was Seen By: Kenny Moon MD    ** Please Note: Dictation voice to text software may have been used in the creation of this document   **

## 2020-08-20 NOTE — PLAN OF CARE
Problem: Potential for Falls  Goal: Patient will remain free of falls  Description: INTERVENTIONS:  - Assess patient frequently for physical needs  -  Identify cognitive and physical deficits and behaviors that affect risk of falls    -  [place]ullivan fall precautions as indicated by assessment   - Educate patient/family on patient safety including physical limitations  - Instruct patient to call for assistance with activity based on assessment  - Modify environment to reduce risk of injury  - Consider OT/PT consult to assist with strengthening/mobility  Outcome: Progressing

## 2020-08-20 NOTE — PROGRESS NOTES
Progress Note - Polina Tejada 72 y o  female MRN: [id number]    Unit/Bed#: -01 Encounter: [id number]      Assessment:  Polina Tejada is a pleasant 51-year-old female seen in consultation for gross hematuria after initial treatment for acute PE with heparin infusion and tPA  She acknowledges history of urinary retention and incontinence many years prior, managed remotely by urology-gynecology  Patient denies prior  surgical manipulation  She is afebrile with hypertension, managed by primary medical team   She has three-way Ko with CBI for several days  Titration challenging with occasional supratherapeutic PTT  Urine is pink today  Hemoglobin has remained relatively stable  Creatinine rising from prior 0 8-1 2--1 44-- now trending downward to 1 28 today  Renal ultrasound obtained demonstrated mild right hydronephrosis  There was no suggestion of significant clot burden, stones or masses  Plan:  Continue CBI to slow drip times 24 hours  Per Internal Medicine attending, Dr Sandee Cano clear, Eliquis to begin today  If urine remains mostly clear, will proceed with trial of void in a m     Will begin Keflex x3 in preparation for  manipulations/catheter removal tomorrow  Subjective:   Denies fever, chills  Endorses mild headache, occasional nausea, constipation after resolution of diarrhea  Objective:     Vitals: Blood pressure 154/88, pulse 87, temperature 98 1 °F (36 7 °C), resp  rate 18, height 4' 10" (1 473 m), weight 96 8 kg (213 lb 6 5 oz), SpO2 95 %  ,Body mass index is 44 6 kg/m²        Intake/Output Summary (Last 24 hours) at 8/20/2020 0902  Last data filed at 8/20/2020 0736  Gross per 24 hour   Intake 584 92 ml   Output 3625 ml   Net -3040 08 ml       Physical Exam:   General appearance: alert and oriented, in no acute distress, appears stated age, cooperative, no distress and moderately obese  Head: Normocephalic, without obvious abnormality, atraumatic  Neck: no adenopathy, no carotid bruit, no JVD, supple, symmetrical, trachea midline and thyroid not enlarged, symmetric, no tenderness/mass/nodules  Lungs: clear to auscultation bilaterally  Heart: regular rate and rhythm, S1, S2 normal, no murmur, click, rub or gallop  Abdomen: soft, non-tender; bowel sounds normal; no masses,  no organomegaly  Extremities: extremities normal, warm and well-perfused; no cyanosis, clubbing, or edema  Pulses: 2+ and symmetric  Neurologic: Grossly normal  Three-way Ko--CBI--pink no clots        Invasive Devices     Peripheral Intravenous Line            Peripheral IV 08/16/20 Left;Upper Arm 3 days    Peripheral IV 08/19/20 Right Wrist less than 1 day          Drain            Continuous Bladder Irrigation Three-way 5 days    Urethral Catheter Three way 22 Fr  5 days              Lab Results   Component Value Date    WBC 13 20 (H) 08/20/2020    HGB 10 1 (L) 08/20/2020    HCT 32 5 (L) 08/20/2020    MCV 88 08/20/2020     08/20/2020     Lab Results   Component Value Date    SODIUM 134 (L) 08/20/2020    K 3 9 08/20/2020    CL 98 (L) 08/20/2020    CO2 30 08/20/2020    BUN 14 08/20/2020    CREATININE 1 28 08/20/2020    GLUC 108 08/20/2020    CALCIUM 8 7 08/20/2020       Lab, Imaging and other studies: I have personally reviewed pertinent reports

## 2020-08-20 NOTE — ASSESSMENT & PLAN NOTE
· Blood pressure this morning : 154 / 88 mmHg, (259-757O systolic yesterday)   · Lisinopril discontinued due to concern for allergic reaction  · Patient is currently on amlodipine 5 mg p o   Daily-hydrochlorothiazide on hold due to JENNIFER  · Agree with current antihypertensive regime, can resume hydrochlorothiazide once creatinine remains stable

## 2020-08-20 NOTE — ASSESSMENT & PLAN NOTE
· Still has gross hematuria, worsened compared to yesterday  · Hemoglobin decreased from 11 to 10 1, could be dilutional secondary to IV fluids but needs to be monitored in her setting of gross hematuria   · Urology on board-patient is currently on continuous bladder irrigation  · Ultrasound kidney/bladder showed mild right-sided hydronephrosis, can be attributed to obstructive uropathy from clots  · Suprapubic pain improved, likely 2/2 irritation from CBI, no evidence of UTI   · Agree with urology and primary team's management of Ko catheter removal and voiding trial once hematuria remained stable on Eliquis, plan for removal is tomorrow

## 2020-08-20 NOTE — ASSESSMENT & PLAN NOTE
· Patient presented with creatinine of 0 87, trend: 0 87 -> until 08/16 when it increased from 0 87 -> 1 28 -> 1 44 -> 1 28 today   · Etiology likely ATN, given H/o antecedent contrast administration, according to urine lytes which show a urine sodium of 133  · UA showed no evidence of UTI   · Pt was also on lisinopril, which has been held due to concern for allergic reaction   ·   PLAN :   · Continue IV fluids (NS at 100 cc/hr) -- reassess tomorrow   · Avoid nephrotoxic agents, NSAIDs, contrast administration   · Continue to hold hydrochlorothiazide  · Patient is advised to increase oral hydration

## 2020-08-21 ENCOUNTER — TELEPHONE (OUTPATIENT)
Dept: OTHER | Facility: HOSPITAL | Age: 65
End: 2020-08-21

## 2020-08-21 LAB
ANION GAP SERPL CALCULATED.3IONS-SCNC: 7 MMOL/L (ref 4–13)
APTT PPP: 33 SECONDS (ref 23–37)
BASOPHILS # BLD AUTO: 0.03 THOUSANDS/ΜL (ref 0–0.1)
BASOPHILS NFR BLD AUTO: 0 % (ref 0–1)
BUN SERPL-MCNC: 14 MG/DL (ref 5–25)
CALCIUM SERPL-MCNC: 8.9 MG/DL (ref 8.3–10.1)
CHLORIDE SERPL-SCNC: 100 MMOL/L (ref 100–108)
CO2 SERPL-SCNC: 29 MMOL/L (ref 21–32)
CREAT SERPL-MCNC: 1.12 MG/DL (ref 0.6–1.3)
EOSINOPHIL # BLD AUTO: 0.23 THOUSAND/ΜL (ref 0–0.61)
EOSINOPHIL NFR BLD AUTO: 2 % (ref 0–6)
ERYTHROCYTE [DISTWIDTH] IN BLOOD BY AUTOMATED COUNT: 15 % (ref 11.6–15.1)
GFR SERPL CREATININE-BSD FRML MDRD: 52 ML/MIN/1.73SQ M
GLUCOSE SERPL-MCNC: 103 MG/DL (ref 65–140)
GLUCOSE SERPL-MCNC: 106 MG/DL (ref 65–140)
GLUCOSE SERPL-MCNC: 108 MG/DL (ref 65–140)
GLUCOSE SERPL-MCNC: 155 MG/DL (ref 65–140)
GLUCOSE SERPL-MCNC: 98 MG/DL (ref 65–140)
HCT VFR BLD AUTO: 33.7 % (ref 34.8–46.1)
HGB BLD-MCNC: 10.3 G/DL (ref 11.5–15.4)
IMM GRANULOCYTES # BLD AUTO: 0.14 THOUSAND/UL (ref 0–0.2)
IMM GRANULOCYTES NFR BLD AUTO: 1 % (ref 0–2)
LYMPHOCYTES # BLD AUTO: 1.85 THOUSANDS/ΜL (ref 0.6–4.47)
LYMPHOCYTES NFR BLD AUTO: 18 % (ref 14–44)
MCH RBC QN AUTO: 27.2 PG (ref 26.8–34.3)
MCHC RBC AUTO-ENTMCNC: 30.6 G/DL (ref 31.4–37.4)
MCV RBC AUTO: 89 FL (ref 82–98)
MONOCYTES # BLD AUTO: 0.82 THOUSAND/ΜL (ref 0.17–1.22)
MONOCYTES NFR BLD AUTO: 8 % (ref 4–12)
NEUTROPHILS # BLD AUTO: 7.53 THOUSANDS/ΜL (ref 1.85–7.62)
NEUTS SEG NFR BLD AUTO: 71 % (ref 43–75)
NRBC BLD AUTO-RTO: 0 /100 WBCS
PLATELET # BLD AUTO: 329 THOUSANDS/UL (ref 149–390)
PMV BLD AUTO: 9.5 FL (ref 8.9–12.7)
POTASSIUM SERPL-SCNC: 4.5 MMOL/L (ref 3.5–5.3)
RBC # BLD AUTO: 3.79 MILLION/UL (ref 3.81–5.12)
SODIUM SERPL-SCNC: 136 MMOL/L (ref 136–145)
WBC # BLD AUTO: 10.6 THOUSAND/UL (ref 4.31–10.16)

## 2020-08-21 PROCEDURE — 82948 REAGENT STRIP/BLOOD GLUCOSE: CPT

## 2020-08-21 PROCEDURE — 99233 SBSQ HOSP IP/OBS HIGH 50: CPT | Performed by: INTERNAL MEDICINE

## 2020-08-21 PROCEDURE — 99232 SBSQ HOSP IP/OBS MODERATE 35: CPT | Performed by: NURSE PRACTITIONER

## 2020-08-21 PROCEDURE — 85025 COMPLETE CBC W/AUTO DIFF WBC: CPT | Performed by: INTERNAL MEDICINE

## 2020-08-21 PROCEDURE — 85730 THROMBOPLASTIN TIME PARTIAL: CPT | Performed by: INTERNAL MEDICINE

## 2020-08-21 PROCEDURE — 80048 BASIC METABOLIC PNL TOTAL CA: CPT | Performed by: INTERNAL MEDICINE

## 2020-08-21 RX ORDER — MAGNESIUM HYDROXIDE/ALUMINUM HYDROXICE/SIMETHICONE 120; 1200; 1200 MG/30ML; MG/30ML; MG/30ML
30 SUSPENSION ORAL EVERY 4 HOURS PRN
Status: DISCONTINUED | OUTPATIENT
Start: 2020-08-21 | End: 2020-08-22 | Stop reason: HOSPADM

## 2020-08-21 RX ADMIN — PANTOPRAZOLE SODIUM 40 MG: 40 TABLET, DELAYED RELEASE ORAL at 05:07

## 2020-08-21 RX ADMIN — FAMOTIDINE 10 MG: 20 TABLET, FILM COATED ORAL at 09:02

## 2020-08-21 RX ADMIN — LEVOTHYROXINE SODIUM 88 MCG: 88 TABLET ORAL at 05:07

## 2020-08-21 RX ADMIN — NYSTATIN: 100000 POWDER TOPICAL at 09:05

## 2020-08-21 RX ADMIN — APIXABAN 10 MG: 5 TABLET, FILM COATED ORAL at 17:47

## 2020-08-21 RX ADMIN — TRAMADOL HYDROCHLORIDE 50 MG: 50 TABLET, FILM COATED ORAL at 22:12

## 2020-08-21 RX ADMIN — NYSTATIN: 100000 POWDER TOPICAL at 17:48

## 2020-08-21 RX ADMIN — INSULIN LISPRO 1 UNITS: 100 INJECTION, SOLUTION INTRAVENOUS; SUBCUTANEOUS at 13:52

## 2020-08-21 RX ADMIN — APIXABAN 10 MG: 5 TABLET, FILM COATED ORAL at 09:03

## 2020-08-21 RX ADMIN — TRAMADOL HYDROCHLORIDE 50 MG: 50 TABLET, FILM COATED ORAL at 13:52

## 2020-08-21 RX ADMIN — DULOXETINE HYDROCHLORIDE 90 MG: 60 CAPSULE, DELAYED RELEASE ORAL at 09:03

## 2020-08-21 RX ADMIN — AMLODIPINE BESYLATE 5 MG: 5 TABLET ORAL at 09:03

## 2020-08-21 RX ADMIN — PRAVASTATIN SODIUM 80 MG: 80 TABLET ORAL at 17:47

## 2020-08-21 RX ADMIN — ALPRAZOLAM 0.25 MG: 0.25 TABLET ORAL at 11:34

## 2020-08-21 RX ADMIN — SODIUM CHLORIDE 100 ML/HR: 0.9 INJECTION, SOLUTION INTRAVENOUS at 09:02

## 2020-08-21 RX ADMIN — ALPRAZOLAM 0.25 MG: 0.25 TABLET ORAL at 22:10

## 2020-08-21 RX ADMIN — TRAMADOL HYDROCHLORIDE 50 MG: 50 TABLET, FILM COATED ORAL at 05:07

## 2020-08-21 RX ADMIN — MELATONIN 3 MG: 3 TAB ORAL at 22:10

## 2020-08-21 NOTE — ASSESSMENT & PLAN NOTE
· Blood pressure this morning : 114/ 72 mmHg  · Lisinopril discontinued due to concern for allergic reaction  · Patient is currently on amlodipine 5 mg p o   Daily, hydrochlorothiazide on hold due to JENNIFER

## 2020-08-21 NOTE — PROGRESS NOTES
Progress Note - Nathaniel Todd 72 y o  female MRN: [id number]    Unit/Bed#: -01 Encounter: [id number]      Assessment:  Nathaniel Todd is a 17-year-old female seen in consultation for 1 week regarding acute onset of gross hematuria after tPA for active PE  She has remained on heparin infusion throughout the week and is just recently transition to Eliquis yesterday  PTT now stable  Three-way Ko to CBI throughout the week  Full titration to off could not be achieved  Patient however has remained afebrile, hemodynamically stable and pain control with the exception of complaints of constipation  There been no clots in urine for many days  Mild AK I during hospitalization--now resolved    Plan:  Clamp CBI after last bag  Remove Ko and begin void trial   Catheter has been in for 1 week without any significant change in hematuria and is likely causing more irritation than not  Discharge at the discretion of the Internal Medicine service  Our office staff will contact patient for non urgent cystoscopic examination  No further  intervention indicated during this hospital stay  Subjective:   Denies fever, chills, flank, suprapubic, groin pain or dysuria  Office complaints of mild constipation  Objective:     Vitals: Blood pressure 114/72, pulse 89, temperature 98 °F (36 7 °C), resp  rate 18, height 4' 10" (1 473 m), weight 98 1 kg (216 lb 4 3 oz), SpO2 97 %  ,Body mass index is 45 2 kg/m²        Intake/Output Summary (Last 24 hours) at 8/21/2020 0848  Last data filed at 8/21/2020 0600  Gross per 24 hour   Intake 1720 ml   Output 5150 ml   Net -3430 ml       Physical Exam: General appearance: alert and oriented, in no acute distress, appears stated age, cooperative and no distress  Head: Normocephalic, without obvious abnormality, atraumatic  Neck: no adenopathy, no carotid bruit, no JVD, supple, symmetrical, trachea midline and thyroid not enlarged, symmetric, no tenderness/mass/nodules  Lungs: diminished breath sounds  Heart: regular rate and rhythm, S1, S2 normal, no murmur, click, rub or gallop  Abdomen: soft, non-tender; bowel sounds normal; no masses,  no organomegaly  Extremities: extremities normal, warm and well-perfused; no cyanosis, clubbing, or edema  Pulses: 2+ and symmetric  Neurologic: Grossly normal  Three-way Ko--CBI--very clear pale pink/peach/blush urine  No clots     Invasive Devices     Peripheral Intravenous Line            Peripheral IV 08/19/20 Right Wrist 1 day          Drain            Continuous Bladder Irrigation Three-way 6 days    Urethral Catheter Three way 22 Fr  6 days              Lab Results   Component Value Date    WBC 10 60 (H) 08/21/2020    HGB 10 3 (L) 08/21/2020    HCT 33 7 (L) 08/21/2020    MCV 89 08/21/2020     08/21/2020     Lab Results   Component Value Date    SODIUM 136 08/21/2020    K 4 5 08/21/2020     08/21/2020    CO2 29 08/21/2020    BUN 14 08/21/2020    CREATININE 1 12 08/21/2020    GLUC 108 08/21/2020    CALCIUM 8 9 08/21/2020       Lab, Imaging and other studies: I have personally reviewed pertinent reports

## 2020-08-21 NOTE — ASSESSMENT & PLAN NOTE
· Still has gross hematuria  · Hgb stable   · Urology on board  · Ultrasound kidney/bladder showed mild right-sided hydronephrosis, can be attributed to obstructive uropathy from clots  · Suprapubic pain improved, likely 2/2 irritation from CBI, no evidence of UTI

## 2020-08-21 NOTE — ASSESSMENT & PLAN NOTE
· Patient presented with creatinine of 0 87, peak creatinine-1 44, improved today to 1 12  · Etiology likely ATN, given H/o antecedent contrast administration, according to urine lytes which show a urine sodium of 133  · UA showed no evidence of UTI   · Pt was also on lisinopril, which has been held due to concern for allergic reaction   ·   PLAN :   · Continue IV fluids (NS at 100 cc/hr), output -- 5,150 ml / 24 hrs   · Avoid nephrotoxic agents, NSAIDs, contrast administration   · Continue to hold hydrochlorothiazide & lisinopril   · Patient is advised to increase oral hydration

## 2020-08-21 NOTE — TELEPHONE ENCOUNTER
Nimco Calloway is a 35-year-old female seen in consultation at [facility] for gross hematuria  Patient will require cystoscopic examination non urgently in the next few weeks  Please contact patient hospital follow-up appointment date and time once discharged

## 2020-08-21 NOTE — PROGRESS NOTES
Progress Note   [place] 72 y o  female MRN: [id number]    Unit/Bed#: -01 Encounter: [id number]    Assessment & Plan:    Acute pulmonary embolism  -hemodynamically stable, vital signs within normal limits, saturating greater than 95% on ambient air   -patient denies chest pain, shortness of breath, cough  -transitioned to oral anticoagulation since August 20, morning   -continue Eliquis 10 mg oral b i d  For 7 day, there after continue Eliquis 5 mg oral twice a day  Gross hematuria  -hematuria likely multifactorial in the setting of tPA use, IV heparin drip, and now currently on p o  Anticoagulation for PE  In addition patient has been with Ko catheter for the last several days that may be contributing to irritation of the genitourinary tract  -patient hematuria has remained stable during the last couple of days, urine is pink today  -hemoglobin remained stable-10 3 grams/deciliter  -urology is on board, as per urology recommendation will remove Ko today I will perform voiding trial   -if patient hematuria remained stable, and she is able to void without difficulty after Ko removal patient patient could be discharged home in the next 24 hours  -continue trending hemoglobin/hematocrit in a m   Labs    Acute kidney injury-improved  -creatinine 1 12 today  -nephrology on board, likely ATN  -encouraged to drink plenty of fluids  -avoid nephrotoxic medications  -trend renal function and labs    Hypertension  -Blood pressure well controlled, most recent records 114/72  - continue amlodipine 5 mg oral daily  -continue holding lisinopril and hydrochlorothiazide  -Continue monitor blood pressure closely  -once acute kidney injury resolved completely patient may resume hydrochlorothiazide if needed at that time, she will need to follow with primary care physician for further antihypertensive management after discharge    Traumatic ecchymoses  -ecchymotic lesions on multiple pressure point including buttock and right shoulder, likely secondary to the use of tPA/heparin drip and increase pressure due to obesity  -hemoglobin stable, continue monitoring  Asthma  -stable, patient is asymptomatic, continue Breo Ellipta and albuterol p r n  Hyperlipidemia  -continue pravastatin    Diarrhea improved  -patient states she is having 1-2 soft bowel movement per day,  -history of irritable bowel syndrome  -C difficile testing negative    Anxiety  -on exam today patient denies feeling anxious  -continue duloxetine 90 mg oral daily  -continue alprazolam 0 25 mg 2 times daily p r n  Subjective:   Patient has been seen at bedside, she was alert and cooperative  Patient states she was still having some discomfort on the suprapubic area although has improved significantly compared to prior days  Still having hematuria, urine is pink colored  States she has been having 1-2 soft bowel movement daily  Denies abdominal cramps, denies chest pain or shortness of breath  Patient has remained afebrile  Tolerating oral diet without difficulties  Denies additional complaints  Objective:     Vitals: Blood pressure 114/72, pulse 89, temperature 98 °F (36 7 °C), resp  rate 18, height 4' 10" (1 473 m), weight 98 1 kg (216 lb 4 3 oz), SpO2 97 %  ,Body mass index is 45 2 kg/m²  Intake/Output Summary (Last 24 hours) at 8/21/2020 1040  Last data filed at 8/21/2020 0801  Gross per 24 hour   Intake 1720 ml   Output 6950 ml   Net -5230 ml         Physical Exam:     Physical Exam  Constitutional:       General: She is not in acute distress  Appearance: She is obese  She is not toxic-appearing  HENT:      Head: Normocephalic and atraumatic  Eyes:      Extraocular Movements: Extraocular movements intact  Conjunctiva/sclera: Conjunctivae normal       Pupils: Pupils are equal, round, and reactive to light  Neck:      Musculoskeletal: Normal range of motion and neck supple   No neck rigidity or muscular tenderness  Cardiovascular:      Rate and Rhythm: Normal rate and regular rhythm  Pulses: Normal pulses  Heart sounds: No murmur  No friction rub  No gallop  Pulmonary:      Effort: No respiratory distress  Breath sounds: Normal breath sounds  No wheezing, rhonchi or rales  Abdominal:      General: There is no distension  Palpations: Abdomen is soft  Tenderness: There is abdominal tenderness (Mild discomfort to palpation over the suprapubic area, with no rebound or guarding  )  There is no guarding or rebound  Musculoskeletal: Normal range of motion  General: No swelling or tenderness  Skin:     General: Skin is warm and dry  Coloration: Skin is not jaundiced or pale  Findings: Bruising (Multiple ecchymotic lesions on the buttock, right shoulder, right lower abdominal wall) present  No erythema or rash  Neurological:      General: No focal deficit present  Mental Status: She is alert and oriented to person, place, and time  Mental status is at baseline     Psychiatric:         Mood and Affect: Mood normal            Invasive Devices     Peripheral Intravenous Line            Peripheral IV 08/19/20 Right Wrist 1 day          Drain            Continuous Bladder Irrigation Three-way 6 days    Urethral Catheter Three way 22 Fr  6 days                        CBC:   Lab Results   Component Value Date    WBC 10 60 (H) 08/21/2020    HGB 10 3 (L) 08/21/2020    HCT 33 7 (L) 08/21/2020    MCV 89 08/21/2020     08/21/2020    MCH 27 2 08/21/2020    MCHC 30 6 (L) 08/21/2020    RDW 15 0 08/21/2020    MPV 9 5 08/21/2020    NRBC 0 08/21/2020   ,   CMP:   Lab Results   Component Value Date    K 4 5 08/21/2020     08/21/2020    CO2 29 08/21/2020    BUN 14 08/21/2020    CREATININE 1 12 08/21/2020    CALCIUM 8 9 08/21/2020    EGFR 52 08/21/2020   ,   PT/INR: No results found for: PT, INR,   Troponin: No results found for: TROPONINI,     Imaging Studies: I have personally reviewed pertinent reports  VTE prophylaxis-patient currently on apixaban for pulmonary embolism    Counseling / Coordination of Care  Total time spent today  30 minutes  Greater than 50% of total time was spent with the patient and / or family counseling and / or coordination of care

## 2020-08-21 NOTE — PROGRESS NOTES
Progress Note - Minor Blizzard 1955, 72 y o  female MRN: [id number]    Unit/Bed#: -01 Encounter: [id number]    Primary Care Provider: Alison Correa MD   Date and time admitted to hospital: 8/12/2020  4:03 PM        [facility] (acute kidney injury) [facility])  Assessment & Plan  · Patient presented with creatinine of 0 87, peak creatinine-1 44, improved today to 1 12  · Etiology likely ATN, given H/o antecedent contrast administration, according to urine lytes which show a urine sodium of 133  · UA showed no evidence of UTI   · Pt was also on lisinopril, which has been held due to concern for allergic reaction   ·   PLAN :   · Continue IV fluids (NS at 100 cc/hr), output -- 5,150 ml / 24 hrs   · Avoid nephrotoxic agents, NSAIDs, contrast administration   · Continue to hold hydrochlorothiazide & lisinopril   · Patient is advised to increase oral hydration    Gross hematuria  Assessment & Plan  · Still has gross hematuria  · Hgb stable   · Urology on board  · Ultrasound kidney/bladder showed mild right-sided hydronephrosis, can be attributed to obstructive uropathy from clots  · Suprapubic pain improved, likely 2/2 irritation from CBI, no evidence of UTI       HTN (hypertension)  Assessment & Plan  · Blood pressure this morning : 114/ 72 mmHg  · Lisinopril discontinued due to concern for allergic reaction  · Patient is currently on amlodipine 5 mg p o  Daily, hydrochlorothiazide on hold due to JENNIFER    VTE Pharmacologic Prophylaxis:   Pharmacologic: Apixaban (Eliquis)  Mechanical VTE Prophylaxis in Place: Yes    Discussions with Specialists or Other Care Team Provider:  Hospitalist    Education and Discussions with Family / Patient:  Plan discussed with the patient-demonstrated understanding    Time Spent for Care: 30 minutes  More than 50% of total time spent on counseling and coordination of care as described above      Current Length of Stay: 9 day(s)    Current Patient Status: Inpatient    Code Status: Level 1 - Full Code      Subjective:   Patient was seen and examined by me at the bedside  She reported no complaints overnight  Still has gross hematuria, unchanged since yesterday     Objective:     Vitals:   Temp (24hrs), Av °F (36 7 °C), Min:97 8 °F (36 6 °C), Max:98 1 °F (36 7 °C)    Temp:  [97 8 °F (36 6 °C)-98 1 °F (36 7 °C)] 98 °F (36 7 °C)  HR:  [87-90] 89  Resp:  [18-20] 18  BP: (114-129)/(50-72) 114/72  SpO2:  [91 %-97 %] 97 %  Body mass index is 45 2 kg/m²  Input and Output Summary (last 24 hours): Intake/Output Summary (Last 24 hours) at 2020 9324  Last data filed at 2020 0801  Gross per 24 hour   Intake 1720 ml   Output 6950 ml   Net -5230 ml       Physical Exam:     Physical Exam  Constitutional:       General: She is not in acute distress  Appearance: She is obese  She is not ill-appearing  HENT:      Mouth/Throat:      Mouth: Mucous membranes are moist    Eyes:      General:         Right eye: No discharge  Left eye: No discharge  Neck:      Musculoskeletal: Neck supple  Cardiovascular:      Rate and Rhythm: Normal rate and regular rhythm  Pulses: Normal pulses  Heart sounds: No murmur  Pulmonary:      Effort: Pulmonary effort is normal  No respiratory distress  Breath sounds: Normal breath sounds  No wheezing or rales  Abdominal:      General: Bowel sounds are normal  There is distension  Palpations: Abdomen is soft  Tenderness: There is no abdominal tenderness  Musculoskeletal:      Right lower leg: No edema  Left lower leg: No edema  Skin:     General: Skin is warm  Capillary Refill: Capillary refill takes less than 2 seconds  Neurological:      Mental Status: She is alert and oriented to person, place, and time             Additional Data:     Labs:    Results from last 7 days   Lab Units 20  0600   WBC Thousand/uL 10 60*   HEMOGLOBIN g/dL 10 3*   HEMATOCRIT % 33 7*   PLATELETS Thousands/uL 329   NEUTROS PCT % 71   LYMPHS PCT % 18   MONOS PCT % 8   EOS PCT % 2     Results from last 7 days   Lab Units 08/21/20  0600  08/19/20  0504   SODIUM mmol/L 136   < > 136   POTASSIUM mmol/L 4 5   < > 4 1   CHLORIDE mmol/L 100   < > 98*   CO2 mmol/L 29   < > 32   BUN mg/dL 14   < > 13   CREATININE mg/dL 1 12   < > 1 44*   ANION GAP mmol/L 7   < > 6   CALCIUM mg/dL 8 9   < > 9 0   ALBUMIN g/dL  --   --  2 6*   TOTAL BILIRUBIN mg/dL  --   --  0 50   ALK PHOS U/L  --   --  78   ALT U/L  --   --  23   AST U/L  --   --  15   GLUCOSE RANDOM mg/dL 108   < > 134    < > = values in this interval not displayed  Results from last 7 days   Lab Units 08/20/20  2050 08/20/20  1104 08/20/20  0536 08/19/20  2101 08/19/20  1742 08/19/20  1124 08/19/20  0822 08/18/20  2045 08/18/20  1559 08/18/20  1314 08/18/20  0731 08/18/20  0708   POC GLUCOSE mg/dl 121 129 111 131 99 97 116 168* 117 135 130 119         Results from last 7 days   Lab Units 08/14/20  1250   PROCALCITONIN ng/ml <0 05           * I Have Reviewed All Lab Data Listed Above  * Additional Pertinent Lab Tests Reviewed: Sarika 66 Admission Reviewed    Recent Cultures (last 7 days):     Results from last 7 days   Lab Units 08/14/20  1241 08/14/20  1230 08/14/20  1130   BLOOD CULTURE  No Growth After 5 Days  --  No Growth After 5 Days     C DIFF TOXIN B   --  Negative  --        Last 24 Hours Medication List:   Current Facility-Administered Medications   Medication Dose Route Frequency Provider Last Rate    albuterol  2 puff Inhalation Q4H PRN Brit Aragon PA-C      albuterol  2 5 mg Nebulization Q6H PRN Brit Aragon PA-C      ALPRAZolam  0 25 mg Oral BID PRN Brit Aragon PA-C      amLODIPine  5 mg Oral Daily Brit Aragon PA-C      apixaban  10 mg Oral BID MD Gladis Torrez-opium  30 mg Rectal Q8H PRN Brit Aragon PA-C      diphenhydrAMINE  25 mg Intravenous Q6H PRN Brit Aragon PA-C      DULoxetine  90 mg Oral Daily Ankush Alonso PA-C      famotidine  10 mg Oral Daily [place], [place]      fluticasone-vilanterol  1 puff Inhalation Daily [place], [place]      HYDROmorphone  0 5 mg Intravenous Q3H PRN Ankush Alonso PA-C      insulin lispro  1-5 Units Subcutaneous HS [place], [place]      insulin lispro  1-6 Units Subcutaneous TID AC [place], [place]      levothyroxine  88 mcg Oral Early Morning [place], [place]      melatonin  3 mg Oral HS [place], [place]      nystatin   Topical BID [place], [place]      ondansetron  4 mg Intravenous Q6H PRN Ankush Alonso PA-C      pantoprazole  40 mg Oral Early Morning [place], [place]      pravastatin  80 mg Oral Daily With CHANTALE Ferrera [address] [place], [place]      simethicone  80 mg Oral Q6H PRN Ankush Alonso PA-C      sodium chloride  100 mL/hr Intravenous Continuous Matt Romero  mL/hr (08/21/20 0902)    traMADol  50 mg Oral Q6H PRN Ankush Alonso PA-C          Today, Patient Was Seen By: Benja Washington MD    ** Please Note: Dictation voice to text software may have been used in the creation of this document   **

## 2020-08-22 VITALS
TEMPERATURE: 97.6 F | WEIGHT: 209 LBS | DIASTOLIC BLOOD PRESSURE: 69 MMHG | BODY MASS INDEX: 43.87 KG/M2 | RESPIRATION RATE: 18 BRPM | SYSTOLIC BLOOD PRESSURE: 125 MMHG | OXYGEN SATURATION: 94 % | HEIGHT: 58 IN | HEART RATE: 95 BPM

## 2020-08-22 LAB
ANION GAP SERPL CALCULATED.3IONS-SCNC: 5 MMOL/L (ref 4–13)
BASOPHILS # BLD AUTO: 0.04 THOUSANDS/ΜL (ref 0–0.1)
BASOPHILS NFR BLD AUTO: 0 % (ref 0–1)
BUN SERPL-MCNC: 10 MG/DL (ref 5–25)
CALCIUM SERPL-MCNC: 8.9 MG/DL (ref 8.3–10.1)
CHLORIDE SERPL-SCNC: 101 MMOL/L (ref 100–108)
CO2 SERPL-SCNC: 31 MMOL/L (ref 21–32)
CREAT SERPL-MCNC: 1.04 MG/DL (ref 0.6–1.3)
EOSINOPHIL # BLD AUTO: 0.24 THOUSAND/ΜL (ref 0–0.61)
EOSINOPHIL NFR BLD AUTO: 2 % (ref 0–6)
ERYTHROCYTE [DISTWIDTH] IN BLOOD BY AUTOMATED COUNT: 14.9 % (ref 11.6–15.1)
GFR SERPL CREATININE-BSD FRML MDRD: 57 ML/MIN/1.73SQ M
GLUCOSE SERPL-MCNC: 106 MG/DL (ref 65–140)
GLUCOSE SERPL-MCNC: 125 MG/DL (ref 65–140)
GLUCOSE SERPL-MCNC: 140 MG/DL (ref 65–140)
HCT VFR BLD AUTO: 31.9 % (ref 34.8–46.1)
HGB BLD-MCNC: 9.9 G/DL (ref 11.5–15.4)
IMM GRANULOCYTES # BLD AUTO: 0.22 THOUSAND/UL (ref 0–0.2)
IMM GRANULOCYTES NFR BLD AUTO: 2 % (ref 0–2)
LYMPHOCYTES # BLD AUTO: 1.85 THOUSANDS/ΜL (ref 0.6–4.47)
LYMPHOCYTES NFR BLD AUTO: 18 % (ref 14–44)
MCH RBC QN AUTO: 27.8 PG (ref 26.8–34.3)
MCHC RBC AUTO-ENTMCNC: 31 G/DL (ref 31.4–37.4)
MCV RBC AUTO: 90 FL (ref 82–98)
MONOCYTES # BLD AUTO: 0.96 THOUSAND/ΜL (ref 0.17–1.22)
MONOCYTES NFR BLD AUTO: 9 % (ref 4–12)
NEUTROPHILS # BLD AUTO: 7.21 THOUSANDS/ΜL (ref 1.85–7.62)
NEUTS SEG NFR BLD AUTO: 69 % (ref 43–75)
NRBC BLD AUTO-RTO: 0 /100 WBCS
PLATELET # BLD AUTO: 366 THOUSANDS/UL (ref 149–390)
PMV BLD AUTO: 8.4 FL (ref 8.9–12.7)
POTASSIUM SERPL-SCNC: 4.2 MMOL/L (ref 3.5–5.3)
RBC # BLD AUTO: 3.56 MILLION/UL (ref 3.81–5.12)
SODIUM SERPL-SCNC: 137 MMOL/L (ref 136–145)
WBC # BLD AUTO: 10.52 THOUSAND/UL (ref 4.31–10.16)

## 2020-08-22 PROCEDURE — 82948 REAGENT STRIP/BLOOD GLUCOSE: CPT

## 2020-08-22 PROCEDURE — 99239 HOSP IP/OBS DSCHRG MGMT >30: CPT | Performed by: INTERNAL MEDICINE

## 2020-08-22 PROCEDURE — 80048 BASIC METABOLIC PNL TOTAL CA: CPT | Performed by: INTERNAL MEDICINE

## 2020-08-22 PROCEDURE — 85025 COMPLETE CBC W/AUTO DIFF WBC: CPT | Performed by: INTERNAL MEDICINE

## 2020-08-22 PROCEDURE — 99233 SBSQ HOSP IP/OBS HIGH 50: CPT | Performed by: INTERNAL MEDICINE

## 2020-08-22 RX ORDER — FAMOTIDINE 10 MG
10 TABLET ORAL DAILY
Qty: 30 TABLET | Refills: 0 | Status: SHIPPED | OUTPATIENT
Start: 2020-08-23 | End: 2020-08-27 | Stop reason: SDUPTHER

## 2020-08-22 RX ORDER — AMLODIPINE BESYLATE 5 MG/1
5 TABLET ORAL DAILY
Qty: 30 TABLET | Refills: 0 | Status: SHIPPED | OUTPATIENT
Start: 2020-08-23 | End: 2020-08-27 | Stop reason: SDUPTHER

## 2020-08-22 RX ADMIN — NYSTATIN: 100000 POWDER TOPICAL at 09:25

## 2020-08-22 RX ADMIN — PANTOPRAZOLE SODIUM 40 MG: 40 TABLET, DELAYED RELEASE ORAL at 06:18

## 2020-08-22 RX ADMIN — TRAMADOL HYDROCHLORIDE 50 MG: 50 TABLET, FILM COATED ORAL at 06:18

## 2020-08-22 RX ADMIN — SODIUM CHLORIDE 100 ML/HR: 0.9 INJECTION, SOLUTION INTRAVENOUS at 03:52

## 2020-08-22 RX ADMIN — ALPRAZOLAM 0.25 MG: 0.25 TABLET ORAL at 09:23

## 2020-08-22 RX ADMIN — DULOXETINE HYDROCHLORIDE 90 MG: 60 CAPSULE, DELAYED RELEASE ORAL at 09:23

## 2020-08-22 RX ADMIN — FAMOTIDINE 10 MG: 20 TABLET, FILM COATED ORAL at 09:23

## 2020-08-22 RX ADMIN — APIXABAN 10 MG: 5 TABLET, FILM COATED ORAL at 09:23

## 2020-08-22 RX ADMIN — LEVOTHYROXINE SODIUM 88 MCG: 88 TABLET ORAL at 06:18

## 2020-08-22 RX ADMIN — AMLODIPINE BESYLATE 5 MG: 5 TABLET ORAL at 09:23

## 2020-08-22 NOTE — ASSESSMENT & PLAN NOTE
· Status post tPA  · Patient started on Eliquis  Currently getting 10 mg b i d  Day 3  She will get total 7 days of 10 mg b i d  Aurora Irene · After that she will continue 5 mg b i d  · No gross hematuria now  Hemoglobin remained stable  · Will check CBC and BMP next week and follow-up with PCP

## 2020-08-22 NOTE — PROGRESS NOTES
NEPHROLOGY PROGRESS NOTE    Patient: Nimco Calloway               Sex: female          DOA: 8/12/2020  4:03 PM   Date of Birth: @        Age: @        LOS:  LOS: 10 days   8/22/2020    REASON FOR THE CONSULTATION:  Further management of JENNIFER  HPI     This is a 72 y o  female admitted for Acute pulmonary embolism ([facility] [address] )     SUBJECTIVE     - found to be nonoliguric overnight  Ko catheter was discontinued yesterday  Patient denies nausea, vomiting, headache or dizziness today    - Reviewed last 24 hrs events     CURRENT MEDICATIONS       Current Facility-Administered Medications:     albuterol (PROVENTIL HFA,VENTOLIN HFA) inhaler 2 puff, 2 puff, Inhalation, Q4H PRN, Kelley Gilliland PA-C    albuterol inhalation solution 2 5 mg, 2 5 mg, Nebulization, Q6H PRN, Kelley Gilliland PA-C    ALPRAZolam Bur[facility] Niece) tablet 0 25 mg, 0 25 mg, Oral, BID PRN, Kelley Gilliland PA-C, 0 25 mg at 08/22/20 9731    aluminum-magnesium hydroxide-simethicone (MYLANTA) 200-200-20 mg/5 mL oral suspension 30 mL, 30 mL, Oral, Q4H PRN, BERNARD Ramirez    amLODIPine (NORVASC) tablet 5 mg, 5 mg, Oral, Daily, Cedric Santos PA-C, 5 mg at 08/22/20 2095    apixaban (ELIQUIS) tablet 10 mg, 10 mg, Oral, BID, Angie Meade MD, 10 mg at 08/22/20 1585    belladonna-opium (B&O SUPPOSITORY) 16 2-30 mg suppository 1 suppository, 30 mg, Rectal, Q8H PRN, Kelley Gilliland PA-C    diphenhydrAMINE (BENADRYL) injection 25 mg, 25 mg, Intravenous, Q6H PRN, Kelley Gilliland PA-C, 25 mg at 08/18/20 1411    DULoxetine (CYMBALTA) delayed release capsule 90 mg, 90 mg, Oral, Daily, Cedric Santos PA-C, 90 mg at 08/22/20 8215    famotidine (PEPCID) tablet 10 mg, 10 mg, Oral, Daily, Cedric Santos PA-C, 10 mg at 08/22/20 7676    fluticasone-vilanterol (BREO ELLIPTA) 100-25 mcg/inh inhaler 1 puff, 1 puff, Inhalation, Daily, Kelley Gilliland PA-C, 1 puff at 08/16/20 0814    HYDROmorphone (DILAUDID) injection 0 5 mg, 0 5 mg, Intravenous, Q3H PRN, Love Duffy PA-C, 0 5 mg at 08/18/20 0714    insulin lispro (HumaLOG) 100 units/mL subcutaneous injection 1-5 Units, 1-5 Units, Subcutaneous, HS, Love Duffy, PA-C, 1 Units at 08/18/20 2159    insulin lispro (HumaLOG) 100 units/mL subcutaneous injection 1-6 Units, 1-6 Units, Subcutaneous, TID AC, 1 Units at 08/21/20 1352 **AND** Fingerstick Glucose (POCT), , , TID AC, Cedric Santos PA-C    levothyroxine tablet 88 mcg, 88 mcg, Oral, Early Morning, Love Duffy, PA-C, 88 mcg at 08/22/20 0618    melatonin tablet 3 mg, 3 mg, Oral, HS, Love Duffy PA-C, 3 mg at 08/21/20 2210    nystatin (MYCOSTATIN) powder, , Topical, BID, Cedric Santos PA-C    ondansetron [facility]) injection 4 mg, 4 mg, Intravenous, Q6H PRN, Love Duffy PA-C, 4 mg at 08/20/20 0522    pantoprazole (PROTONIX) EC tablet 40 mg, 40 mg, Oral, Early Morning, Love Duffy, PA-C, 40 mg at 08/22/20 5670    pravastatin (PRAVACHOL) tablet 80 mg, 80 mg, Oral, Daily With Derrell Santos PA-C, 80 mg at 08/21/20 1747    simethicone (MYLICON) chewable tablet 80 mg, 80 mg, Oral, Q6H PRN, Love Duffy PA-C, 80 mg at 08/20/20 1944    traMADol (ULTRAM) tablet 50 mg, 50 mg, Oral, Q6H PRN, Love Duffy, PA-C, 50 mg at 08/22/20 0618    OBJECTIVE     Current Weight: Weight - Scale: 94 8 kg (208 lb 15 9 oz)  /69   Pulse 95   Temp 97 6 °F (36 4 °C)   Resp 18   Ht 4' 10" (1 473 m)   Wt 94 8 kg (208 lb 15 9 oz)   SpO2 94%   BMI 43 68 kg/m²   Vitals:    08/22/20 0835   BP: 125/69   Pulse: 95   Resp: 18   Temp: 97 6 °F (36 4 °C)   SpO2: 94%     Body mass index is 43 68 kg/m²  Intake/Output Summary (Last 24 hours) at 8/22/2020 1034  Last data filed at 8/22/2020 0351  Gross per 24 hour   Intake 3046 67 ml   Output 1900 ml   Net 1146 67 ml       PHYSICAL EXAMINATION     Physical Exam  Constitutional:       General: She is not in acute distress  HENT:      Head: Normocephalic and atraumatic     Eyes:      General: No scleral icterus  Neck:      Musculoskeletal: Neck supple  Vascular: No JVD  Cardiovascular:      Rate and Rhythm: Normal rate  Pulmonary:      Effort: No accessory muscle usage or respiratory distress  Breath sounds: No wheezing  Abdominal:      General: There is no distension  Palpations: Abdomen is soft  Musculoskeletal:      Right hand: She exhibits no laceration  Left hand: She exhibits no laceration  Skin:     General: Skin is warm  Neurological:      Mental Status: She is alert  Psychiatric:         Speech: She is communicative  Behavior: Behavior is not combative  LAB RESULTS     Results from last 7 days   Lab Units 08/22/20  0539 08/21/20  0600 08/20/20  0538 08/19/20  0504 08/18/20  0510 08/17/20  0503 08/16/20  0506   WBC Thousand/uL 10 52* 10 60* 13 20* 16 52* 18 79* 17 80* 12 32*   HEMOGLOBIN g/dL 9 9* 10 3* 10 1* 11 0* 11 3* 11 9 12 0   HEMATOCRIT % 31 9* 33 7* 32 5* 35 1 36 0 37 3 38 4   PLATELETS Thousands/uL 366 329 341 285 295 253 240   SODIUM mmol/L 137 136 134* 136 134*  --  141   POTASSIUM mmol/L 4 2 4 5 3 9 4 1 3 9  --  3 6   CHLORIDE mmol/L 101 100 98* 98* 96*  --  104   CO2 mmol/L 31 29 30 32 31  --  30   BUN mg/dL 10 14 14 13 12  --  9   CREATININE mg/dL 1 04 1 12 1 28 1 44* 1 28  --  0 80   EGFR ml/min/1 73sq m 57 52 44 38 44  --  78   CALCIUM mg/dL 8 9 8 9 8 7 9 0 8 5  --  8 8       I have personally reviewed the old medical records and patient's previously known baseline creatinine level is ~ 0 8    RADIOLOGY RESULTS      XR abdomen 1 view kub   Final Result by Gustavo Olivera MD (08/20 4167)      No discrete radiopaque renal or ureteral calculi identified  Workstation performed: [id number]         US kidney and bladder   Final Result by Nadir Chow MD (08/18 5258)      Mild right-sided hydronephrosis, not present on the prior CT  This is of uncertain etiology as no stones were present in the right kidney on 8/14/2020    This may be secondary to a blood clot given the history of gross hematuria  The study was marked in [facility] for immediate notification  Workstation performed: [id number]         CT abdomen pelvis wo contrast   Final Result by Kassidy Phelps MD (08/14 1042)      No radiopaque urinary tract calculi or obstructive uropathy  No acute intra-abdominal or intrapelvic process insofar as can be detected on a noncontrast CT  Workstation performed: [id number]         CT head wo contrast   Final Result by Preeti Houston MD (08/14 2308)      No acute intracranial abnormality  Mild cerebral chronic microangiopathy  Workstation performed: [id number]         VAS lower limb venous duplex study, complete bilateral   Final Result by Yusra Rubio MD (08/13 1654)      CT head wo contrast   Final Result by Ziggy Cohen DO (08/12 2241)      No acute intracranial abnormality is seen  Workstation performed: [id number]         CT head without contrast   Final Result by Greg Barth MD (08/12 2043)      No acute intracranial abnormality  Workstation performed: [id number]         PE Study with CT Abdomen and Pelvis with contrast   Final Result by Mariposa Pearson MD (08/12 1835)      PULMONARY ARTERIAL TREE:  Bilateral pulmonary emboli in the main, lobar, segmental and subsegmental branches  RV/LV ratio measures 1 7 suggesting right heart strain  LUNGS:  Right middle lobe consolidation could relate to pneumonia and/or pulmonary infarct  Posttreatment follow-up to radiographic resolution is recommended with unenhanced chest CT in 3 months  I personally discussed this study with Bianka Emanuel on 8/12/2020 at 6:33 PM                                   Workstation performed: [id number]             PLAN / RECOMMENDATIONS      1  JENNIFER  Multifactorial, urine lytes were intrinsic in nature and JENNIFER was suspected due to ATN    ATN was suspected most likely secondary to contrast induced nephropathy  Upon review of old medical records, previously known baseline creatinine is around 0 8  Overnight patient has remained nonoliguric and has voided 3 7 L of urine  Renal function has also improved to current creatinine of 1 04  Plan to stop IV fluid today and encouraged patient to increase oral fluid intake and plan to monitor renal function while in the hospital     2  Hypertension  Essential, overnight blood pressure has remained under good control and monitor hypertension with amlodipine 5 mg PO daily  Patient was taking Accupril and hydrochlorothiazide as outpatient and since blood pressure is under well control would not consider resuming hydrochlorothiazide or Accupril  3  Anemia  Multifactorial, earlier patient was also found to have gross hematuria  In last 24 hours patient said that she has noticed few time her urine was light pink is in color  Patient was admitted with PE and now on anticoagulation  Defer further management of anticoagulation to primary team   Current hemoglobin level is 9 9  Monitor hemoglobin level while in the hospital and consider blood transfusion if hemoglobin level drops below 7  Disposition:  Stable from renal standpoint for discharge  Overall above mentioned plan was also d/w Heber Vega MD  Nephrology  8/22/2020        Portions of the record may have been created with voice recognition software  Occasional wrong word or "sound a like" substitutions may have occurred due to the inherent limitations of voice recognition software  Read the chart carefully and recognize, using context, where substitutions have occurred

## 2020-08-22 NOTE — DISCHARGE INSTRUCTIONS
Pulmonary Embolism   WHAT YOU NEED TO KNOW:   A pulmonary embolism (PE) is the sudden blockage of a blood vessel in the lungs by an embolus  An embolus is a small piece of blood clot, fat, air, or tumor cells  The embolus cuts off the blood supply to your lungs  A pulmonary embolism can become life-threatening  DISCHARGE INSTRUCTIONS:   Call 911 for any of the following:   · You feel lightheaded, short of breath, and have chest pain  · You cough up blood  · You have a seizure  · You have slurred speech, increased sleepiness, or problems seeing, talking, or thinking  · You have weakness or cannot move your arm or leg on one side of your body  Seek care immediately if:   · You feel faint  · You have a severe headache  · Your heart is beating faster than normal   Contact your healthcare provider if:   · The skin on any part of your legs or hips turns purple  · Your gums or nose bleed  · You see blood in your urine or bowel movements  · Your bowel movements are black or darker than normal     · You have questions or concerns about your condition or care  Medicines:   · Blood thinners  help treat the PE and prevent new clots from forming  Examples of blood thinners include heparin, rivaroxaban, apixiban, and warfarin  The following are general safety guidelines to follow while you are taking a blood thinner:     ¨ Watch for bleeding and bruising  Watch for bleeding from your gums or nose  Watch for blood in your urine and bowel movements  Use a soft washcloth on your skin, and a soft toothbrush to brush your teeth  This can keep your skin and gums from bleeding  If you shave, use an electric shaver  Do not play contact sports  ¨ Tell your dentist and other healthcare providers that you take a blood thinner  Wear a bracelet or necklace that says you take this medicine  ¨ Do not start or stop any medicines unless your healthcare provider tells you to   Many medicines cannot be used with blood thinners  ¨ Tell your healthcare provider right away if you forget to take the blood thinner , or if you take too much  ¨ Warfarin  is a blood thinner that you may need to take  The following are additional things you should be aware of if you take warfarin:    § Foods and medicines can affect the amount of warfarin in your blood  Do not make major changes to your diet  Warfarin works best when you eat about the same amount of vitamin K every day  Vitamin K is found in green leafy vegetables and certain other foods  Ask for more information about what to eat or not to eat  § You will need to see your healthcare provider for follow-up visits  You will need regular blood tests to decide how much warfarin you need  · Take your medicine as directed  Contact your healthcare provider if you think your medicine is not helping or if you have side effects  Tell him or her if you are allergic to any medicine  Keep a list of the medicines, vitamins, and herbs you take  Include the amounts, and when and why you take them  Bring the list or the pill bottles to follow-up visits  Carry your medicine list with you in case of an emergency  Prevent another PE:   · Wear pressure stockings  The stockings are tight and put pressure on your legs  This improves blood flow and helps prevent clots  Wear the stockings during the day  Do not wear them when you sleep  · Exercise regularly  Ask about the best exercise plan for you  When you travel by car or work at a desk, take breaks to stand up and move around as much as possible  Rotate your feet in circles often if you sit for a long period of time  · Maintain a healthy weight  Ask your healthcare provider how much you should weigh  Ask him to help you create a weight loss plan if you are overweight  · Do not smoke  Nicotine and other chemicals in cigarettes and cigars can damage blood vessels and increase your risk for another PE   Ask your healthcare provider for information if you currently smoke and need help to quit  E-cigarettes or smokeless tobacco still contain nicotine  Talk to your healthcare provider before you use these products  Follow up with your healthcare provider as directed:  Write down your questions so you remember to ask them during your visits  © 2017 [employer] Information is for End User's use only and may not be sold, redistributed or otherwise used for commercial purposes  All illustrations and images included in CareNotes® are the copyrighted property of [employer] [place] , [employer]  or [employer]  The above information is an  only  It is not intended as medical advice for individual conditions or treatments  Talk to your doctor, nurse or pharmacist before following any medical regimen to see if it is safe and effective for you

## 2020-08-22 NOTE — SOCIAL WORK
ISHAAN notified by [facility] that pt is being discharged on eliquis and to provide an eliquis card  CM called pt via her room phone to discuss  She said that she has prescription coverage through [facility]  CM informed her that CM will come to room to provide a free 30 day trial eliquis card  CM met with pt's  while pt in the bathroom  CM provided him with the free 30 day trial card and explained that they will have to provide it to the Pharmacy so that it can be processed

## 2020-08-22 NOTE — INCIDENTAL FINDINGS
The following findings require follow up:  Radiographic finding   Finding: US kidney and bladder: Mild right-sided hydronephrosis, not present on the prior CT  This is of uncertain etiology as no stones were present in the right kidney on 8/14/2020  This may be secondary to a blood clot given the history of gross hematuria   Follow up required: Follow up should be done within 2 day(s)    Please notify the following clinician to assist with the follow up:     Urology    Diego Tsang MD

## 2020-08-22 NOTE — ASSESSMENT & PLAN NOTE
Patient was evaluated by Nephrology  Resolved now  Lisinopril discontinued and started on amlodipine for hypertension    Follow-up with nephrology as outpatient

## 2020-08-22 NOTE — ASSESSMENT & PLAN NOTE
· Evaluated by Urology  Ko catheter removed yesterday  Voided  · Gross hematuria also resolved    Cleared by Urology for discharge  · Follow-up urology as outpatient

## 2020-08-22 NOTE — DISCHARGE SUMMARY
Discharge- Norman Knott 1955, 72 y o  female MRN: [id number]    Unit/Bed#: -01 Encounter: [id number]    Primary Care Provider: Morgan Garcia MD   Date and time admitted to hospital: 8/12/2020  4:03 PM        JENNIFER (acute kidney injury) [facility])  Assessment & Plan  Patient was evaluated by Nephrology  Resolved now  Lisinopril discontinued and started on amlodipine for hypertension  Follow-up with nephrology as outpatient    Acute diarrhea  Assessment & Plan  · Persistent, benign abdominal exam  · C  Diff PCR negative   Better now    Traumatic ecchymosis of buttock  Assessment & Plan  · Likely related to underlying morbid obesity, tPA and heparin gtt   · Continue local wound care    Gross hematuria  Assessment & Plan  · Evaluated by Urology  Ko catheter removed yesterday  Voided  · Gross hematuria also resolved  Cleared by Urology for discharge  · Follow-up urology as outpatient      SIRS (systemic inflammatory response syndrome) ([facility] [address] )  Assessment & Plan  · Resolved    Hyperglycemia  Assessment & Plan  · Mild, last hgbA1c 5 9% on 7/23  · Stable    Hypothyroidism  Assessment & Plan  · Continue levothyroxine 88 mcg oral daily  Hyperlipidemia  Assessment & Plan  · Continue pravastatin 80 mg oral daily  Anxiety  Assessment & Plan  · Resume home medication    HTN (hypertension)  Assessment & Plan  · Patient currently on Norvasc 5 mg daily  Lisinopril discontinued    Asthma  Assessment & Plan  · Does not appear to be in acute exacerbation at this time   · Resume medication    * Acute pulmonary embolism (HCC)  Assessment & Plan  · Status post tPA  · Patient started on Eliquis  Currently getting 10 mg b i d  Day 3  She will get total 7 days of 10 mg b i d  Jennifer Nanny · After that she will continue 5 mg b i d  · No gross hematuria now  Hemoglobin remained stable  · Will check CBC and BMP next week and follow-up with PCP            Discharging Physician / Practitioner: Peyman Briceño MD  PCP: Benito Gupta MD  Admission Date:   Admission Orders (From admission, onward)     Ordered        08/12/20 2011  Inpatient Admission  Once                   Discharge Date: 08/22/20    Resolved Problems  Date Reviewed: 8/16/2020    None          Consultations During Hospital Stay:  · Nephrology, intensive care unit, Urology    Procedures Performed:   · CT head, CT angiogram of chest abdomen pelvis, venous Doppler, CT abdomen pelvis, U S  Kidney, x-ray abdomen, echocardiogram    Significant Findings / Test Results:   · Echo-  SUMMARY     PROCEDURE INFORMATION:  This was a technically difficult study      LEFT VENTRICLE:  Systolic function was normal  Ejection fraction was estimated to be 60 %  Although no diagnostic regional wall motion abnormality was identified, this possibility cannot be completely excluded on the basis of this study      RIGHT VENTRICLE:  The size was normal   Systolic function was normal      MITRAL VALVE:  There was trace regurgitation      TRICUSPID VALVE:  There was trace regurgitation  Pulmonary artery systolic pressure was within the normal range      IVC, HEPATIC VEINS:  The inferior vena cava was dilated  Respirophasic changes were normal         CT angiogram of chest, abdomen and pelvis  IMPRESSION:     PULMONARY ARTERIAL TREE:  Bilateral pulmonary emboli in the main, lobar, segmental and subsegmental branches  RV/LV ratio measures 1 7 suggesting right heart strain      LUNGS:  Right middle lobe consolidation could relate to pneumonia and/or pulmonary infarct  Posttreatment follow-up to radiographic resolution is recommended with unenhanced chest CT in 3 months      Incidental Findings:   ·      Test Results Pending at Discharge (will require follow up):   ·      Outpatient Tests Requested:  ·     Complications:      Reason for Admission:  Shortness of breath    Hospital Course:     Wade Fields is a 72 y o  female patient who originally presented to the hospital on 8/12/2020 due to shortness of breath  Found to have bilateral PE  Initially she was started on heparin drip and then decision was made for tPA  She was in intensive care  From there she developed gross hematuria  Evaluated by Nephrology and Urology  Aubrey I slowly improving  She was started on Eliquis  Tolerated well  Gross hematuria also resolved  Hemoglobin remains stable  I have seen and examined the patient bedside today  She feels fine  No trouble urinating  Hematuria almost resolved, urine color very light pinkish  Patient denies any shortness of breath now, currently on room air and saturating well  Clinically stable for discharge  CBC and BMP next week and follow-up with PCP  Please see above list of diagnoses and related plan for additional information  Condition at Discharge: good     Discharge Day Visit / Exam:     Subjective:    Vitals: Blood Pressure: 125/69 (08/22/20 0835)  Pulse: 95 (08/22/20 0835)  Temperature: 97 6 °F (36 4 °C) (08/22/20 0835)  Temp Source: Oral (08/21/20 0111)  Respirations: 18 (08/22/20 0835)  Height: 4' 10" (147 3 cm) (08/12/20 2330)  Weight - Scale: 94 8 kg (208 lb 15 9 oz) (08/22/20 0544)  SpO2: 94 % (08/22/20 0835)  Exam:   Physical Exam  General- Awake, alert and oriented x 3, looks comfortable  HEENT- Normocephalic, atraumatic  CVS- Normal S1/ S2, Regular rate and rhythm  Respiratory system- B/L clear breath sounds, no wheezing  Abdomen- Soft, Non distended, no tenderness, Bowel sound- present  CNS-  No acute focal neurologic deficit noted  Discussion with Family:     Discharge instructions/Information to patient and family:   See after visit summary for information provided to patient and family  Provisions for Follow-Up Care:  See after visit summary for information related to follow-up care and any pertinent home health orders        Disposition:     Home with VNA Services (Reminder: Complete face to face encounter)    For Discharges to [place] [id number] Affiliated SNF:   · Not Applicable to this Patient - Not Applicable to this Patient    Planned Readmission:      Discharge Statement:  I spent 35 minutes discharging the patient  This time was spent on the day of discharge  I had direct contact with the patient on the day of discharge  Greater than 50% of the total time was spent examining patient, answering all patient questions, arranging and discussing plan of care with patient as well as directly providing post-discharge instructions  Additional time then spent on discharge activities  Discharge Medications:  See after visit summary for reconciled discharge medications provided to patient and family        ** Please Note: This note has been constructed using a voice recognition system **

## 2020-08-22 NOTE — DISCHARGE INSTR - AVS FIRST PAGE
Up with PCP in 1 week  CBC and BMP 5 days and follow-up with PCP  Aspirin discontinued  Follow-up with PCP regarding that  Blood pressure medication changed  Follow-up with Nephrology as outpatient  Fall precautions  Continue taking Eliquis  Do not stop it unless instructed by any physician  Come back to the emergency room if condition worsen or recur

## 2020-08-22 NOTE — SOCIAL WORK
CM notified by [facility] that pt is cleared for discharge  CM notified Violet Harris that pt is being discharged today

## 2020-08-24 ENCOUNTER — TELEPHONE (OUTPATIENT)
Dept: NEPHROLOGY | Facility: CLINIC | Age: 65
End: 2020-08-24

## 2020-08-24 ENCOUNTER — TRANSITIONAL CARE MANAGEMENT (OUTPATIENT)
Dept: INTERNAL MEDICINE CLINIC | Facility: CLINIC | Age: 65
End: 2020-08-24

## 2020-08-24 ENCOUNTER — TELEPHONE (OUTPATIENT)
Dept: INTERNAL MEDICINE CLINIC | Facility: CLINIC | Age: 65
End: 2020-08-24

## 2020-08-24 DIAGNOSIS — N17.9 AKI (ACUTE KIDNEY INJURY) (HCC): Primary | ICD-10-CM

## 2020-08-24 DIAGNOSIS — K12.1 ORAL ULCER: Primary | ICD-10-CM

## 2020-08-24 RX ORDER — LIDOCAINE HYDROCHLORIDE 20 MG/ML
15 SOLUTION OROPHARYNGEAL 4 TIMES DAILY PRN
Qty: 240 ML | Refills: 1 | Status: SHIPPED | OUTPATIENT
Start: 2020-08-24 | End: 2020-09-30

## 2020-08-24 NOTE — TELEPHONE ENCOUNTER
I called and spoke to pt  appt 10/1 BV office w Dr Thanh Kaufman for cysto / Keegan Schroeder f/u   Reginaldo North   pt aware  Gave location and call center number

## 2020-08-24 NOTE — TELEPHONE ENCOUNTER
Spoke with the pt and informed her that appt was scheduled for 09/16/20 with Dr Winchester  at 4:00  Pt will do labs before that appt  (BMP in Epic)

## 2020-08-24 NOTE — TELEPHONE ENCOUNTER
Patient was discharged from [facility] on 8/22   Made and appt with Omar Issa for 8/27 at [facility]

## 2020-08-24 NOTE — TELEPHONE ENCOUNTER
I sent a prescription for the viscous lidocaine which is the active ingredient of the magic mouthwash

## 2020-08-24 NOTE — TELEPHONE ENCOUNTER
Patient was discharge from [facility] on 8/22/2020 mouthwash was order,but is on back order requesting something difference still has sores in mouth very painful

## 2020-08-24 NOTE — TELEPHONE ENCOUNTER
Pt's  called to schedule hosp follow up in 2 weeks  Dr Nusrat Ferrer when would you like to see her, you are booked and what labs  Please let me know

## 2020-08-24 NOTE — TELEPHONE ENCOUNTER
Please schedule her for next available 30 minutes slot  Can you please also order BMP before visit      Rakel Mendosa

## 2020-08-25 DIAGNOSIS — G89.29 OTHER CHRONIC PAIN: ICD-10-CM

## 2020-08-25 RX ORDER — TRAMADOL HYDROCHLORIDE 50 MG/1
TABLET ORAL
Qty: 90 TABLET | OUTPATIENT
Start: 2020-08-25

## 2020-08-25 RX ORDER — DULOXETIN HYDROCHLORIDE 30 MG/1
90 CAPSULE, DELAYED RELEASE ORAL DAILY
Qty: 270 CAPSULE | Refills: 3 | Status: SHIPPED | OUTPATIENT
Start: 2020-08-25 | End: 2020-12-21 | Stop reason: CLARIF

## 2020-08-26 ENCOUNTER — APPOINTMENT (OUTPATIENT)
Dept: LAB | Facility: HOSPITAL | Age: 65
End: 2020-08-26
Attending: INTERNAL MEDICINE
Payer: MEDICARE

## 2020-08-26 DIAGNOSIS — I26.09 PULMONARY EMBOLISM WITH ACUTE COR PULMONALE (HCC): ICD-10-CM

## 2020-08-26 LAB
ANION GAP SERPL CALCULATED.3IONS-SCNC: 6 MMOL/L (ref 4–13)
BASOPHILS # BLD AUTO: 0.04 THOUSANDS/ΜL (ref 0–0.1)
BASOPHILS NFR BLD AUTO: 0 % (ref 0–1)
BUN SERPL-MCNC: 14 MG/DL (ref 5–25)
CALCIUM SERPL-MCNC: 9.3 MG/DL (ref 8.3–10.1)
CHLORIDE SERPL-SCNC: 101 MMOL/L (ref 100–108)
CO2 SERPL-SCNC: 32 MMOL/L (ref 21–32)
CREAT SERPL-MCNC: 1.01 MG/DL (ref 0.6–1.3)
EOSINOPHIL # BLD AUTO: 0.21 THOUSAND/ΜL (ref 0–0.61)
EOSINOPHIL NFR BLD AUTO: 2 % (ref 0–6)
ERYTHROCYTE [DISTWIDTH] IN BLOOD BY AUTOMATED COUNT: 15 % (ref 11.6–15.1)
GFR SERPL CREATININE-BSD FRML MDRD: 59 ML/MIN/1.73SQ M
GLUCOSE P FAST SERPL-MCNC: 113 MG/DL (ref 65–99)
HCT VFR BLD AUTO: 34.4 % (ref 34.8–46.1)
HGB BLD-MCNC: 10.6 G/DL (ref 11.5–15.4)
IMM GRANULOCYTES # BLD AUTO: 0.14 THOUSAND/UL (ref 0–0.2)
IMM GRANULOCYTES NFR BLD AUTO: 1 % (ref 0–2)
LYMPHOCYTES # BLD AUTO: 1.9 THOUSANDS/ΜL (ref 0.6–4.47)
LYMPHOCYTES NFR BLD AUTO: 18 % (ref 14–44)
MCH RBC QN AUTO: 27.5 PG (ref 26.8–34.3)
MCHC RBC AUTO-ENTMCNC: 30.8 G/DL (ref 31.4–37.4)
MCV RBC AUTO: 89 FL (ref 82–98)
MONOCYTES # BLD AUTO: 0.83 THOUSAND/ΜL (ref 0.17–1.22)
MONOCYTES NFR BLD AUTO: 8 % (ref 4–12)
NEUTROPHILS # BLD AUTO: 7.39 THOUSANDS/ΜL (ref 1.85–7.62)
NEUTS SEG NFR BLD AUTO: 71 % (ref 43–75)
NRBC BLD AUTO-RTO: 0 /100 WBCS
PLATELET # BLD AUTO: 364 THOUSANDS/UL (ref 149–390)
PMV BLD AUTO: 8 FL (ref 8.9–12.7)
POTASSIUM SERPL-SCNC: 4.4 MMOL/L (ref 3.5–5.3)
RBC # BLD AUTO: 3.86 MILLION/UL (ref 3.81–5.12)
SODIUM SERPL-SCNC: 139 MMOL/L (ref 136–145)
WBC # BLD AUTO: 10.51 THOUSAND/UL (ref 4.31–10.16)

## 2020-08-26 PROCEDURE — 80048 BASIC METABOLIC PNL TOTAL CA: CPT

## 2020-08-26 PROCEDURE — 36415 COLL VENOUS BLD VENIPUNCTURE: CPT

## 2020-08-26 PROCEDURE — 85025 COMPLETE CBC W/AUTO DIFF WBC: CPT

## 2020-08-27 ENCOUNTER — OFFICE VISIT (OUTPATIENT)
Dept: INTERNAL MEDICINE CLINIC | Facility: CLINIC | Age: 65
End: 2020-08-27
Payer: MEDICARE

## 2020-08-27 VITALS
TEMPERATURE: 97.7 F | DIASTOLIC BLOOD PRESSURE: 84 MMHG | WEIGHT: 201 LBS | BODY MASS INDEX: 42.19 KG/M2 | SYSTOLIC BLOOD PRESSURE: 142 MMHG | HEIGHT: 58 IN | RESPIRATION RATE: 16 BRPM | HEART RATE: 94 BPM

## 2020-08-27 DIAGNOSIS — I26.99 PULMONARY EMBOLISM, OTHER, UNSPECIFIED CHRONICITY, UNSPECIFIED WHETHER ACUTE COR PULMONALE PRESENT (HCC): ICD-10-CM

## 2020-08-27 DIAGNOSIS — D05.12 DUCTAL CARCINOMA IN SITU (DCIS) OF LEFT BREAST: ICD-10-CM

## 2020-08-27 DIAGNOSIS — R19.7 ACUTE DIARRHEA: Primary | ICD-10-CM

## 2020-08-27 DIAGNOSIS — G89.29 OTHER CHRONIC PAIN: ICD-10-CM

## 2020-08-27 DIAGNOSIS — S30.0XXS: ICD-10-CM

## 2020-08-27 DIAGNOSIS — K21.9 GERD (GASTROESOPHAGEAL REFLUX DISEASE): ICD-10-CM

## 2020-08-27 DIAGNOSIS — R93.89 ABNORMAL CHEST CT: ICD-10-CM

## 2020-08-27 DIAGNOSIS — J45.20 MILD INTERMITTENT ASTHMA WITHOUT COMPLICATION: ICD-10-CM

## 2020-08-27 DIAGNOSIS — I10 ESSENTIAL HYPERTENSION: Chronic | ICD-10-CM

## 2020-08-27 DIAGNOSIS — N17.9 AKI (ACUTE KIDNEY INJURY) (HCC): ICD-10-CM

## 2020-08-27 DIAGNOSIS — I26.09 PULMONARY EMBOLISM WITH ACUTE COR PULMONALE (HCC): ICD-10-CM

## 2020-08-27 DIAGNOSIS — I26.99 OTHER ACUTE PULMONARY EMBOLISM WITHOUT ACUTE COR PULMONALE (HCC): ICD-10-CM

## 2020-08-27 PROCEDURE — 3008F BODY MASS INDEX DOCD: CPT | Performed by: INTERNAL MEDICINE

## 2020-08-27 PROCEDURE — 99496 TRANSJ CARE MGMT HIGH F2F 7D: CPT | Performed by: NURSE PRACTITIONER

## 2020-08-27 RX ORDER — TRAMADOL HYDROCHLORIDE 50 MG/1
50 TABLET ORAL EVERY 8 HOURS PRN
Qty: 30 TABLET | Refills: 0 | Status: CANCELLED | OUTPATIENT
Start: 2020-08-27

## 2020-08-27 RX ORDER — TRAMADOL HYDROCHLORIDE 50 MG/1
50 TABLET ORAL EVERY 8 HOURS PRN
Qty: 90 TABLET | Refills: 3 | Status: CANCELLED
Start: 2020-08-27

## 2020-08-27 RX ORDER — FAMOTIDINE 10 MG
10 TABLET ORAL DAILY
Qty: 90 TABLET | Refills: 3 | Status: SHIPPED | OUTPATIENT
Start: 2020-08-27 | End: 2022-02-21

## 2020-08-27 RX ORDER — AMLODIPINE BESYLATE 5 MG/1
5 TABLET ORAL DAILY
Qty: 90 TABLET | Refills: 3 | Status: SHIPPED | OUTPATIENT
Start: 2020-08-27 | End: 2021-02-26 | Stop reason: SDUPTHER

## 2020-08-27 NOTE — PROGRESS NOTES
Assessment/Plan:   PE   Status post tPA anticoagulated with Eliquis likely indefinitely now 5 mg b i d  she had abnormality on CT could have been infarct from PE we will follow-up in 3 months    Depression stable on current medication     hypertension taken off Ace, HCTZ continue to monitor  Closely     acute kidney injury creatinine and GFR back to baseline following with Nephrology     hematuria scheduled to see urology for cystoscopy         Diagnoses and all orders for this visit:    Acute diarrhea    Essential hypertension  -     amLODIPine (NORVASC) 5 mg tablet; Take 1 tablet (5 mg total) by mouth daily    GERD (gastroesophageal reflux disease)  -     famotidine (PEPCID) 10 mg tablet; Take 1 tablet (10 mg total) by mouth daily    Mild intermittent asthma without complication    Other acute pulmonary embolism without acute cor pulmonale (HCC)    JENNIFER (acute kidney injury) ([facility] [address] )    Ductal carcinoma in situ (DCIS) of left breast    Traumatic ecchymosis of buttock, sequela    Abnormal chest CT  -     CT chest wo contrast; Future    Other chronic pain  -     traMADol (ULTRAM) 50 mg tablet; Take 1 tablet (50 mg total) by mouth every 8 (eight) hours as needed for moderate pain    Pulmonary embolism with acute cor pulmonale (HCC)    Pulmonary embolism, other, unspecified chronicity, unspecified whether acute cor pulmonale present (HCC)  -     apixaban (ELIQUIS) 5 mg; Take 1 tablet (5 mg total) by mouth 2 (two) times a day    Other orders  -     Cancel: traMADol (ULTRAM) 50 mg tablet; Take 1 tablet (50 mg total) by mouth every 8 (eight) hours as needed for severe pain         Subjective:     Patient ID: Denise Eagle is a 72 y o  female  Pt was having sob for several days, felt it was her asthma using nebs - in hindsight was having right calf pain   Was presyncopal - ems came to house - was found to have PE- had TPA-   Has small blisters on both legs    hematuria while hospitalized needs follow-up with urology for cystoscopy          Review of Systems   Constitutional: Negative for appetite change, chills, diaphoresis, fatigue, fever and unexpected weight change  HENT: Negative for postnasal drip and sneezing  Eyes: Negative for visual disturbance  Respiratory: Negative for chest tightness and shortness of breath  Cardiovascular: Negative for chest pain, palpitations and leg swelling  Gastrointestinal: Negative for abdominal pain and blood in stool  Endocrine: Negative for cold intolerance, heat intolerance, polydipsia, polyphagia and polyuria  Genitourinary: Negative for difficulty urinating, dysuria, frequency and urgency  Musculoskeletal: Negative for arthralgias and myalgias  Skin: Negative for rash and wound  Neurological: Negative for dizziness, weakness, light-headedness and headaches  Hematological: Negative for adenopathy  Psychiatric/Behavioral: Negative for confusion, dysphoric mood and sleep disturbance  The patient is not nervous/anxious  Objective:     Physical Exam  Constitutional:       General: She is not in acute distress  Appearance: She is well-developed  She is not diaphoretic  HENT:      Head: Normocephalic and atraumatic  Nose: Nose normal    Eyes:      Conjunctiva/sclera: Conjunctivae normal       Pupils: Pupils are equal, round, and reactive to light  Neck:      Musculoskeletal: Normal range of motion and neck supple  Thyroid: No thyromegaly  Vascular: No JVD  Trachea: No tracheal deviation  Cardiovascular:      Rate and Rhythm: Normal rate and regular rhythm  Heart sounds: Normal heart sounds  No murmur  No friction rub  No gallop  Pulmonary:      Effort: Pulmonary effort is normal  No respiratory distress  Breath sounds: Normal breath sounds  No wheezing or rales  Abdominal:      General: Bowel sounds are normal  There is no distension  Palpations: Abdomen is soft  Tenderness:  There is no abdominal tenderness  Musculoskeletal: Normal range of motion  Lymphadenopathy:      Cervical: No cervical adenopathy  Skin:     General: Skin is warm and dry  Findings: No rash  Comments: Blisters to legs   Neurological:      Mental Status: She is alert and oriented to person, place, and time  Cranial Nerves: No cranial nerve deficit  Psychiatric:         Behavior: Behavior normal          Thought Content: Thought content normal          Judgment: Judgment normal            Vitals:    08/27/20 1355   BP: 142/84   BP Location: Left arm   Patient Position: Sitting   Cuff Size: Standard   Pulse: 94   Resp: 16   Temp: 97 7 °F (36 5 °C)   TempSrc: Temporal   Weight: 91 2 kg (201 lb)   Height: 4' 10" (1 473 m)       Transitional Care Management Review:  Angie Rodriguez is a 72 y o  female here for TCM follow up  During the TCM phone call patient stated:    TCM Call (since 7/27/2020)     Date and time call was made  8/25/2020  9:44 AM    Hospital care reviewed  Records reviewed    Patient was hospitialized at  [facility]    Date of Admission  08/12/20    Date of discharge  08/22/20    Diagnosis  Acute pulmonary embolism     Disposition  Home    Were the patients medications reviewed and updated  Yes (Comment)  magic mouthwash, amlodipine, apixaban, pepcid, tramadol    Current Symptoms  None      TCM Call (since 7/27/2020)     Scheduled for follow up?   Yes    Patients specialists  Other (comment)    Other specialists names  Dr Barbara Valle    Did you obtain your prescribed medications  Yes    Do you need help managing your prescriptions or medications  No    Is transportation to your appointment needed  No    I have advised the patient to call PCP with any new or worsening symptoms  [address] or [facility] other    Are you recieving any outpatient services  No    Are you recieving home care services  Yes    Types of home care services  Nurse visit; Home PT    Have you fallen in the last 12 months  Yes    How many times  Patients  said there has been multiple falls            BERNARD Gillespie

## 2020-08-27 NOTE — PATIENT INSTRUCTIONS
PE   Status post tPA anticoagulated with Eliquis likely indefinitely now 5 mg b i d  she had abnormality on CT could have been infarct from PE we will follow-up in 3 months    Depression stable on current medication     hypertension taken off Ace, HCTZ continue to monitor  Closely     acute kidney injury creatinine and GFR back to baseline following with Nephrology     hematuria scheduled to see urology for cystoscopy

## 2020-08-31 ENCOUNTER — TELEPHONE (OUTPATIENT)
Dept: INTERNAL MEDICINE CLINIC | Facility: CLINIC | Age: 65
End: 2020-08-31

## 2020-08-31 DIAGNOSIS — R30.0 DYSURIA: Primary | ICD-10-CM

## 2020-09-01 ENCOUNTER — APPOINTMENT (OUTPATIENT)
Dept: LAB | Facility: CLINIC | Age: 65
End: 2020-09-01
Payer: MEDICARE

## 2020-09-01 ENCOUNTER — TELEPHONE (OUTPATIENT)
Dept: INTERNAL MEDICINE CLINIC | Facility: CLINIC | Age: 65
End: 2020-09-01

## 2020-09-01 LAB
BACTERIA UR QL AUTO: ABNORMAL /HPF
BILIRUB UR QL STRIP: NEGATIVE
CLARITY UR: CLEAR
COLOR UR: YELLOW
GLUCOSE UR STRIP-MCNC: NEGATIVE MG/DL
HGB UR QL STRIP.AUTO: ABNORMAL
HYALINE CASTS #/AREA URNS LPF: ABNORMAL /LPF
KETONES UR STRIP-MCNC: NEGATIVE MG/DL
LEUKOCYTE ESTERASE UR QL STRIP: ABNORMAL
NITRITE UR QL STRIP: NEGATIVE
NON-SQ EPI CELLS URNS QL MICRO: ABNORMAL /HPF
PH UR STRIP.AUTO: 6.5 [PH]
PROT UR STRIP-MCNC: NEGATIVE MG/DL
RBC #/AREA URNS AUTO: ABNORMAL /HPF
SP GR UR STRIP.AUTO: 1.01 (ref 1–1.03)
UROBILINOGEN UR QL STRIP.AUTO: 0.2 E.U./DL
WBC #/AREA URNS AUTO: ABNORMAL /HPF

## 2020-09-01 PROCEDURE — 81001 URINALYSIS AUTO W/SCOPE: CPT | Performed by: INTERNAL MEDICINE

## 2020-09-01 NOTE — TELEPHONE ENCOUNTER
----- Message from Archana Mccollum MD sent at 9/1/2020  3:06 PM EDT -----  Notify-urine is not indicative of infection

## 2020-09-09 ENCOUNTER — APPOINTMENT (OUTPATIENT)
Dept: LAB | Facility: HOSPITAL | Age: 65
End: 2020-09-09
Payer: MEDICARE

## 2020-09-09 DIAGNOSIS — E03.9 ACQUIRED HYPOTHYROIDISM: ICD-10-CM

## 2020-09-09 LAB — TSH SERPL DL<=0.05 MIU/L-ACNC: 0.72 UIU/ML (ref 0.36–3.74)

## 2020-09-09 PROCEDURE — 36415 COLL VENOUS BLD VENIPUNCTURE: CPT

## 2020-09-09 PROCEDURE — 84443 ASSAY THYROID STIM HORMONE: CPT

## 2020-09-10 ENCOUNTER — TELEPHONE (OUTPATIENT)
Dept: INTERNAL MEDICINE CLINIC | Facility: CLINIC | Age: 65
End: 2020-09-10

## 2020-09-10 NOTE — TELEPHONE ENCOUNTER
----- Message from BERNARD Grant sent at 9/10/2020  8:00 AM EDT -----  Thyroid normal keep same dose

## 2020-09-13 DIAGNOSIS — E78.5 HYPERLIPIDEMIA, UNSPECIFIED HYPERLIPIDEMIA TYPE: ICD-10-CM

## 2020-09-14 RX ORDER — SIMVASTATIN 40 MG
TABLET ORAL
Qty: 90 TABLET | Refills: 3 | Status: SHIPPED | OUTPATIENT
Start: 2020-09-14 | End: 2021-11-08

## 2020-09-15 ENCOUNTER — TELEPHONE (OUTPATIENT)
Dept: NEPHROLOGY | Facility: CLINIC | Age: 65
End: 2020-09-15

## 2020-09-15 NOTE — TELEPHONE ENCOUNTER
I called Hanna Herbert @ [facility] Rep @ [phone] to check eligible for the patient and as of 9/15/2020 the patient has current active coverage as of 3/15/2020  Patient Medicare is primary and [employer] is her secondary  I did call the patient and I explained this to the patient and the patient understood and was okay with it   Lucio Marte,

## 2020-09-16 ENCOUNTER — OFFICE VISIT (OUTPATIENT)
Dept: NEPHROLOGY | Facility: CLINIC | Age: 65
End: 2020-09-16
Payer: MEDICARE

## 2020-09-16 VITALS
HEIGHT: 59 IN | RESPIRATION RATE: 16 BRPM | WEIGHT: 209.8 LBS | HEART RATE: 110 BPM | DIASTOLIC BLOOD PRESSURE: 74 MMHG | BODY MASS INDEX: 42.29 KG/M2 | SYSTOLIC BLOOD PRESSURE: 144 MMHG | TEMPERATURE: 97.5 F

## 2020-09-16 DIAGNOSIS — G89.29 OTHER CHRONIC PAIN: ICD-10-CM

## 2020-09-16 DIAGNOSIS — N17.9 AKI (ACUTE KIDNEY INJURY) (HCC): Primary | ICD-10-CM

## 2020-09-16 DIAGNOSIS — I10 ESSENTIAL HYPERTENSION: Chronic | ICD-10-CM

## 2020-09-16 DIAGNOSIS — D64.9 ANEMIA, UNSPECIFIED TYPE: ICD-10-CM

## 2020-09-16 PROCEDURE — 99214 OFFICE O/P EST MOD 30 MIN: CPT | Performed by: INTERNAL MEDICINE

## 2020-09-16 RX ORDER — TRAMADOL HYDROCHLORIDE 50 MG/1
50 TABLET ORAL EVERY 8 HOURS PRN
Qty: 90 TABLET | Refills: 3 | Status: SHIPPED | OUTPATIENT
Start: 2020-09-16 | End: 2021-06-23

## 2020-09-16 NOTE — LETTER
September 16, 2020     Joe He MD  [address], [place] [address]    Patient: Belle Solitario   YOB: 1955   Date of Visit: 9/16/2020       Dear Dr Elvis Starkey: Thank you for referring Belle Solitario to me for evaluation  Below are my notes for this consultation  If you have questions, please do not hesitate to call me  I look forward to following your patient along with you  Sincerely,        Alka Holm MD        CC: No Recipients  Alka Holm MD  9/16/2020  4:40 PM  Sign when Signing Visit  NEPHROLOGY OFFICE FOLLOW UP  Belle Solitario 72 y o  female Date of Birth: @ MRN: [id number]    Encounter: [id number] DATE: 9/16/2020    REASON FOR VISIT: Belle Solitario is a 72 y o  female who is here on 9/16/2020 for hospital follow-up for further management of JENNIFER  HPI:    This is 70-year-old female with past medical history significant for pulmonary embolism, returns to Nephrology office today for hospital follow-up for further management of JENNIFER  Patient was admitted to [facility] last month with shortness of breath and had underwent CTA PE protocol on admission and was diagnosed with acute PE  Patient has received tPA on admission and now on Eliquis  During the hospital stay patient has also developed JENNIFER  Upon review of old medical records, previously known baseline creatinine was around 0 8  Patient was discharged on 8/22/2020 with creatinine of 1 04  Patient was also found to have gross hematuria during the hospital stay which was suspected due to use of tPA on top of Eliquis  Currently denies any gross hematuria  Patient also have urge to urinate and also have schedule appointment to see urologist in near future  Patient has underlying hypertension which seems to be under good control on current regiment  Accupril and hydrochlorothiazide were stopped during recent hospital stay due to JENNIFER  Currently patient is on amlodipine  Patient was also found to have anemia during the hospital stay and was discharged with hemoglobin of 9 9  Patient takes all the medications as prescribed and denies use of NSAIDs  REVIEW OF SYSTEMS:    Review of Systems   Constitutional: Negative for chills and fever  HENT: Negative for nosebleeds and sore throat  Eyes: Negative for photophobia and pain  Respiratory: Negative for choking and wheezing  Cardiovascular: Negative for chest pain and palpitations  Gastrointestinal: Negative for abdominal pain and blood in stool  Endocrine: Negative for cold intolerance and heat intolerance  Genitourinary: Negative for flank pain and hematuria  Musculoskeletal: Negative for joint swelling and neck pain  Skin: Negative for color change and pallor  Allergic/Immunologic: Negative for environmental allergies and immunocompromised state  Neurological: Negative for seizures and syncope  Hematological: Negative for adenopathy  Does not bruise/bleed easily  Psychiatric/Behavioral: Negative for confusion and suicidal ideas           PAST MEDICAL HISTORY:  Past Medical History:   Diagnosis Date    Allergic rhinitis     Anemia     Cancer ([address] )     breast cancer, Left side    Depression     Disease of thyroid gland     GERD (gastroesophageal reflux disease)     Hyperlipemia     Hypertension     Hypothyroidism     Last assessed: 3/25/14    Obesity     Osteoarthritis     Pre-operative laboratory examination        PAST SURGICAL HISTORY:  Past Surgical History:   Procedure Laterality Date    HAND SURGERY Left 03/2020    HYSTERECTOMY      INCISIONAL BREAST BIOPSY      JOINT REPLACEMENT      KNEE ARTHROPLASTY      ROTATOR CUFF REPAIR      SHOULDER SURGERY      TRIGGER FINGER RELEASE      TRIGGER FINGER RELEASE         SOCIAL HISTORY:  Social History     Substance and Sexual Activity   Alcohol Use Yes    Frequency: Monthly or less    Drinks per session: 1 or 2    Binge frequency: Never    Comment: socially      Social History     Substance and Sexual Activity   Drug Use No     Social History     Tobacco Use   Smoking Status Never Smoker   Smokeless Tobacco Never Used       FAMILY HISTORY:  Family History   Problem Relation Age of Onset    Coronary artery disease Mother     Hypertension Mother         Essential    Coronary artery disease Father        ALLERGY:  Allergies   Allergen Reactions    Tamoxifen Other (See Comments)     Headaches, stomach pain, sweats,     Nsaids Hives    Oxybutynin     Singulair [Montelukast] Swelling    Ciprofloxacin Hives    Penicillins Hives    Sulfa Antibiotics Hives    Vancomycin Hives       MEDICATIONS:    Current Outpatient Medications:     albuterol (PROAIR HFA) 90 mcg/act inhaler, Inhale 2 puffs every 6 (six) hours as needed for wheezing, Disp: , Rfl:     ALPRAZolam (XANAX) 1 mg tablet, TAKE 1/2-1 TABLET THREE TIMES A DAY AS NEEDED, Disp: 90 tablet, Rfl: 3    amLODIPine (NORVASC) 5 mg tablet, Take 1 tablet (5 mg total) by mouth daily, Disp: 90 tablet, Rfl: 3    apixaban (ELIQUIS) 5 mg, Take 1 tablet (5 mg total) by mouth 2 (two) times a day, Disp: 180 tablet, Rfl: 3    cholestyramine (QUESTRAN) 4 g packet, Take by mouth as needed , Disp: , Rfl:     clotrimazole (LOTRIMIN) 1 % cream, as needed , Disp: , Rfl:     clotrimazole-betamethasone (LOTRISONE) 1-0 05 % cream, APPLY SPARINGLY TO THE AFFECTED AREA IN GROIN 2-3 TIMES A DAY AS NEEDED, Disp: 45 g, Rfl: 1    DULoxetine (CYMBALTA) 30 mg delayed release capsule, Take 3 capsules (90 mg total) by mouth daily, Disp: 270 capsule, Rfl: 3    erythromycin (ILOTYCIN) ophthalmic ointment, as needed , Disp: , Rfl: 3    famotidine (PEPCID) 10 mg tablet, Take 1 tablet (10 mg total) by mouth daily, Disp: 90 tablet, Rfl: 3    levothyroxine 88 mcg tablet, Take 1 po daily except on Sunday take 2, Disp: 90 tablet, Rfl: 3    methocarbamol (ROBAXIN) 500 mg tablet, Take 1 tablet (500 mg total) by mouth 4 (four) times a day (Patient taking differently: Take 500 mg by mouth as needed ), Disp: 60 tablet, Rfl: 0    Mometasone Furo-Formoterol Fum (DULERA) 100-5 MCG/ACT AERO, Inhale, Disp: , Rfl:     Multiple Vitamins-Minerals (OCUVITE ADULT 50+ PO), Take by mouth, Disp: , Rfl:     NYSTATIN powder, APPLY TO AFFECTED AREA(S) SPARINGLY  TWICE A DAY, Disp: 60 g, Rfl: 3    pantoprazole (PROTONIX) 40 mg tablet, TAKE 1 TABLET BY MOUTH  DAILY, Disp: 90 tablet, Rfl: 3    simvastatin (ZOCOR) 40 mg tablet, TAKE ONE TABLET BY MOUTH EVERY DAY, Disp: 90 tablet, Rfl: 3    traMADol (ULTRAM) 50 mg tablet, TAKE 1 TABLET BY MOUTH 3  TIMES A DAY (Patient taking differently: every 8 (eight) hours as needed ), Disp: 90 tablet, Rfl: 3    Triamcinolone Acetonide (Nasacort Allergy 24HR) 55 MCG/ACT AERO, into each nostril as needed , Disp: , Rfl:     al mag oxide-diphenhydramine-lidocaine viscous (MAGIC MOUTHWASH) 1:1:1 suspension, Swish and spit 10 mL every 4 (four) hours as needed for mouth pain or discomfort for up to 5 doses (Patient not taking: Reported on 9/16/2020), Disp: 90 mL, Rfl: 0    Lidocaine Viscous HCl (XYLOCAINE) 2 % mucosal solution, Swish and spit 15 mL 4 (four) times a day as needed for mouth pain or discomfort (Patient not taking: Reported on 9/16/2020), Disp: 240 mL, Rfl: 1    PHYSICAL EXAM:  Vitals:    09/16/20 1607   BP: 144/74   BP Location: Left arm   Patient Position: Sitting   Cuff Size: Large   Pulse: (!) 110   Resp: 16   Temp: 97 5 °F (36 4 °C)   TempSrc: Temporal   Weight: 95 2 kg (209 lb 12 8 oz)   Height: 4' 11" (1 499 m)     Body mass index is 42 37 kg/m²  Physical Exam  Constitutional:       General: She is not in acute distress  HENT:      Head: Normocephalic and atraumatic  Eyes:      General: No scleral icterus  Neck:      Musculoskeletal: Neck supple  Vascular: No JVD  Cardiovascular:      Rate and Rhythm: Normal rate        Heart sounds: S1 normal and S2 normal    Pulmonary:      Effort: Pulmonary effort is normal  No accessory muscle usage or respiratory distress  Breath sounds: No wheezing  Abdominal:      General: There is no distension  Palpations: Abdomen is soft  Musculoskeletal:      Right ankle: She exhibits no swelling  Left ankle: She exhibits no swelling  Right hand: She exhibits no laceration  Left hand: She exhibits no laceration  Lymphadenopathy:      Cervical:      Right cervical: No superficial cervical adenopathy  Left cervical: No superficial cervical adenopathy  Skin:     General: Skin is warm  Neurological:      Mental Status: She is alert  She is not disoriented  Psychiatric:         Speech: She is communicative  Behavior: Behavior is not combative  LAB RESULTS:  Results for orders placed or performed in visit on 09/09/20   TSH, 3rd generation with Free T4 reflex   Result Value Ref Range    TSH 3RD GENERATON 0 722 0 358 - 3 740 uIU/mL       ASSESSMENT and PLAN:  Diagnoses and all orders for this visit:    JENNIFER (acute kidney injury) ([facility] [address] )  -     CBC and differential; Future  -     Basic metabolic panel; Future  -     Urinalysis with microscopic; Future  -     Microalbumin / creatinine urine ratio; Future    Essential hypertension    Anemia, unspecified type  -     CBC and differential; Future      1  JENNIFER  Patient was recently admitted to [address] [place] [address] and was evaluated by Nephrology team for JENNIFER which was suspected due to contrast induced nephropathy  Patient was admitted with shortness of breath and had underwent CTA PE protocol on admission and was diagnosed with acute PE but developed contrast induced nephropathy  Upon review of old medical records, previously known baseline creatinine was around 0 8  Peak creatinine level during the hospital stay was 1 44  Patient was discharged on 8/22/2020 with creatinine of 1 04    Since discharge patient's renal function seems to have slowly improved with last known creatinine of 1 01 (8/26/2020)  Clinically patient is not in volume overload state and advised patient to drink at least 2 L of water on daily basis to ensure staying well hydrated  Patient was also found to have gross hematuria during the hospital stay which was suspected due to use of tPA on top of anticoagulation  Patient denies any gross hematuria at this point  Patient also have urge to urinate and also have schedule appointment in near future with urologist for further evaluation  Management of hypertension as mentioned below  Plan to avoid NSAIDs and will plan to recheck renal function before next visit  2  Hypertension  Essential, currently blood pressure is under well control  Due to JENNIFER during recent hospital stay, Accupril and hydrochlorothiazide were stopped  Currently blood pressure is under well control and monitor hypertension with amlodipine 5 mg PO daily  Patient was also advised to follow low-salt diet  3  Anemia  Multifactorial, patient was discharged with hemoglobin of 9 9 which seems to be slowly improving with last known hemoglobin since discharge is 10 6  Patient denies any active signs of bleeding  Recheck CBC with the next visit  Returns to Nephrology office in 4 months  Plan to check CBC, BMP, urine analysis along with urine microalbumin to creatinine ratio before next visit  Portions of the record may have been created with voice recognition software  Occasional wrong word or "sound a like" substitutions may have occurred due to the inherent limitations of voice recognition software  Read the chart carefully and recognize, using context, where substitutions have occurred  If you have any questions, please contact the dictating provider

## 2020-09-16 NOTE — PROGRESS NOTES
NEPHROLOGY OFFICE FOLLOW UP  Mason Griffin 72 y o  female Date of Birth: @ MRN: [id number]    Encounter: [id number] DATE: 9/16/2020    REASON FOR VISIT: Mason Griffin is a 72 y o  female who is here on 9/16/2020 for hospital follow-up for further management of JENNIFER  HPI:    This is 22-year-old female with past medical history significant for pulmonary embolism, returns to Nephrology office today for hospital follow-up for further management of JENNIFER  Patient was admitted to [address] [place] [address] last month with shortness of breath and had underwent CTA PE protocol on admission and was diagnosed with acute PE  Patient has received tPA on admission and now on Eliquis  During the hospital stay patient has also developed JENNIFER  Upon review of old medical records, previously known baseline creatinine was around 0 8  Patient was discharged on 8/22/2020 with creatinine of 1 04  Patient was also found to have gross hematuria during the hospital stay which was suspected due to use of tPA on top of Eliquis  Currently denies any gross hematuria  Patient also have urge to urinate and also have schedule appointment to see urologist in near future  Patient has underlying hypertension which seems to be under good control on current regiment  Accupril and hydrochlorothiazide were stopped during recent hospital stay due to JENNIFER  Currently patient is on amlodipine  Patient was also found to have anemia during the hospital stay and was discharged with hemoglobin of 9 9  Patient takes all the medications as prescribed and denies use of NSAIDs  REVIEW OF SYSTEMS:    Review of Systems   Constitutional: Negative for chills and fever  HENT: Negative for nosebleeds and sore throat  Eyes: Negative for photophobia and pain  Respiratory: Negative for choking and wheezing  Cardiovascular: Negative for chest pain and palpitations  Gastrointestinal: Negative for abdominal pain and blood in stool  Endocrine: Negative for cold intolerance and heat intolerance  Genitourinary: Negative for flank pain and hematuria  Musculoskeletal: Negative for joint swelling and neck pain  Skin: Negative for color change and pallor  Allergic/Immunologic: Negative for environmental allergies and immunocompromised state  Neurological: Negative for seizures and syncope  Hematological: Negative for adenopathy  Does not bruise/bleed easily  Psychiatric/Behavioral: Negative for confusion and suicidal ideas           PAST MEDICAL HISTORY:  Past Medical History:   Diagnosis Date    Allergic rhinitis     Anemia     Cancer ([address] )     breast cancer, Left side    Depression     Disease of thyroid gland     GERD (gastroesophageal reflux disease)     Hyperlipemia     Hypertension     Hypothyroidism     Last assessed: 3/25/14    Obesity     Osteoarthritis     Pre-operative laboratory examination        PAST SURGICAL HISTORY:  Past Surgical History:   Procedure Laterality Date    HAND SURGERY Left 03/2020    HYSTERECTOMY      INCISIONAL BREAST BIOPSY      JOINT REPLACEMENT      KNEE ARTHROPLASTY      ROTATOR CUFF REPAIR      SHOULDER SURGERY      TRIGGER FINGER RELEASE      TRIGGER FINGER RELEASE         SOCIAL HISTORY:  Social History     Substance and Sexual Activity   Alcohol Use Yes    Frequency: Monthly or less    Drinks per session: 1 or 2    Binge frequency: Never    Comment: socially      Social History     Substance and Sexual Activity   Drug Use No     Social History     Tobacco Use   Smoking Status Never Smoker   Smokeless Tobacco Never Used       FAMILY HISTORY:  Family History   Problem Relation Age of Onset    Coronary artery disease Mother     Hypertension Mother         Essential    Coronary artery disease Father        ALLERGY:  Allergies   Allergen Reactions    Tamoxifen Other (See Comments)     Headaches, stomach pain, sweats,     Nsaids Hives    Oxybutynin     Singulair [Montelukast] Swelling    Ciprofloxacin Hives    Penicillins Hives    Sulfa Antibiotics Hives    Vancomycin Hives       MEDICATIONS:    Current Outpatient Medications:     albuterol (PROAIR HFA) 90 mcg/act inhaler, Inhale 2 puffs every 6 (six) hours as needed for wheezing, Disp: , Rfl:     ALPRAZolam (XANAX) 1 mg tablet, TAKE 1/2-1 TABLET THREE TIMES A DAY AS NEEDED, Disp: 90 tablet, Rfl: 3    amLODIPine (NORVASC) 5 mg tablet, Take 1 tablet (5 mg total) by mouth daily, Disp: 90 tablet, Rfl: 3    apixaban (ELIQUIS) 5 mg, Take 1 tablet (5 mg total) by mouth 2 (two) times a day, Disp: 180 tablet, Rfl: 3    cholestyramine (QUESTRAN) 4 g packet, Take by mouth as needed , Disp: , Rfl:     clotrimazole (LOTRIMIN) 1 % cream, as needed , Disp: , Rfl:     clotrimazole-betamethasone (LOTRISONE) 1-0 05 % cream, APPLY SPARINGLY TO THE AFFECTED AREA IN GROIN 2-3 TIMES A DAY AS NEEDED, Disp: 45 g, Rfl: 1    DULoxetine (CYMBALTA) 30 mg delayed release capsule, Take 3 capsules (90 mg total) by mouth daily, Disp: 270 capsule, Rfl: 3    erythromycin (ILOTYCIN) ophthalmic ointment, as needed , Disp: , Rfl: 3    famotidine (PEPCID) 10 mg tablet, Take 1 tablet (10 mg total) by mouth daily, Disp: 90 tablet, Rfl: 3    levothyroxine 88 mcg tablet, Take 1 po daily except on Sunday take 2, Disp: 90 tablet, Rfl: 3    methocarbamol (ROBAXIN) 500 mg tablet, Take 1 tablet (500 mg total) by mouth 4 (four) times a day (Patient taking differently: Take 500 mg by mouth as needed ), Disp: 60 tablet, Rfl: 0    Mometasone Furo-Formoterol Fum (DULERA) 100-5 MCG/ACT AERO, Inhale, Disp: , Rfl:     Multiple Vitamins-Minerals (OCUVITE ADULT 50+ PO), Take by mouth, Disp: , Rfl:     NYSTATIN powder, APPLY TO AFFECTED AREA(S) SPARINGLY  TWICE A DAY, Disp: 60 g, Rfl: 3    pantoprazole (PROTONIX) 40 mg tablet, TAKE 1 TABLET BY MOUTH  DAILY, Disp: 90 tablet, Rfl: 3    simvastatin (ZOCOR) 40 mg tablet, TAKE ONE TABLET BY MOUTH EVERY DAY, Disp: 90 tablet, Rfl: 3    traMADol (ULTRAM) 50 mg tablet, TAKE 1 TABLET BY MOUTH 3  TIMES A DAY (Patient taking differently: every 8 (eight) hours as needed ), Disp: 90 tablet, Rfl: 3    Triamcinolone Acetonide (Nasacort Allergy 24HR) 55 MCG/ACT AERO, into each nostril as needed , Disp: , Rfl:     al mag oxide-diphenhydramine-lidocaine viscous (MAGIC MOUTHWASH) 1:1:1 suspension, Swish and spit 10 mL every 4 (four) hours as needed for mouth pain or discomfort for up to 5 doses (Patient not taking: Reported on 9/16/2020), Disp: 90 mL, Rfl: 0    Lidocaine Viscous HCl (XYLOCAINE) 2 % mucosal solution, Swish and spit 15 mL 4 (four) times a day as needed for mouth pain or discomfort (Patient not taking: Reported on 9/16/2020), Disp: 240 mL, Rfl: 1    PHYSICAL EXAM:  Vitals:    09/16/20 1607   BP: 144/74   BP Location: Left arm   Patient Position: Sitting   Cuff Size: Large   Pulse: (!) 110   Resp: 16   Temp: 97 5 °F (36 4 °C)   TempSrc: Temporal   Weight: 95 2 kg (209 lb 12 8 oz)   Height: 4' 11" (1 499 m)     Body mass index is 42 37 kg/m²  Physical Exam  Constitutional:       General: She is not in acute distress  HENT:      Head: Normocephalic and atraumatic  Eyes:      General: No scleral icterus  Neck:      Musculoskeletal: Neck supple  Vascular: No JVD  Cardiovascular:      Rate and Rhythm: Normal rate  Heart sounds: S1 normal and S2 normal    Pulmonary:      Effort: Pulmonary effort is normal  No accessory muscle usage or respiratory distress  Breath sounds: No wheezing  Abdominal:      General: There is no distension  Palpations: Abdomen is soft  Musculoskeletal:      Right ankle: She exhibits no swelling  Left ankle: She exhibits no swelling  Right hand: She exhibits no laceration  Left hand: She exhibits no laceration  Lymphadenopathy:      Cervical:      Right cervical: No superficial cervical adenopathy  Left cervical: No superficial cervical adenopathy  Skin:     General: Skin is warm  Neurological:      Mental Status: She is alert  She is not disoriented  Psychiatric:         Speech: She is communicative  Behavior: Behavior is not combative  LAB RESULTS:  Results for orders placed or performed in visit on 09/09/20   TSH, 3rd generation with Free T4 reflex   Result Value Ref Range    TSH 3RD GENERATON 0 722 0 358 - 3 740 uIU/mL       ASSESSMENT and PLAN:  Diagnoses and all orders for this visit:    JENNIFER (acute kidney injury) ([facility] [address] )  -     CBC and differential; Future  -     Basic metabolic panel; Future  -     Urinalysis with microscopic; Future  -     Microalbumin / creatinine urine ratio; Future    Essential hypertension    Anemia, unspecified type  -     CBC and differential; Future      1  JENNIFER  Patient was recently admitted to [id number] [place] [address] and was evaluated by Nephrology team for JENNIFER which was suspected due to contrast induced nephropathy  Patient was admitted with shortness of breath and had underwent CTA PE protocol on admission and was diagnosed with acute PE but developed contrast induced nephropathy  Upon review of old medical records, previously known baseline creatinine was around 0 8  Peak creatinine level during the hospital stay was 1 44  Patient was discharged on 8/22/2020 with creatinine of 1 04  Since discharge patient's renal function seems to have slowly improved with last known creatinine of 1 01 (8/26/2020)  Clinically patient is not in volume overload state and advised patient to drink at least 2 L of water on daily basis to ensure staying well hydrated  Patient was also found to have gross hematuria during the hospital stay which was suspected due to use of tPA on top of anticoagulation  Patient denies any gross hematuria at this point  Patient also have urge to urinate and also have schedule appointment in near future with urologist for further evaluation      Management of hypertension as mentioned below  Plan to avoid NSAIDs and will plan to recheck renal function before next visit  2  Hypertension  Essential, currently blood pressure is under well control  Due to JENNIFER during recent hospital stay, Accupril and hydrochlorothiazide were stopped  Currently blood pressure is under well control and monitor hypertension with amlodipine 5 mg PO daily  Patient was also advised to follow low-salt diet  3  Anemia  Multifactorial, patient was discharged with hemoglobin of 9 9 which seems to be slowly improving with last known hemoglobin since discharge is 10 6  Patient denies any active signs of bleeding  Recheck CBC with the next visit  Returns to Nephrology office in 4 months  Plan to check CBC, BMP, urine analysis along with urine microalbumin to creatinine ratio before next visit  Labs ( reviewed on 1/14/2021 )   Creatinine 0 9 with EGFR of 67  Hemoglobin of 11 4   Urine analysis showed no dysuria  Urine microalbumin to creatinine ratio of 16 mg  Normal sodium, potassium, bicarb and calcium   Portions of the record may have been created with voice recognition software  Occasional wrong word or "sound a like" substitutions may have occurred due to the inherent limitations of voice recognition software  Read the chart carefully and recognize, using context, where substitutions have occurred  If you have any questions, please contact the dictating provider

## 2020-09-16 NOTE — TELEPHONE ENCOUNTER
Refill of traMADol (ULTRAM) 50 mg  Send to [facility] mail order 90 day supply           also wants to know if she can get the flu shot this year because she is not taking ELIQUIS

## 2020-09-17 NOTE — TELEPHONE ENCOUNTER
Patient called to report that her Nephrologist Dr Alice Sierra is requesting the Cysto be done much earlier than the 11/19 with Dr Silvestre Khan  She is reporting she can go to either office  If necessary , Dr Alice Sierra can be reached to discuss the urgency    Please call patient at [phone]  Thank you

## 2020-09-18 NOTE — TELEPHONE ENCOUNTER
For recent documentation, patient no longer having gross hematuria  Please place patient on waiting list for cystoscopy  If any sooner availability opens up, this can be offered to the patient  Thank you

## 2020-09-21 ENCOUNTER — TELEPHONE (OUTPATIENT)
Dept: NEPHROLOGY | Facility: CLINIC | Age: 65
End: 2020-09-21

## 2020-09-21 NOTE — TELEPHONE ENCOUNTER
Patient called and left a message on machine stating that she saw Dr Eda Carrasquillo on 9/16/2020 and was told by Dr Eda Carrasquillo to see if she can move up her appointment with the Urology office sooner since she is having some problems  I did call the patient back and explained to her that I did speak to Amrita Emery from the Urology Department, and Amrita Emery said that she will call the patient on Tuesday 9/22/2020  I did explain that to the patient and the patient understood and was okay with it   Jennifer Salas,

## 2020-09-22 ENCOUNTER — TELEPHONE (OUTPATIENT)
Dept: INTERNAL MEDICINE CLINIC | Facility: CLINIC | Age: 65
End: 2020-09-22

## 2020-09-22 ENCOUNTER — OFFICE VISIT (OUTPATIENT)
Dept: INTERNAL MEDICINE CLINIC | Facility: CLINIC | Age: 65
End: 2020-09-22
Payer: MEDICARE

## 2020-09-22 VITALS
WEIGHT: 213.4 LBS | HEART RATE: 98 BPM | RESPIRATION RATE: 16 BRPM | HEIGHT: 58 IN | SYSTOLIC BLOOD PRESSURE: 136 MMHG | DIASTOLIC BLOOD PRESSURE: 74 MMHG | BODY MASS INDEX: 44.8 KG/M2 | TEMPERATURE: 97.7 F

## 2020-09-22 DIAGNOSIS — I10 ESSENTIAL HYPERTENSION: Chronic | ICD-10-CM

## 2020-09-22 DIAGNOSIS — B37.0 ORAL CANDIDIASIS: Primary | ICD-10-CM

## 2020-09-22 PROCEDURE — 99214 OFFICE O/P EST MOD 30 MIN: CPT | Performed by: NURSE PRACTITIONER

## 2020-09-22 RX ORDER — FLUCONAZOLE 100 MG/1
TABLET ORAL
Qty: 15 TABLET | Refills: 0 | Status: SHIPPED | OUTPATIENT
Start: 2020-09-22 | End: 2020-09-24

## 2020-09-22 NOTE — ASSESSMENT & PLAN NOTE
Patient was recently hospitalized for a PE  She currently is now taking amlodipine for her blood pressure  She has some +1 left lower extremity edema  Otherwise her blood pressure is stable in the office today at 136/74

## 2020-09-22 NOTE — PATIENT INSTRUCTIONS
Compression stockings to bilateral lower extremity during the day  Elevate legs when sitting        Oral Candidiasis   WHAT YOU NEED TO KNOW:   Oral candidiasis, or thrush, is a fungal infection that affects the inside of your mouth  DISCHARGE INSTRUCTIONS:   Return to the emergency department if:   · You have trouble swallowing and your jaw and neck are stiff  · You are dizzy, thirsty, or have a dry mouth  · You are urinating little or not at all  · You cannot eat or drink because of the pain  Contact your healthcare provider if:   · You have a fever  · You have nausea, vomiting, or diarrhea  · Your signs and symptoms get worse, even after treatment  · You have questions or concerns about your condition or care  Medicines:   · Antifungal medicine  helps kill the fungus that caused your oral candidiasis  This medicine may be a pill or a solution that you gargle  Remove dentures before you gargle  · Take your medicine as directed  Contact your healthcare provider if you think your medicine is not helping or if you have side effects  Tell him of her if you are allergic to any medicine  Keep a list of the medicines, vitamins, and herbs you take  Include the amounts, and when and why you take them  Bring the list or the pill bottles to follow-up visits  Carry your medicine list with you in case of an emergency  Prevent oral candidiasis:  Brush your teeth, gums, and tongue after you eat and before you go to sleep  Use a toothbrush with soft bristles  See your dentist for regular exams  Remove your dentures when you sleep, or at least 6 hours each day  Clean your dentures and soak them in denture   Let them air dry after soaking  Follow up with your healthcare provider as directed:  Write down your questions so you remember to ask them during your visits     © 2017 [employer] Information is for End User's use only and may not be sold, redistributed or otherwise used for commercial purposes  All illustrations and images included in CareNotes® are the copyrighted property of A D A M , Inc  or Ty Ellis  The above information is an  only  It is not intended as medical advice for individual conditions or treatments  Talk to your doctor, nurse or pharmacist before following any medical regimen to see if it is safe and effective for you

## 2020-09-22 NOTE — TELEPHONE ENCOUNTER
CALL BACK  # [phone]    PATIENT IS COMING IN TODAY TO SEE FLORENTIN AT 2:30--BATOOL & DANIELA HAVE NO SPOTS AVAILABLE    PATIENTS TONGUE IS SWOLLEN AND BEAT RED, SHE HAS SORES INSIDE HER MOUTH, SHE FEELS LIKE THE SORES ARE DOWN HER THROAT, SHE IS HAVING A HARD TIME SWALLOWING FOOD--DOES STATE THAT THIS HAS HAPPENED BEFORE    THINKS IT IS AN ALLERGIC REACTION TO AMLODIPINE & PATTI    WANTS DANIELA TO STEP IN THE ROOM IF POSSIBLE

## 2020-09-22 NOTE — PROGRESS NOTES
INTERNAL MEDICINE FOLLOW-UP OFFICE VISIT  [facility]    NAME: Marcellus Padilla  AGE: 72 y o  SEX: female    DATE OF ENCOUNTER: 9/22/2020   Assessment and Plan:     Problem List Items Addressed This Visit     None      Visit Diagnoses     Oral candidiasis    -  Primary    Relevant Medications    fluconazole (DIFLUCAN) 100 mg tablet          No follow-ups on file  Counseling:     · Medication Side Effects - Adverse side effects of medications were reviewed with the patient/guardian today: Yes  · Counseling was given regarding: Risks and benefits of tx options, Intructions for management, Importance of tx compliance and Risk factor reductions  · Barriers to treatment include: No identified barriers      Chief Complaint:     Chief Complaint   Patient presents with    Follow-up     possible allergic reaction, tongue swelling lip and throat sore  History of Present Illness:     LUIS ANTONIO Porras   Presents to the office today for same-day visit  The patient states that for the past several days she has had a red sore tongue as well as a sore throat and some discomfort with swallowing  In addition she is complaining of some left lower extremity swelling  She was recently started on amlodipine  Upon exam it appears the patient does have an oral thrush  She has had this in the past   Patient requested that Dr Fletcher Chowdhury  come into the room as well as he is her PCP  Diflucan has been prescribed for the patient  She was instructed to take 200 mg today followed by 100 mg a day for the next 13 days  In addition the patient was reminded that she should make sure that she is wincing her mouth thoroughly after using her inhalers  The patient does have a +1 left lower extremity edema  I did recommend to the patient that she use compression stockings during the day and elevate her legs when sitting    She did speak with her nephrologist Dr Mack Mckeon yesterday who recommended the same treatment  The following portions of the patient's history were reviewed and updated as appropriate: allergies, current medications, past family history, past medical history, past social history, past surgical history and problem list      Review of Systems:     Review of Systems   Constitutional: Negative for activity change, fatigue and fever  HENT: Positive for sore throat and trouble swallowing  Negative for congestion, hearing loss, rhinorrhea and voice change  Tongue redness and soreness   Eyes: Negative for photophobia, pain, discharge and visual disturbance  Respiratory: Negative for cough, chest tightness and shortness of breath  Cardiovascular: Positive for leg swelling (left foot/ankle)  Negative for chest pain and palpitations  Gastrointestinal: Negative for abdominal pain, blood in stool, constipation, nausea and vomiting  Endocrine: Negative for cold intolerance and heat intolerance  Genitourinary: Negative for difficulty urinating, frequency, hematuria, urgency, vaginal bleeding and vaginal discharge  Musculoskeletal: Negative for arthralgias and myalgias  Skin: Negative  Neurological: Negative for dizziness, weakness, numbness and headaches  Psychiatric/Behavioral: Negative for decreased concentration  The patient is not nervous/anxious           Problem List:     Patient Active Problem List   Diagnosis    Asthma    HTN (hypertension)    Allergic rhinitis    Anxiety    Candidal intertrigo    Current moderate episode of major depressive disorder without prior episode (HCC)    Herpes simplex infection    Hyperlipidemia    Hypothyroidism    Impaired fasting glucose    Morbid obesity ([address] )    Peripheral neuropathy    Ductal carcinoma in situ (DCIS) of left breast    Diastasis recti    Acute pulmonary embolism (HCC)    Hyperglycemia    SIRS (systemic inflammatory response syndrome) ([address] )    Gross hematuria    Traumatic ecchymosis of buttock    Acute diarrhea    Gram-positive bacteremia    JENNIFER (acute kidney injury) (HCC)    Anemia        Objective:     /74 (BP Location: Left arm, Patient Position: Sitting, Cuff Size: Standard)   Pulse 98   Temp 97 7 °F (36 5 °C) (Temporal)   Resp 16   Ht 4' 10" (1 473 m)   Wt 96 8 kg (213 lb 6 4 oz)   BMI 44 60 kg/m²     Physical Exam  Constitutional:       General: She is not in acute distress  Appearance: Normal appearance  She is well-developed  She is obese  HENT:      Head: Normocephalic and atraumatic  Mouth/Throat:      Lips: Lesions present  Tongue: No lesions  Pharynx: Posterior oropharyngeal erythema present  Eyes:      Pupils: Pupils are equal, round, and reactive to light  Neck:      Musculoskeletal: Normal range of motion  Cardiovascular:      Rate and Rhythm: Normal rate and regular rhythm  Heart sounds: Normal heart sounds  No murmur  Pulmonary:      Effort: Pulmonary effort is normal  No respiratory distress  Breath sounds: Normal breath sounds  No wheezing  Musculoskeletal: Normal range of motion  Left lower leg: Edema (+1) present  Skin:     General: Skin is warm and dry  Neurological:      General: No focal deficit present  Mental Status: She is alert and oriented to person, place, and time  Psychiatric:         Mood and Affect: Mood normal          Behavior: Behavior normal          Thought Content:  Thought content normal          Judgment: Judgment normal             Current Medications:     Current Outpatient Medications   Medication Sig Dispense Refill    al mag oxide-diphenhydramine-lidocaine viscous (MAGIC MOUTHWASH) 1:1:1 suspension Swish and spit 10 mL every 4 (four) hours as needed for mouth pain or discomfort for up to 5 doses 90 mL 0    albuterol (PROAIR HFA) 90 mcg/act inhaler Inhale 2 puffs every 6 (six) hours as needed for wheezing      ALPRAZolam (XANAX) 1 mg tablet TAKE 1/2-1 TABLET THREE TIMES A DAY AS NEEDED 90 tablet 3    amLODIPine (NORVASC) 5 mg tablet Take 1 tablet (5 mg total) by mouth daily 90 tablet 3    apixaban (ELIQUIS) 5 mg Take 1 tablet (5 mg total) by mouth 2 (two) times a day 180 tablet 3    cholestyramine (QUESTRAN) 4 g packet Take by mouth as needed       clotrimazole (LOTRIMIN) 1 % cream as needed       clotrimazole-betamethasone (LOTRISONE) 1-0 05 % cream APPLY SPARINGLY TO THE AFFECTED AREA IN GROIN 2-3 TIMES A DAY AS NEEDED 45 g 1    DULoxetine (CYMBALTA) 30 mg delayed release capsule Take 3 capsules (90 mg total) by mouth daily 270 capsule 3    erythromycin (ILOTYCIN) ophthalmic ointment as needed   3    famotidine (PEPCID) 10 mg tablet Take 1 tablet (10 mg total) by mouth daily 90 tablet 3    levothyroxine 88 mcg tablet Take 1 po daily except on Sunday take 2 90 tablet 3    methocarbamol (ROBAXIN) 500 mg tablet Take 1 tablet (500 mg total) by mouth 4 (four) times a day (Patient taking differently: Take 500 mg by mouth as needed ) 60 tablet 0    Mometasone Furo-Formoterol Fum (DULERA) 100-5 MCG/ACT AERO Inhale      Multiple Vitamins-Minerals (OCUVITE ADULT 50+ PO) Take by mouth      NYSTATIN powder APPLY TO AFFECTED AREA(S) SPARINGLY  TWICE A DAY 60 g 3    pantoprazole (PROTONIX) 40 mg tablet TAKE 1 TABLET BY MOUTH  DAILY 90 tablet 3    simvastatin (ZOCOR) 40 mg tablet TAKE ONE TABLET BY MOUTH EVERY DAY 90 tablet 3    traMADol (ULTRAM) 50 mg tablet Take 1 tablet (50 mg total) by mouth every 8 (eight) hours as needed for severe pain 90 tablet 3    Triamcinolone Acetonide (Nasacort Allergy 24HR) 55 MCG/ACT AERO into each nostril as needed       Lidocaine Viscous HCl (XYLOCAINE) 2 % mucosal solution Swish and spit 15 mL 4 (four) times a day as needed for mouth pain or discomfort (Patient not taking: Reported on 9/16/2020) 240 mL 1     No current facility-administered medications for this visit  There are no Patient Instructions on file for this visit      Jesús Sanchez Chandana Garza, [address] [place] [address]

## 2020-09-28 ENCOUNTER — TELEPHONE (OUTPATIENT)
Dept: INTERNAL MEDICINE CLINIC | Facility: CLINIC | Age: 65
End: 2020-09-28

## 2020-09-28 DIAGNOSIS — E03.9 ACQUIRED HYPOTHYROIDISM: ICD-10-CM

## 2020-09-28 RX ORDER — LEVOTHYROXINE SODIUM 88 UG/1
TABLET ORAL
Qty: 90 TABLET | Refills: 3 | Status: SHIPPED | OUTPATIENT
Start: 2020-09-28 | End: 2021-04-30

## 2020-09-28 NOTE — TELEPHONE ENCOUNTER
Had an appt w/Urology Oct 1- then last wk they changed it to the end of Nov    Can Dr Fritz Gregg try to get the appt w/Urologist sooner? Sees Dr Araujo Mom for her kidneys    Bladder problems; injury to bladder and kidney- complications    Was in hospital and had a PE- that's what cause the problems  When she sits down and gets up- she gets the urgency to go- wets herself- can't control  This happens once and awhile      In July she had surgery w/lump in her eye- inside rt eye lid  Now it's back    Having eye surgery Oct 1 w/Dr Blue Campo w/[facility]

## 2020-10-01 ENCOUNTER — TELEPHONE (OUTPATIENT)
Dept: UROLOGY | Facility: CLINIC | Age: 65
End: 2020-10-01

## 2020-11-18 PROBLEM — N13.39 OTHER HYDRONEPHROSIS: Status: ACTIVE | Noted: 2020-11-18

## 2020-11-19 DIAGNOSIS — F41.9 ANXIETY: ICD-10-CM

## 2020-11-19 RX ORDER — ALPRAZOLAM 1 MG/1
TABLET ORAL
Qty: 90 TABLET | Refills: 3 | Status: SHIPPED | OUTPATIENT
Start: 2020-11-19 | End: 2021-03-19

## 2020-11-23 ENCOUNTER — TELEMEDICINE (OUTPATIENT)
Dept: INTERNAL MEDICINE CLINIC | Facility: CLINIC | Age: 65
End: 2020-11-23
Payer: MEDICARE

## 2020-11-23 DIAGNOSIS — M54.9 BACK PAIN, UNSPECIFIED BACK LOCATION, UNSPECIFIED BACK PAIN LATERALITY, UNSPECIFIED CHRONICITY: Primary | ICD-10-CM

## 2020-11-23 PROCEDURE — 99442 PR PHYS/QHP TELEPHONE EVALUATION 11-20 MIN: CPT | Performed by: NURSE PRACTITIONER

## 2020-11-23 RX ORDER — PREDNISONE 10 MG/1
TABLET ORAL
Qty: 18 TABLET | Refills: 0 | Status: SHIPPED | OUTPATIENT
Start: 2020-11-23 | End: 2020-12-21 | Stop reason: CLARIF

## 2020-11-27 ENCOUNTER — TELEPHONE (OUTPATIENT)
Dept: INTERNAL MEDICINE CLINIC | Facility: CLINIC | Age: 65
End: 2020-11-27

## 2020-12-01 ENCOUNTER — HOSPITAL ENCOUNTER (OUTPATIENT)
Dept: CT IMAGING | Facility: HOSPITAL | Age: 65
Discharge: HOME | End: 2020-12-01
Payer: MEDICARE

## 2020-12-01 DIAGNOSIS — R93.89 ABNORMAL CHEST CT: ICD-10-CM

## 2020-12-01 PROCEDURE — G1004 CDSM NDSC: HCPCS

## 2020-12-01 PROCEDURE — 71250 CT THORAX DX C-: CPT

## 2020-12-02 ENCOUNTER — TELEPHONE (OUTPATIENT)
Dept: INTERNAL MEDICINE CLINIC | Facility: CLINIC | Age: 65
End: 2020-12-02

## 2020-12-03 DIAGNOSIS — R91.1 PULMONARY NODULE: Primary | ICD-10-CM

## 2020-12-15 ENCOUNTER — TELEPHONE (OUTPATIENT)
Dept: INTERNAL MEDICINE CLINIC | Facility: CLINIC | Age: 65
End: 2020-12-15

## 2020-12-21 ENCOUNTER — OFFICE VISIT (OUTPATIENT)
Dept: INTERNAL MEDICINE CLINIC | Facility: CLINIC | Age: 65
End: 2020-12-21
Payer: MEDICARE

## 2020-12-21 VITALS
WEIGHT: 212 LBS | RESPIRATION RATE: 16 BRPM | HEIGHT: 58 IN | TEMPERATURE: 96.6 F | HEART RATE: 92 BPM | DIASTOLIC BLOOD PRESSURE: 80 MMHG | BODY MASS INDEX: 44.5 KG/M2 | SYSTOLIC BLOOD PRESSURE: 136 MMHG

## 2020-12-21 DIAGNOSIS — L30.9 DERMATITIS: ICD-10-CM

## 2020-12-21 DIAGNOSIS — G89.29 OTHER CHRONIC PAIN: ICD-10-CM

## 2020-12-21 DIAGNOSIS — K76.0 FATTY LIVER: Primary | ICD-10-CM

## 2020-12-21 DIAGNOSIS — K74.60 CIRRHOSIS OF LIVER WITHOUT ASCITES, UNSPECIFIED HEPATIC CIRRHOSIS TYPE (HCC): ICD-10-CM

## 2020-12-21 DIAGNOSIS — M46.1 SACROILIITIS (HCC): ICD-10-CM

## 2020-12-21 DIAGNOSIS — J32.9 SINUSITIS, UNSPECIFIED CHRONICITY, UNSPECIFIED LOCATION: ICD-10-CM

## 2020-12-21 PROCEDURE — 99214 OFFICE O/P EST MOD 30 MIN: CPT | Performed by: NURSE PRACTITIONER

## 2020-12-21 RX ORDER — CLOTRIMAZOLE AND BETAMETHASONE DIPROPIONATE 10; .64 MG/G; MG/G
CREAM TOPICAL
Qty: 45 G | Refills: 1 | Status: SHIPPED | OUTPATIENT
Start: 2020-12-21 | End: 2021-07-12

## 2020-12-21 RX ORDER — DULOXETIN HYDROCHLORIDE 60 MG/1
60 CAPSULE, DELAYED RELEASE ORAL DAILY
Qty: 90 CAPSULE | Refills: 3
Start: 2020-12-21 | End: 2021-10-07 | Stop reason: SDUPTHER

## 2020-12-21 RX ORDER — DULOXETIN HYDROCHLORIDE 30 MG/1
30 CAPSULE, DELAYED RELEASE ORAL DAILY
Qty: 90 CAPSULE | Refills: 3
Start: 2020-12-21 | End: 2021-09-30

## 2020-12-21 RX ORDER — DOXYCYCLINE HYCLATE 100 MG
100 TABLET ORAL 2 TIMES DAILY
Qty: 14 TABLET | Refills: 0 | Status: SHIPPED | OUTPATIENT
Start: 2020-12-21 | End: 2020-12-28

## 2020-12-28 ENCOUNTER — TELEPHONE (OUTPATIENT)
Dept: UROLOGY | Facility: CLINIC | Age: 65
End: 2020-12-28

## 2021-01-07 ENCOUNTER — HOSPITAL ENCOUNTER (OUTPATIENT)
Dept: ULTRASOUND IMAGING | Facility: HOSPITAL | Age: 66
Discharge: HOME | End: 2021-01-07
Payer: MEDICARE

## 2021-01-07 DIAGNOSIS — K76.0 FATTY LIVER: ICD-10-CM

## 2021-01-07 PROCEDURE — 76705 ECHO EXAM OF ABDOMEN: CPT

## 2021-01-08 ENCOUNTER — TELEPHONE (OUTPATIENT)
Dept: INTERNAL MEDICINE CLINIC | Facility: CLINIC | Age: 66
End: 2021-01-08

## 2021-01-08 NOTE — TELEPHONE ENCOUNTER
----- Message from Yaz Espinoza, [address] sent at 1/8/2021 11:28 AM EST -----  Ultrasound shows a fatty liver- we just reocmmend a low fat and low sugar diet for this

## 2021-01-12 ENCOUNTER — TELEPHONE (OUTPATIENT)
Dept: NEPHROLOGY | Facility: CLINIC | Age: 66
End: 2021-01-12

## 2021-01-13 ENCOUNTER — LAB (OUTPATIENT)
Dept: LAB | Facility: HOSPITAL | Age: 66
End: 2021-01-13
Attending: INTERNAL MEDICINE
Payer: MEDICARE

## 2021-01-13 DIAGNOSIS — D64.9 ANEMIA, UNSPECIFIED TYPE: ICD-10-CM

## 2021-01-13 DIAGNOSIS — K74.60 CIRRHOSIS OF LIVER WITHOUT ASCITES, UNSPECIFIED HEPATIC CIRRHOSIS TYPE (HCC): ICD-10-CM

## 2021-01-13 DIAGNOSIS — K76.0 FATTY LIVER: ICD-10-CM

## 2021-01-13 DIAGNOSIS — N17.9 AKI (ACUTE KIDNEY INJURY) (HCC): ICD-10-CM

## 2021-01-13 LAB
ALBUMIN SERPL BCP-MCNC: 3.5 G/DL (ref 3.5–5)
ALP SERPL-CCNC: 89 U/L (ref 46–116)
ALT SERPL W P-5'-P-CCNC: 22 U/L (ref 12–78)
ANION GAP SERPL CALCULATED.3IONS-SCNC: 8 MMOL/L (ref 4–13)
APTT PPP: 30 SECONDS (ref 23–37)
AST SERPL W P-5'-P-CCNC: 14 U/L (ref 5–45)
BACTERIA UR QL AUTO: ABNORMAL /HPF
BASOPHILS # BLD AUTO: 0.04 THOUSANDS/ΜL (ref 0–0.1)
BASOPHILS NFR BLD AUTO: 1 % (ref 0–1)
BILIRUB DIRECT SERPL-MCNC: 0.09 MG/DL (ref 0–0.2)
BILIRUB SERPL-MCNC: 0.3 MG/DL (ref 0.2–1)
BILIRUB UR QL STRIP: NEGATIVE
BUN SERPL-MCNC: 16 MG/DL (ref 5–25)
CALCIUM SERPL-MCNC: 9.1 MG/DL (ref 8.3–10.1)
CHLORIDE SERPL-SCNC: 99 MMOL/L (ref 100–108)
CLARITY UR: CLEAR
CO2 SERPL-SCNC: 32 MMOL/L (ref 21–32)
COLOR UR: YELLOW
CREAT SERPL-MCNC: 0.9 MG/DL (ref 0.6–1.3)
EOSINOPHIL # BLD AUTO: 0.19 THOUSAND/ΜL (ref 0–0.61)
EOSINOPHIL NFR BLD AUTO: 3 % (ref 0–6)
ERYTHROCYTE [DISTWIDTH] IN BLOOD BY AUTOMATED COUNT: 17.9 % (ref 11.6–15.1)
GFR SERPL CREATININE-BSD FRML MDRD: 67 ML/MIN/1.73SQ M
GLUCOSE SERPL-MCNC: 123 MG/DL (ref 65–140)
GLUCOSE UR STRIP-MCNC: NEGATIVE MG/DL
HCT VFR BLD AUTO: 39.1 % (ref 34.8–46.1)
HGB BLD-MCNC: 11.4 G/DL (ref 11.5–15.4)
HGB UR QL STRIP.AUTO: NEGATIVE
IMM GRANULOCYTES # BLD AUTO: 0.04 THOUSAND/UL (ref 0–0.2)
IMM GRANULOCYTES NFR BLD AUTO: 1 % (ref 0–2)
INR PPP: 1.06 (ref 0.84–1.19)
KETONES UR STRIP-MCNC: NEGATIVE MG/DL
LEUKOCYTE ESTERASE UR QL STRIP: NEGATIVE
LYMPHOCYTES # BLD AUTO: 2.13 THOUSANDS/ΜL (ref 0.6–4.47)
LYMPHOCYTES NFR BLD AUTO: 29 % (ref 14–44)
MCH RBC QN AUTO: 22.3 PG (ref 26.8–34.3)
MCHC RBC AUTO-ENTMCNC: 29.2 G/DL (ref 31.4–37.4)
MCV RBC AUTO: 77 FL (ref 82–98)
MONOCYTES # BLD AUTO: 0.62 THOUSAND/ΜL (ref 0.17–1.22)
MONOCYTES NFR BLD AUTO: 8 % (ref 4–12)
NEUTROPHILS # BLD AUTO: 4.37 THOUSANDS/ΜL (ref 1.85–7.62)
NEUTS SEG NFR BLD AUTO: 58 % (ref 43–75)
NITRITE UR QL STRIP: NEGATIVE
NON-SQ EPI CELLS URNS QL MICRO: ABNORMAL /HPF
NRBC BLD AUTO-RTO: 0 /100 WBCS
PH UR STRIP.AUTO: 6 [PH]
PLATELET # BLD AUTO: 309 THOUSANDS/UL (ref 149–390)
PMV BLD AUTO: 8.7 FL (ref 8.9–12.7)
POTASSIUM SERPL-SCNC: 4.1 MMOL/L (ref 3.5–5.3)
PROT SERPL-MCNC: 8.4 G/DL (ref 6.4–8.2)
PROT UR STRIP-MCNC: NEGATIVE MG/DL
PROTHROMBIN TIME: 13.3 SECONDS (ref 11.6–14.5)
RBC # BLD AUTO: 5.11 MILLION/UL (ref 3.81–5.12)
RBC #/AREA URNS AUTO: ABNORMAL /HPF
SODIUM SERPL-SCNC: 139 MMOL/L (ref 136–145)
SP GR UR STRIP.AUTO: 1.01 (ref 1–1.03)
UROBILINOGEN UR QL STRIP.AUTO: 0.2 E.U./DL
WBC # BLD AUTO: 7.39 THOUSAND/UL (ref 4.31–10.16)
WBC #/AREA URNS AUTO: ABNORMAL /HPF

## 2021-01-13 PROCEDURE — 80076 HEPATIC FUNCTION PANEL: CPT

## 2021-01-13 PROCEDURE — 86038 ANTINUCLEAR ANTIBODIES: CPT

## 2021-01-13 PROCEDURE — 85025 COMPLETE CBC W/AUTO DIFF WBC: CPT

## 2021-01-13 PROCEDURE — 80048 BASIC METABOLIC PNL TOTAL CA: CPT

## 2021-01-13 PROCEDURE — 82105 ALPHA-FETOPROTEIN SERUM: CPT

## 2021-01-13 PROCEDURE — 85730 THROMBOPLASTIN TIME PARTIAL: CPT

## 2021-01-13 PROCEDURE — 83540 ASSAY OF IRON: CPT

## 2021-01-13 PROCEDURE — 85610 PROTHROMBIN TIME: CPT

## 2021-01-13 PROCEDURE — 81001 URINALYSIS AUTO W/SCOPE: CPT

## 2021-01-13 PROCEDURE — 86256 FLUORESCENT ANTIBODY TITER: CPT

## 2021-01-13 PROCEDURE — 82043 UR ALBUMIN QUANTITATIVE: CPT

## 2021-01-13 PROCEDURE — 80074 ACUTE HEPATITIS PANEL: CPT

## 2021-01-13 PROCEDURE — 82570 ASSAY OF URINE CREATININE: CPT

## 2021-01-13 PROCEDURE — 86235 NUCLEAR ANTIGEN ANTIBODY: CPT

## 2021-01-13 PROCEDURE — 82728 ASSAY OF FERRITIN: CPT

## 2021-01-13 PROCEDURE — 36415 COLL VENOUS BLD VENIPUNCTURE: CPT

## 2021-01-14 LAB
ACTIN IGG SERPL-ACNC: 5 UNITS (ref 0–19)
AFP-TM SERPL-MCNC: 2.7 NG/ML (ref 0.5–8)
CREAT UR-MCNC: 64.5 MG/DL
FERRITIN SERPL-MCNC: 6 NG/ML (ref 8–388)
HAV IGM SER QL: NORMAL
HBV CORE IGM SER QL: NORMAL
HBV SURFACE AG SER QL: NORMAL
HCV AB SER QL: NORMAL
IRON SERPL-MCNC: 26 UG/DL (ref 50–170)
MICROALBUMIN UR-MCNC: 10 MG/L (ref 0–20)
MICROALBUMIN/CREAT 24H UR: 16 MG/G CREATININE (ref 0–30)
MITOCHONDRIA M2 IGG SER-ACNC: <20 UNITS (ref 0–20)

## 2021-01-15 ENCOUNTER — TELEPHONE (OUTPATIENT)
Dept: NEPHROLOGY | Facility: CLINIC | Age: 66
End: 2021-01-15

## 2021-01-15 ENCOUNTER — TELEPHONE (OUTPATIENT)
Dept: INTERNAL MEDICINE CLINIC | Facility: CLINIC | Age: 66
End: 2021-01-15

## 2021-01-15 DIAGNOSIS — D50.9 IRON DEFICIENCY ANEMIA, UNSPECIFIED IRON DEFICIENCY ANEMIA TYPE: Primary | ICD-10-CM

## 2021-01-15 LAB — RYE IGE QN: NEGATIVE

## 2021-01-15 NOTE — TELEPHONE ENCOUNTER
Appointment was confirmed and went over registration details with patient  Antionette Rena Moreno,

## 2021-01-15 NOTE — TELEPHONE ENCOUNTER
----- Message from Jefferson Mckinney, [address] sent at 1/15/2021 12:56 PM EST -----  Her liver labs were normal    It does show she had iron deficiency anemia  I would like to do a stool test to make sure there is no hidden blood in stool  She is due this year for colonscopy  She should start iron 325mg  daily with OJ

## 2021-01-18 ENCOUNTER — OFFICE VISIT (OUTPATIENT)
Dept: NEPHROLOGY | Facility: CLINIC | Age: 66
End: 2021-01-18
Payer: MEDICARE

## 2021-01-18 VITALS
HEART RATE: 110 BPM | SYSTOLIC BLOOD PRESSURE: 152 MMHG | HEIGHT: 59 IN | BODY MASS INDEX: 43.67 KG/M2 | TEMPERATURE: 97.6 F | WEIGHT: 216.6 LBS | DIASTOLIC BLOOD PRESSURE: 70 MMHG | RESPIRATION RATE: 16 BRPM

## 2021-01-18 DIAGNOSIS — N17.9 AKI (ACUTE KIDNEY INJURY) (HCC): Primary | ICD-10-CM

## 2021-01-18 DIAGNOSIS — I10 ESSENTIAL HYPERTENSION: Chronic | ICD-10-CM

## 2021-01-18 PROCEDURE — 99213 OFFICE O/P EST LOW 20 MIN: CPT | Performed by: INTERNAL MEDICINE

## 2021-01-18 RX ORDER — FERROUS SULFATE 325(65) MG
325 TABLET ORAL DAILY
COMMUNITY

## 2021-01-18 NOTE — LETTER
January 18, 2021     Cleon Dakins, MD  [address], [place] [address]    Patient: Cali Bryant   YOB: 1955   Date of Visit: 1/18/2021       Dear Dr Carline Garza: Thank you for referring Cali Bryant to me for evaluation  Below are my notes for this consultation  If you have questions, please do not hesitate to call me  I look forward to following your patient along with you  Sincerely,        Abhi Zavala MD        CC: No Recipients  Abhi Zavala MD  1/18/2021  2:37 PM  Sign when Signing Visit  NEPHROLOGY OFFICE FOLLOW UP  Cali Bryant 72 y o  female Date of Birth: @ MRN: [id number]    Encounter: [id number] DATE: 1/18/2021    REASON FOR VISIT: Cali Bryant is a 72 y o  female who is here on 1/18/2021 for hospital follow-up for further management of JENNIFER  HPI:    This is 77-year-old female with past medical history significant for pulmonary embolism, returns to Nephrology office today for hospital follow-up for further management of JENNIFER  Patient was admitted to [address] [place] [address] last month with shortness of breath and had underwent CTA PE protocol on admission and was diagnosed with acute PE  Patient has received tPA on admission and now on Eliquis  During the hospital stay patient has also developed JENNIFER  Patient was discharged on 8/22/2020 with creatinine of 1 04  Patient was also found to have gross hematuria during the hospital stay which was suspected due to use of tPA on top of Eliquis  Currently denies any gross hematuria  Patient also have urge to urinate and also have schedule appointment to see urologist in near future  Patient has underlying hypertension which seems to be under good control on current regiment  Accupril and hydrochlorothiazide were stopped during recent hospital stay due to JENNIFER  Currently patient is on amlodipine        Patient's  was also present at the time of consultation and history was also obtained from him and all the questions were answered   Based on recent blood work, patient was also found to have anemia and recently started on iron tablet and awaiting to get stool occult blood test      Patient takes all the medications as prescribed and denies use of NSAIDs  Hypertension  Pertinent negatives include no chest pain, neck pain or palpitations  REVIEW OF SYSTEMS:    Review of Systems   Constitutional: Negative for chills and fever  HENT: Negative for nosebleeds and sore throat  Eyes: Negative for photophobia and pain  Respiratory: Negative for choking and wheezing  Cardiovascular: Negative for chest pain and palpitations  Gastrointestinal: Negative for abdominal pain and blood in stool  Endocrine: Negative for heat intolerance  Genitourinary: Negative for flank pain and hematuria  Musculoskeletal: Negative for joint swelling and neck pain  Skin: Negative for color change and pallor  Neurological: Negative for seizures and syncope  Psychiatric/Behavioral: Negative for confusion and suicidal ideas           PAST MEDICAL HISTORY:  Past Medical History:   Diagnosis Date    Allergic rhinitis     Anemia     Cancer ([address] )     breast cancer, Left side    Depression     Disease of thyroid gland     GERD (gastroesophageal reflux disease)     Hyperlipemia     Hypertension     Hypothyroidism     Last assessed: 3/25/14    Obesity     Osteoarthritis     Pre-operative laboratory examination        PAST SURGICAL HISTORY:  Past Surgical History:   Procedure Laterality Date    HAND SURGERY Left 03/2020    HYSTERECTOMY      INCISIONAL BREAST BIOPSY      JOINT REPLACEMENT      KNEE ARTHROPLASTY      ROTATOR CUFF REPAIR      SHOULDER SURGERY      TRIGGER FINGER RELEASE      TRIGGER FINGER RELEASE         SOCIAL HISTORY:  Social History     Substance and Sexual Activity   Alcohol Use Yes    Frequency: Monthly or less    Drinks per session: 1 or 2    Binge frequency: Never    Comment: socially      Social History     Substance and Sexual Activity   Drug Use No     Social History     Tobacco Use   Smoking Status Never Smoker   Smokeless Tobacco Never Used       FAMILY HISTORY:  Family History   Problem Relation Age of Onset    Coronary artery disease Mother     Hypertension Mother         Essential    Coronary artery disease Father        ALLERGY:  Allergies   Allergen Reactions    Tamoxifen Other (See Comments)     Headaches, stomach pain, sweats,     Nsaids Hives    Oxybutynin     Singulair [Montelukast] Swelling    Ciprofloxacin Hives    Penicillins Hives    Sulfa Antibiotics Hives    Vancomycin Hives       MEDICATIONS:    Current Outpatient Medications:     albuterol (PROAIR HFA) 90 mcg/act inhaler, Inhale 2 puffs every 6 (six) hours as needed for wheezing, Disp: , Rfl:     ALPRAZolam (XANAX) 1 mg tablet, TAKE 1/2-1 TABLET THREE TIMES A DAY AS NEEDED, Disp: 90 tablet, Rfl: 3    amLODIPine (NORVASC) 5 mg tablet, Take 1 tablet (5 mg total) by mouth daily, Disp: 90 tablet, Rfl: 3    apixaban (ELIQUIS) 5 mg, Take 1 tablet (5 mg total) by mouth 2 (two) times a day, Disp: 180 tablet, Rfl: 3    cholestyramine (QUESTRAN) 4 g packet, Take by mouth as needed , Disp: , Rfl:     clotrimazole (LOTRIMIN) 1 % cream, as needed , Disp: , Rfl:     clotrimazole-betamethasone (LOTRISONE) 1-0 05 % cream, Apply to skin fold bid, Disp: 45 g, Rfl: 1    DULoxetine (CYMBALTA) 30 mg delayed release capsule, Take 1 capsule (30 mg total) by mouth daily, Disp: 90 capsule, Rfl: 3    DULoxetine (CYMBALTA) 60 mg delayed release capsule, Take 1 capsule (60 mg total) by mouth daily, Disp: 90 capsule, Rfl: 3    erythromycin (ILOTYCIN) ophthalmic ointment, as needed , Disp: , Rfl: 3    famotidine (PEPCID) 10 mg tablet, Take 1 tablet (10 mg total) by mouth daily, Disp: 90 tablet, Rfl: 3    ferrous sulfate 325 (65 Fe) mg tablet, Take 325 mg by mouth daily, Disp: , Rfl:     levothyroxine 88 mcg tablet, Take 1 po daily except on Sunday take 2, Disp: 90 tablet, Rfl: 3    methocarbamol (ROBAXIN) 500 mg tablet, Take 1 tablet (500 mg total) by mouth 4 (four) times a day (Patient taking differently: Take 500 mg by mouth as needed ), Disp: 60 tablet, Rfl: 0    Mometasone Furo-Formoterol Fum (DULERA) 100-5 MCG/ACT AERO, Inhale, Disp: , Rfl:     Multiple Vitamins-Minerals (OCUVITE ADULT 50+ PO), Take by mouth, Disp: , Rfl:     NYSTATIN powder, APPLY TO AFFECTED AREA(S) SPARINGLY  TWICE A DAY, Disp: 60 g, Rfl: 3    pantoprazole (PROTONIX) 40 mg tablet, TAKE 1 TABLET BY MOUTH  DAILY, Disp: 90 tablet, Rfl: 3    simvastatin (ZOCOR) 40 mg tablet, TAKE ONE TABLET BY MOUTH EVERY DAY, Disp: 90 tablet, Rfl: 3    traMADol (ULTRAM) 50 mg tablet, Take 1 tablet (50 mg total) by mouth every 8 (eight) hours as needed for severe pain, Disp: 90 tablet, Rfl: 3    Triamcinolone Acetonide (Nasacort Allergy 24HR) 55 MCG/ACT AERO, into each nostril as needed , Disp: , Rfl:     al mag oxide-diphenhydramine-lidocaine viscous (MAGIC MOUTHWASH) 1:1:1 suspension, Swish and spit 10 mL every 4 (four) hours as needed for mouth pain or discomfort for up to 5 doses (Patient not taking: Reported on 1/18/2021), Disp: 90 mL, Rfl: 0    PHYSICAL EXAM:  Vitals:    01/18/21 1342   BP: 152/70   BP Location: Left arm   Patient Position: Sitting   Cuff Size: Large   Pulse: (!) 110   Resp: 16   Temp: 97 6 °F (36 4 °C)   TempSrc: Temporal   Weight: 98 2 kg (216 lb 9 6 oz)   Height: 4' 11" (1 499 m)     Body mass index is 43 75 kg/m²  Physical Exam  Constitutional:       General: She is not in acute distress  HENT:      Head: Normocephalic and atraumatic  Eyes:      General: No scleral icterus  Neck:      Musculoskeletal: Neck supple  Vascular: No JVD  Cardiovascular:      Rate and Rhythm: Normal rate        Heart sounds: S1 normal and S2 normal    Pulmonary:      Effort: Pulmonary effort is normal  No accessory muscle usage or respiratory distress  Breath sounds: No wheezing  Abdominal:      General: There is no distension  Palpations: Abdomen is soft  Musculoskeletal:      Right ankle: She exhibits no swelling  Left ankle: She exhibits no swelling  Comments: Moving all extremities   Skin:     General: Skin is warm  Comments: No jaundice    Neurological:      Mental Status: She is alert  She is not disoriented  Comments: Facial symmetry  Speech is clear   Psychiatric:         Speech: She is communicative  Behavior: Behavior is not combative           LAB RESULTS:  Results for orders placed or performed in visit on 01/13/21   CBC and differential   Result Value Ref Range    WBC 7 39 4 31 - 10 16 Thousand/uL    RBC 5 11 3 81 - 5 12 Million/uL    Hemoglobin 11 4 (L) 11 5 - 15 4 g/dL    Hematocrit 39 1 34 8 - 46 1 %    MCV 77 (L) 82 - 98 fL    MCH 22 3 (L) 26 8 - 34 3 pg    MCHC 29 2 (L) 31 4 - 37 4 g/dL    RDW 17 9 (H) 11 6 - 15 1 %    MPV 8 7 (L) 8 9 - 12 7 fL    Platelets 362 912 - 691 Thousands/uL    nRBC 0 /100 WBCs    Neutrophils Relative 58 43 - 75 %    Immat GRANS % 1 0 - 2 %    Lymphocytes Relative 29 14 - 44 %    Monocytes Relative 8 4 - 12 %    Eosinophils Relative 3 0 - 6 %    Basophils Relative 1 0 - 1 %    Neutrophils Absolute 4 37 1 85 - 7 62 Thousands/µL    Immature Grans Absolute 0 04 0 00 - 0 20 Thousand/uL    Lymphocytes Absolute 2 13 0 60 - 4 47 Thousands/µL    Monocytes Absolute 0 62 0 17 - 1 22 Thousand/µL    Eosinophils Absolute 0 19 0 00 - 0 61 Thousand/µL    Basophils Absolute 0 04 0 00 - 0 10 Thousands/µL   Basic metabolic panel   Result Value Ref Range    Sodium 139 136 - 145 mmol/L    Potassium 4 1 3 5 - 5 3 mmol/L    Chloride 99 (L) 100 - 108 mmol/L    CO2 32 21 - 32 mmol/L    ANION GAP 8 4 - 13 mmol/L    BUN 16 5 - 25 mg/dL    Creatinine 0 90 0 60 - 1 30 mg/dL    Glucose 123 65 - 140 mg/dL    Calcium 9 1 8 3 - 10 1 mg/dL    eGFR 67 ml/min/1 73sq m   Urinalysis with microscopic   Result Value Ref Range    Clarity, UA Clear     Color, UA Yellow     Specific [place], UA 1 015 1 003 - 1 030    pH, UA 6 0 4 5, 5 0, 5 5, 6 0, 6 5, 7 0, 7 5, 8 0    Glucose, UA Negative Negative mg/dl    Ketones, UA Negative Negative mg/dl    Blood, UA Negative Negative    Protein, UA Negative Negative mg/dl    Nitrite, UA Negative Negative    Bilirubin, UA Negative Negative    Urobilinogen, UA 0 2 0 2, 1 0 E U /dl E U /dl    Leukocytes, UA Negative Negative    WBC, UA 0-1 (A) None Seen, 2-4 /hpf    RBC, UA None Seen None Seen, 2-4 /hpf    Bacteria, UA None Seen None Seen, Occasional /hpf    Epithelial Cells Occasional None Seen, Occasional /hpf   Microalbumin / creatinine urine ratio   Result Value Ref Range    Creatinine, Ur 64 5 mg/dL    Microalbum  ,U,Random 10 0 0 0 - 20 0 mg/L    Microalb Creat Ratio 16 0 - 30 mg/g creatinine   APTT   Result Value Ref Range    PTT 30 23 - 37 seconds   Protime-INR   Result Value Ref Range    Protime 13 3 11 6 - 14 5 seconds    INR 1 06 0 84 - 1 19   Hepatic function panel   Result Value Ref Range    Total Bilirubin 0 30 0 20 - 1 00 mg/dL    Bilirubin, Direct 0 09 0 00 - 0 20 mg/dL    Alkaline Phosphatase 89 46 - 116 U/L    AST 14 5 - 45 U/L    ALT 22 12 - 78 U/L    Total Protein 8 4 (H) 6 4 - 8 2 g/dL    Albumin 3 5 3 5 - 5 0 g/dL   ELEAZAR Screen w/ Reflex to Titer/Pattern   Result Value Ref Range    ELEAZAR Negative Negative   Antimitochondrial antibody   Result Value Ref Range    Mitochondrial Ab <20 0 0 0 - 20 0 Units   Anti-smooth muscle antibody, IgG   Result Value Ref Range    Smooth Muscle Ab 5 0 - 19 Units   Ferritin   Result Value Ref Range    Ferritin 6 (L) 8 - 388 ng/mL   Iron   Result Value Ref Range    Iron 26 (L) 50 - 170 ug/dL   AFP tumor marker   Result Value Ref Range    AFP TUMOR MARKER 2 7 0 5 - 8 ng/mL   Hepatitis panel, acute   Result Value Ref Range    Hepatitis B Surface Ag Non-reactive Non-reactive, NonReactive - Confirmed    Hep A IgM Non-reactive Non-reactive, Equivocal-Suggest Recollect    Hepatitis C Ab Non-reactive Non-reactive    Hep B C IgM Non-reactive Non-reactive       ASSESSMENT and PLAN:  Kristin Montez was seen today for hypertension and follow-up  Diagnoses and all orders for this visit:    JENNIFER (acute kidney injury) ([facility] [address] )    Essential hypertension      1  JENNIFER  Patient was recently admitted to [address] [place] [address] and was evaluated by Nephrology team for JENNIFER which was suspected due to contrast induced nephropathy  Patient was admitted with shortness of breath and had underwent CTA PE protocol on admission and was diagnosed with acute PE but developed contrast induced nephropathy  In the interim patient's renal function has improved to current creatinine of 0 9 with EGFR of 67   Renal function has reached back to the baseline again   JENNIFER has resolved now   Patient was also found to have gross hematuria during the hospital stay which was suspected due to use of tPA on top of anticoagulation  Patient denies any gross hematuria at this point  Patient also have urge to urinate and also have schedule appointment in near future with urologist for further evaluation  Management of hypertension as mentioned below  Plan to avoid NSAIDs and will plan to recheck renal function before next visit  2  Hypertension  Essential,   Today's blood pressure was slightly on the higher side and based on patient body habitus, advised patient to talk to PCP regarding sleep apnea screening with sleep study   Due to JENNIFER during recent hospital stay, Accupril and hydrochlorothiazide were stopped  Currently blood pressure is under well control and   Plan to monitor hypertension for time being with amlodipine 5 mg PO daily   Patient was also advised to follow low-salt diet  3  Anemia  Multifactorial,   Current hemoglobin is 11 5 and recently also found to have iron deficiency and started on iron tablets     Defer further management of anemia to PCP   Discussed in length with patient today to have renal function checked periodically   and returns to Nephrology office PRN   Portions of the record may have been created with voice recognition software  Occasional wrong word or "sound a like" substitutions may have occurred due to the inherent limitations of voice recognition software  Read the chart carefully and recognize, using context, where substitutions have occurred  If you have any questions, please contact the dictating provider

## 2021-01-18 NOTE — PROGRESS NOTES
NEPHROLOGY OFFICE FOLLOW UP  Kevin Aranda 72 y o  female Date of Birth: @ MRN: [id number]    Encounter: [id number] DATE: 1/18/2021    REASON FOR VISIT: Kevin Aranda is a 72 y o  female who is here on 1/18/2021 for hospital follow-up for further management of JENNIFER  HPI:    This is 42-year-old female with past medical history significant for pulmonary embolism, returns to Nephrology office today for hospital follow-up for further management of JENNIFER  Patient was admitted to [facility] last month with shortness of breath and had underwent CTA PE protocol on admission and was diagnosed with acute PE  Patient has received tPA on admission and now on Eliquis  During the hospital stay patient has also developed JENNIFER  Patient was discharged on 8/22/2020 with creatinine of 1 04  Patient was also found to have gross hematuria during the hospital stay which was suspected due to use of tPA on top of Eliquis  Currently denies any gross hematuria  Patient also have urge to urinate and also have schedule appointment to see urologist in near future  Patient has underlying hypertension which seems to be under good control on current regiment  Accupril and hydrochlorothiazide were stopped during recent hospital stay due to JENNIFER  Currently patient is on amlodipine  Patient's  was also present at the time of consultation and history was also obtained from him and all the questions were answered   Based on recent blood work, patient was also found to have anemia and recently started on iron tablet and awaiting to get stool occult blood test      Patient takes all the medications as prescribed and denies use of NSAIDs  Hypertension  Pertinent negatives include no chest pain, neck pain or palpitations  REVIEW OF SYSTEMS:    Review of Systems   Constitutional: Negative for chills and fever  HENT: Negative for nosebleeds and sore throat      Eyes: Negative for photophobia and pain    Respiratory: Negative for choking and wheezing  Cardiovascular: Negative for chest pain and palpitations  Gastrointestinal: Negative for abdominal pain and blood in stool  Endocrine: Negative for heat intolerance  Genitourinary: Negative for flank pain and hematuria  Musculoskeletal: Negative for joint swelling and neck pain  Skin: Negative for color change and pallor  Neurological: Negative for seizures and syncope  Psychiatric/Behavioral: Negative for confusion and suicidal ideas           PAST MEDICAL HISTORY:  Past Medical History:   Diagnosis Date    Allergic rhinitis     Anemia     Cancer ([address] )     breast cancer, Left side    Depression     Disease of thyroid gland     GERD (gastroesophageal reflux disease)     Hyperlipemia     Hypertension     Hypothyroidism     Last assessed: 3/25/14    Obesity     Osteoarthritis     Pre-operative laboratory examination        PAST SURGICAL HISTORY:  Past Surgical History:   Procedure Laterality Date    HAND SURGERY Left 03/2020    HYSTERECTOMY      INCISIONAL BREAST BIOPSY      JOINT REPLACEMENT      KNEE ARTHROPLASTY      ROTATOR CUFF REPAIR      SHOULDER SURGERY      TRIGGER FINGER RELEASE      TRIGGER FINGER RELEASE         SOCIAL HISTORY:  Social History     Substance and Sexual Activity   Alcohol Use Yes    Frequency: Monthly or less    Drinks per session: 1 or 2    Binge frequency: Never    Comment: socially      Social History     Substance and Sexual Activity   Drug Use No     Social History     Tobacco Use   Smoking Status Never Smoker   Smokeless Tobacco Never Used       FAMILY HISTORY:  Family History   Problem Relation Age of Onset    Coronary artery disease Mother     Hypertension Mother         Essential    Coronary artery disease Father        ALLERGY:  Allergies   Allergen Reactions    Tamoxifen Other (See Comments)     Headaches, stomach pain, sweats,     Nsaids Hives    Oxybutynin     Singulair [Montelukast] Swelling    Ciprofloxacin Hives    Penicillins Hives    Sulfa Antibiotics Hives    Vancomycin Hives       MEDICATIONS:    Current Outpatient Medications:     albuterol (PROAIR HFA) 90 mcg/act inhaler, Inhale 2 puffs every 6 (six) hours as needed for wheezing, Disp: , Rfl:     ALPRAZolam (XANAX) 1 mg tablet, TAKE 1/2-1 TABLET THREE TIMES A DAY AS NEEDED, Disp: 90 tablet, Rfl: 3    amLODIPine (NORVASC) 5 mg tablet, Take 1 tablet (5 mg total) by mouth daily, Disp: 90 tablet, Rfl: 3    apixaban (ELIQUIS) 5 mg, Take 1 tablet (5 mg total) by mouth 2 (two) times a day, Disp: 180 tablet, Rfl: 3    cholestyramine (QUESTRAN) 4 g packet, Take by mouth as needed , Disp: , Rfl:     clotrimazole (LOTRIMIN) 1 % cream, as needed , Disp: , Rfl:     clotrimazole-betamethasone (LOTRISONE) 1-0 05 % cream, Apply to skin fold bid, Disp: 45 g, Rfl: 1    DULoxetine (CYMBALTA) 30 mg delayed release capsule, Take 1 capsule (30 mg total) by mouth daily, Disp: 90 capsule, Rfl: 3    DULoxetine (CYMBALTA) 60 mg delayed release capsule, Take 1 capsule (60 mg total) by mouth daily, Disp: 90 capsule, Rfl: 3    erythromycin (ILOTYCIN) ophthalmic ointment, as needed , Disp: , Rfl: 3    famotidine (PEPCID) 10 mg tablet, Take 1 tablet (10 mg total) by mouth daily, Disp: 90 tablet, Rfl: 3    ferrous sulfate 325 (65 Fe) mg tablet, Take 325 mg by mouth daily, Disp: , Rfl:     levothyroxine 88 mcg tablet, Take 1 po daily except on Sunday take 2, Disp: 90 tablet, Rfl: 3    methocarbamol (ROBAXIN) 500 mg tablet, Take 1 tablet (500 mg total) by mouth 4 (four) times a day (Patient taking differently: Take 500 mg by mouth as needed ), Disp: 60 tablet, Rfl: 0    Mometasone Furo-Formoterol Fum (DULERA) 100-5 MCG/ACT AERO, Inhale, Disp: , Rfl:     Multiple Vitamins-Minerals (OCUVITE ADULT 50+ PO), Take by mouth, Disp: , Rfl:     NYSTATIN powder, APPLY TO AFFECTED AREA(S) SPARINGLY  TWICE A DAY, Disp: 60 g, Rfl: 3    pantoprazole (PROTONIX) 40 mg tablet, TAKE 1 TABLET BY MOUTH  DAILY, Disp: 90 tablet, Rfl: 3    simvastatin (ZOCOR) 40 mg tablet, TAKE ONE TABLET BY MOUTH EVERY DAY, Disp: 90 tablet, Rfl: 3    traMADol (ULTRAM) 50 mg tablet, Take 1 tablet (50 mg total) by mouth every 8 (eight) hours as needed for severe pain, Disp: 90 tablet, Rfl: 3    Triamcinolone Acetonide (Nasacort Allergy 24HR) 55 MCG/ACT AERO, into each nostril as needed , Disp: , Rfl:     al mag oxide-diphenhydramine-lidocaine viscous (MAGIC MOUTHWASH) 1:1:1 suspension, Swish and spit 10 mL every 4 (four) hours as needed for mouth pain or discomfort for up to 5 doses (Patient not taking: Reported on 1/18/2021), Disp: 90 mL, Rfl: 0    PHYSICAL EXAM:  Vitals:    01/18/21 1342   BP: 152/70   BP Location: Left arm   Patient Position: Sitting   Cuff Size: Large   Pulse: (!) 110   Resp: 16   Temp: 97 6 °F (36 4 °C)   TempSrc: Temporal   Weight: 98 2 kg (216 lb 9 6 oz)   Height: 4' 11" (1 499 m)     Body mass index is 43 75 kg/m²  Physical Exam  Constitutional:       General: She is not in acute distress  HENT:      Head: Normocephalic and atraumatic  Eyes:      General: No scleral icterus  Neck:      Musculoskeletal: Neck supple  Vascular: No JVD  Cardiovascular:      Rate and Rhythm: Normal rate  Heart sounds: S1 normal and S2 normal    Pulmonary:      Effort: Pulmonary effort is normal  No accessory muscle usage or respiratory distress  Breath sounds: No wheezing  Abdominal:      General: There is no distension  Palpations: Abdomen is soft  Musculoskeletal:      Right ankle: She exhibits no swelling  Left ankle: She exhibits no swelling  Comments: Moving all extremities   Skin:     General: Skin is warm  Comments: No jaundice    Neurological:      Mental Status: She is alert  She is not disoriented  Comments: Facial symmetry  Speech is clear   Psychiatric:         Speech: She is communicative           Behavior: Behavior is not combative           LAB RESULTS:  Results for orders placed or performed in visit on 01/13/21   CBC and differential   Result Value Ref Range    WBC 7 39 4 31 - 10 16 Thousand/uL    RBC 5 11 3 81 - 5 12 Million/uL    Hemoglobin 11 4 (L) 11 5 - 15 4 g/dL    Hematocrit 39 1 34 8 - 46 1 %    MCV 77 (L) 82 - 98 fL    MCH 22 3 (L) 26 8 - 34 3 pg    MCHC 29 2 (L) 31 4 - 37 4 g/dL    RDW 17 9 (H) 11 6 - 15 1 %    MPV 8 7 (L) 8 9 - 12 7 fL    Platelets 793 333 - 151 Thousands/uL    nRBC 0 /100 WBCs    Neutrophils Relative 58 43 - 75 %    Immat GRANS % 1 0 - 2 %    Lymphocytes Relative 29 14 - 44 %    Monocytes Relative 8 4 - 12 %    Eosinophils Relative 3 0 - 6 %    Basophils Relative 1 0 - 1 %    Neutrophils Absolute 4 37 1 85 - 7 62 Thousands/µL    Immature Grans Absolute 0 04 0 00 - 0 20 Thousand/uL    Lymphocytes Absolute 2 13 0 60 - 4 47 Thousands/µL    Monocytes Absolute 0 62 0 17 - 1 22 Thousand/µL    Eosinophils Absolute 0 19 0 00 - 0 61 Thousand/µL    Basophils Absolute 0 04 0 00 - 0 10 Thousands/µL   Basic metabolic panel   Result Value Ref Range    Sodium 139 136 - 145 mmol/L    Potassium 4 1 3 5 - 5 3 mmol/L    Chloride 99 (L) 100 - 108 mmol/L    CO2 32 21 - 32 mmol/L    ANION GAP 8 4 - 13 mmol/L    BUN 16 5 - 25 mg/dL    Creatinine 0 90 0 60 - 1 30 mg/dL    Glucose 123 65 - 140 mg/dL    Calcium 9 1 8 3 - 10 1 mg/dL    eGFR 67 ml/min/1 73sq m   Urinalysis with microscopic   Result Value Ref Range    Clarity, UA Clear     Color, UA Yellow     Specific [place], UA 1 015 1 003 - 1 030    pH, UA 6 0 4 5, 5 0, 5 5, 6 0, 6 5, 7 0, 7 5, 8 0    Glucose, UA Negative Negative mg/dl    Ketones, UA Negative Negative mg/dl    Blood, UA Negative Negative    Protein, UA Negative Negative mg/dl    Nitrite, UA Negative Negative    Bilirubin, UA Negative Negative    Urobilinogen, UA 0 2 0 2, 1 0 E U /dl E U /dl    Leukocytes, UA Negative Negative    WBC, UA 0-1 (A) None Seen, 2-4 /hpf    RBC, UA None Seen None Seen, 2-4 /hpf    Bacteria, UA None Seen None Seen, Occasional /hpf    Epithelial Cells Occasional None Seen, Occasional /hpf   Microalbumin / creatinine urine ratio   Result Value Ref Range    Creatinine, Ur 64 5 mg/dL    Microalbum  ,U,Random 10 0 0 0 - 20 0 mg/L    Microalb Creat Ratio 16 0 - 30 mg/g creatinine   APTT   Result Value Ref Range    PTT 30 23 - 37 seconds   Protime-INR   Result Value Ref Range    Protime 13 3 11 6 - 14 5 seconds    INR 1 06 0 84 - 1 19   Hepatic function panel   Result Value Ref Range    Total Bilirubin 0 30 0 20 - 1 00 mg/dL    Bilirubin, Direct 0 09 0 00 - 0 20 mg/dL    Alkaline Phosphatase 89 46 - 116 U/L    AST 14 5 - 45 U/L    ALT 22 12 - 78 U/L    Total Protein 8 4 (H) 6 4 - 8 2 g/dL    Albumin 3 5 3 5 - 5 0 g/dL   ELEAZAR Screen w/ Reflex to Titer/Pattern   Result Value Ref Range    ELEAZAR Negative Negative   Antimitochondrial antibody   Result Value Ref Range    Mitochondrial Ab <20 0 0 0 - 20 0 Units   Anti-smooth muscle antibody, IgG   Result Value Ref Range    Smooth Muscle Ab 5 0 - 19 Units   Ferritin   Result Value Ref Range    Ferritin 6 (L) 8 - 388 ng/mL   Iron   Result Value Ref Range    Iron 26 (L) 50 - 170 ug/dL   AFP tumor marker   Result Value Ref Range    AFP TUMOR MARKER 2 7 0 5 - 8 ng/mL   Hepatitis panel, acute   Result Value Ref Range    Hepatitis B Surface Ag Non-reactive Non-reactive, NonReactive - Confirmed    Hep A IgM Non-reactive Non-reactive, Equivocal-Suggest Recollect    Hepatitis C Ab Non-reactive Non-reactive    Hep B C IgM Non-reactive Non-reactive       ASSESSMENT and PLAN:  Kia Madrid was seen today for hypertension and follow-up  Diagnoses and all orders for this visit:    JENNIFER (acute kidney injury) ([address] )    Essential hypertension      1  JENNIFER  Patient was recently admitted to [address] [place] [address] and was evaluated by Nephrology team for JENNIFER which was suspected due to contrast induced nephropathy      Patient was admitted with shortness of breath and had underwent CTA PE protocol on admission and was diagnosed with acute PE but developed contrast induced nephropathy  In the interim patient's renal function has improved to current creatinine of 0 9 with EGFR of 67   Renal function has reached back to the baseline again   JENNIFER has resolved now   Patient was also found to have gross hematuria during the hospital stay which was suspected due to use of tPA on top of anticoagulation  Patient denies any gross hematuria at this point  Patient also have urge to urinate and also have schedule appointment in near future with urologist for further evaluation  Management of hypertension as mentioned below  Plan to avoid NSAIDs and will plan to recheck renal function before next visit  2  Hypertension  Essential,   Today's blood pressure was slightly on the higher side and based on patient body habitus, advised patient to talk to PCP regarding sleep apnea screening with sleep study   Due to JENNIFER during recent hospital stay, Accupril and hydrochlorothiazide were stopped  Currently blood pressure is under well control and   Plan to monitor hypertension for time being with amlodipine 5 mg PO daily   Patient was also advised to follow low-salt diet  3  Anemia  Multifactorial,   Current hemoglobin is 11 5 and recently also found to have iron deficiency and started on iron tablets   Defer further management of anemia to PCP   Discussed in length with patient today to have renal function checked periodically   and returns to Nephrology office PRN   Portions of the record may have been created with voice recognition software  Occasional wrong word or "sound a like" substitutions may have occurred due to the inherent limitations of voice recognition software  Read the chart carefully and recognize, using context, where substitutions have occurred  If you have any questions, please contact the dictating provider

## 2021-01-21 ENCOUNTER — LAB (OUTPATIENT)
Dept: LAB | Facility: HOSPITAL | Age: 66
End: 2021-01-21
Payer: MEDICARE

## 2021-01-22 ENCOUNTER — TELEPHONE (OUTPATIENT)
Dept: INTERNAL MEDICINE CLINIC | Facility: CLINIC | Age: 66
End: 2021-01-22

## 2021-01-22 DIAGNOSIS — R19.5 HEME POSITIVE STOOL: ICD-10-CM

## 2021-01-22 DIAGNOSIS — D50.9 IRON DEFICIENCY ANEMIA, UNSPECIFIED IRON DEFICIENCY ANEMIA TYPE: Primary | ICD-10-CM

## 2021-01-22 NOTE — TELEPHONE ENCOUNTER
----- Message from Jl Pizarro, Osmin Kami  sent at 1/22/2021  5:06 PM EST -----  Notify her stool test was positive for blood- she is due for colonscopy this year anyway but now she needs to go sooner than later   She can continue the eliquis and contineue iron and I put a referral in for maile

## 2021-01-22 NOTE — TELEPHONE ENCOUNTER
Patient would like a call back about the results of the Occult Blood, Fecal Immunochemical test  Results are in MyChart but she does not understand  Also, the iron pills you prescribed are making her rectum burn and painful  Is there some type of cream you can send to the pharmacy for her  Would like to get something before the weekend

## 2021-01-26 ENCOUNTER — OFFICE VISIT (OUTPATIENT)
Dept: GASTROENTEROLOGY | Facility: CLINIC | Age: 66
End: 2021-01-26
Payer: MEDICARE

## 2021-01-26 ENCOUNTER — TELEPHONE (OUTPATIENT)
Dept: GASTROENTEROLOGY | Facility: CLINIC | Age: 66
End: 2021-01-26

## 2021-01-26 VITALS
SYSTOLIC BLOOD PRESSURE: 188 MMHG | HEIGHT: 59 IN | DIASTOLIC BLOOD PRESSURE: 82 MMHG | WEIGHT: 217 LBS | HEART RATE: 102 BPM | BODY MASS INDEX: 43.75 KG/M2

## 2021-01-26 DIAGNOSIS — K59.09 OTHER CONSTIPATION: ICD-10-CM

## 2021-01-26 DIAGNOSIS — K62.89 RECTAL PAIN: ICD-10-CM

## 2021-01-26 DIAGNOSIS — D50.0 IRON DEFICIENCY ANEMIA DUE TO CHRONIC BLOOD LOSS: Primary | ICD-10-CM

## 2021-01-26 DIAGNOSIS — R19.5 OCCULT BLOOD IN STOOLS: ICD-10-CM

## 2021-01-26 DIAGNOSIS — K76.0 FATTY LIVER: ICD-10-CM

## 2021-01-26 PROCEDURE — 99204 OFFICE O/P NEW MOD 45 MIN: CPT | Performed by: INTERNAL MEDICINE

## 2021-01-26 RX ORDER — HYDROCORTISONE ACETATE PRAMOXINE HCL 2.5; 1 G/100G; G/100G
CREAM TOPICAL 3 TIMES DAILY
Qty: 3 TUBE | Refills: 1 | Status: SHIPPED | OUTPATIENT
Start: 2021-01-26

## 2021-01-26 NOTE — LETTER
January 26, 2021     Felicia Shone, MD  [address],  [place] [address]    Patient: Bianca Padilla   YOB: 1955   Date of Visit: 1/26/2021       Dear Dr Kylie Juárez: Thank you for referring Bianca Padilla to me for evaluation  Below are my notes for this consultation  If you have questions, please do not hesitate to call me  I look forward to following your patient along with you  Sincerely,        Selina Ruffin MD        CC: No Recipients  Selina Ruffin MD  1/26/2021  2:16 PM  Incomplete  [facility]    Dear Janelle Malik,     I had the pleasure of seeing your patient Bianca Padilla in the office today and I thank you for this kind referral        Chief Complaint:  Anemia      HPI:  Bianca Padilla is a 72 y o  female who presents with iron deficiency anemia  Most recent serum iron is 26  The patient is now on oral iron  Patient underwent both EGD and colonoscopy in 2016  At that time some gastric polyps were seen  No other lesions were noted  Patient has been found to have iron deficiency anemia  According to the patient she began having chocolate colored stools  Blood studies showed her to be anemic  Stool was then tested and found to be positive for blood  She is placed on oral iron  She had a past history of iron deficiency anemia sometime ago  Patient does have significant arthritis especially in the back and legs  She does take arthritic medications  She also had a pulmonary embolism and is anticoagulated with Eliquis  She had 1 episode of epistaxis recently  This was not around dark stool  She does not describe actual melena  She has no bleeding gums  She has not seen any rectal bleeding  She has no weight loss  Fact she has recent weight gain  She was diagnosed with a fatty liver and placed on a low-carbohydrate diet but is apparently following this    She has no symptomatology referable to the liver  Recent liver functions were essentially unremarkable but the ultrasound showed fatty liver  Patient has no other causes of liver disease  She has significant arthritic complaints back the legs  She has upper abdominal lower thoracic discomfort side  She does not have any shortness of breath  No chest pain  She has an  She has no other GI complaints the present         Review of Systems:   Constitutional: No fever or chills, feels poorly, positive fatigue, positive sleepiness, recent weight gain as above  HENT: No complaints of earache, no hearing loss, no nosebleeds, no nasal discharge, no sore throat, no hoarseness  Eyes: No complaints of eye pain, no red eyes, no discharge from eyes, no itchy eyes  Cardiovascular: No complaints of slow heart rate, no fast heart rate, no chest pain, no palpitations, no leg claudication, no lower extremity edema  Respiratory: No complaints of shortness of breath, no wheezing, no cough, no SOB on exertion, no orthopnea  Gastrointestinal: As noted in HPI  Genitourinary: No complaints of dysuria, no incontinence, no hesitancy, no nocturia  Musculoskeletal:  As per HPI  Neurological: No complaints of headache, no confusion, no convulsions, no numbness or tingling, no dizziness or fainting, no limb weakness, no difficulty walking  Skin: No complaints of skin rash or skin lesions, no itching, no skin wound, no dry skin  Hematological/Lymphatic: No complaints of swollen glands, does not bleed easy  Allergic/Immunologic: No immunocompromised state  Endocrine:  No complaints of polyuria, no polydipsia  Psychiatric/Behavioral: is not suicidal, no sleep disturbances, no anxiety or depression, no change in personality, no emotional problems         Historical Information   Past Medical History:   Diagnosis Date    Allergic rhinitis     Anemia     Cancer (HCC)     breast cancer, Left side    Depression     Disease of thyroid gland     GERD (gastroesophageal reflux disease)     Hyperlipemia     Hypertension     Hypothyroidism     Last assessed: 3/25/14    Obesity     Osteoarthritis     Pre-operative laboratory examination      Past Surgical History:   Procedure Laterality Date    HAND SURGERY Left 03/2020    HYSTERECTOMY      INCISIONAL BREAST BIOPSY      JOINT REPLACEMENT      KNEE ARTHROPLASTY      ROTATOR CUFF REPAIR      SHOULDER SURGERY      TRIGGER FINGER RELEASE      TRIGGER FINGER RELEASE       Social History   Social History     Substance and Sexual Activity   Alcohol Use Yes    Frequency: Monthly or less    Drinks per session: 1 or 2    Binge frequency: Never    Comment: socially      Social History     Substance and Sexual Activity   Drug Use No     Social History     Tobacco Use   Smoking Status Never Smoker   Smokeless Tobacco Never Used     Family History   Problem Relation Age of Onset    Coronary artery disease Mother     Hypertension Mother         Essential    Coronary artery disease Father          Current Medications: has a current medication list which includes the following prescription(s): al mag oxide-diphenhydramine-lidocaine viscous, albuterol, alprazolam, apixaban, cholestyramine, clotrimazole, clotrimazole-betamethasone, duloxetine, duloxetine, erythromycin, ferrous sulfate, levothyroxine, methocarbamol, mometasone-formoterol, multiple vitamins-minerals, nystatin, pantoprazole, simvastatin, tramadol, nasacort allergy 24hr, amlodipine, famotidine, and hydrocortisone-pramoxine  Vital Signs: BP (!) 188/82   Pulse 102   Ht 4' 11" (1 499 m)   Wt 98 4 kg (217 lb)   BMI 43 83 kg/m²     Physical Exam:   Constitutional  General Appearance: No acute distress, well appearing and well nourished, obese  Head  Normocephalic  Eyes  Conjunctivae and lids: No swelling, erythema, or discharge  Pupils and irises: Equal, round and reactive to light     Ears, Nose, Mouth, and Throat  External inspection of ears and nose: Normal  Nasal mucosa, septum and turbinates: Normal without edema or erythema/   Oropharynx: Normal with no erythema, edema, exudate or lesions  Neck  Normal range of motion  Neck supple  Cardiovascular  Auscultation of the heart: Normal rate and rhythm, normal S1 and S2 without murmurs  Examination of the extremities for edema and/or varicosities: Normal  Pulmonary/Chest  Respiratory effort: No increased work of breathing or signs of respiratory distress  Auscultation of lungs: Clear to auscultation, equal breath sounds bilaterally, no wheezes, rales, no rhonchi  Abdomen  Abdomen:  Minimally tender epigastric area with no rebound  , no masses  Positive diastasis  Liver and spleen: No hepatomegaly or splenomegaly  Musculoskeletal  Gait and station: normal   Digits and Nails: normal without clubbing or cyanosis  Inspection/palpation of joints, bones, and muscles: Normal  Neurological  No nystagmus or asterixis  Skin  Skin and subcutaneous tissue: Normal without rashes or lesions  Lymphatic  Palpation of the lymph nodes in neck: No lymphadenopathy     Psychiatric  Orientation to person, place and time: Normal   Mood and affect: Normal          Labs:   Lab Results   Component Value Date    ALT 22 01/13/2021    AST 14 01/13/2021    BUN 16 01/13/2021    CALCIUM 9 1 01/13/2021    CL 99 (L) 01/13/2021    CHOL 175 05/04/2017    CO2 32 01/13/2021    CREATININE 0 90 01/13/2021    HDL 67 07/23/2020    HCT 39 1 01/13/2021    HGB 11 4 (L) 01/13/2021    HGBA1C 5 9 (H) 07/23/2020    MG 2 4 08/13/2020    PHOS 4 7 (H) 08/13/2020     01/13/2021    K 4 1 01/13/2021     05/04/2017    TRIG 108 07/23/2020    WBC 7 39 01/13/2021         X-Rays & Procedures:   No orders to display         ______________________________________________________________________      Assessment & Plan:      Diagnoses and all orders for this visit:    Iron deficiency anemia due to chronic blood loss    Occult blood in stools    Rectal pain  -     hydrocortisone-pramoxine (ANALPRAM-HC) 2 5-1 % rectal cream; Apply topically 3 (three) times a day    Other constipation  -     hydrocortisone-pramoxine (ANALPRAM-HC) 2 5-1 % rectal cream; Apply topically 3 (three) times a day    Fatty liver      1  Fit positive stool and iron deficiency anemia  Patient will undergo EGD, colonoscopy, capsule  Further recommendations will depend on the study results  She is chronically anticoagulated  Problem 2  Fatty liver  The patient had initially lost some weight but is now gained weight  She is not really following the low-carbohydrate diet  I went over all the possible sequelae of fatty liver including cirrhosis and liver cancer  I advised strict adherence to the low-carbohydrate diet  Problem 3  Rectal discomfort secondary to oral iron  Patient is given prescription for Analpram     I will be happy to inform you of her results and further recommendations  I would like to thank you for allowing me to participate in her care                With warmest regards,    Pam Oliver MD, [facility]

## 2021-01-26 NOTE — TELEPHONE ENCOUNTER
She had PE 8/12  Should be done after 2/12, unless emergent  After 2/12, can hold 1-2 days for endoscopy, then  Resume asap

## 2021-01-26 NOTE — H&P (VIEW-ONLY)
[facility]    Dear Alessia De Leon,     I had the pleasure of seeing your patient Haseeb Calvert in the office today and I thank you for this kind referral        Chief Complaint:  Anemia      HPI:  Haseeb Calvert is a 72 y o  female who presents with iron deficiency anemia  Most recent serum iron is 26  The patient is now on oral iron  Patient underwent both EGD and colonoscopy in 2016  At that time some gastric polyps were seen  No other lesions were noted  Patient has been found to have iron deficiency anemia  According to the patient she began having chocolate colored stools  Blood studies showed her to be anemic  Stool was then tested and found to be positive for blood  She is placed on oral iron  She had a past history of iron deficiency anemia sometime ago  Patient does have significant arthritis especially in the back and legs  She does take arthritic medications  She also had a pulmonary embolism and is anticoagulated with Eliquis  She had 1 episode of epistaxis recently  This was not around dark stool  She does not describe actual melena  She has no bleeding gums  She has not seen any rectal bleeding  She has no weight loss  Fact she has recent weight gain  She was diagnosed with a fatty liver and placed on a low-carbohydrate diet but is apparently following this  She has no symptomatology referable to the liver  Recent liver functions were essentially unremarkable but the ultrasound showed fatty liver  Patient has no other causes of liver disease  She has significant arthritic complaints back the legs  She has upper abdominal lower thoracic discomfort side  She does not have any shortness of breath  No chest pain  She has an  She has no other GI complaints the present         Review of Systems:   Constitutional: No fever or chills, feels poorly, positive fatigue, positive sleepiness, recent weight gain as above  HENT: No complaints of earache, no hearing loss, no nosebleeds, no nasal discharge, no sore throat, no hoarseness  Eyes: No complaints of eye pain, no red eyes, no discharge from eyes, no itchy eyes  Cardiovascular: No complaints of slow heart rate, no fast heart rate, no chest pain, no palpitations, no leg claudication, no lower extremity edema  Respiratory: No complaints of shortness of breath, no wheezing, no cough, no SOB on exertion, no orthopnea  Gastrointestinal: As noted in HPI  Genitourinary: No complaints of dysuria, no incontinence, no hesitancy, no nocturia  Musculoskeletal:  As per HPI  Neurological: No complaints of headache, no confusion, no convulsions, no numbness or tingling, no dizziness or fainting, no limb weakness, no difficulty walking  Skin: No complaints of skin rash or skin lesions, no itching, no skin wound, no dry skin  Hematological/Lymphatic: No complaints of swollen glands, does not bleed easy  Allergic/Immunologic: No immunocompromised state  Endocrine:  No complaints of polyuria, no polydipsia  Psychiatric/Behavioral: is not suicidal, no sleep disturbances, no anxiety or depression, no change in personality, no emotional problems         Historical Information   Past Medical History:   Diagnosis Date    Allergic rhinitis     Anemia     Cancer (HCC)     breast cancer, Left side    Depression     Disease of thyroid gland     GERD (gastroesophageal reflux disease)     Hyperlipemia     Hypertension     Hypothyroidism     Last assessed: 3/25/14    Obesity     Osteoarthritis     Pre-operative laboratory examination      Past Surgical History:   Procedure Laterality Date    HAND SURGERY Left 03/2020    HYSTERECTOMY      INCISIONAL BREAST BIOPSY      JOINT REPLACEMENT      KNEE ARTHROPLASTY      ROTATOR CUFF REPAIR      SHOULDER SURGERY      TRIGGER FINGER RELEASE      TRIGGER FINGER RELEASE       Social History   Social History     Substance and Sexual Activity   Alcohol Use Yes    Frequency: Monthly or less    Drinks per session: 1 or 2    Binge frequency: Never    Comment: socially      Social History     Substance and Sexual Activity   Drug Use No     Social History     Tobacco Use   Smoking Status Never Smoker   Smokeless Tobacco Never Used     Family History   Problem Relation Age of Onset    Coronary artery disease Mother     Hypertension Mother         Essential    Coronary artery disease Father          Current Medications: has a current medication list which includes the following prescription(s): al mag oxide-diphenhydramine-lidocaine viscous, albuterol, alprazolam, apixaban, cholestyramine, clotrimazole, clotrimazole-betamethasone, duloxetine, duloxetine, erythromycin, ferrous sulfate, levothyroxine, methocarbamol, mometasone-formoterol, multiple vitamins-minerals, nystatin, pantoprazole, simvastatin, tramadol, nasacort allergy 24hr, amlodipine, famotidine, and hydrocortisone-pramoxine  Vital Signs: BP (!) 188/82   Pulse 102   Ht 4' 11" (1 499 m)   Wt 98 4 kg (217 lb)   BMI 43 83 kg/m²     Physical Exam:   Constitutional  General Appearance: No acute distress, well appearing and well nourished, obese  Head  Normocephalic  Eyes  Conjunctivae and lids: No swelling, erythema, or discharge  Pupils and irises: Equal, round and reactive to light  Ears, Nose, Mouth, and Throat  External inspection of ears and nose: Normal  Nasal mucosa, septum and turbinates: Normal without edema or erythema/   Oropharynx: Normal with no erythema, edema, exudate or lesions  Neck  Normal range of motion  Neck supple  Cardiovascular  Auscultation of the heart: Normal rate and rhythm, normal S1 and S2 without murmurs  Examination of the extremities for edema and/or varicosities: Normal  Pulmonary/Chest  Respiratory effort: No increased work of breathing or signs of respiratory distress     Auscultation of lungs: Clear to auscultation, equal breath sounds bilaterally, no wheezes, rales, no rhonchi  Abdomen  Abdomen:  Minimally tender epigastric area with no rebound  , no masses  Positive diastasis  Liver and spleen: No hepatomegaly or splenomegaly  Musculoskeletal  Gait and station: normal   Digits and Nails: normal without clubbing or cyanosis  Inspection/palpation of joints, bones, and muscles: Normal  Neurological  No nystagmus or asterixis  Skin  Skin and subcutaneous tissue: Normal without rashes or lesions  Lymphatic  Palpation of the lymph nodes in neck: No lymphadenopathy  Psychiatric  Orientation to person, place and time: Normal   Mood and affect: Normal          Labs:   Lab Results   Component Value Date    ALT 22 01/13/2021    AST 14 01/13/2021    BUN 16 01/13/2021    CALCIUM 9 1 01/13/2021    CL 99 (L) 01/13/2021    CHOL 175 05/04/2017    CO2 32 01/13/2021    CREATININE 0 90 01/13/2021    HDL 67 07/23/2020    HCT 39 1 01/13/2021    HGB 11 4 (L) 01/13/2021    HGBA1C 5 9 (H) 07/23/2020    MG 2 4 08/13/2020    PHOS 4 7 (H) 08/13/2020     01/13/2021    K 4 1 01/13/2021     05/04/2017    TRIG 108 07/23/2020    WBC 7 39 01/13/2021         X-Rays & Procedures:   No orders to display         ______________________________________________________________________      Assessment & Plan:      Diagnoses and all orders for this visit:    Iron deficiency anemia due to chronic blood loss    Occult blood in stools    Rectal pain  -     hydrocortisone-pramoxine (ANALPRAM-HC) 2 5-1 % rectal cream; Apply topically 3 (three) times a day    Other constipation  -     hydrocortisone-pramoxine (ANALPRAM-HC) 2 5-1 % rectal cream; Apply topically 3 (three) times a day    Fatty liver      1  Fit positive stool and iron deficiency anemia  Patient will undergo EGD, colonoscopy, capsule  Further recommendations will depend on the study results  She is chronically anticoagulated  Problem 2  Fatty liver  The patient had initially lost some weight but is now gained weight    She is not really following the low-carbohydrate diet  I went over all the possible sequelae of fatty liver including cirrhosis and liver cancer  I advised strict adherence to the low-carbohydrate diet  Problem 3  Rectal discomfort secondary to oral iron  Patient is given prescription for Analpram     I will be happy to inform you of her results and further recommendations  I would like to thank you for allowing me to participate in her care                With warmest regards,    Josselyn Lee MD, [facility]

## 2021-01-26 NOTE — PROGRESS NOTES
[facility]    Dear HomeSav,     I had the pleasure of seeing your patient Cali Bryant in the office today and I thank you for this kind referral        Chief Complaint:  Anemia      HPI:  Cali Bryant is a 72 y o  female who presents with iron deficiency anemia  Most recent serum iron is 26  The patient is now on oral iron  Patient underwent both EGD and colonoscopy in 2016  At that time some gastric polyps were seen  No other lesions were noted  Patient has been found to have iron deficiency anemia  According to the patient she began having chocolate colored stools  Blood studies showed her to be anemic  Stool was then tested and found to be positive for blood  She is placed on oral iron  She had a past history of iron deficiency anemia sometime ago  Patient does have significant arthritis especially in the back and legs  She does take arthritic medications  She also had a pulmonary embolism and is anticoagulated with Eliquis  She had 1 episode of epistaxis recently  This was not around dark stool  She does not describe actual melena  She has no bleeding gums  She has not seen any rectal bleeding  She has no weight loss  Fact she has recent weight gain  She was diagnosed with a fatty liver and placed on a low-carbohydrate diet but is apparently following this  She has no symptomatology referable to the liver  Recent liver functions were essentially unremarkable but the ultrasound showed fatty liver  Patient has no other causes of liver disease  She has significant arthritic complaints back the legs  She has upper abdominal lower thoracic discomfort side  She does not have any shortness of breath  No chest pain  She has an  She has no other GI complaints the present         Review of Systems:   Constitutional: No fever or chills, feels poorly, positive fatigue, positive sleepiness, recent weight gain as above  HENT: No complaints of earache, no hearing loss, no nosebleeds, no nasal discharge, no sore throat, no hoarseness  Eyes: No complaints of eye pain, no red eyes, no discharge from eyes, no itchy eyes  Cardiovascular: No complaints of slow heart rate, no fast heart rate, no chest pain, no palpitations, no leg claudication, no lower extremity edema  Respiratory: No complaints of shortness of breath, no wheezing, no cough, no SOB on exertion, no orthopnea  Gastrointestinal: As noted in HPI  Genitourinary: No complaints of dysuria, no incontinence, no hesitancy, no nocturia  Musculoskeletal:  As per HPI  Neurological: No complaints of headache, no confusion, no convulsions, no numbness or tingling, no dizziness or fainting, no limb weakness, no difficulty walking  Skin: No complaints of skin rash or skin lesions, no itching, no skin wound, no dry skin  Hematological/Lymphatic: No complaints of swollen glands, does not bleed easy  Allergic/Immunologic: No immunocompromised state  Endocrine:  No complaints of polyuria, no polydipsia  Psychiatric/Behavioral: is not suicidal, no sleep disturbances, no anxiety or depression, no change in personality, no emotional problems         Historical Information   Past Medical History:   Diagnosis Date    Allergic rhinitis     Anemia     Cancer (HCC)     breast cancer, Left side    Depression     Disease of thyroid gland     GERD (gastroesophageal reflux disease)     Hyperlipemia     Hypertension     Hypothyroidism     Last assessed: 3/25/14    Obesity     Osteoarthritis     Pre-operative laboratory examination      Past Surgical History:   Procedure Laterality Date    HAND SURGERY Left 03/2020    HYSTERECTOMY      INCISIONAL BREAST BIOPSY      JOINT REPLACEMENT      KNEE ARTHROPLASTY      ROTATOR CUFF REPAIR      SHOULDER SURGERY      TRIGGER FINGER RELEASE      TRIGGER FINGER RELEASE       Social History   Social History     Substance and Sexual Activity   Alcohol Use Yes    Frequency: Monthly or less    Drinks per session: 1 or 2    Binge frequency: Never    Comment: socially      Social History     Substance and Sexual Activity   Drug Use No     Social History     Tobacco Use   Smoking Status Never Smoker   Smokeless Tobacco Never Used     Family History   Problem Relation Age of Onset    Coronary artery disease Mother     Hypertension Mother         Essential    Coronary artery disease Father          Current Medications: has a current medication list which includes the following prescription(s): al mag oxide-diphenhydramine-lidocaine viscous, albuterol, alprazolam, apixaban, cholestyramine, clotrimazole, clotrimazole-betamethasone, duloxetine, duloxetine, erythromycin, ferrous sulfate, levothyroxine, methocarbamol, mometasone-formoterol, multiple vitamins-minerals, nystatin, pantoprazole, simvastatin, tramadol, nasacort allergy 24hr, amlodipine, famotidine, and hydrocortisone-pramoxine  Vital Signs: BP (!) 188/82   Pulse 102   Ht 4' 11" (1 499 m)   Wt 98 4 kg (217 lb)   BMI 43 83 kg/m²     Physical Exam:   Constitutional  General Appearance: No acute distress, well appearing and well nourished, obese  Head  Normocephalic  Eyes  Conjunctivae and lids: No swelling, erythema, or discharge  Pupils and irises: Equal, round and reactive to light  Ears, Nose, Mouth, and Throat  External inspection of ears and nose: Normal  Nasal mucosa, septum and turbinates: Normal without edema or erythema/   Oropharynx: Normal with no erythema, edema, exudate or lesions  Neck  Normal range of motion  Neck supple  Cardiovascular  Auscultation of the heart: Normal rate and rhythm, normal S1 and S2 without murmurs  Examination of the extremities for edema and/or varicosities: Normal  Pulmonary/Chest  Respiratory effort: No increased work of breathing or signs of respiratory distress     Auscultation of lungs: Clear to auscultation, equal breath sounds bilaterally, no wheezes, rales, no rhonchi  Abdomen  Abdomen:  Minimally tender epigastric area with no rebound  , no masses  Positive diastasis  Liver and spleen: No hepatomegaly or splenomegaly  Musculoskeletal  Gait and station: normal   Digits and Nails: normal without clubbing or cyanosis  Inspection/palpation of joints, bones, and muscles: Normal  Neurological  No nystagmus or asterixis  Skin  Skin and subcutaneous tissue: Normal without rashes or lesions  Lymphatic  Palpation of the lymph nodes in neck: No lymphadenopathy  Psychiatric  Orientation to person, place and time: Normal   Mood and affect: Normal          Labs:   Lab Results   Component Value Date    ALT 22 01/13/2021    AST 14 01/13/2021    BUN 16 01/13/2021    CALCIUM 9 1 01/13/2021    CL 99 (L) 01/13/2021    CHOL 175 05/04/2017    CO2 32 01/13/2021    CREATININE 0 90 01/13/2021    HDL 67 07/23/2020    HCT 39 1 01/13/2021    HGB 11 4 (L) 01/13/2021    HGBA1C 5 9 (H) 07/23/2020    MG 2 4 08/13/2020    PHOS 4 7 (H) 08/13/2020     01/13/2021    K 4 1 01/13/2021     05/04/2017    TRIG 108 07/23/2020    WBC 7 39 01/13/2021         X-Rays & Procedures:   No orders to display         ______________________________________________________________________      Assessment & Plan:      Diagnoses and all orders for this visit:    Iron deficiency anemia due to chronic blood loss    Occult blood in stools    Rectal pain  -     hydrocortisone-pramoxine (ANALPRAM-HC) 2 5-1 % rectal cream; Apply topically 3 (three) times a day    Other constipation  -     hydrocortisone-pramoxine (ANALPRAM-HC) 2 5-1 % rectal cream; Apply topically 3 (three) times a day    Fatty liver      1  Fit positive stool and iron deficiency anemia  Patient will undergo EGD, colonoscopy, capsule  Further recommendations will depend on the study results  She is chronically anticoagulated  Problem 2  Fatty liver  The patient had initially lost some weight but is now gained weight    She is not really following the low-carbohydrate diet  I went over all the possible sequelae of fatty liver including cirrhosis and liver cancer  I advised strict adherence to the low-carbohydrate diet  Problem 3  Rectal discomfort secondary to oral iron  Patient is given prescription for Analpram     I will be happy to inform you of her results and further recommendations  I would like to thank you for allowing me to participate in her care                With warmest regards,    Soumya Chopra MD, [facility]

## 2021-01-28 ENCOUNTER — PROCEDURE VISIT (OUTPATIENT)
Dept: UROLOGY | Facility: CLINIC | Age: 66
End: 2021-01-28
Payer: MEDICARE

## 2021-01-28 VITALS
HEART RATE: 100 BPM | SYSTOLIC BLOOD PRESSURE: 158 MMHG | BODY MASS INDEX: 43.79 KG/M2 | HEIGHT: 59 IN | DIASTOLIC BLOOD PRESSURE: 90 MMHG | WEIGHT: 217.2 LBS

## 2021-01-28 DIAGNOSIS — R31.0 GROSS HEMATURIA: Primary | ICD-10-CM

## 2021-01-28 PROCEDURE — 52000 CYSTOURETHROSCOPY: CPT | Performed by: UROLOGY

## 2021-01-28 NOTE — PROGRESS NOTES
Office Cystoscopy Procedure Note    Indication:     history of gross hematuria     upper tract imaging results:   -FINDINGS:     ABDOMEN     LOWER CHEST:  Surgical clips left breast  Minimal left breast skin thickening attributed to post therapy changes      LIVER/BILIARY TREE:  Unremarkable      GALLBLADDER:  No calcified gallstones  No pericholecystic inflammatory change      SPLEEN:  Unremarkable      PANCREAS:  Unremarkable      ADRENAL GLANDS:  Unremarkable      KIDNEYS/URETERS:  Unremarkable  No hydronephrosis      STOMACH AND BOWEL:  Unremarkable      APPENDIX:  No findings to suggest appendicitis      ABDOMINOPELVIC CAVITY:  No ascites  No pneumoperitoneum  No lymphadenopathy      VESSELS:  Minimal aortoiliac calcification  No aneurysm      PELVIS     REPRODUCTIVE ORGANS:  Post hysterectomy      URINARY BLADDER:  Unremarkable      ABDOMINAL WALL/INGUINAL REGIONS:  Unremarkable      OSSEOUS STRUCTURES:  No acute fracture or osseous destructive lesion identified  Degenerative changes of the spine, pubic symphysis, and multiple joints  Small Schmorl's nodes superior endplate of L2      IMPRESSION:     No radiopaque urinary tract calculi or obstructive uropathy      No acute intra-abdominal or intrapelvic process insofar as can be detected on a noncontrast CT  Informed consent   The risks, benefits, complications, treatment options, and expected outcomes were discussed with the patient  The patient concurred with the proposed plan and provided informed consent  Anesthesia  Lidocaine jelly 2%    Antibiotic prophylaxis   None    Procedure  In the presence of a female nurse, the patient was placed in the supine frog-legged position, was prepped and draped in the usual manner using sterile technique, and 2% lidocaine jelly instilled into the urethra  A 17 F flexible cystoscope was then inserted into the urethra and the urethra and bladder carefully examined    The following findings were noted: Findings:  Urethra:  Normal  Bladder:  Normal  Ureteral orifices:  Normal  Other findings:  None     Specimens: None                 Complications:    None; patient tolerated the procedure well           Disposition: To home after 30 minute observation  Condition: Stable    Plan:      the patient has now completed a full hematuria workup including cystoscopy and imaging  No signs of genitourinary pathology nor certainly any signs of malignancy  At this point she will follow up on as-needed basis    Certainly happy to see her again should the need arise

## 2021-01-29 ENCOUNTER — TELEPHONE (OUTPATIENT)
Dept: INTERNAL MEDICINE CLINIC | Facility: CLINIC | Age: 66
End: 2021-01-29

## 2021-01-29 DIAGNOSIS — I10 ESSENTIAL HYPERTENSION: Primary | Chronic | ICD-10-CM

## 2021-01-29 RX ORDER — QUINAPRIL 10 MG/1
10 TABLET ORAL
Status: CANCELLED
Start: 2021-01-29

## 2021-01-29 NOTE — TELEPHONE ENCOUNTER
I would like to put her back on her accupril- I know it was stopped during hospital stay in aug but her kidney number is almost back to normal  I would like her to take 10mg and we will recheck her kidneys in 2 weeks and make sure ok  The alternative is to increase her amlodipine to 10mg but that may cause leg swelling  Let me know what she wants to  do and if she needs me to send in accupril   I ordered the labs for 2 weeks

## 2021-01-29 NOTE — TELEPHONE ENCOUNTER
Patient is worried about her bp it has been 188/90 wondering if her meds should be changed she can be reached at  [phone] she is having a colonoscopy and endoscopy on Tuesday and Dr Glen Benson ask that she call her pcp about her bp

## 2021-01-29 NOTE — TELEPHONE ENCOUNTER
No  Between her and [facility] they have plenty of accupril  So to clarify for you , she will take the 5 Mg of amlodipine and add 10 Mg of accupril  Tiago Farmer That is how she took it before

## 2021-01-29 NOTE — TELEPHONE ENCOUNTER
Yes bc if she is taking it and her bps are still high we need to add medication  does she need me to send it in?

## 2021-01-29 NOTE — TELEPHONE ENCOUNTER
CALLED PT   AND WAS NOTIFIED AND WILL TAKE THE ACCUPRIL AND IS WONDERING IS SHE TO STAY ON AMLODIPINE

## 2021-02-10 ENCOUNTER — ANESTHESIA EVENT (OUTPATIENT)
Dept: PERIOP | Facility: HOSPITAL | Age: 66
End: 2021-02-10

## 2021-02-10 RX ORDER — SODIUM CHLORIDE, SODIUM LACTATE, POTASSIUM CHLORIDE, CALCIUM CHLORIDE 600; 310; 30; 20 MG/100ML; MG/100ML; MG/100ML; MG/100ML
125 INJECTION, SOLUTION INTRAVENOUS CONTINUOUS
Status: CANCELLED | OUTPATIENT
Start: 2021-02-10

## 2021-02-11 ENCOUNTER — HOSPITAL ENCOUNTER (OUTPATIENT)
Dept: PERIOP | Facility: HOSPITAL | Age: 66
Setting detail: OUTPATIENT SURGERY
Discharge: HOME | End: 2021-02-11
Attending: INTERNAL MEDICINE
Payer: MEDICARE

## 2021-02-11 ENCOUNTER — ANESTHESIA (OUTPATIENT)
Dept: PERIOP | Facility: HOSPITAL | Age: 66
End: 2021-02-11

## 2021-02-11 ENCOUNTER — TELEPHONE (OUTPATIENT)
Dept: GASTROENTEROLOGY | Facility: CLINIC | Age: 66
End: 2021-02-11

## 2021-02-11 VITALS
HEART RATE: 79 BPM | OXYGEN SATURATION: 96 % | DIASTOLIC BLOOD PRESSURE: 72 MMHG | TEMPERATURE: 97.7 F | RESPIRATION RATE: 16 BRPM | SYSTOLIC BLOOD PRESSURE: 131 MMHG | HEIGHT: 58 IN | WEIGHT: 210.1 LBS | BODY MASS INDEX: 44.1 KG/M2

## 2021-02-11 VITALS — HEART RATE: 82 BPM

## 2021-02-11 DIAGNOSIS — K62.89 RECTAL PAIN: ICD-10-CM

## 2021-02-11 DIAGNOSIS — K59.09 OTHER CONSTIPATION: ICD-10-CM

## 2021-02-11 DIAGNOSIS — R19.5 OCCULT BLOOD IN STOOLS: ICD-10-CM

## 2021-02-11 DIAGNOSIS — K76.0 FATTY LIVER: ICD-10-CM

## 2021-02-11 DIAGNOSIS — D50.0 IRON DEFICIENCY ANEMIA DUE TO CHRONIC BLOOD LOSS: ICD-10-CM

## 2021-02-11 PROCEDURE — 45378 DIAGNOSTIC COLONOSCOPY: CPT | Performed by: INTERNAL MEDICINE

## 2021-02-11 PROCEDURE — 88305 TISSUE EXAM BY PATHOLOGIST: CPT | Performed by: PATHOLOGY

## 2021-02-11 PROCEDURE — 43239 EGD BIOPSY SINGLE/MULTIPLE: CPT | Performed by: INTERNAL MEDICINE

## 2021-02-11 RX ORDER — LIDOCAINE HYDROCHLORIDE 20 MG/ML
INJECTION, SOLUTION EPIDURAL; INFILTRATION; INTRACAUDAL; PERINEURAL AS NEEDED
Status: DISCONTINUED | OUTPATIENT
Start: 2021-02-11 | End: 2021-02-11

## 2021-02-11 RX ORDER — PROPOFOL 10 MG/ML
INJECTION, EMULSION INTRAVENOUS AS NEEDED
Status: DISCONTINUED | OUTPATIENT
Start: 2021-02-11 | End: 2021-02-11

## 2021-02-11 RX ORDER — SODIUM CHLORIDE, SODIUM LACTATE, POTASSIUM CHLORIDE, CALCIUM CHLORIDE 600; 310; 30; 20 MG/100ML; MG/100ML; MG/100ML; MG/100ML
INJECTION, SOLUTION INTRAVENOUS CONTINUOUS PRN
Status: DISCONTINUED | OUTPATIENT
Start: 2021-02-11 | End: 2021-02-11

## 2021-02-11 RX ADMIN — PROPOFOL 20 MG: 10 INJECTION, EMULSION INTRAVENOUS at 08:24

## 2021-02-11 RX ADMIN — PROPOFOL 30 MG: 10 INJECTION, EMULSION INTRAVENOUS at 08:21

## 2021-02-11 RX ADMIN — PROPOFOL 20 MG: 10 INJECTION, EMULSION INTRAVENOUS at 08:37

## 2021-02-11 RX ADMIN — PROPOFOL 20 MG: 10 INJECTION, EMULSION INTRAVENOUS at 08:28

## 2021-02-11 RX ADMIN — PROPOFOL 10 MG: 10 INJECTION, EMULSION INTRAVENOUS at 08:29

## 2021-02-11 RX ADMIN — PROPOFOL 10 MG: 10 INJECTION, EMULSION INTRAVENOUS at 08:33

## 2021-02-11 RX ADMIN — PROPOFOL 20 MG: 10 INJECTION, EMULSION INTRAVENOUS at 08:30

## 2021-02-11 RX ADMIN — PROPOFOL 20 MG: 10 INJECTION, EMULSION INTRAVENOUS at 08:31

## 2021-02-11 RX ADMIN — PROPOFOL 100 MG: 10 INJECTION, EMULSION INTRAVENOUS at 08:20

## 2021-02-11 RX ADMIN — LIDOCAINE HYDROCHLORIDE 100 MG: 20 INJECTION, SOLUTION EPIDURAL; INFILTRATION; INTRACAUDAL; PERINEURAL at 08:20

## 2021-02-11 RX ADMIN — SODIUM CHLORIDE, SODIUM LACTATE, POTASSIUM CHLORIDE, AND CALCIUM CHLORIDE: .6; .31; .03; .02 INJECTION, SOLUTION INTRAVENOUS at 08:17

## 2021-02-11 RX ADMIN — PROPOFOL 20 MG: 10 INJECTION, EMULSION INTRAVENOUS at 08:26

## 2021-02-11 NOTE — INTERVAL H&P NOTE
H&P reviewed  After examining the patient I find no changes in the patients condition since the H&P had been written      Vitals:    02/11/21 0843   BP: 127/56   Pulse: 86   Resp: 21   Temp: 97 7 °F (36 5 °C)   SpO2: 90%

## 2021-02-11 NOTE — ANESTHESIA PREPROCEDURE EVALUATION
Procedure:  COLONOSCOPY  EGD    Relevant Problems   CARDIO   (+) HTN (hypertension)   (+) Hyperlipidemia      ENDO   (+) Hypothyroidism      /RENAL   (+) JENNIFER (acute kidney injury) (HCC)   (+) Other hydronephrosis      HEMATOLOGY   (+) Anemia      MUSCULOSKELETAL   (+) Diastasis recti   (+) Sacroiliitis (HCC)      NEURO/PSYCH   (+) Anxiety   (+) Current moderate episode of major depressive disorder without prior episode (HCC)      PULMONARY   (+) Asthma        Physical Exam    Airway    Mallampati score: III  TM Distance: >3 FB  Neck ROM: full     Dental       Cardiovascular  Cardiovascular exam normal    Pulmonary  Pulmonary exam normal     Other Findings        Anesthesia Plan  ASA Score- 2     Anesthesia Type- IV sedation with anesthesia with ASA Monitors  Additional Monitors:   Airway Plan:           Plan Factors-Exercise tolerance (METS): >4 METS  Chart reviewed  EKG reviewed  Existing labs reviewed  Patient summary reviewed  Induction- intravenous  Postoperative Plan-     Informed Consent- Anesthetic plan and risks discussed with patient  I personally reviewed this patient with the CRNA  Discussed and agreed on the Anesthesia Plan with the CRNA  Alicia Laurent

## 2021-02-11 NOTE — DISCHARGE INSTRUCTIONS
Upper Endoscopy   WHAT YOU NEED TO KNOW:   An upper endoscopy is also called an upper gastrointestinal (GI) endoscopy, or an esophagogastroduodenoscopy (EGD)  You may feel bloated, gassy, or have some abdominal discomfort after your procedure  Your throat may be sore for 24 to 36 hours  You may burp or pass gas from air that is still inside your body  DISCHARGE INSTRUCTIONS:   Call 911 for any of the following:   · You have sudden chest pain or trouble breathing  Seek care immediately if:   · You feel dizzy or faint  · You have trouble swallowing  · Your bowel movements are very dark or black  · Your abdomen is hard and firm and you have severe pain  · You vomit blood  Contact your healthcare provider if:   · You feel full or bloated and cannot burp or pass gas  · You have not had a bowel movement for 3 days after your procedure  · You have neck pain  · You have a fever or chills  · You have nausea or are vomiting  · You have a rash or hives  · You have questions or concerns about your endoscopy  Relieve a sore throat:  Suck on throat lozenges or crushed ice  Gargle with a small amount of warm salt water  Mix 1 teaspoon of salt and 1 cup of warm water to make salt water  Relieve gas and discomfort from bloating:  Lie on your right side with a heating pad on your abdomen  Take short walks to help pass gas  Eat small meals until bloating is relieved  Rest after your procedure: You have been given medicine to relax you  Do not  drive or make important decisions until the day after your procedure  Return to your normal activity as directed  You can usually return to work the day after your procedure  Follow up with your healthcare provider as directed:  Write down your questions so you remember to ask them during your visits     © 2017 [address] is for End User's use only and may not be sold, redistributed or otherwise used for commercial purposes  All illustrations and images included in CareNotes® are the copyrighted property of [employer]  or Ty Ellis  The above information is an  only  It is not intended as medical advice for individual conditions or treatments  Talk to your doctor, nurse or pharmacist before following any medical regimen to see if it is safe and effective for you  Colonoscopy   WHAT YOU NEED TO KNOW:   A colonoscopy is a procedure to examine the inside of your colon (intestine) with a scope  Polyps or tissue growths may have been removed during your colonoscopy  It is normal to feel bloated and to have some abdominal discomfort  You should be passing gas  If you have hemorrhoids or you had polyps removed, you may have a small amount of bleeding  DISCHARGE INSTRUCTIONS:   Call your doctor if:   · You have a large amount of bright red blood in your bowel movements  · Your abdomen is hard and firm and you have severe pain  · You have sudden trouble breathing  · You develop a rash or hives  · You have a fever within 24 hours of your procedure  · You have not had a bowel movement for 3 days after your procedure  · You have questions or concerns about your condition or care  After your colonoscopy:   · Do not lift, strain, or run  for 3 days  · Rest as much as possible  You have been given medicine to relax you  Do not  drive or make important decisions for at least 24 hours  Return to your normal activity as directed  · Relieve gas and discomfort from bloating  by lying on your left side with a heating pad on your abdomen  You may need to take short walks to help the gas move out  Eat small meals until bloating is relieved  If you had polyps removed: For 7 days after your procedure:  · Do not  take aspirin  · Do not  go on long car rides  Help prevent constipation:   · Eat a variety of healthy foods    Healthy foods include fruit, vegetables, whole-grain breads, low-fat dairy products, beans, lean meat, and fish  Ask if you need to be on a special diet  Your healthcare provider may recommend that you eat high-fiber foods such as cooked beans  Fiber helps you have regular bowel movements  · Drink liquids as directed  Adults should drink between 9 and 13 eight-ounce cups of liquid every day  Ask what amount is best for you  For most people, good liquids to drink are water, juice, and milk  · Exercise as directed  Talk to your healthcare provider about the best exercise plan for you  Exercise can help prevent constipation, decrease your blood pressure and improve your health  Follow up with your healthcare provider as directed:  Write down your questions so you remember to ask them during your visits  © Copyright [address] Information is for End User's use only and may not be sold, redistributed or otherwise used for commercial purposes  All illustrations and images included in CareNotes® are the copyrighted property of A D A M [employer]  or [address] [place], [place] [id number] diana   The above information is an  only  It is not intended as medical advice for individual conditions or treatments  Talk to your doctor, nurse or pharmacist before following any medical regimen to see if it is safe and effective for you

## 2021-02-11 NOTE — ANESTHESIA POSTPROCEDURE EVALUATION
Post-Op Assessment Note    CV Status:  Stable  Pain Score: 0    Pain management: adequate     Mental Status:  Sleepy   Hydration Status:  Stable   PONV Controlled:  None   Airway Patency:  Patent and adequate      Post Op Vitals Reviewed: Yes      Staff: CRNA   Comments: 3LPM/Mask        No complications documented      BP   127/56   Temp      Pulse  86   Resp   18   SpO2   92

## 2021-02-15 ENCOUNTER — TELEPHONE (OUTPATIENT)
Dept: INTERNAL MEDICINE CLINIC | Facility: CLINIC | Age: 66
End: 2021-02-15

## 2021-02-15 DIAGNOSIS — D50.9 IRON DEFICIENCY ANEMIA, UNSPECIFIED IRON DEFICIENCY ANEMIA TYPE: Primary | ICD-10-CM

## 2021-02-15 NOTE — TELEPHONE ENCOUNTER
Becki I had the endoscopy and will be back in Dr Rossi office on for the camera I have to swallow  When do you want to check my blood for iron levels  Let me know please   Thanks Shayla Vela

## 2021-02-25 ENCOUNTER — OFFICE VISIT (OUTPATIENT)
Dept: GASTROENTEROLOGY | Facility: CLINIC | Age: 66
End: 2021-02-25
Payer: MEDICARE

## 2021-02-25 DIAGNOSIS — D50.8 OTHER IRON DEFICIENCY ANEMIA: Primary | ICD-10-CM

## 2021-02-25 DIAGNOSIS — R19.5 OCCULT BLOOD IN STOOLS: ICD-10-CM

## 2021-02-26 ENCOUNTER — TELEPHONE (OUTPATIENT)
Dept: GASTROENTEROLOGY | Facility: CLINIC | Age: 66
End: 2021-02-26

## 2021-02-26 DIAGNOSIS — I10 ESSENTIAL HYPERTENSION: Chronic | ICD-10-CM

## 2021-02-26 PROCEDURE — 91110 GI TRC IMG INTRAL ESOPH-ILE: CPT | Performed by: INTERNAL MEDICINE

## 2021-02-27 RX ORDER — AMLODIPINE BESYLATE 5 MG/1
5 TABLET ORAL DAILY
Qty: 90 TABLET | Refills: 3 | Status: SHIPPED | OUTPATIENT
Start: 2021-02-27 | End: 2022-01-19

## 2021-03-02 ENCOUNTER — TELEPHONE (OUTPATIENT)
Dept: INTERNAL MEDICINE CLINIC | Facility: CLINIC | Age: 66
End: 2021-03-02

## 2021-03-02 NOTE — TELEPHONE ENCOUNTER
Dr Kim Height office would like last office notes, lab work , egg , US and any other test done recently     please fax to [phone]

## 2021-03-02 NOTE — TELEPHONE ENCOUNTER
FYI : patient has an appt w/ Dr Sonya Youssef on 3/17/21    Would like to know if you want her still to get the CBC done?      # [phone]

## 2021-03-05 ENCOUNTER — LAB (OUTPATIENT)
Dept: LAB | Facility: HOSPITAL | Age: 66
End: 2021-03-05
Attending: INTERNAL MEDICINE
Payer: MEDICARE

## 2021-03-05 ENCOUNTER — TELEPHONE (OUTPATIENT)
Dept: INTERNAL MEDICINE CLINIC | Facility: CLINIC | Age: 66
End: 2021-03-05

## 2021-03-05 DIAGNOSIS — D50.9 IRON DEFICIENCY ANEMIA, UNSPECIFIED IRON DEFICIENCY ANEMIA TYPE: ICD-10-CM

## 2021-03-05 LAB
BASOPHILS # BLD AUTO: 0.03 THOUSANDS/ΜL (ref 0–0.1)
BASOPHILS NFR BLD AUTO: 0 % (ref 0–1)
EOSINOPHIL # BLD AUTO: 0.15 THOUSAND/ΜL (ref 0–0.61)
EOSINOPHIL NFR BLD AUTO: 1 % (ref 0–6)
ERYTHROCYTE [DISTWIDTH] IN BLOOD BY AUTOMATED COUNT: 21.6 % (ref 11.6–15.1)
HCT VFR BLD AUTO: 43.5 % (ref 34.8–46.1)
HGB BLD-MCNC: 13.2 G/DL (ref 11.5–15.4)
IMM GRANULOCYTES # BLD AUTO: 0.04 THOUSAND/UL (ref 0–0.2)
IMM GRANULOCYTES NFR BLD AUTO: 0 % (ref 0–2)
LYMPHOCYTES # BLD AUTO: 2.4 THOUSANDS/ΜL (ref 0.6–4.47)
LYMPHOCYTES NFR BLD AUTO: 23 % (ref 14–44)
MCH RBC QN AUTO: 25 PG (ref 26.8–34.3)
MCHC RBC AUTO-ENTMCNC: 30.3 G/DL (ref 31.4–37.4)
MCV RBC AUTO: 83 FL (ref 82–98)
MONOCYTES # BLD AUTO: 0.93 THOUSAND/ΜL (ref 0.17–1.22)
MONOCYTES NFR BLD AUTO: 9 % (ref 4–12)
NEUTROPHILS # BLD AUTO: 6.85 THOUSANDS/ΜL (ref 1.85–7.62)
NEUTS SEG NFR BLD AUTO: 67 % (ref 43–75)
NRBC BLD AUTO-RTO: 0 /100 WBCS
PLATELET # BLD AUTO: 296 THOUSANDS/UL (ref 149–390)
PMV BLD AUTO: 8.5 FL (ref 8.9–12.7)
RBC # BLD AUTO: 5.27 MILLION/UL (ref 3.81–5.12)
WBC # BLD AUTO: 10.4 THOUSAND/UL (ref 4.31–10.16)

## 2021-03-05 PROCEDURE — 85025 COMPLETE CBC W/AUTO DIFF WBC: CPT

## 2021-03-05 PROCEDURE — 36415 COLL VENOUS BLD VENIPUNCTURE: CPT

## 2021-03-05 NOTE — TELEPHONE ENCOUNTER
----- Message from Vipul Mcdonald MD sent at 3/5/2021  4:48 PM EST -----  Notify-hemoglobin normalized

## 2021-03-10 DIAGNOSIS — Z23 ENCOUNTER FOR IMMUNIZATION: ICD-10-CM

## 2021-03-12 DIAGNOSIS — B37.9 YEAST INFECTION: ICD-10-CM

## 2021-03-12 RX ORDER — NYSTATIN 100000 [USP'U]/G
POWDER TOPICAL
Qty: 15 G | Refills: 3 | Status: SHIPPED | OUTPATIENT
Start: 2021-03-12 | End: 2021-06-10

## 2021-03-19 DIAGNOSIS — F41.9 ANXIETY: ICD-10-CM

## 2021-03-19 RX ORDER — ALPRAZOLAM 1 MG/1
TABLET ORAL
Qty: 90 TABLET | Refills: 3 | Status: SHIPPED | OUTPATIENT
Start: 2021-03-19 | End: 2021-07-14

## 2021-04-29 DIAGNOSIS — E03.9 ACQUIRED HYPOTHYROIDISM: ICD-10-CM

## 2021-04-30 RX ORDER — LEVOTHYROXINE SODIUM 88 UG/1
TABLET ORAL
Qty: 104 TABLET | Refills: 3 | Status: SHIPPED | OUTPATIENT
Start: 2021-04-30 | End: 2022-02-28

## 2021-05-07 DIAGNOSIS — K21.9 GASTROESOPHAGEAL REFLUX DISEASE WITHOUT ESOPHAGITIS: ICD-10-CM

## 2021-05-07 RX ORDER — PANTOPRAZOLE SODIUM 40 MG/1
TABLET, DELAYED RELEASE ORAL
Qty: 90 TABLET | Refills: 3 | Status: SHIPPED | OUTPATIENT
Start: 2021-05-07 | End: 2021-12-14

## 2021-05-17 DIAGNOSIS — I10 ESSENTIAL HYPERTENSION: ICD-10-CM

## 2021-05-17 RX ORDER — QUINAPRIL 20 MG/1
TABLET ORAL
Qty: 90 TABLET | Refills: 3 | Status: SHIPPED | OUTPATIENT
Start: 2021-05-17 | End: 2022-02-20

## 2021-06-04 ENCOUNTER — TELEPHONE (OUTPATIENT)
Dept: INTERNAL MEDICINE CLINIC | Facility: CLINIC | Age: 66
End: 2021-06-04

## 2021-06-04 NOTE — TELEPHONE ENCOUNTER
FELL 6/3--BLACK AND BLUE ALL OVER, THINKS SHE BROKE 2 TOES,     [address] NO APPTS    REFUSES ED    SCHEDULED APPT W/ DANIELA FOR 6/5    IF IT GETS WORSE SHE WILL GO TO ED

## 2021-06-10 DIAGNOSIS — B37.9 YEAST INFECTION: ICD-10-CM

## 2021-06-10 RX ORDER — NYSTATIN 100000 [USP'U]/G
POWDER TOPICAL
Qty: 15 G | Refills: 3 | Status: SHIPPED | OUTPATIENT
Start: 2021-06-10 | End: 2021-07-12

## 2021-06-23 DIAGNOSIS — I26.99 PULMONARY EMBOLISM, OTHER, UNSPECIFIED CHRONICITY, UNSPECIFIED WHETHER ACUTE COR PULMONALE PRESENT (HCC): ICD-10-CM

## 2021-06-24 RX ORDER — APIXABAN 5 MG/1
TABLET, FILM COATED ORAL
Qty: 180 TABLET | Refills: 3 | Status: SHIPPED | OUTPATIENT
Start: 2021-06-24 | End: 2021-12-03 | Stop reason: SDUPTHER

## 2021-07-06 ENCOUNTER — OFFICE VISIT (OUTPATIENT)
Dept: INTERNAL MEDICINE CLINIC | Facility: CLINIC | Age: 66
End: 2021-07-06
Payer: MEDICARE

## 2021-07-06 VITALS
BODY MASS INDEX: 45.97 KG/M2 | HEART RATE: 105 BPM | OXYGEN SATURATION: 93 % | RESPIRATION RATE: 16 BRPM | DIASTOLIC BLOOD PRESSURE: 64 MMHG | TEMPERATURE: 98.8 F | HEIGHT: 58 IN | SYSTOLIC BLOOD PRESSURE: 132 MMHG | WEIGHT: 219 LBS

## 2021-07-06 DIAGNOSIS — J45.20 MILD INTERMITTENT ASTHMA WITHOUT COMPLICATION: ICD-10-CM

## 2021-07-06 DIAGNOSIS — F32.1 CURRENT MODERATE EPISODE OF MAJOR DEPRESSIVE DISORDER WITHOUT PRIOR EPISODE (HCC): ICD-10-CM

## 2021-07-06 DIAGNOSIS — E78.2 MIXED HYPERLIPIDEMIA: ICD-10-CM

## 2021-07-06 DIAGNOSIS — J30.1 SEASONAL ALLERGIC RHINITIS DUE TO POLLEN: ICD-10-CM

## 2021-07-06 DIAGNOSIS — E03.9 ACQUIRED HYPOTHYROIDISM: Primary | ICD-10-CM

## 2021-07-06 DIAGNOSIS — I26.99 OTHER ACUTE PULMONARY EMBOLISM WITHOUT ACUTE COR PULMONALE (HCC): ICD-10-CM

## 2021-07-06 DIAGNOSIS — R73.01 IMPAIRED FASTING GLUCOSE: ICD-10-CM

## 2021-07-06 DIAGNOSIS — R19.7 ACUTE DIARRHEA: ICD-10-CM

## 2021-07-06 DIAGNOSIS — E66.01 MORBID OBESITY (HCC): ICD-10-CM

## 2021-07-06 DIAGNOSIS — G61.9 INFLAMMATORY NEUROPATHY (HCC): ICD-10-CM

## 2021-07-06 DIAGNOSIS — D05.12 DUCTAL CARCINOMA IN SITU (DCIS) OF LEFT BREAST: ICD-10-CM

## 2021-07-06 DIAGNOSIS — R73.9 HYPERGLYCEMIA: ICD-10-CM

## 2021-07-06 DIAGNOSIS — I10 ESSENTIAL HYPERTENSION: Chronic | ICD-10-CM

## 2021-07-06 DIAGNOSIS — F41.9 ANXIETY: ICD-10-CM

## 2021-07-06 PROBLEM — R65.10 SIRS (SYSTEMIC INFLAMMATORY RESPONSE SYNDROME) (HCC): Status: RESOLVED | Noted: 2020-08-13 | Resolved: 2021-07-06

## 2021-07-06 PROBLEM — N17.9 AKI (ACUTE KIDNEY INJURY) (HCC): Status: RESOLVED | Noted: 2020-08-19 | Resolved: 2021-07-06

## 2021-07-06 PROBLEM — R78.81 GRAM-POSITIVE BACTEREMIA: Status: RESOLVED | Noted: 2020-08-14 | Resolved: 2021-07-06

## 2021-07-06 PROBLEM — S30.0XXA TRAUMATIC ECCHYMOSIS OF BUTTOCK: Status: RESOLVED | Noted: 2020-08-14 | Resolved: 2021-07-06

## 2021-07-06 PROCEDURE — 99214 OFFICE O/P EST MOD 30 MIN: CPT | Performed by: INTERNAL MEDICINE

## 2021-07-06 NOTE — PROGRESS NOTES
Assessment/Plan:    Diagnoses and all orders for this visit:    Acquired hypothyroidism  -     TSH, 3rd generation with Free T4 reflex; Future    Impaired fasting glucose  -     Hemoglobin A1C; Future    Acute diarrhea    Mild intermittent asthma without complication    Seasonal allergic rhinitis due to pollen    Essential hypertension  -     CBC and differential; Future  -     Comprehensive metabolic panel; Future    Other acute pulmonary embolism without acute cor pulmonale (HCC)    Inflammatory neuropathy (HCC)    Ductal carcinoma in situ (DCIS) of left breast    Morbid obesity (HCC)    Mixed hyperlipidemia  -     Lipid panel; Future    Current moderate episode of major depressive disorder without prior episode ([address] )    Anxiety    Hyperglycemia              Patient Instructions   Check labs today, will follow accordingly    Blood pressure stable on present regimen    Hypothyroidism-check TSH    History of bilateral PEs August 2020, unprovoked  Continue indefinite anticoagulation, I would be in favor of decreasing dose to 2 5 mg after 1 year of 5 mg  GERD stable and pantoprazole    History of DCIS stable without evidence of recurrence status post surgery and radiation treatment  Depression/anxiety/chronic pain-continue Cymbalta and tramadol with Tylenol as needed  Continue with therapy  Sciatica-continue with physical therapy  Asthma/allergy, follow-up with allergist    Routine follow-up after labs in 6 months, sooner as needed      Subjective:      Patient ID: Jared Garcia is a 77 y o  female    Here to f/u MMP and review labs  Multiple acute issues  Recent diarrheal illness resolved  Having L hand surgery w/ Dr Bryon Rees at [facility] 7/19    Depression and anxiety-seeing Ghada weekly and taking rx as directed  Still w/ poor sleep since critical illness  DCIS- had surgery w/ Dr Anya Salgado, completed XRT  Following w/ Dr Anamika robles for medical oncology    Stopped Tamoxifen d/t side effects, no rx   Neg mammo last week  H/o b/l PE-f/b Dr Dari Hong, taking eliquis 5 mg bid  HTN-taking rx as directed, home bp's have been stable    HPL-Tolerating rx  GERD-stable on rx  Taking pantoprazole am, pepcid pm       Hypothyroid-taking rx as directed  Asthma/allergy-following w/ Dr Bimal Jackson, taking rx as directed  Chronic pain in feet r/t arthritis  Chronic lbp w/ R radiculopathy + PN getting worse, seeing neuro in March  Following w/ orhto w/ post tka pain in right tibia  Obesity-decided against bariatric surgery, wt has been stable after recent loss  Following Atkins plan  Wasn't able to get Shingrix yet           Current Outpatient Medications:     albuterol (PROAIR HFA) 90 mcg/act inhaler, Inhale 2 puffs every 6 (six) hours as needed for wheezing, Disp: , Rfl:     ALPRAZolam (XANAX) 1 mg tablet, TAKE 1/2-1 TABLET THREE TIMES A DAY, Disp: 90 tablet, Rfl: 3    amLODIPine (NORVASC) 5 mg tablet, Take 1 tablet (5 mg total) by mouth daily, Disp: 90 tablet, Rfl: 3    clotrimazole (LOTRIMIN) 1 % cream, as needed , Disp: , Rfl:     clotrimazole-betamethasone (LOTRISONE) 1-0 05 % cream, Apply to skin fold bid, Disp: 45 g, Rfl: 1    DULoxetine (CYMBALTA) 30 mg delayed release capsule, Take 1 capsule (30 mg total) by mouth daily, Disp: 90 capsule, Rfl: 3    DULoxetine (CYMBALTA) 60 mg delayed release capsule, Take 1 capsule (60 mg total) by mouth daily, Disp: 90 capsule, Rfl: 3    Eliquis 5 MG, TAKE 1 TABLET BY MOUTH  TWICE DAILY, Disp: 180 tablet, Rfl: 3    ferrous sulfate 325 (65 Fe) mg tablet, Take 325 mg by mouth daily, Disp: , Rfl:     hydrocortisone-pramoxine (ANALPRAM-HC) 2 5-1 % rectal cream, Apply topically 3 (three) times a day, Disp: 3 Tube, Rfl: 1    levothyroxine 88 mcg tablet, TAKE 1 TABLET BY MOUTH  DAILY EXCEPT ON SUNDAY TAKE 2 TABLETS, Disp: 104 tablet, Rfl: 3    methocarbamol (ROBAXIN) 500 mg tablet, Take 1 tablet (500 mg total) by mouth 4 (four) times a day (Patient taking differently: Take 500 mg by mouth as needed ), Disp: 60 tablet, Rfl: 0    Mometasone Furo-Formoterol Fum (DULERA) 100-5 MCG/ACT AERO, Inhale, Disp: , Rfl:     Multiple Vitamins-Minerals (OCUVITE ADULT 50+ PO), Take by mouth, Disp: , Rfl:     nystatin powder, APPLY TO AFFECTED AREA(S) SPARINGLY TWO TIMES A DAY, Disp: 15 g, Rfl: 3    pantoprazole (PROTONIX) 40 mg tablet, TAKE 1 TABLET BY MOUTH  DAILY, Disp: 90 tablet, Rfl: 3    quinapril (ACCUPRIL) 20 mg tablet, TAKE 1 TABLET BY MOUTH  EVERY DAY (Patient taking differently: 10 mg daily ), Disp: 90 tablet, Rfl: 3    simvastatin (ZOCOR) 40 mg tablet, TAKE ONE TABLET BY MOUTH EVERY DAY, Disp: 90 tablet, Rfl: 3    traMADol (ULTRAM) 50 mg tablet, TAKE 1 TABLET BY MOUTH  EVERY 8 HOURS AS NEEDED FOR SEVERE PAIN, Disp: 90 tablet, Rfl: 1    Triamcinolone Acetonide (Nasacort Allergy 24HR) 55 MCG/ACT AERO, into each nostril as needed , Disp: , Rfl:     al mag oxide-diphenhydramine-lidocaine viscous (MAGIC MOUTHWASH) 1:1:1 suspension, Swish and spit 10 mL every 4 (four) hours as needed for mouth pain or discomfort for up to 5 doses (Patient not taking: Reported on 7/6/2021), Disp: 90 mL, Rfl: 0    cholestyramine (QUESTRAN) 4 g packet, Take by mouth as needed  (Patient not taking: Reported on 7/6/2021), Disp: , Rfl:     erythromycin (ILOTYCIN) ophthalmic ointment, as needed  (Patient not taking: Reported on 7/6/2021), Disp: , Rfl: 3    famotidine (PEPCID) 10 mg tablet, Take 1 tablet (10 mg total) by mouth daily, Disp: 90 tablet, Rfl: 3    No results found for this or any previous visit (from the past 1008 hour(s))      The following portions of the patient's history were reviewed and updated as appropriate: allergies, current medications, past family history, past medical history, past social history, past surgical history and problem list      Review of Systems      Objective:      Vitals:    07/06/21 1152   BP: 132/64   Pulse:    Resp:    Temp: SpO2:           Physical Exam

## 2021-07-06 NOTE — PATIENT INSTRUCTIONS
Check labs today, will follow accordingly    Blood pressure stable on present regimen    Hypothyroidism-check TSH    History of bilateral PEs August 2020, unprovoked  Continue indefinite anticoagulation, I would be in favor of decreasing dose to 2 5 mg after 1 year of 5 mg  GERD stable and pantoprazole    History of DCIS stable without evidence of recurrence status post surgery and radiation treatment  Depression/anxiety/chronic pain-continue Cymbalta and tramadol with Tylenol as needed  Continue with therapy  Sciatica-continue with physical therapy      Asthma/allergy, follow-up with allergist    Routine follow-up after labs in 6 months, sooner as needed

## 2021-07-08 DIAGNOSIS — M54.9 BACK PAIN, UNSPECIFIED BACK LOCATION, UNSPECIFIED BACK PAIN LATERALITY, UNSPECIFIED CHRONICITY: ICD-10-CM

## 2021-07-08 RX ORDER — METHOCARBAMOL 500 MG/1
500 TABLET, FILM COATED ORAL 4 TIMES DAILY PRN
Qty: 60 TABLET | Refills: 0
Start: 2021-07-08 | End: 2021-08-05 | Stop reason: SDUPTHER

## 2021-07-10 DIAGNOSIS — L30.9 DERMATITIS: ICD-10-CM

## 2021-07-10 DIAGNOSIS — B37.9 YEAST INFECTION: ICD-10-CM

## 2021-07-12 RX ORDER — CLOTRIMAZOLE AND BETAMETHASONE DIPROPIONATE 10; .64 MG/G; MG/G
CREAM TOPICAL
Qty: 45 G | Refills: 1 | Status: SHIPPED | OUTPATIENT
Start: 2021-07-12

## 2021-07-12 RX ORDER — NYSTATIN 100000 [USP'U]/G
POWDER TOPICAL
Qty: 15 G | Refills: 3 | Status: SHIPPED | OUTPATIENT
Start: 2021-07-12 | End: 2021-08-05 | Stop reason: SDUPTHER

## 2021-07-14 ENCOUNTER — TELEPHONE (OUTPATIENT)
Dept: INTERNAL MEDICINE CLINIC | Facility: CLINIC | Age: 66
End: 2021-07-14

## 2021-07-14 DIAGNOSIS — F41.9 ANXIETY: ICD-10-CM

## 2021-07-14 RX ORDER — ALPRAZOLAM 1 MG/1
TABLET ORAL
Qty: 90 TABLET | Refills: 3 | Status: SHIPPED | OUTPATIENT
Start: 2021-07-14 | End: 2021-11-06

## 2021-07-14 NOTE — TELEPHONE ENCOUNTER
Pt having surgery on Left pinky because of a piece of metal in it  - 7/19    Surgeon: Dr Bakari Nguyen  The surgeon also wants to give a steroid injection to her left thumb for the pain- lump on thumb   Doing it the same time of surgery     Is that OK with Dr Yajaira Murphy?

## 2021-07-14 NOTE — TELEPHONE ENCOUNTER
Called pt   And was notified now asking when and how many days does she need to not take her eliquis

## 2021-07-15 ENCOUNTER — APPOINTMENT (OUTPATIENT)
Dept: LAB | Facility: HOSPITAL | Age: 66
End: 2021-07-15
Payer: MEDICARE

## 2021-07-15 DIAGNOSIS — R73.01 IMPAIRED FASTING GLUCOSE: ICD-10-CM

## 2021-07-15 DIAGNOSIS — I10 ESSENTIAL HYPERTENSION: Chronic | ICD-10-CM

## 2021-07-15 DIAGNOSIS — E03.9 ACQUIRED HYPOTHYROIDISM: ICD-10-CM

## 2021-07-15 DIAGNOSIS — E78.2 MIXED HYPERLIPIDEMIA: ICD-10-CM

## 2021-07-15 DIAGNOSIS — Z11.59 NEED FOR HEPATITIS C SCREENING TEST: ICD-10-CM

## 2021-07-15 LAB
ALBUMIN SERPL BCP-MCNC: 3.6 G/DL (ref 3.5–5)
ALP SERPL-CCNC: 92 U/L (ref 46–116)
ALT SERPL W P-5'-P-CCNC: 26 U/L (ref 12–78)
ANION GAP SERPL CALCULATED.3IONS-SCNC: 8 MMOL/L (ref 4–13)
AST SERPL W P-5'-P-CCNC: 23 U/L (ref 5–45)
BASOPHILS # BLD AUTO: 0.03 THOUSANDS/ΜL (ref 0–0.1)
BASOPHILS NFR BLD AUTO: 0 % (ref 0–1)
BILIRUB SERPL-MCNC: 0.44 MG/DL (ref 0.2–1)
BUN SERPL-MCNC: 12 MG/DL (ref 5–25)
CALCIUM SERPL-MCNC: 9.1 MG/DL (ref 8.3–10.1)
CHLORIDE SERPL-SCNC: 100 MMOL/L (ref 100–108)
CHOLEST SERPL-MCNC: 175 MG/DL (ref 50–200)
CO2 SERPL-SCNC: 31 MMOL/L (ref 21–32)
CREAT SERPL-MCNC: 0.79 MG/DL (ref 0.6–1.3)
EOSINOPHIL # BLD AUTO: 0.26 THOUSAND/ΜL (ref 0–0.61)
EOSINOPHIL NFR BLD AUTO: 3 % (ref 0–6)
ERYTHROCYTE [DISTWIDTH] IN BLOOD BY AUTOMATED COUNT: 14.8 % (ref 11.6–15.1)
EST. AVERAGE GLUCOSE BLD GHB EST-MCNC: 114 MG/DL
GFR SERPL CREATININE-BSD FRML MDRD: 78 ML/MIN/1.73SQ M
GLUCOSE P FAST SERPL-MCNC: 97 MG/DL (ref 65–99)
HBA1C MFR BLD: 5.6 %
HCT VFR BLD AUTO: 42.4 % (ref 34.8–46.1)
HCV AB SER QL: NORMAL
HDLC SERPL-MCNC: 47 MG/DL
HGB BLD-MCNC: 13.3 G/DL (ref 11.5–15.4)
IMM GRANULOCYTES # BLD AUTO: 0.03 THOUSAND/UL (ref 0–0.2)
IMM GRANULOCYTES NFR BLD AUTO: 0 % (ref 0–2)
LDLC SERPL CALC-MCNC: 100 MG/DL (ref 0–100)
LYMPHOCYTES # BLD AUTO: 2.29 THOUSANDS/ΜL (ref 0.6–4.47)
LYMPHOCYTES NFR BLD AUTO: 28 % (ref 14–44)
MCH RBC QN AUTO: 27.9 PG (ref 26.8–34.3)
MCHC RBC AUTO-ENTMCNC: 31.4 G/DL (ref 31.4–37.4)
MCV RBC AUTO: 89 FL (ref 82–98)
MONOCYTES # BLD AUTO: 0.6 THOUSAND/ΜL (ref 0.17–1.22)
MONOCYTES NFR BLD AUTO: 7 % (ref 4–12)
NEUTROPHILS # BLD AUTO: 5.01 THOUSANDS/ΜL (ref 1.85–7.62)
NEUTS SEG NFR BLD AUTO: 62 % (ref 43–75)
NONHDLC SERPL-MCNC: 128 MG/DL
NRBC BLD AUTO-RTO: 0 /100 WBCS
PLATELET # BLD AUTO: 221 THOUSANDS/UL (ref 149–390)
PMV BLD AUTO: 8.5 FL (ref 8.9–12.7)
POTASSIUM SERPL-SCNC: 3.9 MMOL/L (ref 3.5–5.3)
PROT SERPL-MCNC: 7.9 G/DL (ref 6.4–8.2)
RBC # BLD AUTO: 4.76 MILLION/UL (ref 3.81–5.12)
SODIUM SERPL-SCNC: 139 MMOL/L (ref 136–145)
TRIGL SERPL-MCNC: 141 MG/DL
TSH SERPL DL<=0.05 MIU/L-ACNC: 3.53 UIU/ML (ref 0.36–3.74)
WBC # BLD AUTO: 8.22 THOUSAND/UL (ref 4.31–10.16)

## 2021-07-15 PROCEDURE — 85025 COMPLETE CBC W/AUTO DIFF WBC: CPT

## 2021-07-15 PROCEDURE — 84443 ASSAY THYROID STIM HORMONE: CPT

## 2021-07-15 PROCEDURE — 86803 HEPATITIS C AB TEST: CPT

## 2021-07-15 PROCEDURE — 80061 LIPID PANEL: CPT

## 2021-07-15 PROCEDURE — 80053 COMPREHEN METABOLIC PANEL: CPT

## 2021-07-15 PROCEDURE — 36415 COLL VENOUS BLD VENIPUNCTURE: CPT

## 2021-07-15 PROCEDURE — 83036 HEMOGLOBIN GLYCOSYLATED A1C: CPT

## 2021-07-16 ENCOUNTER — TELEPHONE (OUTPATIENT)
Dept: INTERNAL MEDICINE CLINIC | Facility: CLINIC | Age: 66
End: 2021-07-16

## 2021-07-16 NOTE — TELEPHONE ENCOUNTER
----- Message from Joanne Mayfield MD sent at 7/16/2021  5:35 AM EDT -----   Notify-labs all fine, sugar improved    Repeat in Jan prior to f/u

## 2021-08-05 ENCOUNTER — TELEPHONE (OUTPATIENT)
Dept: INTERNAL MEDICINE CLINIC | Facility: CLINIC | Age: 66
End: 2021-08-05

## 2021-08-05 ENCOUNTER — OFFICE VISIT (OUTPATIENT)
Dept: INTERNAL MEDICINE CLINIC | Facility: CLINIC | Age: 66
End: 2021-08-05
Payer: MEDICARE

## 2021-08-05 VITALS
BODY MASS INDEX: 44.96 KG/M2 | RESPIRATION RATE: 16 BRPM | DIASTOLIC BLOOD PRESSURE: 84 MMHG | HEART RATE: 100 BPM | HEIGHT: 58 IN | SYSTOLIC BLOOD PRESSURE: 168 MMHG | WEIGHT: 214.2 LBS | TEMPERATURE: 99.4 F | OXYGEN SATURATION: 97 %

## 2021-08-05 DIAGNOSIS — M54.9 BACK PAIN, UNSPECIFIED BACK LOCATION, UNSPECIFIED BACK PAIN LATERALITY, UNSPECIFIED CHRONICITY: ICD-10-CM

## 2021-08-05 DIAGNOSIS — F43.10 PTSD (POST-TRAUMATIC STRESS DISORDER): ICD-10-CM

## 2021-08-05 DIAGNOSIS — B37.9 YEAST INFECTION: ICD-10-CM

## 2021-08-05 DIAGNOSIS — Z01.419 WELL WOMAN EXAM: ICD-10-CM

## 2021-08-05 DIAGNOSIS — F32.1 CURRENT MODERATE EPISODE OF MAJOR DEPRESSIVE DISORDER WITHOUT PRIOR EPISODE (HCC): ICD-10-CM

## 2021-08-05 DIAGNOSIS — Z86.711 HISTORY OF PULMONARY EMBOLISM: ICD-10-CM

## 2021-08-05 DIAGNOSIS — F41.9 ANXIETY: Primary | ICD-10-CM

## 2021-08-05 PROCEDURE — 99214 OFFICE O/P EST MOD 30 MIN: CPT | Performed by: INTERNAL MEDICINE

## 2021-08-05 RX ORDER — METHOCARBAMOL 500 MG/1
500 TABLET, FILM COATED ORAL 4 TIMES DAILY PRN
Qty: 60 TABLET | Refills: 0 | Status: SHIPPED | OUTPATIENT
Start: 2021-08-05 | End: 2021-12-16 | Stop reason: SDUPTHER

## 2021-08-05 RX ORDER — METHOCARBAMOL 500 MG/1
500 TABLET, FILM COATED ORAL 4 TIMES DAILY PRN
Qty: 60 TABLET | Refills: 0
Start: 2021-08-05 | End: 2021-08-05 | Stop reason: SDUPTHER

## 2021-08-05 RX ORDER — PAROXETINE 10 MG/1
10 TABLET, FILM COATED ORAL DAILY
Qty: 30 TABLET | Refills: 5 | Status: SHIPPED | OUTPATIENT
Start: 2021-08-05 | End: 2022-02-27

## 2021-08-05 RX ORDER — NYSTATIN 100000 [USP'U]/G
1 POWDER TOPICAL 2 TIMES DAILY
Qty: 15 G | Refills: 3 | Status: SHIPPED | OUTPATIENT
Start: 2021-08-05 | End: 2021-08-05 | Stop reason: SDUPTHER

## 2021-08-05 RX ORDER — NYSTATIN 100000 [USP'U]/G
1 POWDER TOPICAL 2 TIMES DAILY
Qty: 60 G | Refills: 3 | Status: SHIPPED | OUTPATIENT
Start: 2021-08-05 | End: 2021-12-28

## 2021-08-05 NOTE — TELEPHONE ENCOUNTER
Script for Robaxin was not received by the pharmacy  [facility] is requesting a resend on this script

## 2021-08-05 NOTE — PROGRESS NOTES
Assessment/Plan:    Diagnoses and all orders for this visit:    Anxiety  -     PARoxetine (PAXIL) 10 mg tablet; Take 1 tablet (10 mg total) by mouth daily    Back pain, unspecified back location, unspecified back pain laterality, unspecified chronicity  -     methocarbamol (ROBAXIN) 500 mg tablet; Take 1 tablet (500 mg total) by mouth 4 (four) times a day as needed for muscle spasms    Yeast infection  -     nystatin (nystatin) powder; Apply 1 application topically 2 (two) times a day To affected area(s) sparingly    Current moderate episode of major depressive disorder without prior episode (HCC)  -     PARoxetine (PAXIL) 10 mg tablet; Take 1 tablet (10 mg total) by mouth daily    PTSD (post-traumatic stress disorder)  -     PARoxetine (PAXIL) 10 mg tablet; Take 1 tablet (10 mg total) by mouth daily    History of pulmonary embolism         BMI Counseling: Body mass index is 44 77 kg/m²  The BMI is above normal  Nutrition recommendations include decreasing portion sizes, decreasing fast food intake, consuming healthier snacks, limiting drinks that contain sugar, moderation in carbohydrate intake and reducing intake of saturated and trans fat  Exercise recommendations include moderate physical activity 150 minutes/week  No pharmacotherapy was ordered  Patient Instructions   Start Paxil 10 mg daily  F/u 1 wk      Subjective:      Patient ID: Beau Alexis is a 77 y o  female    Acute visit w/ panic, ptsd, vivid dreams, sweats, feeling impending dome r/t 1 yr anniversary of PE  Stable on Eliquis  No bleeds  Seeing Maya weekly w/ progress w/ anger mngt  Keeping active and busy, walking dogs  But sx worse w/ idle time  Using alprazolam bid, no change  Sleeping ok w/ melatonin  Notes L flank pain since last hospitalization    Using 1 tramadol occasionally in am          Current Outpatient Medications:     al mag oxide-diphenhydramine-lidocaine viscous (MAGIC MOUTHWASH) 1:1:1 suspension, Swish and spit 10 mL every 4 (four) hours as needed for mouth pain or discomfort for up to 5 doses, Disp: 90 mL, Rfl: 0    albuterol (PROAIR HFA) 90 mcg/act inhaler, Inhale 2 puffs every 6 (six) hours as needed for wheezing, Disp: , Rfl:     ALPRAZolam (XANAX) 1 mg tablet, TAKE 1/2-1 TABLET THREE TIMES A DAY, Disp: 90 tablet, Rfl: 3    cholestyramine (QUESTRAN) 4 g packet, Take by mouth as needed , Disp: , Rfl:     clotrimazole (LOTRIMIN) 1 % cream, as needed , Disp: , Rfl:     clotrimazole-betamethasone (LOTRISONE) 1-0 05 % cream, APPLY TO SKIN FOLD TWO TIMES A DAY, Disp: 45 g, Rfl: 1    DULoxetine (CYMBALTA) 30 mg delayed release capsule, Take 1 capsule (30 mg total) by mouth daily, Disp: 90 capsule, Rfl: 3    DULoxetine (CYMBALTA) 60 mg delayed release capsule, Take 1 capsule (60 mg total) by mouth daily, Disp: 90 capsule, Rfl: 3    Eliquis 5 MG, TAKE 1 TABLET BY MOUTH  TWICE DAILY, Disp: 180 tablet, Rfl: 3    erythromycin (ILOTYCIN) ophthalmic ointment, as needed , Disp: , Rfl: 3    ferrous sulfate 325 (65 Fe) mg tablet, Take 325 mg by mouth daily, Disp: , Rfl:     hydrocortisone-pramoxine (ANALPRAM-HC) 2 5-1 % rectal cream, Apply topically 3 (three) times a day, Disp: 3 Tube, Rfl: 1    levothyroxine 88 mcg tablet, TAKE 1 TABLET BY MOUTH  DAILY EXCEPT ON SUNDAY TAKE 2 TABLETS, Disp: 104 tablet, Rfl: 3    methocarbamol (ROBAXIN) 500 mg tablet, Take 1 tablet (500 mg total) by mouth 4 (four) times a day as needed for muscle spasms, Disp: 60 tablet, Rfl: 0    Mometasone Furo-Formoterol Fum (DULERA) 100-5 MCG/ACT AERO, Inhale, Disp: , Rfl:     Multiple Vitamins-Minerals (OCUVITE ADULT 50+ PO), Take by mouth, Disp: , Rfl:     nystatin (nystatin) powder, Apply 1 application topically 2 (two) times a day To affected area(s) sparingly, Disp: 15 g, Rfl: 3    pantoprazole (PROTONIX) 40 mg tablet, TAKE 1 TABLET BY MOUTH  DAILY, Disp: 90 tablet, Rfl: 3    quinapril (ACCUPRIL) 20 mg tablet, TAKE 1 TABLET BY MOUTH EVERY DAY (Patient taking differently: 10 mg daily ), Disp: 90 tablet, Rfl: 3    simvastatin (ZOCOR) 40 mg tablet, TAKE ONE TABLET BY MOUTH EVERY DAY, Disp: 90 tablet, Rfl: 3    traMADol (ULTRAM) 50 mg tablet, TAKE 1 TABLET BY MOUTH  EVERY 8 HOURS AS NEEDED FOR SEVERE PAIN, Disp: 90 tablet, Rfl: 1    Triamcinolone Acetonide (Nasacort Allergy 24HR) 55 MCG/ACT AERO, into each nostril as needed , Disp: , Rfl:     amLODIPine (NORVASC) 5 mg tablet, Take 1 tablet (5 mg total) by mouth daily, Disp: 90 tablet, Rfl: 3    famotidine (PEPCID) 10 mg tablet, Take 1 tablet (10 mg total) by mouth daily, Disp: 90 tablet, Rfl: 3    PARoxetine (PAXIL) 10 mg tablet, Take 1 tablet (10 mg total) by mouth daily, Disp: 30 tablet, Rfl: 5    Recent Results (from the past 1008 hour(s))   CBC and differential    Collection Time: 07/15/21 12:59 PM   Result Value Ref Range    WBC 8 22 4 31 - 10 16 Thousand/uL    RBC 4 76 3 81 - 5 12 Million/uL    Hemoglobin 13 3 11 5 - 15 4 g/dL    Hematocrit 42 4 34 8 - 46 1 %    MCV 89 82 - 98 fL    MCH 27 9 26 8 - 34 3 pg    MCHC 31 4 31 4 - 37 4 g/dL    RDW 14 8 11 6 - 15 1 %    MPV 8 5 (L) 8 9 - 12 7 fL    Platelets 131 663 - 466 Thousands/uL    nRBC 0 /100 WBCs    Neutrophils Relative 62 43 - 75 %    Immat GRANS % 0 0 - 2 %    Lymphocytes Relative 28 14 - 44 %    Monocytes Relative 7 4 - 12 %    Eosinophils Relative 3 0 - 6 %    Basophils Relative 0 0 - 1 %    Neutrophils Absolute 5 01 1 85 - 7 62 Thousands/µL    Immature Grans Absolute 0 03 0 00 - 0 20 Thousand/uL    Lymphocytes Absolute 2 29 0 60 - 4 47 Thousands/µL    Monocytes Absolute 0 60 0 17 - 1 22 Thousand/µL    Eosinophils Absolute 0 26 0 00 - 0 61 Thousand/µL    Basophils Absolute 0 03 0 00 - 0 10 Thousands/µL   Comprehensive metabolic panel    Collection Time: 07/15/21 12:59 PM   Result Value Ref Range    Sodium 139 136 - 145 mmol/L    Potassium 3 9 3 5 - 5 3 mmol/L    Chloride 100 100 - 108 mmol/L    CO2 31 21 - 32 mmol/L    ANION GAP 8 4 - 13 mmol/L    BUN 12 5 - 25 mg/dL    Creatinine 0 79 0 60 - 1 30 mg/dL    Glucose, Fasting 97 65 - 99 mg/dL    Calcium 9 1 8 3 - 10 1 mg/dL    AST 23 5 - 45 U/L    ALT 26 12 - 78 U/L    Alkaline Phosphatase 92 46 - 116 U/L    Total Protein 7 9 6 4 - 8 2 g/dL    Albumin 3 6 3 5 - 5 0 g/dL    Total Bilirubin 0 44 0 20 - 1 00 mg/dL    eGFR 78 ml/min/1 73sq m   Lipid panel    Collection Time: 07/15/21 12:59 PM   Result Value Ref Range    Cholesterol 175 50 - 200 mg/dL    Triglycerides 141 <=150 mg/dL    HDL, Direct 47 >=40 mg/dL    LDL Calculated 100 0 - 100 mg/dL    Non-HDL-Chol (CHOL-HDL) 128 mg/dl   Hemoglobin A1C    Collection Time: 07/15/21 12:59 PM   Result Value Ref Range    Hemoglobin A1C 5 6 Normal 3 8-5 6%; PreDiabetic 5 7-6 4%; Diabetic >=6 5%; Glycemic control for adults with diabetes <7 0% %     mg/dl   TSH, 3rd generation with Free T4 reflex    Collection Time: 07/15/21 12:59 PM   Result Value Ref Range    TSH 3RD GENERATON 3 526 0 358 - 3 740 uIU/mL   Hepatitis C antibody    Collection Time: 07/15/21 12:59 PM   Result Value Ref Range    Hepatitis C Ab Non-reactive Non-reactive       The following portions of the patient's history were reviewed and updated as appropriate: allergies, current medications, past family history, past medical history, past social history, past surgical history and problem list      Review of Systems   Constitutional: Negative for appetite change, chills, diaphoresis, fatigue, fever and unexpected weight change  HENT: Negative for congestion, hearing loss and rhinorrhea  Eyes: Negative for visual disturbance  Respiratory: Negative for cough, chest tightness, shortness of breath and wheezing  Cardiovascular: Negative for chest pain, palpitations and leg swelling  Gastrointestinal: Negative for abdominal pain and blood in stool  Endocrine: Positive for heat intolerance  Negative for cold intolerance, polydipsia and polyuria     Genitourinary: Negative for difficulty urinating, dysuria, frequency and urgency  Musculoskeletal: Negative for arthralgias and myalgias  Skin: Negative for rash  Neurological: Negative for dizziness, weakness, light-headedness and headaches  Hematological: Does not bruise/bleed easily  Psychiatric/Behavioral: Positive for agitation, decreased concentration and sleep disturbance  Negative for dysphoric mood  The patient is nervous/anxious  Objective:      Vitals:    08/05/21 1105   BP: 168/84   Pulse: 100   Resp: 16   Temp: 99 4 °F (37 4 °C)   SpO2: 97%          Physical Exam  Constitutional:       Appearance: She is well-developed  HENT:      Head: Normocephalic and atraumatic  Pulmonary:      Effort: Pulmonary effort is normal    Abdominal:      Palpations: Abdomen is soft  Tenderness: There is abdominal tenderness  Comments: L flank ttp w/o mass or erythema   Neurological:      Mental Status: She is alert and oriented to person, place, and time  Psychiatric:         Behavior: Behavior normal  Behavior is cooperative  Thought Content:  Thought content normal          Judgment: Judgment normal

## 2021-08-06 ENCOUNTER — RA CDI HCC (OUTPATIENT)
Dept: OTHER | Facility: HOSPITAL | Age: 66
End: 2021-08-06

## 2021-08-06 NOTE — PROGRESS NOTES
[facility]  coding opportunities             Chart Reviewed * (Number of) Inbasket suggestions sent to Provider: 1                  Patients insurance company: Medicare     Visit status: Patient arrived for their scheduled appointment     Provider never responded to [place] [id number]  coding request     [place] [address]  coding opportunities       DX: M46 1 Sacroiliitis, not elsewhere classified    Cirrhosis lab work normal  Acute dx      Chart reviewed, (number of) suggestions sent to provider: 1                  Patients insurance company: [facility]

## 2021-08-12 ENCOUNTER — OFFICE VISIT (OUTPATIENT)
Dept: INTERNAL MEDICINE CLINIC | Facility: CLINIC | Age: 66
End: 2021-08-12
Payer: MEDICARE

## 2021-08-12 VITALS
WEIGHT: 216 LBS | BODY MASS INDEX: 45.34 KG/M2 | SYSTOLIC BLOOD PRESSURE: 116 MMHG | OXYGEN SATURATION: 95 % | HEART RATE: 105 BPM | TEMPERATURE: 98.8 F | HEIGHT: 58 IN | DIASTOLIC BLOOD PRESSURE: 62 MMHG

## 2021-08-12 DIAGNOSIS — F41.9 ANXIETY: ICD-10-CM

## 2021-08-12 DIAGNOSIS — Z86.711 HISTORY OF PULMONARY EMBOLISM: ICD-10-CM

## 2021-08-12 DIAGNOSIS — F43.10 PTSD (POST-TRAUMATIC STRESS DISORDER): ICD-10-CM

## 2021-08-12 DIAGNOSIS — Z78.0 MENOPAUSE: ICD-10-CM

## 2021-08-12 DIAGNOSIS — J20.9 ACUTE BRONCHITIS, UNSPECIFIED ORGANISM: Primary | ICD-10-CM

## 2021-08-12 PROCEDURE — 1123F ACP DISCUSS/DSCN MKR DOCD: CPT | Performed by: INTERNAL MEDICINE

## 2021-08-12 PROCEDURE — 99214 OFFICE O/P EST MOD 30 MIN: CPT | Performed by: INTERNAL MEDICINE

## 2021-08-12 PROCEDURE — G0438 PPPS, INITIAL VISIT: HCPCS | Performed by: INTERNAL MEDICINE

## 2021-08-12 NOTE — PROGRESS NOTES
Assessment and Plan:     Problem List Items Addressed This Visit     None          Falls Plan of Care: balance, strength, and gait training instructions were provided  Preventive health issues were discussed with patient, and age appropriate screening tests were ordered as noted in patient's After Visit Summary  Personalized health advice and appropriate referrals for health education or preventive services given if needed, as noted in patient's After Visit Summary       History of Present Illness:     Patient presents for Medicare Annual Wellness visit    Patient Care Team:  Tania Nolen MD as PCP - Mykel Burton MD as PCP - [address] [place] [address] ([facility])  Tania Nolen MD     Problem List:     Patient Active Problem List   Diagnosis    Asthma    HTN (hypertension)    Allergic rhinitis    Anxiety    Candidal intertrigo    Current moderate episode of major depressive disorder without prior episode ([address] )    Herpes simplex infection    Hyperlipidemia    Hypothyroidism    Impaired fasting glucose    Morbid obesity ([address] )    Peripheral neuropathy    Ductal carcinoma in situ (DCIS) of left breast    Diastasis recti    Acute pulmonary embolism ([address] )    Gross hematuria    Acute diarrhea    Anemia    Other hydronephrosis    Sacroiliitis ([address] )    History of pulmonary embolism    PTSD (post-traumatic stress disorder)      Past Medical and Surgical History:     Past Medical History:   Diagnosis Date    Allergic rhinitis     Anemia     Cancer ([address] )     breast cancer, Left side    Depression     Disease of thyroid gland     GERD (gastroesophageal reflux disease)     Hyperlipemia     Hypertension     Hypothyroidism     Last assessed: 3/25/14    Obesity     Osteoarthritis     Pre-operative laboratory examination      Past Surgical History:   Procedure Laterality Date    HAND SURGERY Left 03/2020    HYSTERECTOMY      INCISIONAL BREAST BIOPSY      JOINT REPLACEMENT      KNEE ARTHROPLASTY      ROTATOR CUFF REPAIR      SHOULDER SURGERY      TRIGGER FINGER RELEASE      TRIGGER FINGER RELEASE        Family History:     Family History   Problem Relation Age of Onset    Coronary artery disease Mother     Hypertension Mother         Essential    Coronary artery disease Father       Social History:     Social History     Socioeconomic History    Marital status: /Civil Union     Spouse name: Not on file    Number of children: Not on file    Years of education: Not on file    Highest education level: Not on file   Occupational History    Not on file   Tobacco Use    Smoking status: Never Smoker    Smokeless tobacco: Never Used   Vaping Use    Vaping Use: Never used   Substance and Sexual Activity    Alcohol use: Yes     Comment: socially     Drug use: No    Sexual activity: Not Currently     Partners: Male   Other Topics Concern    Not on file   Social History Narrative    Living independently w/spouse     Social Determinants of Health     Financial Resource Strain:     Difficulty of Paying Living Expenses:    Food Insecurity:     Worried About Running Out of Food in the Last Year:     [address] in the Last Year:    Transportation Needs:     Lack of Transportation (Medical):      Lack of Transportation (Non-Medical):    Physical Activity: Inactive    Days of Exercise per Week: 0 days    Minutes of Exercise per Session: 0 min   Stress: Stress Concern Present    Feeling of Stress : Very much   Social Connections:     Frequency of Communication with Friends and Family:     Frequency of Social Gatherings with Friends and Family:     Attends Taoist Services:     Active Member of Clubs or Organizations:     Attends Club or Organization Meetings:     Marital Status:    Intimate Partner Violence:     Fear of Current or Ex-Partner:     Emotionally Abused:     Physically Abused:     Sexually Abused:       Medications and Allergies:     Current Outpatient Medications   Medication Sig Dispense Refill    al mag oxide-diphenhydramine-lidocaine viscous (MAGIC MOUTHWASH) 1:1:1 suspension Swish and spit 10 mL every 4 (four) hours as needed for mouth pain or discomfort for up to 5 doses 90 mL 0    albuterol (PROAIR HFA) 90 mcg/act inhaler Inhale 2 puffs every 6 (six) hours as needed for wheezing      ALPRAZolam (XANAX) 1 mg tablet TAKE 1/2-1 TABLET THREE TIMES A DAY 90 tablet 3    amLODIPine (NORVASC) 5 mg tablet Take 1 tablet (5 mg total) by mouth daily 90 tablet 3    cholestyramine (QUESTRAN) 4 g packet Take by mouth as needed       clotrimazole (LOTRIMIN) 1 % cream as needed       clotrimazole-betamethasone (LOTRISONE) 1-0 05 % cream APPLY TO SKIN FOLD TWO TIMES A DAY 45 g 1    DULoxetine (CYMBALTA) 30 mg delayed release capsule Take 1 capsule (30 mg total) by mouth daily 90 capsule 3    DULoxetine (CYMBALTA) 60 mg delayed release capsule Take 1 capsule (60 mg total) by mouth daily 90 capsule 3    Eliquis 5 MG TAKE 1 TABLET BY MOUTH  TWICE DAILY 180 tablet 3    erythromycin (ILOTYCIN) ophthalmic ointment as needed   3    famotidine (PEPCID) 10 mg tablet Take 1 tablet (10 mg total) by mouth daily 90 tablet 3    ferrous sulfate 325 (65 Fe) mg tablet Take 325 mg by mouth daily      hydrocortisone-pramoxine (ANALPRAM-HC) 2 5-1 % rectal cream Apply topically 3 (three) times a day 3 Tube 1    levothyroxine 88 mcg tablet TAKE 1 TABLET BY MOUTH  DAILY EXCEPT ON SUNDAY TAKE 2 TABLETS 104 tablet 3    methocarbamol (ROBAXIN) 500 mg tablet Take 1 tablet (500 mg total) by mouth 4 (four) times a day as needed for muscle spasms 60 tablet 0    Mometasone Furo-Formoterol Fum (DULERA) 100-5 MCG/ACT AERO Inhale      Multiple Vitamins-Minerals (OCUVITE ADULT 50+ PO) Take by mouth      nystatin (nystatin) powder Apply 1 application topically 2 (two) times a day To affected area(s) sparingly 60 g 3    pantoprazole (PROTONIX) 40 mg tablet TAKE 1 TABLET BY MOUTH DAILY 90 tablet 3    PARoxetine (PAXIL) 10 mg tablet Take 1 tablet (10 mg total) by mouth daily 30 tablet 5    quinapril (ACCUPRIL) 20 mg tablet TAKE 1 TABLET BY MOUTH  EVERY DAY (Patient taking differently: 10 mg daily ) 90 tablet 3    simvastatin (ZOCOR) 40 mg tablet TAKE ONE TABLET BY MOUTH EVERY DAY 90 tablet 3    traMADol (ULTRAM) 50 mg tablet TAKE 1 TABLET BY MOUTH  EVERY 8 HOURS AS NEEDED FOR SEVERE PAIN 90 tablet 1    Triamcinolone Acetonide (Nasacort Allergy 24HR) 55 MCG/ACT AERO into each nostril as needed        No current facility-administered medications for this visit       Allergies   Allergen Reactions    Tamoxifen Other (See Comments)     Headaches, stomach pain, sweats,     Nsaids Hives    Oxybutynin     Singulair [Montelukast] Swelling    Ciprofloxacin Hives    Penicillins Hives    Sulfa Antibiotics Hives    Vancomycin Hives      Immunizations:     Immunization History   Administered Date(s) Administered    H1N1, All Formulations 01/18/2010    INFLUENZA 11/11/2014, 10/19/2015, 11/11/2016, 09/25/2017, 11/07/2018, 11/05/2020    Influenza Quadrivalent, 6-35 Months IM 11/11/2014, 11/11/2016, 09/25/2017    Influenza, high dose seasonal 0 7 mL 11/11/2014, 11/11/2016, 09/25/2017    Influenza, injectable, quadrivalent, preservative free 0 5 mL 10/26/2015, 11/07/2018, 11/26/2019    Influenza, seasonal, injectable 10/19/2015    Pneumococcal Conjugate 13-Valent 07/23/2020    Pneumococcal Polysaccharide PPV23 1955, 11/11/2016    SARS-CoV-2 / COVID-19 mRNA IM (Moderna) 03/22/2021, 04/19/2021    Tdap 1955    Zoster Vaccine Recombinant 11/05/2020      Health Maintenance:         Topic Date Due    Breast Cancer Screening: Mammogram  05/08/2021    DXA SCAN  07/01/2021    Colorectal Cancer Screening  02/11/2026    Hepatitis C Screening  Completed         Topic Date Due    Hepatitis A Vaccine (1 of 2 - Risk 2-dose series) Never done    Hepatitis B Vaccine (1 of 3 - Risk 3-dose series) Never done    DTaP,Tdap,and Td Vaccines (1 - Tdap) 03/10/1976    Influenza Vaccine (1) 09/01/2021      Medicare Health Risk Assessment:     There were no vitals taken for this visit  Sydney Bates is here for her Initial Wellness visit  Health Risk Assessment:   Patient rates overall health as good  Patient feels that their physical health rating is same  Patient is satisfied with their life  Eyesight was rated as same  Hearing was rated as same  Patient feels that their emotional and mental health rating is same  Patients states they are never, rarely angry  Patient states they are never, rarely unusually tired/fatigued  Pain experienced in the last 7 days has been some  Patient states that she has experienced no weight loss or gain in last 6 months  Depression Screening:   PHQ-2 Score: 0  PHQ-9 Score: 0      Fall Risk Screening: In the past year, patient has experienced: no history of falling in past year      Urinary Incontinence Screening:   Patient has not leaked urine accidently in the last six months  Home Safety:  Patient has trouble with stairs inside or outside of their home  Patient has working smoke alarms and has working carbon monoxide detector  Home safety hazards include: none  Nutrition:   Current diet is Regular  Medications:   Patient is not currently taking any over-the-counter supplements  Patient is able to manage medications  Activities of Daily Living (ADLs)/Instrumental Activities of Daily Living (IADLs):   Walk and transfer into and out of bed and chair?: Yes  Dress and groom yourself?: Yes    Bathe or shower yourself?: Yes    Feed yourself?  Yes  Do your laundry/housekeeping?: Yes  Manage your money, pay your bills and track your expenses?: Yes  Make your own meals?: Yes    Do your own shopping?: Yes    Previous Hospitalizations:   Any hospitalizations or ED visits within the last 12 months?: Yes    How many hospitalizations have you had in the last year?: 1-2    Advance Care Planning:   Living will: Yes    Durable POA for healthcare: Yes    Advanced directive: Yes      Cognitive Screening:   Provider or family/friend/caregiver concerned regarding cognition?: No    PREVENTIVE SCREENINGS      Cardiovascular Screening:    General: Screening Not Indicated and History Lipid Disorder      Diabetes Screening:     General: Screening Current      Colorectal Cancer Screening:     General: Screening Current      Breast Cancer Screening:     General: Screening Current      Cervical Cancer Screening:    General: Screening Not Indicated      Osteoporosis Screening:    General: Risks and Benefits Discussed      Lung Cancer Screening:     General: Screening Not Indicated      Hepatitis C Screening:    General: Screening Current    Screening, Brief Intervention, and Referral to Treatment (SBIRT)    Screening  Typical number of drinks in a day: 0  Typical number of drinks in a week: 0  Interpretation: Low risk drinking behavior      Single Item Drug Screening:  How often have you used an illegal drug (including marijuana) or a prescription medication for non-medical reasons in the past year? never    Single Item Drug Screen Score: 0  Interpretation: Negative screen for possible drug use disorder    Review of Current Opioid Use    Opioid Risk Tool (ORT) Interpretation: Complete Opioid Risk Tool (ORT)      Isacc Carcamo MD

## 2021-08-12 NOTE — PATIENT INSTRUCTIONS
Depression/anxiety and PTSD appears to be markedly improved with 10 mg of Paxil in addition to Cymbalta  Change Paxil to nighttime dosing due to somnolence  Monitor symptoms over the next 1 month and follow-up  If everything is great go ahead and cancel that follow-up appointment  History of pulmonary embolism 1 year ago today  She has completed 1 year of standard dose Eliquis  PEs were bilateral and unprovoked  Would plan to reduce dose of Eliquis to 2 5 mg twice daily for indefinite  The dose was just refilled so will address this prior to next refill    Bronchitis is likely on the basis of allergic rhinitis  Continue with present regimen  Monitor symptoms, contact me for any worsening    Follow-up with allergy next week as scheduled    Osteopenia-due for bone density

## 2021-08-12 NOTE — PROGRESS NOTES
Assessment/Plan:    Diagnoses and all orders for this visit:    Acute bronchitis, unspecified organism    History of pulmonary embolism    PTSD (post-traumatic stress disorder)    Anxiety    Menopause  -     DXA bone density spine hip and pelvis; Future              Patient Instructions   Depression/anxiety and PTSD appears to be markedly improved with 10 mg of Paxil in addition to Cymbalta  Change Paxil to nighttime dosing due to somnolence  Monitor symptoms over the next 1 month and follow-up  If everything is great go ahead and cancel that follow-up appointment  History of pulmonary embolism 1 year ago today  She has completed 1 year of standard dose Eliquis  PEs were bilateral and unprovoked  Would plan to reduce dose of Eliquis to 2 5 mg twice daily for indefinite  The dose was just refilled so will address this prior to next refill    Bronchitis is likely on the basis of allergic rhinitis  Continue with present regimen  Monitor symptoms, contact me for any worsening  Follow-up with allergy next week as scheduled    Osteopenia-due for bone density          Subjective:      Patient ID: Sia Duarte is a 77 y o  female    F/u multiple issues    Started Paxil 1 wk ago and dramatic immediate decrease in anxiety, but it made her loopy  Occasional vivid dreams  Taking rx in am   Still w/ 90 mg cymbalta  No more feelings of death or doom  Started w/ cough, chest congestion, pnd, sneezing yesterday  Seeing allergist next week  Taking rx as directed           Current Outpatient Medications:     albuterol (PROAIR HFA) 90 mcg/act inhaler, Inhale 2 puffs every 6 (six) hours as needed for wheezing, Disp: , Rfl:     ALPRAZolam (XANAX) 1 mg tablet, TAKE 1/2-1 TABLET THREE TIMES A DAY, Disp: 90 tablet, Rfl: 3    cholestyramine (QUESTRAN) 4 g packet, Take by mouth as needed , Disp: , Rfl:     clotrimazole (LOTRIMIN) 1 % cream, as needed , Disp: , Rfl:     clotrimazole-betamethasone (LOTRISONE) 1-0 05 % cream, APPLY TO SKIN FOLD TWO TIMES A DAY, Disp: 45 g, Rfl: 1    DULoxetine (CYMBALTA) 30 mg delayed release capsule, Take 1 capsule (30 mg total) by mouth daily, Disp: 90 capsule, Rfl: 3    DULoxetine (CYMBALTA) 60 mg delayed release capsule, Take 1 capsule (60 mg total) by mouth daily, Disp: 90 capsule, Rfl: 3    Eliquis 5 MG, TAKE 1 TABLET BY MOUTH  TWICE DAILY, Disp: 180 tablet, Rfl: 3    ferrous sulfate 325 (65 Fe) mg tablet, Take 325 mg by mouth daily, Disp: , Rfl:     hydrocortisone-pramoxine (ANALPRAM-HC) 2 5-1 % rectal cream, Apply topically 3 (three) times a day, Disp: 3 Tube, Rfl: 1    levothyroxine 88 mcg tablet, TAKE 1 TABLET BY MOUTH  DAILY EXCEPT ON SUNDAY TAKE 2 TABLETS, Disp: 104 tablet, Rfl: 3    methocarbamol (ROBAXIN) 500 mg tablet, Take 1 tablet (500 mg total) by mouth 4 (four) times a day as needed for muscle spasms, Disp: 60 tablet, Rfl: 0    Mometasone Furo-Formoterol Fum (DULERA) 100-5 MCG/ACT AERO, Inhale, Disp: , Rfl:     Multiple Vitamins-Minerals (OCUVITE ADULT 50+ PO), Take by mouth, Disp: , Rfl:     nystatin (nystatin) powder, Apply 1 application topically 2 (two) times a day To affected area(s) sparingly, Disp: 60 g, Rfl: 3    pantoprazole (PROTONIX) 40 mg tablet, TAKE 1 TABLET BY MOUTH  DAILY, Disp: 90 tablet, Rfl: 3    PARoxetine (PAXIL) 10 mg tablet, Take 1 tablet (10 mg total) by mouth daily, Disp: 30 tablet, Rfl: 5    quinapril (ACCUPRIL) 20 mg tablet, TAKE 1 TABLET BY MOUTH  EVERY DAY (Patient taking differently: 10 mg daily ), Disp: 90 tablet, Rfl: 3    simvastatin (ZOCOR) 40 mg tablet, TAKE ONE TABLET BY MOUTH EVERY DAY, Disp: 90 tablet, Rfl: 3    traMADol (ULTRAM) 50 mg tablet, TAKE 1 TABLET BY MOUTH  EVERY 8 HOURS AS NEEDED FOR SEVERE PAIN, Disp: 90 tablet, Rfl: 1    Triamcinolone Acetonide (Nasacort Allergy 24HR) 55 MCG/ACT AERO, into each nostril as needed , Disp: , Rfl:     amLODIPine (NORVASC) 5 mg tablet, Take 1 tablet (5 mg total) by mouth daily, Disp: 90 tablet, Rfl: 3    famotidine (PEPCID) 10 mg tablet, Take 1 tablet (10 mg total) by mouth daily, Disp: 90 tablet, Rfl: 3    Recent Results (from the past 1008 hour(s))   CBC and differential    Collection Time: 07/15/21 12:59 PM   Result Value Ref Range    WBC 8 22 4 31 - 10 16 Thousand/uL    RBC 4 76 3 81 - 5 12 Million/uL    Hemoglobin 13 3 11 5 - 15 4 g/dL    Hematocrit 42 4 34 8 - 46 1 %    MCV 89 82 - 98 fL    MCH 27 9 26 8 - 34 3 pg    MCHC 31 4 31 4 - 37 4 g/dL    RDW 14 8 11 6 - 15 1 %    MPV 8 5 (L) 8 9 - 12 7 fL    Platelets 818 693 - 413 Thousands/uL    nRBC 0 /100 WBCs    Neutrophils Relative 62 43 - 75 %    Immat GRANS % 0 0 - 2 %    Lymphocytes Relative 28 14 - 44 %    Monocytes Relative 7 4 - 12 %    Eosinophils Relative 3 0 - 6 %    Basophils Relative 0 0 - 1 %    Neutrophils Absolute 5 01 1 85 - 7 62 Thousands/µL    Immature Grans Absolute 0 03 0 00 - 0 20 Thousand/uL    Lymphocytes Absolute 2 29 0 60 - 4 47 Thousands/µL    Monocytes Absolute 0 60 0 17 - 1 22 Thousand/µL    Eosinophils Absolute 0 26 0 00 - 0 61 Thousand/µL    Basophils Absolute 0 03 0 00 - 0 10 Thousands/µL   Comprehensive metabolic panel    Collection Time: 07/15/21 12:59 PM   Result Value Ref Range    Sodium 139 136 - 145 mmol/L    Potassium 3 9 3 5 - 5 3 mmol/L    Chloride 100 100 - 108 mmol/L    CO2 31 21 - 32 mmol/L    ANION GAP 8 4 - 13 mmol/L    BUN 12 5 - 25 mg/dL    Creatinine 0 79 0 60 - 1 30 mg/dL    Glucose, Fasting 97 65 - 99 mg/dL    Calcium 9 1 8 3 - 10 1 mg/dL    AST 23 5 - 45 U/L    ALT 26 12 - 78 U/L    Alkaline Phosphatase 92 46 - 116 U/L    Total Protein 7 9 6 4 - 8 2 g/dL    Albumin 3 6 3 5 - 5 0 g/dL    Total Bilirubin 0 44 0 20 - 1 00 mg/dL    eGFR 78 ml/min/1 73sq m   Lipid panel    Collection Time: 07/15/21 12:59 PM   Result Value Ref Range    Cholesterol 175 50 - 200 mg/dL    Triglycerides 141 <=150 mg/dL    HDL, Direct 47 >=40 mg/dL    LDL Calculated 100 0 - 100 mg/dL    Non-HDL-Chol (CHOL-HDL) 128 mg/dl   Hemoglobin A1C    Collection Time: 07/15/21 12:59 PM   Result Value Ref Range    Hemoglobin A1C 5 6 Normal 3 8-5 6%; PreDiabetic 5 7-6 4%; Diabetic >=6 5%; Glycemic control for adults with diabetes <7 0% %     mg/dl   TSH, 3rd generation with Free T4 reflex    Collection Time: 07/15/21 12:59 PM   Result Value Ref Range    TSH 3RD GENERATON 3 526 0 358 - 3 740 uIU/mL   Hepatitis C antibody    Collection Time: 07/15/21 12:59 PM   Result Value Ref Range    Hepatitis C Ab Non-reactive Non-reactive       The following portions of the patient's history were reviewed and updated as appropriate: allergies, current medications, past family history, past medical history, past social history, past surgical history and problem list      Review of Systems   Constitutional: Positive for fatigue  Negative for appetite change, chills, diaphoresis, fever and unexpected weight change  HENT: Positive for postnasal drip, rhinorrhea and sneezing  Negative for congestion and hearing loss  Eyes: Negative for visual disturbance  Respiratory: Positive for cough and wheezing  Negative for chest tightness and shortness of breath  Cardiovascular: Negative for chest pain, palpitations and leg swelling  Gastrointestinal: Negative for abdominal pain and blood in stool  Endocrine: Negative for cold intolerance, heat intolerance, polydipsia and polyuria  Genitourinary: Negative for difficulty urinating, dysuria, frequency and urgency  Musculoskeletal: Negative for arthralgias and myalgias  Skin: Negative for rash  Neurological: Negative for dizziness, weakness, light-headedness and headaches  Hematological: Does not bruise/bleed easily  Psychiatric/Behavioral: Negative for dysphoric mood and sleep disturbance           Objective:      Vitals:    08/12/21 1452   BP: 116/62   Pulse: 105   Temp: 98 8 °F (37 1 °C)   SpO2: 95%          Physical Exam  Constitutional:       Appearance: She is well-developed  HENT:      Head: Normocephalic and atraumatic  Nose: Nose normal    Eyes:      General: No scleral icterus  Conjunctiva/sclera: Conjunctivae normal       Pupils: Pupils are equal, round, and reactive to light  Neck:      Thyroid: No thyromegaly  Vascular: No JVD  Trachea: No tracheal deviation  Cardiovascular:      Rate and Rhythm: Normal rate and regular rhythm  Heart sounds: No murmur heard  No friction rub  No gallop  Pulmonary:      Effort: Pulmonary effort is normal  No respiratory distress  Breath sounds: Normal breath sounds  No wheezing or rales  Musculoskeletal:         General: No deformity  Cervical back: Normal range of motion and neck supple  Lymphadenopathy:      Cervical: No cervical adenopathy  Skin:     General: Skin is warm and dry  Coloration: Skin is not pale  Findings: No erythema or rash  Neurological:      Mental Status: She is alert and oriented to person, place, and time  Cranial Nerves: No cranial nerve deficit  Psychiatric:         Behavior: Behavior normal          Thought Content:  Thought content normal          Judgment: Judgment normal

## 2021-09-30 DIAGNOSIS — G89.29 OTHER CHRONIC PAIN: ICD-10-CM

## 2021-09-30 RX ORDER — DULOXETIN HYDROCHLORIDE 30 MG/1
CAPSULE, DELAYED RELEASE ORAL
Qty: 90 CAPSULE | Refills: 2 | Status: SHIPPED | OUTPATIENT
Start: 2021-09-30 | End: 2021-11-24 | Stop reason: SDUPTHER

## 2021-10-07 DIAGNOSIS — G89.29 OTHER CHRONIC PAIN: ICD-10-CM

## 2021-10-07 RX ORDER — DULOXETIN HYDROCHLORIDE 60 MG/1
60 CAPSULE, DELAYED RELEASE ORAL DAILY
Qty: 90 CAPSULE | Refills: 0
Start: 2021-10-07 | End: 2021-11-22

## 2021-10-14 ENCOUNTER — HOSPITAL ENCOUNTER (OUTPATIENT)
Dept: RADIOLOGY | Facility: HOSPITAL | Age: 66
Discharge: HOME | End: 2021-10-14
Payer: MEDICARE

## 2021-10-14 DIAGNOSIS — R05.1 ACUTE COUGH: ICD-10-CM

## 2021-10-14 PROCEDURE — 70220 X-RAY EXAM OF SINUSES: CPT

## 2021-10-14 PROCEDURE — 71046 X-RAY EXAM CHEST 2 VIEWS: CPT

## 2021-11-05 DIAGNOSIS — F41.9 ANXIETY: ICD-10-CM

## 2021-11-06 RX ORDER — ALPRAZOLAM 1 MG/1
TABLET ORAL
Qty: 90 TABLET | Refills: 3 | Status: SHIPPED | OUTPATIENT
Start: 2021-11-06 | End: 2022-03-01

## 2021-11-08 DIAGNOSIS — E78.5 HYPERLIPIDEMIA, UNSPECIFIED HYPERLIPIDEMIA TYPE: ICD-10-CM

## 2021-11-08 RX ORDER — SIMVASTATIN 40 MG
TABLET ORAL
Qty: 90 TABLET | Refills: 3 | Status: SHIPPED | OUTPATIENT
Start: 2021-11-08 | End: 2022-01-30 | Stop reason: SDUPTHER

## 2021-11-17 ENCOUNTER — IMMUNIZATIONS (OUTPATIENT)
Dept: FAMILY MEDICINE CLINIC | Facility: HOSPITAL | Age: 66
End: 2021-11-17

## 2021-11-17 DIAGNOSIS — Z23 ENCOUNTER FOR IMMUNIZATION: Primary | ICD-10-CM

## 2021-11-17 PROCEDURE — 91306 COVID-19 MODERNA VACC 0.25 ML BOOSTER: CPT

## 2021-11-17 PROCEDURE — 0064A COVID-19 MODERNA VACC 0.25 ML BOOSTER: CPT

## 2021-11-22 DIAGNOSIS — G89.29 OTHER CHRONIC PAIN: ICD-10-CM

## 2021-11-22 RX ORDER — DULOXETIN HYDROCHLORIDE 60 MG/1
CAPSULE, DELAYED RELEASE ORAL
Qty: 90 CAPSULE | Refills: 2 | Status: SHIPPED | OUTPATIENT
Start: 2021-11-22 | End: 2022-05-23 | Stop reason: SDUPTHER

## 2021-11-23 ENCOUNTER — TELEPHONE (OUTPATIENT)
Dept: INTERNAL MEDICINE CLINIC | Facility: CLINIC | Age: 66
End: 2021-11-23

## 2021-11-24 ENCOUNTER — TELEPHONE (OUTPATIENT)
Dept: INTERNAL MEDICINE CLINIC | Facility: CLINIC | Age: 66
End: 2021-11-24

## 2021-12-14 ENCOUNTER — OFFICE VISIT (OUTPATIENT)
Dept: INTERNAL MEDICINE CLINIC | Facility: CLINIC | Age: 66
End: 2021-12-14
Payer: MEDICARE

## 2021-12-14 VITALS
HEIGHT: 58 IN | WEIGHT: 229 LBS | DIASTOLIC BLOOD PRESSURE: 80 MMHG | HEART RATE: 100 BPM | SYSTOLIC BLOOD PRESSURE: 128 MMHG | BODY MASS INDEX: 48.07 KG/M2 | RESPIRATION RATE: 18 BRPM | TEMPERATURE: 99.6 F | OXYGEN SATURATION: 97 %

## 2021-12-14 DIAGNOSIS — R13.10 PAIN WITH SWALLOWING: Primary | ICD-10-CM

## 2021-12-14 DIAGNOSIS — K58.2 IRRITABLE BOWEL SYNDROME WITH BOTH CONSTIPATION AND DIARRHEA: ICD-10-CM

## 2021-12-14 DIAGNOSIS — K21.9 GASTROESOPHAGEAL REFLUX DISEASE WITHOUT ESOPHAGITIS: ICD-10-CM

## 2021-12-14 DIAGNOSIS — E66.01 CLASS 3 SEVERE OBESITY WITHOUT SERIOUS COMORBIDITY IN ADULT, UNSPECIFIED BMI, UNSPECIFIED OBESITY TYPE (HCC): ICD-10-CM

## 2021-12-14 DIAGNOSIS — I10 PRIMARY HYPERTENSION: Chronic | ICD-10-CM

## 2021-12-14 PROBLEM — E66.813 CLASS 3 SEVERE OBESITY IN ADULT (HCC): Status: ACTIVE | Noted: 2021-12-14

## 2021-12-14 PROBLEM — K58.9 IBS (IRRITABLE BOWEL SYNDROME): Status: ACTIVE | Noted: 2021-12-14

## 2021-12-14 PROCEDURE — 99214 OFFICE O/P EST MOD 30 MIN: CPT | Performed by: INTERNAL MEDICINE

## 2021-12-14 RX ORDER — PANTOPRAZOLE SODIUM 40 MG/1
40 TABLET, DELAYED RELEASE ORAL 2 TIMES DAILY
Qty: 90 TABLET | Refills: 3 | Status: SHIPPED | OUTPATIENT
Start: 2021-12-14 | End: 2022-04-25

## 2021-12-16 DIAGNOSIS — M54.9 BACK PAIN, UNSPECIFIED BACK LOCATION, UNSPECIFIED BACK PAIN LATERALITY, UNSPECIFIED CHRONICITY: ICD-10-CM

## 2021-12-16 RX ORDER — METHOCARBAMOL 500 MG/1
500 TABLET, FILM COATED ORAL 4 TIMES DAILY PRN
Qty: 60 TABLET | Refills: 0 | Status: SHIPPED | OUTPATIENT
Start: 2021-12-16 | End: 2022-01-12

## 2021-12-28 DIAGNOSIS — B37.9 YEAST INFECTION: ICD-10-CM

## 2021-12-28 RX ORDER — NYSTATIN 100000 [USP'U]/G
POWDER TOPICAL
Qty: 60 G | Refills: 3 | Status: SHIPPED | OUTPATIENT
Start: 2021-12-28 | End: 2022-05-31

## 2022-01-03 ENCOUNTER — TELEPHONE (OUTPATIENT)
Dept: OTHER | Facility: OTHER | Age: 67
End: 2022-01-03

## 2022-01-03 NOTE — TELEPHONE ENCOUNTER
Patient called and cancelled  appointment due to personal emergency   Pt will call back and reschedule at their convenience

## 2022-01-11 DIAGNOSIS — M54.9 BACK PAIN, UNSPECIFIED BACK LOCATION, UNSPECIFIED BACK PAIN LATERALITY, UNSPECIFIED CHRONICITY: ICD-10-CM

## 2022-01-12 ENCOUNTER — HOSPITAL ENCOUNTER (OUTPATIENT)
Dept: RADIOLOGY | Facility: HOSPITAL | Age: 67
Discharge: HOME | End: 2022-01-12
Payer: MEDICARE

## 2022-01-12 DIAGNOSIS — R13.10 PAIN WITH SWALLOWING: ICD-10-CM

## 2022-01-12 PROCEDURE — 74220 X-RAY XM ESOPHAGUS 1CNTRST: CPT

## 2022-01-12 RX ORDER — METHOCARBAMOL 500 MG/1
TABLET, FILM COATED ORAL
Qty: 60 TABLET | Refills: 0 | Status: SHIPPED | OUTPATIENT
Start: 2022-01-12 | End: 2022-02-01

## 2022-01-19 DIAGNOSIS — I10 ESSENTIAL HYPERTENSION: Chronic | ICD-10-CM

## 2022-01-19 RX ORDER — AMLODIPINE BESYLATE 5 MG/1
TABLET ORAL
Qty: 90 TABLET | Refills: 3 | Status: SHIPPED | OUTPATIENT
Start: 2022-01-19

## 2022-01-30 DIAGNOSIS — E78.5 HYPERLIPIDEMIA, UNSPECIFIED HYPERLIPIDEMIA TYPE: ICD-10-CM

## 2022-01-31 ENCOUNTER — APPOINTMENT (OUTPATIENT)
Dept: LAB | Facility: HOSPITAL | Age: 67
End: 2022-01-31
Payer: MEDICARE

## 2022-01-31 DIAGNOSIS — R73.01 IMPAIRED FASTING GLUCOSE: ICD-10-CM

## 2022-01-31 DIAGNOSIS — Z12.39 BREAST SCREENING: Primary | ICD-10-CM

## 2022-01-31 DIAGNOSIS — E78.2 MIXED HYPERLIPIDEMIA: ICD-10-CM

## 2022-01-31 DIAGNOSIS — E03.9 ACQUIRED HYPOTHYROIDISM: ICD-10-CM

## 2022-01-31 DIAGNOSIS — M54.9 BACK PAIN, UNSPECIFIED BACK LOCATION, UNSPECIFIED BACK PAIN LATERALITY, UNSPECIFIED CHRONICITY: ICD-10-CM

## 2022-01-31 LAB
ALBUMIN SERPL BCP-MCNC: 3.6 G/DL (ref 3.5–5)
ALP SERPL-CCNC: 88 U/L (ref 46–116)
ALT SERPL W P-5'-P-CCNC: 29 U/L (ref 12–78)
ANION GAP SERPL CALCULATED.3IONS-SCNC: 7 MMOL/L (ref 4–13)
AST SERPL W P-5'-P-CCNC: 22 U/L (ref 5–45)
BASOPHILS # BLD AUTO: 0.04 THOUSANDS/ΜL (ref 0–0.1)
BASOPHILS NFR BLD AUTO: 1 % (ref 0–1)
BILIRUB SERPL-MCNC: 0.51 MG/DL (ref 0.2–1)
BUN SERPL-MCNC: 11 MG/DL (ref 5–25)
CALCIUM SERPL-MCNC: 9 MG/DL (ref 8.3–10.1)
CHLORIDE SERPL-SCNC: 99 MMOL/L (ref 100–108)
CHOLEST SERPL-MCNC: 186 MG/DL
CO2 SERPL-SCNC: 32 MMOL/L (ref 21–32)
CREAT SERPL-MCNC: 0.84 MG/DL (ref 0.6–1.3)
EOSINOPHIL # BLD AUTO: 0.38 THOUSAND/ΜL (ref 0–0.61)
EOSINOPHIL NFR BLD AUTO: 5 % (ref 0–6)
ERYTHROCYTE [DISTWIDTH] IN BLOOD BY AUTOMATED COUNT: 14.6 % (ref 11.6–15.1)
GFR SERPL CREATININE-BSD FRML MDRD: 72 ML/MIN/1.73SQ M
GLUCOSE P FAST SERPL-MCNC: 101 MG/DL (ref 65–99)
HCT VFR BLD AUTO: 43 % (ref 34.8–46.1)
HDLC SERPL-MCNC: 56 MG/DL
HGB BLD-MCNC: 13.7 G/DL (ref 11.5–15.4)
IMM GRANULOCYTES # BLD AUTO: 0.02 THOUSAND/UL (ref 0–0.2)
IMM GRANULOCYTES NFR BLD AUTO: 0 % (ref 0–2)
LDLC SERPL CALC-MCNC: 103 MG/DL (ref 0–100)
LYMPHOCYTES # BLD AUTO: 2.16 THOUSANDS/ΜL (ref 0.6–4.47)
LYMPHOCYTES NFR BLD AUTO: 27 % (ref 14–44)
MCH RBC QN AUTO: 29 PG (ref 26.8–34.3)
MCHC RBC AUTO-ENTMCNC: 31.9 G/DL (ref 31.4–37.4)
MCV RBC AUTO: 91 FL (ref 82–98)
MONOCYTES # BLD AUTO: 0.72 THOUSAND/ΜL (ref 0.17–1.22)
MONOCYTES NFR BLD AUTO: 9 % (ref 4–12)
NEUTROPHILS # BLD AUTO: 4.66 THOUSANDS/ΜL (ref 1.85–7.62)
NEUTS SEG NFR BLD AUTO: 58 % (ref 43–75)
NONHDLC SERPL-MCNC: 130 MG/DL
NRBC BLD AUTO-RTO: 0 /100 WBCS
PLATELET # BLD AUTO: 268 THOUSANDS/UL (ref 149–390)
PMV BLD AUTO: 8.8 FL (ref 8.9–12.7)
POTASSIUM SERPL-SCNC: 4 MMOL/L (ref 3.5–5.3)
PROT SERPL-MCNC: 7.8 G/DL (ref 6.4–8.2)
RBC # BLD AUTO: 4.72 MILLION/UL (ref 3.81–5.12)
SODIUM SERPL-SCNC: 138 MMOL/L (ref 136–145)
T4 FREE SERPL-MCNC: 0.97 NG/DL (ref 0.76–1.46)
TRIGL SERPL-MCNC: 133 MG/DL
TSH SERPL DL<=0.05 MIU/L-ACNC: 3.98 UIU/ML (ref 0.36–3.74)
WBC # BLD AUTO: 7.98 THOUSAND/UL (ref 4.31–10.16)

## 2022-01-31 PROCEDURE — 36415 COLL VENOUS BLD VENIPUNCTURE: CPT

## 2022-01-31 PROCEDURE — 80053 COMPREHEN METABOLIC PANEL: CPT

## 2022-01-31 PROCEDURE — 83036 HEMOGLOBIN GLYCOSYLATED A1C: CPT

## 2022-01-31 PROCEDURE — 84439 ASSAY OF FREE THYROXINE: CPT

## 2022-01-31 PROCEDURE — 80061 LIPID PANEL: CPT

## 2022-01-31 PROCEDURE — 84443 ASSAY THYROID STIM HORMONE: CPT

## 2022-01-31 PROCEDURE — 85025 COMPLETE CBC W/AUTO DIFF WBC: CPT

## 2022-01-31 RX ORDER — SIMVASTATIN 40 MG
40 TABLET ORAL DAILY
Qty: 90 TABLET | Refills: 0 | Status: SHIPPED | OUTPATIENT
Start: 2022-01-31 | End: 2022-04-03

## 2022-02-01 ENCOUNTER — OFFICE VISIT (OUTPATIENT)
Dept: INTERNAL MEDICINE CLINIC | Facility: CLINIC | Age: 67
End: 2022-02-01
Payer: MEDICARE

## 2022-02-01 ENCOUNTER — HOSPITAL ENCOUNTER (OUTPATIENT)
Dept: BONE DENSITY | Facility: CLINIC | Age: 67
Discharge: HOME | End: 2022-02-01
Payer: MEDICARE

## 2022-02-01 DIAGNOSIS — G89.29 OTHER CHRONIC PAIN: ICD-10-CM

## 2022-02-01 DIAGNOSIS — J01.00 ACUTE NON-RECURRENT MAXILLARY SINUSITIS: ICD-10-CM

## 2022-02-01 DIAGNOSIS — E78.2 MIXED HYPERLIPIDEMIA: ICD-10-CM

## 2022-02-01 DIAGNOSIS — R73.01 IMPAIRED FASTING GLUCOSE: Primary | ICD-10-CM

## 2022-02-01 DIAGNOSIS — D05.12 DUCTAL CARCINOMA IN SITU (DCIS) OF LEFT BREAST: ICD-10-CM

## 2022-02-01 DIAGNOSIS — Z78.0 MENOPAUSE: ICD-10-CM

## 2022-02-01 DIAGNOSIS — Z86.711 HISTORY OF PULMONARY EMBOLISM: ICD-10-CM

## 2022-02-01 DIAGNOSIS — G61.9 INFLAMMATORY NEUROPATHY (HCC): ICD-10-CM

## 2022-02-01 DIAGNOSIS — E03.9 ACQUIRED HYPOTHYROIDISM: ICD-10-CM

## 2022-02-01 DIAGNOSIS — F41.9 ANXIETY: ICD-10-CM

## 2022-02-01 DIAGNOSIS — F43.10 PTSD (POST-TRAUMATIC STRESS DISORDER): ICD-10-CM

## 2022-02-01 DIAGNOSIS — E66.01 MORBID OBESITY (HCC): ICD-10-CM

## 2022-02-01 DIAGNOSIS — I10 PRIMARY HYPERTENSION: Chronic | ICD-10-CM

## 2022-02-01 DIAGNOSIS — K21.9 GASTROESOPHAGEAL REFLUX DISEASE WITHOUT ESOPHAGITIS: ICD-10-CM

## 2022-02-01 PROBLEM — I26.99 ACUTE PULMONARY EMBOLISM (HCC): Status: RESOLVED | Noted: 2020-08-12 | Resolved: 2022-02-01

## 2022-02-01 PROBLEM — R19.7 ACUTE DIARRHEA: Status: RESOLVED | Noted: 2020-08-14 | Resolved: 2022-02-01

## 2022-02-01 LAB
EST. AVERAGE GLUCOSE BLD GHB EST-MCNC: 123 MG/DL
HBA1C MFR BLD: 5.9 %

## 2022-02-01 PROCEDURE — 99214 OFFICE O/P EST MOD 30 MIN: CPT | Performed by: INTERNAL MEDICINE

## 2022-02-01 PROCEDURE — 77080 DXA BONE DENSITY AXIAL: CPT

## 2022-02-01 RX ORDER — TRAMADOL HYDROCHLORIDE 50 MG/1
50 TABLET ORAL EVERY 8 HOURS PRN
Qty: 90 TABLET | Refills: 1 | Status: SHIPPED | OUTPATIENT
Start: 2022-02-01 | End: 2022-05-31

## 2022-02-01 RX ORDER — METHOCARBAMOL 500 MG/1
TABLET, FILM COATED ORAL
Qty: 60 TABLET | Refills: 0 | Status: SHIPPED | OUTPATIENT
Start: 2022-02-01 | End: 2022-03-08

## 2022-02-01 NOTE — PROGRESS NOTES
Assessment/Plan:    Diagnoses and all orders for this visit:    Impaired fasting glucose  -     Hemoglobin A1C; Future    Acquired hypothyroidism  -     TSH, 3rd generation with Free T4 reflex; Future    Gastroesophageal reflux disease without esophagitis    Primary hypertension  -     CBC and differential; Future  -     Comprehensive metabolic panel; Future    Inflammatory neuropathy (HCC)    Anxiety    Mixed hyperlipidemia  -     Lipid panel; Future    Morbid obesity ([facility] [address] )    Ductal carcinoma in situ (DCIS) of left breast    History of pulmonary embolism    PTSD (post-traumatic stress disorder)    Acute non-recurrent maxillary sinusitis    Other chronic pain  -     traMADol (ULTRAM) 50 mg tablet; Take 1 tablet (50 mg total) by mouth every 8 (eight) hours as needed for severe pain              Patient Instructions   Lab data reviewed in detail and compared prior    Depression/anxiety, PTSD-continue with therapy  Continue on Cymbalta and Paxil for now, consider coming off Paxil once anxiety more stable  Status post pulmonary embolism-continue on low-dose Eliquis    Hypertension stable on present regimen    Hyperlipidemia stable on simvastatin    Allergy and asthma status post attack today  Continue with bronchodilators as needed  Lungs are completely clear to auscultation with good air exchange today so no steroids to be prescribed but contact me if symptoms exacerbate  Consider wearing mask when walking in the cold air to avoid cold air induced bronchospasm  Schedule with allergy as planned  Hypothyroidism stable on levothyroxine    Obesity complicated by above-please schedule with bariatric team     Lab follow-up in 6 months, sooner as needed      Subjective:      Patient ID: Negin Torres is a 77 y o  female    Here to f/u MMP and review labs  Multiple acute issues        3 wk uri w/ sinus congestion and pnd, w/ asthma attack today    Depression/anxiety/PTSD-seeing [facility] weekly and taking rx as directed  DCIS- had surgery w/ Dr Curt Brown, completed XRT  Following w/ Dr Gwen Murray for medical oncology  Stopped Tamoxifen d/t side effects, no rx  H/o b/l PE-f/b Dr Gwen uMrray, taking eliquis 2 5 mg bid  HTN-taking rx as directed, home bp's have been stable    HPL-Tolerating simvastatin  GERD-stable on rx  Taking pantoprazole am, pepcid pm       Hypothyroid-taking rx as directed  Asthma/allergy-plans to schedule w/ Dr Callie Bernal    Chronic pain in feet r/t arthritis  Chronic lbp w/ R radiculopathy + PN getting worse, seeing neuro in March  Following w/ orhto w/ post tka pain in right tibia  Obesity-put off seeing bariatric team   Gained nearly 40 lbs    Wasn't able to get Shingrix yet           Current Outpatient Medications:     albuterol (PROAIR HFA) 90 mcg/act inhaler, Inhale 2 puffs every 6 (six) hours as needed for wheezing, Disp: , Rfl:     ALPRAZolam (XANAX) 1 mg tablet, TAKE 1/2 TO 1 TABLET THREE TIMES A DAY, Disp: 90 tablet, Rfl: 3    amLODIPine (NORVASC) 5 mg tablet, TAKE 1 TABLET BY MOUTH  DAILY, Disp: 90 tablet, Rfl: 3    apixaban (Eliquis) 2 5 mg, Take 1 tablet (2 5 mg total) by mouth 2 (two) times a day, Disp: 180 tablet, Rfl: 2    cholestyramine (QUESTRAN) 4 g packet, Take by mouth as needed , Disp: , Rfl:     clotrimazole (LOTRIMIN) 1 % cream, as needed , Disp: , Rfl:     clotrimazole-betamethasone (LOTRISONE) 1-0 05 % cream, APPLY TO SKIN FOLD TWO TIMES A DAY, Disp: 45 g, Rfl: 1    DULoxetine (CYMBALTA) 30 mg delayed release capsule, 1 daily w/ 60 mg tab, Disp: 90 capsule, Rfl: 3    DULoxetine (CYMBALTA) 60 mg delayed release capsule, Take 1 capsule by mouth daily, Disp: 90 capsule, Rfl: 2    ferrous sulfate 325 (65 Fe) mg tablet, Take 325 mg by mouth daily, Disp: , Rfl:     hydrocortisone-pramoxine (ANALPRAM-HC) 2 5-1 % rectal cream, Apply topically 3 (three) times a day, Disp: 3 Tube, Rfl: 1    levothyroxine 88 mcg tablet, TAKE 1 TABLET BY MOUTH  DAILY EXCEPT ON SUNDAY TAKE 2 TABLETS, Disp: 104 tablet, Rfl: 3    methocarbamol (ROBAXIN) 500 mg tablet, TAKE ONE TABLET BY MOUTH FOUR TIMES A DAY  AS NEEDED FOR MUSCLE SPASMS, Disp: 60 tablet, Rfl: 0    Mometasone Furo-Formoterol Fum (DULERA) 100-5 MCG/ACT AERO, Inhale, Disp: , Rfl:     Multiple Vitamins-Minerals (OCUVITE ADULT 50+ PO), Take by mouth, Disp: , Rfl:     nystatin powder, APPLY ONE APPLICATION TOPICALLY TWO TIMES A DAY TO THE AFFECTED AREA(S) SPARINGLY, Disp: 60 g, Rfl: 3    pantoprazole (PROTONIX) 40 mg tablet, Take 1 tablet (40 mg total) by mouth 2 (two) times a day, Disp: 90 tablet, Rfl: 3    PARoxetine (PAXIL) 10 mg tablet, Take 1 tablet (10 mg total) by mouth daily, Disp: 30 tablet, Rfl: 5    quinapril (ACCUPRIL) 20 mg tablet, TAKE 1 TABLET BY MOUTH  EVERY DAY (Patient taking differently: 10 mg daily ), Disp: 90 tablet, Rfl: 3    simvastatin (ZOCOR) 40 mg tablet, Take 1 tablet (40 mg total) by mouth daily, Disp: 90 tablet, Rfl: 0    traMADol (ULTRAM) 50 mg tablet, Take 1 tablet (50 mg total) by mouth every 8 (eight) hours as needed for severe pain, Disp: 90 tablet, Rfl: 1    Triamcinolone Acetonide (Nasacort Allergy 24HR) 55 MCG/ACT AERO, into each nostril as needed , Disp: , Rfl:     famotidine (PEPCID) 10 mg tablet, Take 1 tablet (10 mg total) by mouth daily, Disp: 90 tablet, Rfl: 3    Recent Results (from the past 1008 hour(s))   CBC and differential    Collection Time: 01/31/22  1:16 PM   Result Value Ref Range    WBC 7 98 4 31 - 10 16 Thousand/uL    RBC 4 72 3 81 - 5 12 Million/uL    Hemoglobin 13 7 11 5 - 15 4 g/dL    Hematocrit 43 0 34 8 - 46 1 %    MCV 91 82 - 98 fL    MCH 29 0 26 8 - 34 3 pg    MCHC 31 9 31 4 - 37 4 g/dL    RDW 14 6 11 6 - 15 1 %    MPV 8 8 (L) 8 9 - 12 7 fL    Platelets 356 742 - 811 Thousands/uL    nRBC 0 /100 WBCs    Neutrophils Relative 58 43 - 75 %    Immat GRANS % 0 0 - 2 %    Lymphocytes Relative 27 14 - 44 %    Monocytes Relative 9 4 - 12 %    Eosinophils Relative 5 0 - 6 % Basophils Relative 1 0 - 1 %    Neutrophils Absolute 4 66 1 85 - 7 62 Thousands/µL    Immature Grans Absolute 0 02 0 00 - 0 20 Thousand/uL    Lymphocytes Absolute 2 16 0 60 - 4 47 Thousands/µL    Monocytes Absolute 0 72 0 17 - 1 22 Thousand/µL    Eosinophils Absolute 0 38 0 00 - 0 61 Thousand/µL    Basophils Absolute 0 04 0 00 - 0 10 Thousands/µL   Comprehensive metabolic panel    Collection Time: 01/31/22  1:16 PM   Result Value Ref Range    Sodium 138 136 - 145 mmol/L    Potassium 4 0 3 5 - 5 3 mmol/L    Chloride 99 (L) 100 - 108 mmol/L    CO2 32 21 - 32 mmol/L    ANION GAP 7 4 - 13 mmol/L    BUN 11 5 - 25 mg/dL    Creatinine 0 84 0 60 - 1 30 mg/dL    Glucose, Fasting 101 (H) 65 - 99 mg/dL    Calcium 9 0 8 3 - 10 1 mg/dL    AST 22 5 - 45 U/L    ALT 29 12 - 78 U/L    Alkaline Phosphatase 88 46 - 116 U/L    Total Protein 7 8 6 4 - 8 2 g/dL    Albumin 3 6 3 5 - 5 0 g/dL    Total Bilirubin 0 51 0 20 - 1 00 mg/dL    eGFR 72 ml/min/1 73sq m   Lipid panel    Collection Time: 01/31/22  1:16 PM   Result Value Ref Range    Cholesterol 186 See Comment mg/dL    Triglycerides 133 See Comment mg/dL    HDL, Direct 56 >=50 mg/dL    LDL Calculated 103 (H) 0 - 100 mg/dL    Non-HDL-Chol (CHOL-HDL) 130 mg/dl   Hemoglobin A1C    Collection Time: 01/31/22  1:16 PM   Result Value Ref Range    Hemoglobin A1C 5 9 (H) Normal 3 8-5 6%; PreDiabetic 5 7-6 4%;  Diabetic >=6 5%; Glycemic control for adults with diabetes <7 0% %     mg/dl   TSH, 3rd generation with Free T4 reflex    Collection Time: 01/31/22  1:16 PM   Result Value Ref Range    TSH 3RD GENERATON 3 977 (H) 0 358 - 3 740 uIU/mL   T4, free    Collection Time: 01/31/22  1:16 PM   Result Value Ref Range    Free T4 0 97 0 76 - 1 46 ng/dL       The following portions of the patient's history were reviewed and updated as appropriate: allergies, current medications, past family history, past medical history, past social history, past surgical history and problem list      Review of Systems   Constitutional: Negative for appetite change, chills, diaphoresis, fatigue, fever and unexpected weight change  HENT: Negative for congestion, hearing loss and rhinorrhea  Eyes: Negative for visual disturbance  Respiratory: Positive for wheezing  Negative for cough, chest tightness and shortness of breath  Cardiovascular: Negative for chest pain, palpitations and leg swelling  Gastrointestinal: Negative for abdominal pain and blood in stool  Endocrine: Negative for cold intolerance, heat intolerance, polydipsia and polyuria  Genitourinary: Negative for difficulty urinating, dysuria, frequency and urgency  Musculoskeletal: Negative for arthralgias and myalgias  Skin: Negative for rash  Neurological: Negative for dizziness, weakness, light-headedness and headaches  Hematological: Does not bruise/bleed easily  Psychiatric/Behavioral: Negative for dysphoric mood and sleep disturbance  Objective:      Vitals:    02/01/22 1616   BP: 134/80   Pulse: 102   Temp: 98 9 °F (37 2 °C)   SpO2: 97%          Physical Exam  Constitutional:       Appearance: She is well-developed  HENT:      Head: Normocephalic and atraumatic  Nose: Nose normal    Eyes:      General: No scleral icterus  Conjunctiva/sclera: Conjunctivae normal       Pupils: Pupils are equal, round, and reactive to light  Neck:      Thyroid: No thyromegaly  Vascular: No JVD  Trachea: No tracheal deviation  Cardiovascular:      Rate and Rhythm: Normal rate and regular rhythm  Heart sounds: No murmur heard  No friction rub  No gallop  Pulmonary:      Effort: Pulmonary effort is normal  No respiratory distress  Breath sounds: Normal breath sounds  No wheezing or rales  Musculoskeletal:         General: No deformity  Cervical back: Normal range of motion and neck supple  Lymphadenopathy:      Cervical: No cervical adenopathy  Skin:     General: Skin is warm and dry  Coloration: Skin is not pale  Findings: No erythema or rash  Neurological:      Mental Status: She is alert and oriented to person, place, and time  Cranial Nerves: No cranial nerve deficit  Psychiatric:         Behavior: Behavior normal          Thought Content:  Thought content normal          Judgment: Judgment normal

## 2022-02-01 NOTE — PATIENT INSTRUCTIONS
Lab data reviewed in detail and compared prior    Depression/anxiety, PTSD-continue with therapy  Continue on Cymbalta and Paxil for now, consider coming off Paxil once anxiety more stable  Status post pulmonary embolism-continue on low-dose Eliquis    Hypertension stable on present regimen    Hyperlipidemia stable on simvastatin    Allergy and asthma status post attack today  Continue with bronchodilators as needed  Lungs are completely clear to auscultation with good air exchange today so no steroids to be prescribed but contact me if symptoms exacerbate  Consider wearing mask when walking in the cold air to avoid cold air induced bronchospasm  Schedule with allergy as planned      Hypothyroidism stable on levothyroxine    Obesity complicated by above-please schedule with bariatric team     Lab follow-up in 6 months, sooner as needed

## 2022-02-02 VITALS
WEIGHT: 226 LBS | DIASTOLIC BLOOD PRESSURE: 80 MMHG | OXYGEN SATURATION: 97 % | TEMPERATURE: 98.9 F | HEART RATE: 102 BPM | BODY MASS INDEX: 47.44 KG/M2 | HEIGHT: 58 IN | SYSTOLIC BLOOD PRESSURE: 134 MMHG

## 2022-02-15 ENCOUNTER — OFFICE VISIT (OUTPATIENT)
Dept: GASTROENTEROLOGY | Facility: CLINIC | Age: 67
End: 2022-02-15
Payer: MEDICARE

## 2022-02-15 VITALS
HEART RATE: 125 BPM | HEIGHT: 58 IN | WEIGHT: 226 LBS | DIASTOLIC BLOOD PRESSURE: 100 MMHG | OXYGEN SATURATION: 96 % | BODY MASS INDEX: 47.44 KG/M2 | SYSTOLIC BLOOD PRESSURE: 160 MMHG

## 2022-02-15 DIAGNOSIS — R13.10 ODYNOPHAGIA: ICD-10-CM

## 2022-02-15 DIAGNOSIS — R93.3 ABNORMAL BARIUM SWALLOW: ICD-10-CM

## 2022-02-15 DIAGNOSIS — R13.19 ESOPHAGEAL DYSPHAGIA: Primary | ICD-10-CM

## 2022-02-15 PROCEDURE — 99214 OFFICE O/P EST MOD 30 MIN: CPT | Performed by: INTERNAL MEDICINE

## 2022-02-15 NOTE — PROGRESS NOTES
[facility]      Chief Complaint:  Swallowing issues    HPI:  Mary Mallory is a 77 y o   female who presents with recent onset of both difficult and painful swallowing  This happens mostly with solids  This happened on several occasions over the last year  Patient had undergone an EGD approximately 1 year ago  Benign gastric polyps were found at that time  She has a history of chronic GERD  She recently had a pulmonary embolism  Over the past 6 or 8 months she has been noticing a problem with swallowing  When she tries to swallow she feels it gets stuck high up in the esophagus  She then has to lean in a particular direction or even regurgitate the food  It happens only with solids  It is intermittent  She also gets severe pain when this happens  The patient underwent a barium swallow which showed a possible web and tertiary contractions  She has had no weight loss  No change in the consistency of her skin or Raynaud's  When she gets the pain following the dysphagia it goes to into her back  It lasts for about 5-15 minutes and goes away spontaneously  There is no shortness of breath  There is no other associated symptomatology  No nocturnal symptomatology  No other definitive exacerbating or remitting factors  Patient is considering bariatric surgery and has an appointment with 1 of the bariatric specialists         Review of Systems:   Constitutional: No fever or chills, feels well, no tiredness, no recent weight gain or weight loss  HENT: No complaints of earache, no hearing loss, no nosebleeds, no nasal discharge, no sore throat, no hoarseness  Eyes: No complaints of eye pain, no red eyes, no discharge from eyes, no itchy eyes  Cardiovascular: No complaints of slow heart rate, no fast heart rate, no chest pain, no palpitations, no leg claudication, no lower extremity edema     Respiratory: No complaints of shortness of breath, no wheezing, no cough, positive SOB on exertion, no orthopnea  Gastrointestinal: As noted in HPI  Genitourinary: No complaints of dysuria, no incontinence, no hesitancy, no nocturia  Musculoskeletal:  Positive arthralgia, no myalgias, no joint swelling or stiffness, no limb pain or swelling  Neurological: No complaints of headache, no confusion, no convulsions, no numbness or tingling, no dizziness or fainting, no limb weakness, no difficulty walking  Skin: No complaints of skin rash or skin lesions, no itching, no skin wound, no dry skin  Hematological/Lymphatic: No complaints of swollen glands, does not bleed easy  Currently on anticoagulants  Allergic/Immunologic: No immunocompromised state  Endocrine:  No complaints of polyuria, no polydipsia  Psychiatric/Behavioral: is not suicidal, no sleep disturbances, no anxiety or depression, no change in personality, no emotional problems         Historical Information   Past Medical History:   Diagnosis Date    Allergic rhinitis     Anemia     Cancer ([facility] [address] )     breast cancer, Left side    Depression     Disease of thyroid gland     GERD (gastroesophageal reflux disease)     Hyperlipemia     Hypertension     Hypothyroidism     Last assessed: 3/25/14    Obesity     Osteoarthritis     Pre-operative laboratory examination      Past Surgical History:   Procedure Laterality Date    HAND SURGERY Left 03/2020    HYSTERECTOMY      INCISIONAL BREAST BIOPSY      JOINT REPLACEMENT      KNEE ARTHROPLASTY      ROTATOR CUFF REPAIR      SHOULDER SURGERY      TRIGGER FINGER RELEASE      TRIGGER FINGER RELEASE       Social History   Social History     Substance and Sexual Activity   Alcohol Use Yes    Comment: socially      Social History     Substance and Sexual Activity   Drug Use No     Social History     Tobacco Use   Smoking Status Never Smoker   Smokeless Tobacco Never Used     Family History   Problem Relation Age of Onset    Coronary artery disease Mother    Huseyin Abt Hypertension Mother         Essential    Coronary artery disease Father          Current Medications: has a current medication list which includes the following prescription(s): albuterol, alprazolam, amlodipine, apixaban, cholestyramine, clotrimazole, clotrimazole-betamethasone, duloxetine, duloxetine, ferrous sulfate, hydrocortisone-pramoxine, levothyroxine, methocarbamol, mometasone-formoterol, multiple vitamins-minerals, nystatin, pantoprazole, paroxetine, quinapril, simvastatin, tramadol, nasacort allergy 24hr, and famotidine  Vital Signs: /100   Pulse (!) 125   Ht 4' 10" (1 473 m)   Wt 103 kg (226 lb)   SpO2 96%   BMI 47 23 kg/m²       Physical Exam:   Constitutional  General Appearance: No acute distress, well appearing and well nourished  Head  Normocephalic  Eyes  Conjunctivae and lids: No swelling, erythema, or discharge  Pupils and irises: Equal, round and reactive to light  Ears, Nose, Mouth, and Throat  External inspection of ears and nose: Normal  Nasal mucosa, septum and turbinates: Normal without edema or erythema/   Oropharynx: Normal with no erythema, edema, exudate or lesions  Neck  Normal range of motion  Neck supple  Cardiovascular  Auscultation of the heart: Normal rate and rhythm, normal S1 and S2 without murmurs  Examination of the extremities for edema and/or varicosities: Normal  Pulmonary/Chest  Respiratory effort: No increased work of breathing or signs of respiratory distress  Auscultation of lungs: Clear to auscultation, equal breath sounds bilaterally, no wheezes, rales, no rhonchi  Abdomen  Abdomen: Non-tender, no masses  Liver and spleen: No hepatomegaly or splenomegaly  Musculoskeletal  Gait and station: normal   Digits and Nails: normal without clubbing or cyanosis  Inspection/palpation of joints, bones, and muscles: Normal  Neurological  No nystagmus or asterixis  Skin  Skin and subcutaneous tissue: Normal without rashes or lesions  Lymphatic  Palpation of the lymph nodes in neck: No lymphadenopathy  Psychiatric  Orientation to person, place and time: Normal   Mood and affect: Normal          Labs:  Lab Results   Component Value Date    ALT 29 01/31/2022    AST 22 01/31/2022    BUN 11 01/31/2022    CALCIUM 9 0 01/31/2022    CL 99 (L) 01/31/2022    CHOL 175 05/04/2017    CO2 32 01/31/2022    CREATININE 0 84 01/31/2022    HDL 56 01/31/2022    HCT 43 0 01/31/2022    HGB 13 7 01/31/2022    HGBA1C 5 9 (H) 01/31/2022    MG 2 4 08/13/2020    PHOS 4 7 (H) 08/13/2020     01/31/2022    K 4 0 01/31/2022     05/04/2017    TRIG 133 01/31/2022    WBC 7 98 01/31/2022         X-Rays & Procedures:   No orders to display           ______________________________________________________________________      Assessment & Plan:     Diagnoses and all orders for this visit:    Esophageal dysphagia  -     EGD; Future    Odynophagia  -     EGD; Future    Abnormal barium swallow  -     EGD; Future      Patient is currently on anticoagulants for her PE and will remain on them  Patient will be scheduled for an EGD  Further recommendations will be based on the study results

## 2022-02-15 NOTE — H&P (VIEW-ONLY)
[facility]      Chief Complaint:  Swallowing issues    HPI:  Ingrid Carter is a 77 y o   female who presents with recent onset of both difficult and painful swallowing  This happens mostly with solids  This happened on several occasions over the last year  Patient had undergone an EGD approximately 1 year ago  Benign gastric polyps were found at that time  She has a history of chronic GERD  She recently had a pulmonary embolism  Over the past 6 or 8 months she has been noticing a problem with swallowing  When she tries to swallow she feels it gets stuck high up in the esophagus  She then has to lean in a particular direction or even regurgitate the food  It happens only with solids  It is intermittent  She also gets severe pain when this happens  The patient underwent a barium swallow which showed a possible web and tertiary contractions  She has had no weight loss  No change in the consistency of her skin or Raynaud's  When she gets the pain following the dysphagia it goes to into her back  It lasts for about 5-15 minutes and goes away spontaneously  There is no shortness of breath  There is no other associated symptomatology  No nocturnal symptomatology  No other definitive exacerbating or remitting factors  Patient is considering bariatric surgery and has an appointment with 1 of the bariatric specialists         Review of Systems:   Constitutional: No fever or chills, feels well, no tiredness, no recent weight gain or weight loss  HENT: No complaints of earache, no hearing loss, no nosebleeds, no nasal discharge, no sore throat, no hoarseness  Eyes: No complaints of eye pain, no red eyes, no discharge from eyes, no itchy eyes  Cardiovascular: No complaints of slow heart rate, no fast heart rate, no chest pain, no palpitations, no leg claudication, no lower extremity edema     Respiratory: No complaints of shortness of breath, no wheezing, no cough, positive SOB on exertion, no orthopnea  Gastrointestinal: As noted in HPI  Genitourinary: No complaints of dysuria, no incontinence, no hesitancy, no nocturia  Musculoskeletal:  Positive arthralgia, no myalgias, no joint swelling or stiffness, no limb pain or swelling  Neurological: No complaints of headache, no confusion, no convulsions, no numbness or tingling, no dizziness or fainting, no limb weakness, no difficulty walking  Skin: No complaints of skin rash or skin lesions, no itching, no skin wound, no dry skin  Hematological/Lymphatic: No complaints of swollen glands, does not bleed easy  Currently on anticoagulants  Allergic/Immunologic: No immunocompromised state  Endocrine:  No complaints of polyuria, no polydipsia  Psychiatric/Behavioral: is not suicidal, no sleep disturbances, no anxiety or depression, no change in personality, no emotional problems         Historical Information   Past Medical History:   Diagnosis Date    Allergic rhinitis     Anemia     Cancer ([address] )     breast cancer, Left side    Depression     Disease of thyroid gland     GERD (gastroesophageal reflux disease)     Hyperlipemia     Hypertension     Hypothyroidism     Last assessed: 3/25/14    Obesity     Osteoarthritis     Pre-operative laboratory examination      Past Surgical History:   Procedure Laterality Date    HAND SURGERY Left 03/2020    HYSTERECTOMY      INCISIONAL BREAST BIOPSY      JOINT REPLACEMENT      KNEE ARTHROPLASTY      ROTATOR CUFF REPAIR      SHOULDER SURGERY      TRIGGER FINGER RELEASE      TRIGGER FINGER RELEASE       Social History   Social History     Substance and Sexual Activity   Alcohol Use Yes    Comment: socially      Social History     Substance and Sexual Activity   Drug Use No     Social History     Tobacco Use   Smoking Status Never Smoker   Smokeless Tobacco Never Used     Family History   Problem Relation Age of Onset    Coronary artery disease Mother    Fabiana Paez Hypertension Mother         Essential    Coronary artery disease Father          Current Medications: has a current medication list which includes the following prescription(s): albuterol, alprazolam, amlodipine, apixaban, cholestyramine, clotrimazole, clotrimazole-betamethasone, duloxetine, duloxetine, ferrous sulfate, hydrocortisone-pramoxine, levothyroxine, methocarbamol, mometasone-formoterol, multiple vitamins-minerals, nystatin, pantoprazole, paroxetine, quinapril, simvastatin, tramadol, nasacort allergy 24hr, and famotidine  Vital Signs: /100   Pulse (!) 125   Ht 4' 10" (1 473 m)   Wt 103 kg (226 lb)   SpO2 96%   BMI 47 23 kg/m²       Physical Exam:   Constitutional  General Appearance: No acute distress, well appearing and well nourished  Head  Normocephalic  Eyes  Conjunctivae and lids: No swelling, erythema, or discharge  Pupils and irises: Equal, round and reactive to light  Ears, Nose, Mouth, and Throat  External inspection of ears and nose: Normal  Nasal mucosa, septum and turbinates: Normal without edema or erythema/   Oropharynx: Normal with no erythema, edema, exudate or lesions  Neck  Normal range of motion  Neck supple  Cardiovascular  Auscultation of the heart: Normal rate and rhythm, normal S1 and S2 without murmurs  Examination of the extremities for edema and/or varicosities: Normal  Pulmonary/Chest  Respiratory effort: No increased work of breathing or signs of respiratory distress  Auscultation of lungs: Clear to auscultation, equal breath sounds bilaterally, no wheezes, rales, no rhonchi  Abdomen  Abdomen: Non-tender, no masses  Liver and spleen: No hepatomegaly or splenomegaly  Musculoskeletal  Gait and station: normal   Digits and Nails: normal without clubbing or cyanosis  Inspection/palpation of joints, bones, and muscles: Normal  Neurological  No nystagmus or asterixis  Skin  Skin and subcutaneous tissue: Normal without rashes or lesions  Lymphatic  Palpation of the lymph nodes in neck: No lymphadenopathy  Psychiatric  Orientation to person, place and time: Normal   Mood and affect: Normal          Labs:  Lab Results   Component Value Date    ALT 29 01/31/2022    AST 22 01/31/2022    BUN 11 01/31/2022    CALCIUM 9 0 01/31/2022    CL 99 (L) 01/31/2022    CHOL 175 05/04/2017    CO2 32 01/31/2022    CREATININE 0 84 01/31/2022    HDL 56 01/31/2022    HCT 43 0 01/31/2022    HGB 13 7 01/31/2022    HGBA1C 5 9 (H) 01/31/2022    MG 2 4 08/13/2020    PHOS 4 7 (H) 08/13/2020     01/31/2022    K 4 0 01/31/2022     05/04/2017    TRIG 133 01/31/2022    WBC 7 98 01/31/2022         X-Rays & Procedures:   No orders to display           ______________________________________________________________________      Assessment & Plan:     Diagnoses and all orders for this visit:    Esophageal dysphagia  -     EGD; Future    Odynophagia  -     EGD; Future    Abnormal barium swallow  -     EGD; Future      Patient is currently on anticoagulants for her PE and will remain on them  Patient will be scheduled for an EGD  Further recommendations will be based on the study results

## 2022-02-15 NOTE — LETTER
February 15, 2022     Soraya Barreto MD  [address], [place] [address]    Patient: Jose Curran   YOB: 1955   Date of Visit: 2/15/2022       Dear Dr Rosalva Rocha: Thank you for referring Jose Curran to me for evaluation  Below are my notes for this consultation  If you have questions, please do not hesitate to call me  I look forward to following your patient along with you  Sincerely,        Hilda Barragan MD        CC: No Recipients  Hilda Barragan MD  2/15/2022  4:03 PM  Incomplete  [facility]      Chief Complaint:  Swallowing issues    HPI:  Jose Curran is a 77 y o   female who presents with recent onset of both difficult and painful swallowing  This happens mostly with solids  This happened on several occasions over the last year  Patient had undergone an EGD approximately 1 year ago  Benign gastric polyps were found at that time  She has a history of chronic GERD  She recently had a pulmonary embolism  Over the past 6 or 8 months she has been noticing a problem with swallowing  When she tries to swallow she feels it gets stuck high up in the esophagus  She then has to lean in a particular direction or even regurgitate the food  It happens only with solids  It is intermittent  She also gets severe pain when this happens  The patient underwent a barium swallow which showed a possible web and tertiary contractions  She has had no weight loss  No change in the consistency of her skin or Raynaud's  When she gets the pain following the dysphagia it goes to into her back  It lasts for about 5-15 minutes and goes away spontaneously  There is no shortness of breath  There is no other associated symptomatology  No nocturnal symptomatology  No other definitive exacerbating or remitting factors    Patient is considering bariatric surgery and has an appointment with 1 of the bariatric specialists         Review of Systems:   Constitutional: No fever or chills, feels well, no tiredness, no recent weight gain or weight loss  HENT: No complaints of earache, no hearing loss, no nosebleeds, no nasal discharge, no sore throat, no hoarseness  Eyes: No complaints of eye pain, no red eyes, no discharge from eyes, no itchy eyes  Cardiovascular: No complaints of slow heart rate, no fast heart rate, no chest pain, no palpitations, no leg claudication, no lower extremity edema  Respiratory: No complaints of shortness of breath, no wheezing, no cough, positive SOB on exertion, no orthopnea  Gastrointestinal: As noted in HPI  Genitourinary: No complaints of dysuria, no incontinence, no hesitancy, no nocturia  Musculoskeletal:  Positive arthralgia, no myalgias, no joint swelling or stiffness, no limb pain or swelling  Neurological: No complaints of headache, no confusion, no convulsions, no numbness or tingling, no dizziness or fainting, no limb weakness, no difficulty walking  Skin: No complaints of skin rash or skin lesions, no itching, no skin wound, no dry skin  Hematological/Lymphatic: No complaints of swollen glands, does not bleed easy  Currently on anticoagulants  Allergic/Immunologic: No immunocompromised state  Endocrine:  No complaints of polyuria, no polydipsia  Psychiatric/Behavioral: is not suicidal, no sleep disturbances, no anxiety or depression, no change in personality, no emotional problems         Historical Information   Past Medical History:   Diagnosis Date    Allergic rhinitis     Anemia     Cancer ([address] )     breast cancer, Left side    Depression     Disease of thyroid gland     GERD (gastroesophageal reflux disease)     Hyperlipemia     Hypertension     Hypothyroidism     Last assessed: 3/25/14    Obesity     Osteoarthritis     Pre-operative laboratory examination      Past Surgical History:   Procedure Laterality Date    HAND SURGERY Left 03/2020    HYSTERECTOMY      INCISIONAL BREAST BIOPSY      JOINT REPLACEMENT      KNEE ARTHROPLASTY      ROTATOR CUFF REPAIR      SHOULDER SURGERY      TRIGGER FINGER RELEASE      TRIGGER FINGER RELEASE       Social History   Social History     Substance and Sexual Activity   Alcohol Use Yes    Comment: socially      Social History     Substance and Sexual Activity   Drug Use No     Social History     Tobacco Use   Smoking Status Never Smoker   Smokeless Tobacco Never Used     Family History   Problem Relation Age of Onset    Coronary artery disease Mother     Hypertension Mother         Essential    Coronary artery disease Father          Current Medications: has a current medication list which includes the following prescription(s): albuterol, alprazolam, amlodipine, apixaban, cholestyramine, clotrimazole, clotrimazole-betamethasone, duloxetine, duloxetine, ferrous sulfate, hydrocortisone-pramoxine, levothyroxine, methocarbamol, mometasone-formoterol, multiple vitamins-minerals, nystatin, pantoprazole, paroxetine, quinapril, simvastatin, tramadol, nasacort allergy 24hr, and famotidine  Vital Signs: /100   Pulse (!) 125   Ht 4' 10" (1 473 m)   Wt 103 kg (226 lb)   SpO2 96%   BMI 47 23 kg/m²       Physical Exam:   Constitutional  General Appearance: No acute distress, well appearing and well nourished  Head  Normocephalic  Eyes  Conjunctivae and lids: No swelling, erythema, or discharge  Pupils and irises: Equal, round and reactive to light  Ears, Nose, Mouth, and Throat  External inspection of ears and nose: Normal  Nasal mucosa, septum and turbinates: Normal without edema or erythema/   Oropharynx: Normal with no erythema, edema, exudate or lesions  Neck  Normal range of motion  Neck supple  Cardiovascular  Auscultation of the heart: Normal rate and rhythm, normal S1 and S2 without murmurs    Examination of the extremities for edema and/or varicosities: Normal  Pulmonary/Chest  Respiratory effort: No increased work of breathing or signs of respiratory distress  Auscultation of lungs: Clear to auscultation, equal breath sounds bilaterally, no wheezes, rales, no rhonchi  Abdomen  Abdomen: Non-tender, no masses  Liver and spleen: No hepatomegaly or splenomegaly  Musculoskeletal  Gait and station: normal   Digits and Nails: normal without clubbing or cyanosis  Inspection/palpation of joints, bones, and muscles: Normal  Neurological  No nystagmus or asterixis  Skin  Skin and subcutaneous tissue: Normal without rashes or lesions  Lymphatic  Palpation of the lymph nodes in neck: No lymphadenopathy  Psychiatric  Orientation to person, place and time: Normal   Mood and affect: Normal          Labs:  Lab Results   Component Value Date    ALT 29 01/31/2022    AST 22 01/31/2022    BUN 11 01/31/2022    CALCIUM 9 0 01/31/2022    CL 99 (L) 01/31/2022    CHOL 175 05/04/2017    CO2 32 01/31/2022    CREATININE 0 84 01/31/2022    HDL 56 01/31/2022    HCT 43 0 01/31/2022    HGB 13 7 01/31/2022    HGBA1C 5 9 (H) 01/31/2022    MG 2 4 08/13/2020    PHOS 4 7 (H) 08/13/2020     01/31/2022    K 4 0 01/31/2022     05/04/2017    TRIG 133 01/31/2022    WBC 7 98 01/31/2022         X-Rays & Procedures:   No orders to display           ______________________________________________________________________      Assessment & Plan:     Diagnoses and all orders for this visit:    Esophageal dysphagia  -     EGD; Future    Odynophagia  -     EGD; Future    Abnormal barium swallow  -     EGD; Future      Patient is currently on anticoagulants for her PE and will remain on them  Patient will be scheduled for an EGD  Further recommendations will be based on the study results

## 2022-02-18 ENCOUNTER — TELEPHONE (OUTPATIENT)
Dept: GASTROENTEROLOGY | Facility: HOSPITAL | Age: 67
End: 2022-02-18

## 2022-02-20 DIAGNOSIS — I10 ESSENTIAL HYPERTENSION: ICD-10-CM

## 2022-02-20 RX ORDER — QUINAPRIL 20 MG/1
TABLET ORAL
Qty: 90 TABLET | Refills: 3 | Status: SHIPPED | OUTPATIENT
Start: 2022-02-20 | End: 2022-06-24

## 2022-02-21 ENCOUNTER — ANESTHESIA (OUTPATIENT)
Dept: GASTROENTEROLOGY | Facility: HOSPITAL | Age: 67
End: 2022-02-21

## 2022-02-21 ENCOUNTER — ANESTHESIA EVENT (OUTPATIENT)
Dept: GASTROENTEROLOGY | Facility: HOSPITAL | Age: 67
End: 2022-02-21

## 2022-02-21 ENCOUNTER — HOSPITAL ENCOUNTER (OUTPATIENT)
Dept: GASTROENTEROLOGY | Facility: HOSPITAL | Age: 67
Setting detail: OUTPATIENT SURGERY
Discharge: HOME | End: 2022-02-21
Attending: INTERNAL MEDICINE | Admitting: INTERNAL MEDICINE
Payer: MEDICARE

## 2022-02-21 VITALS
OXYGEN SATURATION: 94 % | RESPIRATION RATE: 16 BRPM | TEMPERATURE: 97.6 F | DIASTOLIC BLOOD PRESSURE: 69 MMHG | WEIGHT: 228.84 LBS | BODY MASS INDEX: 48.04 KG/M2 | HEIGHT: 58 IN | HEART RATE: 95 BPM | SYSTOLIC BLOOD PRESSURE: 125 MMHG

## 2022-02-21 DIAGNOSIS — R13.10 ODYNOPHAGIA: ICD-10-CM

## 2022-02-21 DIAGNOSIS — R13.19 ESOPHAGEAL DYSPHAGIA: ICD-10-CM

## 2022-02-21 DIAGNOSIS — R93.3 ABNORMAL BARIUM SWALLOW: ICD-10-CM

## 2022-02-21 PROCEDURE — 43239 EGD BIOPSY SINGLE/MULTIPLE: CPT | Performed by: INTERNAL MEDICINE

## 2022-02-21 PROCEDURE — 88305 TISSUE EXAM BY PATHOLOGIST: CPT | Performed by: PATHOLOGY

## 2022-02-21 PROCEDURE — 43248 EGD GUIDE WIRE INSERTION: CPT | Performed by: INTERNAL MEDICINE

## 2022-02-21 PROCEDURE — 88312 SPECIAL STAINS GROUP 1: CPT | Performed by: PATHOLOGY

## 2022-02-21 PROCEDURE — C1769 GUIDE WIRE: HCPCS

## 2022-02-21 RX ORDER — SODIUM CHLORIDE, SODIUM LACTATE, POTASSIUM CHLORIDE, CALCIUM CHLORIDE 600; 310; 30; 20 MG/100ML; MG/100ML; MG/100ML; MG/100ML
INJECTION, SOLUTION INTRAVENOUS CONTINUOUS PRN
Status: DISCONTINUED | OUTPATIENT
Start: 2022-02-21 | End: 2022-02-21

## 2022-02-21 RX ORDER — SODIUM CHLORIDE, SODIUM LACTATE, POTASSIUM CHLORIDE, CALCIUM CHLORIDE 600; 310; 30; 20 MG/100ML; MG/100ML; MG/100ML; MG/100ML
125 INJECTION, SOLUTION INTRAVENOUS CONTINUOUS
Status: DISCONTINUED | OUTPATIENT
Start: 2022-02-21 | End: 2022-02-25 | Stop reason: HOSPADM

## 2022-02-21 RX ADMIN — SODIUM CHLORIDE, SODIUM LACTATE, POTASSIUM CHLORIDE, AND CALCIUM CHLORIDE 125 ML/HR: .6; .31; .03; .02 INJECTION, SOLUTION INTRAVENOUS at 12:33

## 2022-02-21 RX ADMIN — SODIUM CHLORIDE, SODIUM LACTATE, POTASSIUM CHLORIDE, AND CALCIUM CHLORIDE: .6; .31; .03; .02 INJECTION, SOLUTION INTRAVENOUS at 13:12

## 2022-02-21 NOTE — ANESTHESIA PREPROCEDURE EVALUATION
Procedure:  EGD    Relevant Problems   CARDIO   (+) HTN (hypertension)   (+) Hyperlipidemia      ENDO   (+) Hypothyroidism      GI/HEPATIC   (+) GERD (gastroesophageal reflux disease)      /RENAL   (+) Other hydronephrosis      HEMATOLOGY   (+) Anemia      MUSCULOSKELETAL   (+) Diastasis recti   (+) Sacroiliitis (HCC)      NEURO/PSYCH   (+) Anxiety   (+) Current moderate episode of major depressive disorder without prior episode (HCC)   (+) History of pulmonary embolism   (+) PTSD (post-traumatic stress disorder)      PULMONARY   (+) Asthma        Physical Exam    Airway    Mallampati score: II  TM Distance: >3 FB  Neck ROM: full     Dental   upper dentures and lower dentures,     Cardiovascular  Rhythm: regular, Rate: normal, No murmur, Cardiovascular exam normal    Pulmonary  Pulmonary exam normal Breath sounds clear to auscultation, No wheezes,     Other Findings        Anesthesia Plan  ASA Score- 3     Anesthesia Type- IV sedation with anesthesia with ASA Monitors  Additional Monitors:   Airway Plan:           Plan Factors-Exercise tolerance (METS): >4 METS  Chart reviewed  EKG reviewed  Imaging results reviewed  Existing labs reviewed  Patient is not a current smoker  Patient instructed to abstain from smoking on day of procedure  Patient did not smoke on day of surgery  There is medical exclusion for perioperative obstructive sleep apnea risk education  Induction-     Postoperative Plan-     Informed Consent- Anesthetic plan and risks discussed with patient  I personally reviewed this patient with the CRNA  Discussed and agreed on the Anesthesia Plan with the CRNA  Mateo Pollock

## 2022-02-21 NOTE — INTERVAL H&P NOTE
H&P reviewed  After examining the patient I find no changes in the patients condition since the H&P had been written      Vitals:    02/21/22 1224   BP: 140/64   Pulse: 99   Resp: 16   Temp: 97 7 °F (36 5 °C)   SpO2: 95%

## 2022-02-27 DIAGNOSIS — F43.10 PTSD (POST-TRAUMATIC STRESS DISORDER): ICD-10-CM

## 2022-02-27 DIAGNOSIS — F32.1 CURRENT MODERATE EPISODE OF MAJOR DEPRESSIVE DISORDER WITHOUT PRIOR EPISODE (HCC): ICD-10-CM

## 2022-02-27 DIAGNOSIS — F41.9 ANXIETY: ICD-10-CM

## 2022-02-27 RX ORDER — PAROXETINE 10 MG/1
TABLET, FILM COATED ORAL
Qty: 30 TABLET | Refills: 5 | Status: SHIPPED | OUTPATIENT
Start: 2022-02-27

## 2022-02-28 DIAGNOSIS — F41.9 ANXIETY: ICD-10-CM

## 2022-02-28 DIAGNOSIS — E03.9 ACQUIRED HYPOTHYROIDISM: ICD-10-CM

## 2022-02-28 RX ORDER — LEVOTHYROXINE SODIUM 88 UG/1
TABLET ORAL
Qty: 104 TABLET | Refills: 3 | Status: SHIPPED | OUTPATIENT
Start: 2022-02-28

## 2022-03-01 RX ORDER — ALPRAZOLAM 1 MG/1
TABLET ORAL
Qty: 90 TABLET | Refills: 3 | Status: SHIPPED | OUTPATIENT
Start: 2022-03-01 | End: 2022-06-24 | Stop reason: SDUPTHER

## 2022-03-03 ENCOUNTER — VBI (OUTPATIENT)
Dept: ADMINISTRATIVE | Facility: OTHER | Age: 67
End: 2022-03-03

## 2022-03-03 ENCOUNTER — TELEPHONE (OUTPATIENT)
Dept: GASTROENTEROLOGY | Facility: CLINIC | Age: 67
End: 2022-03-03

## 2022-03-03 NOTE — TELEPHONE ENCOUNTER
Patient still having problems with chest pain and dysphagia as soon as she eats    Please set her up for esophageal manometry

## 2022-03-04 ENCOUNTER — TELEPHONE (OUTPATIENT)
Dept: INTERNAL MEDICINE CLINIC | Facility: CLINIC | Age: 67
End: 2022-03-04

## 2022-03-04 NOTE — TELEPHONE ENCOUNTER
PT  UPSET  THAT  SHE  CAN'T  GET  APPT  WITH  DANIELA  OR  URVASHI   EXPLAIN  ABOUT  THE  SAME  DAY   APPT   AND  SHE   SAID  SHE  HAS  BEEN  CALLING   ALL   WEEK  AND  NEVER  GOT  AN  APPT    HAVING  SINUS  ISSUES  AGAIN  AND  COUGH / HEADACHE  WHAT  CAN  I  DO  FOR  HER

## 2022-03-05 NOTE — TELEPHONE ENCOUNTER
I replied to a msg 2/28 advising her to call for appt  No other notes or documentation of phone call and both Teddy and I had appts available each day  This msg 4 pm on Friday? ? Rafael Tena, can you pls review      Staff, notify pt ceftin called in, she has pcn allergy but has tolerated cephalosporins preveiosly

## 2022-03-07 DIAGNOSIS — M54.9 BACK PAIN, UNSPECIFIED BACK LOCATION, UNSPECIFIED BACK PAIN LATERALITY, UNSPECIFIED CHRONICITY: ICD-10-CM

## 2022-03-08 RX ORDER — METHOCARBAMOL 500 MG/1
TABLET, FILM COATED ORAL
Qty: 60 TABLET | Refills: 0 | Status: SHIPPED | OUTPATIENT
Start: 2022-03-08 | End: 2022-03-11

## 2022-03-10 DIAGNOSIS — M54.9 BACK PAIN, UNSPECIFIED BACK LOCATION, UNSPECIFIED BACK PAIN LATERALITY, UNSPECIFIED CHRONICITY: ICD-10-CM

## 2022-03-11 RX ORDER — METHOCARBAMOL 500 MG/1
TABLET, FILM COATED ORAL
Qty: 60 TABLET | Refills: 0 | Status: SHIPPED | OUTPATIENT
Start: 2022-03-11 | End: 2022-04-04

## 2022-03-17 ENCOUNTER — OFFICE VISIT (OUTPATIENT)
Dept: INTERNAL MEDICINE CLINIC | Facility: CLINIC | Age: 67
End: 2022-03-17
Payer: MEDICARE

## 2022-03-17 ENCOUNTER — HOSPITAL ENCOUNTER (OUTPATIENT)
Dept: RADIOLOGY | Facility: HOSPITAL | Age: 67
Discharge: HOME | End: 2022-03-17
Payer: MEDICARE

## 2022-03-17 VITALS
RESPIRATION RATE: 16 BRPM | TEMPERATURE: 98.6 F | SYSTOLIC BLOOD PRESSURE: 148 MMHG | HEART RATE: 94 BPM | BODY MASS INDEX: 48.07 KG/M2 | DIASTOLIC BLOOD PRESSURE: 82 MMHG | WEIGHT: 229 LBS | OXYGEN SATURATION: 94 % | HEIGHT: 58 IN

## 2022-03-17 DIAGNOSIS — S99.922A INJURY OF TOE ON LEFT FOOT, INITIAL ENCOUNTER: ICD-10-CM

## 2022-03-17 DIAGNOSIS — Z12.31 ENCOUNTER FOR SCREENING MAMMOGRAM FOR BREAST CANCER: Primary | ICD-10-CM

## 2022-03-17 DIAGNOSIS — J30.1 SEASONAL ALLERGIC RHINITIS DUE TO POLLEN: ICD-10-CM

## 2022-03-17 DIAGNOSIS — J45.20 MILD INTERMITTENT ASTHMA WITHOUT COMPLICATION: ICD-10-CM

## 2022-03-17 DIAGNOSIS — J01.01 RECURRENT MAXILLARY SINUSITIS: ICD-10-CM

## 2022-03-17 PROCEDURE — 99214 OFFICE O/P EST MOD 30 MIN: CPT | Performed by: INTERNAL MEDICINE

## 2022-03-17 PROCEDURE — 73630 X-RAY EXAM OF FOOT: CPT

## 2022-03-17 NOTE — PATIENT INSTRUCTIONS
Status post acute sinusitis as described-continue with allergy medication and saline lavage    Contact me if symptoms worsen to consider CT and ENT referral     Injury to left foot rule out metatarsal fracture, follow-up accordingly    Hypertension stable on present regimen    Asthma-continue bronchodilators, consider chest x-ray for persistent cough

## 2022-03-17 NOTE — PROGRESS NOTES
Assessment/Plan:    Diagnoses and all orders for this visit:    Encounter for screening mammogram for breast cancer  -     Mammo screening bilateral w 3d & cad; Future              There are no Patient Instructions on file for this visit  Subjective:      Patient ID: Ingrid Carter is a 79 y o  female    F/u acute sinusitis  Notes 75% improvement s/p 10d course of ceftin, but persistent nasal congestion, post nasal drip and cough  She reports having thick, wormlike nasal casts "like a noodle" full of green/yellow/bloody material that she removed from her b/l nares  She has been using airgel to "loosten" her secretions  Plans to see allergist soon  No f/c  No sob  Carmen Mcfadden down last step, feet bumped into ea other, wearing surgical boot for ?broken toes           Current Outpatient Medications:     albuterol (PROAIR HFA) 90 mcg/act inhaler, Inhale 2 puffs every 6 (six) hours as needed for wheezing, Disp: , Rfl:     ALPRAZolam (XANAX) 1 mg tablet, TAKE ONE-HALF TO ONE TABLET BY MOUTH THREE TIMES A DAY, Disp: 90 tablet, Rfl: 3    amLODIPine (NORVASC) 5 mg tablet, TAKE 1 TABLET BY MOUTH  DAILY, Disp: 90 tablet, Rfl: 3    apixaban (Eliquis) 2 5 mg, Take 1 tablet (2 5 mg total) by mouth 2 (two) times a day, Disp: 180 tablet, Rfl: 2    clotrimazole (LOTRIMIN) 1 % cream, as needed , Disp: , Rfl:     clotrimazole-betamethasone (LOTRISONE) 1-0 05 % cream, APPLY TO SKIN FOLD TWO TIMES A DAY, Disp: 45 g, Rfl: 1    DULoxetine (CYMBALTA) 30 mg delayed release capsule, 1 daily w/ 60 mg tab, Disp: 90 capsule, Rfl: 3    DULoxetine (CYMBALTA) 60 mg delayed release capsule, Take 1 capsule by mouth daily, Disp: 90 capsule, Rfl: 2    ferrous sulfate 325 (65 Fe) mg tablet, Take 325 mg by mouth daily, Disp: , Rfl:     hydrocortisone-pramoxine (ANALPRAM-HC) 2 5-1 % rectal cream, Apply topically 3 (three) times a day, Disp: 3 Tube, Rfl: 1    levothyroxine 88 mcg tablet, TAKE 1 TABLET BY MOUTH  DAILY EXCEPT ON SUNDAY TAKE 2 TABLETS BY MOUTH, Disp: 104 tablet, Rfl: 3    methocarbamol (ROBAXIN) 500 mg tablet, TAKE 1 TABLET BY MOUTH 4 TIMES A DAY AS NEEDED FOR MUSCLE SPASMS, Disp: 60 tablet, Rfl: 0    Mometasone Furo-Formoterol Fum (DULERA) 100-5 MCG/ACT AERO, Inhale, Disp: , Rfl:     Multiple Vitamins-Minerals (OCUVITE ADULT 50+ PO), Take by mouth, Disp: , Rfl:     nystatin powder, APPLY ONE APPLICATION TOPICALLY TWO TIMES A DAY TO THE AFFECTED AREA(S) SPARINGLY, Disp: 60 g, Rfl: 3    pantoprazole (PROTONIX) 40 mg tablet, Take 1 tablet (40 mg total) by mouth 2 (two) times a day, Disp: 90 tablet, Rfl: 3    PARoxetine (PAXIL) 10 mg tablet, TAKE ONE TABLET BY MOUTH EVERY DAY, Disp: 30 tablet, Rfl: 5    quinapril (ACCUPRIL) 20 mg tablet, TAKE 1 TABLET BY MOUTH  DAILY, Disp: 90 tablet, Rfl: 3    simvastatin (ZOCOR) 40 mg tablet, Take 1 tablet (40 mg total) by mouth daily, Disp: 90 tablet, Rfl: 0    traMADol (ULTRAM) 50 mg tablet, Take 1 tablet (50 mg total) by mouth every 8 (eight) hours as needed for severe pain, Disp: 90 tablet, Rfl: 1    Triamcinolone Acetonide (Nasacort Allergy 24HR) 55 MCG/ACT AERO, into each nostril as needed , Disp: , Rfl:     cholestyramine (QUESTRAN) 4 g packet, Take by mouth as needed  (Patient not taking: Reported on 3/17/2022 ), Disp: , Rfl:     famotidine (PEPCID) 10 mg tablet, Take 1 tablet (10 mg total) by mouth daily, Disp: 90 tablet, Rfl: 3    Recent Results (from the past 1008 hour(s))   Tissue Exam    Collection Time: 02/21/22  1:20 PM   Result Value Ref Range    Case Report       Surgical Pathology Report                         Case: [id number]                                   Authorizing Provider:  Jillian Lopes MD      Collected:           02/21/2022 1320              Ordering Location:      [facility]       Received:            02/21/2022 [address] [place] [address] Endoscopy Pathologist:           Elda Moyer MD                                                           Specimens:   A) - Esophagus, distal                                                                              B) - Esophagus, proximal                                                                   Final Diagnosis       A  Esophagus, "Distal esophagus biopsy," Biopsy:  - Benign squamous mucosa with nonspecific regenerative, chronic inflammatory changes, acute inflammation, and bacterial colonies  - GMS does not highlight fungal elements  - Negative for intestinal metaplasia  - Eosinophils are fewer than 3 cells per high power field in squamous epithelium, negative for eosinophilic esophagitis  - No epithelial dysplasia and no evidence of malignancy    B  Esophagus, "Proximal esophagus biopsy," Biopsy:  - Benign squamous mucosa with nonspecific regenerative, chronic inflammatory changes, acute inflammation, and bacterial colonies  - GMS does not highlight fungal elements  - Negative for intestinal metaplasia  - Eosinophils are fewer than 3 cells per high power field in squamous epithelium, negative for eosinophilic esophagitis  - No epithelial dysplasia and no evidence of malignancy      Additional Information       All reported additional testing was performed with appropriately reactive controls  These tests were developed and their performance characteristics determined by [facility] or appropriate performing facility, though some tests may be performed on tissues which have not been validated for performance characteristics (such as staining performed on alcohol exposed cell blocks and decalcified tissues)  Results should be interpreted with caution and in the context of the patients clinical condition  These tests may not be cleared or approved by the [place]  Food and Drug Administration, though the FDA has determined that such clearance or approval is not necessary   These tests are used for clinical purposes and they should not be regarded as investigational or for research  This laboratory has been approved by CLIA 88, designated as a high-complexity laboratory and is qualified to perform these tests   Interpretation performed at [address],  [facility]        Gross Description          A  The specimen is received in formalin, labeled with the patient's name and hospital number, and is designated "distal esophagus biopsy"  The specimen consists of multiple colorless, friable tissue fragments measuring in aggregate of 0 5 x 0 4 x 0 1 cm  The specimen is drained into an embedding bag  Entirely submitted  One cassette  Note: due to the size and consistency of specimen, the tissue may not survive histologic processing  B  The specimen is received in formalin, labeled with the patient's name and hospital number, and is designated "proximal esophagus biopsy"  The specimen consists of multiple colorless, friable tissue fragments measuring in aggregate of 0 3 x 0 3 x 0 1 cm  Note: due to the size and consistency of specimen, the tissue may not survive histologic processing  The specimen is drained into an embedding bag  The specimen is entirely submitted in a screened cassette  Note: The estimated total formalin fixation time based upon information provided by the submitting clinician and the standard processing schedule is under 72 hours  Olive Douglas           Clinical Information Cold bx r/o EOE        The following portions of the patient's history were reviewed and updated as appropriate: allergies, current medications, past family history, past medical history, past social history, past surgical history and problem list      Review of Systems   Constitutional: Negative for appetite change, chills, diaphoresis, fatigue, fever and unexpected weight change  HENT: Positive for rhinorrhea and sinus pressure   Negative for congestion and hearing loss     Eyes: Negative for visual disturbance  Respiratory: Positive for cough  Negative for chest tightness, shortness of breath and wheezing  Cardiovascular: Negative for chest pain, palpitations and leg swelling  Gastrointestinal: Negative for abdominal pain and blood in stool  Endocrine: Negative for cold intolerance, heat intolerance, polydipsia and polyuria  Genitourinary: Negative for difficulty urinating, dysuria, frequency and urgency  Musculoskeletal: Positive for arthralgias and gait problem  Negative for myalgias  Skin: Negative for rash  Neurological: Negative for dizziness, weakness, light-headedness and headaches  Hematological: Does not bruise/bleed easily  Psychiatric/Behavioral: Negative for dysphoric mood and sleep disturbance  Objective:      Vitals:    03/17/22 1242   BP: 148/82   Pulse: 94   Resp: 16   Temp: 98 6 °F (37 °C)   SpO2: 94%          Physical Exam  Constitutional:       Appearance: She is well-developed  HENT:      Head: Normocephalic and atraumatic  Nose: Nose normal    Eyes:      General: No scleral icterus  Conjunctiva/sclera: Conjunctivae normal       Pupils: Pupils are equal, round, and reactive to light  Neck:      Thyroid: No thyromegaly  Vascular: No JVD  Trachea: No tracheal deviation  Cardiovascular:      Rate and Rhythm: Normal rate and regular rhythm  Heart sounds: No murmur heard  No friction rub  No gallop  Pulmonary:      Effort: Pulmonary effort is normal  No respiratory distress  Breath sounds: Normal breath sounds  No wheezing or rales  Musculoskeletal:         General: No deformity  Cervical back: Normal range of motion and neck supple  Comments: L 4th and 5th toes erythematous and ecchymotic, distal metatarsals are ttp   Lymphadenopathy:      Cervical: No cervical adenopathy  Skin:     General: Skin is warm and dry  Coloration: Skin is not pale        Findings: No erythema or rash    Neurological:      Mental Status: She is alert and oriented to person, place, and time  Cranial Nerves: No cranial nerve deficit  Psychiatric:         Behavior: Behavior normal          Thought Content:  Thought content normal          Judgment: Judgment normal

## 2022-03-23 ENCOUNTER — TELEPHONE (OUTPATIENT)
Dept: GASTROENTEROLOGY | Facility: HOSPITAL | Age: 67
End: 2022-03-23

## 2022-03-24 ENCOUNTER — TELEPHONE (OUTPATIENT)
Dept: INTERNAL MEDICINE CLINIC | Facility: CLINIC | Age: 67
End: 2022-03-24

## 2022-03-24 NOTE — TELEPHONE ENCOUNTER
Nahun Apariciotzer    Patient does not go to [facility] for her mammogram  Her mammograms are ordered by the [facility] and [address]  She just wanted you to know she is not ignoring your letter but Dr Nasrin Earl does not have to order mammograms for her

## 2022-03-29 ENCOUNTER — VBI (OUTPATIENT)
Dept: ADMINISTRATIVE | Facility: OTHER | Age: 67
End: 2022-03-29

## 2022-04-01 ENCOUNTER — HOSPITAL ENCOUNTER (OUTPATIENT)
Dept: GASTROENTEROLOGY | Facility: HOSPITAL | Age: 67
Discharge: HOME | End: 2022-04-01
Payer: MEDICARE

## 2022-04-01 VITALS
RESPIRATION RATE: 18 BRPM | DIASTOLIC BLOOD PRESSURE: 87 MMHG | HEART RATE: 96 BPM | TEMPERATURE: 98.1 F | OXYGEN SATURATION: 94 % | SYSTOLIC BLOOD PRESSURE: 142 MMHG

## 2022-04-01 DIAGNOSIS — R13.10 ODYNOPHAGIA: ICD-10-CM

## 2022-04-01 DIAGNOSIS — R13.19 ESOPHAGEAL DYSPHAGIA: ICD-10-CM

## 2022-04-01 DIAGNOSIS — R93.3 ABNORMAL BARIUM SWALLOW: ICD-10-CM

## 2022-04-01 PROCEDURE — 91020 GASTRIC MOTILITY STUDIES: CPT

## 2022-04-01 PROCEDURE — 91010 ESOPHAGUS MOTILITY STUDY: CPT | Performed by: INTERNAL MEDICINE

## 2022-04-03 DIAGNOSIS — E78.5 HYPERLIPIDEMIA, UNSPECIFIED HYPERLIPIDEMIA TYPE: ICD-10-CM

## 2022-04-03 RX ORDER — SIMVASTATIN 40 MG
TABLET ORAL
Qty: 90 TABLET | Refills: 3 | Status: SHIPPED | OUTPATIENT
Start: 2022-04-03 | End: 2022-05-02 | Stop reason: SDUPTHER

## 2022-04-07 ENCOUNTER — TELEPHONE (OUTPATIENT)
Dept: GASTROENTEROLOGY | Facility: CLINIC | Age: 67
End: 2022-04-07

## 2022-04-25 DIAGNOSIS — K21.9 GASTROESOPHAGEAL REFLUX DISEASE WITHOUT ESOPHAGITIS: ICD-10-CM

## 2022-04-25 RX ORDER — PANTOPRAZOLE SODIUM 40 MG/1
TABLET, DELAYED RELEASE ORAL
Qty: 180 TABLET | Refills: 3 | Status: SHIPPED | OUTPATIENT
Start: 2022-04-25

## 2022-04-27 ENCOUNTER — TELEPHONE (OUTPATIENT)
Dept: GASTROENTEROLOGY | Facility: CLINIC | Age: 67
End: 2022-04-27

## 2022-04-27 ENCOUNTER — TELEPHONE (OUTPATIENT)
Dept: BARIATRICS | Facility: CLINIC | Age: 67
End: 2022-04-27

## 2022-04-27 ENCOUNTER — PATIENT MESSAGE (OUTPATIENT)
Dept: GASTROENTEROLOGY | Facility: CLINIC | Age: 67
End: 2022-04-27

## 2022-04-27 DIAGNOSIS — R13.19 ESOPHAGEAL DYSPHAGIA: Primary | ICD-10-CM

## 2022-04-27 NOTE — TELEPHONE ENCOUNTER
Tell her to make sure her food is cut up extremely well and should extremely well, and give her a prescription for Levsin SL, 1 SL t i d  1/2 hour a c , dispense 60 with 3 refills as a trial   Have her call me in 2 weeks with progress

## 2022-04-27 NOTE — TELEPHONE ENCOUNTER
Sp to pt  Pt spoke to Dr Jared Aviles and he wants her to have surgical consult  Gave her the doctor names at [facility] and to call back to schedule consult after choosing which one she wants

## 2022-04-27 NOTE — TELEPHONE ENCOUNTER
----- Message from Jocelin Yanez sent at 4/27/2022  1:46 AM EDT -----  Regarding: Choking   Dear Zak Franco  Last while dinner I had 2 small pieces of meat took one and got stuck in my throat I couldn't swallow or breathe Corean Few came and was ready to push on my chest   I took a sip of water started to vomit  I told Dr Proctor Are we swepping this   under the carpet  I am having allergies bad and a lot of mucus, coughing  Can you 2 figure out what to do next it seem not getting past my throat     Sincerely,  Jocelin Yanez

## 2022-04-28 NOTE — TELEPHONE ENCOUNTER
Levsin is giving her diarrhea and making her feel hyper  I told her to stop it    She will call and schedule the barium swallowing study

## 2022-05-04 ENCOUNTER — TELEPHONE (OUTPATIENT)
Dept: BARIATRICS | Facility: CLINIC | Age: 67
End: 2022-05-04

## 2022-05-04 NOTE — TELEPHONE ENCOUNTER
Received email that patient wants Dr Phillip Estrada but wants to wait a little to make appt gave her [facility] # to schedule appt

## 2022-05-23 DIAGNOSIS — G89.29 OTHER CHRONIC PAIN: ICD-10-CM

## 2022-05-24 RX ORDER — DULOXETIN HYDROCHLORIDE 60 MG/1
60 CAPSULE, DELAYED RELEASE ORAL DAILY
Qty: 90 CAPSULE | Refills: 0 | Status: SHIPPED | OUTPATIENT
Start: 2022-05-24

## 2022-05-26 ENCOUNTER — HOSPITAL ENCOUNTER (OUTPATIENT)
Dept: RADIOLOGY | Facility: HOSPITAL | Age: 67
Discharge: HOME | End: 2022-05-26
Attending: INTERNAL MEDICINE
Payer: MEDICARE

## 2022-05-26 DIAGNOSIS — R13.12 OROPHARYNGEAL DYSPHAGIA: ICD-10-CM

## 2022-05-26 PROCEDURE — 74230 X-RAY XM SWLNG FUNCJ C+: CPT

## 2022-05-26 PROCEDURE — 92611 MOTION FLUOROSCOPY/SWALLOW: CPT

## 2022-05-26 NOTE — PROCEDURES
Speech-Language Pathology Video Barium Swallow Study        Patient Name: Luis Hare    PSHXA'M Date: 5/26/2022     Problem List  Patient Active Problem List   Diagnosis    Asthma    HTN (hypertension)    Allergic rhinitis    Anxiety    Candidal intertrigo    Current moderate episode of major depressive disorder without prior episode ([facility] [address] )    Herpes simplex infection    Hyperlipidemia    Hypothyroidism    Impaired fasting glucose    Morbid obesity ([facility] [address] )    Peripheral neuropathy    Ductal carcinoma in situ (DCIS) of left breast    Diastasis recti    Gross hematuria    Anemia    Other hydronephrosis    Sacroiliitis (HCC)    History of pulmonary embolism    PTSD (post-traumatic stress disorder)    Pain with swallowing    IBS (irritable bowel syndrome)    GERD (gastroesophageal reflux disease)    Class 3 severe obesity in adult [facility])       Past Medical History  Past Medical History:   Diagnosis Date    Allergic rhinitis     Anemia     Cancer (HCC)     breast cancer, Left side    Colon polyp     Depression     Disease of thyroid gland     GERD (gastroesophageal reflux disease)     Hyperlipemia     Hypertension     Hypothyroidism     Last assessed: 3/25/14    Obesity     Osteoarthritis     Pre-operative laboratory examination     Pulmonary embolism [facility])        Past Surgical History  Past Surgical History:   Procedure Laterality Date    HAND SURGERY Left 03/2020    HYSTERECTOMY      INCISIONAL BREAST BIOPSY      JOINT REPLACEMENT      KNEE ARTHROPLASTY      ROTATOR CUFF REPAIR      SHOULDER SURGERY      TRIGGER FINGER RELEASE      TRIGGER FINGER RELEASE           General Information;  78 yo female [facility]BS by Dr Willam Brown for dysphagia w/ c/o globus sensation around the area of the upper esophagus, food (spefically rice) getting 'stuck' resulting in regurgitation    Denies recent pneumonia and reports to maintain a baseline diet of regular solids cut into small pieces following most recent EGD  See below for additional details  Since the time of the EGD patient reports some relief  Most of the time she is able to clear the globus sensation by taking a sip of water  Pt was viewed in lateral position and was given trials of puree solids, soft/mixed solids, hard solids, thin liquids by cup and straw  All trials were self-fed  demonstrating natural feeding habits as best as possible while under fluoro  Swallow Information   Current Risks for Dysphagia & Aspiration: known history of dysphagia and reported new dysphagia     Current Symptoms/Concerns: choking incident and goblus sensation, regurgitation     Current Diet: regular diet and thin liquids; pre-cut/small pieces    Baseline Assessment   Behavior/Cognition: alert    Speech/Language Status: able to participate in conversation and able to follow commands    Patient Positioning: Laterally at 90 degrees      Speech/Swallow Mech: Oral motor movements appeared    Facial: symmetrical  Labial: WFL  Lingual: WFL  Velum: symmetrical  Mandible: adequate ROM  Dentition: adequate  Vocal quality:clear/adequate   Volitional Cough: strong/productive   Tracheostomy: n/a  Respiratory: RA    Previous VBS: None on file  EGD 2/21/22:  Normal esophagus, status post biopsy distal and proximal, status post empiric dilation, [address] [place] over guidewire  Multiple benign gastric polyps    Results are as follows:   Oral stage; Pt presents with mild oral dysphagia characterized by reduced bolus manipulation, cohesion and prolonged transfer  Patient appears to to hesitate when attempting to initiate swallow response  Piecemeal deglutition noted (2/2 hesitation) with reduced posterior containment on larger bites  Premature loss (trace-mild)  Pharyngeal stage; Pt presents with mild pharyngeal dysphagia characterized by delayed swallow response ?hesitancy, CP prominence  Spillage of liquid textures to the valleculae and UES, with spillage of oral residue (while piecemealing larger bites of hard solids) to the valleculae  ?able transient laryngeal penetration of thin liquids  No marlee aspiration  Trace-mild bolus retention above the UES (solids>liquids) with brief/trace regurgitation x1  All material noted to eventually travel through the UES with no bolus obstruction or pharyngeal residue  Esophageal stage;  Esophageal screening was not completed  Mild stasis was observed lining the proximal esophagus across all consistencies  Noted to clear with additional swallows  See chart for further details re: esophogeal function  Assessment Summary; Patient presents with mild oropharyngeal dysphagia characterized by prolonged bolus transit, poor bolus cohesion/piecemeal diglutition and delayed swallow response (suspect hesitancy 2/2 fear of choking)  This resulted in premature bolus loss over the base of tongue, to the level of the valleculae and UES  Transient laryngeal penetration when taking consecutive sips of thins, otherwise no evidence of mistimed airway protection noted  No marlee aspiration or deep laryngeal penetration  No significant pharyngeal residue or obstruction in bolus pathway  CP prominence with minimal bolus retention and x1 regurgitation (trace) which was not noted to reach the laryngeal area  Otherwise all material safety cleared though the UES within 1-2 swallows  *Images available for direct review in PACS     Diagnosis/Prognosis;  mild oropharyngeal dysphagia    good prognosis for continued safe po intake given good acuity and understanding of symptoms   Prognosis Considerations: medical status    Recommendations; Recommend soft/level 3 diet and thin liquids, with upright posture, only feed when fully alert, slow rate of feeding, small bites/sips, alternating bites and sips and smaller, more frequent meals     Recommended Form of Meds: as tolerated    Consider consult with: F/u with GI/PCP    Results reviewed with patient and Jazmine Montalvo [address], [address] [facility]  Speech-Language Pathologist  [place] #[id number]  [place] #[id number]

## 2022-05-30 DIAGNOSIS — G89.29 OTHER CHRONIC PAIN: ICD-10-CM

## 2022-05-30 DIAGNOSIS — B37.9 YEAST INFECTION: ICD-10-CM

## 2022-05-31 RX ORDER — TRAMADOL HYDROCHLORIDE 50 MG/1
TABLET ORAL
Qty: 90 TABLET | Refills: 1 | Status: SHIPPED | OUTPATIENT
Start: 2022-05-31

## 2022-05-31 RX ORDER — NYSTATIN 100000 [USP'U]/G
POWDER TOPICAL
Qty: 60 G | Refills: 3 | Status: SHIPPED | OUTPATIENT
Start: 2022-05-31

## 2022-06-10 ENCOUNTER — CLINICAL SUPPORT (OUTPATIENT)
Dept: INTERNAL MEDICINE CLINIC | Facility: CLINIC | Age: 67
End: 2022-06-10

## 2022-06-10 ENCOUNTER — TELEPHONE (OUTPATIENT)
Dept: INTERNAL MEDICINE CLINIC | Facility: CLINIC | Age: 67
End: 2022-06-10

## 2022-06-10 VITALS — DIASTOLIC BLOOD PRESSURE: 78 MMHG | SYSTOLIC BLOOD PRESSURE: 154 MMHG

## 2022-06-10 DIAGNOSIS — I10 HYPERTENSION, UNSPECIFIED TYPE: Primary | ICD-10-CM

## 2022-06-10 NOTE — TELEPHONE ENCOUNTER
PT STOPPED IN AFTER HER MAMMOGRAM AS THEY TOOK HER BP READING AND /100 AND I CHECKED IT AND SHE /78 AND HAD NOT TAKEN HER MED YET TODAY AND ALSO WANTED TO LET YOU KNOW SHE IS UNDER A LOT OF STRESS AT HOME WITH  AND SHE HAS ALSO LOST 5 LBS, SHE WILL MONITOR BLOOD PRESSURE AT HOME AND CALL US IF SHE GETS HIGH READINGS

## 2022-06-20 ENCOUNTER — TELEPHONE (OUTPATIENT)
Dept: INTERNAL MEDICINE CLINIC | Facility: CLINIC | Age: 67
End: 2022-06-20

## 2022-06-20 NOTE — TELEPHONE ENCOUNTER
BP still elevated; not feeling well   Sent a message to Dr Mirta Gibbs through her My Chart last wk    Trying to get in with Porsha Lara or Dr Mirta Gibbs this wk       On BP meds: accupril, amlodipine, blood thinner, and Paxil     will be calling back with BP reading

## 2022-06-21 ENCOUNTER — TELEPHONE (OUTPATIENT)
Dept: INTERNAL MEDICINE CLINIC | Facility: CLINIC | Age: 67
End: 2022-06-21

## 2022-06-21 NOTE — TELEPHONE ENCOUNTER
Patients sister called, very upset and would like Esthela's help--     Please call Pedrito Lucero (sister) or Burley Lesches (patients spouse)    Patient has not been acting herself, asking for a divorce, part of a fan club in [facility], was told she won something and if she does not attend she will have to pay $5,000-therapist and everyone is telling her that this is a scam and nobody can get through to her--police had to come to the house yesterday    Looking for some insight on what to do next

## 2022-06-22 ENCOUNTER — TELEPHONE (OUTPATIENT)
Dept: ADMINISTRATIVE | Facility: OTHER | Age: 67
End: 2022-06-22

## 2022-06-22 NOTE — TELEPHONE ENCOUNTER
----- Message from Saulo Montalvo LPN sent at 5/48/0120 11:41 AM EDT -----  Regarding: mammo  06/22/22 11:42 AM    Hello, our patient Teresa Nation has had Mammogram completed/performed  Please assist in updating the patient chart by pulling the Care Everywhere (CE) document   The date of service is 05/19/2022    Thank you,  Saulo Montalvo LPN  [facility]

## 2022-06-22 NOTE — TELEPHONE ENCOUNTER
Upon review of the In Basket request we were able to locate, review, and update the patient chart as requested for Mammogram     Any additional questions or concerns should be emailed to the Practice Liaisons via [email or web address] email, please do not reply via In Basket      Thank you  Bridgett Mcdowell

## 2022-06-24 ENCOUNTER — OFFICE VISIT (OUTPATIENT)
Dept: INTERNAL MEDICINE CLINIC | Facility: CLINIC | Age: 67
End: 2022-06-24
Payer: MEDICARE

## 2022-06-24 ENCOUNTER — APPOINTMENT (OUTPATIENT)
Dept: LAB | Facility: CLINIC | Age: 67
End: 2022-06-24
Payer: MEDICARE

## 2022-06-24 VITALS
TEMPERATURE: 99.5 F | WEIGHT: 223.4 LBS | SYSTOLIC BLOOD PRESSURE: 146 MMHG | RESPIRATION RATE: 16 BRPM | OXYGEN SATURATION: 98 % | BODY MASS INDEX: 46.89 KG/M2 | HEIGHT: 58 IN | DIASTOLIC BLOOD PRESSURE: 72 MMHG | HEART RATE: 108 BPM
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
## 2022-06-24 DIAGNOSIS — F41.9 ANXIETY: ICD-10-CM

## 2022-06-24 DIAGNOSIS — R35.0 URINARY FREQUENCY: ICD-10-CM

## 2022-06-24 DIAGNOSIS — I10 ESSENTIAL HYPERTENSION: ICD-10-CM

## 2022-06-24 DIAGNOSIS — I10 PRIMARY HYPERTENSION: Primary | Chronic | ICD-10-CM

## 2022-06-24 LAB
BILIRUB UR QL STRIP: NEGATIVE
CLARITY UR: NORMAL
COLOR UR: YELLOW
GLUCOSE UR STRIP-MCNC: NEGATIVE MG/DL
HGB UR QL STRIP.AUTO: NEGATIVE
KETONES UR STRIP-MCNC: NEGATIVE MG/DL
LEUKOCYTE ESTERASE UR QL STRIP: NEGATIVE
NITRITE UR QL STRIP: NEGATIVE
PH UR STRIP.AUTO: 5.5 [PH]
PROT UR STRIP-MCNC: NEGATIVE MG/DL
SP GR UR STRIP.AUTO: 1.02 (ref 1–1.03)
UROBILINOGEN UR STRIP-ACNC: <2 MG/DL

## 2022-06-24 PROCEDURE — 81003 URINALYSIS AUTO W/O SCOPE: CPT | Performed by: INTERNAL MEDICINE

## 2022-06-24 PROCEDURE — 99214 OFFICE O/P EST MOD 30 MIN: CPT | Performed by: INTERNAL MEDICINE

## 2022-06-24 RX ORDER — ALPRAZOLAM 0.25 MG/1
TABLET ORAL
Qty: 60 TABLET | Refills: 3 | Status: SHIPPED | OUTPATIENT
Start: 2022-06-24 | End: 2022-07-06 | Stop reason: SDUPTHER

## 2022-06-24 RX ORDER — QUINAPRIL 40 MG/1
20 TABLET ORAL DAILY
Qty: 90 TABLET | Refills: 3
Start: 2022-06-24

## 2022-06-24 NOTE — PROGRESS NOTES
Assessment/Plan:    Diagnoses and all orders for this visit:    Primary hypertension  -     Uric acid; Future    Anxiety  -     ALPRAZolam (XANAX) 0 25 mg tablet; 1/2-1 po TID prn    Urinary frequency  -     UA w Reflex to Microscopic w Reflex to Culture    Essential hypertension  -     quinapril (ACCUPRIL) 40 MG tablet; Take 0 5 tablets (20 mg total) by mouth daily    Fall, initial encounter            Patient Instructions   Urinary frequency-check urinalysis and follow accordingly    Hypertension-suboptimally controlled, may be somewhat related to recent stressors  Increase quinapril to 40 mg daily, continue amlodipine  Monitor home blood pressures  Follow-up after labs in August as scheduled  Anxiety-continue on Cymbalta, refill alprazolam   I have recommended continuing with your therapist as well as pursuing a separate couples therapist     Fall-symptoms improving, no palpable abnormalities  Lung exam is clear  PT offered but declined at this time  Contact me if symptoms worsen or fail to improve as expected over the next several weeks  Subjective:      Patient ID: Bianca Padilla is a 79 y o  female    F/u MMP    Stressed re relationship w/ Ramiro, constantly bickering  She was lonely so got on Facebook and got caught up in a scam and wanted to give $5000 to meet a celebrity  HTN-home bp's 143-163/71-95, taking rx as directed  [place] by tripping over dog last week and bannister hit L flank w/ resultant pain and swelling  Subsequently developed urinary frequency and groin pain  No hematuria  Brought am urine specimen                Current Outpatient Medications:     albuterol (PROVENTIL HFA,VENTOLIN HFA) 90 mcg/act inhaler, Inhale 2 puffs every 6 (six) hours as needed for wheezing, Disp: , Rfl:     ALPRAZolam (XANAX) 0 25 mg tablet, 1/2-1 po TID prn, Disp: 60 tablet, Rfl: 3    amLODIPine (NORVASC) 5 mg tablet, TAKE 1 TABLET BY MOUTH  DAILY, Disp: 90 tablet, Rfl: 3    apixaban (Eliquis) 2 5 mg, Take 1 tablet (2 5 mg total) by mouth 2 (two) times a day, Disp: 180 tablet, Rfl: 2    cholestyramine (QUESTRAN) 4 g packet, Take by mouth as needed, Disp: , Rfl:     clotrimazole (LOTRIMIN) 1 % cream, as needed , Disp: , Rfl:     clotrimazole-betamethasone (LOTRISONE) 1-0 05 % cream, APPLY TO SKIN FOLD TWO TIMES A DAY, Disp: 45 g, Rfl: 1    DULoxetine (CYMBALTA) 30 mg delayed release capsule, 1 daily w/ 60 mg tab, Disp: 90 capsule, Rfl: 3    DULoxetine (CYMBALTA) 60 mg delayed release capsule, Take 1 capsule (60 mg total) by mouth in the morning , Disp: 90 capsule, Rfl: 0    ferrous sulfate 325 (65 Fe) mg tablet, Take 325 mg by mouth daily, Disp: , Rfl:     hydrocortisone-pramoxine (ANALPRAM-HC) 2 5-1 % rectal cream, Apply topically 3 (three) times a day, Disp: 3 Tube, Rfl: 1    levothyroxine 88 mcg tablet, TAKE 1 TABLET BY MOUTH  DAILY EXCEPT ON SUNDAY TAKE 2 TABLETS BY MOUTH, Disp: 104 tablet, Rfl: 3    methocarbamol (ROBAXIN) 500 mg tablet, TAKE 1 TABLET BY MOUTH 4 TIMES A DAY AS NEEDED FOR MUSCLE SPASMS, Disp: 60 tablet, Rfl: 3    mometasone-formoterol (DULERA) 100-5 MCG/ACT inhaler, Inhale, Disp: , Rfl:     Multiple Vitamins-Minerals (OCUVITE ADULT 50+ PO), Take by mouth, Disp: , Rfl:     nystatin powder, APPLY SPARINGLY TO AFFECTED AREA(S) TWO TIMES A DAY, Disp: 60 g, Rfl: 3    pantoprazole (PROTONIX) 40 mg tablet, TAKE 1 TABLET BY MOUTH  TWICE DAILY, Disp: 180 tablet, Rfl: 3    PARoxetine (PAXIL) 10 mg tablet, TAKE ONE TABLET BY MOUTH EVERY DAY, Disp: 30 tablet, Rfl: 5    quinapril (ACCUPRIL) 40 MG tablet, Take 0 5 tablets (20 mg total) by mouth daily, Disp: 90 tablet, Rfl: 3    simvastatin (ZOCOR) 40 mg tablet, Take 1 tablet (40 mg total) by mouth daily, Disp: 90 tablet, Rfl: 3    traMADol (ULTRAM) 50 mg tablet, TAKE ONE TABLET BY MOUTH EVERY 8 HOURS AS NEEDED FOR SEVERE PAIN, Disp: 90 tablet, Rfl: 1    Triamcinolone Acetonide (Nasacort Allergy 24HR) 55 MCG/ACT AERO, into each nostril as needed , Disp: , Rfl:     famotidine (PEPCID) 10 mg tablet, Take 1 tablet (10 mg total) by mouth daily, Disp: 90 tablet, Rfl: 3    hyoscyamine (LEVSIN/SL) 0 125 mg SL tablet, Take 1 tablet (0 125 mg total) by mouth 3 (three) times a day, Disp: 60 tablet, Rfl: 3    No results found for this or any previous visit (from the past 1008 hour(s))  The following portions of the patient's history were reviewed and updated as appropriate: allergies, current medications, past family history, past medical history, past social history, past surgical history and problem list      Review of Systems   Constitutional: Negative for appetite change, chills, diaphoresis, fatigue, fever and unexpected weight change  HENT: Negative for congestion, hearing loss and rhinorrhea  Eyes: Negative for visual disturbance  Respiratory: Negative for cough, chest tightness, shortness of breath and wheezing  Cardiovascular: Negative for chest pain, palpitations and leg swelling  Gastrointestinal: Negative for abdominal pain and blood in stool  Endocrine: Negative for cold intolerance, heat intolerance, polydipsia and polyuria  Genitourinary: Negative for difficulty urinating, dysuria, frequency and urgency  Musculoskeletal: Positive for back pain  Negative for arthralgias and myalgias  Skin: Negative for rash  Neurological: Negative for dizziness, weakness, light-headedness and headaches  Hematological: Does not bruise/bleed easily  Psychiatric/Behavioral: Negative for dysphoric mood and sleep disturbance  Objective:      Vitals:    06/24/22 1249   BP: 146/72   Pulse: (!) 108   Resp: 16   Temp: 99 5 °F (37 5 °C)   SpO2: 98%          Physical Exam  Constitutional:       Appearance: She is well-developed  HENT:      Head: Normocephalic and atraumatic  Nose: Nose normal    Eyes:      General: No scleral icterus       Conjunctiva/sclera: Conjunctivae normal       Pupils: Pupils are equal, round, and reactive to light  Neck:      Thyroid: No thyromegaly  Vascular: No JVD  Trachea: No tracheal deviation  Cardiovascular:      Rate and Rhythm: Normal rate and regular rhythm  Heart sounds: No murmur heard  No friction rub  No gallop  Pulmonary:      Effort: Pulmonary effort is normal  No respiratory distress  Breath sounds: Normal breath sounds  No wheezing or rales  Abdominal:      General: Bowel sounds are normal  There is no distension  Palpations: Abdomen is soft  There is no mass  Tenderness: There is no abdominal tenderness  There is no guarding or rebound  Comments: Left back and flank ttp w/o ecchymosis or mass   Musculoskeletal:         General: No tenderness  Cervical back: Normal range of motion and neck supple  Lymphadenopathy:      Cervical: No cervical adenopathy  Skin:     General: Skin is warm and dry  Findings: No erythema or rash  Neurological:      Mental Status: She is alert and oriented to person, place, and time  Cranial Nerves: No cranial nerve deficit  Psychiatric:         Behavior: Behavior normal          Thought Content:  Thought content normal          Judgment: Judgment normal

## 2022-06-24 NOTE — PATIENT INSTRUCTIONS
Urinary frequency-check urinalysis and follow accordingly    Hypertension-suboptimally controlled, may be somewhat related to recent stressors  Increase quinapril to 40 mg daily, continue amlodipine  Monitor home blood pressures  Follow-up after labs in August as scheduled  Anxiety-continue on Cymbalta, refill alprazolam   I have recommended continuing with your therapist as well as pursuing a separate couples therapist     Fall-symptoms improving, no palpable abnormalities  Lung exam is clear  PT offered but declined at this time  Contact me if symptoms worsen or fail to improve as expected over the next several weeks

## 2022-07-06 DIAGNOSIS — F41.9 ANXIETY: ICD-10-CM

## 2022-07-06 RX ORDER — ALPRAZOLAM 1 MG/1
TABLET ORAL
Qty: 90 TABLET | Refills: 1 | Status: SHIPPED | OUTPATIENT
Start: 2022-07-06 | End: 2022-08-31 | Stop reason: SDUPTHER

## 2022-07-07 DIAGNOSIS — I26.99 PULMONARY EMBOLISM, OTHER, UNSPECIFIED CHRONICITY, UNSPECIFIED WHETHER ACUTE COR PULMONALE PRESENT (HCC): ICD-10-CM

## 2022-07-08 RX ORDER — APIXABAN 2.5 MG/1
TABLET, FILM COATED ORAL
Qty: 180 TABLET | Refills: 3 | Status: SHIPPED | OUTPATIENT
Start: 2022-07-08

## 2022-08-08 ENCOUNTER — RA CDI HCC (OUTPATIENT)
Dept: OTHER | Facility: HOSPITAL | Age: 67
End: 2022-08-08

## 2022-08-08 NOTE — PROGRESS NOTES
[address]  coding opportunities       Chart reviewed, no opportunity found: CHART REVIEWED, NO OPPORTUNITY FOUND        Patients Insurance     Medicare Insurance: Medicare

## 2022-08-24 DIAGNOSIS — M54.9 BACK PAIN, UNSPECIFIED BACK LOCATION, UNSPECIFIED BACK PAIN LATERALITY, UNSPECIFIED CHRONICITY: ICD-10-CM

## 2022-08-25 RX ORDER — METHOCARBAMOL 500 MG/1
TABLET, FILM COATED ORAL
Qty: 60 TABLET | Refills: 3 | Status: SHIPPED | OUTPATIENT
Start: 2022-08-25

## 2022-08-30 ENCOUNTER — APPOINTMENT (OUTPATIENT)
Dept: LAB | Facility: HOSPITAL | Age: 67
End: 2022-08-30
Payer: MEDICARE

## 2022-08-30 DIAGNOSIS — I10 PRIMARY HYPERTENSION: Chronic | ICD-10-CM

## 2022-08-30 DIAGNOSIS — R73.01 IMPAIRED FASTING GLUCOSE: ICD-10-CM

## 2022-08-30 DIAGNOSIS — E78.2 MIXED HYPERLIPIDEMIA: ICD-10-CM

## 2022-08-30 DIAGNOSIS — E03.9 ACQUIRED HYPOTHYROIDISM: ICD-10-CM

## 2022-08-30 LAB
ALBUMIN SERPL BCP-MCNC: 3.8 G/DL (ref 3.5–5)
ALP SERPL-CCNC: 93 U/L (ref 46–116)
ALT SERPL W P-5'-P-CCNC: 25 U/L (ref 12–78)
ANION GAP SERPL CALCULATED.3IONS-SCNC: 12 MMOL/L (ref 4–13)
AST SERPL W P-5'-P-CCNC: 21 U/L (ref 5–45)
BASOPHILS # BLD AUTO: 0.03 THOUSANDS/ΜL (ref 0–0.1)
BASOPHILS NFR BLD AUTO: 0 % (ref 0–1)
BILIRUB SERPL-MCNC: 0.44 MG/DL (ref 0.2–1)
BUN SERPL-MCNC: 11 MG/DL (ref 5–25)
CALCIUM SERPL-MCNC: 9.5 MG/DL (ref 8.3–10.1)
CHLORIDE SERPL-SCNC: 99 MMOL/L (ref 96–108)
CHOLEST SERPL-MCNC: 199 MG/DL
CO2 SERPL-SCNC: 28 MMOL/L (ref 21–32)
CREAT SERPL-MCNC: 0.87 MG/DL (ref 0.6–1.3)
EOSINOPHIL # BLD AUTO: 0.25 THOUSAND/ΜL (ref 0–0.61)
EOSINOPHIL NFR BLD AUTO: 3 % (ref 0–6)
ERYTHROCYTE [DISTWIDTH] IN BLOOD BY AUTOMATED COUNT: 14.3 % (ref 11.6–15.1)
EST. AVERAGE GLUCOSE BLD GHB EST-MCNC: 126 MG/DL
GFR SERPL CREATININE-BSD FRML MDRD: 69 ML/MIN/1.73SQ M
GLUCOSE P FAST SERPL-MCNC: 115 MG/DL (ref 65–99)
HBA1C MFR BLD: 6 %
HCT VFR BLD AUTO: 43.7 % (ref 34.8–46.1)
HDLC SERPL-MCNC: 55 MG/DL
HGB BLD-MCNC: 13.8 G/DL (ref 11.5–15.4)
IMM GRANULOCYTES # BLD AUTO: 0.04 THOUSAND/UL (ref 0–0.2)
IMM GRANULOCYTES NFR BLD AUTO: 0 % (ref 0–2)
LDLC SERPL CALC-MCNC: 121 MG/DL (ref 0–100)
LYMPHOCYTES # BLD AUTO: 2.32 THOUSANDS/ΜL (ref 0.6–4.47)
LYMPHOCYTES NFR BLD AUTO: 25 % (ref 14–44)
MCH RBC QN AUTO: 28.5 PG (ref 26.8–34.3)
MCHC RBC AUTO-ENTMCNC: 31.6 G/DL (ref 31.4–37.4)
MCV RBC AUTO: 90 FL (ref 82–98)
MONOCYTES # BLD AUTO: 0.61 THOUSAND/ΜL (ref 0.17–1.22)
MONOCYTES NFR BLD AUTO: 7 % (ref 4–12)
NEUTROPHILS # BLD AUTO: 6.17 THOUSANDS/ΜL (ref 1.85–7.62)
NEUTS SEG NFR BLD AUTO: 65 % (ref 43–75)
NONHDLC SERPL-MCNC: 144 MG/DL
NRBC BLD AUTO-RTO: 0 /100 WBCS
PLATELET # BLD AUTO: 273 THOUSANDS/UL (ref 149–390)
PMV BLD AUTO: 8.9 FL (ref 8.9–12.7)
POTASSIUM SERPL-SCNC: 3.9 MMOL/L (ref 3.5–5.3)
PROT SERPL-MCNC: 8.3 G/DL (ref 6.4–8.4)
RBC # BLD AUTO: 4.84 MILLION/UL (ref 3.81–5.12)
SODIUM SERPL-SCNC: 139 MMOL/L (ref 135–147)
TRIGL SERPL-MCNC: 116 MG/DL
TSH SERPL DL<=0.05 MIU/L-ACNC: 3.62 UIU/ML (ref 0.45–4.5)
URATE SERPL-MCNC: 3.8 MG/DL (ref 2–7.5)
WBC # BLD AUTO: 9.42 THOUSAND/UL (ref 4.31–10.16)

## 2022-08-30 PROCEDURE — 84550 ASSAY OF BLOOD/URIC ACID: CPT

## 2022-08-30 PROCEDURE — 36415 COLL VENOUS BLD VENIPUNCTURE: CPT

## 2022-08-30 PROCEDURE — 80061 LIPID PANEL: CPT

## 2022-08-30 PROCEDURE — 85025 COMPLETE CBC W/AUTO DIFF WBC: CPT

## 2022-08-30 PROCEDURE — 83036 HEMOGLOBIN GLYCOSYLATED A1C: CPT

## 2022-08-30 PROCEDURE — 84443 ASSAY THYROID STIM HORMONE: CPT

## 2022-08-30 PROCEDURE — 80053 COMPREHEN METABOLIC PANEL: CPT

## 2022-08-31 ENCOUNTER — OFFICE VISIT (OUTPATIENT)
Dept: INTERNAL MEDICINE CLINIC | Facility: CLINIC | Age: 67
End: 2022-08-31
Payer: MEDICARE

## 2022-08-31 VITALS
OXYGEN SATURATION: 93 % | HEART RATE: 104 BPM | TEMPERATURE: 99.8 F | HEIGHT: 58 IN | WEIGHT: 221.8 LBS | DIASTOLIC BLOOD PRESSURE: 74 MMHG | BODY MASS INDEX: 46.56 KG/M2 | SYSTOLIC BLOOD PRESSURE: 138 MMHG | RESPIRATION RATE: 18 BRPM

## 2022-08-31 DIAGNOSIS — D05.12 DUCTAL CARCINOMA IN SITU (DCIS) OF LEFT BREAST: ICD-10-CM

## 2022-08-31 DIAGNOSIS — E78.2 MIXED HYPERLIPIDEMIA: ICD-10-CM

## 2022-08-31 DIAGNOSIS — F41.9 ANXIETY: ICD-10-CM

## 2022-08-31 DIAGNOSIS — J30.1 SEASONAL ALLERGIC RHINITIS DUE TO POLLEN: ICD-10-CM

## 2022-08-31 DIAGNOSIS — Z86.711 HISTORY OF PULMONARY EMBOLISM: ICD-10-CM

## 2022-08-31 DIAGNOSIS — J45.20 MILD INTERMITTENT ASTHMA WITHOUT COMPLICATION: ICD-10-CM

## 2022-08-31 DIAGNOSIS — F32.1 CURRENT MODERATE EPISODE OF MAJOR DEPRESSIVE DISORDER WITHOUT PRIOR EPISODE (HCC): ICD-10-CM

## 2022-08-31 DIAGNOSIS — K58.2 IRRITABLE BOWEL SYNDROME WITH BOTH CONSTIPATION AND DIARRHEA: ICD-10-CM

## 2022-08-31 DIAGNOSIS — E03.9 ACQUIRED HYPOTHYROIDISM: ICD-10-CM

## 2022-08-31 DIAGNOSIS — I10 PRIMARY HYPERTENSION: Chronic | ICD-10-CM

## 2022-08-31 DIAGNOSIS — R73.01 IMPAIRED FASTING GLUCOSE: ICD-10-CM

## 2022-08-31 DIAGNOSIS — L30.9 DERMATITIS: ICD-10-CM

## 2022-08-31 DIAGNOSIS — K21.9 GASTROESOPHAGEAL REFLUX DISEASE WITHOUT ESOPHAGITIS: ICD-10-CM

## 2022-08-31 DIAGNOSIS — G61.9 INFLAMMATORY NEUROPATHY (HCC): ICD-10-CM

## 2022-08-31 DIAGNOSIS — Z00.00 MEDICARE ANNUAL WELLNESS VISIT, SUBSEQUENT: Primary | ICD-10-CM

## 2022-08-31 DIAGNOSIS — E66.01 CLASS 3 SEVERE OBESITY WITHOUT SERIOUS COMORBIDITY WITH BODY MASS INDEX (BMI) OF 45.0 TO 49.9 IN ADULT, UNSPECIFIED OBESITY TYPE (HCC): ICD-10-CM

## 2022-08-31 DIAGNOSIS — F43.10 PTSD (POST-TRAUMATIC STRESS DISORDER): ICD-10-CM

## 2022-08-31 PROBLEM — M46.1 SACROILIITIS (HCC): Status: RESOLVED | Noted: 2020-12-21 | Resolved: 2022-08-31

## 2022-08-31 PROCEDURE — G0439 PPPS, SUBSEQ VISIT: HCPCS | Performed by: INTERNAL MEDICINE

## 2022-08-31 PROCEDURE — 99214 OFFICE O/P EST MOD 30 MIN: CPT | Performed by: INTERNAL MEDICINE

## 2022-08-31 RX ORDER — CLOTRIMAZOLE AND BETAMETHASONE DIPROPIONATE 10; .64 MG/G; MG/G
CREAM TOPICAL
Qty: 45 G | Refills: 1 | Status: SHIPPED | OUTPATIENT
Start: 2022-08-31

## 2022-08-31 RX ORDER — ALPRAZOLAM 1 MG/1
TABLET ORAL
Qty: 90 TABLET | Refills: 1 | Status: SHIPPED | OUTPATIENT
Start: 2022-08-31

## 2022-08-31 RX ORDER — BECLOMETHASONE DIPROPIONATE HFA 40 UG/1
2 AEROSOL, METERED RESPIRATORY (INHALATION) 2 TIMES DAILY
COMMUNITY

## 2022-08-31 RX ORDER — ATORVASTATIN CALCIUM 40 MG/1
40 TABLET, FILM COATED ORAL DAILY
Qty: 90 TABLET | Refills: 3 | Status: SHIPPED | OUTPATIENT
Start: 2022-08-31

## 2022-08-31 NOTE — PATIENT INSTRUCTIONS
Lab data reviewed in detail and compared prior    Impaired fasting glucose stable with A1c 6 0    Hyperlipidemia-LDL has risen for unclear reasons, will transition to atorvastatin for increased potency as well as less drug interactions  History of DVT and DCIS following with Dr Dania Lei and Surgical Oncology    Depression/anxiety/PTSD, continue with therapy, Cymbalta and paroxetine    GERD with dysphagia status post EGD with dilation and manometry which was normal   Continue on pantoprazole, continue with small bites and thorough chewing with increased liquid  Hypertension stable on present regimen    Allergy and asthma following with allergist    Obesity-agree with plans for bariatric surgery, congratulations on recent weight loss, keep up the good work    Routine follow-up after labs in 6 months, sooner as needed  Medicare Preventive Visit Patient Instructions  Thank you for completing your Welcome to Medicare Visit or Medicare Annual Wellness Visit today  Your next wellness visit will be due in one year (9/1/2023)  The screening/preventive services that you may require over the next 5-10 years are detailed below  Some tests may not apply to you based off risk factors and/or age  Screening tests ordered at today's visit but not completed yet may show as past due  Also, please note that scanned in results may not display below  Preventive Screenings:  Service Recommendations Previous Testing/Comments   Colorectal Cancer Screening  * Colonoscopy    * Fecal Occult Blood Test (FOBT)/Fecal Immunochemical Test (FIT)  * Fecal DNA/Cologuard Test  * Flexible Sigmoidoscopy Age: 39-70 years old   Colonoscopy: every 10 years (may be performed more frequently if at higher risk)  OR  FOBT/FIT: every 1 year  OR  Cologuard: every 3 years  OR  Sigmoidoscopy: every 5 years  Screening may be recommended earlier than age 39 if at higher risk for colorectal cancer   Also, an individualized decision between you and your healthcare provider will decide whether screening between the ages of 74-80 would be appropriate  Colonoscopy: 02/11/2021  FOBT/FIT: 01/21/2021  Cologuard: Not on file  Sigmoidoscopy: Not on file    Screening Current     Breast Cancer Screening Age: 36 years old  Frequency: every 1-2 years  Not required if history of left and right mastectomy Mammogram: 05/19/2022    Screening Current   Cervical Cancer Screening Between the ages of 21-29, pap smear recommended once every 3 years  Between the ages of 33-67, can perform pap smear with HPV co-testing every 5 years  Recommendations may differ for women with a history of total hysterectomy, cervical cancer, or abnormal pap smears in past  Pap Smear: 10/02/2017    Screening Not Indicated   Hepatitis C Screening Once for adults born between 1945 and 1965  More frequently in patients at high risk for Hepatitis C Hep C Antibody: 07/15/2021    Screening Current   Diabetes Screening 1-2 times per year if you're at risk for diabetes or have pre-diabetes Fasting glucose: 115 mg/dL (8/30/2022)  A1C: 6 0 % (8/30/2022)  Screening Current   Cholesterol Screening Once every 5 years if you don't have a lipid disorder  May order more often based on risk factors  Lipid panel: 08/30/2022    Screening Not Indicated  History Lipid Disorder     Other Preventive Screenings Covered by Medicare:  Abdominal Aortic Aneurysm (AAA) Screening: covered once if your at risk  You're considered to be at risk if you have a family history of AAA  Lung Cancer Screening: covers low dose CT scan once per year if you meet all of the following conditions: (1) Age 50-69; (2) No signs or symptoms of lung cancer; (3) Current smoker or have quit smoking within the last 15 years; (4) You have a tobacco smoking history of at least 20 pack years (packs per day multiplied by number of years you smoked); (5) You get a written order from a healthcare provider    Glaucoma Screening: covered annually if you're considered high risk: (1) You have diabetes OR (2) Family history of glaucoma OR (3)  aged 48 and older OR (4)  American aged 72 and older  Osteoporosis Screening: covered every 2 years if you meet one of the following conditions: (1) You're estrogen deficient and at risk for osteoporosis based off medical history and other findings; (2) Have a vertebral abnormality; (3) On glucocorticoid therapy for more than 3 months; (4) Have primary hyperparathyroidism; (5) On osteoporosis medications and need to assess response to drug therapy  Last bone density test (DXA Scan): 02/01/2022  HIV Screening: covered annually if you're between the age of 12-76  Also covered annually if you are younger than 13 and older than 72 with risk factors for HIV infection  For pregnant patients, it is covered up to 3 times per pregnancy  Immunizations:  Immunization Recommendations   Influenza Vaccine Annual influenza vaccination during flu season is recommended for all persons aged >= 6 months who do not have contraindications   Pneumococcal Vaccine   * Pneumococcal conjugate vaccine = PCV13 (Prevnar 13), PCV15 (Vaxneuvance), PCV20 (Prevnar 20)  * Pneumococcal polysaccharide vaccine = PPSV23 (Pneumovax) Adults 25-60 years old: 1-3 doses may be recommended based on certain risk factors  Adults 72 years old: 1-2 doses may be recommended based off what pneumonia vaccine you previously received   Hepatitis B Vaccine 3 dose series if at intermediate or high risk (ex: diabetes, end stage renal disease, liver disease)   Tetanus (Td) Vaccine - COST NOT COVERED BY MEDICARE PART B Following completion of primary series, a booster dose should be given every 10 years to maintain immunity against tetanus  Td may also be given as tetanus wound prophylaxis  Tdap Vaccine - COST NOT COVERED BY MEDICARE PART B Recommended at least once for all adults  For pregnant patients, recommended with each pregnancy     Shingles Vaccine (Shingrix) - COST NOT COVERED BY MEDICARE PART B  2 shot series recommended in those aged 48 and above     Health Maintenance Due:      Topic Date Due    Breast Cancer Screening: Mammogram  05/19/2023    DXA SCAN  02/01/2024    Colorectal Cancer Screening  02/11/2026    Hepatitis C Screening  Completed     Immunizations Due:      Topic Date Due    Pneumococcal Vaccine: 65+ Years (3 - PPSV23 or PCV20) 11/11/2021    COVID-19 Vaccine (4 - Booster for Moderna series) 03/17/2022    Influenza Vaccine (1) 09/01/2022     Advance Directives   What are advance directives? Advance directives are legal documents that state your wishes and plans for medical care  These plans are made ahead of time in case you lose your ability to make decisions for yourself  Advance directives can apply to any medical decision, such as the treatments you want, and if you want to donate organs  What are the types of advance directives? There are many types of advance directives, and each state has rules about how to use them  You may choose a combination of any of the following:  Living will: This is a written record of the treatment you want  You can also choose which treatments you do not want, which to limit, and which to stop at a certain time  This includes surgery, medicine, IV fluid, and tube feedings  Durable power of  for healthcare [facility]): This is a written record that states who you want to make healthcare choices for you when you are unable to make them for yourself  This person, called a proxy, is usually a family member or a friend  You may choose more than 1 proxy  Do not resuscitate (DNR) order:  A DNR order is used in case your heart stops beating or you stop breathing  It is a request not to have certain forms of treatment, such as CPR  A DNR order may be included in other types of advance directives  Medical directive:   This covers the care that you want if you are in a coma, near death, or unable to make decisions for yourself  You can list the treatments you want for each condition  Treatment may include pain medicine, surgery, blood transfusions, dialysis, IV or tube feedings, and a ventilator (breathing machine)  Values history: This document has questions about your views, beliefs, and how you feel and think about life  This information can help others choose the care that you would choose  Why are advance directives important? An advance directive helps you control your care  Although spoken wishes may be used, it is better to have your wishes written down  Spoken wishes can be misunderstood, or not followed  Treatments may be given even if you do not want them  An advance directive may make it easier for your family to make difficult choices about your care  Fall Prevention    Fall prevention  includes ways to make your home and other areas safer  It also includes ways you can move more carefully to prevent a fall  Health conditions that cause changes in your blood pressure, vision, or muscle strength and coordination may increase your risk for falls  Medicines may also increase your risk for falls if they make you dizzy, weak, or sleepy  Fall prevention tips:   Stand or sit up slowly  Use assistive devices as directed  Wear shoes that fit well and have soles that   Wear a personal alarm  Stay active  Manage your medical conditions  Home Safety Tips:  Add items to prevent falls in the bathroom  Keep paths clear  Install bright lights in your home  Keep items you use often on shelves within reach  [place] or place reflective tape on the edges of your stairs  Weight Management   Why it is important to manage your weight:  Being overweight increases your risk of health conditions such as heart disease, high blood pressure, type 2 diabetes, and certain types of cancer  It can also increase your risk for osteoarthritis, sleep apnea, and other respiratory problems   Aim for a slow, steady weight loss  Even a small amount of weight loss can lower your risk of health problems  How to lose weight safely:  A safe and healthy way to lose weight is to eat fewer calories and get regular exercise  You can lose up about 1 pound a week by decreasing the number of calories you eat by 500 calories each day  Healthy meal plan for weight management:  A healthy meal plan includes a variety of foods, contains fewer calories, and helps you stay healthy  A healthy meal plan includes the following:  Eat whole-grain foods more often  A healthy meal plan should contain fiber  Fiber is the part of grains, fruits, and vegetables that is not broken down by your body  Whole-grain foods are healthy and provide extra fiber in your diet  Some examples of whole-grain foods are whole-wheat breads and pastas, oatmeal, brown rice, and bulgur  Eat a variety of vegetables every day  Include dark, leafy greens such as spinach, kale, shima greens, and mustard greens  Eat yellow and orange vegetables such as carrots, sweet potatoes, and winter squash  Eat a variety of fruits every day  Choose fresh or canned fruit (canned in its own juice or light syrup) instead of juice  Fruit juice has very little or no fiber  Eat low-fat dairy foods  Drink fat-free (skim) milk or 1% milk  Eat fat-free yogurt and low-fat cottage cheese  Try low-fat cheeses such as mozzarella and other reduced-fat cheeses  Choose meat and other protein foods that are low in fat  Choose beans or other legumes such as split peas or lentils  Choose fish, skinless poultry (chicken or turkey), or lean cuts of red meat (beef or pork)  Before you cook meat or poultry, cut off any visible fat  Use less fat and oil  Try baking foods instead of frying them  Add less fat, such as margarine, sour cream, regular salad dressing and mayonnaise to foods  Eat fewer high-fat foods   Some examples of high-fat foods include french fries, doughnuts, ice cream, and cakes  Eat fewer sweets  Limit foods and drinks that are high in sugar  This includes candy, cookies, regular soda, and sweetened drinks  Exercise:  Exercise at least 30 minutes per day on most days of the week  Some examples of exercise include walking, biking, dancing, and swimming  You can also fit in more physical activity by taking the stairs instead of the elevator or parking farther away from stores  Ask your healthcare provider about the best exercise plan for you  © Copyright [employer] 2018 Information is for End User's use only and may not be sold, redistributed or otherwise used for commercial purposes   All illustrations and images included in CareNotes® are the copyrighted property of A D A M , Inc  or [address] [place] [address]

## 2022-08-31 NOTE — PROGRESS NOTES
Assessment and Plan:     Problem List Items Addressed This Visit        Digestive    IBS (irritable bowel syndrome)    GERD (gastroesophageal reflux disease)       Endocrine    Hypothyroidism    Relevant Orders    TSH, 3rd generation with Free T4 reflex    Impaired fasting glucose    Relevant Orders    Hemoglobin A1C       Respiratory    Asthma    Relevant Medications    beclomethasone (Qvar RediHaler) 40 MCG/ACT inhaler    Allergic rhinitis       Cardiovascular and Mediastinum    HTN (hypertension) (Chronic)       Nervous and Auditory    Peripheral neuropathy       Other    Anxiety    Current moderate episode of major depressive disorder without prior episode (HCC)    Hyperlipidemia    Relevant Medications    atorvastatin (LIPITOR) 40 mg tablet    Other Relevant Orders    CBC and differential    Comprehensive metabolic panel    Lipid panel    Ductal carcinoma in situ (DCIS) of left breast    History of pulmonary embolism    PTSD (post-traumatic stress disorder)    Class 3 severe obesity in adult [facility])      Other Visit Diagnoses     Medicare annual wellness visit, subsequent    -  Primary           Preventive health issues were discussed with patient, and age appropriate screening tests were ordered as noted in patient's After Visit Summary  Personalized health advice and appropriate referrals for health education or preventive services given if needed, as noted in patient's After Visit Summary  History of Present Illness:     Patient presents for a Medicare Wellness Visit    Here to f/u MMP, AWV and review labs  Feeling generally well, better after losing some wt  Still w/ some marital issues w/ [place] [employer] over money and her wanting to travel to [place] to visit an old friend  Allergies awful this yr  Seen by Dr Alexia Park and started on QVAR and claritin w/ some relief  Depression/anxiety/PTSD-seeing [facility] weekly and taking rx as directed  DCIS- had surgery w/ Dr Jayashree Griggs, completed XRT    Following w/ Dr Anamika robles for medical oncology  Stopped Tamoxifen d/t side effects, no rx  H/o b/l PE-f/b Dr Anamika robles, taking eliquis 2 5 mg bid  Still w/ PTSD sx and just completed 2 yr anniversary  HTN-taking rx as directed, home bp's have been stable    HPL-Tolerating simvastatin  GERD-stable on rx  Taking pantoprazole am, pepcid pm       Dysphagia-still w/ intermittent sx, had egd w/ dilation that was nl in Feb, nl manometry in April  Hypothyroid-taking rx as directed  Chronic pain in feet r/t arthritis  Chronic lbp w/ R radiculopathy + PN getting worse, seeing neuro in March  Following w/ orhto w/ post tka pain in right tibia  Obesity-gained almost 40 lbs, but since then lost 30, seen by bariatric team   Now considering sleeve  Wasn't able to get Shingrix yet  Patient Care Team:  Nilda Zendejas MD as PCP - Milena Cosby MD as PCP - [address] [place], [place] [id number] [facility])  Nilda Zendejas MD     Review of Systems:     Review of Systems   Constitutional: Negative for chills and fever  HENT: Negative for ear pain and sore throat  Eyes: Negative for pain and visual disturbance  Respiratory: Negative for cough and shortness of breath  Cardiovascular: Negative for chest pain and palpitations  Gastrointestinal: Negative for abdominal pain and vomiting  Genitourinary: Negative for dysuria and hematuria  Musculoskeletal: Negative for arthralgias and back pain  Skin: Negative for color change and rash  Neurological: Negative for seizures and syncope  All other systems reviewed and are negative         Problem List:     Patient Active Problem List   Diagnosis    Asthma    HTN (hypertension)    Allergic rhinitis    Anxiety    Candidal intertrigo    Current moderate episode of major depressive disorder without prior episode (HCC)    Herpes simplex infection    Hyperlipidemia    Hypothyroidism    Impaired fasting glucose    Peripheral neuropathy    Ductal carcinoma in situ (DCIS) of left breast    Diastasis recti    Gross hematuria    Anemia    Other hydronephrosis    History of pulmonary embolism    PTSD (post-traumatic stress disorder)    Pain with swallowing    IBS (irritable bowel syndrome)    GERD (gastroesophageal reflux disease)    Class 3 severe obesity in adult [facility])      Past Medical and Surgical History:     Past Medical History:   Diagnosis Date    Allergic rhinitis     Anemia     Cancer ([address] )     breast cancer, Left side    Colon polyp     Depression     Disease of thyroid gland     GERD (gastroesophageal reflux disease)     Hyperlipemia     Hypertension     Hypothyroidism     Last assessed: 3/25/14    Obesity     Osteoarthritis     Pre-operative laboratory examination     Pulmonary embolism [facility])      Past Surgical History:   Procedure Laterality Date    HAND SURGERY Left 03/2020    HYSTERECTOMY      INCISIONAL BREAST BIOPSY      JOINT REPLACEMENT      KNEE ARTHROPLASTY      ROTATOR CUFF REPAIR      SHOULDER SURGERY      TRIGGER FINGER RELEASE      TRIGGER FINGER RELEASE        Family History:     Family History   Problem Relation Age of Onset    Coronary artery disease Mother     Hypertension Mother         Essential    Coronary artery disease Father       Social History:     Social History     Socioeconomic History    Marital status: /Civil Union     Spouse name: None    Number of children: None    Years of education: None    Highest education level: None   Occupational History    None   Tobacco Use    Smoking status: Never Smoker    Smokeless tobacco: Never Used   Vaping Use    Vaping Use: Never used   Substance and Sexual Activity    Alcohol use: Yes     Comment: socially     Drug use: No    Sexual activity: Not Currently     Partners: Male   Other Topics Concern    None   Social History Narrative    Living independently w/spouse     Social Determinants of Health     Financial Resource Strain: Not on file   Food Insecurity: Not on file   Transportation Needs: Not on file   Physical Activity: Insufficiently Active    Days of Exercise per Week: 4 days    Minutes of Exercise per Session: 30 min   Stress: No Stress Concern Present    Feeling of Stress : Only a little   Social Connections: Not on file   Intimate Partner Violence: Not on file   Housing Stability: Not on file      Medications and Allergies:     Current Outpatient Medications   Medication Sig Dispense Refill    albuterol (PROVENTIL HFA,VENTOLIN HFA) 90 mcg/act inhaler Inhale 2 puffs every 6 (six) hours as needed for wheezing      ALPRAZolam (XANAX) 1 mg tablet 1/2-1 po TID prn 90 tablet 1    amLODIPine (NORVASC) 5 mg tablet TAKE 1 TABLET BY MOUTH  DAILY 90 tablet 3    atorvastatin (LIPITOR) 40 mg tablet Take 1 tablet (40 mg total) by mouth daily 90 tablet 3    beclomethasone (Qvar RediHaler) 40 MCG/ACT inhaler Inhale 2 puffs 2 (two) times a day Rinse mouth after use   clotrimazole (LOTRIMIN) 1 % cream as needed       clotrimazole-betamethasone (LOTRISONE) 1-0 05 % cream APPLY TO SKIN FOLD TWO TIMES A DAY 45 g 1    DULoxetine (CYMBALTA) 30 mg delayed release capsule 1 daily w/ 60 mg tab 90 capsule 3    DULoxetine (CYMBALTA) 60 mg delayed release capsule Take 1 capsule (60 mg total) by mouth in the morning   90 capsule 0    Eliquis 2 5 MG TAKE 1 TABLET BY MOUTH  TWICE DAILY 180 tablet 3    ferrous sulfate 325 (65 Fe) mg tablet Take 325 mg by mouth daily      hydrocortisone-pramoxine (ANALPRAM-HC) 2 5-1 % rectal cream Apply topically 3 (three) times a day 3 Tube 1    levothyroxine 88 mcg tablet TAKE 1 TABLET BY MOUTH  DAILY EXCEPT ON SUNDAY TAKE 2 TABLETS BY MOUTH 104 tablet 3    methocarbamol (ROBAXIN) 500 mg tablet TAKE ONE TABLET BY MOUTH FOUR TIMES A DAY AS NEEDED FOR MUSCLE SPASMS 60 tablet 3    mometasone-formoterol (DULERA) 100-5 MCG/ACT inhaler Inhale      Multiple Vitamins-Minerals (OCUVITE ADULT 50+ PO) Take by mouth      nystatin powder APPLY SPARINGLY TO AFFECTED AREA(S) TWO TIMES A DAY 60 g 3    pantoprazole (PROTONIX) 40 mg tablet TAKE 1 TABLET BY MOUTH  TWICE DAILY 180 tablet 3    PARoxetine (PAXIL) 10 mg tablet TAKE ONE TABLET BY MOUTH EVERY DAY 30 tablet 5    quinapril (ACCUPRIL) 40 MG tablet Take 0 5 tablets (20 mg total) by mouth daily 90 tablet 3    traMADol (ULTRAM) 50 mg tablet TAKE ONE TABLET BY MOUTH EVERY 8 HOURS AS NEEDED FOR SEVERE PAIN 90 tablet 1    Triamcinolone Acetonide (Nasacort Allergy 24HR) 55 MCG/ACT AERO into each nostril as needed       cholestyramine (QUESTRAN) 4 g packet Take by mouth as needed (Patient not taking: Reported on 8/31/2022)      famotidine (PEPCID) 10 mg tablet Take 1 tablet (10 mg total) by mouth daily 90 tablet 3    hyoscyamine (LEVSIN/SL) 0 125 mg SL tablet Take 1 tablet (0 125 mg total) by mouth 3 (three) times a day 60 tablet 3     No current facility-administered medications for this visit       Allergies   Allergen Reactions    Tamoxifen Other (See Comments)     Headaches, stomach pain, sweats,     Nsaids Hives    Oxybutynin     Singulair [Montelukast] Swelling    Ciprofloxacin Hives    Penicillins Hives    Sulfa Antibiotics Hives    Vancomycin Hives      Immunizations:     Immunization History   Administered Date(s) Administered    COVID-19 MODERNA VACC 0 25 ML IM BOOSTER 11/17/2021    COVID-19 MODERNA VACC 0 5 ML IM 03/22/2021, 04/19/2021    H1N1, All Formulations 01/18/2010    INFLUENZA 11/11/2014, 10/19/2015, 11/11/2016, 09/25/2017, 11/07/2018, 11/05/2020, 12/07/2021    Influenza Quadrivalent, 6-35 Months IM 11/11/2014, 11/11/2016, 09/25/2017    Influenza, high dose seasonal 0 7 mL 11/11/2014, 11/11/2016, 09/25/2017    Influenza, injectable, quadrivalent, preservative free 0 5 mL 10/26/2015, 11/07/2018, 11/26/2019    Influenza, seasonal, injectable 10/19/2015    Pneumococcal Conjugate 13-Valent 07/23/2020    Pneumococcal Polysaccharide PPV23 1955, 11/11/2016  Tdap 1955    Zoster Vaccine Recombinant 11/05/2020      Health Maintenance:         Topic Date Due    Breast Cancer Screening: Mammogram  05/19/2023    DXA SCAN  02/01/2024    Colorectal Cancer Screening  02/11/2026    Hepatitis C Screening  Completed         Topic Date Due    Pneumococcal Vaccine: 65+ Years (3 - PPSV23 or PCV20) 11/11/2021    COVID-19 Vaccine (4 - Booster for Moderna series) 03/17/2022    Influenza Vaccine (1) 09/01/2022      Medicare Screening Tests and Risk Assessments:     Bhupendra Sellers is here for her Subsequent Wellness visit  Health Risk Assessment:   Patient rates overall health as fair  Patient feels that their physical health rating is slightly better  Patient is dissatisfied with their life  Eyesight was rated as same  Hearing was rated as same  Patient feels that their emotional and mental health rating is slightly worse  Patients states they are often angry  Patient states they are never, rarely unusually tired/fatigued  Pain experienced in the last 7 days has been some  Patient's pain rating has been 6/10  Patient states that she has experienced no weight loss or gain in last 6 months  Depression Screening:   PHQ-9 Score: 6      Fall Risk Screening: In the past year, patient has experienced: history of falling in past year    Number of falls: 2 or more  Injured during fall?: Yes    Feels unsteady when standing or walking?: Yes    Worried about falling?: Yes      Urinary Incontinence Screening:   Patient has not leaked urine accidently in the last six months  Home Safety:  Patient has trouble with stairs inside or outside of their home  Patient has working smoke alarms and has working carbon monoxide detector  Home safety hazards include: none  Nutrition:   Current diet is Regular  Medications:   Patient is currently taking over-the-counter supplements  OTC medications include: see medication list  Patient is able to manage medications       Activities of Daily Living (ADLs)/Instrumental Activities of Daily Living (IADLs):   Walk and transfer into and out of bed and chair?: Yes  Dress and groom yourself?: Yes    Bathe or shower yourself?: Yes    Feed yourself? Yes  Do your laundry/housekeeping?: Yes  Manage your money, pay your bills and track your expenses?: Yes  Make your own meals?: Yes    Do your own shopping?: Yes    Previous Hospitalizations:   Any hospitalizations or ED visits within the last 12 months?: No      Advance Care Planning:   Living will: Yes    Advanced directive: Yes      Cognitive Screening:   Provider or family/friend/caregiver concerned regarding cognition?: No    PREVENTIVE SCREENINGS      Cardiovascular Screening:    General: Screening Not Indicated and History Lipid Disorder      Diabetes Screening:     General: Screening Current      Colorectal Cancer Screening:     General: Screening Current      Breast Cancer Screening:     General: Screening Current      Cervical Cancer Screening:    General: Screening Not Indicated      Osteoporosis Screening:    General: Screening Current      Abdominal Aortic Aneurysm (AAA) Screening:        General: Screening Not Indicated      Lung Cancer Screening:     General: Screening Not Indicated      Hepatitis C Screening:    General: Screening Current    Screening, Brief Intervention, and Referral to Treatment (SBIRT)    Screening  Typical number of drinks in a day: 0  Typical number of drinks in a week: 0  Interpretation: Low risk drinking behavior      Single Item Drug Screening:  How often have you used an illegal drug (including marijuana) or a prescription medication for non-medical reasons in the past year? never    Single Item Drug Screen Score: 0  Interpretation: Negative screen for possible drug use disorder    No exam data present     Physical Exam:     /74 (BP Location: Left arm, Patient Position: Sitting, Cuff Size: Large)   Pulse 104   Temp 99 8 °F (37 7 °C) (Tympanic)   Resp 18   Ht 4' 10" (1 473 m)   Wt 101 kg (221 lb 12 8 oz)   SpO2 93%   BMI 46 36 kg/m²     Physical Exam  Vitals and nursing note reviewed  Constitutional:       General: She is not in acute distress  Appearance: She is well-developed  HENT:      Head: Normocephalic and atraumatic  Eyes:      Conjunctiva/sclera: Conjunctivae normal    Cardiovascular:      Rate and Rhythm: Normal rate and regular rhythm  Heart sounds: No murmur heard  Pulmonary:      Effort: Pulmonary effort is normal  No respiratory distress  Breath sounds: Normal breath sounds  Abdominal:      Palpations: Abdomen is soft  Tenderness: There is no abdominal tenderness  Musculoskeletal:      Cervical back: Neck supple  Skin:     General: Skin is warm and dry  Neurological:      Mental Status: She is alert            Js Graham MD

## 2022-09-04 DIAGNOSIS — F41.9 ANXIETY: ICD-10-CM

## 2022-09-04 DIAGNOSIS — F43.10 PTSD (POST-TRAUMATIC STRESS DISORDER): ICD-10-CM

## 2022-09-04 DIAGNOSIS — F32.1 CURRENT MODERATE EPISODE OF MAJOR DEPRESSIVE DISORDER WITHOUT PRIOR EPISODE (HCC): ICD-10-CM

## 2022-09-05 RX ORDER — PAROXETINE 10 MG/1
TABLET, FILM COATED ORAL
Qty: 30 TABLET | Refills: 5 | Status: SHIPPED | OUTPATIENT
Start: 2022-09-05

## 2022-09-20 ENCOUNTER — TELEPHONE (OUTPATIENT)
Dept: INTERNAL MEDICINE CLINIC | Facility: CLINIC | Age: 67
End: 2022-09-20

## 2022-09-20 ENCOUNTER — OFFICE VISIT (OUTPATIENT)
Dept: INTERNAL MEDICINE CLINIC | Facility: CLINIC | Age: 67
End: 2022-09-20
Payer: MEDICARE

## 2022-09-20 VITALS
HEART RATE: 98 BPM | BODY MASS INDEX: 46.05 KG/M2 | WEIGHT: 219.4 LBS | SYSTOLIC BLOOD PRESSURE: 132 MMHG | RESPIRATION RATE: 18 BRPM | HEIGHT: 58 IN | DIASTOLIC BLOOD PRESSURE: 80 MMHG | TEMPERATURE: 98.3 F

## 2022-09-20 DIAGNOSIS — J40 BRONCHITIS: ICD-10-CM

## 2022-09-20 DIAGNOSIS — J40 BRONCHITIS: Primary | ICD-10-CM

## 2022-09-20 DIAGNOSIS — I10 ESSENTIAL HYPERTENSION: ICD-10-CM

## 2022-09-20 PROCEDURE — 99214 OFFICE O/P EST MOD 30 MIN: CPT | Performed by: NURSE PRACTITIONER

## 2022-09-20 RX ORDER — PROMETHAZINE HYDROCHLORIDE AND CODEINE PHOSPHATE 6.25; 1 MG/5ML; MG/5ML
5 SYRUP ORAL EVERY 4 HOURS PRN
Qty: 120 ML | Refills: 3 | Status: SHIPPED | OUTPATIENT
Start: 2022-09-20 | End: 2022-09-20 | Stop reason: SDUPTHER

## 2022-09-20 RX ORDER — PROMETHAZINE HYDROCHLORIDE AND CODEINE PHOSPHATE 6.25; 1 MG/5ML; MG/5ML
5 SYRUP ORAL EVERY 4 HOURS PRN
Qty: 120 ML | Refills: 3 | Status: SHIPPED | OUTPATIENT
Start: 2022-09-20

## 2022-09-20 RX ORDER — DOXYCYCLINE HYCLATE 100 MG
100 TABLET ORAL 2 TIMES DAILY
Qty: 14 TABLET | Refills: 0 | Status: SHIPPED | OUTPATIENT
Start: 2022-09-20 | End: 2022-09-27

## 2022-09-20 RX ORDER — QUINAPRIL 40 MG/1
40 TABLET ORAL DAILY
Qty: 90 TABLET | Refills: 3 | Status: SHIPPED | OUTPATIENT
Start: 2022-09-20

## 2022-09-20 NOTE — TELEPHONE ENCOUNTER
Pt would like the cough med (promethazine-codeine) - sent to [facility] rather then [facility] please

## 2022-09-20 NOTE — PROGRESS NOTES
INTERNAL MEDICINE FOLLOW-UP VISIT  [facility]    NAME: Paris Wray  AGE: 79 y o  SEX: female  : 1955     DATE: 2022     Assessment and Plan:   1  Essential hypertension  - quinapril (ACCUPRIL) 40 MG tablet; Take 1 tablet (40 mg total) by mouth daily  Dispense: 90 tablet; Refill: 3    2  Bronchitis  Not feeling well for over 2 weeks symptoms suggestive of asthma flare and bronchitis  Would like to hold off on steriods- recommend neb tid, continue inhalers and nasal steriods and mucinex, start abx, probiotics and contact me in 48 hours  - doxycycline hyclate (VIBRA-TABS) 100 mg tablet; Take 1 tablet (100 mg total) by mouth 2 (two) times a day for 7 days  Dispense: 14 tablet; Refill: 0  - promethazine-codeine (PHENERGAN WITH CODEINE) 6 25-10 mg/5 mL syrup; Take 5 mL by mouth every 4 (four) hours as needed for cough  Dispense: 120 mL; Refill: 3    BMI Counseling: Body mass index is 45 85 kg/m²  The BMI is above normal  Nutrition recommendations include decreasing portion sizes and decreasing fast food intake  No pharmacotherapy was ordered  Rationale for BMI follow-up plan is due to patient being overweight or obese  Falls Plan of Care: balance, strength, and gait training instructions were provided  Home safety education provided  No follow-ups on file  Chief Complaint:     Chief Complaint   Patient presents with    Follow-up     Sick for 12 days  Two negative home tests   Sore Throat    Asthma    Cough      History of Present Illness:     Not feeling well for about 1 1/2 weeks  Started w/ sore throat , nasal congestion, post nasal drip  Then cough, was dry now very productive     + wheeze no sob no fevers,   Taking mucinex and her normal inhalers     The following portions of the patient's history were reviewed and updated as appropriate: allergies, current medications, past family history, past medical history, past social history, past surgical history and problem list      Review of Systems:     Review of Systems   Constitutional: Positive for fatigue  Negative for appetite change, chills, diaphoresis, fever and unexpected weight change  HENT: Negative for postnasal drip and sneezing  Eyes: Negative for visual disturbance  Respiratory: Positive for cough and wheezing  Negative for chest tightness and shortness of breath  Cardiovascular: Negative for chest pain, palpitations and leg swelling  Gastrointestinal: Negative for abdominal pain and blood in stool  Endocrine: Negative for cold intolerance, heat intolerance, polydipsia, polyphagia and polyuria  Genitourinary: Negative for difficulty urinating, dysuria, frequency and urgency  Musculoskeletal: Negative for arthralgias and myalgias  Skin: Negative for rash and wound  Neurological: Negative for dizziness, weakness, light-headedness and headaches  Hematological: Negative for adenopathy  Psychiatric/Behavioral: Negative for confusion, dysphoric mood and sleep disturbance  The patient is not nervous/anxious           Past Medical History:     Past Medical History:   Diagnosis Date    Allergic rhinitis     Anemia     Cancer [address] [address] )     breast cancer, Left side    Colon polyp     Depression     Disease of thyroid gland     GERD (gastroesophageal reflux disease)     Hyperlipemia     Hypertension     Hypothyroidism     Last assessed: 3/25/14    Obesity     Osteoarthritis     Pre-operative laboratory examination     Pulmonary embolism (HCC)         Current Medications:     Current Outpatient Medications:     albuterol (PROVENTIL HFA,VENTOLIN HFA) 90 mcg/act inhaler, Inhale 2 puffs every 6 (six) hours as needed for wheezing, Disp: , Rfl:     ALPRAZolam (XANAX) 1 mg tablet, 1/2-1 po TID prn, Disp: 90 tablet, Rfl: 1    amLODIPine (NORVASC) 5 mg tablet, TAKE 1 TABLET BY MOUTH  DAILY, Disp: 90 tablet, Rfl: 3    atorvastatin (LIPITOR) 40 mg tablet, Take 1 tablet (40 mg total) by mouth daily, Disp: 90 tablet, Rfl: 3    beclomethasone (Qvar RediHaler) 40 MCG/ACT inhaler, Inhale 2 puffs 2 (two) times a day Rinse mouth after use , Disp: , Rfl:     clotrimazole (LOTRIMIN) 1 % cream, as needed , Disp: , Rfl:     clotrimazole-betamethasone (LOTRISONE) 1-0 05 % cream, APPLY TO SKIN FOLD TWO TIMES A DAY, Disp: 45 g, Rfl: 1    doxycycline hyclate (VIBRA-TABS) 100 mg tablet, Take 1 tablet (100 mg total) by mouth 2 (two) times a day for 7 days, Disp: 14 tablet, Rfl: 0    DULoxetine (CYMBALTA) 30 mg delayed release capsule, 1 daily w/ 60 mg tab, Disp: 90 capsule, Rfl: 3    DULoxetine (CYMBALTA) 60 mg delayed release capsule, Take 1 capsule (60 mg total) by mouth in the morning , Disp: 90 capsule, Rfl: 0    Eliquis 2 5 MG, TAKE 1 TABLET BY MOUTH  TWICE DAILY, Disp: 180 tablet, Rfl: 3    famotidine (PEPCID) 10 mg tablet, Take 1 tablet (10 mg total) by mouth daily, Disp: 90 tablet, Rfl: 3    ferrous sulfate 325 (65 Fe) mg tablet, Take 325 mg by mouth daily, Disp: , Rfl:     hydrocortisone-pramoxine (ANALPRAM-HC) 2 5-1 % rectal cream, Apply topically 3 (three) times a day, Disp: 3 Tube, Rfl: 1    levothyroxine 88 mcg tablet, TAKE 1 TABLET BY MOUTH  DAILY EXCEPT ON SUNDAY TAKE 2 TABLETS BY MOUTH, Disp: 104 tablet, Rfl: 3    methocarbamol (ROBAXIN) 500 mg tablet, TAKE ONE TABLET BY MOUTH FOUR TIMES A DAY AS NEEDED FOR MUSCLE SPASMS, Disp: 60 tablet, Rfl: 3    mometasone-formoterol (DULERA) 100-5 MCG/ACT inhaler, Inhale, Disp: , Rfl:     Multiple Vitamins-Minerals (OCUVITE ADULT 50+ PO), Take by mouth, Disp: , Rfl:     nystatin powder, APPLY SPARINGLY TO AFFECTED AREA(S) TWO TIMES A DAY, Disp: 60 g, Rfl: 3    pantoprazole (PROTONIX) 40 mg tablet, TAKE 1 TABLET BY MOUTH  TWICE DAILY, Disp: 180 tablet, Rfl: 3    PARoxetine (PAXIL) 10 mg tablet, TAKE ONE TABLET BY MOUTH EVERY DAY, Disp: 30 tablet, Rfl: 5    promethazine-codeine (PHENERGAN WITH CODEINE) 6 25-10 mg/5 mL syrup, Take 5 mL by mouth every 4 (four) hours as needed for cough, Disp: 120 mL, Rfl: 3    quinapril (ACCUPRIL) 40 MG tablet, Take 1 tablet (40 mg total) by mouth daily, Disp: 90 tablet, Rfl: 3    traMADol (ULTRAM) 50 mg tablet, TAKE ONE TABLET BY MOUTH EVERY 8 HOURS AS NEEDED FOR SEVERE PAIN, Disp: 90 tablet, Rfl: 1    Triamcinolone Acetonide (Nasacort Allergy 24HR) 55 MCG/ACT AERO, into each nostril as needed , Disp: , Rfl:     cholestyramine (QUESTRAN) 4 g packet, Take by mouth as needed (Patient not taking: No sig reported), Disp: , Rfl:     hyoscyamine (LEVSIN/SL) 0 125 mg SL tablet, Take 1 tablet (0 125 mg total) by mouth 3 (three) times a day, Disp: 60 tablet, Rfl: 3     Allergies: Allergies   Allergen Reactions    Tamoxifen Other (See Comments)     Headaches, stomach pain, sweats,     Nsaids Hives    Oxybutynin     Singulair [Montelukast] Swelling    Ciprofloxacin Hives    Penicillins Hives    Sulfa Antibiotics Hives    Vancomycin Hives        Physical Exam:     /80 (BP Location: Left arm, Patient Position: Sitting, Cuff Size: Standard)   Pulse 98   Temp 98 3 °F (36 8 °C) (Oral)   Resp 18   Ht 4' 10" (1 473 m)   Wt 99 5 kg (219 lb 6 4 oz)   BMI 45 85 kg/m²     Physical Exam  Constitutional:       Appearance: She is well-developed  HENT:      Head: Normocephalic and atraumatic  Right Ear: Swelling present  Left Ear: Swelling present  Nose: Congestion present  Mouth/Throat:      Pharynx: Posterior oropharyngeal erythema present  Eyes:      Pupils: Pupils are equal, round, and reactive to light  Neck:      Thyroid: No thyromegaly  Cardiovascular:      Rate and Rhythm: Normal rate and regular rhythm  Heart sounds: No murmur heard  Pulmonary:      Effort: Pulmonary effort is normal       Breath sounds: Wheezing present  Abdominal:      General: Bowel sounds are normal       Palpations: Abdomen is soft     Musculoskeletal:         General: Normal range of motion  Cervical back: Normal range of motion and neck supple  Lymphadenopathy:      Cervical: No cervical adenopathy  Skin:     General: Skin is warm and dry  Neurological:      Mental Status: She is alert and oriented to person, place, and time             Data:         Lestine Scheuermann, CRNP  [facility]

## 2022-09-23 DIAGNOSIS — I10 ESSENTIAL HYPERTENSION: Primary | ICD-10-CM

## 2022-09-23 DIAGNOSIS — J40 BRONCHITIS: ICD-10-CM

## 2022-09-23 RX ORDER — METHYLPREDNISOLONE 4 MG/1
TABLET ORAL
Qty: 21 EACH | Refills: 0 | Status: SHIPPED | OUTPATIENT
Start: 2022-09-23 | End: 2023-04-21

## 2022-10-18 DIAGNOSIS — G89.29 OTHER CHRONIC PAIN: ICD-10-CM

## 2022-10-18 RX ORDER — TRAMADOL HYDROCHLORIDE 50 MG/1
TABLET ORAL
Qty: 90 TABLET | Refills: 1 | Status: SHIPPED | OUTPATIENT
Start: 2022-10-18

## 2022-10-31 ENCOUNTER — TELEPHONE (OUTPATIENT)
Dept: INTERNAL MEDICINE CLINIC | Facility: CLINIC | Age: 67
End: 2022-10-31

## 2022-10-31 DIAGNOSIS — F41.9 ANXIETY: ICD-10-CM

## 2022-10-31 RX ORDER — ALPRAZOLAM 1 MG/1
TABLET ORAL
Qty: 90 TABLET | Refills: 1 | Status: SHIPPED | OUTPATIENT
Start: 2022-10-31

## 2022-10-31 NOTE — TELEPHONE ENCOUNTER
Ext: 1    Lilian Villegas from [email or web address] wants to know if the pt has the capacity to make her own decisions

## 2022-11-01 DIAGNOSIS — F32.1 CURRENT MODERATE EPISODE OF MAJOR DEPRESSIVE DISORDER WITHOUT PRIOR EPISODE (HCC): ICD-10-CM

## 2022-11-01 DIAGNOSIS — G89.29 OTHER CHRONIC PAIN: ICD-10-CM

## 2022-11-01 DIAGNOSIS — F43.10 PTSD (POST-TRAUMATIC STRESS DISORDER): ICD-10-CM

## 2022-11-01 DIAGNOSIS — F41.9 ANXIETY: ICD-10-CM

## 2022-11-01 RX ORDER — DULOXETIN HYDROCHLORIDE 30 MG/1
CAPSULE, DELAYED RELEASE ORAL
Qty: 90 CAPSULE | Refills: 3 | Status: SHIPPED | OUTPATIENT
Start: 2022-11-01

## 2022-11-01 RX ORDER — PAROXETINE 10 MG/1
10 TABLET, FILM COATED ORAL DAILY
Qty: 30 TABLET | Refills: 0 | Status: SHIPPED | OUTPATIENT
Start: 2022-11-01 | End: 2022-11-07 | Stop reason: SDUPTHER

## 2022-11-01 NOTE — TELEPHONE ENCOUNTER
----- Message from Mari First sent at 10/31/2022  7:24 PM EDT -----  Regarding: Refill Urgent Duloxetine 30mg cap  I need Refill of Duloxetine 30mg capsules [facility] forgot to order need as asap [address]   [id number] again urgent 30mg [facility]

## 2022-11-07 DIAGNOSIS — F32.1 CURRENT MODERATE EPISODE OF MAJOR DEPRESSIVE DISORDER WITHOUT PRIOR EPISODE (HCC): ICD-10-CM

## 2022-11-07 DIAGNOSIS — F41.9 ANXIETY: ICD-10-CM

## 2022-11-07 DIAGNOSIS — F43.10 PTSD (POST-TRAUMATIC STRESS DISORDER): ICD-10-CM

## 2022-11-07 RX ORDER — PAROXETINE 10 MG/1
10 TABLET, FILM COATED ORAL DAILY
Qty: 90 TABLET | Refills: 3 | Status: SHIPPED | OUTPATIENT
Start: 2022-11-07

## 2022-11-30 DIAGNOSIS — G89.29 OTHER CHRONIC PAIN: ICD-10-CM

## 2022-11-30 RX ORDER — DULOXETIN HYDROCHLORIDE 60 MG/1
60 CAPSULE, DELAYED RELEASE ORAL DAILY
Qty: 90 CAPSULE | Refills: 0 | Status: SHIPPED | OUTPATIENT
Start: 2022-11-30

## 2022-12-14 DIAGNOSIS — I10 ESSENTIAL HYPERTENSION: Chronic | ICD-10-CM

## 2022-12-14 RX ORDER — AMLODIPINE BESYLATE 5 MG/1
TABLET ORAL
Qty: 90 TABLET | Refills: 3 | Status: SHIPPED | OUTPATIENT
Start: 2022-12-14

## 2022-12-26 DIAGNOSIS — M54.9 BACK PAIN, UNSPECIFIED BACK LOCATION, UNSPECIFIED BACK PAIN LATERALITY, UNSPECIFIED CHRONICITY: ICD-10-CM

## 2022-12-26 DIAGNOSIS — F41.9 ANXIETY: ICD-10-CM

## 2022-12-27 RX ORDER — ALPRAZOLAM 1 MG/1
TABLET ORAL
Qty: 90 TABLET | Refills: 1 | Status: SHIPPED | OUTPATIENT
Start: 2022-12-27

## 2022-12-27 RX ORDER — METHOCARBAMOL 500 MG/1
TABLET, FILM COATED ORAL
Qty: 60 TABLET | Refills: 3 | Status: SHIPPED | OUTPATIENT
Start: 2022-12-27

## 2023-01-02 DIAGNOSIS — B37.9 YEAST INFECTION: ICD-10-CM

## 2023-01-03 RX ORDER — NYSTATIN 100000 [USP'U]/G
POWDER TOPICAL
Qty: 60 G | Refills: 3 | Status: SHIPPED | OUTPATIENT
Start: 2023-01-03

## 2023-01-03 NOTE — TELEPHONE ENCOUNTER
[facility]  Please forward to your office staff for further review as this medication was reviewed by a [facility] RN

## 2023-01-16 ENCOUNTER — APPOINTMENT (OUTPATIENT)
Dept: LAB | Facility: HOSPITAL | Age: 68
End: 2023-01-16

## 2023-01-16 DIAGNOSIS — E78.2 MIXED HYPERLIPIDEMIA: ICD-10-CM

## 2023-01-16 DIAGNOSIS — R73.01 IMPAIRED FASTING GLUCOSE: ICD-10-CM

## 2023-01-16 DIAGNOSIS — E03.9 ACQUIRED HYPOTHYROIDISM: ICD-10-CM

## 2023-01-16 LAB
ALBUMIN SERPL BCP-MCNC: 3.7 G/DL (ref 3.5–5)
ALP SERPL-CCNC: 109 U/L (ref 46–116)
ALT SERPL W P-5'-P-CCNC: 25 U/L (ref 12–78)
ANION GAP SERPL CALCULATED.3IONS-SCNC: 11 MMOL/L (ref 4–13)
AST SERPL W P-5'-P-CCNC: 21 U/L (ref 5–45)
BASOPHILS # BLD AUTO: 0.04 THOUSANDS/ÂΜL (ref 0–0.1)
BASOPHILS NFR BLD AUTO: 1 % (ref 0–1)
BILIRUB SERPL-MCNC: 0.59 MG/DL (ref 0.2–1)
BUN SERPL-MCNC: 10 MG/DL (ref 5–25)
CALCIUM SERPL-MCNC: 9 MG/DL (ref 8.3–10.1)
CHLORIDE SERPL-SCNC: 99 MMOL/L (ref 96–108)
CHOLEST SERPL-MCNC: 170 MG/DL
CO2 SERPL-SCNC: 29 MMOL/L (ref 21–32)
CREAT SERPL-MCNC: 0.81 MG/DL (ref 0.6–1.3)
EOSINOPHIL # BLD AUTO: 0.31 THOUSAND/ÂΜL (ref 0–0.61)
EOSINOPHIL NFR BLD AUTO: 4 % (ref 0–6)
ERYTHROCYTE [DISTWIDTH] IN BLOOD BY AUTOMATED COUNT: 13.6 % (ref 11.6–15.1)
GFR SERPL CREATININE-BSD FRML MDRD: 75 ML/MIN/1.73SQ M
GLUCOSE P FAST SERPL-MCNC: 107 MG/DL (ref 65–99)
HCT VFR BLD AUTO: 43 % (ref 34.8–46.1)
HDLC SERPL-MCNC: 56 MG/DL
HGB BLD-MCNC: 13.6 G/DL (ref 11.5–15.4)
IMM GRANULOCYTES # BLD AUTO: 0.03 THOUSAND/UL (ref 0–0.2)
IMM GRANULOCYTES NFR BLD AUTO: 0 % (ref 0–2)
LDLC SERPL CALC-MCNC: 92 MG/DL (ref 0–100)
LYMPHOCYTES # BLD AUTO: 2 THOUSANDS/ÂΜL (ref 0.6–4.47)
LYMPHOCYTES NFR BLD AUTO: 25 % (ref 14–44)
MCH RBC QN AUTO: 28.8 PG (ref 26.8–34.3)
MCHC RBC AUTO-ENTMCNC: 31.6 G/DL (ref 31.4–37.4)
MCV RBC AUTO: 91 FL (ref 82–98)
MONOCYTES # BLD AUTO: 0.64 THOUSAND/ÂΜL (ref 0.17–1.22)
MONOCYTES NFR BLD AUTO: 8 % (ref 4–12)
NEUTROPHILS # BLD AUTO: 5.13 THOUSANDS/ÂΜL (ref 1.85–7.62)
NEUTS SEG NFR BLD AUTO: 62 % (ref 43–75)
NONHDLC SERPL-MCNC: 114 MG/DL
NRBC BLD AUTO-RTO: 0 /100 WBCS
PLATELET # BLD AUTO: 292 THOUSANDS/UL (ref 149–390)
PMV BLD AUTO: 8.6 FL (ref 8.9–12.7)
POTASSIUM SERPL-SCNC: 3.6 MMOL/L (ref 3.5–5.3)
PROT SERPL-MCNC: 8 G/DL (ref 6.4–8.4)
RBC # BLD AUTO: 4.73 MILLION/UL (ref 3.81–5.12)
SODIUM SERPL-SCNC: 139 MMOL/L (ref 135–147)
TRIGL SERPL-MCNC: 110 MG/DL
TSH SERPL DL<=0.05 MIU/L-ACNC: 1.93 UIU/ML (ref 0.45–4.5)
WBC # BLD AUTO: 8.15 THOUSAND/UL (ref 4.31–10.16)

## 2023-01-17 LAB
EST. AVERAGE GLUCOSE BLD GHB EST-MCNC: 117 MG/DL
HBA1C MFR BLD: 5.7 %

## 2023-01-26 DIAGNOSIS — G89.29 OTHER CHRONIC PAIN: ICD-10-CM

## 2023-01-26 RX ORDER — DULOXETIN HYDROCHLORIDE 30 MG/1
CAPSULE, DELAYED RELEASE ORAL
Qty: 90 CAPSULE | Refills: 0 | Status: SHIPPED | OUTPATIENT
Start: 2023-01-26

## 2023-01-26 NOTE — TELEPHONE ENCOUNTER
----- Message from Sachin Sewell sent at 1/26/2023  1:46 AM EST -----  Regarding: Medicine   Contact: [phone]  Dr Gil Lawson   I will  be leaving for trip I will need  (CYMBALTA  DULOXETINE ) 30MG ONLY SEND TO [facility] they said they will refill right there 30mg 90day supply please call in  I appreciate it    [facility] filll   [facility]not[facility]     Thank you   Sachin Sewell

## 2023-01-27 ENCOUNTER — RA CDI HCC (OUTPATIENT)
Dept: OTHER | Facility: HOSPITAL | Age: 68
End: 2023-01-27

## 2023-01-27 DIAGNOSIS — E03.9 ACQUIRED HYPOTHYROIDISM: ICD-10-CM

## 2023-01-27 RX ORDER — LEVOTHYROXINE SODIUM 88 UG/1
TABLET ORAL
Qty: 104 TABLET | Refills: 3 | Status: SHIPPED | OUTPATIENT
Start: 2023-01-27

## 2023-01-27 NOTE — PROGRESS NOTES
[facility]  coding opportunities       Chart reviewed, no opportunity found:   [address]        Patients Insurance     Medicare Insurance: [employer]

## 2023-02-26 DIAGNOSIS — G89.29 OTHER CHRONIC PAIN: ICD-10-CM

## 2023-02-26 DIAGNOSIS — F41.9 ANXIETY: ICD-10-CM

## 2023-02-27 RX ORDER — TRAMADOL HYDROCHLORIDE 50 MG/1
TABLET ORAL
Qty: 90 TABLET | Refills: 1 | Status: SHIPPED | OUTPATIENT
Start: 2023-02-27

## 2023-02-27 RX ORDER — ALPRAZOLAM 1 MG/1
TABLET ORAL
Qty: 90 TABLET | Refills: 1 | Status: SHIPPED | OUTPATIENT
Start: 2023-02-27

## 2023-03-07 DIAGNOSIS — G89.29 OTHER CHRONIC PAIN: ICD-10-CM

## 2023-03-07 RX ORDER — DULOXETIN HYDROCHLORIDE 60 MG/1
60 CAPSULE, DELAYED RELEASE ORAL DAILY
Qty: 90 CAPSULE | Refills: 1 | Status: SHIPPED | OUTPATIENT
Start: 2023-03-07

## 2023-03-19 DIAGNOSIS — L30.9 DERMATITIS: ICD-10-CM

## 2023-03-20 RX ORDER — CLOTRIMAZOLE AND BETAMETHASONE DIPROPIONATE 10; .64 MG/G; MG/G
CREAM TOPICAL
Qty: 45 G | Refills: 1 | Status: SHIPPED | OUTPATIENT
Start: 2023-03-20

## 2023-03-23 DIAGNOSIS — K21.9 GASTROESOPHAGEAL REFLUX DISEASE WITHOUT ESOPHAGITIS: ICD-10-CM

## 2023-03-24 RX ORDER — PANTOPRAZOLE SODIUM 40 MG/1
TABLET, DELAYED RELEASE ORAL
Qty: 180 TABLET | Refills: 3 | Status: SHIPPED | OUTPATIENT
Start: 2023-03-24

## 2023-04-21 ENCOUNTER — APPOINTMENT (OUTPATIENT)
Age: 68
End: 2023-04-21

## 2023-04-21 DIAGNOSIS — E83.52 HYPERCALCEMIA: ICD-10-CM

## 2023-04-21 DIAGNOSIS — R25.1 SHAKING: ICD-10-CM

## 2023-04-21 DIAGNOSIS — R20.0 NUMBNESS: ICD-10-CM

## 2023-04-21 LAB
25(OH)D3 SERPL-MCNC: 38.8 NG/ML (ref 30–100)
ANION GAP SERPL CALCULATED.3IONS-SCNC: 1 MMOL/L (ref 4–13)
BUN SERPL-MCNC: 13 MG/DL (ref 5–25)
CALCIUM SERPL-MCNC: 10.1 MG/DL (ref 8.3–10.1)
CHLORIDE SERPL-SCNC: 103 MMOL/L (ref 96–108)
CO2 SERPL-SCNC: 31 MMOL/L (ref 21–32)
CREAT SERPL-MCNC: 0.74 MG/DL (ref 0.6–1.3)
GFR SERPL CREATININE-BSD FRML MDRD: 83 ML/MIN/1.73SQ M
GLUCOSE P FAST SERPL-MCNC: 122 MG/DL (ref 65–99)
MAGNESIUM SERPL-MCNC: 2.2 MG/DL (ref 1.6–2.6)
POTASSIUM SERPL-SCNC: 4 MMOL/L (ref 3.5–5.3)
SODIUM SERPL-SCNC: 135 MMOL/L (ref 135–147)
TSH SERPL DL<=0.05 MIU/L-ACNC: 2.98 UIU/ML (ref 0.45–4.5)
VIT B12 SERPL-MCNC: 427 PG/ML (ref 100–900)

## 2023-04-25 LAB — METHYLMALONATE SERPL-SCNC: 141 NMOL/L (ref 0–378)

## 2023-04-26 ENCOUNTER — TELEPHONE (OUTPATIENT)
Age: 68
End: 2023-04-26

## 2023-04-26 NOTE — TELEPHONE ENCOUNTER
Im going to refer her to her allergist for these symptoms    Labs were all stable and show no clinical significance for her symptoms

## 2023-04-26 NOTE — TELEPHONE ENCOUNTER
Has stopped her benadryl over a week ago, this all started after Dr Tatiana Almonte changed her nightime pills  Last night after she took her pills starting getting itchy and got hives  She still has the shakes, numbness and tingling in legs and feet  Also hands and arms  Hands cramping  Bad gas pains also  Also urinating a lot  Has lost 27 lbs

## 2023-04-26 NOTE — TELEPHONE ENCOUNTER
----- Message from [id number] sent at 4/26/2023 10:46 AM EDT -----  Labs are urine were all fine   Continue to stop the benadryl as I feel you are having an opposite reaction to benadryl

## 2023-05-01 ENCOUNTER — TELEPHONE (OUTPATIENT)
Age: 68
End: 2023-05-01

## 2023-05-01 NOTE — TELEPHONE ENCOUNTER
hydrOXYzine HCL (ATARAX) 25 mg tablet    Patient states she never received this med   It looks like you wanted her to have it   Please approve to [employer]

## 2023-05-03 DIAGNOSIS — G89.29 OTHER CHRONIC PAIN: ICD-10-CM

## 2023-05-03 RX ORDER — DULOXETIN HYDROCHLORIDE 30 MG/1
CAPSULE, DELAYED RELEASE ORAL
Qty: 90 CAPSULE | Refills: 0 | Status: CANCELLED | OUTPATIENT
Start: 2023-05-03

## 2023-05-03 RX ORDER — DULOXETIN HYDROCHLORIDE 30 MG/1
CAPSULE, DELAYED RELEASE ORAL
Qty: 90 CAPSULE | Refills: 0 | Status: SHIPPED | OUTPATIENT
Start: 2023-05-03

## 2023-05-04 DIAGNOSIS — M54.9 BACK PAIN, UNSPECIFIED BACK LOCATION, UNSPECIFIED BACK PAIN LATERALITY, UNSPECIFIED CHRONICITY: ICD-10-CM

## 2023-05-04 RX ORDER — METHOCARBAMOL 500 MG/1
TABLET, FILM COATED ORAL
Qty: 60 TABLET | Refills: 3 | Status: SHIPPED | OUTPATIENT
Start: 2023-05-04

## 2023-05-23 ENCOUNTER — TELEPHONE (OUTPATIENT)
Age: 68
End: 2023-05-23

## 2023-05-23 NOTE — TELEPHONE ENCOUNTER
----- Message from Robert Pinedo MD sent at 5/23/2023 12:24 PM EDT -----  Regarding: FW: Wasn't feeling well  Thanks for no one getting back to me regarding symptoms I have still been having  I guess you have no time for your patients anymore since your move changes in [place] but thanks a lot for caring   Contact: [phone]  Did you read?      ----- Message -----  From: Chrissy Conte  Sent: 5/23/2023   7:58 AM EDT  To: Robert Pinedo MD  Subject: FW: Wasn't feeling well  Thanks for no one g#      ----- Message -----  From: Nicole Nj  Sent: 5/23/2023   2:59 AM EDT  To: [facility]  Subject: Wasn't feeling well  Thanks for no one getti#    Don't bother contacting me I will get better on my own  I wrote last week  wow what a change  [facility] not so good      Sincerely,    Ned Canales

## 2023-05-24 DIAGNOSIS — F41.9 ANXIETY: ICD-10-CM

## 2023-05-25 RX ORDER — ALPRAZOLAM 1 MG/1
TABLET ORAL
Qty: 90 TABLET | Refills: 1 | Status: SHIPPED | OUTPATIENT
Start: 2023-05-25

## 2023-06-08 DIAGNOSIS — G89.29 OTHER CHRONIC PAIN: ICD-10-CM

## 2023-06-11 RX ORDER — TRAMADOL HYDROCHLORIDE 50 MG/1
TABLET ORAL
Qty: 90 TABLET | Refills: 1 | Status: SHIPPED | OUTPATIENT
Start: 2023-06-11

## 2023-06-22 DIAGNOSIS — I26.99 PULMONARY EMBOLISM, OTHER, UNSPECIFIED CHRONICITY, UNSPECIFIED WHETHER ACUTE COR PULMONALE PRESENT (HCC): ICD-10-CM

## 2023-06-22 NOTE — TELEPHONE ENCOUNTER
----- Message from Jose Rush sent at 6/22/2023  1:44 PM EDT -----  Regarding: Eric Barahona: [phone]  I'm trying to refill Eliquis via Mychart and it won't let me  On the Phone I log in it says it's Locked, On the computer it says it can't be done here  If you are taking me off this you have to let me know how to ween myself off this if not please send a 100 day supply to [facility]    Thank You  Jose Rush

## 2023-06-28 ENCOUNTER — TELEPHONE (OUTPATIENT)
Age: 68
End: 2023-06-28

## 2023-06-28 NOTE — TELEPHONE ENCOUNTER
Patient states if she goes to the ER or Urgent Care its a $250 co-pay  But if they just get an x-ray done it will only be $95 co-pay  Patient is wondering does she really have to go to ER or UC?

## 2023-06-28 NOTE — TELEPHONE ENCOUNTER
Message for Dr Renetta Case:     Put in an order to get her rt hand and wrist x-rayed- it happened yesterday- her dog jumped up; the dog is powerful; slammed hand     Achy; has a brace on- in pain    Moving away Sat- won't see Dr Renetta Case any more    Would like to go to [facility]     Call her when the order is in

## 2023-07-28 DIAGNOSIS — B37.9 YEAST INFECTION: ICD-10-CM

## 2023-07-28 DIAGNOSIS — F41.9 ANXIETY: ICD-10-CM

## 2023-07-28 RX ORDER — ALPRAZOLAM 1 MG/1
TABLET ORAL
Qty: 90 TABLET | Refills: 0 | Status: SHIPPED | OUTPATIENT
Start: 2023-07-28

## 2023-07-28 RX ORDER — NYSTATIN 100000 [USP'U]/G
1 POWDER TOPICAL 2 TIMES DAILY
Qty: 60 G | Refills: 0 | Status: SHIPPED | OUTPATIENT
Start: 2023-07-28

## 2023-08-09 DIAGNOSIS — G89.29 OTHER CHRONIC PAIN: ICD-10-CM

## 2023-08-10 RX ORDER — DULOXETIN HYDROCHLORIDE 30 MG/1
CAPSULE, DELAYED RELEASE ORAL
Qty: 90 CAPSULE | Refills: 0 | Status: SHIPPED | OUTPATIENT
Start: 2023-08-10

## 2023-08-22 DIAGNOSIS — G89.29 OTHER CHRONIC PAIN: ICD-10-CM

## 2023-08-22 RX ORDER — TRAMADOL HYDROCHLORIDE 50 MG/1
TABLET ORAL
Qty: 90 TABLET | Refills: 1 | Status: SHIPPED | OUTPATIENT
Start: 2023-08-22

## 2023-08-28 DIAGNOSIS — F41.9 ANXIETY: ICD-10-CM

## 2023-08-29 RX ORDER — ALPRAZOLAM 1 MG/1
TABLET ORAL
Qty: 90 TABLET | Refills: 0 | Status: SHIPPED | OUTPATIENT
Start: 2023-08-29

## 2023-09-05 DIAGNOSIS — Z00.00 WELL ADULT EXAM: Primary | ICD-10-CM

## 2023-09-06 DIAGNOSIS — G89.29 OTHER CHRONIC PAIN: ICD-10-CM

## 2023-09-06 RX ORDER — DULOXETIN HYDROCHLORIDE 60 MG/1
60 CAPSULE, DELAYED RELEASE ORAL DAILY
Qty: 90 CAPSULE | Refills: 1 | Status: SHIPPED | OUTPATIENT
Start: 2023-09-06

## 2023-09-08 ENCOUNTER — APPOINTMENT (OUTPATIENT)
Dept: LAB | Facility: HOSPITAL | Age: 68
End: 2023-09-08
Attending: INTERNAL MEDICINE
Payer: COMMERCIAL

## 2023-09-08 DIAGNOSIS — Z00.00 WELL ADULT EXAM: ICD-10-CM

## 2023-09-08 LAB
ALBUMIN SERPL BCP-MCNC: 4.4 G/DL (ref 3.5–5)
ALP SERPL-CCNC: 71 U/L (ref 34–104)
ALT SERPL W P-5'-P-CCNC: 15 U/L (ref 7–52)
ANION GAP SERPL CALCULATED.3IONS-SCNC: 9 MMOL/L
AST SERPL W P-5'-P-CCNC: 16 U/L (ref 13–39)
BASOPHILS # BLD AUTO: 0.05 THOUSANDS/ÂΜL (ref 0–0.1)
BASOPHILS NFR BLD AUTO: 1 % (ref 0–1)
BILIRUB SERPL-MCNC: 0.56 MG/DL (ref 0.2–1)
BUN SERPL-MCNC: 10 MG/DL (ref 5–25)
CALCIUM SERPL-MCNC: 10.2 MG/DL (ref 8.4–10.2)
CHLORIDE SERPL-SCNC: 99 MMOL/L (ref 96–108)
CHOLEST SERPL-MCNC: 300 MG/DL
CO2 SERPL-SCNC: 31 MMOL/L (ref 21–32)
CREAT SERPL-MCNC: 0.84 MG/DL (ref 0.6–1.3)
EOSINOPHIL # BLD AUTO: 0.41 THOUSAND/ÂΜL (ref 0–0.61)
EOSINOPHIL NFR BLD AUTO: 5 % (ref 0–6)
ERYTHROCYTE [DISTWIDTH] IN BLOOD BY AUTOMATED COUNT: 13.4 % (ref 11.6–15.1)
EST. AVERAGE GLUCOSE BLD GHB EST-MCNC: 123 MG/DL
GFR SERPL CREATININE-BSD FRML MDRD: 71 ML/MIN/1.73SQ M
GLUCOSE P FAST SERPL-MCNC: 104 MG/DL (ref 65–99)
HBA1C MFR BLD: 5.9 %
HCT VFR BLD AUTO: 44 % (ref 34.8–46.1)
HDLC SERPL-MCNC: 52 MG/DL
HGB BLD-MCNC: 14 G/DL (ref 11.5–15.4)
IMM GRANULOCYTES # BLD AUTO: 0.04 THOUSAND/UL (ref 0–0.2)
IMM GRANULOCYTES NFR BLD AUTO: 0 % (ref 0–2)
LDLC SERPL CALC-MCNC: 212 MG/DL (ref 0–100)
LYMPHOCYTES # BLD AUTO: 2.29 THOUSANDS/ÂΜL (ref 0.6–4.47)
LYMPHOCYTES NFR BLD AUTO: 25 % (ref 14–44)
MCH RBC QN AUTO: 28.8 PG (ref 26.8–34.3)
MCHC RBC AUTO-ENTMCNC: 31.8 G/DL (ref 31.4–37.4)
MCV RBC AUTO: 91 FL (ref 82–98)
MONOCYTES # BLD AUTO: 0.66 THOUSAND/ÂΜL (ref 0.17–1.22)
MONOCYTES NFR BLD AUTO: 7 % (ref 4–12)
NEUTROPHILS # BLD AUTO: 5.71 THOUSANDS/ÂΜL (ref 1.85–7.62)
NEUTS SEG NFR BLD AUTO: 62 % (ref 43–75)
NONHDLC SERPL-MCNC: 248 MG/DL
NRBC BLD AUTO-RTO: 0 /100 WBCS
PLATELET # BLD AUTO: 293 THOUSANDS/UL (ref 149–390)
PMV BLD AUTO: 9.2 FL (ref 8.9–12.7)
POTASSIUM SERPL-SCNC: 4.6 MMOL/L (ref 3.5–5.3)
PROT SERPL-MCNC: 8.3 G/DL (ref 6.4–8.4)
RBC # BLD AUTO: 4.86 MILLION/UL (ref 3.81–5.12)
SODIUM SERPL-SCNC: 139 MMOL/L (ref 135–147)
TRIGL SERPL-MCNC: 182 MG/DL
TSH SERPL DL<=0.05 MIU/L-ACNC: 0.53 UIU/ML (ref 0.45–4.5)
WBC # BLD AUTO: 9.16 THOUSAND/UL (ref 4.31–10.16)

## 2023-09-08 PROCEDURE — 83036 HEMOGLOBIN GLYCOSYLATED A1C: CPT

## 2023-09-08 PROCEDURE — 80053 COMPREHEN METABOLIC PANEL: CPT

## 2023-09-08 PROCEDURE — 80061 LIPID PANEL: CPT

## 2023-09-08 PROCEDURE — 84443 ASSAY THYROID STIM HORMONE: CPT

## 2023-09-08 PROCEDURE — 85025 COMPLETE CBC W/AUTO DIFF WBC: CPT

## 2023-09-08 PROCEDURE — 36415 COLL VENOUS BLD VENIPUNCTURE: CPT

## 2023-09-10 DIAGNOSIS — I10 PRIMARY HYPERTENSION: Chronic | ICD-10-CM

## 2023-09-10 RX ORDER — LISINOPRIL 40 MG/1
40 TABLET ORAL DAILY
Qty: 100 TABLET | Refills: 2 | Status: SHIPPED | OUTPATIENT
Start: 2023-09-10

## 2023-09-11 ENCOUNTER — OFFICE VISIT (OUTPATIENT)
Age: 68
End: 2023-09-11
Payer: COMMERCIAL

## 2023-09-11 VITALS
HEIGHT: 58 IN | SYSTOLIC BLOOD PRESSURE: 120 MMHG | HEART RATE: 125 BPM | WEIGHT: 194.8 LBS | RESPIRATION RATE: 18 BRPM | OXYGEN SATURATION: 98 % | DIASTOLIC BLOOD PRESSURE: 72 MMHG | TEMPERATURE: 98.2 F | BODY MASS INDEX: 40.89 KG/M2

## 2023-09-11 DIAGNOSIS — F43.10 PTSD (POST-TRAUMATIC STRESS DISORDER): ICD-10-CM

## 2023-09-11 DIAGNOSIS — E03.9 ACQUIRED HYPOTHYROIDISM: ICD-10-CM

## 2023-09-11 DIAGNOSIS — I10 PRIMARY HYPERTENSION: Primary | Chronic | ICD-10-CM

## 2023-09-11 DIAGNOSIS — F41.9 ANXIETY: ICD-10-CM

## 2023-09-11 DIAGNOSIS — R73.01 IMPAIRED FASTING GLUCOSE: ICD-10-CM

## 2023-09-11 DIAGNOSIS — E78.2 MIXED HYPERLIPIDEMIA: ICD-10-CM

## 2023-09-11 DIAGNOSIS — D05.12 DUCTAL CARCINOMA IN SITU (DCIS) OF LEFT BREAST: ICD-10-CM

## 2023-09-11 DIAGNOSIS — R00.0 TACHYCARDIA: ICD-10-CM

## 2023-09-11 DIAGNOSIS — G61.9 INFLAMMATORY NEUROPATHY (HCC): ICD-10-CM

## 2023-09-11 PROCEDURE — G0439 PPPS, SUBSEQ VISIT: HCPCS | Performed by: INTERNAL MEDICINE

## 2023-09-11 PROCEDURE — 99215 OFFICE O/P EST HI 40 MIN: CPT | Performed by: INTERNAL MEDICINE

## 2023-09-11 RX ORDER — ROSUVASTATIN CALCIUM 10 MG/1
10 TABLET, COATED ORAL DAILY
Qty: 30 TABLET | Refills: 5 | Status: SHIPPED | OUTPATIENT
Start: 2023-09-11

## 2023-09-11 NOTE — PATIENT INSTRUCTIONS
Hypertension - Stable; continue amlodipine and lisinopril; continue monitoring blood pressure at home  Sinus tachycardia - ECG obtained in office and compared to previous; 48 hr Holter monitor and Echocardiogram ordered  Mixed hyperlipidemia - Lipid panel revealed , ; start rosuvastatin 10 mg daily  Hypothyroidism - TSH within normal limits; continue levothyroxine  Depression/anxiety/PTSD - Patient declined referral to Psychiatry; continue Xanax, Paxil, and Cymbalta  Peripheral neuropathy - Continue Paxil and Cymbalta  History of PE - Continue Eliquis  Ductal carcinoma in situ - Mammogram without malignancy in August; Follow-up with Dr. Karl Gomes  GERD - Continue pantoprazole    Follow-up after Holter monitor and Echocardiogram results; consider switching amlodipine to non-dihydropyridine CCB  Routine follow-up in 6 months with labs      Medicare Preventive Visit Patient Instructions  Thank you for completing your Welcome to Medicare Visit or Medicare Annual Wellness Visit today. Your next wellness visit will be due in one year (9/11/2024). The screening/preventive services that you may require over the next 5-10 years are detailed below. Some tests may not apply to you based off risk factors and/or age. Screening tests ordered at today's visit but not completed yet may show as past due. Also, please note that scanned in results may not display below. Preventive Screenings:  Service Recommendations Previous Testing/Comments   Colorectal Cancer Screening  * Colonoscopy    * Fecal Occult Blood Test (FOBT)/Fecal Immunochemical Test (FIT)  * Fecal DNA/Cologuard Test  * Flexible Sigmoidoscopy Age: 43-73 years old   Colonoscopy: every 10 years (may be performed more frequently if at higher risk)  OR  FOBT/FIT: every 1 year  OR  Cologuard: every 3 years  OR  Sigmoidoscopy: every 5 years  Screening may be recommended earlier than age 39 if at higher risk for colorectal cancer.  Also, an individualized decision between you and your healthcare provider will decide whether screening between the ages of 77-80 would be appropriate. Colonoscopy: 02/11/2021  FOBT/FIT: 01/21/2021  Cologuard: Not on file  Sigmoidoscopy: Not on file    Screening Current     Breast Cancer Screening Age: 36 years old  Frequency: every 1-2 years  Not required if history of left and right mastectomy Mammogram: 05/19/2022    Screening Current   Cervical Cancer Screening Between the ages of 21-29, pap smear recommended once every 3 years. Between the ages of 32-69, can perform pap smear with HPV co-testing every 5 years. Recommendations may differ for women with a history of total hysterectomy, cervical cancer, or abnormal pap smears in past. Pap Smear: 10/02/2017    Screening Not Indicated   Hepatitis C Screening Once for adults born between 1945 and 1965  More frequently in patients at high risk for Hepatitis C Hep C Antibody: 07/15/2021    Screening Current   Diabetes Screening 1-2 times per year if you're at risk for diabetes or have pre-diabetes Fasting glucose: 104 mg/dL (9/8/2023)  A1C: 5.9 % (9/8/2023)  Screening Current   Cholesterol Screening Once every 5 years if you don't have a lipid disorder. May order more often based on risk factors. Lipid panel: 09/08/2023    Screening Not Indicated  History Lipid Disorder     Other Preventive Screenings Covered by Medicare:  Abdominal Aortic Aneurysm (AAA) Screening: covered once if your at risk. You're considered to be at risk if you have a family history of AAA. Lung Cancer Screening: covers low dose CT scan once per year if you meet all of the following conditions: (1) Age 48-67; (2) No signs or symptoms of lung cancer; (3) Current smoker or have quit smoking within the last 15 years; (4) You have a tobacco smoking history of at least 20 pack years (packs per day multiplied by number of years you smoked); (5) You get a written order from a healthcare provider.   Glaucoma Screening: covered annually if you're considered high risk: (1) You have diabetes OR (2) Family history of glaucoma OR (3)  aged 48 and older OR (3)  American aged 72 and older  Osteoporosis Screening: covered every 2 years if you meet one of the following conditions: (1) You're estrogen deficient and at risk for osteoporosis based off medical history and other findings; (2) Have a vertebral abnormality; (3) On glucocorticoid therapy for more than 3 months; (4) Have primary hyperparathyroidism; (5) On osteoporosis medications and need to assess response to drug therapy. Last bone density test (DXA Scan): 02/01/2022. HIV Screening: covered annually if you're between the age of 14-79. Also covered annually if you are younger than 13 and older than 72 with risk factors for HIV infection. For pregnant patients, it is covered up to 3 times per pregnancy. Immunizations:  Immunization Recommendations   Influenza Vaccine Annual influenza vaccination during flu season is recommended for all persons aged >= 6 months who do not have contraindications   Pneumococcal Vaccine   * Pneumococcal conjugate vaccine = PCV13 (Prevnar 13), PCV15 (Vaxneuvance), PCV20 (Prevnar 20)  * Pneumococcal polysaccharide vaccine = PPSV23 (Pneumovax) Adults 20-63 years old: 1-3 doses may be recommended based on certain risk factors  Adults 72 years old: 1-2 doses may be recommended based off what pneumonia vaccine you previously received   Hepatitis B Vaccine 3 dose series if at intermediate or high risk (ex: diabetes, end stage renal disease, liver disease)   Tetanus (Td) Vaccine - COST NOT COVERED BY MEDICARE PART B Following completion of primary series, a booster dose should be given every 10 years to maintain immunity against tetanus. Td may also be given as tetanus wound prophylaxis. Tdap Vaccine - COST NOT COVERED BY MEDICARE PART B Recommended at least once for all adults. For pregnant patients, recommended with each pregnancy. Shingles Vaccine (Shingrix) - COST NOT COVERED BY MEDICARE PART B  2 shot series recommended in those aged 48 and above     Health Maintenance Due:      Topic Date Due    Breast Cancer Screening: Mammogram  05/19/2023    DXA SCAN  02/01/2024    Colorectal Cancer Screening  02/11/2026    Hepatitis C Screening  Completed     Immunizations Due:      Topic Date Due    Pneumococcal Vaccine: 65+ Years (3 - PPSV23 if available, else PCV20) 11/11/2021    COVID-19 Vaccine (6 - Moderna series) 02/05/2023    Influenza Vaccine (1) 09/01/2023     Advance Directives   What are advance directives? Advance directives are legal documents that state your wishes and plans for medical care. These plans are made ahead of time in case you lose your ability to make decisions for yourself. Advance directives can apply to any medical decision, such as the treatments you want, and if you want to donate organs. What are the types of advance directives? There are many types of advance directives, and each state has rules about how to use them. You may choose a combination of any of the following:  Living will: This is a written record of the treatment you want. You can also choose which treatments you do not want, which to limit, and which to stop at a certain time. This includes surgery, medicine, IV fluid, and tube feedings. Durable power of  for healthcare [facility] [place]): This is a written record that states who you want to make healthcare choices for you when you are unable to make them for yourself. This person, called a proxy, is usually a family member or a friend. You may choose more than 1 proxy. Do not resuscitate (DNR) order:  A DNR order is used in case your heart stops beating or you stop breathing. It is a request not to have certain forms of treatment, such as CPR. A DNR order may be included in other types of advance directives. Medical directive:   This covers the care that you want if you are in a coma, near death, or unable to make decisions for yourself. You can list the treatments you want for each condition. Treatment may include pain medicine, surgery, blood transfusions, dialysis, IV or tube feedings, and a ventilator (breathing machine). Values history: This document has questions about your views, beliefs, and how you feel and think about life. This information can help others choose the care that you would choose. Why are advance directives important? An advance directive helps you control your care. Although spoken wishes may be used, it is better to have your wishes written down. Spoken wishes can be misunderstood, or not followed. Treatments may be given even if you do not want them. An advance directive may make it easier for your family to make difficult choices about your care. Weight Management   Why it is important to manage your weight:  Being overweight increases your risk of health conditions such as heart disease, high blood pressure, type 2 diabetes, and certain types of cancer. It can also increase your risk for osteoarthritis, sleep apnea, and other respiratory problems. Aim for a slow, steady weight loss. Even a small amount of weight loss can lower your risk of health problems. How to lose weight safely:  A safe and healthy way to lose weight is to eat fewer calories and get regular exercise. You can lose up about 1 pound a week by decreasing the number of calories you eat by 500 calories each day. Healthy meal plan for weight management:  A healthy meal plan includes a variety of foods, contains fewer calories, and helps you stay healthy. A healthy meal plan includes the following:  Eat whole-grain foods more often. A healthy meal plan should contain fiber. Fiber is the part of grains, fruits, and vegetables that is not broken down by your body. Whole-grain foods are healthy and provide extra fiber in your diet.  Some examples of whole-grain foods are whole-wheat breads and pastas, oatmeal, brown rice, and bulgur. Eat a variety of vegetables every day. Include dark, leafy greens such as spinach, kale, shima greens, and mustard greens. Eat yellow and orange vegetables such as carrots, sweet potatoes, and winter squash. Eat a variety of fruits every day. Choose fresh or canned fruit (canned in its own juice or light syrup) instead of juice. Fruit juice has very little or no fiber. Eat low-fat dairy foods. Drink fat-free (skim) milk or 1% milk. Eat fat-free yogurt and low-fat cottage cheese. Try low-fat cheeses such as mozzarella and other reduced-fat cheeses. Choose meat and other protein foods that are low in fat. Choose beans or other legumes such as split peas or lentils. Choose fish, skinless poultry (chicken or turkey), or lean cuts of red meat (beef or pork). Before you cook meat or poultry, cut off any visible fat. Use less fat and oil. Try baking foods instead of frying them. Add less fat, such as margarine, sour cream, regular salad dressing and mayonnaise to foods. Eat fewer high-fat foods. Some examples of high-fat foods include french fries, doughnuts, ice cream, and cakes. Eat fewer sweets. Limit foods and drinks that are high in sugar. This includes candy, cookies, regular soda, and sweetened drinks. Exercise:  Exercise at least 30 minutes per day on most days of the week. Some examples of exercise include walking, biking, dancing, and swimming. You can also fit in more physical activity by taking the stairs instead of the elevator or parking farther away from stores. Ask your healthcare provider about the best exercise plan for you.    Narcotic (Opioid) Safety    Use narcotics safely:  Take prescribed narcotics exactly as directed  Do not give narcotics to others or take narcotics that belong to someone else  Do not mix narcotics without medicines or alcohol  Do not drive or operate heavy machinery after you take the narcotic  Monitor for side effects and notify your healthcare provider if you experienced side effects such as nausea, sleepiness, itching, or trouble thinking clearly. Manage constipation:    Constipation is the most common side effect of narcotic medicine. Constipation is when you have hard, dry bowel movements, or you go longer than usual between bowel movements. Tell your healthcare provider about all changes in your bowel movements while you are taking narcotics. He or she may recommend laxative medicine to help you have a bowel movement. He or she may also change the kind of narcotic you are taking, or change when you take it. The following are more ways you can prevent or relieve constipation:    Drink liquids as directed. You may need to drink extra liquids to help soften and move your bowels. Ask how much liquid to drink each day and which liquids are best for you. Eat high-fiber foods. This may help decrease constipation by adding bulk to your bowel movements. High-fiber foods include fruits, vegetables, whole-grain breads and cereals, and beans. Your healthcare provider or dietitian can help you create a high-fiber meal plan. Your provider may also recommend a fiber supplement if you cannot get enough fiber from food. Exercise regularly. Regular physical activity can help stimulate your intestines. Walking is a good exercise to prevent or relieve constipation. Ask which exercises are best for you. Schedule a time each day to have a bowel movement. This may help train your body to have regular bowel movements. Bend forward while you are on the toilet to help move the bowel movement out. Sit on the toilet for at least 10 minutes, even if you do not have a bowel movement. Store narcotics safely:   Store narcotics where others cannot easily get them. Keep them in a locked cabinet or secure area. Do not  keep them in a purse or other bag you carry with you. A person may be looking for something else and find the narcotics.   Make sure narcotics are stored out of the reach of children. A child can easily overdose on narcotics. Narcotics may look like candy to a small child. The best way to dispose of narcotics: The laws vary by country and area. In the [facility], the best way is to return the narcotics through a take-back program. This program is offered by the [employer] ([employer]). The following are options for using the program:  Take the narcotics to a [facility] collection site. The site is often a law enforcement center. Call your local law enforcement center for scheduled take-back days in your area. You will be given information on where to go if the collection site is in a different location. Take the narcotics to an approved pharmacy or hospital.  A pharmacy or hospital may be set up as a collection site. You will need to ask if it is a [facility] collection site if you were not directed there. A pharmacy or doctor's office may not be able to take back narcotics unless it is a [facility] site. Use a mail-back system. This means you are given containers to put the narcotics into. You will then mail them in the containers. Use a take-back drop box. This is a place to leave the narcotics at any time. People and animals will not be able to get into the box. Your local law enforcement agency can tell you where to find a drop box in your area. Other ways to manage pain:   Ask your healthcare provider about non-narcotic medicines to control pain. Nonprescription medicines include NSAIDs (such as ibuprofen) and acetaminophen. Prescription medicines include muscle relaxers, antidepressants, and steroids. Pain may be managed without any medicines. Some ways to relieve pain include massage, aromatherapy, or meditation. Physical or occupational therapy may also help.     For more information:   Drug Enforcement Administration  [address] [facility] , [address]  Phone: 4- 036 - 075-0456  Web Address: HighDefinitionTheatre.it. [facility] Cent[facility].gov/drug_disposal/    [address] [place] [address]  Phone: 9- 592 - 614-5683  Web Address: http://orderTopia/     © Copyright [facility] 2018 Information is for End User's use only and may not be sold, redistributed or otherwise used for commercial purposes.  All illustrations and images included in CareNotes® are the copyrighted property of [employer] or [address] [place] [address]
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
## 2023-09-12 DIAGNOSIS — I10 ESSENTIAL HYPERTENSION: Chronic | ICD-10-CM

## 2023-09-13 DIAGNOSIS — M54.9 BACK PAIN, UNSPECIFIED BACK LOCATION, UNSPECIFIED BACK PAIN LATERALITY, UNSPECIFIED CHRONICITY: ICD-10-CM

## 2023-09-13 RX ORDER — METHOCARBAMOL 500 MG/1
TABLET, FILM COATED ORAL
Qty: 60 TABLET | Refills: 3 | Status: SHIPPED | OUTPATIENT
Start: 2023-09-13

## 2023-09-13 RX ORDER — AMLODIPINE BESYLATE 5 MG/1
TABLET ORAL
Qty: 100 TABLET | Refills: 2 | Status: SHIPPED | OUTPATIENT
Start: 2023-09-13

## 2023-09-19 ENCOUNTER — HOSPITAL ENCOUNTER (OUTPATIENT)
Dept: NON INVASIVE DIAGNOSTICS | Facility: CLINIC | Age: 68
Discharge: HOME | End: 2023-09-19
Payer: COMMERCIAL

## 2023-09-19 VITALS
SYSTOLIC BLOOD PRESSURE: 120 MMHG | BODY MASS INDEX: 40.72 KG/M2 | HEIGHT: 58 IN | HEART RATE: 102 BPM | WEIGHT: 194 LBS | DIASTOLIC BLOOD PRESSURE: 72 MMHG

## 2023-09-19 DIAGNOSIS — R00.0 TACHYCARDIA: ICD-10-CM

## 2023-09-19 LAB
AORTIC ROOT: 3.4 CM
APICAL FOUR CHAMBER EJECTION FRACTION: 60 %
ASCENDING AORTA: 2.7 CM
E WAVE DECELERATION TIME: 219 MS
FRACTIONAL SHORTENING: 35 % (ref 28–44)
INTERVENTRICULAR SEPTUM IN DIASTOLE (PARASTERNAL SHORT AXIS VIEW): 1 CM
INTERVENTRICULAR SEPTUM: 1 CM (ref 0.6–1.1)
LAAS-AP2: 14.9 CM2
LAAS-AP4: 14.2 CM2
LEFT ATRIUM SIZE: 3.3 CM
LEFT ATRIUM VOLUME (MOD BIPLANE): 35 ML
LEFT INTERNAL DIMENSION IN SYSTOLE: 2.8 CM (ref 2.1–4)
LEFT VENTRICLE DIASTOLIC VOLUME (MOD BIPLANE): 69 ML
LEFT VENTRICLE SYSTOLIC VOLUME (MOD BIPLANE): 29 ML
LEFT VENTRICULAR INTERNAL DIMENSION IN DIASTOLE: 4.3 CM (ref 3.5–6)
LEFT VENTRICULAR POSTERIOR WALL IN END DIASTOLE: 1 CM
LEFT VENTRICULAR STROKE VOLUME: 55 ML
LV EF: 58 %
LVSV (TEICH): 55 ML
MV E'TISSUE VEL-SEP: 14 CM/S
MV PEAK A VEL: 1.07 M/S
MV PEAK E VEL: 79 CM/S
MV STENOSIS PRESSURE HALF TIME: 64 MS
MV VALVE AREA P 1/2 METHOD: 3.44 CM2
RIGHT ATRIUM AREA SYSTOLE A4C: 15.2 CM2
RIGHT VENTRICLE ID DIMENSION: 3.4 CM
SL CV LEFT ATRIUM LENGTH A2C: 5 CM
SL CV LV EF: 55
SL CV PED ECHO LEFT VENTRICLE DIASTOLIC VOLUME (MOD BIPLANE) 2D: 84 ML
SL CV PED ECHO LEFT VENTRICLE SYSTOLIC VOLUME (MOD BIPLANE) 2D: 29 ML
TRICUSPID ANNULAR PLANE SYSTOLIC EXCURSION: 2.3 CM

## 2023-09-19 PROCEDURE — 93225 XTRNL ECG REC<48 HRS REC: CPT

## 2023-09-19 PROCEDURE — 93306 TTE W/DOPPLER COMPLETE: CPT | Performed by: INTERNAL MEDICINE

## 2023-09-19 PROCEDURE — 93306 TTE W/DOPPLER COMPLETE: CPT

## 2023-09-19 PROCEDURE — 93226 XTRNL ECG REC<48 HR SCAN A/R: CPT

## 2023-09-22 PROCEDURE — 93227 XTRNL ECG REC<48 HR R&I: CPT | Performed by: INTERNAL MEDICINE

## 2023-09-25 DIAGNOSIS — F41.9 ANXIETY: ICD-10-CM

## 2023-09-26 RX ORDER — ALPRAZOLAM 1 MG/1
TABLET ORAL
Qty: 90 TABLET | Refills: 0 | Status: SHIPPED | OUTPATIENT
Start: 2023-09-26

## 2023-09-27 DIAGNOSIS — B37.9 YEAST INFECTION: ICD-10-CM

## 2023-09-27 DIAGNOSIS — L30.9 DERMATITIS: ICD-10-CM

## 2023-09-27 RX ORDER — NYSTATIN 100000 [USP'U]/G
1 POWDER TOPICAL 2 TIMES DAILY
Qty: 60 G | Refills: 0 | Status: SHIPPED | OUTPATIENT
Start: 2023-09-27

## 2023-09-27 RX ORDER — CLOTRIMAZOLE AND BETAMETHASONE DIPROPIONATE 10; .64 MG/G; MG/G
CREAM TOPICAL
Qty: 45 G | Refills: 1 | Status: SHIPPED | OUTPATIENT
Start: 2023-09-27

## 2023-10-19 DIAGNOSIS — F43.10 PTSD (POST-TRAUMATIC STRESS DISORDER): ICD-10-CM

## 2023-10-19 DIAGNOSIS — F32.1 CURRENT MODERATE EPISODE OF MAJOR DEPRESSIVE DISORDER WITHOUT PRIOR EPISODE (HCC): ICD-10-CM

## 2023-10-19 DIAGNOSIS — G89.29 OTHER CHRONIC PAIN: ICD-10-CM

## 2023-10-19 DIAGNOSIS — F41.9 ANXIETY: ICD-10-CM

## 2023-10-20 RX ORDER — PAROXETINE 10 MG/1
10 TABLET, FILM COATED ORAL DAILY
Qty: 90 TABLET | Refills: 3 | Status: SHIPPED | OUTPATIENT
Start: 2023-10-20

## 2023-10-20 RX ORDER — DULOXETIN HYDROCHLORIDE 30 MG/1
CAPSULE, DELAYED RELEASE ORAL
Qty: 90 CAPSULE | Refills: 0 | Status: SHIPPED | OUTPATIENT
Start: 2023-10-20

## 2023-10-20 RX ORDER — PAROXETINE 10 MG/1
10 TABLET, FILM COATED ORAL DAILY
Qty: 90 TABLET | Refills: 0 | Status: SHIPPED | OUTPATIENT
Start: 2023-10-20

## 2023-10-22 DIAGNOSIS — I10 PRIMARY HYPERTENSION: Chronic | ICD-10-CM

## 2023-10-23 DIAGNOSIS — F41.9 ANXIETY: ICD-10-CM

## 2023-10-23 DIAGNOSIS — G89.29 OTHER CHRONIC PAIN: ICD-10-CM

## 2023-10-23 RX ORDER — TRAMADOL HYDROCHLORIDE 50 MG/1
50 TABLET ORAL EVERY 8 HOURS PRN
Qty: 90 TABLET | Refills: 0 | Status: SHIPPED | OUTPATIENT
Start: 2023-10-23

## 2023-10-23 RX ORDER — ALPRAZOLAM 1 MG/1
TABLET ORAL
Qty: 90 TABLET | Refills: 0 | Status: SHIPPED | OUTPATIENT
Start: 2023-10-23

## 2023-10-23 RX ORDER — LISINOPRIL 40 MG/1
40 TABLET ORAL DAILY
Qty: 100 TABLET | Refills: 0 | Status: SHIPPED | OUTPATIENT
Start: 2023-10-23

## 2023-11-03 DIAGNOSIS — E03.9 ACQUIRED HYPOTHYROIDISM: ICD-10-CM

## 2023-11-04 RX ORDER — LEVOTHYROXINE SODIUM 88 UG/1
TABLET ORAL
Qty: 116 TABLET | Refills: 2 | Status: SHIPPED | OUTPATIENT
Start: 2023-11-04

## 2023-11-14 ENCOUNTER — TELEPHONE (OUTPATIENT)
Age: 68
End: 2023-11-14

## 2023-11-28 DIAGNOSIS — F41.9 ANXIETY: ICD-10-CM

## 2023-11-28 RX ORDER — ALPRAZOLAM 1 MG/1
TABLET ORAL
Qty: 90 TABLET | Refills: 0 | Status: SHIPPED | OUTPATIENT
Start: 2023-11-28

## 2023-11-30 DIAGNOSIS — G89.29 OTHER CHRONIC PAIN: ICD-10-CM

## 2023-11-30 RX ORDER — TRAMADOL HYDROCHLORIDE 50 MG/1
TABLET ORAL
Qty: 90 TABLET | Refills: 0 | Status: SHIPPED | OUTPATIENT
Start: 2023-11-30

## 2023-12-19 DIAGNOSIS — B37.9 YEAST INFECTION: ICD-10-CM

## 2023-12-19 RX ORDER — NYSTATIN 100000 [USP'U]/G
POWDER TOPICAL
Qty: 60 G | Refills: 0 | Status: SHIPPED | OUTPATIENT
Start: 2023-12-19

## 2023-12-28 DIAGNOSIS — G89.29 OTHER CHRONIC PAIN: ICD-10-CM

## 2023-12-28 DIAGNOSIS — F41.9 ANXIETY: ICD-10-CM

## 2023-12-28 RX ORDER — TRAMADOL HYDROCHLORIDE 50 MG/1
50 TABLET ORAL EVERY 8 HOURS PRN
Qty: 90 TABLET | Refills: 0 | Status: SHIPPED | OUTPATIENT
Start: 2023-12-28

## 2023-12-28 RX ORDER — ALPRAZOLAM 1 MG/1
TABLET ORAL
Qty: 90 TABLET | Refills: 0 | Status: SHIPPED | OUTPATIENT
Start: 2023-12-28

## 2023-12-29 DIAGNOSIS — I26.99 PULMONARY EMBOLISM, OTHER, UNSPECIFIED CHRONICITY, UNSPECIFIED WHETHER ACUTE COR PULMONALE PRESENT (HCC): ICD-10-CM

## 2023-12-29 DIAGNOSIS — I10 ESSENTIAL HYPERTENSION: Chronic | ICD-10-CM

## 2024-01-11 ENCOUNTER — VBI (OUTPATIENT)
Dept: ADMINISTRATIVE | Facility: OTHER | Age: 69
End: 2024-01-11

## 2024-01-19 DIAGNOSIS — M54.9 BACK PAIN, UNSPECIFIED BACK LOCATION, UNSPECIFIED BACK PAIN LATERALITY, UNSPECIFIED CHRONICITY: ICD-10-CM

## 2024-01-19 RX ORDER — METHOCARBAMOL 500 MG/1
TABLET, FILM COATED ORAL
Qty: 60 TABLET | Refills: 3 | Status: SHIPPED | OUTPATIENT
Start: 2024-01-19

## 2024-01-25 ENCOUNTER — OFFICE VISIT (OUTPATIENT)
Age: 69
End: 2024-01-25
Payer: COMMERCIAL

## 2024-01-25 ENCOUNTER — TELEPHONE (OUTPATIENT)
Dept: PSYCHIATRY | Facility: CLINIC | Age: 69
End: 2024-01-25

## 2024-01-25 VITALS
HEART RATE: 125 BPM | BODY MASS INDEX: 41.1 KG/M2 | RESPIRATION RATE: 17 BRPM | OXYGEN SATURATION: 99 % | DIASTOLIC BLOOD PRESSURE: 60 MMHG | WEIGHT: 195.8 LBS | HEIGHT: 58 IN | TEMPERATURE: 99.3 F | SYSTOLIC BLOOD PRESSURE: 115 MMHG

## 2024-01-25 DIAGNOSIS — E66.01 CLASS 3 SEVERE OBESITY WITHOUT SERIOUS COMORBIDITY WITH BODY MASS INDEX (BMI) OF 45.0 TO 49.9 IN ADULT, UNSPECIFIED OBESITY TYPE (HCC): ICD-10-CM

## 2024-01-25 DIAGNOSIS — M54.2 CERVICALGIA: ICD-10-CM

## 2024-01-25 DIAGNOSIS — R73.01 IMPAIRED FASTING GLUCOSE: ICD-10-CM
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
## 2024-01-25 DIAGNOSIS — R05.2 SUBACUTE COUGH: ICD-10-CM

## 2024-01-25 DIAGNOSIS — J06.9 VIRAL UPPER RESPIRATORY TRACT INFECTION: Primary | ICD-10-CM

## 2024-01-25 DIAGNOSIS — I10 PRIMARY HYPERTENSION: Chronic | ICD-10-CM

## 2024-01-25 DIAGNOSIS — F33.1 MODERATE EPISODE OF RECURRENT MAJOR DEPRESSIVE DISORDER (HCC): ICD-10-CM

## 2024-01-25 DIAGNOSIS — R26.9 NEUROLOGIC GAIT DYSFUNCTION: ICD-10-CM

## 2024-01-25 LAB
SARS-COV-2 AG UPPER RESP QL IA: NEGATIVE
VALID CONTROL: NORMAL

## 2024-01-25 PROCEDURE — 87811 SARS-COV-2 COVID19 W/OPTIC: CPT | Performed by: INTERNAL MEDICINE

## 2024-01-25 PROCEDURE — 99214 OFFICE O/P EST MOD 30 MIN: CPT | Performed by: INTERNAL MEDICINE

## 2024-01-25 RX ORDER — ATORVASTATIN CALCIUM 40 MG/1
1 TABLET, FILM COATED ORAL DAILY
COMMUNITY

## 2024-01-25 NOTE — TELEPHONE ENCOUNTER
Contacted patient in regards to ASAP Referral  in attempts to verify patient's needs of services and add patient to proper wait list. LVM for patient to contact intake dept  in regards to referral.

## 2024-01-25 NOTE — PATIENT INSTRUCTIONS
Viral URI-prolonged cough over 3-week period of time exposed to COVID but COVID-negative.  Will check chest x-ray given persistent cough and shortness of breath.  Continue with albuterol as needed.  Mucinex DM maximal strength over-the-counter may be beneficial.    Ambulatory dysfunction with fall and resultant cervicalgia and back pain-referral for physical therapy.    Impaired fasting glucose, hypertension, hyperlipidemia-check labs and schedule routine follow-up next month.    MDD-continue cymbalta and paxil for now, referral to psychiatry.
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
## 2024-01-29 DIAGNOSIS — G89.29 OTHER CHRONIC PAIN: ICD-10-CM

## 2024-01-29 DIAGNOSIS — F41.9 ANXIETY: ICD-10-CM

## 2024-01-29 RX ORDER — TRAMADOL HYDROCHLORIDE 50 MG/1
50 TABLET ORAL EVERY 8 HOURS PRN
Qty: 90 TABLET | Refills: 0 | Status: SHIPPED | OUTPATIENT
Start: 2024-01-29

## 2024-01-29 RX ORDER — ALPRAZOLAM 1 MG/1
TABLET ORAL
Qty: 90 TABLET | Refills: 0 | Status: SHIPPED | OUTPATIENT
Start: 2024-01-29

## 2024-01-29 NOTE — TELEPHONE ENCOUNTER
Contacted patient in regards to ASAP Referral  in attempts to verify patient's needs of services and add patient to proper wait list. Spoke with pt spouse whom stated pt was asleep; going to call back later.

## 2024-01-30 ENCOUNTER — APPOINTMENT (OUTPATIENT)
Dept: LAB | Facility: HOSPITAL | Age: 69
End: 2024-01-30
Payer: COMMERCIAL

## 2024-01-30 ENCOUNTER — HOSPITAL ENCOUNTER (OUTPATIENT)
Dept: RADIOLOGY | Facility: HOSPITAL | Age: 69
Discharge: HOME | End: 2024-01-30
Payer: COMMERCIAL

## 2024-01-30 DIAGNOSIS — R73.01 IMPAIRED FASTING GLUCOSE: ICD-10-CM

## 2024-01-30 DIAGNOSIS — R05.2 SUBACUTE COUGH: ICD-10-CM

## 2024-01-30 DIAGNOSIS — I10 PRIMARY HYPERTENSION: ICD-10-CM

## 2024-01-30 LAB
ALBUMIN SERPL BCP-MCNC: 4 G/DL (ref 3.5–5)
ALP SERPL-CCNC: 68 U/L (ref 34–104)
ALT SERPL W P-5'-P-CCNC: 14 U/L (ref 7–52)
ANION GAP SERPL CALCULATED.3IONS-SCNC: 5 MMOL/L
AST SERPL W P-5'-P-CCNC: 18 U/L (ref 13–39)
BASOPHILS # BLD AUTO: 0.03 THOUSANDS/ÂΜL (ref 0–0.1)
BASOPHILS NFR BLD AUTO: 0 % (ref 0–1)
BILIRUB SERPL-MCNC: 0.49 MG/DL (ref 0.2–1)
BUN SERPL-MCNC: 10 MG/DL (ref 5–25)
CALCIUM SERPL-MCNC: 9.6 MG/DL (ref 8.4–10.2)
CHLORIDE SERPL-SCNC: 99 MMOL/L (ref 96–108)
CHOLEST SERPL-MCNC: 170 MG/DL
CO2 SERPL-SCNC: 33 MMOL/L (ref 21–32)
CREAT SERPL-MCNC: 0.75 MG/DL (ref 0.6–1.3)
EOSINOPHIL # BLD AUTO: 0.38 THOUSAND/ÂΜL (ref 0–0.61)
EOSINOPHIL NFR BLD AUTO: 6 % (ref 0–6)
ERYTHROCYTE [DISTWIDTH] IN BLOOD BY AUTOMATED COUNT: 12.6 % (ref 11.6–15.1)
GFR SERPL CREATININE-BSD FRML MDRD: 82 ML/MIN/1.73SQ M
GLUCOSE P FAST SERPL-MCNC: 101 MG/DL (ref 65–99)
HCT VFR BLD AUTO: 41.6 % (ref 34.8–46.1)
HDLC SERPL-MCNC: 48 MG/DL
HGB BLD-MCNC: 13.4 G/DL (ref 11.5–15.4)
IMM GRANULOCYTES # BLD AUTO: 0.02 THOUSAND/UL (ref 0–0.2)
IMM GRANULOCYTES NFR BLD AUTO: 0 % (ref 0–2)
LDLC SERPL CALC-MCNC: 80 MG/DL (ref 0–100)
LYMPHOCYTES # BLD AUTO: 2.53 THOUSANDS/ÂΜL (ref 0.6–4.47)
LYMPHOCYTES NFR BLD AUTO: 38 % (ref 14–44)
MCH RBC QN AUTO: 28.8 PG (ref 26.8–34.3)
MCHC RBC AUTO-ENTMCNC: 32.2 G/DL (ref 31.4–37.4)
MCV RBC AUTO: 90 FL (ref 82–98)
MONOCYTES # BLD AUTO: 0.6 THOUSAND/ÂΜL (ref 0.17–1.22)
MONOCYTES NFR BLD AUTO: 9 % (ref 4–12)
NEUTROPHILS # BLD AUTO: 3.14 THOUSANDS/ÂΜL (ref 1.85–7.62)
NEUTS SEG NFR BLD AUTO: 47 % (ref 43–75)
NONHDLC SERPL-MCNC: 122 MG/DL
NRBC BLD AUTO-RTO: 0 /100 WBCS
PLATELET # BLD AUTO: 224 THOUSANDS/UL (ref 149–390)
PMV BLD AUTO: 8.5 FL (ref 8.9–12.7)
POTASSIUM SERPL-SCNC: 4 MMOL/L (ref 3.5–5.3)
PROT SERPL-MCNC: 7.2 G/DL (ref 6.4–8.4)
RBC # BLD AUTO: 4.65 MILLION/UL (ref 3.81–5.12)
SODIUM SERPL-SCNC: 137 MMOL/L (ref 135–147)
TRIGL SERPL-MCNC: 210 MG/DL
TSH SERPL DL<=0.05 MIU/L-ACNC: 0.25 UIU/ML (ref 0.45–4.5)
WBC # BLD AUTO: 6.7 THOUSAND/UL (ref 4.31–10.16)

## 2024-01-30 PROCEDURE — 84439 ASSAY OF FREE THYROXINE: CPT

## 2024-01-30 PROCEDURE — 71046 X-RAY EXAM CHEST 2 VIEWS: CPT

## 2024-01-30 PROCEDURE — 36415 COLL VENOUS BLD VENIPUNCTURE: CPT

## 2024-01-30 PROCEDURE — 83036 HEMOGLOBIN GLYCOSYLATED A1C: CPT

## 2024-01-30 PROCEDURE — 85025 COMPLETE CBC W/AUTO DIFF WBC: CPT

## 2024-01-30 PROCEDURE — 82570 ASSAY OF URINE CREATININE: CPT

## 2024-01-30 PROCEDURE — 80053 COMPREHEN METABOLIC PANEL: CPT

## 2024-01-30 PROCEDURE — 84443 ASSAY THYROID STIM HORMONE: CPT

## 2024-01-30 PROCEDURE — 80061 LIPID PANEL: CPT

## 2024-01-30 PROCEDURE — 82043 UR ALBUMIN QUANTITATIVE: CPT

## 2024-01-31 DIAGNOSIS — E03.9 ACQUIRED HYPOTHYROIDISM: Primary | ICD-10-CM

## 2024-01-31 LAB
CREAT UR-MCNC: 92.8 MG/DL
EST. AVERAGE GLUCOSE BLD GHB EST-MCNC: 117 MG/DL
HBA1C MFR BLD: 5.7 %
MICROALBUMIN UR-MCNC: <7 MG/L
MICROALBUMIN/CREAT 24H UR: <8 MG/G CREATININE (ref 0–30)
T4 FREE SERPL-MCNC: 1 NG/DL (ref 0.61–1.12)

## 2024-02-12 DIAGNOSIS — I26.99 PULMONARY EMBOLISM, OTHER, UNSPECIFIED CHRONICITY, UNSPECIFIED WHETHER ACUTE COR PULMONALE PRESENT (HCC): ICD-10-CM

## 2024-02-19 DIAGNOSIS — I10 PRIMARY HYPERTENSION: Chronic | ICD-10-CM

## 2024-02-19 DIAGNOSIS — B37.9 YEAST INFECTION: ICD-10-CM

## 2024-02-20 RX ORDER — LISINOPRIL 40 MG/1
40 TABLET ORAL DAILY
Qty: 100 TABLET | Refills: 0 | Status: SHIPPED | OUTPATIENT
Start: 2024-02-20

## 2024-02-20 RX ORDER — NYSTATIN 100000 [USP'U]/G
1 POWDER TOPICAL 2 TIMES DAILY
Qty: 60 G | Refills: 0 | Status: SHIPPED | OUTPATIENT
Start: 2024-02-20

## 2024-03-01 ENCOUNTER — TELEPHONE (OUTPATIENT)
Age: 69
End: 2024-03-01

## 2024-03-01 DIAGNOSIS — G89.29 OTHER CHRONIC PAIN: ICD-10-CM

## 2024-03-01 DIAGNOSIS — F41.9 ANXIETY: ICD-10-CM

## 2024-03-01 RX ORDER — ALPRAZOLAM 1 MG/1
TABLET ORAL
Qty: 90 TABLET | Refills: 0 | Status: SHIPPED | OUTPATIENT
Start: 2024-03-01

## 2024-03-01 RX ORDER — TRAMADOL HYDROCHLORIDE 50 MG/1
50 TABLET ORAL EVERY 8 HOURS PRN
Qty: 90 TABLET | Refills: 0 | Status: SHIPPED | OUTPATIENT
Start: 2024-03-01

## 2024-03-12 DIAGNOSIS — E78.2 MIXED HYPERLIPIDEMIA: ICD-10-CM

## 2024-03-12 RX ORDER — ROSUVASTATIN CALCIUM 10 MG/1
10 TABLET, COATED ORAL DAILY
Qty: 90 TABLET | Refills: 3 | Status: SHIPPED | OUTPATIENT
Start: 2024-03-12 | End: 2024-03-18

## 2024-03-12 NOTE — TELEPHONE ENCOUNTER
----- Message from Benjamin Puga sent at 3/12/2024  1:15 PM EDT -----  Regarding: Crestor/Rosuvastatin  Contact: [phone]  Please send a 100 day supply to [facility]  Thank you   Couldn't refill through [facility].

## 2024-03-14 DIAGNOSIS — I26.99 PULMONARY EMBOLISM, OTHER, UNSPECIFIED CHRONICITY, UNSPECIFIED WHETHER ACUTE COR PULMONALE PRESENT (HCC): ICD-10-CM

## 2024-03-14 RX ORDER — APIXABAN 2.5 MG/1
2.5 TABLET, FILM COATED ORAL 2 TIMES DAILY
Qty: 90 TABLET | Refills: 7 | Status: SHIPPED | OUTPATIENT
Start: 2024-03-14

## 2024-03-16 DIAGNOSIS — E78.2 MIXED HYPERLIPIDEMIA: ICD-10-CM

## 2024-03-18 RX ORDER — ROSUVASTATIN CALCIUM 10 MG/1
10 TABLET, COATED ORAL DAILY
Qty: 30 TABLET | Refills: 5 | Status: SHIPPED | OUTPATIENT
Start: 2024-03-18

## 2024-03-22 ENCOUNTER — TELEPHONE (OUTPATIENT)
Age: 69
End: 2024-03-22

## 2024-03-22 NOTE — TELEPHONE ENCOUNTER
Rhona Vogel pt's counselor JOCE for a call back  She did not state what it was in regards too        # [phone]

## 2024-03-24 DIAGNOSIS — I26.99 PULMONARY EMBOLISM, OTHER, UNSPECIFIED CHRONICITY, UNSPECIFIED WHETHER ACUTE COR PULMONALE PRESENT (HCC): ICD-10-CM

## 2024-03-24 NOTE — MALNUTRITION/BMI
This medical record reflects one or more clinical indicators suggestive of morbid obesity  Please indicate the one diagnosis below which you feel best reflects the clinical picture  BMI Findings:  BMI Classifications: Morbid Obesity 45-49 9   Related to energy intake exceeding energy expenditure over time as evidenced by BMI  Body mass index is 46 44 kg/m²  See Nutrition note dated 1/17/18 for additional details  Completed nutrition assessment is viewable in the nutrition documentation 
Albuterol (Eqv-ProAir HFA) 90 mcg/inh inhalation aerosol: 4 puff(s) inhaled every 4 hours  amoxicillin 400 mg/5 mL oral liquid: 10 milliliter(s) orally once a day Please take once a day, with last dose on 4/2.  prednisoLONE (as sodium phosphate) 15 mg/5 mL oral liquid: 5 milliliter(s) orally every 24 hours

## 2024-03-25 NOTE — TELEPHONE ENCOUNTER
She follows with Dr. Burkett, with [facility] and had normal mammogram in August.  If she has concerns for spread of cancer, she should make appointment with medical or surgical oncology.
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
## 2024-03-26 DIAGNOSIS — G89.29 OTHER CHRONIC PAIN: ICD-10-CM

## 2024-03-26 DIAGNOSIS — F41.9 ANXIETY: ICD-10-CM

## 2024-03-26 NOTE — TELEPHONE ENCOUNTER
Returning your call :    Hi, this is Rhona Richter. I'm a licensed professional counselor and I was just returning a April's call about one of Doctor Esthela's patience. Benjamin Cruz, if you could possibly give me a call back, It's [phone]. All right. Thanks. Todd. Todd

## 2024-03-27 RX ORDER — TRAMADOL HYDROCHLORIDE 50 MG/1
50 TABLET ORAL EVERY 8 HOURS PRN
Qty: 270 TABLET | Refills: 0 | Status: SHIPPED | OUTPATIENT
Start: 2024-03-27

## 2024-03-27 RX ORDER — ALPRAZOLAM 1 MG/1
TABLET ORAL
Qty: 90 TABLET | Refills: 0 | Status: SHIPPED | OUTPATIENT
Start: 2024-03-27

## 2024-04-02 ENCOUNTER — TELEPHONE (OUTPATIENT)
Age: 69
End: 2024-04-02

## 2024-04-02 NOTE — TELEPHONE ENCOUNTER
Patient is requesting a Authorization for Release of Health Information for Rhona the Psychotherapist. Patient stated that you have completed this release for her in the past, please advise     Patient also stated that she has fallen down 6 more times since her last visit to the office in February as per the patient. She will be going to see Dr. Agustin about the issue due to her having knee replacement    Patient stated that she has received a new insurance from [employer]   ID #: [id number]  Plan #: [id number]  Patient no longer has her cell phone number so she is requesting all calls go to her home phone until she is able to replace her cell phone.

## 2024-04-02 NOTE — TELEPHONE ENCOUNTER
I am not certain what authorization she is asking for, but have no problem sharing our information with Rhona or vice versa

## 2024-04-04 ENCOUNTER — TELEPHONE (OUTPATIENT)
Age: 69
End: 2024-04-04

## 2024-04-04 NOTE — TELEPHONE ENCOUNTER
Caller: Patient    Doctor: Lorena    Reason for call:     Patient called to update coverage and make appt, call dropped     Call back#: n/a

## 2024-04-04 NOTE — TELEPHONE ENCOUNTER
Patient was contacted and a message was left on her VM with our number to call us back if she would like to provide us additional information so we can further assist her

## 2024-04-08 ENCOUNTER — OFFICE VISIT (OUTPATIENT)
Dept: OBGYN CLINIC | Facility: CLINIC | Age: 69
End: 2024-04-08
Payer: COMMERCIAL

## 2024-04-08 ENCOUNTER — APPOINTMENT (OUTPATIENT)
Dept: RADIOLOGY | Facility: CLINIC | Age: 69
End: 2024-04-08
Payer: COMMERCIAL

## 2024-04-08 VITALS
BODY MASS INDEX: 41.06 KG/M2 | DIASTOLIC BLOOD PRESSURE: 77 MMHG | WEIGHT: 195.6 LBS | SYSTOLIC BLOOD PRESSURE: 131 MMHG | HEART RATE: 96 BPM | HEIGHT: 58 IN

## 2024-04-08 DIAGNOSIS — M54.50 LOW BACK PAIN, UNSPECIFIED BACK PAIN LATERALITY, UNSPECIFIED CHRONICITY, UNSPECIFIED WHETHER SCIATICA PRESENT: Primary | ICD-10-CM

## 2024-04-08 DIAGNOSIS — S32.020A CLOSED COMPRESSION FRACTURE OF L2 VERTEBRA, INITIAL ENCOUNTER (HCC): ICD-10-CM

## 2024-04-08 DIAGNOSIS — M54.50 LOW BACK PAIN, UNSPECIFIED BACK PAIN LATERALITY, UNSPECIFIED CHRONICITY, UNSPECIFIED WHETHER SCIATICA PRESENT: ICD-10-CM

## 2024-04-08 DIAGNOSIS — M81.0 AGE-RELATED OSTEOPOROSIS WITHOUT CURRENT PATHOLOGICAL FRACTURE: ICD-10-CM

## 2024-04-08 PROCEDURE — 99203 OFFICE O/P NEW LOW 30 MIN: CPT | Performed by: ORTHOPAEDIC SURGERY

## 2024-04-08 PROCEDURE — 72110 X-RAY EXAM L-2 SPINE 4/>VWS: CPT

## 2024-04-08 RX ORDER — LIDOCAINE HCL/PF 50 MG/5 ML
SYRINGE (ML) INJECTION
COMMUNITY
Start: 2023-10-03

## 2024-04-08 RX ORDER — AZELASTINE 1 MG/ML
SPRAY, METERED NASAL
COMMUNITY

## 2024-04-08 NOTE — PROGRESS NOTES
[facility] ORTHOPEDIC SPINE SURGERY  DR.AMIR LASHAWN MD  [address] [address] [phone]    HISTORY OF PRESENT ILLNESS:    Benjamin Puga is a 69 y.o. female who presents for initial evaluation of lumbar spine. The patient was previously treated by Dr. Carrillo at an [facility].  The patient reports she has had 7 falls beginning in January or February. The patient notes it was prior to taking her medications in the morning or in the evening. The patient felt as though she was tripping herself. She has history or right TKA with revision, left total knee arthroplasty. She has neuropathy, numbness, tingling into bilateral feet, fluttering into her legs. She had stabbing pain into her lumbar spine when wiping herself. Pain is managed with Tramadol prescribed by Dr. Proctor. The patient cannot take NSAID's as she is on blood thinners. The patient has not required her cane for ambulation over the past 2 years up until recently she has been using it while out in public. The patient is accompanied by her  for her exam today.       ALLERGIES:   Allergies   Allergen Reactions    Tamoxifen Other (See Comments)     Headaches, stomach pain, sweats,     Nsaids Hives    Oxybutynin     Singulair [Montelukast] Swelling    Ciprofloxacin Hives    Penicillins Hives    Sulfa Antibiotics Hives    Vancomycin Hives       MEDICATIONS:    Current Outpatient Medications:     albuterol (PROVENTIL HFA,VENTOLIN HFA) 90 mcg/act inhaler, Inhale 2 puffs every 6 (six) hours as needed for wheezing, Disp: , Rfl:     ALPRAZolam (XANAX) 1 mg tablet, TAKE 1/2-1 TABLET THREE TIMES A DAY AS NEEDED, Disp: 90 tablet, Rfl: 0    amLODIPine (NORVASC) 5 mg tablet, TAKE 1 TABLET BY MOUTH DAILY, Disp: 100 tablet, Rfl: 2    apixaban (Eliquis) 2.5 mg, Take 1 tablet (2.5 mg total) by mouth 2 (two) times a day, Disp: 90 tablet, Rfl: 0    azelastine (ASTELIN) 0.1 % nasal spray, Spray 2 sprays twice a day by intranasal  route., Disp: , Rfl:     beclomethasone (Qvar RediHaler) 40 MCG/ACT inhaler, Inhale 2 puffs 2 (two) times a day Rinse mouth after use., Disp: , Rfl:     clotrimazole (LOTRIMIN) 1 % cream, as needed , Disp: , Rfl:     clotrimazole-betamethasone (LOTRISONE) 1-0.05 % cream, APPLY TO SKIN FOLDS TWO TIMES A DAY, Disp: 45 g, Rfl: 1    DULoxetine (CYMBALTA) 30 mg delayed release capsule, TAKE ONE CAPSULE BY MOUTH EVERY DAY WITH 60 MG CAP, Disp: 90 capsule, Rfl: 0    DULoxetine (CYMBALTA) 60 mg delayed release capsule, Take 1 capsule (60 mg total) by mouth daily, Disp: 90 capsule, Rfl: 1    ferrous sulfate 325 (65 Fe) mg tablet, Take 325 mg by mouth daily, Disp: , Rfl:     hydrocortisone-pramoxine (ANALPRAM-HC) 2.5-1 % rectal cream, Apply topically 3 (three) times a day, Disp: 3 Tube, Rfl: 1    hydrOXYzine HCL (ATARAX) 25 mg tablet, Take 1 tablet (25 mg total) by mouth every 6 (six) hours as needed for itching, Disp: 100 tablet, Rfl: 2    levothyroxine 88 mcg tablet, TAKE 1 TABLET BY MOUTH DAILY  EXCEPT TAKE 2 TABLETS BY MOUTH  ON SUNDAY, Disp: 116 tablet, Rfl: 2    lisinopril (ZESTRIL) 40 mg tablet, TAKE ONE TABLET BY MOUTH EVERY DAY, Disp: 100 tablet, Rfl: 0    methocarbamol (ROBAXIN) 500 mg tablet, TAKE ONE TABLET BY MOUTH FOUR TIMES A DAY AS NEEDED FOR MUSCLE SPASMS, Disp: 60 tablet, Rfl: 3    mometasone-formoterol (DULERA) 100-5 MCG/ACT inhaler, Inhale, Disp: , Rfl:     Multiple Vitamins-Minerals (OCUVITE ADULT 50+ PO), Take by mouth, Disp: , Rfl:     nystatin powder, APPLY TO AFFECTED AREA(S) TWO TIMES A DAY, Disp: 60 g, Rfl: 0    pantoprazole (PROTONIX) 40 mg tablet, TAKE 1 TABLET BY MOUTH  TWICE DAILY, Disp: 180 tablet, Rfl: 3    rosuvastatin (CRESTOR) 10 MG tablet, TAKE ONE TABLET BY MOUTH EVERY DAY, Disp: 30 tablet, Rfl: 5    traMADol (ULTRAM) 50 mg tablet, Take 1 tablet (50 mg total) by mouth every 8 (eight) hours as needed for moderate pain, Disp: 270 tablet, Rfl: 0    Triamcinolone Acetonide (Nasacort Allergy  24HR) 55 MCG/ACT AERO, into each nostril as needed , Disp: , Rfl:     atorvastatin (LIPITOR) 40 mg tablet, Take 1 tablet by mouth daily, Disp: , Rfl:     DULoxetine (CYMBALTA) 30 mg delayed release capsule, TAKE ONE CAPSULE BY MOUTH EVERY DAY WITH 60MG CAP, Disp: 90 capsule, Rfl: 0    famotidine (PEPCID) 10 mg tablet, Take 1 tablet (10 mg total) by mouth daily, Disp: 90 tablet, Rfl: 3    hyoscyamine (LEVSIN/SL) 0.125 mg SL tablet, Take 1 tablet (0.125 mg total) by mouth 3 (three) times a day (Patient not taking: Reported on 4/8/2024), Disp: 60 tablet, Rfl: 3    Lidocaine HCl 10 MG/ML SOSY, administered (Patient not taking: Reported on 4/8/2024), Disp: , Rfl:     PARoxetine (PAXIL) 10 mg tablet, TAKE ONE TABLET BY MOUTH EVERY DAY (Patient not taking: Reported on 4/8/2024), Disp: 90 tablet, Rfl: 3    PARoxetine (PAXIL) 10 mg tablet, Take 1 tablet (10 mg total) by mouth daily, Disp: 90 tablet, Rfl: 0    PEG 3350-KCl-NaBcb-NaCl-NaSulf (PEG 3350/ELECTROLYTES PO), , Disp: , Rfl:      PAST MEDICAL HISTORY:   Past Medical History:   Diagnosis Date    Allergic rhinitis     Anemia     Cancer (HCC)     breast cancer, Left side    Colon polyp     Depression     Disease of thyroid gland     GERD (gastroesophageal reflux disease)     Hyperlipemia     Hypertension     Hypothyroidism     Last assessed: 3/25/14    Obesity     Osteoarthritis     Pre-operative laboratory examination     Pulmonary embolism (HCC)        PAST SURGICAL HISTORY:  Past Surgical History:   Procedure Laterality Date    HAND SURGERY Left 03/2020    HYSTERECTOMY      INCISIONAL BREAST BIOPSY      JOINT REPLACEMENT      KNEE ARTHROPLASTY      MAMMO NEEDLE LOCALIZATION LEFT (ALL INC) Left 6/6/2019    ROTATOR CUFF REPAIR      SHOULDER SURGERY      TRIGGER FINGER RELEASE      TRIGGER FINGER RELEASE         SOCIAL HISTORY:  Social History     Tobacco Use   Smoking Status Never   Smokeless Tobacco Never          PHYSICAL EXAM:  69 y.o. female sitting comfortably on  exam chair in no apparent distress.   Patient ambulates without normal gait, leaning slightly forward.   TTP over  lumbar spine, bilateral SI joints.     Sensation intact to light touch left L2 through S1 dermatomes.   Sensation intact to light touch right L2 through S1 dermatomes     Left Motor: 5-/5 iliopsoas, 5-/5 quadriceps  Right Motor: 5-/5 iliopsoas, 5-/5 quadriceps    ROM:  Painful ROM of bilateral hips with internal rotation  History of bilateral total knee arthroplasties     Symmetric deep tendon reflexes bilateral upper extremities    1+    Symmetric deep tendon reflexes bilateral lower extremities     absent    Kaur's sign negative  Radial release sign negative  Clonus negative  Straight leg raise test is negative Bilateral   The patient is well perfused distally.        RADIOGRAPHIC STUDIES:  MRI, Lumbar spine, 2/23/2020: L2 superior endplate chronic compression deformity.  Mild multilevel disc desiccation.  No evidence of spinal listhesis.  No evidence of significant facet disease and no evidence of clinically significant spinal stenosis.  DXA, spine hip and pelvis, 2/1/2022: Lumbar spine T-score -2.9, Left total hip T-score -0.4, Left femoral neck T-score -1.2  X-rays, Lumbar spine, 4/8/24: Multilevel lumbar degenerative disc disease which is quite mild.  There is no evidence of instability or deformity.  Prior L2 compression fracture is visualized and is unchanged compared to prior films including the prior MRI study.      ASSESSMENT:  1. Low back pain, unspecified back pain laterality, unspecified chronicity, unspecified whether sciatica present  -     XR spine lumbar minimum 4 views non injury; Future; Expected date: 04/08/2024  -     Ambulatory Referral to Physical Therapy; Future    2. Age-related osteoporosis without current pathological fracture  -     Ambulatory Referral to Physical Therapy; Future    3. Closed compression fracture of L2 vertebra, initial encounter (HCC)  -     Ambulatory  Referral to Physical Therapy; Future      PLAN:  69 y.o. female with history of lumbar compression fracture at L2, low back pain, age related osteoporosis. The patient's x-rays and previous MRI study was reviewed today.     The natural history of compression fracture was discussed with the patient today. The best plan of care for the patient is to aqua therapy. The patient will trial aqua therapy for a minimum of 5 weeks.  After the first and second visits there will be increased back pain due to use of musculature that has not been used in a while.  Once aqua therapy is completed, if they have success, they will continue with aqua therapy. If symptoms do not improve, we will move forward with the next steps.     I will see the patient back in approximately 2 months for re-evaluation and possibly order an updated MRI.        Scribe Attestation      I,:  Rae Lozoya am acting as a scribe while in the presence of the attending physician.:       I,:  Lisa Carrillo MD personally performed the services described in this documentation    as scribed in my presence.:

## 2024-04-20 DIAGNOSIS — G89.29 OTHER CHRONIC PAIN: ICD-10-CM

## 2024-04-21 RX ORDER — DULOXETIN HYDROCHLORIDE 60 MG/1
60 CAPSULE, DELAYED RELEASE ORAL DAILY
Qty: 90 CAPSULE | Refills: 1 | Status: SHIPPED | OUTPATIENT
Start: 2024-04-21

## 2024-04-25 DIAGNOSIS — I26.99 PULMONARY EMBOLISM, OTHER, UNSPECIFIED CHRONICITY, UNSPECIFIED WHETHER ACUTE COR PULMONALE PRESENT (HCC): ICD-10-CM

## 2024-04-25 RX ORDER — APIXABAN 2.5 MG/1
2.5 TABLET, FILM COATED ORAL 2 TIMES DAILY
Qty: 90 TABLET | Refills: 1 | Status: SHIPPED | OUTPATIENT
Start: 2024-04-25

## 2024-04-26 ENCOUNTER — TELEPHONE (OUTPATIENT)
Age: 69
End: 2024-04-26

## 2024-04-26 DIAGNOSIS — F41.9 ANXIETY: ICD-10-CM

## 2024-04-26 NOTE — TELEPHONE ENCOUNTER
Called and spoke with patient informed her that she needs to follow up with Ortho and schedule her PT assessment. If she is unable to complete PT at least it will be documented and then further testing can be ordered. Also explained that she has ongoing back and leg issues and Neurology is not appropriate. Patient verbally understood. Will schedule with PT and Ortho.

## 2024-04-26 NOTE — TELEPHONE ENCOUNTER
Are you OK placing Neurology referral?   DAILY PROGRESS NOTE    ADMISSION DATE:  6/4/2021  DATE:  6/5/2021  CURRENT HOSPITAL DAY:  Hospital Day: 2       CHIEF COMPLAINT:  Post op; poor weight gain     ACTIVE PROBLEMS:    There are no active hospital problems to display for this patient.      INTERVAL HISTORY:    Kianna Hong is a 6 month old female patient admitted with Encounter for hearing examination following failed hearing screening [Z01.110]  Dysfunction of both eustachian tubes [H69.83]  Bilateral chronic serous otitis media [H65.23]  Obstructive sleep apnea (adult) (pediatric) [G47.33]  Epistaxis [R04.0]  Dysphagia, unspecified type [R13.10]  MARIYA (obstructive sleep apnea) [G47.33]     Overnight pt had large emesis. Saturations dipped down into the 80s briefly and pt was repositioned and suctioned. Sats returned to upper 90s.     MEDICATIONS:    Current Facility-Administered Medications   Medication Dose Route Frequency Provider Last Rate Last Admin   • famotidine (PEPCID) oral suspension 1.92 mg  0.5 mg/kg (Dosing Weight) Oral Daily Cl Cruz,        • sodium chloride (PF) 0.9 % injection 0.6-4.6 mL  0.6-4.6 mL Intravenous PRN Cl Cruz DO       • sodium chloride 0.9 % flush bag 25 mL  25 mL Intravenous PRN Cl Cruz, DO       • sodium chloride (PF) 0.9 % injection 0.5-10 mL  0.5-10 mL Intravenous PRN Cl Cruz DO       • ofloxacin (FLOXIN) 0.3 % otic solution 4 drop  4 drop Both Ears 2 times per day Cl Cruz DO   4 drop at 06/05/21 0024   • acetaminophen (TYLENOL) 160 MG/5ML suspension 57.6 mg  15 mg/kg Oral Q6H Cl Cruz DO   57.6 mg at 06/05/21 0023   • ibuprofen (CHILDRENS ADVIL) 100 MG/5ML suspension 38 mg  10 mg/kg Oral Q6H PRN Niya Guerin,               OBJECTIVE:      Vitals:  Vitals with min/max:      Vital Last Value 24 Hour Range   Temperature 98.1 °F (36.7 °C) (06/05/21 0350) Temp  Min: 97.2 °F (36.2 °C)  Max: 99 °F (37.2 °C)   Pulse 106 (06/05/21 6900) Pulse  Min: 100   Max: 133   Respiratory 40 (06/05/21 0350) Resp  Min: 17  Max: 44   Non-Invasive  Blood Pressure (!) 90/66 (06/05/21 0350) BP  Min: 80/60  Max: 98/66   Pulse Oximetry 92 % (06/05/21 0400) SpO2  Min: 92 %  Max: 100 %   Arterial   Blood Pressure   No data recorded        INTAKE/OUTPUT:      Intake/Output Summary (Last 24 hours) at 6/5/2021 0501  Last data filed at 6/5/2021 0350  Gross per 24 hour   Intake 350 ml   Output 0 ml   Net 350 ml         Physical Exam  Vitals and nursing note reviewed.   Constitutional:       General: She is active.   HENT:      Head: Normocephalic and atraumatic. Anterior fontanelle is flat.      Right Ear: External ear normal.      Left Ear: External ear normal.      Nose: Nose normal.      Mouth/Throat:      Mouth: Mucous membranes are moist.      Pharynx: Oropharynx is clear.   Eyes:      Conjunctiva/sclera: Conjunctivae normal.      Pupils: Pupils are equal, round, and reactive to light.   Cardiovascular:      Rate and Rhythm: Normal rate and regular rhythm.      Pulses: Normal pulses.      Heart sounds: Normal heart sounds.   Pulmonary:      Effort: Pulmonary effort is normal.      Breath sounds: Normal breath sounds.      Comments: On LFNC   Abdominal:      General: Bowel sounds are normal.      Palpations: Abdomen is soft.   Genitourinary:     General: Normal vulva.      Rectum: Normal.   Musculoskeletal:         General: Normal range of motion.      Cervical back: Normal range of motion and neck supple.   Skin:     General: Skin is warm and dry.      Capillary Refill: Capillary refill takes less than 2 seconds.      Turgor: Normal.   Neurological:      General: No focal deficit present.      Mental Status: She is alert.     LABORATORY DATA:    No visits with results within 1 Day(s) from this visit.   Latest known visit with results is:   Lab Services on 06/03/2021   Component Date Value Ref Range Status   • SARS-CoV-2 by PCR 06/03/2021 Not Detected  Not Detected / Detected / Inhibitor  Present Final    Pooled sample   • Isolation Guidelines 06/03/2021    Final                    Value:This result contains rich text formatting which cannot be displayed here.   • Procedural Notes 06/03/2021    Final                    Value:This result contains rich text formatting which cannot be displayed here.        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 Fortune.  Pt also received ABR with Audiology.  Pt recovering well from procedures and stable on floors. During this admission the issue of poor weight gain will be addressed. ST and Nutrition will be consulted.      Active Problems:    * No active hospital problems. *    Plan:     ENT:  -ENT on consult ; appreciate recs             -ofloxacin 4 drops in each ear BID for 5 days             -tylenol 15mg/kg q6 ATC for pain             -follow up with Dr. Fortune 7/12     FEN:  -ST on consult  -Nutrition on consult  -175 ml Neosure q3-4 hours  -daily weights     Resp/CV:  -LFNC 0.5L 100%   -Continuous pulse oximetry     Social:   -SW consult             -WIC form             -EI referral         Lines: none.   VTE: 1, surgery. Prevention Strategy: encourage maximal movement as tolerated.   Dispo: Pending ST, nutritional recs, clinical status     Cl Cruz,   Pediatrics; PGY-1  [facility], [place]

## 2024-04-29 RX ORDER — ALPRAZOLAM 1 MG/1
TABLET ORAL
Qty: 90 TABLET | Refills: 0 | Status: SHIPPED | OUTPATIENT
Start: 2024-04-29

## 2024-04-30 DIAGNOSIS — G89.29 OTHER CHRONIC PAIN: ICD-10-CM

## 2024-05-01 RX ORDER — DULOXETIN HYDROCHLORIDE 30 MG/1
CAPSULE, DELAYED RELEASE ORAL
Qty: 100 CAPSULE | Refills: 1 | Status: SHIPPED | OUTPATIENT
Start: 2024-05-01

## 2024-05-03 DIAGNOSIS — T78.40XA ALLERGY, INITIAL ENCOUNTER: ICD-10-CM

## 2024-05-03 RX ORDER — HYDROXYZINE HYDROCHLORIDE 25 MG/1
25 TABLET, FILM COATED ORAL EVERY 6 HOURS PRN
Qty: 100 TABLET | Refills: 1 | Status: SHIPPED | OUTPATIENT
Start: 2024-05-03

## 2024-05-10 DIAGNOSIS — M54.9 BACK PAIN, UNSPECIFIED BACK LOCATION, UNSPECIFIED BACK PAIN LATERALITY, UNSPECIFIED CHRONICITY: ICD-10-CM

## 2024-05-11 DIAGNOSIS — I26.99 PULMONARY EMBOLISM, OTHER, UNSPECIFIED CHRONICITY, UNSPECIFIED WHETHER ACUTE COR PULMONALE PRESENT (HCC): ICD-10-CM

## 2024-05-13 RX ORDER — METHOCARBAMOL 500 MG/1
TABLET, FILM COATED ORAL
Qty: 60 TABLET | Refills: 3 | Status: SHIPPED | OUTPATIENT
Start: 2024-05-13

## 2024-05-26 DIAGNOSIS — I10 PRIMARY HYPERTENSION: Chronic | ICD-10-CM

## 2024-05-26 RX ORDER — LISINOPRIL 40 MG/1
40 TABLET ORAL DAILY
Qty: 100 TABLET | Refills: 1 | Status: SHIPPED | OUTPATIENT
Start: 2024-05-26 | End: 2024-05-28 | Stop reason: SDUPTHER

## 2024-05-28 DIAGNOSIS — E78.2 MIXED HYPERLIPIDEMIA: ICD-10-CM

## 2024-05-28 DIAGNOSIS — I10 PRIMARY HYPERTENSION: Chronic | ICD-10-CM

## 2024-05-28 RX ORDER — LISINOPRIL 40 MG/1
40 TABLET ORAL DAILY
Qty: 100 TABLET | Refills: 1 | Status: SHIPPED | OUTPATIENT
Start: 2024-05-28

## 2024-05-28 RX ORDER — ROSUVASTATIN CALCIUM 10 MG/1
10 TABLET, COATED ORAL DAILY
Qty: 100 TABLET | Refills: 1 | Status: SHIPPED | OUTPATIENT
Start: 2024-05-28

## 2024-05-28 NOTE — TELEPHONE ENCOUNTER
Not a dup. These need to be sent to [facility], [facility]    Reason for call:   [x] Refill   [] Prior Auth  [] Other:     Office:   [x] PCP/Provider - [facility]  [] Specialty/Provider -     Medication: Zestril    Dose/Frequency: 40 mg     Quantity: #100    Pharmacy: [facility]    Does the patient have enough for 3 days?   [] Yes   [x] No - Send as HP to POD                Reason for call:   [x] Refill   [] Prior Auth  [] Other:     Office:   [x] PCP/Provider -[facility]   [] Specialty/Provider -     Medication: Crestor    Dose/Frequency: 10 mg     Quantity: #100    Pharmacy: [facility]    Does the patient have enough for 3 days?   [] Yes   [x] No - Send as HP to POD

## 2024-05-29 DIAGNOSIS — F41.9 ANXIETY: ICD-10-CM

## 2024-05-30 RX ORDER — ROSUVASTATIN CALCIUM 10 MG/1
10 TABLET, COATED ORAL DAILY
Qty: 30 TABLET | Refills: 5 | Status: SHIPPED | OUTPATIENT
Start: 2024-05-30

## 2024-05-31 RX ORDER — ALPRAZOLAM 1 MG/1
TABLET ORAL
Qty: 90 TABLET | Refills: 0 | Status: SHIPPED | OUTPATIENT
Start: 2024-05-31

## 2024-06-25 ENCOUNTER — HOSPITAL ENCOUNTER (INPATIENT)
Facility: HOSPITAL | Age: 69
LOS: 2 days | DRG: 885 | End: 2024-06-28
Attending: EMERGENCY MEDICINE | Admitting: PSYCHIATRY & NEUROLOGY
Payer: COMMERCIAL

## 2024-06-25 ENCOUNTER — APPOINTMENT (EMERGENCY)
Dept: RADIOLOGY | Facility: HOSPITAL | Age: 69
DRG: 885 | End: 2024-06-25
Payer: COMMERCIAL

## 2024-06-25 DIAGNOSIS — T14.91XA SUICIDAL BEHAVIOR WITH ATTEMPTED SELF-INJURY (HCC): ICD-10-CM

## 2024-06-25 DIAGNOSIS — I10 PRIMARY HYPERTENSION: ICD-10-CM

## 2024-06-25 DIAGNOSIS — E03.9 ACQUIRED HYPOTHYROIDISM: ICD-10-CM

## 2024-06-25 DIAGNOSIS — J45.20 MILD INTERMITTENT ASTHMA WITHOUT COMPLICATION: ICD-10-CM

## 2024-06-25 DIAGNOSIS — R73.01 IMPAIRED FASTING GLUCOSE: ICD-10-CM

## 2024-06-25 DIAGNOSIS — T50.901A OVERDOSE: Primary | ICD-10-CM

## 2024-06-25 DIAGNOSIS — B00.9 HERPES SIMPLEX INFECTION: ICD-10-CM

## 2024-06-25 DIAGNOSIS — T14.91XA SUICIDE ATTEMPT (HCC): ICD-10-CM

## 2024-06-25 DIAGNOSIS — E78.2 MIXED HYPERLIPIDEMIA: ICD-10-CM

## 2024-06-25 LAB
BASE EX.OXY STD BLDV CALC-SCNC: 82.3 % (ref 60–80)
BASE EXCESS BLDV CALC-SCNC: -2.7 MMOL/L
BASOPHILS # BLD AUTO: 0.03 THOUSANDS/ÂΜL (ref 0–0.1)
BASOPHILS NFR BLD AUTO: 0 % (ref 0–1)
CARDIAC TROPONIN I PNL SERPL HS: <2 NG/L
EOSINOPHIL # BLD AUTO: 0.29 THOUSAND/ÂΜL (ref 0–0.61)
EOSINOPHIL NFR BLD AUTO: 4 % (ref 0–6)
ERYTHROCYTE [DISTWIDTH] IN BLOOD BY AUTOMATED COUNT: 13.2 % (ref 11.6–15.1)
ETHANOL SERPL-MCNC: <10 MG/DL
HCO3 BLDV-SCNC: 21.1 MMOL/L (ref 24–30)
HCT VFR BLD AUTO: 40.4 % (ref 34.8–46.1)
HGB BLD-MCNC: 13 G/DL (ref 11.5–15.4)
IMM GRANULOCYTES # BLD AUTO: 0.02 THOUSAND/UL (ref 0–0.2)
IMM GRANULOCYTES NFR BLD AUTO: 0 % (ref 0–2)
LYMPHOCYTES # BLD AUTO: 2.23 THOUSANDS/ÂΜL (ref 0.6–4.47)
LYMPHOCYTES NFR BLD AUTO: 27 % (ref 14–44)
MCH RBC QN AUTO: 28.1 PG (ref 26.8–34.3)
MCHC RBC AUTO-ENTMCNC: 32.2 G/DL (ref 31.4–37.4)
MCV RBC AUTO: 87 FL (ref 82–98)
MONOCYTES # BLD AUTO: 0.56 THOUSAND/ÂΜL (ref 0.17–1.22)
MONOCYTES NFR BLD AUTO: 7 % (ref 4–12)
NEUTROPHILS # BLD AUTO: 5.04 THOUSANDS/ÂΜL (ref 1.85–7.62)
NEUTS SEG NFR BLD AUTO: 62 % (ref 43–75)
NRBC BLD AUTO-RTO: 0 /100 WBCS
O2 CT BLDV-SCNC: 13.6 ML/DL
PCO2 BLDV: 33.1 MM HG (ref 42–50)
PH BLDV: 7.42 [PH] (ref 7.3–7.4)
PLATELET # BLD AUTO: 251 THOUSANDS/UL (ref 149–390)
PMV BLD AUTO: 8.8 FL (ref 8.9–12.7)
PO2 BLDV: 44 MM HG (ref 35–45)
RBC # BLD AUTO: 4.62 MILLION/UL (ref 3.81–5.12)
WBC # BLD AUTO: 8.17 THOUSAND/UL (ref 4.31–10.16)

## 2024-06-25 PROCEDURE — GZ59ZZZ INDIVIDUAL PSYCHOTHERAPY, PSYCHOPHYSIOLOGICAL: ICD-10-PCS | Performed by: INTERNAL MEDICINE

## 2024-06-25 PROCEDURE — 36415 COLL VENOUS BLD VENIPUNCTURE: CPT | Performed by: EMERGENCY MEDICINE

## 2024-06-25 PROCEDURE — 93005 ELECTROCARDIOGRAM TRACING: CPT

## 2024-06-25 PROCEDURE — 82077 ASSAY SPEC XCP UR&BREATH IA: CPT | Performed by: EMERGENCY MEDICINE

## 2024-06-25 PROCEDURE — 85025 COMPLETE CBC W/AUTO DIFF WBC: CPT | Performed by: EMERGENCY MEDICINE

## 2024-06-25 PROCEDURE — GZHZZZZ GROUP PSYCHOTHERAPY: ICD-10-PCS | Performed by: INTERNAL MEDICINE

## 2024-06-25 PROCEDURE — 84484 ASSAY OF TROPONIN QUANT: CPT | Performed by: EMERGENCY MEDICINE

## 2024-06-25 PROCEDURE — 80143 DRUG ASSAY ACETAMINOPHEN: CPT | Performed by: EMERGENCY MEDICINE

## 2024-06-25 PROCEDURE — 82805 BLOOD GASES W/O2 SATURATION: CPT | Performed by: EMERGENCY MEDICINE

## 2024-06-25 PROCEDURE — 80179 DRUG ASSAY SALICYLATE: CPT | Performed by: EMERGENCY MEDICINE

## 2024-06-25 PROCEDURE — 99285 EMERGENCY DEPT VISIT HI MDM: CPT

## 2024-06-25 PROCEDURE — 84443 ASSAY THYROID STIM HORMONE: CPT | Performed by: EMERGENCY MEDICINE

## 2024-06-25 PROCEDURE — 99285 EMERGENCY DEPT VISIT HI MDM: CPT | Performed by: EMERGENCY MEDICINE

## 2024-06-25 PROCEDURE — 71045 X-RAY EXAM CHEST 1 VIEW: CPT

## 2024-06-25 PROCEDURE — 80053 COMPREHEN METABOLIC PANEL: CPT | Performed by: EMERGENCY MEDICINE

## 2024-06-26 PROBLEM — T14.91XA SUICIDAL BEHAVIOR WITH ATTEMPTED SELF-INJURY (HCC): Status: ACTIVE | Noted: 2024-06-26

## 2024-06-26 LAB
ALBUMIN SERPL BCG-MCNC: 4.1 G/DL (ref 3.5–5)
ALP SERPL-CCNC: 82 U/L (ref 34–104)
ALT SERPL W P-5'-P-CCNC: 12 U/L (ref 7–52)
ANION GAP SERPL CALCULATED.3IONS-SCNC: 10 MMOL/L (ref 4–13)
APAP SERPL-MCNC: <2 UG/ML (ref 10–20)
AST SERPL W P-5'-P-CCNC: 17 U/L (ref 13–39)
ATRIAL RATE: 110 BPM
BILIRUB SERPL-MCNC: 0.42 MG/DL (ref 0.2–1)
BUN SERPL-MCNC: 11 MG/DL (ref 5–25)
CALCIUM SERPL-MCNC: 9.6 MG/DL (ref 8.4–10.2)
CARDIAC TROPONIN I PNL SERPL HS: <2 NG/L
CARDIAC TROPONIN I PNL SERPL HS: <2 NG/L
CHLORIDE SERPL-SCNC: 100 MMOL/L (ref 96–108)
CO2 SERPL-SCNC: 29 MMOL/L (ref 21–32)
CREAT SERPL-MCNC: 0.75 MG/DL (ref 0.6–1.3)
GFR SERPL CREATININE-BSD FRML MDRD: 81 ML/MIN/1.73SQ M
GLUCOSE SERPL-MCNC: 128 MG/DL (ref 65–140)
P AXIS: 56 DEGREES
POTASSIUM SERPL-SCNC: 4.1 MMOL/L (ref 3.5–5.3)
PR INTERVAL: 194 MS
PROT SERPL-MCNC: 7.1 G/DL (ref 6.4–8.4)
QRS AXIS: 105 DEGREES
QRSD INTERVAL: 86 MS
QT INTERVAL: 324 MS
QTC INTERVAL: 438 MS
SALICYLATES SERPL-MCNC: <5 MG/DL (ref 3–20)
SODIUM SERPL-SCNC: 139 MMOL/L (ref 135–147)
T WAVE AXIS: 40 DEGREES
TSH SERPL DL<=0.05 MIU/L-ACNC: 0.21 UIU/ML (ref 0.45–4.5)
VENTRICULAR RATE: 110 BPM

## 2024-06-26 PROCEDURE — 81001 URINALYSIS AUTO W/SCOPE: CPT | Performed by: INTERNAL MEDICINE

## 2024-06-26 PROCEDURE — 99223 1ST HOSP IP/OBS HIGH 75: CPT | Performed by: PSYCHIATRY & NEUROLOGY

## 2024-06-26 PROCEDURE — 36415 COLL VENOUS BLD VENIPUNCTURE: CPT | Performed by: EMERGENCY MEDICINE

## 2024-06-26 PROCEDURE — 99232 SBSQ HOSP IP/OBS MODERATE 35: CPT | Performed by: INTERNAL MEDICINE

## 2024-06-26 PROCEDURE — 87086 URINE CULTURE/COLONY COUNT: CPT | Performed by: INTERNAL MEDICINE

## 2024-06-26 PROCEDURE — 99222 1ST HOSP IP/OBS MODERATE 55: CPT | Performed by: INTERNAL MEDICINE

## 2024-06-26 PROCEDURE — 80307 DRUG TEST PRSMV CHEM ANLYZR: CPT | Performed by: INTERNAL MEDICINE

## 2024-06-26 PROCEDURE — 84484 ASSAY OF TROPONIN QUANT: CPT | Performed by: EMERGENCY MEDICINE

## 2024-06-26 PROCEDURE — 84484 ASSAY OF TROPONIN QUANT: CPT | Performed by: INTERNAL MEDICINE

## 2024-06-26 PROCEDURE — 93010 ELECTROCARDIOGRAM REPORT: CPT | Performed by: INTERNAL MEDICINE

## 2024-06-26 RX ORDER — PAROXETINE HYDROCHLORIDE 20 MG/1
10 TABLET, FILM COATED ORAL DAILY
Status: DISCONTINUED | OUTPATIENT
Start: 2024-06-26 | End: 2024-06-26

## 2024-06-26 RX ORDER — ONDANSETRON 2 MG/ML
4 INJECTION INTRAMUSCULAR; INTRAVENOUS EVERY 6 HOURS PRN
Status: DISCONTINUED | OUTPATIENT
Start: 2024-06-26 | End: 2024-06-28 | Stop reason: HOSPADM

## 2024-06-26 RX ORDER — DULOXETIN HYDROCHLORIDE 60 MG/1
60 CAPSULE, DELAYED RELEASE ORAL DAILY
Status: DISCONTINUED | OUTPATIENT
Start: 2024-06-26 | End: 2024-06-26

## 2024-06-26 RX ORDER — PANTOPRAZOLE SODIUM 40 MG/1
40 TABLET, DELAYED RELEASE ORAL 2 TIMES DAILY
Status: DISCONTINUED | OUTPATIENT
Start: 2024-06-26 | End: 2024-06-28 | Stop reason: HOSPADM

## 2024-06-26 RX ORDER — LEVOTHYROXINE SODIUM 88 UG/1
88 TABLET ORAL
Status: DISCONTINUED | OUTPATIENT
Start: 2024-06-26 | End: 2024-06-26

## 2024-06-26 RX ORDER — ALBUTEROL SULFATE 2.5 MG/3ML
5 SOLUTION RESPIRATORY (INHALATION) ONCE
Status: COMPLETED | OUTPATIENT
Start: 2024-06-26 | End: 2024-06-26

## 2024-06-26 RX ORDER — ACETAMINOPHEN 325 MG/1
650 TABLET ORAL EVERY 6 HOURS PRN
Status: DISCONTINUED | OUTPATIENT
Start: 2024-06-26 | End: 2024-06-28 | Stop reason: HOSPADM

## 2024-06-26 RX ORDER — ATORVASTATIN CALCIUM 40 MG/1
40 TABLET, FILM COATED ORAL DAILY
Status: DISCONTINUED | OUTPATIENT
Start: 2024-06-26 | End: 2024-06-28 | Stop reason: HOSPADM

## 2024-06-26 RX ORDER — LISINOPRIL 20 MG/1
40 TABLET ORAL DAILY
Status: DISCONTINUED | OUTPATIENT
Start: 2024-06-26 | End: 2024-06-28 | Stop reason: HOSPADM

## 2024-06-26 RX ORDER — ALPRAZOLAM 0.25 MG/1
0.25 TABLET ORAL 3 TIMES DAILY PRN
Status: DISCONTINUED | OUTPATIENT
Start: 2024-06-26 | End: 2024-06-28 | Stop reason: HOSPADM

## 2024-06-26 RX ORDER — LEVOTHYROXINE SODIUM 0.05 MG/1
50 TABLET ORAL
Status: DISCONTINUED | OUTPATIENT
Start: 2024-06-26 | End: 2024-06-28 | Stop reason: HOSPADM

## 2024-06-26 RX ORDER — AMLODIPINE BESYLATE 5 MG/1
5 TABLET ORAL DAILY
Status: DISCONTINUED | OUTPATIENT
Start: 2024-06-26 | End: 2024-06-28 | Stop reason: HOSPADM

## 2024-06-26 RX ADMIN — ATORVASTATIN CALCIUM 40 MG: 40 TABLET, FILM COATED ORAL at 08:51

## 2024-06-26 RX ADMIN — IPRATROPIUM BROMIDE 0.5 MG: 0.5 SOLUTION RESPIRATORY (INHALATION) at 01:30

## 2024-06-26 RX ADMIN — ALBUTEROL SULFATE 5 MG: 2.5 SOLUTION RESPIRATORY (INHALATION) at 01:30

## 2024-06-26 RX ADMIN — APIXABAN 2.5 MG: 2.5 TABLET, FILM COATED ORAL at 08:51

## 2024-06-26 RX ADMIN — PANTOPRAZOLE SODIUM 40 MG: 40 TABLET, DELAYED RELEASE ORAL at 08:51

## 2024-06-26 RX ADMIN — LISINOPRIL 40 MG: 20 TABLET ORAL at 08:51

## 2024-06-26 RX ADMIN — PANTOPRAZOLE SODIUM 40 MG: 40 TABLET, DELAYED RELEASE ORAL at 17:21

## 2024-06-26 RX ADMIN — LEVOTHYROXINE SODIUM 50 MCG: 0.05 TABLET ORAL at 05:42

## 2024-06-26 RX ADMIN — APIXABAN 2.5 MG: 2.5 TABLET, FILM COATED ORAL at 17:21

## 2024-06-26 RX ADMIN — AMLODIPINE BESYLATE 5 MG: 5 TABLET ORAL at 08:51

## 2024-06-26 NOTE — PLAN OF CARE
Problem: Potential for Falls  Goal: Patient will remain free of falls  Description: INTERVENTIONS:  - Educate patient/family on patient safety including physical limitations  - Instruct patient to call for assistance with activity   - Consult OT/PT to assist with strengthening/mobility   - Keep Call bell within reach  - Keep bed low and locked with side rails adjusted as appropriate  - Keep care items and personal belongings within reach  - Initiate and maintain comfort rounds  - Make Fall Risk Sign visible to staff  - Offer Toileting every 3 Hours, in advance of need  - Initiate/Maintain bed alarm  - Obtain necessary fall risk management equipment:   - Apply yellow socks and bracelet for high fall risk patients  - Consider moving patient to room near nurses station  6/26/2024 1007 by Mariajose Hansen  Outcome: Progressing  6/26/2024 1005 by Mariajose Hansen  Outcome: Progressing     Problem: SELF HARM/SUICIDALITY  Goal: Will have no self-injury during hospital stay  Description: INTERVENTIONS:  - Q 15 MINUTES: Routine safety checks  - Q WAKING SHIFT & PRN: Assess risk to determine if routine checks are adequate to maintain patient safety  - Encourage patient to participate actively in care by formulating a plan to combat response to suicidal ideation, identify supports and resources  Outcome: Progressing     Problem: DEPRESSION  Goal: Will be euthymic at discharge  Description: INTERVENTIONS:  - Administer medication as ordered  - Provide emotional support via 1:1 interaction with staff  - Encourage involvement in milieu/groups/activities  - Monitor for social isolation  Outcome: Progressing     Problem: DELFINA  Goal: Will exhibit normal sleep and speech and no impulsivity  Description: INTERVENTIONS:  - Administer medication as ordered  - Set limits on impulsive behavior  - Make attempts to decrease external stimuli as possible  Outcome: Progressing     Problem: PSYCHOSIS  Goal: Will report no hallucinations or  delusions  Description: Interventions:  - Administer medication as  ordered  - Every waking shifts and PRN assess for the presence of hallucinations and or delusions  - Assist with reality testing to support increasing orientation  - Assess if patient's hallucinations or delusions are encouraging self-harm or harm to others and intervene as appropriate  Outcome: Progressing     Problem: BEHAVIOR  Goal: Pt/Family maintain appropriate behavior and adhere to behavioral management agreement, if implemented  Description: INTERVENTIONS:  - Assess the family dynamic   - Encourage verbalization of thoughts and concerns in a socially appropriate manner  - Assess patient/family's coping skills and non-compliant behavior (including use of illegal substances).  - Utilize positive, consistent limit setting strategies supporting safety of patient, staff and others  - Initiate consult with Case Management, Spiritual Care or other ancillary services as appropriate  - If a patient's/visitor's behavior jeopardizes the safety of the patient, staff, or others, refer to organization procedure.   - Notify Security of behavior or suspected illegal substances which indicate the need for search of the patient and/or belongings  - Encourage participation in the decision making process about a behavioral management agreement; implement if patient meets criteria  Outcome: Progressing     Problem: ANXIETY  Goal: Will report anxiety at manageable levels  Description: INTERVENTIONS:  - Administer medication as ordered  - Teach and encourage coping skills  - Provide emotional support  - Assess patient/family for anxiety and ability to cope  Outcome: Progressing  Goal: By discharge: Patient will verbalize 2 strategies to deal with anxiety  Description: Interventions:  - Identify any obvious source/trigger to anxiety  - Staff will assist patient in applying identified coping technique/skills  - Encourage attendance of scheduled groups and  activities  Outcome: Progressing     Problem: SUBSTANCE USE/ABUSE  Goal: Will have no detox symptoms and will verbalize plan for changing substance-related behavior  Description: INTERVENTIONS:  - Monitor physical status and assess for symptoms of withdrawal  - Administer medication as ordered  - Provide emotional support with 1 on 1 interaction with staff  - Encourage recovery focused program/ addiction education  - Assess for verbalization of changing behaviors related to substance abuse  - Initiate consults and referrals as appropriate (Case Management, Spiritual Care, etc.)  Outcome: Progressing  Goal: By discharge, will develop insight into their chemical dependency and sustain motivation to continue in recovery  Description: INTERVENTIONS:  - Attends all daily group sessions and scheduled AA groups  - Actively practices coping skills through participation in the therapeutic community and adherence to program rules  - Reviews and completes assignments from individual treatment plan  - Assist patient development of understanding of their personal cycle of addiction and relapse triggers  Outcome: Progressing  Goal: By discharge, patient will have ongoing treatment plan addressing chemical dependency  Description: INTERVENTIONS:  - Assist patient with resources and/or appointments for ongoing recovery based living  Outcome: Progressing     Problem: SLEEP DISTURBANCE  Goal: Will exhibit normal sleeping pattern  Description: Interventions:  -  Assess the patients sleep pattern, noting recent changes  - Administer medication as ordered  - Decrease environmental stimuli, including noise, as appropriate during the night  - Encourage the patient to actively participate in unit groups and or exercise during the day to enhance ability to achieve adequate sleep at night  - Assess the patient, in the morning, encouraging a description of sleep experience  Outcome: Progressing     Problem: INVOLUNTARY ADMIT  Goal: Will  cooperate with staff recommendations and doctor's orders and will demonstrate appropriate behavior  Description: INTERVENTIONS:  - Treat underlying conditions and offer medication as ordered  - Educate regarding involuntary admission procedures and rules  - Utilize positive consistent limit setting strategies to support patient and staff safety  Outcome: Progressing     Problem: SELF CARE DEFICIT  Goal: Return ADL status to a safe level of function  Description: INTERVENTIONS:  - Administer medication as ordered  - Assess ADL deficits and provide assistive devices as needed  - Obtain PT/OT consults as needed  - Assist and instruct patient to increase activity and self care as tolerated  Outcome: Progressing     Problem: SPIRITUAL CARE  Goal: Pt/Family able to move forward in process of forgiving self, others and/or higher power  Description: INTERVENTIONS:  - Assist patient with any spiritual needs/requests such as communion, confession, anointing, etc  - Explore guilt and help patient/family identify possible spiritual/cultural beliefs and values  - Explore possibilities of making amends & reconciliation with self, others, and/or a greater power  - Guide patient/family in identifying painful feelings  - Help patient explore and identify spiritual beliefs, cultural understandings or values that may help or hinder letting go of issue  - Help patient explore feelings of anger, bitterness, resentment, anxiety   Help patient/family identify and examine the situation in which these feelings are experienced  - Help patient/family identify destructive displacement of feelings onto other individuals  - Refer patient to formal counseling and/or to freedom community for further support as needed or per request  Outcome: Progressing  Goal: Patient feels balance and connection with others and/or higher power that empowers the self during times of loss, guilt and fear  Description: INTERVENTIONS:  - Create safety for patient  through empathic presence and non-judgmental listening  - Encourage patient to explore his/her values, beliefs and/or spiritual images and practices  - Encourage use of breath work, imagery, meditation, relaxation, reiki to ease distress and provide healing  - Encourage use of cultural and spiritual celebrations and rituals  - Facilitate discussion that helps patient sort out spiritual concerns  - Help patient identify where meaning/hope/comfort & strength are in his/her life  - Refer patient to freedom community for assistance, as appropriate  - Respond to patient/family need for prayer/ritual/sacrament/ceremony  Outcome: Progressing

## 2024-06-26 NOTE — ASSESSMENT & PLAN NOTE
Will continue with Synthroid although I think her does need to be lowered, her TSH both in January and during this visit was low.  Will reduce Synthroid to 50 mcg daily.

## 2024-06-26 NOTE — UTILIZATION REVIEW
"Initial Clinical Review    6/25 - Treatment started in ED - meeting for 1 MN stay    Observation 6/26 @ 0153 and changed to Inpatient on 6/26 @ 1712. Pt requiring continued stay for Suicidal Attempt via medication overdose on continual 1:1 in person observation. 302.    Admission: Date/Time/Statement:   Admission Orders (From admission, onward)       Ordered        06/26/24 1712  INPATIENT ADMISSION  Once            06/26/24 0153  Place in Observation  Once                          Orders Placed This Encounter   Procedures    INPATIENT ADMISSION     Standing Status:   Standing     Number of Occurrences:   1     Order Specific Question:   Level of Care     Answer:   Med Surg [16]     Order Specific Question:   Estimated length of stay     Answer:   More than 2 Midnights     Order Specific Question:   Certification     Answer:   I certify that inpatient services are medically necessary for this patient for a duration of greater than two midnights. See H&P and MD Progress Notes for additional information about the patient's course of treatment.     ED Arrival Information       Expected   -    Arrival   6/25/2024 23:10    Acuity   Emergent              Means of arrival   Ambulance    Escorted by   [facility] ([facility])    Service   Hospitalist    Admission type   Emergency              Arrival complaint   Overdose             Chief Complaint   Patient presents with    Overdose - Intentional     Arrives ems from home, per ems, pt took handful of tramadol robaxin and xanax \" a couple hours ago\" pt is lethargic, oriented to self and place during triage. Pts  was home per ems, he didn't witness the pt take meds. Pt has suicide note written, brought in by ems.        Initial Presentation: 69 y.o. female to ED presents after she called paramedics shortly after taking bunch of her pills including Robaxin and Xanax, each around 10 to 12 tablets.  She appears slightly confused and was unable to provide full history of why she " "made that decision but she stated that \"she wanted to put an end to this misery \".  Pt was found to be slightly lethargic, IV hydrated, oxygenated. On exam; noted mild wheezing, placed on O2 and nebs given. Placed on 1:1 Observation.  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  Admit to Observation Dx; Suicidal behavior with attempted self-injury. Continue 1:1 Observation. Psychiatry consult. Continue home med with adjustments.   Pt reports she had done this before.     6/26 Changed to Inpatient status  Progress notes; Suicidal behavior with attempted self-injury.   Continue 1:1 Observation. Psychiatry consult pending.     Behavioral Health/ Psychiatry cons; Intentional overdose. Suicide attempt via overdose. On eval today, pt continues to endorse symptoms of depression and hopelessness. 302 for involuntary admission has been filed, awaiting inpatient psychiatry placement   Dx; Suicidal behavior with attempted self-injury   Major Depressive Disorder, recurrent, severe, without psychotic features    Recommend no changes to psychotropic meds at this time.   Continue in person 1:1 observation.  Continue medical management  302 for involuntary admission has been filed, awaiting inpatient psychiatry placement     Date: 6/27  Day 3: Has surpassed a 2nd midnight with active treatments and services.  Continue 1:1 in person Observation. 302, awaiting IP Psych placement.  C/o occasional wheezing. No suicidal or homicidal ideations       ED Triage Vitals   Temperature Pulse Respirations Blood Pressure SpO2 Pain Score   06/25/24 2314 06/25/24 2314 06/25/24 2314 06/25/24 2314 06/25/24 2314 06/26/24 0200   98 °F (36.7 °C) 99 16 109/65 94 % No Pain     Weight (last 2 days)       Date/Time Weight    06/26/24 0230 88.7 (195.55)            Vital Signs (last 3 days)       Date/Time Temp Pulse Resp " BP MAP (mmHg) SpO2 Calculated FIO2 (%) - Nasal Cannula Nasal Cannula O2 Flow Rate (L/min) O2 Device Patient Position - Orthostatic VS Trudy Coma Scale Score Pain    06/27/24 0645 98.4 °F (36.9 °C) 87 17 132/69 -- 95 % -- -- None (Room air) -- -- --    06/27/24 0223 97.9 °F (36.6 °C) 81 17 108/61 78 93 % -- -- -- -- -- --    06/26/24 2321 98.1 °F (36.7 °C) -- 16 109/53 77 -- -- -- None (Room air) Sitting -- --    06/26/24 2045 -- -- -- -- -- -- -- -- None (Room air) -- 15 No Pain    06/26/24 1900 98.4 °F (36.9 °C) -- -- 108/53 74 -- -- -- None (Room air) Lying -- --    06/26/24 1532 -- -- -- 99/53 -- -- -- -- -- -- -- --    06/26/24 1500 98 °F (36.7 °C) 84 19 94/49 69 90 % -- -- None (Room air) Sitting -- --    06/26/24 1001 -- -- -- -- -- -- -- -- -- -- 15 No Pain    06/26/24 0822 -- 89 18 113/57 82 92 % -- -- None (Room air) Sitting -- --    06/26/24 0726 98.3 °F (36.8 °C) -- 21 92/52 70 93 % -- -- -- Lying -- --    06/26/24 0234 97.7 °F (36.5 °C) -- 21 128/58 83 -- -- -- -- -- -- --    06/26/24 0200 -- 93 20 115/61 82 98 % -- -- -- -- -- No Pain    06/26/24 0145 -- 84 20 114/55 81 97 % 28 2 L/min Nasal cannula -- -- --    06/26/24 0137 -- 83 19 140/60 87 99 % 28 2 L/min Nasal cannula -- -- --    06/26/24 0130 -- 84 20 108/59 78 100 % 28 2 L/min Nasal cannula -- -- --    06/26/24 0128 -- -- -- -- -- -- -- -- Nasal cannula -- -- --    06/26/24 0115 -- 94 20 107/56 80 97 % 28 2 L/min Nasal cannula -- -- --    06/26/24 0103 -- 85 20 107/67 82 98 % 28 2 L/min Nasal cannula -- -- --    06/26/24 0045 -- 82 19 97/51 72 98 % 28 2 L/min Nasal cannula Lying -- --    06/26/24 0040 -- -- -- -- -- -- -- -- -- -- 15 --    06/26/24 0015 -- 84 20 98/55 70 94 % 28 2 L/min Nasal cannula -- -- --    06/26/24 0000 -- -- 16 105/57 74 95 % 28 2 L/min Nasal cannula -- -- --    06/25/24 2345 -- 89 16 111/59 77 94 % 28 2 L/min Nasal cannula -- -- --    06/25/24 2340 -- 89 20 -- -- 96 % 28 2 L/min Nasal cannula -- -- --    06/25/24 2338  -- -- -- -- -- -- -- -- Nasal cannula  -- -- --    06/25/24 2337 -- 89 18 -- -- 89 % -- -- None (Room air) -- -- --    06/25/24 2330 -- 91 21 108/55 75 95 % -- -- -- -- -- --    06/25/24 2320 -- -- -- -- -- -- -- -- -- -- 14 --    06/25/24 2315 -- 109 20 154/83 112 95 % -- -- None (Room air) -- -- --    06/25/24 2314 98 °F (36.7 °C) 99 16 109/65 -- 94 % -- -- None (Room air) -- -- --              Pertinent Labs/Diagnostic Test Results:   Radiology:  XR chest 1 view portable   Final Interpretation by Lenora Terry MD (06/26 0854)      No acute consolidation      Mild increased lung markings may be due to hypoinflation or mild congestion      The study was marked in EPIC for significant notification.         Workstation performed: [id number]           Cardiology:  ECG 12 lead   Final Result by Abdifatah Manzo MD (06/26 8307)   Sinus tachycardia   Rightward axis   Borderline ECG   Confirmed by Abdifatah Manzo ([id number]) on 6/26/2024 1:23:52 PM        GI:  No orders to display           Results from last 7 days   Lab Units 06/25/24  2322   WBC Thousand/uL 8.17   HEMOGLOBIN g/dL 13.0   HEMATOCRIT % 40.4   PLATELETS Thousands/uL 251   TOTAL NEUT ABS Thousands/µL 5.04         Results from last 7 days   Lab Units 06/25/24  2322   SODIUM mmol/L 139   POTASSIUM mmol/L 4.1   CHLORIDE mmol/L 100   CO2 mmol/L 29   ANION GAP mmol/L 10   BUN mg/dL 11   CREATININE mg/dL 0.75   EGFR ml/min/1.73sq m 81   CALCIUM mg/dL 9.6     Results from last 7 days   Lab Units 06/25/24  2322   AST U/L 17   ALT U/L 12   ALK PHOS U/L 82   TOTAL PROTEIN g/dL 7.1   ALBUMIN g/dL 4.1   TOTAL BILIRUBIN mg/dL 0.42         Results from last 7 days   Lab Units 06/25/24  2322   GLUCOSE RANDOM mg/dL 128       Results from last 7 days   Lab Units 06/25/24  2337   PH RONIT  7.422*   PCO2 RONIT mm Hg 33.1*   PO2 RONIT mm Hg 44.0   HCO3 RONIT mmol/L 21.1*   BASE EXC RONIT mmol/L -2.7   O2 CONTENT RONIT ml/dL 13.6   O2 HGB, VENOUS % 82.3*             Results from last 7 days    Lab Units 06/26/24  0256 06/26/24  0114 06/25/24  2322   HS TNI 0HR ng/L  --   --  <2   HS TNI 2HR ng/L  --  <2  --    HS TNI 4HR ng/L <2  --   --              Results from last 7 days   Lab Units 06/25/24  2322   TSH 3RD GENERATON uIU/mL 0.215*       Results from last 7 days   Lab Units 06/26/24  2356   CLARITY UA  Turbid   COLOR UA  Yellow   SPEC GRAV UA  1.020   PH UA  5.0   GLUCOSE UA mg/dl Negative   KETONES UA mg/dl Negative   BLOOD UA  Negative   PROTEIN UA mg/dl Negative   NITRITE UA  Negative   BILIRUBIN UA  Negative   UROBILINOGEN UA (BE) mg/dl <2.0   LEUKOCYTES UA  Large*   WBC UA /hpf 10-20*   RBC UA /hpf 10-20*   BACTERIA UA /hpf None Seen   EPITHELIAL CELLS WET PREP /hpf Moderate*   MUCUS THREADS  Moderate*             Results from last 7 days   Lab Units 06/26/24  2354   AMPH/METH  Negative   BARBITURATE UR  Negative   BENZODIAZEPINE UR  Positive*   COCAINE UR  Negative   METHADONE URINE  Negative   OPIATE UR  Negative   PCP UR  Negative   THC UR  Negative     Results from last 7 days   Lab Units 06/25/24  2322   ETHANOL LVL mg/dL <10   ACETAMINOPHEN LVL ug/mL <2*   SALICYLATE LVL mg/dL <5         ED Treatment-Medication Administration from 06/25/2024 2310 to 06/26/2024 0216         Date/Time Order Dose Route Action     06/26/2024 0130 albuterol inhalation solution 5 mg 5 mg Nebulization Given     06/26/2024 0130 ipratropium (ATROVENT) 0.02 % inhalation solution 0.5 mg 0.5 mg Nebulization Given            Past Medical History:   Diagnosis Date    Allergic rhinitis     Anemia     Cancer (HCC)     breast cancer, Left side    Colon polyp     Depression     Disease of thyroid gland     GERD (gastroesophageal reflux disease)     Hyperlipemia     Hypertension     Hypothyroidism     Last assessed: 3/25/14    Obesity     Osteoarthritis     Pre-operative laboratory examination     Pulmonary embolism (HCC)      Present on Admission:   HTN (hypertension)   Asthma   Hyperlipidemia   Hypothyroidism   History of  pulmonary embolism      Admitting Diagnosis: Overdose [T50.901A]  Suicide attempt (HCC) [T14.91XA]  Age/Sex: 69 y.o. female  Admission Orders:  Scheduled Medications:  amLODIPine, 5 mg, Oral, Daily  apixaban, 2.5 mg, Oral, BID  atorvastatin, 40 mg, Oral, Daily  fluticasone, 1 puff, Inhalation, Daily  levothyroxine, 50 mcg, Oral, Early Morning  lisinopril, 40 mg, Oral, Daily  pantoprazole, 40 mg, Oral, BID      Continuous IV Infusions: None     PRN Meds:  acetaminophen, 650 mg, Oral, Q6H PRN  albuterol, 2 puff, Inhalation, Q4H PRN  ALPRAZolam, 0.25 mg, Oral, TID PRN  ondansetron, 4 mg, Intravenous, Q6H PRN        IP CONSULT TO PSYCHIATRY  IP CONSULT TO ED CRISIS WORKER    Network Utilization Review Department  ATTENTION: Please call with any questions or concerns to [phone] and carefully listen to the prompts so that you are directed to the right person. All voicemails are confidential.   For Discharge needs, contact Care Management DC Support Team at [phone] opt. [phone]  Send all requests for admission clinical reviews, approved or denied determinations and any other requests to dedicated fax number below belonging to the campus where the patient is receiving treatment. List of dedicated fax numbers for the Facilities:  FACILITY NAME UR FAX NUMBER   ADMISSION DENIALS (Administrative/Medical Necessity) [phone]   DISCHARGE SUPPORT TEAM (NETWORK) [phone]   PARENT CHILD HEALTH (Maternity/NICU/Pediatrics) [phone]   [facility] [phone]   [facility] [phone]   [facility] [phone]   [facility] [phone]   [facility] [phone]   [facility] [phone]   [facility] [phone]   [facility] [phone]   [facility] [phone]   [facility]  [facility] [phone]   [facility] [phone]   [facility] [phone]

## 2024-06-26 NOTE — ASSESSMENT & PLAN NOTE
Patient has some evidence of wheezing at this point, on examination she ha expiratory wheezings mostly at the lower lung fields, chest x-ray was reviewed which is not quite informative, it is unclear if she had any altered sensorium which may have predisposed her for any aspiration, will continue with nebulizer treatment as needed, monitor oxygen requirement, patient was not hypoxic so far.  Her picture is not compatible with asthma exacerbation.

## 2024-06-26 NOTE — ASSESSMENT & PLAN NOTE
Patient currently is a stable, she tells me that she had done this before, she took bunch of medications that she had at home including Xanax and Robaxin but then she changed her mind and she was the one who called paramedics and asked to be brought to the hospital.  She is now recovering slowly, during my encounter, she appeared slightly confused and mostly was tangential in conversation.  Once medically she is stable, she needs to be seen by psychiatry.  Continue with one-to-one observation.

## 2024-06-26 NOTE — CASE MANAGEMENT
Case Management Assessment & Discharge Planning Note    Patient name Benjamin Puga  Location /-01 MRN [id number]  : 1955 Date 2024       Current Admission Date: 2024  Current Admission Diagnosis:Suicidal behavior with attempted self-injury (HCC)   Patient Active Problem List    Diagnosis Date Noted Date Diagnosed    Suicidal behavior with attempted self-injury ([facility]) 2024     Pain with swallowing 2021     IBS (irritable bowel syndrome) 2021     GERD (gastroesophageal reflux disease) 2021     Morbid obesity with BMI of 40.0-44.9, adult ([facility]) 2021     History of pulmonary embolism 2021     PTSD (post-traumatic stress disorder) 2021     Other hydronephrosis 2020     Anemia 2020     Gross hematuria 2020     Diastasis recti 07/10/2019     Ductal carcinoma in situ (DCIS) of left breast 2019     Asthma 2018     HTN (hypertension) 2018     Current moderate episode of major depressive disorder without prior episode ([facility]) 2017     Candidal intertrigo 2016     Peripheral neuropathy 2016     Allergic rhinitis 2014     Hyperlipidemia 2014     Hypothyroidism 2014     Impaired fasting glucose 2014     Herpes simplex infection 2014     Anxiety 2014       LOS (days): 0  Geometric Mean LOS (GMLOS) (days):   Days to GMLOS:     OBJECTIVE:              Current admission status: Observation       Preferred Pharmacy:   [id number] - [place], [place] - [address]  [facility]  [facility] [address]  Phone: [phone] Fax: [phone]    [facility] - [place], [place] - [address]  [address] [address]  Phone: [phone] Fax: [phone]    Primary Care Provider: Roland Proctor MD    Primary Insurance: [employer] [facility]  Secondary Insurance:     ASSESSMENT:  Active Health Care Proxies    There  are no active Health Care Proxies on file.       Advance Directives  Does patient have a Health Care POA?: Yes  Does patient have Advance Directives?: Yes  Advance Directives: Power of  for health care  Primary Contact: Stepan Puga (Spouse)  [phone]         Readmission Root Cause  30 Day Readmission: No    Patient Information  Admitted from:: Home  Mental Status: Alert  During Assessment patient was accompanied by: Not accompanied during assessment  Assessment information provided by:: Patient  Primary Caregiver: Self  Support Systems: Spouse/significant other, Family members, Friends/neighbors  County of Residence: [place]  [place] [facility] do you live in?: [place]  Home entry access options. Select all that apply.: Stairs  Number of steps to enter home.: 4  Do the steps have railings?: Yes  Type of Current Residence: [address] [place] home  Upon entering residence, is there a bedroom on the main floor (no further steps)?: Yes  Upon entering residence, is there a bathroom on the main floor (no further steps)?: Yes  Living Arrangements: Lives w/ Spouse/significant other  Is patient a ?: No    Activities of Daily Living Prior to Admission  Functional Status: Independent  Completes ADLs independently?: Yes  Ambulates independently?: Yes  Does patient use assisted devices?: No  Does patient currently own DME?: No  Does patient have a history of Outpatient Therapy (PT/OT)?: No  Does the patient have a history of Short-Term Rehab?: No  Does patient have a history of HHC?: Yes ([facility])  Does patient currently have HHC?: No         Patient Information Continued  Income Source: SSI/SSD  Does patient have prescription coverage?: Yes  Does patient receive dialysis treatments?: No  Does patient have a history of substance abuse?: No  Does patient have a history of Mental Health Diagnosis?: No         Means of Transportation  Means of Transport to [facility]:: Family transport      Social Determinants of Health  ([facility])      Flowsheet Row Most Recent Value   Housing Stability    In the last 12 months, was there a time when you were not able to pay the mortgage or rent on time? N   In the past 12 months, how many times have you moved where you were living? 1   At any time in the past 12 months, were you homeless or living in a shelter (including now)? N   Transportation Needs    In the past 12 months, has lack of transportation kept you from medical appointments or from getting medications? no   In the past 12 months, has lack of transportation kept you from meetings, work, or from getting things needed for daily living? No   Food Insecurity    Within the past 12 months, you worried that your food would run out before you got the money to buy more. Never true   Within the past 12 months, the food you bought just didn't last and you didn't have money to get more. Never true   Utilities    In the past 12 months has the electric, gas, oil, or water company threatened to shut off services in your home? No            DISCHARGE DETAILS:    Discharge planning discussed with:: Pt at bedside  [place] of Choice: Yes  Comments - [place] of Choice: CM met with pt at bedside and introduced self/role. Pt is  able to make her needs known and encouraged to do so. CRISIS consult, warm handoff completed and forward to crisis. Pt is 302 and pending psych consult. CM continues to follow and assist with pt dc planss.  CM contacted family/caregiver?: Yes  Were Treatment Team discharge recommendations reviewed with patient/caregiver?: Yes  Did patient/caregiver verbalize understanding of patient care needs?: Yes  Were patient/caregiver advised of the risks associated with not following Treatment Team discharge recommendations?: Yes    Contacts  Patient Contacts: Stepan Puga (Spouse)  Relationship to Patient:: Family  Contact Method: Phone  Phone Number: [phone]  Reason/Outcome: Continuity of Care, Emergency Contact, Referral,  Discharge Planning    Requested Home Health Care         Is the patient interested in HHC at discharge?: No    DME Referral Provided  Referral made for DME?: No    Other Referral/Resources/Interventions Provided:  Interventions: None Indicated    Would you like to participate in our [facility] service program?  : No - Declined    Treatment Team Recommendation: Home  Discharge Destination Plan:: Home  Transport at Discharge : Behavioral Health Transfer  Dispatcher Contacted: No

## 2024-06-26 NOTE — ED NOTES
SBAR sent to floor, 1:1 will go up and stay with patient, 302 also being transported with 1:1      Jagruti Echevarria  06/26/24 4535

## 2024-06-26 NOTE — PLAN OF CARE
Problem: Potential for Falls  Goal: Patient will remain free of falls  Description: INTERVENTIONS:  - Educate patient/family on patient safety including physical limitations  - Instruct patient to call for assistance with activity   - Consult OT/PT to assist with strengthening/mobility   - Keep Call bell within reach  - Keep bed low and locked with side rails adjusted as appropriate  - Keep care items and personal belongings within reach  - Initiate and maintain comfort rounds  - Make Fall Risk Sign visible to staff  - Offer Toileting every 3 Hours, in advance of need  - Initiate/Maintain bed alarm  - Obtain necessary fall risk management equipment:   - Apply yellow socks and bracelet for high fall risk patients  - Consider moving patient to room near nurses station  Outcome: Progressing
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
## 2024-06-26 NOTE — ASSESSMENT & PLAN NOTE
Blood pressures currently controlled, resume home regimen of amlodipine 5 mg daily and lisinopril 40 mg daily.

## 2024-06-26 NOTE — ED NOTES
Patients meds that were brought in by EMS- will be given to patients  Ramiro once he leaves.      Jagruti Echevarria  06/26/24 0059
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
## 2024-06-26 NOTE — ED PROVIDER NOTES
"History  Chief Complaint   Patient presents with    Overdose - Intentional     Arrives ems from home, per ems, pt took handful of tramadol robaxin and xanax \" a couple hours ago\" pt is lethargic, oriented to self and place during triage. Pts  was home per ems, he didn't witness the pt take meds. Pt has suicide note written, brought in by ems.      69-year-old female presented to the emergency department for evaluation of overdose.  Patient states she took approximately 10 to 20 pills each of Robaxin and Xanax and tramadol.  Patient is explicit that she notices an attempt to commit suicide and presents as well with a suicide note.  Patient is slightly somnolent but easily arousable.  Her eyes are open.  She offers no other physical complaints at this time.        Prior to Admission Medications   Prescriptions Last Dose Informant Patient Reported? Taking?   ALPRAZolam (XANAX) 1 mg tablet   No No   Sig: TAKE ONE-HALF TO ONE TABLET THREE TIMES A DAY AS NEEDED   DULoxetine (CYMBALTA) 30 mg delayed release capsule  Self No No   Sig: TAKE ONE CAPSULE BY MOUTH EVERY DAY WITH 60 MG CAP   DULoxetine (CYMBALTA) 30 mg delayed release capsule   No No   Sig: TAKE ONE CAPSULE BY MOUTH EVERY DAY WITH 60MG CAP   DULoxetine (CYMBALTA) 60 mg delayed release capsule   No No   Sig: TAKE ONE CAPSULE BY MOUTH EVERY DAY   Multiple Vitamins-Minerals (OCUVITE ADULT 50+ PO)  Self Yes No   Sig: Take by mouth   PARoxetine (PAXIL) 10 mg tablet  Self No No   Sig: TAKE ONE TABLET BY MOUTH EVERY DAY   Patient not taking: Reported on 4/8/2024   PARoxetine (PAXIL) 10 mg tablet  Self No No   Sig: Take 1 tablet (10 mg total) by mouth daily   PEG 3350-KCl-NaBcb-NaCl-NaSulf (PEG 3350/ELECTROLYTES PO)  Self Yes No   Triamcinolone Acetonide (Nasacort Allergy 24HR) 55 MCG/ACT AERO  Self Yes No   Sig: into each nostril as needed    albuterol (PROVENTIL HFA,VENTOLIN HFA) 90 mcg/act inhaler  Self Yes No   Sig: Inhale 2 puffs every 6 (six) hours as " needed for wheezing   amLODIPine (NORVASC) 5 mg tablet  Self No No   Sig: TAKE 1 TABLET BY MOUTH DAILY   apixaban (Eliquis) 2.5 mg   No No   Sig: Take 1 tablet (2.5 mg total) by mouth 2 (two) times a day   atorvastatin (LIPITOR) 40 mg tablet  Self Yes No   Sig: Take 1 tablet by mouth daily   azelastine (ASTELIN) 0.1 % nasal spray  Self Yes No   Sig: Spray 2 sprays twice a day by intranasal route.   beclomethasone (Qvar RediHaler) 40 MCG/ACT inhaler  Self Yes No   Sig: Inhale 2 puffs 2 (two) times a day Rinse mouth after use.   clotrimazole (LOTRIMIN) 1 % cream  Self Yes No   Sig: as needed    clotrimazole-betamethasone (LOTRISONE) 1-0.05 % cream  Self No No   Sig: APPLY TO SKIN FOLDS TWO TIMES A DAY   famotidine (PEPCID) 10 mg tablet  Self No No   Sig: Take 1 tablet (10 mg total) by mouth daily   ferrous sulfate 325 (65 Fe) mg tablet  Self Yes No   Sig: Take 325 mg by mouth daily   hydrOXYzine HCL (ATARAX) 25 mg tablet   No No   Sig: Take 1 tablet (25 mg total) by mouth every 6 (six) hours as needed for itching   hydrocortisone-pramoxine (ANALPRAM-HC) 2.5-1 % rectal cream  Self No No   Sig: Apply topically 3 (three) times a day   levothyroxine 88 mcg tablet  Self No No   Sig: TAKE 1 TABLET BY MOUTH DAILY  EXCEPT TAKE 2 TABLETS BY MOUTH  ON SUNDAY   lisinopril (ZESTRIL) 40 mg tablet   No No   Sig: Take 1 tablet (40 mg total) by mouth daily   methocarbamol (ROBAXIN) 500 mg tablet   No No   Sig: TAKE ONE TABLET BY MOUTH FOUR TIMES A DAY AS NEEDED FOR MUSCLE SPASMS   mometasone-formoterol (DULERA) 100-5 MCG/ACT inhaler  Self Yes No   Sig: Inhale   nystatin powder  Self No No   Sig: APPLY TO AFFECTED AREA(S) TWO TIMES A DAY   pantoprazole (PROTONIX) 40 mg tablet  Self No No   Sig: TAKE 1 TABLET BY MOUTH  TWICE DAILY   rosuvastatin (CRESTOR) 10 MG tablet   No No   Sig: Take 1 tablet (10 mg total) by mouth daily   rosuvastatin (CRESTOR) 10 MG tablet   No No   Sig: Take 1 tablet (10 mg total) by mouth daily   traMADol  (ULTRAM) 50 mg tablet  Self No No   Sig: Take 1 tablet (50 mg total) by mouth every 8 (eight) hours as needed for moderate pain      Facility-Administered Medications: None       Past Medical History:   Diagnosis Date    Allergic rhinitis     Anemia     Cancer (HCC)     breast cancer, Left side    Colon polyp     Depression     Disease of thyroid gland     GERD (gastroesophageal reflux disease)     Hyperlipemia     Hypertension     Hypothyroidism     Last assessed: 3/25/14    Obesity     Osteoarthritis     Pre-operative laboratory examination     Pulmonary embolism (HCC)        Past Surgical History:   Procedure Laterality Date    HAND SURGERY Left 03/2020    HYSTERECTOMY      INCISIONAL BREAST BIOPSY      JOINT REPLACEMENT      KNEE ARTHROPLASTY      MAMMO NEEDLE LOCALIZATION LEFT (ALL INC) Left 6/6/2019    ROTATOR CUFF REPAIR      SHOULDER SURGERY      TRIGGER FINGER RELEASE      TRIGGER FINGER RELEASE         Family History   Problem Relation Age of Onset    Coronary artery disease Mother     Hypertension Mother         Essential    Coronary artery disease Father      I have reviewed and agree with the history as documented.    E-Cigarette/Vaping    E-Cigarette Use Never User      E-Cigarette/Vaping Substances    Nicotine No     THC No     CBD No     Flavoring No     Other No     Unknown No      Social History     Tobacco Use    Smoking status: Never    Smokeless tobacco: Never   Vaping Use    Vaping status: Never Used   Substance Use Topics    Alcohol use: Yes     Comment: socially     Drug use: No       Review of Systems   Constitutional:  Negative for appetite change, chills, fatigue and fever.   HENT:  Negative for sneezing and sore throat.    Eyes:  Negative for visual disturbance.   Respiratory:  Negative for cough, choking, chest tightness, shortness of breath and wheezing.    Cardiovascular:  Negative for chest pain and palpitations.   Gastrointestinal:  Negative for abdominal pain, constipation,  diarrhea, nausea and vomiting.   Genitourinary:  Negative for difficulty urinating and dysuria.   Neurological:  Negative for dizziness, weakness, light-headedness, numbness and headaches.   Psychiatric/Behavioral:  Positive for suicidal ideas.    All other systems reviewed and are negative.      Physical Exam  Physical Exam  Vitals and nursing note reviewed.   Constitutional:       General: She is not in acute distress.     Appearance: She is well-developed. She is not diaphoretic.   HENT:      Head: Normocephalic and atraumatic.   Eyes:      Pupils: Pupils are equal, round, and reactive to light.   Neck:      Vascular: No JVD.      Trachea: No tracheal deviation.   Cardiovascular:      Rate and Rhythm: Normal rate and regular rhythm.      Heart sounds: Normal heart sounds. No murmur heard.     No friction rub. No gallop.   Pulmonary:      Effort: Pulmonary effort is normal. No respiratory distress.      Breath sounds: Normal breath sounds. No wheezing or rales.   Abdominal:      General: Bowel sounds are normal. There is no distension.      Palpations: Abdomen is soft.      Tenderness: There is no abdominal tenderness. There is no guarding or rebound.   Skin:     General: Skin is warm and dry.      Coloration: Skin is not pale.   Neurological:      Mental Status: She is alert and oriented to person, place, and time.      Cranial Nerves: No cranial nerve deficit.      Motor: No abnormal muscle tone.   Psychiatric:         Behavior: Behavior normal.         Vital Signs  ED Triage Vitals   Temperature Pulse Respirations Blood Pressure SpO2   06/25/24 2314 06/25/24 2314 06/25/24 2314 06/25/24 2314 06/25/24 2314   98 °F (36.7 °C) 99 16 109/65 94 %      Temp Source Heart Rate Source Patient Position - Orthostatic VS BP Location FiO2 (%)   06/25/24 2314 06/25/24 2314 06/26/24 0045 06/26/24 0045 --   Temporal Monitor Lying Right arm       Pain Score       06/26/24 0200       No Pain           Vitals:    06/26/24 0137  06/26/24 0145 06/26/24 0200 06/26/24 0234   BP: 140/60 114/55 115/61 128/58   Pulse: 83 84 93    Patient Position - Orthostatic VS:             Visual Acuity  Visual Acuity      Flowsheet Row Most Recent Value   L Pupil Size (mm) 4   R Pupil Size (mm) 4            ED Medications  Medications   atorvastatin (LIPITOR) tablet 40 mg (has no administration in time range)   ALPRAZolam (XANAX) tablet 0.25 mg (has no administration in time range)   amLODIPine (NORVASC) tablet 5 mg (has no administration in time range)   apixaban (ELIQUIS) tablet 2.5 mg (has no administration in time range)   fluticasone (ARNUITY ELLIPTA) 100 MCG/ACT inhaler 1 puff (has no administration in time range)   lisinopril (ZESTRIL) tablet 40 mg (has no administration in time range)   pantoprazole (PROTONIX) EC tablet 40 mg (has no administration in time range)   acetaminophen (TYLENOL) tablet 650 mg (has no administration in time range)   ondansetron (ZOFRAN) injection 4 mg (has no administration in time range)   levothyroxine tablet 50 mcg (has no administration in time range)   albuterol inhalation solution 5 mg (5 mg Nebulization Given 6/26/24 0130)   ipratropium (ATROVENT) 0.02 % inhalation solution 0.5 mg (0.5 mg Nebulization Given 6/26/24 0130)       Diagnostic Studies  Results Reviewed       Procedure Component Value Units Date/Time    HS Troponin I 4hr [[id number]] Collected: 06/26/24 0256    Lab Status: In process Specimen: Blood from Arm, Right Updated: 06/26/24 0303    HS Troponin I 2hr [[id number]] Collected: 06/26/24 0114    Lab Status: Final result Specimen: Blood from Arm, Left Updated: 06/26/24 0145     hs TnI 2hr <2 ng/L      Delta 2hr hsTnI --    TSH [[id number]]  (Abnormal) Collected: 06/25/24 2322    Lab Status: Final result Specimen: Blood from Arm, Left Updated: 06/26/24 0003     TSH 3RD GENERATON 0.215 uIU/mL     Comprehensive metabolic panel [[id number]] Collected: 06/25/24 2322    Lab Status: Final result Specimen: Blood from  Arm, Left Updated: 06/26/24 0003     Sodium 139 mmol/L      Potassium 4.1 mmol/L      Chloride 100 mmol/L      CO2 29 mmol/L      ANION GAP 10 mmol/L      BUN 11 mg/dL      Creatinine 0.75 mg/dL      Glucose 128 mg/dL      Calcium 9.6 mg/dL      AST 17 U/L      ALT 12 U/L      Alkaline Phosphatase 82 U/L      Total Protein 7.1 g/dL      Albumin 4.1 g/dL      Total Bilirubin 0.42 mg/dL      eGFR 81 ml/min/1.73sq m     Narrative:      [employer] guidelines for Chronic Kidney Disease (CKD):     Stage 1 with normal or high GFR (GFR > 90 mL/min/1.73 square meters)    Stage 2 Mild CKD (GFR = 60-89 mL/min/1.73 square meters)    Stage 3A Moderate CKD (GFR = 45-59 mL/min/1.73 square meters)    Stage 3B Moderate CKD (GFR = 30-44 mL/min/1.73 square meters)    Stage 4 Severe CKD (GFR = 15-29 mL/min/1.73 square meters)    Stage 5 End Stage CKD (GFR <15 mL/min/1.73 square meters)  Note: GFR calculation is accurate only with a steady state creatinine    Acetaminophen level-If concentration is detectable, please discuss with medical  on call. [[id number]]  (Abnormal) Collected: 06/25/24 2322    Lab Status: Final result Specimen: Blood from Arm, Left Updated: 06/26/24 0003     Acetaminophen Level <2 ug/mL     Salicylate level [[id number]]  (Normal) Collected: 06/25/24 2322    Lab Status: Final result Specimen: Blood from Arm, Left Updated: 06/26/24 0003     Salicylate Lvl <5 mg/dL     HS Troponin 0hr (reflex protocol) [[id number]]  (Normal) Collected: 06/25/24 2322    Lab Status: Final result Specimen: Blood from Arm, Left Updated: 06/25/24 2354     hs TnI 0hr <2 ng/L     Ethanol [[id number]]  (Normal) Collected: 06/25/24 2322    Lab Status: Final result Specimen: Blood from Arm, Left Updated: 06/25/24 2352     Ethanol Lvl <10 mg/dL     Blood gas, venous [[id number]]  (Abnormal) Collected: 06/25/24 2337    Lab Status: Final result Specimen: Blood from Arm, Left Updated: 06/25/24 2345     pH, Juanito 7.422      pCO2, Juanito 33.1 mm Hg      pO2, Juanito 44.0 mm Hg      HCO3, Juanito 21.1 mmol/L      Base Excess, Juanito -2.7 mmol/L      O2 Content, Juanito 13.6 ml/dL      O2 HGB, VENOUS 82.3 %     CBC and differential [[id number]]  (Abnormal) Collected: 06/25/24 2322    Lab Status: Final result Specimen: Blood from Arm, Left Updated: 06/25/24 2333     WBC 8.17 Thousand/uL      RBC 4.62 Million/uL      Hemoglobin 13.0 g/dL      Hematocrit 40.4 %      MCV 87 fL      MCH 28.1 pg      MCHC 32.2 g/dL      RDW 13.2 %      MPV 8.8 fL      Platelets 251 Thousands/uL      nRBC 0 /100 WBCs      Segmented % 62 %      Immature Grans % 0 %      Lymphocytes % 27 %      Monocytes % 7 %      Eosinophils Relative 4 %      Basophils Relative 0 %      Absolute Neutrophils 5.04 Thousands/µL      Absolute Immature Grans 0.02 Thousand/uL      Absolute Lymphocytes 2.23 Thousands/µL      Absolute Monocytes 0.56 Thousand/µL      Eosinophils Absolute 0.29 Thousand/µL      Basophils Absolute 0.03 Thousands/µL     Rapid drug screen, urine [[id number]]     Lab Status: No result Specimen: Urine     UA w Reflex to Microscopic w Reflex to Culture [[id number]]     Lab Status: No result Specimen: Urine                    XR chest 1 view portable    (Results Pending)              Procedures  Procedures         ED Course                               SBIRT 22yo+      Flowsheet Row Most Recent Value   Initial Alcohol Screen: US AUDIT-C     1. How often do you have a drink containing alcohol? 0 Filed at: 06/26/2024 0000   2. How many drinks containing alcohol do you have on a typical day you are drinking?  0 Filed at: 06/26/2024 0000   3b. FEMALE Any Age, or MALE 65+: How often do you have 4 or more drinks on one occassion? 0 Filed at: 06/26/2024 0000   Audit-C Score 0 Filed at: 06/26/2024 0000   DALIA: How many times in the past year have you...    Used an illegal drug or used a prescription medication for non-medical reasons? Once or Twice Filed at: 06/26/2024 0000   DAST-10:  "In the past 12 months...    1. Have you used drugs other than those required for medical reasons? 0 Filed at: 06/26/2024 0000   2. Do you use more than one drug at a time? 1 Filed at: 06/26/2024 0000   3. Have you had medical problems as a result of your drug use (e.g., memory loss, hepatitis, convulsions, bleeding, etc.)? 0 Filed at: 06/26/2024 0000   4. Have you had \"blackouts\" or \"flashbacks\" as a result of drug use?YesNo 1 Filed at: 06/26/2024 0000   5. Do you ever feel bad or guilty about your drug use? 0 Filed at: 06/26/2024 0000   6. Does your spouse (or parent) ever complain about your involvement with drugs? 0 Filed at: 06/26/2024 0000   7. Have you neglected your family because of your use of drugs? 0 Filed at: 06/26/2024 0000   8. Have you engaged in illegal activities in order to obtain drugs? 0 Filed at: 06/26/2024 0000   9. Have you ever experienced withdrawal symptoms (felt sick) when you stopped taking drugs? 0 Filed at: 06/26/2024 0000   10. Are you always able to stop using drugs when you want to? 0 Filed at: 06/26/2024 0000   DAST-10 Score 2 Filed at: 06/26/2024 0000                      Medical Decision Making  9-year-old female status post polypharmacy overdose, will check for coingestions, placed on cardiac monitor, check EKG labs, discussed with poison control, reassess.      Attempted to contact poison control, no answer, given is a polysubstance overdose including Ultram, will admit for observation, petitioned 302 based off of significant suicide attempt by overdose.    Amount and/or Complexity of Data Reviewed  Labs: ordered.    Risk  Prescription drug management.  Decision regarding hospitalization.             Disposition  Final diagnoses:   Overdose   Suicide attempt (HCC)     Time reflects when diagnosis was documented in both MDM as applicable and the Disposition within this note       Time User Action Codes Description Comment    6/26/2024  1:43 AM Sergey Norton Add [H44.988T] Overdose "     6/26/2024  1:43 AM Sergey Norton Marc [T14.91XA] Suicide attempt (HCC)           ED Disposition       ED Disposition   Admit    Condition   Stable    Date/Time   Wed Jun 26, 2024 0143    Comment   Case was discussed with BERNEI and the patient's admission status was agreed to be Admission Status: observation status to the service of Dr. Larsen .               Follow-up Information    None         Current Discharge Medication List        CONTINUE these medications which have NOT CHANGED    Details   albuterol (PROVENTIL HFA,VENTOLIN HFA) 90 mcg/act inhaler Inhale 2 puffs every 6 (six) hours as needed for wheezing      ALPRAZolam (XANAX) 1 mg tablet TAKE ONE-HALF TO ONE TABLET THREE TIMES A DAY AS NEEDED  Qty: 90 tablet, Refills: 0    Associated Diagnoses: Anxiety      amLODIPine (NORVASC) 5 mg tablet TAKE 1 TABLET BY MOUTH DAILY  Qty: 100 tablet, Refills: 2    Comments: Please send a replace/new response with 100-Day Supply if appropriate to maximize member benefit. Requesting 1 year supply.  Associated Diagnoses: Essential hypertension      apixaban (Eliquis) 2.5 mg Take 1 tablet (2.5 mg total) by mouth 2 (two) times a day  Qty: 200 tablet, Refills: 1    Comments: Requesting 1 year supply  Associated Diagnoses: Pulmonary embolism, other, unspecified chronicity, unspecified whether acute cor pulmonale present ([facility])      atorvastatin (LIPITOR) 40 mg tablet Take 1 tablet by mouth daily      azelastine (ASTELIN) 0.1 % nasal spray Spray 2 sprays twice a day by intranasal route.      beclomethasone (Qvar RediHaler) 40 MCG/ACT inhaler Inhale 2 puffs 2 (two) times a day Rinse mouth after use.      clotrimazole (LOTRIMIN) 1 % cream as needed       clotrimazole-betamethasone (LOTRISONE) 1-0.05 % cream APPLY TO SKIN FOLDS TWO TIMES A DAY  Qty: 45 g, Refills: 1    Associated Diagnoses: Dermatitis      !! DULoxetine (CYMBALTA) 30 mg delayed release capsule TAKE ONE CAPSULE BY MOUTH EVERY DAY WITH 60 MG CAP  Qty: 90 capsule,  Refills: 0    Associated Diagnoses: Other chronic pain      !! DULoxetine (CYMBALTA) 30 mg delayed release capsule TAKE ONE CAPSULE BY MOUTH EVERY DAY WITH 60MG CAP  Qty: 100 capsule, Refills: 1    Associated Diagnoses: Other chronic pain      !! DULoxetine (CYMBALTA) 60 mg delayed release capsule TAKE ONE CAPSULE BY MOUTH EVERY DAY  Qty: 90 capsule, Refills: 1    Associated Diagnoses: Other chronic pain      famotidine (PEPCID) 10 mg tablet Take 1 tablet (10 mg total) by mouth daily  Qty: 90 tablet, Refills: 3    Associated Diagnoses: GERD (gastroesophageal reflux disease)      ferrous sulfate 325 (65 Fe) mg tablet Take 325 mg by mouth daily      hydrocortisone-pramoxine (ANALPRAM-HC) 2.5-1 % rectal cream Apply topically 3 (three) times a day  Qty: 3 Tube, Refills: 1    Associated Diagnoses: Rectal pain; Other constipation      hydrOXYzine HCL (ATARAX) 25 mg tablet Take 1 tablet (25 mg total) by mouth every 6 (six) hours as needed for itching  Qty: 100 tablet, Refills: 1    Associated Diagnoses: Allergy, initial encounter      levothyroxine 88 mcg tablet TAKE 1 TABLET BY MOUTH DAILY  EXCEPT TAKE 2 TABLETS BY MOUTH  ON SUNDAY  Qty: 116 tablet, Refills: 2    Comments: Please send a replace/new response with 100-Day Supply if appropriate to maximize member benefit. Requesting 1 year supply.  Associated Diagnoses: Acquired hypothyroidism      lisinopril (ZESTRIL) 40 mg tablet Take 1 tablet (40 mg total) by mouth daily  Qty: 100 tablet, Refills: 1    Associated Diagnoses: Primary hypertension      methocarbamol (ROBAXIN) 500 mg tablet TAKE ONE TABLET BY MOUTH FOUR TIMES A DAY AS NEEDED FOR MUSCLE SPASMS  Qty: 60 tablet, Refills: 3    Associated Diagnoses: Back pain, unspecified back location, unspecified back pain laterality, unspecified chronicity      mometasone-formoterol (DULERA) 100-5 MCG/ACT inhaler Inhale      Multiple Vitamins-Minerals (OCUVITE ADULT 50+ PO) Take by mouth      nystatin powder APPLY TO AFFECTED  AREA(S) TWO TIMES A DAY  Qty: 60 g, Refills: 0    Associated Diagnoses: Yeast infection      pantoprazole (PROTONIX) 40 mg tablet TAKE 1 TABLET BY MOUTH  TWICE DAILY  Qty: 180 tablet, Refills: 3    Comments: Requesting 1 year supply  Associated Diagnoses: Gastroesophageal reflux disease without esophagitis      !! PARoxetine (PAXIL) 10 mg tablet TAKE ONE TABLET BY MOUTH EVERY DAY  Qty: 90 tablet, Refills: 3    Associated Diagnoses: Anxiety; Current moderate episode of major depressive disorder without prior episode (HCC); PTSD (post-traumatic stress disorder)      !! PARoxetine (PAXIL) 10 mg tablet Take 1 tablet (10 mg total) by mouth daily  Qty: 90 tablet, Refills: 0    Associated Diagnoses: Anxiety; Current moderate episode of major depressive disorder without prior episode (HCC); PTSD (post-traumatic stress disorder)      PEG 3350-KCl-NaBcb-NaCl-NaSulf (PEG 3350/ELECTROLYTES PO)       !! rosuvastatin (CRESTOR) 10 MG tablet Take 1 tablet (10 mg total) by mouth daily  Qty: 30 tablet, Refills: 5    Associated Diagnoses: Mixed hyperlipidemia      !! rosuvastatin (CRESTOR) 10 MG tablet Take 1 tablet (10 mg total) by mouth daily  Qty: 100 tablet, Refills: 1    Associated Diagnoses: Mixed hyperlipidemia      traMADol (ULTRAM) 50 mg tablet Take 1 tablet (50 mg total) by mouth every 8 (eight) hours as needed for moderate pain  Qty: 270 tablet, Refills: 0    Associated Diagnoses: Other chronic pain      Triamcinolone Acetonide (Nasacort Allergy 24HR) 55 MCG/ACT AERO into each nostril as needed        !! - Potential duplicate medications found. Please discuss with provider.          No discharge procedures on file.    PDMP Review         Value Time User    PDMP Reviewed  Yes 5/31/2024  7:32 AM Colette Bryant PA-C            ED Provider  Electronically Signed by             Sergey Norton MD  06/26/24 2377

## 2024-06-26 NOTE — PROGRESS NOTES
"Progress Note - Benjamin Puga 69 y.o. female MRN: [id number]  Unit/Bed#: -01 Encounter: [id number]    Subjective:   Feeling physically well.  Regrets taking pills and denies current suicidal ideation.  Conversation is very tangential as she tries to describe her online relationship, money and gambling issues.    All other ROS are negative.    Objective:   Vitals: Blood pressure 113/57, pulse 89, temperature 98.3 °F (36.8 °C), temperature source Oral, resp. rate 18, height 4' 10\" (1.473 m), weight 88.7 kg (195 lb 8.8 oz), SpO2 92%.,Body mass index is 40.87 kg/m².  SPO2 RA Rest      Flowsheet Row ED to Hosp-Admission (Current) from 6/25/2024 in [facility] 4th Floor Med Surg Unit   SpO2 92 %   SpO2 Activity At Rest   O2 Device None (Room air)   O2 Flow Rate --          I&O: No intake or output data in the 24 hours ending 06/26/24 0910      Physical Exam:       General Appearance:    Alert, cooperative, no distress   Head:    Normocephalic, without obvious abnormality, atraumatic   Eyes:    PERRL, conjunctiva/corneas clear, EOM's intact       Nose:   Moist mucous membranes, no drainage or sinus tenderness   Throat:   No tenderness, no exudates   Neck:   Supple, symmetrical, trachea midline, no JVD   Lungs:     Clear to auscultation bilaterally, respirations unlabored   Heart:    Regular rate and rhythm, S1 and S2 normal, no murmur, rub   or gallop   Abdomen: Soft, non-tender, positive bowel sounds, no masses, no organomegaly   Extremities:  No pedal edema, calf tenderness. Distal pulses palpable.   Neurologic:     CNII-XII intact.      Invasive Devices       Peripheral Intravenous Line  Duration             Peripheral IV 06/25/24 Left Antecubital <1 day                          Social History  reviewed  Family History   Problem Relation Age of Onset    Coronary artery disease Mother     Hypertension Mother         Essential    Coronary artery disease Father     reviewed    Meds:  Current " Facility-Administered Medications   Medication Dose Route Frequency Provider Last Rate Last Admin    acetaminophen (TYLENOL) tablet 650 mg  650 mg Oral Q6H PRN Colton Larsen MD        ALPRAZolam (XANAX) tablet 0.25 mg  0.25 mg Oral TID PRN Colton Larsen MD        amLODIPine (NORVASC) tablet 5 mg  5 mg Oral Daily Colton Larsen MD   5 mg at 06/26/24 0851    apixaban (ELIQUIS) tablet 2.5 mg  2.5 mg Oral BID Colton Larsen MD   2.5 mg at 06/26/24 0851    atorvastatin (LIPITOR) tablet 40 mg  40 mg Oral Daily Colton Larsen MD   40 mg at 06/26/24 0851    fluticasone (ARNUITY ELLIPTA) 100 MCG/ACT inhaler 1 puff  1 puff Inhalation Daily Colton Larsen MD        levothyroxine tablet 50 mcg  50 mcg Oral Early Morning Colton Larsen MD   50 mcg at 06/26/24 0542    lisinopril (ZESTRIL) tablet 40 mg  40 mg Oral Daily Colton Larsen MD   40 mg at 06/26/24 0851    ondansetron (ZOFRAN) injection 4 mg  4 mg Intravenous Q6H PRN Colton Larsen MD        pantoprazole (PROTONIX) EC tablet 40 mg  40 mg Oral BID Colton Larsen MD   40 mg at 06/26/24 0851        Medications Prior to Admission:     ALPRAZolam (XANAX) 1 mg tablet    amLODIPine (NORVASC) 5 mg tablet    apixaban (Eliquis) 2.5 mg    DULoxetine (CYMBALTA) 30 mg delayed release capsule    ferrous sulfate 325 (65 Fe) mg tablet    hydrOXYzine HCL (ATARAX) 25 mg tablet    levothyroxine 88 mcg tablet    lisinopril (ZESTRIL) 40 mg tablet    methocarbamol (ROBAXIN) 500 mg tablet    Multiple Vitamins-Minerals (OCUVITE ADULT 50+ PO)    pantoprazole (PROTONIX) 40 mg tablet    rosuvastatin (CRESTOR) 10 MG tablet    traMADol (ULTRAM) 50 mg tablet    albuterol (PROVENTIL HFA,VENTOLIN HFA) 90 mcg/act inhaler    atorvastatin (LIPITOR) 40 mg tablet    azelastine (ASTELIN) 0.1 % nasal spray    beclomethasone (Qvar RediHaler) 40 MCG/ACT inhaler    clotrimazole (LOTRIMIN) 1 % cream    clotrimazole-betamethasone (LOTRISONE) 1-0.05 % cream    DULoxetine  (CYMBALTA) 30 mg delayed release capsule    DULoxetine (CYMBALTA) 60 mg delayed release capsule    famotidine (PEPCID) 10 mg tablet    hydrocortisone-pramoxine (ANALPRAM-HC) 2.5-1 % rectal cream    mometasone-formoterol (DULERA) 100-5 MCG/ACT inhaler    nystatin powder    PARoxetine (PAXIL) 10 mg tablet    PARoxetine (PAXIL) 10 mg tablet    PEG 3350-KCl-NaBcb-NaCl-NaSulf (PEG 3350/ELECTROLYTES PO)    rosuvastatin (CRESTOR) 10 MG tablet    Triamcinolone Acetonide (Nasacort Allergy 24HR) 55 MCG/ACT AERO    Labs:  Results from last 7 days   Lab Units 06/25/24  2322   WBC Thousand/uL 8.17   HEMOGLOBIN g/dL 13.0   HEMATOCRIT % 40.4   PLATELETS Thousands/uL 251   SEGS PCT % 62   LYMPHO PCT % 27   MONO PCT % 7   EOS PCT % 4     Results from last 7 days   Lab Units 06/25/24  2322   POTASSIUM mmol/L 4.1   CHLORIDE mmol/L 100   CO2 mmol/L 29   BUN mg/dL 11   CREATININE mg/dL 0.75   CALCIUM mg/dL 9.6   ALK PHOS U/L 82   ALT U/L 12   AST U/L 17     Lab Results   Component Value Date    TROPONINI 0.11 (H) 08/13/2020    TROPONINI 0.08 (H) 08/12/2020    TROPONINI <0.02 01/15/2018         Lab Results   Component Value Date    BLOODCX No Growth After 5 Days. 08/14/2020    BLOODCX No Growth After 5 Days. 08/14/2020    BLOODCX Staphylococcus coagulase negative (A) 08/12/2020    URINECX 30,000-39,000 cfu/ml 10/08/2019       Imaging:  Results for orders placed during the hospital encounter of 06/25/24    XR chest 1 view portable    Narrative  XR CHEST PORTABLE    INDICATION: ams.    COMPARISON: January 30, 2024    FINDINGS:    No pneumothorax or pleural effusion.    Cardiomegaly seen.    Mild increased lung markings may be related to mild hypoinflation or mild congestion  Bones are unremarkable for age.    Normal upper abdomen.    Impression  No acute consolidation    Mild increased lung markings may be due to hypoinflation or mild congestion    The study was marked in EPIC for significant notification.      Workstation performed:  [id number]    Results for orders placed during the hospital encounter of 01/30/24    XR chest pa & lateral    Narrative  CHEST    INDICATION:   Subacute cough.    COMPARISON:  Comparison made with previous examination(s) dated (RF) 26-May-2022,(RF) 12-Jan-2022,(DX) 14-Oct-2021,(CT) 01-Dec-2020,(DX) 19-Aug-2020.    EXAM PERFORMED/VIEWS:  XR CHEST PA & LATERAL    FINDINGS:    Cardiomediastinal silhouette. Borderline enlarged.      The lungs are clear.  No pneumothorax or pleural effusion.    Osseous structures appear within normal limits for patient age.    Impression  No acute cardiopulmonary disease.  Borderline cardiomegaly. No significant change from prior study 10/14/2021.    Electronically signed: 01/30/2024 05:03 PM Ranjan Sanchez MD      VTE Pharmacologic Prophylaxis: Eliquis      Code Status:   Level 1 - Full Code    Assessment:  Principal Problem:    Suicidal behavior with attempted self-injury (HCC)  Active Problems:    Asthma    HTN (hypertension)    Hyperlipidemia    Hypothyroidism    History of pulmonary embolism    Morbid obesity with BMI of 40.0-44.9, adult (HCC)  Resolved Problems:    * No resolved hospital problems. *      Plan:  Suicidal behavior with attempted self-injury-patient is alert and cooperative and hemodynamically stable  - Consult to crisis  - Consult to psychiatry  - One-to-one observation    History of pulmonary embolism-continue low-dose Eliquis    Morbid obesity-continued weight loss efforts encouraged    Hypothyroidism-TSH suppressed, dose reduced from 88 to 50 mcg.  Check TSH in 4 to 6 weeks outpatient    Hyperlipidemia-continue statin    Hypertension stable on lisinopril and amlodipine    Asthma-clinically stable    Disposition-medically stable for transfer to inpatient psychiatric unit once evaluated by psychiatry.    Roland Proctor MD  6/26/2024,9:10 AM

## 2024-06-26 NOTE — H&P
[facility]  H&P  Name: Benjamin Puga 69 y.o. female I MRN: [id number]  Unit/Bed#: SHIV I Date of Admission: 6/25/2024   Date of Service: 6/26/2024  Hospital Day: 0      Assessment & Plan   * Suicidal behavior with attempted self-injury (HCC)  Assessment & Plan  Patient currently is a stable, she tells me that she had done this before, she took bunch of medications that she had at home including Xanax and Robaxin but then she changed her mind and she was the one who called paramedics and asked to be brought to the hospital.  She is now recovering slowly, during my encounter, she appeared slightly confused and mostly was tangential in conversation.  Once medically she is stable, she needs to be seen by psychiatry.  Continue with one-to-one observation.    Morbid obesity with BMI of 40.0-44.9, adult (HCC)  Assessment & Plan  BMI is 40    History of pulmonary embolism  Assessment & Plan  Continue with home dose of Eliquis.    Hypothyroidism  Assessment & Plan  Will continue with Synthroid although I think her does need to be lowered, her TSH both in January and during this visit was low.  Will reduce Synthroid to 50 mcg daily.    Hyperlipidemia  Assessment & Plan  Her medication reconciliation is not complete, I saw that she had both Lipitor and Crestor on her list, for now will restart Lipitor and await medication to be reconciled.    HTN (hypertension)  Assessment & Plan  Blood pressures currently controlled, resume home regimen of amlodipine 5 mg daily and lisinopril 40 mg daily.    Asthma  Assessment & Plan  Patient has some evidence of wheezing at this point, on examination she ha expiratory wheezings mostly at the lower lung fields, chest x-ray was reviewed which is not quite informative, it is unclear if she had any altered sensorium which may have predisposed her for any aspiration, will continue with nebulizer treatment as needed, monitor oxygen requirement, patient was not hypoxic  "so far.  Her picture is not compatible with asthma exacerbation.           VTE Prophylaxis: Apixaban (Eliquis)  / reason for no mechanical VTE prophylaxis not required    Code Status: Full code  POLST: There is no POLST form on file for this patient (pre-hospital)  Discussion with family: No family at bedside    Anticipated Length of Stay:  Patient will be admitted on an Observation basis with an anticipated length of stay of less than 2 midnights.   Justification for Hospital Stay:     Total Time for Visit, including Counseling / Coordination of Care: 30 minutes.  Greater than 50% of this total time spent on direct patient counseling and coordination of care.    Chief Complaint:   Suicidal attempt    History of Present Illness:    Benjamin Puga is a 69 y.o. 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  She appears slightly confused and was unable to provide full history of why she made that decision but she stated that \"she wanted to put an end to this misery \".  Patient was found to be slightly lethargic, IV hydrated, oxygenated, on examination she had some mild wheezing and was placed on oxygen and nebulizer was given.  Will place her in observation while on one-to-one observation and when medically stable, to be seen by psychiatry.    Review of Systems:    Review of Systems   Respiratory:  Positive for wheezing.    Psychiatric/Behavioral:  Positive for confusion, self-injury and suicidal ideas.    All other systems reviewed and are negative.      Past Medical and Surgical History:     Past Medical History:   Diagnosis Date    Allergic rhinitis     Anemia     Cancer (HCC)     breast cancer, Left side    Colon " polyp     Depression     Disease of thyroid gland     GERD (gastroesophageal reflux disease)     Hyperlipemia     Hypertension     Hypothyroidism     Last assessed: 3/25/14    Obesity     Osteoarthritis     Pre-operative laboratory examination     Pulmonary embolism (HCC)        Past Surgical History:   Procedure Laterality Date    HAND SURGERY Left 03/2020    HYSTERECTOMY      INCISIONAL BREAST BIOPSY      JOINT REPLACEMENT      KNEE ARTHROPLASTY      MAMMO NEEDLE LOCALIZATION LEFT (ALL INC) Left 6/6/2019    ROTATOR CUFF REPAIR      SHOULDER SURGERY      TRIGGER FINGER RELEASE      TRIGGER FINGER RELEASE         Meds/Allergies:    Prior to Admission medications    Medication Sig Start Date End Date Taking? Authorizing Provider   albuterol (PROVENTIL HFA,VENTOLIN HFA) 90 mcg/act inhaler Inhale 2 puffs every 6 (six) hours as needed for wheezing    Historical Provider, MD   ALPRAZolam (XANAX) 1 mg tablet TAKE ONE-HALF TO ONE TABLET THREE TIMES A DAY AS NEEDED 5/31/24   Colette Bryant PA-C   amLODIPine (NORVASC) 5 mg tablet TAKE 1 TABLET BY MOUTH DAILY 9/13/23   Roland Proctor MD   apixaban (Eliquis) 2.5 mg Take 1 tablet (2.5 mg total) by mouth 2 (two) times a day 5/13/24   Colette Bryant PA-C   atorvastatin (LIPITOR) 40 mg tablet Take 1 tablet by mouth daily    Historical Provider, MD   azelastine (ASTELIN) 0.1 % nasal spray Spray 2 sprays twice a day by intranasal route.    Historical Provider, MD   beclomethasone (Qvar RediHaler) 40 MCG/ACT inhaler Inhale 2 puffs 2 (two) times a day Rinse mouth after use.    Historical Provider, MD   clotrimazole (LOTRIMIN) 1 % cream as needed     Historical Provider, MD aguayorimazole-betamethasone (LOTRISONE) 1-0.05 % cream APPLY TO SKIN FOLDS TWO TIMES A DAY 9/27/23   Roland Proctor MD   DULoxetine (CYMBALTA) 30 mg delayed release capsule TAKE ONE CAPSULE BY MOUTH EVERY DAY WITH 60 MG CAP 10/20/23   Roland Proctor MD   DULoxetine (CYMBALTA) 30 mg delayed  release capsule TAKE ONE CAPSULE BY MOUTH EVERY DAY WITH 60MG CAP 5/1/24   Roland Proctor MD   DULoxetine (CYMBALTA) 60 mg delayed release capsule TAKE ONE CAPSULE BY MOUTH EVERY DAY 4/21/24   Roland Proctor MD   famotidine (PEPCID) 10 mg tablet Take 1 tablet (10 mg total) by mouth daily 8/27/20 1/25/24  BERNARD Goodman   ferrous sulfate 325 (65 Fe) mg tablet Take 325 mg by mouth daily    Historical Provider, MD   hydrocortisone-pramoxine (ANALPRAM-HC) 2.5-1 % rectal cream Apply topically 3 (three) times a day 1/26/21   Jaret Rossi MD   hydrOXYzine HCL (ATARAX) 25 mg tablet Take 1 tablet (25 mg total) by mouth every 6 (six) hours as needed for itching 5/3/24   Roland Proctor MD   levothyroxine 88 mcg tablet TAKE 1 TABLET BY MOUTH DAILY  EXCEPT TAKE 2 TABLETS BY MOUTH  ON SUNDAY 11/4/23   Roland Proctor MD   lisinopril (ZESTRIL) 40 mg tablet Take 1 tablet (40 mg total) by mouth daily 5/28/24   Roland Proctor MD   methocarbamol (ROBAXIN) 500 mg tablet TAKE ONE TABLET BY MOUTH FOUR TIMES A DAY AS NEEDED FOR MUSCLE SPASMS 5/13/24   Colette Bryant PA-C   mometasone-formoterol (DULERA) 100-5 MCG/ACT inhaler Inhale    Historical Provider, MD   Multiple Vitamins-Minerals (OCUVITE ADULT 50+ PO) Take by mouth    Historical Provider, MD   nystatin powder APPLY TO AFFECTED AREA(S) TWO TIMES A DAY 2/20/24   Colette Bryant PA-C   pantoprazole (PROTONIX) 40 mg tablet TAKE 1 TABLET BY MOUTH  TWICE DAILY 3/24/23   Roland Proctor MD   PARoxetine (PAXIL) 10 mg tablet TAKE ONE TABLET BY MOUTH EVERY DAY  Patient not taking: Reported on 4/8/2024 10/20/23   Roland Proctor MD   PARoxetine (PAXIL) 10 mg tablet Take 1 tablet (10 mg total) by mouth daily 10/20/23   Roland Proctor MD   PEG 3350-KCl-NaBcb-NaCl-NaSulf (PEG 3350/ELECTROLYTES PO)     Historical Provider, MD   rosuvastatin (CRESTOR) 10 MG tablet Take 1 tablet (10 mg total) by mouth daily 5/30/24   Colettefelecia Bryant PA-C   rosuvastatin  (CRESTOR) 10 MG tablet Take 1 tablet (10 mg total) by mouth daily 5/28/24   Roland Proctor MD   traMADol (ULTRAM) 50 mg tablet Take 1 tablet (50 mg total) by mouth every 8 (eight) hours as needed for moderate pain 3/27/24   Colette Bryant PA-C   Triamcinolone Acetonide (Nasacort Allergy 24HR) 55 MCG/ACT AERO into each nostril as needed  5/11/17   Historical Provider, MD   hyoscyamine (LEVSIN/SL) 0.125 mg SL tablet Take 1 tablet (0.125 mg total) by mouth 3 (three) times a day  Patient not taking: Reported on 4/8/2024 4/27/22 6/26/24  Jaret Rossi MD   Lidocaine HCl 10 MG/ML SOSY administered  Patient not taking: Reported on 4/8/2024 10/3/23 6/26/24  Historical Provider, MD     I have reviewed home medications with patient personally.    Allergies:   Allergies   Allergen Reactions    Tamoxifen Other (See Comments)     Headaches, stomach pain, sweats,     Nsaids Hives    Oxybutynin     Singulair [Montelukast] Swelling    Ciprofloxacin Hives    Penicillins Hives    Sulfa Antibiotics Hives    Vancomycin Hives       Social History:     Marital Status: /Civil Union   Occupation: Not applicable  Patient Pre-hospital Living Situation: Home  Patient Pre-hospital Level of Mobility: Ambulatory  Patient Pre-hospital Diet Restrictions: Unknown  Substance Use History:   Social History     Substance and Sexual Activity   Alcohol Use Yes    Comment: socially      Social History     Tobacco Use   Smoking Status Never   Smokeless Tobacco Never     Social History     Substance and Sexual Activity   Drug Use No       Family History:    non-contributory      Vitals:   Blood Pressure: 115/61 (06/26/24 0200)  Pulse: 93 (06/26/24 0200)  Temperature: 98 °F (36.7 °C) (06/25/24 2314)  Temp Source: Temporal (06/25/24 2314)  Respirations: 20 (06/26/24 0200)  SpO2: 98 % (06/26/24 0200)    Physical Exam    Physical Exam  Vitals and nursing note reviewed.   HENT:      Head: Normocephalic and atraumatic.      Nose: Nose  normal.   Eyes:      Pupils: Pupils are equal, round, and reactive to light.   Cardiovascular:      Rate and Rhythm: Normal rate and regular rhythm.   Pulmonary:      Effort: Pulmonary effort is normal. No respiratory distress.      Breath sounds: Normal breath sounds. No stridor.  Bilateral lower lungs expiratory wheeze without prolongation of expiration.  Abdominal:      General: Abdomen is flat. Bowel sounds are normal. There is no distension.      Palpations: There is no mass.      Tenderness: There is no guarding.   Musculoskeletal:         General: No swelling or tenderness. Normal range of motion.   Skin:     General: Skin is warm and dry.   Neurological:      General: No focal deficit present.      Mental Status: He is alert and oriented to person, place, and time. Mental status is at baseline.       Additional Data:     Lab Results: I have personally reviewed pertinent reports.      Results from last 7 days   Lab Units 06/25/24  2322   WBC Thousand/uL 8.17   HEMOGLOBIN g/dL 13.0   HEMATOCRIT % 40.4   PLATELETS Thousands/uL 251   SEGS PCT % 62   LYMPHO PCT % 27   MONO PCT % 7   EOS PCT % 4     Results from last 7 days   Lab Units 06/25/24  2322   SODIUM mmol/L 139   POTASSIUM mmol/L 4.1   CHLORIDE mmol/L 100   CO2 mmol/L 29   BUN mg/dL 11   CREATININE mg/dL 0.75   ANION GAP mmol/L 10   CALCIUM mg/dL 9.6   ALBUMIN g/dL 4.1   TOTAL BILIRUBIN mg/dL 0.42   ALK PHOS U/L 82   ALT U/L 12   AST U/L 17   GLUCOSE RANDOM mg/dL 128                       Imaging: I have personally reviewed pertinent reports.      XR chest 1 view portable    (Results Pending)       EKG, Pathology, and Other Studies Reviewed on Admission:   EKG: Chest x-ray was reviewed by myself    [facility] Records Reviewed: Yes     ** Please Note: This note has been constructed using a voice recognition system. **

## 2024-06-26 NOTE — ED NOTES
Provider made aware of BP 98/55 MAP 70, fluid bolus NSS initiated via epic secure chat order.      Jagruti Echevarria  06/26/24 0024

## 2024-06-26 NOTE — ASSESSMENT & PLAN NOTE
Her medication reconciliation is not complete, I saw that she had both Lipitor and Crestor on her list, for now will restart Lipitor and await medication to be reconciled.

## 2024-06-27 LAB
AMPHETAMINES SERPL QL SCN: NEGATIVE
BACTERIA UR QL AUTO: ABNORMAL /HPF
BARBITURATES UR QL: NEGATIVE
BENZODIAZ UR QL: POSITIVE
BILIRUB UR QL STRIP: NEGATIVE
CLARITY UR: ABNORMAL
COCAINE UR QL: NEGATIVE
COLOR UR: YELLOW
FENTANYL UR QL SCN: NEGATIVE
GLUCOSE UR STRIP-MCNC: NEGATIVE MG/DL
HGB UR QL STRIP.AUTO: NEGATIVE
HYDROCODONE UR QL SCN: NEGATIVE
KETONES UR STRIP-MCNC: NEGATIVE MG/DL
LEUKOCYTE ESTERASE UR QL STRIP: ABNORMAL
METHADONE UR QL: NEGATIVE
MUCOUS THREADS UR QL AUTO: ABNORMAL
NITRITE UR QL STRIP: NEGATIVE
NON-SQ EPI CELLS URNS QL MICRO: ABNORMAL /HPF
OPIATES UR QL SCN: NEGATIVE
OXYCODONE+OXYMORPHONE UR QL SCN: NEGATIVE
PCP UR QL: NEGATIVE
PH UR STRIP.AUTO: 5 [PH]
PROT UR STRIP-MCNC: NEGATIVE MG/DL
RBC #/AREA URNS AUTO: ABNORMAL /HPF
SP GR UR STRIP.AUTO: 1.02 (ref 1–1.03)
THC UR QL: NEGATIVE
UROBILINOGEN UR STRIP-ACNC: <2 MG/DL
WBC #/AREA URNS AUTO: ABNORMAL /HPF

## 2024-06-27 PROCEDURE — 99232 SBSQ HOSP IP/OBS MODERATE 35: CPT | Performed by: INTERNAL MEDICINE

## 2024-06-27 RX ORDER — DOCUSATE SODIUM 100 MG/1
100 CAPSULE, LIQUID FILLED ORAL 2 TIMES DAILY
Status: DISCONTINUED | OUTPATIENT
Start: 2024-06-27 | End: 2024-06-28 | Stop reason: HOSPADM

## 2024-06-27 RX ORDER — ALBUTEROL SULFATE 90 UG/1
2 AEROSOL, METERED RESPIRATORY (INHALATION) EVERY 4 HOURS PRN
Status: DISCONTINUED | OUTPATIENT
Start: 2024-06-27 | End: 2024-06-28 | Stop reason: HOSPADM

## 2024-06-27 RX ORDER — POLYETHYLENE GLYCOL 3350 17 G/17G
17 POWDER, FOR SOLUTION ORAL DAILY
Status: DISCONTINUED | OUTPATIENT
Start: 2024-06-28 | End: 2024-06-28 | Stop reason: HOSPADM

## 2024-06-27 RX ORDER — DULOXETIN HYDROCHLORIDE 60 MG/1
60 CAPSULE, DELAYED RELEASE ORAL DAILY
Status: DISCONTINUED | OUTPATIENT
Start: 2024-06-28 | End: 2024-06-27

## 2024-06-27 RX ORDER — DULOXETIN HYDROCHLORIDE 30 MG/1
30 CAPSULE, DELAYED RELEASE ORAL DAILY
Status: DISCONTINUED | OUTPATIENT
Start: 2024-06-28 | End: 2024-06-27

## 2024-06-27 RX ORDER — DULOXETIN HYDROCHLORIDE 60 MG/1
60 CAPSULE, DELAYED RELEASE ORAL DAILY
Status: DISCONTINUED | OUTPATIENT
Start: 2024-06-27 | End: 2024-06-28 | Stop reason: HOSPADM

## 2024-06-27 RX ORDER — DULOXETIN HYDROCHLORIDE 30 MG/1
30 CAPSULE, DELAYED RELEASE ORAL DAILY
Status: DISCONTINUED | OUTPATIENT
Start: 2024-06-27 | End: 2024-06-28 | Stop reason: HOSPADM

## 2024-06-27 RX ADMIN — ALPRAZOLAM 0.25 MG: 0.25 TABLET ORAL at 23:19

## 2024-06-27 RX ADMIN — APIXABAN 2.5 MG: 2.5 TABLET, FILM COATED ORAL at 18:12

## 2024-06-27 RX ADMIN — ATORVASTATIN CALCIUM 40 MG: 40 TABLET, FILM COATED ORAL at 10:18

## 2024-06-27 RX ADMIN — PANTOPRAZOLE SODIUM 40 MG: 40 TABLET, DELAYED RELEASE ORAL at 18:12

## 2024-06-27 RX ADMIN — DULOXETINE HYDROCHLORIDE 60 MG: 30 CAPSULE, DELAYED RELEASE ORAL at 16:34

## 2024-06-27 RX ADMIN — LEVOTHYROXINE SODIUM 50 MCG: 0.05 TABLET ORAL at 05:28

## 2024-06-27 RX ADMIN — DULOXETINE HYDROCHLORIDE 30 MG: 60 CAPSULE, DELAYED RELEASE ORAL at 16:34

## 2024-06-27 RX ADMIN — ALBUTEROL SULFATE 2 PUFF: 90 AEROSOL, METERED RESPIRATORY (INHALATION) at 15:38

## 2024-06-27 RX ADMIN — APIXABAN 2.5 MG: 2.5 TABLET, FILM COATED ORAL at 10:19

## 2024-06-27 RX ADMIN — AMLODIPINE BESYLATE 5 MG: 5 TABLET ORAL at 10:20

## 2024-06-27 RX ADMIN — PANTOPRAZOLE SODIUM 40 MG: 40 TABLET, DELAYED RELEASE ORAL at 10:18

## 2024-06-27 RX ADMIN — DOCUSATE SODIUM 100 MG: 100 CAPSULE, LIQUID FILLED ORAL at 18:12

## 2024-06-27 RX ADMIN — LISINOPRIL 40 MG: 20 TABLET ORAL at 10:21

## 2024-06-27 RX ADMIN — ALPRAZOLAM 0.25 MG: 0.25 TABLET ORAL at 15:33

## 2024-06-27 NOTE — PROGRESS NOTES
"Progress Note - Benjamin Puga 69 y.o. female MRN: [id number]  Unit/Bed#: -01 Encounter: [id number]    Subjective:   Complains of occasional wheezing, otherwise no complaints.  No suicidal or homicidal ideations.      All other ROS are negative.    Objective:   Vitals: Blood pressure 138/71, pulse (!) 109, temperature 98.3 °F (36.8 °C), temperature source Oral, resp. rate 20, height 4' 10\" (1.473 m), weight 88.7 kg (195 lb 8.8 oz), SpO2 97%.,Body mass index is 40.87 kg/m².  SPO2 RA Rest      Flowsheet Row ED to Hosp-Admission (Current) from 6/25/2024 in [facility] 4th Floor Med Surg Unit   SpO2 97 %   SpO2 Activity At Rest   O2 Device None (Room air)   O2 Flow Rate --          I&O:   Intake/Output Summary (Last 24 hours) at 6/27/2024 1517  Last data filed at 6/27/2024 0801  Gross per 24 hour   Intake 525 ml   Output 850 ml   Net -325 ml         Physical Exam:       General Appearance:    Alert, cooperative, no distress   Head:    Normocephalic, without obvious abnormality, atraumatic   Eyes:    PERRL, conjunctiva/corneas clear, EOM's intact       Nose:   Moist mucous membranes, no drainage or sinus tenderness   Throat:   No tenderness, no exudates   Neck:   Supple, symmetrical, trachea midline, no JVD   Lungs:     Clear to auscultation bilaterally, respirations unlabored   Heart:    Regular rate and rhythm, S1 and S2 normal, no murmur, rub   or gallop   Abdomen: Soft, non-tender, positive bowel sounds, no masses, no organomegaly   Extremities:  No pedal edema, calf tenderness. Distal pulses palpable.   Neurologic:     CNII-XII intact.      Invasive Devices       Peripheral Intravenous Line  Duration             Peripheral IV 06/25/24 Left Antecubital 1 day                          Social History  reviewed  Family History   Problem Relation Age of Onset    Coronary artery disease Mother     Hypertension Mother         Essential    Coronary artery disease Father     reviewed    Meds:  Current " Facility-Administered Medications   Medication Dose Route Frequency Provider Last Rate Last Admin    acetaminophen (TYLENOL) tablet 650 mg  650 mg Oral Q6H PRN Colton Larsen MD        albuterol (PROVENTIL HFA,VENTOLIN HFA) inhaler 2 puff  2 puff Inhalation Q4H PRN Roland Proctor MD        ALPRAZolam (XANAX) tablet 0.25 mg  0.25 mg Oral TID PRN Colton Larsen MD        amLODIPine (NORVASC) tablet 5 mg  5 mg Oral Daily Colton Larsen MD   5 mg at 06/27/24 1020    apixaban (ELIQUIS) tablet 2.5 mg  2.5 mg Oral BID Colton Larsen MD   2.5 mg at 06/27/24 1019    atorvastatin (LIPITOR) tablet 40 mg  40 mg Oral Daily Colton Larsen MD   40 mg at 06/27/24 1018    fluticasone (ARNUITY ELLIPTA) 100 MCG/ACT inhaler 1 puff  1 puff Inhalation Daily Colton Larsen MD        levothyroxine tablet 50 mcg  50 mcg Oral Early Morning Colton Larsen MD   50 mcg at 06/27/24 0528    lisinopril (ZESTRIL) tablet 40 mg  40 mg Oral Daily Colton Larsen MD   40 mg at 06/27/24 1021    ondansetron (ZOFRAN) injection 4 mg  4 mg Intravenous Q6H PRN Colton Larsen MD        pantoprazole (PROTONIX) EC tablet 40 mg  40 mg Oral BID Colton Larsen MD   40 mg at 06/27/24 1018        Medications Prior to Admission:     ALPRAZolam (XANAX) 1 mg tablet    amLODIPine (NORVASC) 5 mg tablet    apixaban (Eliquis) 2.5 mg    DULoxetine (CYMBALTA) 30 mg delayed release capsule    ferrous sulfate 325 (65 Fe) mg tablet    hydrOXYzine HCL (ATARAX) 25 mg tablet    levothyroxine 88 mcg tablet    lisinopril (ZESTRIL) 40 mg tablet    methocarbamol (ROBAXIN) 500 mg tablet    Multiple Vitamins-Minerals (OCUVITE ADULT 50+ PO)    pantoprazole (PROTONIX) 40 mg tablet    rosuvastatin (CRESTOR) 10 MG tablet    traMADol (ULTRAM) 50 mg tablet    albuterol (PROVENTIL HFA,VENTOLIN HFA) 90 mcg/act inhaler    atorvastatin (LIPITOR) 40 mg tablet    azelastine (ASTELIN) 0.1 % nasal spray    beclomethasone (Qvar RediHaler) 40 MCG/ACT inhaler     clotrimazole (LOTRIMIN) 1 % cream    clotrimazole-betamethasone (LOTRISONE) 1-0.05 % cream    DULoxetine (CYMBALTA) 30 mg delayed release capsule    DULoxetine (CYMBALTA) 60 mg delayed release capsule    famotidine (PEPCID) 10 mg tablet    hydrocortisone-pramoxine (ANALPRAM-HC) 2.5-1 % rectal cream    mometasone-formoterol (DULERA) 100-5 MCG/ACT inhaler    nystatin powder    PARoxetine (PAXIL) 10 mg tablet    PARoxetine (PAXIL) 10 mg tablet    PEG 3350-KCl-NaBcb-NaCl-NaSulf (PEG 3350/ELECTROLYTES PO)    rosuvastatin (CRESTOR) 10 MG tablet    Triamcinolone Acetonide (Nasacort Allergy 24HR) 55 MCG/ACT AERO    Labs:  Results from last 7 days   Lab Units 06/25/24  2322   WBC Thousand/uL 8.17   HEMOGLOBIN g/dL 13.0   HEMATOCRIT % 40.4   PLATELETS Thousands/uL 251   SEGS PCT % 62   LYMPHO PCT % 27   MONO PCT % 7   EOS PCT % 4     Results from last 7 days   Lab Units 06/25/24  2322   POTASSIUM mmol/L 4.1   CHLORIDE mmol/L 100   CO2 mmol/L 29   BUN mg/dL 11   CREATININE mg/dL 0.75   CALCIUM mg/dL 9.6   ALK PHOS U/L 82   ALT U/L 12   AST U/L 17     Lab Results   Component Value Date    TROPONINI 0.11 (H) 08/13/2020    TROPONINI 0.08 (H) 08/12/2020    TROPONINI <0.02 01/15/2018         Lab Results   Component Value Date    BLOODCX No Growth After 5 Days. 08/14/2020    BLOODCX No Growth After 5 Days. 08/14/2020    BLOODCX Staphylococcus coagulase negative (A) 08/12/2020    URINECX 30,000-39,000 cfu/ml 10/08/2019       Imaging:  Results for orders placed during the hospital encounter of 06/25/24    XR chest 1 view portable    Narrative  XR CHEST PORTABLE    INDICATION: ams.    COMPARISON: January 30, 2024    FINDINGS:    No pneumothorax or pleural effusion.    Cardiomegaly seen.    Mild increased lung markings may be related to mild hypoinflation or mild congestion  Bones are unremarkable for age.    Normal upper abdomen.    Impression  No acute consolidation    Mild increased lung markings may be due to hypoinflation or mild  congestion    The study was marked in EPIC for significant notification.      Workstation performed: [id number]    Results for orders placed during the hospital encounter of 01/30/24    XR chest pa & lateral    Narrative  CHEST    INDICATION:   Subacute cough.    COMPARISON:  Comparison made with previous examination(s) dated (RF) 26-May-2022,(RF) 12-Jan-2022,(DX) 14-Oct-2021,(CT) 01-Dec-2020,(DX) 19-Aug-2020.    EXAM PERFORMED/VIEWS:  XR CHEST PA & LATERAL    FINDINGS:    Cardiomediastinal silhouette. Borderline enlarged.      The lungs are clear.  No pneumothorax or pleural effusion.    Osseous structures appear within normal limits for patient age.    Impression  No acute cardiopulmonary disease.  Borderline cardiomegaly. No significant change from prior study 10/14/2021.    Electronically signed: 01/30/2024 05:03 PM Ranjan Sanchez MD      VTE Pharmacologic Prophylaxis: Eliquis      Code Status:   Level 1 - Full Code    Assessment:  Principal Problem:    Suicidal behavior with attempted self-injury (HCC)  Active Problems:    Asthma    HTN (hypertension)    Hyperlipidemia    Hypothyroidism    History of pulmonary embolism    Morbid obesity with BMI of 40.0-44.9, adult (HCC)  Resolved Problems:    * No resolved hospital problems. *      Plan:  Suicidal behavior with attempted self-injury-patient is alert and cooperative and hemodynamically stable  - Appreciate crisis and psychiatry input  -No change in psychotropic medication  - One-to-one observation  -302 form completed     History of pulmonary embolism-continue low-dose Eliquis     Morbid obesity-continued weight loss efforts encouraged     Hypothyroidism-TSH suppressed, dose reduced from 88 to 50 mcg.  Check TSH in 4 to 6 weeks outpatient     Hyperlipidemia-continue statin     Hypertension stable on lisinopril and amlodipine     Asthma-add albuterol as needed     Disposition-medically stable for transfer to inpatient psychiatric unit once evaluated by  psychiatry.    Roland Proctor MD  6/27/2024,3:17 PM

## 2024-06-27 NOTE — ED NOTES
CW reached out to [facility] and requested completion of 302. CW requested to be advised once completed so bed search may begin.    TDS, CW

## 2024-06-27 NOTE — PLAN OF CARE
Problem: SELF HARM/SUICIDALITY  Goal: Will have no self-injury during hospital stay  Description: INTERVENTIONS:  - Q 15 MINUTES: Routine safety checks  - Q WAKING SHIFT & PRN: Assess risk to determine if routine checks are adequate to maintain patient safety  - Encourage patient to participate actively in care by formulating a plan to combat response to suicidal ideation, identify supports and resources  Outcome: Progressing     Problem: DEPRESSION  Goal: Will be euthymic at discharge  Description: INTERVENTIONS:  - Administer medication as ordered  - Provide emotional support via 1:1 interaction with staff  - Encourage involvement in milieu/groups/activities  - Monitor for social isolation  Outcome: Progressing     Problem: DELFINA  Goal: Will exhibit normal sleep and speech and no impulsivity  Description: INTERVENTIONS:  - Administer medication as ordered  - Set limits on impulsive behavior  - Make attempts to decrease external stimuli as possible  Outcome: Progressing     Problem: ANXIETY  Goal: Will report anxiety at manageable levels  Description: INTERVENTIONS:  - Administer medication as ordered  - Teach and encourage coping skills  - Provide emotional support  - Assess patient/family for anxiety and ability to cope  Outcome: Progressing

## 2024-06-27 NOTE — ED NOTES
CW notes, as per [facility], no OA in-network beds available today.    CW placed calls to the following facilities:    [facility], as per Jo, NO BEDS and no anticipated d/c's until Tuesday of next week.    [facility], as per admissions, CW may send clinicals for review. SENT    TDS, CW

## 2024-06-27 NOTE — CASE MANAGEMENT
Case Management Discharge Planning Note    Patient name Benjamin Puga  Location /-01 MRN [id number]  : 1955 Date 2024       Current Admission Date: 2024  Current Admission Diagnosis:Suicidal behavior with attempted self-injury (HCC)   Patient Active Problem List    Diagnosis Date Noted Date Diagnosed    Suicidal behavior with attempted self-injury ([facility]) 2024     Pain with swallowing 2021     IBS (irritable bowel syndrome) 2021     GERD (gastroesophageal reflux disease) 2021     Morbid obesity with BMI of 40.0-44.9, adult ([facility]) 2021     History of pulmonary embolism 2021     PTSD (post-traumatic stress disorder) 2021     Other hydronephrosis 2020     Anemia 2020     Gross hematuria 2020     Diastasis recti 07/10/2019     Ductal carcinoma in situ (DCIS) of left breast 2019     Asthma 2018     HTN (hypertension) 2018     Current moderate episode of major depressive disorder without prior episode ([facility]) 2017     Candidal intertrigo 2016     Peripheral neuropathy 2016     Allergic rhinitis 2014     Hyperlipidemia 2014     Hypothyroidism 2014     Impaired fasting glucose 2014     Herpes simplex infection 2014     Anxiety 2014       LOS (days): 1  Geometric Mean LOS (GMLOS) (days): 2.5  Days to GMLOS:1.7     OBJECTIVE:  Risk of Unplanned Readmission Score: 11.04         Current admission status: Inpatient   Preferred Pharmacy:   [id number] - [place], [place] - [facility]  [facility] [place] [address]  Phone: [phone] Fax: [phone]    [facility] - [place], [place] - [facility] [address]  Phone: [phone] Fax: [phone]    Primary Care Provider: Roland Proctor MD    Primary Insurance: [facility]  Secondary Insurance:     DISCHARGE DETAILS:  Pt  is medically cleared for discharge pending psychiatric placement. CM continues to follow.

## 2024-06-27 NOTE — ED NOTES
CW received text from [facility] indicating 302 was completed.    CW spoke w/pt. Pt is calm, cooperative, and engages in conversation. CW read and reviewed 302 rights to pt.    CW sent 302 to [facility]    TDS, CW

## 2024-06-27 NOTE — PLAN OF CARE
Problem: Potential for Falls  Goal: Patient will remain free of falls  Description: INTERVENTIONS:  - Educate patient/family on patient safety including physical limitations  - Instruct patient to call for assistance with activity   - Consult OT/PT to assist with strengthening/mobility   - Keep Call bell within reach  - Keep bed low and locked with side rails adjusted as appropriate  - Keep care items and personal belongings within reach  - Initiate and maintain comfort rounds  - Make Fall Risk Sign visible to staff  - Offer Toileting every 2 Hours, in advance of need  - Initiate/Maintain bed alarm  - Obtain necessary fall risk management equipment: bed alarm   Problem: SELF HARM/SUICIDALITY  Goal: Will have no self-injury during hospital stay  Description: INTERVENTIONS:  - Q 15 MINUTES: Routine safety checks  - Q WAKING SHIFT & PRN: Assess risk to determine if routine checks are adequate to maintain patient safety  - Encourage patient to participate actively in care by formulating a plan to combat response to suicidal ideation, identify supports and resources  Outcome: Progressing     Problem: DEPRESSION  Goal: Will be euthymic at discharge  Description: INTERVENTIONS:  - Administer medication as ordered  - Provide emotional support via 1:1 interaction with staff  - Encourage involvement in milieu/groups/activities  - Monitor for social isolation  Outcome: Progressing     Problem: DELFINA  Goal: Will exhibit normal sleep and speech and no impulsivity  Description: INTERVENTIONS:  - Administer medication as ordered  - Set limits on impulsive behavior  - Make attempts to decrease external stimuli as possible  Outcome: Progressing     Problem: PSYCHOSIS  Goal: Will report no hallucinations or delusions  Description: Interventions:  - Administer medication as  ordered  - Every waking shifts and PRN assess for the presence of hallucinations and or delusions  - Assist with reality testing to support increasing  orientation  - Assess if patient's hallucinations or delusions are encouraging self-harm or harm to others and intervene as appropriate  Outcome: Progressing     Problem: BEHAVIOR  Goal: Pt/Family maintain appropriate behavior and adhere to behavioral management agreement, if implemented  Description: INTERVENTIONS:  - Assess the family dynamic   - Encourage verbalization of thoughts and concerns in a socially appropriate manner  - Assess patient/family's coping skills and non-compliant behavior (including use of illegal substances).  - Utilize positive, consistent limit setting strategies supporting safety of patient, staff and others  - Initiate consult with Case Management, Spiritual Care or other ancillary services as appropriate  - If a patient's/visitor's behavior jeopardizes the safety of the patient, staff, or others, refer to organization procedure.   - Notify Security of behavior or suspected illegal substances which indicate the need for search of the patient and/or belongings  - Encourage participation in the decision making process about a behavioral management agreement; implement if patient meets criteria  Outcome: Progressing     Problem: ANXIETY  Goal: Will report anxiety at manageable levels  Description: INTERVENTIONS:  - Administer medication as ordered  - Teach and encourage coping skills  - Provide emotional support  - Assess patient/family for anxiety and ability to cope  Outcome: Progressing  Goal: By discharge: Patient will verbalize 2 strategies to deal with anxiety  Description: Interventions:  - Identify any obvious source/trigger to anxiety  - Staff will assist patient in applying identified coping technique/skills  - Encourage attendance of scheduled groups and activities  Outcome: Progressing     Problem: SUBSTANCE USE/ABUSE  Goal: Will have no detox symptoms and will verbalize plan for changing substance-related behavior  Description: INTERVENTIONS:  - Monitor physical status and  assess for symptoms of withdrawal  - Administer medication as ordered  - Provide emotional support with 1 on 1 interaction with staff  - Encourage recovery focused program/ addiction education  - Assess for verbalization of changing behaviors related to substance abuse  - Initiate consults and referrals as appropriate (Case Management, Spiritual Care, etc.)  Outcome: Progressing  Goal: By discharge, will develop insight into their chemical dependency and sustain motivation to continue in recovery  Description: INTERVENTIONS:  - Attends all daily group sessions and scheduled AA groups  - Actively practices coping skills through participation in the therapeutic community and adherence to program rules  - Reviews and completes assignments from individual treatment plan  - Assist patient development of understanding of their personal cycle of addiction and relapse triggers  Outcome: Progressing  Goal: By discharge, patient will have ongoing treatment plan addressing chemical dependency  Description: INTERVENTIONS:  - Assist patient with resources and/or appointments for ongoing recovery based living  Outcome: Progressing     Problem: SLEEP DISTURBANCE  Goal: Will exhibit normal sleeping pattern  Description: Interventions:  -  Assess the patients sleep pattern, noting recent changes  - Administer medication as ordered  - Decrease environmental stimuli, including noise, as appropriate during the night  - Encourage the patient to actively participate in unit groups and or exercise during the day to enhance ability to achieve adequate sleep at night  - Assess the patient, in the morning, encouraging a description of sleep experience  Outcome: Progressing     Problem: SELF CARE DEFICIT  Goal: Return ADL status to a safe level of function  Description: INTERVENTIONS:  - Administer medication as ordered  - Assess ADL deficits and provide assistive devices as needed  - Obtain PT/OT consults as needed  - Assist and instruct  patient to increase activity and self care as tolerated  Outcome: Progressing     Problem: SPIRITUAL CARE  Goal: Pt/Family able to move forward in process of forgiving self, others and/or higher power  Description: INTERVENTIONS:  - Assist patient with any spiritual needs/requests such as communion, confession, anointing, etc  - Explore guilt and help patient/family identify possible spiritual/cultural beliefs and values  - Explore possibilities of making amends & reconciliation with self, others, and/or a greater power  - Guide patient/family in identifying painful feelings  - Help patient explore and identify spiritual beliefs, cultural understandings or values that may help or hinder letting go of issue  - Help patient explore feelings of anger, bitterness, resentment, anxiety   Help patient/family identify and examine the situation in which these feelings are experienced  - Help patient/family identify destructive displacement of feelings onto other individuals  - Refer patient to formal counseling and/or to [facility] community for further support as needed or per request  Outcome: Progressing  Goal: Patient feels balance and connection with others and/or higher power that empowers the self during times of loss, guilt and fear  Description: INTERVENTIONS:  - Create safety for patient through empathic presence and non-judgmental listening  - Encourage patient to explore his/her values, beliefs and/or spiritual images and practices  - Encourage use of breath work, imagery, meditation, relaxation, reiki to ease distress and provide healing  - Encourage use of cultural and spiritual celebrations and rituals  - Facilitate discussion that helps patient sort out spiritual concerns  - Help patient identify where meaning/hope/comfort & strength are in his/her life  - Refer patient to [facility] for assistance, as appropriate  - Respond to patient/family need for prayer/ritual/sacrament/ceremony  Outcome: Progressing      - Apply yellow socks and bracelet for high fall risk patients  - Consider moving patient to room near nurses station  Outcome: Progressing

## 2024-06-28 ENCOUNTER — HOSPITAL ENCOUNTER (INPATIENT)
Facility: HOSPITAL | Age: 69
LOS: 11 days | Discharge: HOME | DRG: 885 | End: 2024-07-09
Attending: PSYCHIATRY & NEUROLOGY | Admitting: PSYCHIATRY & NEUROLOGY
Payer: COMMERCIAL

## 2024-06-28 VITALS
RESPIRATION RATE: 19 BRPM | BODY MASS INDEX: 41.05 KG/M2 | HEART RATE: 114 BPM | WEIGHT: 195.55 LBS | DIASTOLIC BLOOD PRESSURE: 77 MMHG | OXYGEN SATURATION: 99 % | HEIGHT: 58 IN | SYSTOLIC BLOOD PRESSURE: 144 MMHG | TEMPERATURE: 98.2 F

## 2024-06-28 DIAGNOSIS — B36.9 DERMATOMYCOSIS: ICD-10-CM

## 2024-06-28 DIAGNOSIS — E03.9 ACQUIRED HYPOTHYROIDISM: ICD-10-CM

## 2024-06-28 DIAGNOSIS — J45.20 MILD INTERMITTENT ASTHMA WITHOUT COMPLICATION: ICD-10-CM

## 2024-06-28 DIAGNOSIS — K21.9 GASTROESOPHAGEAL REFLUX DISEASE WITHOUT ESOPHAGITIS: ICD-10-CM

## 2024-06-28 DIAGNOSIS — M54.9 BACK PAIN, UNSPECIFIED BACK LOCATION, UNSPECIFIED BACK PAIN LATERALITY, UNSPECIFIED CHRONICITY: ICD-10-CM

## 2024-06-28 DIAGNOSIS — I10 ESSENTIAL HYPERTENSION: Chronic | ICD-10-CM

## 2024-06-28 DIAGNOSIS — I26.99 PULMONARY EMBOLISM, OTHER, UNSPECIFIED CHRONICITY, UNSPECIFIED WHETHER ACUTE COR PULMONALE PRESENT (HCC): ICD-10-CM

## 2024-06-28 DIAGNOSIS — R73.01 IMPAIRED FASTING GLUCOSE: ICD-10-CM

## 2024-06-28 DIAGNOSIS — B37.9 YEAST INFECTION: ICD-10-CM

## 2024-06-28 DIAGNOSIS — F32.2 CURRENT SEVERE EPISODE OF MAJOR DEPRESSIVE DISORDER WITHOUT PSYCHOTIC FEATURES WITHOUT PRIOR EPISODE (HCC): Primary | ICD-10-CM

## 2024-06-28 DIAGNOSIS — E53.8 VITAMIN B12 DEFICIENCY: ICD-10-CM

## 2024-06-28 DIAGNOSIS — G89.29 OTHER CHRONIC PAIN: ICD-10-CM

## 2024-06-28 DIAGNOSIS — E78.5 DYSLIPIDEMIA: ICD-10-CM

## 2024-06-28 DIAGNOSIS — R10.13 DYSPEPSIA: ICD-10-CM

## 2024-06-28 DIAGNOSIS — F41.9 ANXIETY: ICD-10-CM

## 2024-06-28 DIAGNOSIS — J30.1 SEASONAL ALLERGIC RHINITIS DUE TO POLLEN: ICD-10-CM

## 2024-06-28 DIAGNOSIS — I10 PRIMARY HYPERTENSION: ICD-10-CM

## 2024-06-28 DIAGNOSIS — E78.2 MIXED HYPERLIPIDEMIA: ICD-10-CM

## 2024-06-28 DIAGNOSIS — B00.9 HERPES SIMPLEX INFECTION: ICD-10-CM

## 2024-06-28 LAB — BACTERIA UR CULT: NORMAL

## 2024-06-28 PROCEDURE — 99239 HOSP IP/OBS DSCHRG MGMT >30: CPT | Performed by: INTERNAL MEDICINE

## 2024-06-28 RX ORDER — DULOXETIN HYDROCHLORIDE 30 MG/1
30 CAPSULE, DELAYED RELEASE ORAL DAILY
Status: DISCONTINUED | OUTPATIENT
Start: 2024-06-29 | End: 2024-07-02

## 2024-06-28 RX ORDER — LISINOPRIL 20 MG/1
40 TABLET ORAL DAILY
Status: CANCELLED | OUTPATIENT
Start: 2024-06-29

## 2024-06-28 RX ORDER — METHOCARBAMOL 500 MG/1
500 TABLET, FILM COATED ORAL EVERY 6 HOURS PRN
Status: DISCONTINUED | OUTPATIENT
Start: 2024-06-28 | End: 2024-07-09 | Stop reason: HOSPADM

## 2024-06-28 RX ORDER — DULOXETIN HYDROCHLORIDE 30 MG/1
30 CAPSULE, DELAYED RELEASE ORAL DAILY
Status: CANCELLED | OUTPATIENT
Start: 2024-06-29

## 2024-06-28 RX ORDER — ATORVASTATIN CALCIUM 40 MG/1
40 TABLET, FILM COATED ORAL DAILY
Status: CANCELLED | OUTPATIENT
Start: 2024-06-29

## 2024-06-28 RX ORDER — HYDROXYZINE HYDROCHLORIDE 25 MG/1
25 TABLET, FILM COATED ORAL
Status: CANCELLED | OUTPATIENT
Start: 2024-06-28

## 2024-06-28 RX ORDER — DOCUSATE SODIUM 100 MG/1
100 CAPSULE, LIQUID FILLED ORAL 2 TIMES DAILY
Status: CANCELLED | OUTPATIENT
Start: 2024-06-28

## 2024-06-28 RX ORDER — ALBUTEROL SULFATE 90 UG/1
2 AEROSOL, METERED RESPIRATORY (INHALATION) EVERY 4 HOURS PRN
Status: CANCELLED | OUTPATIENT
Start: 2024-06-28

## 2024-06-28 RX ORDER — HYDROXYZINE HYDROCHLORIDE 25 MG/1
25 TABLET, FILM COATED ORAL
Status: DISCONTINUED | OUTPATIENT
Start: 2024-06-28 | End: 2024-06-29

## 2024-06-28 RX ORDER — PANTOPRAZOLE SODIUM 40 MG/1
40 TABLET, DELAYED RELEASE ORAL 2 TIMES DAILY
Status: DISCONTINUED | OUTPATIENT
Start: 2024-06-28 | End: 2024-07-09 | Stop reason: HOSPADM

## 2024-06-28 RX ORDER — ACETAMINOPHEN 325 MG/1
650 TABLET ORAL EVERY 4 HOURS PRN
Status: CANCELLED | OUTPATIENT
Start: 2024-06-28

## 2024-06-28 RX ORDER — TRAZODONE HYDROCHLORIDE 50 MG/1
50 TABLET ORAL
Status: DISCONTINUED | OUTPATIENT
Start: 2024-06-28 | End: 2024-07-09 | Stop reason: HOSPADM

## 2024-06-28 RX ORDER — LISINOPRIL 20 MG/1
40 TABLET ORAL DAILY
Status: DISCONTINUED | OUTPATIENT
Start: 2024-06-29 | End: 2024-07-09 | Stop reason: HOSPADM

## 2024-06-28 RX ORDER — ACETAMINOPHEN 325 MG/1
975 TABLET ORAL EVERY 6 HOURS PRN
Status: DISCONTINUED | OUTPATIENT
Start: 2024-06-28 | End: 2024-06-29

## 2024-06-28 RX ORDER — OLANZAPINE 2.5 MG/1
5 TABLET, FILM COATED ORAL
Status: CANCELLED | OUTPATIENT
Start: 2024-06-28

## 2024-06-28 RX ORDER — PANTOPRAZOLE SODIUM 40 MG/1
40 TABLET, DELAYED RELEASE ORAL 2 TIMES DAILY
Status: CANCELLED | OUTPATIENT
Start: 2024-06-28

## 2024-06-28 RX ORDER — LORAZEPAM 0.5 MG/1
0.5 TABLET ORAL
Status: DISCONTINUED | OUTPATIENT
Start: 2024-06-28 | End: 2024-07-08

## 2024-06-28 RX ORDER — OLANZAPINE 2.5 MG/1
2.5 TABLET, FILM COATED ORAL
Status: CANCELLED | OUTPATIENT
Start: 2024-06-28

## 2024-06-28 RX ORDER — OLANZAPINE 5 MG/1
5 TABLET ORAL
Status: DISCONTINUED | OUTPATIENT
Start: 2024-06-28 | End: 2024-07-09 | Stop reason: HOSPADM

## 2024-06-28 RX ORDER — ALBUTEROL SULFATE 90 UG/1
2 AEROSOL, METERED RESPIRATORY (INHALATION) EVERY 4 HOURS PRN
Status: DISCONTINUED | OUTPATIENT
Start: 2024-06-28 | End: 2024-07-09 | Stop reason: HOSPADM

## 2024-06-28 RX ORDER — ACETAMINOPHEN 325 MG/1
650 TABLET ORAL EVERY 4 HOURS PRN
Status: DISCONTINUED | OUTPATIENT
Start: 2024-06-28 | End: 2024-07-09 | Stop reason: HOSPADM

## 2024-06-28 RX ORDER — CLOTRIMAZOLE 1 %
CREAM (GRAM) TOPICAL 2 TIMES DAILY
Status: DISCONTINUED | OUTPATIENT
Start: 2024-06-28 | End: 2024-07-09 | Stop reason: HOSPADM

## 2024-06-28 RX ORDER — BENZTROPINE MESYLATE 1 MG/1
0.5 TABLET ORAL
Status: CANCELLED | OUTPATIENT
Start: 2024-06-28

## 2024-06-28 RX ORDER — POLYETHYLENE GLYCOL 3350 17 G/17G
17 POWDER, FOR SOLUTION ORAL DAILY
Status: DISCONTINUED | OUTPATIENT
Start: 2024-06-29 | End: 2024-07-02

## 2024-06-28 RX ORDER — AMLODIPINE BESYLATE 5 MG/1
5 TABLET ORAL DAILY
Status: DISCONTINUED | OUTPATIENT
Start: 2024-06-29 | End: 2024-07-09 | Stop reason: HOSPADM

## 2024-06-28 RX ORDER — POLYETHYLENE GLYCOL 3350 17 G/17G
17 POWDER, FOR SOLUTION ORAL DAILY
Status: CANCELLED | OUTPATIENT
Start: 2024-06-29

## 2024-06-28 RX ORDER — LORAZEPAM 2 MG/ML
1 INJECTION INTRAMUSCULAR
Status: DISCONTINUED | OUTPATIENT
Start: 2024-06-28 | End: 2024-07-09 | Stop reason: HOSPADM

## 2024-06-28 RX ORDER — ACETAMINOPHEN 325 MG/1
975 TABLET ORAL EVERY 6 HOURS PRN
Status: CANCELLED | OUTPATIENT
Start: 2024-06-28

## 2024-06-28 RX ORDER — OLANZAPINE 2.5 MG/1
2.5 TABLET, FILM COATED ORAL
Status: DISCONTINUED | OUTPATIENT
Start: 2024-06-28 | End: 2024-07-09 | Stop reason: HOSPADM

## 2024-06-28 RX ORDER — TRAZODONE HYDROCHLORIDE 50 MG/1
50 TABLET ORAL
Status: CANCELLED | OUTPATIENT
Start: 2024-06-28

## 2024-06-28 RX ORDER — NYSTATIN 100000 [USP'U]/G
POWDER TOPICAL 2 TIMES DAILY
Status: DISCONTINUED | OUTPATIENT
Start: 2024-06-28 | End: 2024-07-09 | Stop reason: HOSPADM

## 2024-06-28 RX ORDER — ATORVASTATIN CALCIUM 40 MG/1
40 TABLET, FILM COATED ORAL DAILY
Status: DISCONTINUED | OUTPATIENT
Start: 2024-06-29 | End: 2024-07-09 | Stop reason: HOSPADM

## 2024-06-28 RX ORDER — LEVOTHYROXINE SODIUM 0.05 MG/1
50 TABLET ORAL
Status: CANCELLED | OUTPATIENT
Start: 2024-06-29

## 2024-06-28 RX ORDER — DULOXETIN HYDROCHLORIDE 60 MG/1
60 CAPSULE, DELAYED RELEASE ORAL DAILY
Status: CANCELLED | OUTPATIENT
Start: 2024-06-29

## 2024-06-28 RX ORDER — LORAZEPAM 2 MG/ML
1 INJECTION INTRAMUSCULAR
Status: CANCELLED | OUTPATIENT
Start: 2024-06-28

## 2024-06-28 RX ORDER — LORAZEPAM 0.5 MG/1
0.5 TABLET ORAL
Status: CANCELLED | OUTPATIENT
Start: 2024-06-28

## 2024-06-28 RX ORDER — ONDANSETRON 2 MG/ML
4 INJECTION INTRAMUSCULAR; INTRAVENOUS EVERY 6 HOURS PRN
Status: CANCELLED | OUTPATIENT
Start: 2024-06-28

## 2024-06-28 RX ORDER — DOCUSATE SODIUM 100 MG/1
100 CAPSULE, LIQUID FILLED ORAL 2 TIMES DAILY
Status: DISCONTINUED | OUTPATIENT
Start: 2024-06-28 | End: 2024-07-02

## 2024-06-28 RX ORDER — LORAZEPAM 1 MG/1
1 TABLET ORAL
Status: CANCELLED | OUTPATIENT
Start: 2024-06-28

## 2024-06-28 RX ORDER — LEVOTHYROXINE SODIUM 0.05 MG/1
50 TABLET ORAL
Start: 2024-06-29 | End: 2024-07-09

## 2024-06-28 RX ORDER — DULOXETIN HYDROCHLORIDE 60 MG/1
60 CAPSULE, DELAYED RELEASE ORAL DAILY
Status: DISCONTINUED | OUTPATIENT
Start: 2024-06-29 | End: 2024-07-02

## 2024-06-28 RX ORDER — BENZTROPINE MESYLATE 0.5 MG/1
0.5 TABLET ORAL
Status: DISCONTINUED | OUTPATIENT
Start: 2024-06-28 | End: 2024-07-09 | Stop reason: HOSPADM

## 2024-06-28 RX ORDER — OLANZAPINE 10 MG/2ML
5 INJECTION, POWDER, FOR SOLUTION INTRAMUSCULAR
Status: DISCONTINUED | OUTPATIENT
Start: 2024-06-28 | End: 2024-07-09 | Stop reason: HOSPADM

## 2024-06-28 RX ORDER — LEVOTHYROXINE SODIUM 0.05 MG/1
50 TABLET ORAL
Status: DISCONTINUED | OUTPATIENT
Start: 2024-06-29 | End: 2024-07-09 | Stop reason: HOSPADM

## 2024-06-28 RX ORDER — OLANZAPINE 10 MG/2ML
5 INJECTION, POWDER, FOR SOLUTION INTRAMUSCULAR
Status: CANCELLED | OUTPATIENT
Start: 2024-06-28

## 2024-06-28 RX ORDER — LORAZEPAM 1 MG/1
1 TABLET ORAL
Status: DISCONTINUED | OUTPATIENT
Start: 2024-06-28 | End: 2024-07-08

## 2024-06-28 RX ORDER — AMLODIPINE BESYLATE 5 MG/1
5 TABLET ORAL DAILY
Status: CANCELLED | OUTPATIENT
Start: 2024-06-29

## 2024-06-28 RX ORDER — ONDANSETRON 2 MG/ML
4 INJECTION INTRAMUSCULAR; INTRAVENOUS EVERY 6 HOURS PRN
Status: DISCONTINUED | OUTPATIENT
Start: 2024-06-28 | End: 2024-06-30

## 2024-06-28 RX ADMIN — DOCUSATE SODIUM 100 MG: 100 CAPSULE, LIQUID FILLED ORAL at 08:28

## 2024-06-28 RX ADMIN — APIXABAN 2.5 MG: 2.5 TABLET, FILM COATED ORAL at 19:54

## 2024-06-28 RX ADMIN — TRAZODONE HYDROCHLORIDE 50 MG: 50 TABLET ORAL at 21:02

## 2024-06-28 RX ADMIN — APIXABAN 2.5 MG: 2.5 TABLET, FILM COATED ORAL at 08:28

## 2024-06-28 RX ADMIN — ALBUTEROL SULFATE 2 PUFF: 90 AEROSOL, METERED RESPIRATORY (INHALATION) at 21:07

## 2024-06-28 RX ADMIN — DOCUSATE SODIUM 100 MG: 100 CAPSULE, LIQUID FILLED ORAL at 19:54

## 2024-06-28 RX ADMIN — LEVOTHYROXINE SODIUM 50 MCG: 0.05 TABLET ORAL at 06:10

## 2024-06-28 RX ADMIN — LISINOPRIL 40 MG: 20 TABLET ORAL at 08:28

## 2024-06-28 RX ADMIN — DULOXETINE HYDROCHLORIDE 60 MG: 30 CAPSULE, DELAYED RELEASE ORAL at 08:29

## 2024-06-28 RX ADMIN — DULOXETINE HYDROCHLORIDE 30 MG: 60 CAPSULE, DELAYED RELEASE ORAL at 08:38

## 2024-06-28 RX ADMIN — ATORVASTATIN CALCIUM 40 MG: 40 TABLET, FILM COATED ORAL at 08:28

## 2024-06-28 RX ADMIN — AMLODIPINE BESYLATE 5 MG: 5 TABLET ORAL at 08:28

## 2024-06-28 RX ADMIN — ALBUTEROL SULFATE 2 PUFF: 90 AEROSOL, METERED RESPIRATORY (INHALATION) at 01:51

## 2024-06-28 RX ADMIN — NYSTATIN 3 APPLICATION: 100000 POWDER TOPICAL at 20:59

## 2024-06-28 RX ADMIN — PANTOPRAZOLE SODIUM 40 MG: 40 TABLET, DELAYED RELEASE ORAL at 08:28

## 2024-06-28 RX ADMIN — ALPRAZOLAM 0.25 MG: 0.25 TABLET ORAL at 08:28

## 2024-06-28 RX ADMIN — PANTOPRAZOLE SODIUM 40 MG: 40 TABLET, DELAYED RELEASE ORAL at 19:54

## 2024-06-28 RX ADMIN — POLYETHYLENE GLYCOL 3350 17 G: 17 POWDER, FOR SOLUTION ORAL at 08:28

## 2024-06-28 NOTE — ED NOTES
MARTINEZ placed call to [facility], spoke w/Lydia TRONCOSO    Insurance Authorization for admission:   Phone call placed to [facility]  Phone number: [phone]     Spoke to Lydia TRONCOSO     ** days approved.  Level of care: 302  Review on **.   Authorization # [id number] - CW to fax clinicals to: [phone]. SENT        Eligibility Verification System checked - ([phone]).  Online system / automated system indicates: **    Insurance Authorization for Transportation:    Phone call placed to **.   Phone number **.   Spoke to **.   Authorization #: **    MARTINEZ POTTER

## 2024-06-28 NOTE — ED NOTES
CW placed transport packet in pt chart binder on the MS-Unit / Flr.    CW provided [facility] and pt's nurse w/updates    CW provided [facility] w/ETA    TDS, CW

## 2024-06-28 NOTE — DISCHARGE SUMMARY
"Discharge Summary - Benjamin Puga 69 y.o. 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 Larsen  Benjamin Puga is a 69 y.o. 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  She appears slightly confused and was unable to provide full history of why she made that decision but she stated that \"she wanted to put an end to this misery \".  Patient was found to be slightly lethargic, IV hydrated, oxygenated, on examination she had some mild wheezing and was placed on oxygen and nebulizer was given.  Will place her in observation while on one-to-one observation and when medically stable, to be seen by psychiatry.     Procedures Performed:   No orders of the defined types were placed in this encounter.      Hospital Course:   Suicidal behavior with attempted self-injury-patient remained alert and cooperative and hemodynamically stable throughout the hospitalization.  - Appreciate crisis and psychiatry input  -No change in psychotropic medication  - One-to-one observation  -302 form completed  - [facility]     History of pulmonary embolism-continue low-dose Eliquis     Morbid obesity-continued weight loss efforts encouraged     Hypothyroidism-TSH suppressed, " dose reduced from 88 to 50 mcg.  Check TSH in 4 to 6 weeks outpatient     Hyperlipidemia-continue statin     Hypertension stable on lisinopril and amlodipine     Asthma-albuterol added for mild wheezing and congestion perhaps related to allergy.  Continue Arnuity      Current Facility-Administered Medications:     acetaminophen (TYLENOL) tablet 650 mg, 650 mg, Oral, Q6H PRN, Colton Larsen MD    albuterol (PROVENTIL HFA,VENTOLIN HFA) inhaler 2 puff, 2 puff, Inhalation, Q4H PRN, Roland Proctor MD, 2 puff at 06/28/24 0151    ALPRAZolam (XANAX) tablet 0.25 mg, 0.25 mg, Oral, TID PRN, Colton Larsen MD, 0.25 mg at 06/28/24 0828    amLODIPine (NORVASC) tablet 5 mg, 5 mg, Oral, Daily, Colton Larsen MD, 5 mg at 06/28/24 0828    apixaban (ELIQUIS) tablet 2.5 mg, 2.5 mg, Oral, BID, Colton Larsen MD, 2.5 mg at 06/28/24 0828    atorvastatin (LIPITOR) tablet 40 mg, 40 mg, Oral, Daily, Colton Larsen MD, 40 mg at 06/28/24 0828    docusate sodium (COLACE) capsule 100 mg, 100 mg, Oral, BID, Roland Proctor MD, 100 mg at 06/28/24 0828    DULoxetine (CYMBALTA) delayed release capsule 30 mg, 30 mg, Oral, Daily, Roland Proctor MD, 30 mg at 06/28/24 0838    DULoxetine (CYMBALTA) delayed release capsule 60 mg, 60 mg, Oral, Daily, Roland Proctor MD, 60 mg at 06/28/24 0829    fluticasone (ARNUITY ELLIPTA) 100 MCG/ACT inhaler 1 puff, 1 puff, Inhalation, Daily, Colton Larsen MD    levothyroxine tablet 50 mcg, 50 mcg, Oral, Early Morning, Colton Larsen MD, 50 mcg at 06/28/24 0610    lisinopril (ZESTRIL) tablet 40 mg, 40 mg, Oral, Daily, Colton Larsen MD, 40 mg at 06/28/24 0828    ondansetron (ZOFRAN) injection 4 mg, 4 mg, Intravenous, Q6H PRN, Colton Larsen MD    pantoprazole (PROTONIX) EC tablet 40 mg, 40 mg, Oral, BID, Colton Larsen MD, 40 mg at 06/28/24 0828    polyethylene glycol (MIRALAX) packet 17 g, 17 g, Oral, Daily, Roland Proctor MD, 17 g at 06/28/24 0828      Consulting Providers    Psychiatry    Significant Findings, Care, Treatment and Services Provided:   IMPRESSION:     No acute consolidation     Mild increased lung markings may be due to hypoinflation or mild congestion    Complications:  None  Physical Exam:  General Appearance:    Alert, cooperative, no distress   Head:    Normocephalic, without obvious abnormality, atraumatic   Eyes:    PERRL, conjunctiva/corneas clear, EOM's intact       Nose:   Moist mucous membranes, no drainage or sinus tenderness   Throat:   No tenderness, no exudates   Neck:   Supple, symmetrical, trachea midline, no JVD   Lungs:   Scattered wheezes in upper lobes bilaterally, respirations unlabored   Heart:    Regular rate and rhythm, S1 and S2 normal, no murmur, rub   or gallop   Abdomen: Soft, non-tender, positive bowel sounds, no masses, no organomegaly   Extremities:  No pedal edema, calf tenderness. Distal pulses palpable.   Neurologic:     CNII-XII intact.    Labs:   Lab Results   Component Value Date    WBC 8.17 06/25/2024    WBC 9.0 05/04/2017    RBC 4.62 06/25/2024    HGB 13.0 06/25/2024    HGB 12.2 05/04/2017    HCT 40.4 06/25/2024    HCT 37.6 05/04/2017    MCV 87 06/25/2024    MCV 83 05/04/2017    MCH 28.1 06/25/2024    MCH 27.1 05/04/2017    RDW 13.2 06/25/2024    RDW 14.9 05/04/2017     06/25/2024     05/04/2017     Lab Results   Component Value Date    CREATININE 0.75 06/25/2024    CREATININE 0.70 08/26/2021    BUN 11 06/25/2024    BUN 13 08/26/2021     05/04/2017    K 4.1 06/25/2024    K 4.0 08/26/2021     06/25/2024    CL 99 (L) 08/26/2021    CO2 29 06/25/2024    CO2 31 08/26/2021    GLUCOSE 90 05/04/2017    PROT 7.3 05/04/2017    ALKPHOS 82 06/25/2024    ALKPHOS 82 05/04/2017    ALT 12 06/25/2024    ALT 13 05/04/2017    AST 17 06/25/2024    AST 18 05/04/2017    BILIDIR 0.09 01/13/2021    BILIDIR 0.09 05/04/2017         Discharge Diagnosis:   Principal Problem:    Suicidal behavior with attempted self-injury  (HCC)  Active Problems:    Asthma    HTN (hypertension)    Hyperlipidemia    Hypothyroidism    History of pulmonary embolism    Morbid obesity with BMI of 40.0-44.9, adult (HCC)  Resolved Problems:    * No resolved hospital problems. *      Condition at Discharge:   Good     Code Status: Level 1 - Full Code  Advance Directive and Living Will: <no information>  Power of :    POLST:      Discharge instructions/Information to patient and family:   See after visit summary for information provided to patient and family.      Provisions for Follow-Up Care:  See after visit summary for information related to follow-up care and any pertinent home health orders.      Disposition:   [facility]    Planned Readmission:   Yes [facility]    Discharge Statement   I spent 42 minutes discharging the patient. This time was spent on the day of discharge. I had direct contact with the patient on the day of discharge. Additional documentation is required if more than 30 minutes were spent on discharge. Greater than 50% of the total time was spent examining patient, answering all patient questions, arranging and discussing plan of care with patient as well as directly providing post-discharge instructions. Additional time then spent on discharge activities.    Discharge Medications:  See after visit summary for reconciled discharge medications provided to patient and family.      Roland Proctor MD  6/28/2024,3:48 PM

## 2024-06-28 NOTE — PLAN OF CARE
Problem: Potential for Falls  Goal: Patient will remain free of falls  Description: INTERVENTIONS:  - Educate patient/family on patient safety including physical limitations  - Instruct patient to call for assistance with activity   - Consult OT/PT to assist with strengthening/mobility   - Keep Call bell within reach  - Keep bed low and locked with side rails adjusted as appropriate  - Keep care items and personal belongings within reach  - Initiate and maintain comfort rounds  - Make Fall Risk Sign visible to staff  - Offer Toileting every  Hours, in advance of need  - Initiate/Maintain alarm  - Obtain necessary fall risk management equipment  - Apply yellow socks and bracelet for high fall risk patients  - Consider moving patient to room near nurses station  6/28/2024 1736 by Mariajose Hansen  Outcome: Adequate for Discharge  6/28/2024 0949 by Mariajose Hansen  Outcome: Progressing     Problem: SELF HARM/SUICIDALITY  Goal: Will have no self-injury during hospital stay  Description: INTERVENTIONS:  - Q 15 MINUTES: Routine safety checks  - Q WAKING SHIFT & PRN: Assess risk to determine if routine checks are adequate to maintain patient safety  - Encourage patient to participate actively in care by formulating a plan to combat response to suicidal ideation, identify supports and resources  6/28/2024 1736 by Mariajose Hansen  Outcome: Adequate for Discharge  6/28/2024 0949 by Mariajose Hansen  Outcome: Progressing     Problem: DEPRESSION  Goal: Will be euthymic at discharge  Description: INTERVENTIONS:  - Administer medication as ordered  - Provide emotional support via 1:1 interaction with staff  - Encourage involvement in milieu/groups/activities  - Monitor for social isolation  6/28/2024 1736 by Mariajose Hansen  Outcome: Adequate for Discharge  6/28/2024 0949 by Mariajose Hansen  Outcome: Progressing     Problem: DELFINA  Goal: Will exhibit normal sleep and speech and no impulsivity  Description:  INTERVENTIONS:  - Administer medication as ordered  - Set limits on impulsive behavior  - Make attempts to decrease external stimuli as possible  6/28/2024 1736 by Mariajose Hansen  Outcome: Adequate for Discharge  6/28/2024 0949 by Mariajose Hansen  Outcome: Progressing     Problem: PSYCHOSIS  Goal: Will report no hallucinations or delusions  Description: Interventions:  - Administer medication as  ordered  - Every waking shifts and PRN assess for the presence of hallucinations and or delusions  - Assist with reality testing to support increasing orientation  - Assess if patient's hallucinations or delusions are encouraging self-harm or harm to others and intervene as appropriate  6/28/2024 1736 by Mariajose Hansen  Outcome: Adequate for Discharge  6/28/2024 0949 by Mariajose Hansen  Outcome: Progressing     Problem: BEHAVIOR  Goal: Pt/Family maintain appropriate behavior and adhere to behavioral management agreement, if implemented  Description: INTERVENTIONS:  - Assess the family dynamic   - Encourage verbalization of thoughts and concerns in a socially appropriate manner  - Assess patient/family's coping skills and non-compliant behavior (including use of illegal substances).  - Utilize positive, consistent limit setting strategies supporting safety of patient, staff and others  - Initiate consult with Case Management, Spiritual Care or other ancillary services as appropriate  - If a patient's/visitor's behavior jeopardizes the safety of the patient, staff, or others, refer to organization procedure.   - Notify Security of behavior or suspected illegal substances which indicate the need for search of the patient and/or belongings  - Encourage participation in the decision making process about a behavioral management agreement; implement if patient meets criteria  6/28/2024 1736 by Mariajose Hansen  Outcome: Adequate for Discharge  6/28/2024 0949 by Mariajose Hansen  Outcome: Progressing     Problem:  ANXIETY  Goal: Will report anxiety at manageable levels  Description: INTERVENTIONS:  - Administer medication as ordered  - Teach and encourage coping skills  - Provide emotional support  - Assess patient/family for anxiety and ability to cope  6/28/2024 1736 by Mariajose Hansen  Outcome: Adequate for Discharge  6/28/2024 0949 by Mariajose Hansen  Outcome: Progressing  Goal: By discharge: Patient will verbalize 2 strategies to deal with anxiety  Description: Interventions:  - Identify any obvious source/trigger to anxiety  - Staff will assist patient in applying identified coping technique/skills  - Encourage attendance of scheduled groups and activities  6/28/2024 1736 by Mariajose Hansen  Outcome: Adequate for Discharge  6/28/2024 0949 by Mariajose Hansen  Outcome: Progressing     Problem: SUBSTANCE USE/ABUSE  Goal: Will have no detox symptoms and will verbalize plan for changing substance-related behavior  Description: INTERVENTIONS:  - Monitor physical status and assess for symptoms of withdrawal  - Administer medication as ordered  - Provide emotional support with 1 on 1 interaction with staff  - Encourage recovery focused program/ addiction education  - Assess for verbalization of changing behaviors related to substance abuse  - Initiate consults and referrals as appropriate (Case Management, Spiritual Care, etc.)  6/28/2024 1736 by Mariajose Hanesn  Outcome: Adequate for Discharge  6/28/2024 0949 by Mariajose Hansen  Outcome: Progressing  Goal: By discharge, will develop insight into their chemical dependency and sustain motivation to continue in recovery  Description: INTERVENTIONS:  - Attends all daily group sessions and scheduled AA groups  - Actively practices coping skills through participation in the therapeutic community and adherence to program rules  - Reviews and completes assignments from individual treatment plan  - Assist patient development of understanding of their personal cycle of  addiction and relapse triggers  6/28/2024 1736 by Mariajose Hansen  Outcome: Adequate for Discharge  6/28/2024 0949 by Mariajose Hansen  Outcome: Progressing  Goal: By discharge, patient will have ongoing treatment plan addressing chemical dependency  Description: INTERVENTIONS:  - Assist patient with resources and/or appointments for ongoing recovery based living  6/28/2024 1736 by Mariajose Hansen  Outcome: Adequate for Discharge  6/28/2024 0949 by Mariajose Hansen  Outcome: Progressing     Problem: SLEEP DISTURBANCE  Goal: Will exhibit normal sleeping pattern  Description: Interventions:  -  Assess the patients sleep pattern, noting recent changes  - Administer medication as ordered  - Decrease environmental stimuli, including noise, as appropriate during the night  - Encourage the patient to actively participate in unit groups and or exercise during the day to enhance ability to achieve adequate sleep at night  - Assess the patient, in the morning, encouraging a description of sleep experience  6/28/2024 1736 by Mariajose Hansen  Outcome: Adequate for Discharge  6/28/2024 0949 by Mariajose Hansen  Outcome: Progressing     Problem: INVOLUNTARY ADMIT  Goal: Will cooperate with staff recommendations and doctor's orders and will demonstrate appropriate behavior  Description: INTERVENTIONS:  - Treat underlying conditions and offer medication as ordered  - Educate regarding involuntary admission procedures and rules  - Utilize positive consistent limit setting strategies to support patient and staff safety  6/28/2024 1736 by Mariajose Hansen  Outcome: Adequate for Discharge  6/28/2024 0949 by Mariajose Hansen  Outcome: Progressing     Problem: SELF CARE DEFICIT  Goal: Return ADL status to a safe level of function  Description: INTERVENTIONS:  - Administer medication as ordered  - Assess ADL deficits and provide assistive devices as needed  - Obtain PT/OT consults as needed  - Assist and instruct patient to  increase activity and self care as tolerated  6/28/2024 1736 by Mariajose Hansen  Outcome: Adequate for Discharge  6/28/2024 0949 by Mariajose Hansen  Outcome: Progressing     Problem: SPIRITUAL CARE  Goal: Pt/Family able to move forward in process of forgiving self, others and/or higher power  Description: INTERVENTIONS:  - Assist patient with any spiritual needs/requests such as communion, confession, anointing, etc  - Explore guilt and help patient/family identify possible spiritual/cultural beliefs and values  - Explore possibilities of making amends & reconciliation with self, others, and/or a greater power  - Guide patient/family in identifying painful feelings  - Help patient explore and identify spiritual beliefs, cultural understandings or values that may help or hinder letting go of issue  - Help patient explore feelings of anger, bitterness, resentment, anxiety   Help patient/family identify and examine the situation in which these feelings are experienced  - Help patient/family identify destructive displacement of feelings onto other individuals  - Refer patient to formal counseling and/or to freedom community for further support as needed or per request  6/28/2024 1736 by Mariajose Hansen  Outcome: Adequate for Discharge  6/28/2024 0949 by Mariajose Hansen  Outcome: Progressing  Goal: Patient feels balance and connection with others and/or higher power that empowers the self during times of loss, guilt and fear  Description: INTERVENTIONS:  - Create safety for patient through empathic presence and non-judgmental listening  - Encourage patient to explore his/her values, beliefs and/or spiritual images and practices  - Encourage use of breath work, imagery, meditation, relaxation, reiki to ease distress and provide healing  - Encourage use of cultural and spiritual celebrations and rituals  - Facilitate discussion that helps patient sort out spiritual concerns  - Help patient identify where  meaning/hope/comfort & strength are in his/her life  - Refer patient to freedom community for assistance, as appropriate  - Respond to patient/family need for prayer/ritual/sacrament/ceremony  6/28/2024 1736 by Mariajose Hansen  Outcome: Adequate for Discharge  6/28/2024 0949 by Mariajose Hansen  Outcome: Progressing

## 2024-06-28 NOTE — ED NOTES
Patient is accepted at [facility].  Patient is accepted by Dr. Calderón per Intake.     Transportation is arranged with [facility].    Transportation is scheduled for 1615.   Patient may go to the floor at anytime after 1530.          Nurse report is to be called to [phone] prior to patient transfer.     TDS, CW

## 2024-06-28 NOTE — PLAN OF CARE
Problem: Potential for Falls  Goal: Patient will remain free of falls  Description: INTERVENTIONS:  - Educate patient/family on patient safety including physical limitations  - Instruct patient to call for assistance with activity   - Consult OT/PT to assist with strengthening/mobility   - Keep Call bell within reach  - Keep bed low and locked with side rails adjusted as appropriate  - Keep care items and personal belongings within reach  - Initiate and maintain comfort rounds  - Make Fall Risk Sign visible to staff  - Offer Toileting every 3 Hours, in advance of need  - Initiate/Maintain bed alarm  - Obtain necessary fall risk management equipment:   - Apply yellow socks and bracelet for high fall risk patients  - Consider moving patient to room near nurses station  Outcome: Progressing     Problem: SELF HARM/SUICIDALITY  Goal: Will have no self-injury during hospital stay  Description: INTERVENTIONS:  - Q 15 MINUTES: Routine safety checks  - Q WAKING SHIFT & PRN: Assess risk to determine if routine checks are adequate to maintain patient safety  - Encourage patient to participate actively in care by formulating a plan to combat response to suicidal ideation, identify supports and resources  Outcome: Progressing     Problem: DEPRESSION  Goal: Will be euthymic at discharge  Description: INTERVENTIONS:  - Administer medication as ordered  - Provide emotional support via 1:1 interaction with staff  - Encourage involvement in milieu/groups/activities  - Monitor for social isolation  Outcome: Progressing     Problem: DELFINA  Goal: Will exhibit normal sleep and speech and no impulsivity  Description: INTERVENTIONS:  - Administer medication as ordered  - Set limits on impulsive behavior  - Make attempts to decrease external stimuli as possible  Outcome: Progressing     Problem: PSYCHOSIS  Goal: Will report no hallucinations or delusions  Description: Interventions:  - Administer medication as  ordered  - Every waking  shifts and PRN assess for the presence of hallucinations and or delusions  - Assist with reality testing to support increasing orientation  - Assess if patient's hallucinations or delusions are encouraging self-harm or harm to others and intervene as appropriate  Outcome: Progressing     Problem: BEHAVIOR  Goal: Pt/Family maintain appropriate behavior and adhere to behavioral management agreement, if implemented  Description: INTERVENTIONS:  - Assess the family dynamic   - Encourage verbalization of thoughts and concerns in a socially appropriate manner  - Assess patient/family's coping skills and non-compliant behavior (including use of illegal substances).  - Utilize positive, consistent limit setting strategies supporting safety of patient, staff and others  - Initiate consult with Case Management, Spiritual Care or other ancillary services as appropriate  - If a patient's/visitor's behavior jeopardizes the safety of the patient, staff, or others, refer to organization procedure.   - Notify Security of behavior or suspected illegal substances which indicate the need for search of the patient and/or belongings  - Encourage participation in the decision making process about a behavioral management agreement; implement if patient meets criteria  Outcome: Progressing     Problem: ANXIETY  Goal: Will report anxiety at manageable levels  Description: INTERVENTIONS:  - Administer medication as ordered  - Teach and encourage coping skills  - Provide emotional support  - Assess patient/family for anxiety and ability to cope  Outcome: Progressing  Goal: By discharge: Patient will verbalize 2 strategies to deal with anxiety  Description: Interventions:  - Identify any obvious source/trigger to anxiety  - Staff will assist patient in applying identified coping technique/skills  - Encourage attendance of scheduled groups and activities  Outcome: Progressing     Problem: SUBSTANCE USE/ABUSE  Goal: Will have no detox symptoms  and will verbalize plan for changing substance-related behavior  Description: INTERVENTIONS:  - Monitor physical status and assess for symptoms of withdrawal  - Administer medication as ordered  - Provide emotional support with 1 on 1 interaction with staff  - Encourage recovery focused program/ addiction education  - Assess for verbalization of changing behaviors related to substance abuse  - Initiate consults and referrals as appropriate (Case Management, Spiritual Care, etc.)  Outcome: Progressing  Goal: By discharge, will develop insight into their chemical dependency and sustain motivation to continue in recovery  Description: INTERVENTIONS:  - Attends all daily group sessions and scheduled AA groups  - Actively practices coping skills through participation in the therapeutic community and adherence to program rules  - Reviews and completes assignments from individual treatment plan  - Assist patient development of understanding of their personal cycle of addiction and relapse triggers  Outcome: Progressing  Goal: By discharge, patient will have ongoing treatment plan addressing chemical dependency  Description: INTERVENTIONS:  - Assist patient with resources and/or appointments for ongoing recovery based living  Outcome: Progressing     Problem: SLEEP DISTURBANCE  Goal: Will exhibit normal sleeping pattern  Description: Interventions:  -  Assess the patients sleep pattern, noting recent changes  - Administer medication as ordered  - Decrease environmental stimuli, including noise, as appropriate during the night  - Encourage the patient to actively participate in unit groups and or exercise during the day to enhance ability to achieve adequate sleep at night  - Assess the patient, in the morning, encouraging a description of sleep experience  Outcome: Progressing     Problem: INVOLUNTARY ADMIT  Goal: Will cooperate with staff recommendations and doctor's orders and will demonstrate appropriate  behavior  Description: INTERVENTIONS:  - Treat underlying conditions and offer medication as ordered  - Educate regarding involuntary admission procedures and rules  - Utilize positive consistent limit setting strategies to support patient and staff safety  Outcome: Progressing     Problem: SELF CARE DEFICIT  Goal: Return ADL status to a safe level of function  Description: INTERVENTIONS:  - Administer medication as ordered  - Assess ADL deficits and provide assistive devices as needed  - Obtain PT/OT consults as needed  - Assist and instruct patient to increase activity and self care as tolerated  Outcome: Progressing     Problem: SPIRITUAL CARE  Goal: Pt/Family able to move forward in process of forgiving self, others and/or higher power  Description: INTERVENTIONS:  - Assist patient with any spiritual needs/requests such as communion, confession, anointing, etc  - Explore guilt and help patient/family identify possible spiritual/cultural beliefs and values  - Explore possibilities of making amends & reconciliation with self, others, and/or a greater power  - Guide patient/family in identifying painful feelings  - Help patient explore and identify spiritual beliefs, cultural understandings or values that may help or hinder letting go of issue  - Help patient explore feelings of anger, bitterness, resentment, anxiety   Help patient/family identify and examine the situation in which these feelings are experienced  - Help patient/family identify destructive displacement of feelings onto other individuals  - Refer patient to formal counseling and/or to [facility] community for further support as needed or per request  Outcome: Progressing  Goal: Patient feels balance and connection with others and/or higher power that empowers the self during times of loss, guilt and fear  Description: INTERVENTIONS:  - Create safety for patient through empathic presence and non-judgmental listening  - Encourage patient to explore his/her  values, beliefs and/or spiritual images and practices  - Encourage use of breath work, imagery, meditation, relaxation, reiki to ease distress and provide healing  - Encourage use of cultural and spiritual celebrations and rituals  - Facilitate discussion that helps patient sort out spiritual concerns  - Help patient identify where meaning/hope/comfort & strength are in his/her life  - Refer patient to [facility] community for assistance, as appropriate  - Respond to patient/family need for prayer/ritual/sacrament/ceremony  Outcome: Progressing

## 2024-06-28 NOTE — PLAN OF CARE
Problem: Potential for Falls  Goal: Patient will remain free of falls  Description: INTERVENTIONS:  - Educate patient/family on patient safety including physical limitations  - Instruct patient to call for assistance with activity   - Consult OT/PT to assist with strengthening/mobility   - Keep Call bell within reach  - Keep bed low and locked with side rails adjusted as appropriate  - Keep care items and personal belongings within reach  - Initiate and maintain comfort rounds  - Make Fall Risk Sign visible to staff  - Apply yellow socks and bracelet for high fall risk patients  - Consider moving patient to room near nurses station  Outcome: Progressing     Problem: SELF HARM/SUICIDALITY  Goal: Will have no self-injury during hospital stay  Description: INTERVENTIONS:  - Q 15 MINUTES: Routine safety checks  - Q WAKING SHIFT & PRN: Assess risk to determine if routine checks are adequate to maintain patient safety  - Encourage patient to participate actively in care by formulating a plan to combat response to suicidal ideation, identify supports and resources  Outcome: Progressing     Problem: DEPRESSION  Goal: Will be euthymic at discharge  Description: INTERVENTIONS:  - Administer medication as ordered  - Provide emotional support via 1:1 interaction with staff  - Encourage involvement in milieu/groups/activities  - Monitor for social isolation  Outcome: Progressing     Problem: DELFINA  Goal: Will exhibit normal sleep and speech and no impulsivity  Description: INTERVENTIONS:  - Administer medication as ordered  - Set limits on impulsive behavior  - Make attempts to decrease external stimuli as possible  Outcome: Progressing     Problem: PSYCHOSIS  Goal: Will report no hallucinations or delusions  Description: Interventions:  - Administer medication as  ordered  - Every waking shifts and PRN assess for the presence of hallucinations and or delusions  - Assist with reality testing to support increasing  orientation  - Assess if patient's hallucinations or delusions are encouraging self-harm or harm to others and intervene as appropriate  Outcome: Progressing     Problem: BEHAVIOR  Goal: Pt/Family maintain appropriate behavior and adhere to behavioral management agreement, if implemented  Description: INTERVENTIONS:  - Assess the family dynamic   - Encourage verbalization of thoughts and concerns in a socially appropriate manner  - Assess patient/family's coping skills and non-compliant behavior (including use of illegal substances).  - Utilize positive, consistent limit setting strategies supporting safety of patient, staff and others  - Initiate consult with Case Management, Spiritual Care or other ancillary services as appropriate  - If a patient's/visitor's behavior jeopardizes the safety of the patient, staff, or others, refer to organization procedure.   - Notify Security of behavior or suspected illegal substances which indicate the need for search of the patient and/or belongings  - Encourage participation in the decision making process about a behavioral management agreement; implement if patient meets criteria  Outcome: Progressing     Problem: ANXIETY  Goal: Will report anxiety at manageable levels  Description: INTERVENTIONS:  - Administer medication as ordered  - Teach and encourage coping skills  - Provide emotional support  - Assess patient/family for anxiety and ability to cope  Outcome: Progressing  Goal: By discharge: Patient will verbalize 2 strategies to deal with anxiety  Description: Interventions:  - Identify any obvious source/trigger to anxiety  - Staff will assist patient in applying identified coping technique/skills  - Encourage attendance of scheduled groups and activities  Outcome: Progressing     Problem: SUBSTANCE USE/ABUSE  Goal: Will have no detox symptoms and will verbalize plan for changing substance-related behavior  Description: INTERVENTIONS:  - Monitor physical status and  assess for symptoms of withdrawal  - Administer medication as ordered  - Provide emotional support with 1 on 1 interaction with staff  - Encourage recovery focused program/ addiction education  - Assess for verbalization of changing behaviors related to substance abuse  - Initiate consults and referrals as appropriate (Case Management, Spiritual Care, etc.)  Outcome: Progressing  Goal: By discharge, will develop insight into their chemical dependency and sustain motivation to continue in recovery  Description: INTERVENTIONS:  - Attends all daily group sessions and scheduled AA groups  - Actively practices coping skills through participation in the therapeutic community and adherence to program rules  - Reviews and completes assignments from individual treatment plan  - Assist patient development of understanding of their personal cycle of addiction and relapse triggers  Outcome: Progressing  Goal: By discharge, patient will have ongoing treatment plan addressing chemical dependency  Description: INTERVENTIONS:  - Assist patient with resources and/or appointments for ongoing recovery based living  Outcome: Progressing     Problem: SLEEP DISTURBANCE  Goal: Will exhibit normal sleeping pattern  Description: Interventions:  -  Assess the patients sleep pattern, noting recent changes  - Administer medication as ordered  - Decrease environmental stimuli, including noise, as appropriate during the night  - Encourage the patient to actively participate in unit groups and or exercise during the day to enhance ability to achieve adequate sleep at night  - Assess the patient, in the morning, encouraging a description of sleep experience  Outcome: Progressing     Problem: INVOLUNTARY ADMIT  Goal: Will cooperate with staff recommendations and doctor's orders and will demonstrate appropriate behavior  Description: INTERVENTIONS:  - Treat underlying conditions and offer medication as ordered  - Educate regarding involuntary  admission procedures and rules  - Utilize positive consistent limit setting strategies to support patient and staff safety  Outcome: Progressing     Problem: SELF CARE DEFICIT  Goal: Return ADL status to a safe level of function  Description: INTERVENTIONS:  - Administer medication as ordered  - Assess ADL deficits and provide assistive devices as needed  - Obtain PT/OT consults as needed  - Assist and instruct patient to increase activity and self care as tolerated  Outcome: Progressing     Problem: SPIRITUAL CARE  Goal: Pt/Family able to move forward in process of forgiving self, others and/or higher power  Description: INTERVENTIONS:  - Assist patient with any spiritual needs/requests such as communion, confession, anointing, etc  - Explore guilt and help patient/family identify possible spiritual/cultural beliefs and values  - Explore possibilities of making amends & reconciliation with self, others, and/or a greater power  - Guide patient/family in identifying painful feelings  - Help patient explore and identify spiritual beliefs, cultural understandings or values that may help or hinder letting go of issue  - Help patient explore feelings of anger, bitterness, resentment, anxiety   Help patient/family identify and examine the situation in which these feelings are experienced  - Help patient/family identify destructive displacement of feelings onto other individuals  - Refer patient to formal counseling and/or to [facility] community for further support as needed or per request  Outcome: Progressing  Goal: Patient feels balance and connection with others and/or higher power that empowers the self during times of loss, guilt and fear  Description: INTERVENTIONS:  - Create safety for patient through empathic presence and non-judgmental listening  - Encourage patient to explore his/her values, beliefs and/or spiritual images and practices  - Encourage use of breath work, imagery, meditation, relaxation, reiki to ease  distress and provide healing  - Encourage use of cultural and spiritual celebrations and rituals  - Facilitate discussion that helps patient sort out spiritual concerns  - Help patient identify where meaning/hope/comfort & strength are in his/her life  - Refer patient to [facility] community for assistance, as appropriate  - Respond to patient/family need for prayer/ritual/sacrament/ceremony  Outcome: Progressing

## 2024-06-29 PROBLEM — F32.9 MAJOR DEPRESSIVE DISORDER, SINGLE EPISODE, UNSPECIFIED: Status: ACTIVE | Noted: 2017-11-30

## 2024-06-29 PROBLEM — F32.2 CURRENT SEVERE EPISODE OF MAJOR DEPRESSIVE DISORDER WITHOUT PSYCHOTIC FEATURES WITHOUT PRIOR EPISODE (HCC): Status: ACTIVE | Noted: 2017-11-30

## 2024-06-29 LAB
25(OH)D3 SERPL-MCNC: 46.3 NG/ML (ref 30–100)
CHOLEST SERPL-MCNC: 164 MG/DL
HDLC SERPL-MCNC: 51 MG/DL
LDLC SERPL CALC-MCNC: 100 MG/DL (ref 0–100)
NONHDLC SERPL-MCNC: 113 MG/DL
TRIGL SERPL-MCNC: 64 MG/DL
VIT B12 SERPL-MCNC: 288 PG/ML (ref 180–914)

## 2024-06-29 PROCEDURE — 80061 LIPID PANEL: CPT

## 2024-06-29 PROCEDURE — 82607 VITAMIN B-12: CPT

## 2024-06-29 PROCEDURE — 99223 1ST HOSP IP/OBS HIGH 75: CPT | Performed by: STUDENT IN AN ORGANIZED HEALTH CARE EDUCATION/TRAINING PROGRAM

## 2024-06-29 PROCEDURE — 82306 VITAMIN D 25 HYDROXY: CPT

## 2024-06-29 RX ORDER — FLUTICASONE PROPIONATE 50 MCG
2 SPRAY, SUSPENSION (ML) NASAL DAILY
Status: DISCONTINUED | OUTPATIENT
Start: 2024-06-29 | End: 2024-07-09 | Stop reason: HOSPADM

## 2024-06-29 RX ORDER — PRAVASTATIN SODIUM 40 MG
80 TABLET ORAL
Status: DISCONTINUED | OUTPATIENT
Start: 2024-06-29 | End: 2024-06-29

## 2024-06-29 RX ORDER — ALPRAZOLAM 0.25 MG/1
0.5 TABLET ORAL EVERY 8 HOURS PRN
Status: DISCONTINUED | OUTPATIENT
Start: 2024-06-29 | End: 2024-07-08

## 2024-06-29 RX ORDER — ALPRAZOLAM 0.25 MG/1
0.5 TABLET ORAL 2 TIMES DAILY
Status: DISCONTINUED | OUTPATIENT
Start: 2024-06-29 | End: 2024-07-08

## 2024-06-29 RX ORDER — FLUTICASONE FUROATE AND VILANTEROL 100; 25 UG/1; UG/1
1 POWDER RESPIRATORY (INHALATION)
Status: DISCONTINUED | OUTPATIENT
Start: 2024-06-29 | End: 2024-06-29

## 2024-06-29 RX ORDER — TRAMADOL HYDROCHLORIDE 50 MG/1
50 TABLET ORAL EVERY 8 HOURS PRN
Status: DISCONTINUED | OUTPATIENT
Start: 2024-06-29 | End: 2024-07-09 | Stop reason: HOSPADM

## 2024-06-29 RX ADMIN — DOCUSATE SODIUM 100 MG: 100 CAPSULE, LIQUID FILLED ORAL at 17:09

## 2024-06-29 RX ADMIN — DOCUSATE SODIUM 100 MG: 100 CAPSULE, LIQUID FILLED ORAL at 09:10

## 2024-06-29 RX ADMIN — LISINOPRIL 40 MG: 20 TABLET ORAL at 09:10

## 2024-06-29 RX ADMIN — APIXABAN 2.5 MG: 2.5 TABLET, FILM COATED ORAL at 17:09

## 2024-06-29 RX ADMIN — NYSTATIN 1 APPLICATION: 100000 POWDER TOPICAL at 09:11

## 2024-06-29 RX ADMIN — ACETAMINOPHEN 975 MG: 325 TABLET ORAL at 05:54

## 2024-06-29 RX ADMIN — NYSTATIN 1 APPLICATION: 100000 POWDER TOPICAL at 17:09

## 2024-06-29 RX ADMIN — APIXABAN 2.5 MG: 2.5 TABLET, FILM COATED ORAL at 09:10

## 2024-06-29 RX ADMIN — FLUTICASONE PROPIONATE 2 SPRAY: 50 SPRAY, METERED NASAL at 15:37

## 2024-06-29 RX ADMIN — ACETAMINOPHEN 650 MG: 325 TABLET ORAL at 21:32

## 2024-06-29 RX ADMIN — ALPRAZOLAM 0.5 MG: 0.5 TABLET ORAL at 12:18

## 2024-06-29 RX ADMIN — ATORVASTATIN CALCIUM 40 MG: 40 TABLET, FILM COATED ORAL at 09:10

## 2024-06-29 RX ADMIN — CLOTRIMAZOLE 1 APPLICATION: 1 CREAM TOPICAL at 17:08

## 2024-06-29 RX ADMIN — POLYETHYLENE GLYCOL 3350 17 G: 17 POWDER, FOR SOLUTION ORAL at 09:11

## 2024-06-29 RX ADMIN — DULOXETINE HYDROCHLORIDE 30 MG: 60 CAPSULE, DELAYED RELEASE ORAL at 09:09

## 2024-06-29 RX ADMIN — LEVOTHYROXINE SODIUM 50 MCG: 50 TABLET ORAL at 05:54

## 2024-06-29 RX ADMIN — ALPRAZOLAM 0.5 MG: 0.5 TABLET ORAL at 21:19

## 2024-06-29 RX ADMIN — AMLODIPINE BESYLATE 5 MG: 5 TABLET ORAL at 09:10

## 2024-06-29 RX ADMIN — DULOXETINE HYDROCHLORIDE 60 MG: 60 CAPSULE, DELAYED RELEASE ORAL at 09:10

## 2024-06-29 RX ADMIN — PANTOPRAZOLE SODIUM 40 MG: 40 TABLET, DELAYED RELEASE ORAL at 17:09

## 2024-06-29 RX ADMIN — PANTOPRAZOLE SODIUM 40 MG: 40 TABLET, DELAYED RELEASE ORAL at 09:10

## 2024-06-29 NOTE — H&P
Gonzalo#  :1955 F  MRN:[id number]    [facility]:[id number]  Adm Date: 2024  6:42 PM   ATT PHY: Graham Calderón Md  [facility]         Chief Complaint: intentional overdose      History of Presenting Illness: Benjamin Puga is a(n) 69 y.o. year old female who is admitted to [facility] on 201 voluntary commitment basis.  Patient originally presented to [facility] on  due to intentional overdose on Robaxin, Xanax, and Tramadol.     Patient examined at bedside.  Patient is complaining of chronic pain.  States she takes Robaxin, Xanax, and Tramadol at home which all helps. Patient reports BM this AM however previously had not gone in 4 days.  Denies any other symptoms.     Labs pending.     Allergies   Allergen Reactions    Tamoxifen Other (See Comments)     Headaches, stomach pain, sweats,     Nsaids Hives    Oxybutynin     Singulair [Montelukast] Swelling    Ciprofloxacin Hives    Penicillins Hives    Sulfa Antibiotics Hives    Vancomycin Hives       Current Facility-Administered Medications on File Prior to Encounter   Medication Dose Route Frequency Provider Last Rate Last Admin    [DISCONTINUED] acetaminophen (TYLENOL) tablet 650 mg  650 mg Oral Q6H PRN Colton Larsen MD        [DISCONTINUED] albuterol (PROVENTIL HFA,VENTOLIN HFA) inhaler 2 puff  2 puff Inhalation Q4H PRN Roland Proctor MD   2 puff at 24 0151    [DISCONTINUED] ALPRAZolam (XANAX) tablet 0.25 mg  0.25 mg Oral TID PRN Colton Larsen MD   0.25 mg at 24 0828    [DISCONTINUED] amLODIPine (NORVASC) tablet 5 mg  5 mg Oral Daily Colton Larsen MD   5 mg at 24    [DISCONTINUED] apixaban (ELIQUIS) tablet 2.5 mg  2.5 mg Oral BID Colton Larsen MD   2.5 mg at 24    [DISCONTINUED] atorvastatin (LIPITOR) tablet 40 mg  40 mg Oral Daily Colton Larsen MD   40 mg at 24 0828    [DISCONTINUED] docusate  sodium (COLACE) capsule 100 mg  100 mg Oral BID Roland Proctor MD   100 mg at 06/28/24 0828    [DISCONTINUED] DULoxetine (CYMBALTA) delayed release capsule 30 mg  30 mg Oral Daily Roland Proctor MD   30 mg at 06/28/24 0838    [DISCONTINUED] DULoxetine (CYMBALTA) delayed release capsule 60 mg  60 mg Oral Daily Roland Proctor MD   60 mg at 06/28/24 0829    [DISCONTINUED] fluticasone (ARNUITY ELLIPTA) 100 MCG/ACT inhaler 1 puff  1 puff Inhalation Daily Colton Larsen MD        [DISCONTINUED] levothyroxine tablet 50 mcg  50 mcg Oral Early Morning Colton Larsen MD   50 mcg at 06/28/24 0610    [DISCONTINUED] lisinopril (ZESTRIL) tablet 40 mg  40 mg Oral Daily Colton Larsen MD   40 mg at 06/28/24 0828    [DISCONTINUED] ondansetron (ZOFRAN) injection 4 mg  4 mg Intravenous Q6H PRN Colton Larsen MD        [DISCONTINUED] pantoprazole (PROTONIX) EC tablet 40 mg  40 mg Oral BID Colton Larsen MD   40 mg at 06/28/24 0828    [DISCONTINUED] polyethylene glycol (MIRALAX) packet 17 g  17 g Oral Daily Roland Proctor MD   17 g at 06/28/24 0828     Current Outpatient Medications on File Prior to Encounter   Medication Sig Dispense Refill    albuterol (PROVENTIL HFA,VENTOLIN HFA) 90 mcg/act inhaler Inhale 2 puffs every 6 (six) hours as needed for wheezing      ALPRAZolam (XANAX) 1 mg tablet TAKE ONE-HALF TO ONE TABLET THREE TIMES A DAY AS NEEDED 90 tablet 0    amLODIPine (NORVASC) 5 mg tablet TAKE 1 TABLET BY MOUTH DAILY 100 tablet 2    apixaban (Eliquis) 2.5 mg Take 1 tablet (2.5 mg total) by mouth 2 (two) times a day 200 tablet 1    azelastine (ASTELIN) 0.1 % nasal spray Spray 2 sprays twice a day by intranasal route.      beclomethasone (Qvar RediHaler) 40 MCG/ACT inhaler Inhale 2 puffs 2 (two) times a day Rinse mouth after use.      clotrimazole-betamethasone (LOTRISONE) 1-0.05 % cream APPLY TO SKIN FOLDS TWO TIMES A DAY 45 g 1    DULoxetine (CYMBALTA) 30 mg delayed release capsule TAKE ONE CAPSULE BY  MOUTH EVERY DAY WITH 60 MG CAP 90 capsule 0    DULoxetine (CYMBALTA) 60 mg delayed release capsule TAKE ONE CAPSULE BY MOUTH EVERY DAY 90 capsule 1    hydrocortisone-pramoxine (ANALPRAM-HC) 2.5-1 % rectal cream Apply topically 3 (three) times a day 3 Tube 1    hydrOXYzine HCL (ATARAX) 25 mg tablet Take 1 tablet (25 mg total) by mouth every 6 (six) hours as needed for itching 100 tablet 1    levothyroxine 50 mcg tablet Take 1 tablet (50 mcg total) by mouth daily in the early morning      lisinopril (ZESTRIL) 40 mg tablet Take 1 tablet (40 mg total) by mouth daily 100 tablet 1    methocarbamol (ROBAXIN) 500 mg tablet TAKE ONE TABLET BY MOUTH FOUR TIMES A DAY AS NEEDED FOR MUSCLE SPASMS 60 tablet 3    mometasone-formoterol (DULERA) 100-5 MCG/ACT inhaler Inhale      Multiple Vitamins-Minerals (OCUVITE ADULT 50+ PO) Take by mouth      nystatin powder APPLY TO AFFECTED AREA(S) TWO TIMES A DAY 60 g 0    pantoprazole (PROTONIX) 40 mg tablet TAKE 1 TABLET BY MOUTH  TWICE DAILY 180 tablet 3    rosuvastatin (CRESTOR) 10 MG tablet Take 1 tablet (10 mg total) by mouth daily 30 tablet 5    traMADol (ULTRAM) 50 mg tablet Take 1 tablet (50 mg total) by mouth every 8 (eight) hours as needed for moderate pain 270 tablet 0    Triamcinolone Acetonide (Nasacort Allergy 24HR) 55 MCG/ACT AERO into each nostril as needed       atorvastatin (LIPITOR) 40 mg tablet Take 1 tablet by mouth daily      clotrimazole (LOTRIMIN) 1 % cream as needed       famotidine (PEPCID) 10 mg tablet Take 1 tablet (10 mg total) by mouth daily 90 tablet 3    ferrous sulfate 325 (65 Fe) mg tablet Take 325 mg by mouth daily      PARoxetine (PAXIL) 10 mg tablet Take 1 tablet (10 mg total) by mouth daily (Patient not taking: Reported on 6/28/2024) 90 tablet 0    PEG 3350-KCl-NaBcb-NaCl-NaSulf (PEG 3350/ELECTROLYTES PO)  (Patient not taking: Reported on 6/28/2024)         Active Ambulatory Problems     Diagnosis Date Noted    Asthma 01/16/2018    HTN (hypertension)  01/16/2018    Allergic rhinitis 05/20/2014    Anxiety 03/20/2014    Candidal intertrigo 08/26/2016    Current severe episode of major depressive disorder without psychotic features without prior episode ([facility]) 11/30/2017    Herpes simplex infection 03/25/2014    Hyperlipidemia 05/20/2014    Hypothyroidism 05/20/2014    Impaired fasting glucose 05/20/2014    Peripheral neuropathy 05/11/2016    Ductal carcinoma in situ (DCIS) of left breast 05/31/2019    Diastasis recti 07/10/2019    Gross hematuria 08/14/2020    Anemia 09/16/2020    Other hydronephrosis 11/18/2020    History of pulmonary embolism 08/05/2021    PTSD (post-traumatic stress disorder) 08/05/2021    Pain with swallowing 12/14/2021    IBS (irritable bowel syndrome) 12/14/2021    GERD (gastroesophageal reflux disease) 12/14/2021    Morbid obesity with BMI of 40.0-44.9, adult ([facility]) 12/14/2021    Suicidal behavior with attempted self-injury ([facility]) 06/26/2024     Resolved Ambulatory Problems     Diagnosis Date Noted    Morbid obesity ([facility]) 05/20/2014    Acute pulmonary embolism ([facility]) 08/12/2020    Hyperglycemia 08/12/2020    SIRS (systemic inflammatory response syndrome) ([facility]) 08/13/2020    Traumatic ecchymosis of buttock 08/14/2020    Acute diarrhea 08/14/2020    Gram-positive bacteremia 08/14/2020    JENNIFER (acute kidney injury) ([facility]) 08/19/2020    Sacroiliitis ([facility]) 12/21/2020     Past Medical History:   Diagnosis Date    Cancer ([facility])     Colon polyp     Depression     Disease of thyroid gland     Hyperlipemia     Hypertension     Obesity     Osteoarthritis     Pre-operative laboratory examination     Pulmonary embolism ([facility])        Past Surgical History:   Procedure Laterality Date    HAND SURGERY Left 03/2020    HYSTERECTOMY      INCISIONAL BREAST BIOPSY      JOINT REPLACEMENT      KNEE ARTHROPLASTY      MAMMO NEEDLE LOCALIZATION LEFT (ALL INC) Left 6/6/2019    ROTATOR CUFF REPAIR      SHOULDER SURGERY      TRIGGER FINGER RELEASE      TRIGGER FINGER RELEASE          Social History:   Social History     Socioeconomic History    Marital status: /Civil Union     Spouse name: Not on file    Number of children: Not on file    Years of education: Not on file    Highest education level: Not on file   Occupational History    Not on file   Tobacco Use    Smoking status: Never    Smokeless tobacco: Never   Vaping Use    Vaping status: Never Used   Substance and Sexual Activity    Alcohol use: Yes     Comment: socially     Drug use: No    Sexual activity: Not Currently     Partners: Male   Other Topics Concern    Not on file   Social History Narrative    Living independently w/spouse     Social Determinants of Health     Financial Resource Strain: Low Risk  (9/11/2023)    Overall Financial Resource Strain (CARDIA)     Difficulty of Paying Living Expenses: Not hard at all   Food Insecurity: No Food Insecurity (6/26/2024)    Hunger Vital Sign     Worried About Running Out of Food in the Last Year: Never true     Ran Out of Food in the Last Year: Never true   Transportation Needs: No Transportation Needs (6/26/2024)    PRAPARE - Transportation     Lack of Transportation (Medical): No     Lack of Transportation (Non-Medical): No   Physical Activity: Insufficiently Active (6/24/2022)    Exercise Vital Sign     Days of Exercise per Week: 4 days     Minutes of Exercise per Session: 30 min   Stress: No Stress Concern Present (6/24/2022)    [employer] - Occupational Stress Questionnaire     Feeling of Stress : Only a little   Social Connections: Not on file   Intimate Partner Violence: Not on file   Housing Stability: Low Risk  (6/26/2024)    Housing Stability Vital Sign     Unable to Pay for Housing in the Last Year: No     Number of Times Moved in the Last Year: 1     Homeless in the Last Year: No       Family History:   Family History   Problem Relation Age of Onset    Coronary artery disease Mother     Hypertension Mother         Essential    Coronary  "artery disease Father        Review of Systems   Constitutional:  Negative for chills and fever.   HENT: Negative.     Eyes: Negative.    Respiratory:  Negative for cough, shortness of breath and wheezing.    Cardiovascular:  Negative for chest pain, palpitations and leg swelling.   Gastrointestinal:  Negative for abdominal pain, constipation, diarrhea and nausea.   Genitourinary:  Negative for dysuria.   Musculoskeletal:  Positive for arthralgias.   Skin:  Positive for rash.   Neurological:  Negative for dizziness, light-headedness and headaches.       Physical Exam   Vitals: Blood pressure 134/63, pulse 85, temperature 98.6 °F (37 °C), temperature source Temporal, resp. rate 18, height 4' 11\" (1.499 m), weight 88.5 kg (195 lb), SpO2 95%.,Body mass index is 39.39 kg/m².  Constitutional: Awake, alert, in no acute distress.  Head: Normocephalic and atraumatic.   Mouth/Throat: Oropharynx is clear and moist.    Eyes: Conjunctivae and EOM are normal.   Neck: Neck supple. No thyromegaly present.   Cardiovascular: Normal rate, regular rhythm and normal heart sounds.    Pulmonary/Chest: Effort normal and breath sounds normal.   Abdominal: Soft. Bowel sounds are normal. There is no tenderness. There is no rebound and no guarding.   Neurological: No focal deficits.   Musculoskeletal:  Nontender spine.  Skin: Skin is warm and dry. No edema.     Assessment     Benjamin Puga is a(n) 69 y.o. female with MDD.    Allergic rhinitis.  Continue Flonase daily.   Asthma.  Stable.  Continue fluticasone inhaler daily (Qvar Redihaler non form) and albuterol as needed.   Cardiac hx w/ HTN, HLD. Hx of PE.  Continue lisinopril 40 mg daily, Norvasc 5 mg, Eliquis 2.5 mg daily, atorvastatin 40 mg daily (Crestor non form).  Prediabetes.  Hgb A1c 5.7% on 1/20/24.  Lifestyle modifications.  Recheck A1c.   Hypothyroidism.  Continue levothyroxine 50 mcg daily.    GERD.  Continue Protonix 40 mg daily.   IBS-C.   Continue Colace 100 mg twice " daily and Miralax daily.   Intertrigo.  Apply Nystatin powder twice daily.   Tinea corporis.  Apply clotrimazole cream twice daily x 14 days.   MDD.  Managed by psych team.     Prognosis: Fair.    Discharge Plan: In progress.    Advanced Directives: I have discussed in detail with the patient the advanced directives. The patient's  is appointed as POA and has her living will. Emergency contact is  Stepan, [phone]. When discussing cardiac and pulmonary resuscitation efforts with the patient, the patient wishes to be  FULL CODE.    I have spent more than 50 minutes gathering data, doing physical examination, and discussing the advanced directives, which was witnessed by caring staff.    The patient was discussed with Dr. Trinidad and he is in agreement with the above note.

## 2024-06-29 NOTE — TREATMENT PLAN
TREATMENT PLAN REVIEW - Behavioral Health Benjamin Puga 69 y.o. 1955 female MRN: [id number]    [facility] Room / Bed: [facility]-01 Encounter: [id number]          Admit Date/Time:  6/28/2024  6:42 PM    Treatment Team:   MD Negar Jones MD Veronda Sestok Madison Williams Debra Malik Gieniec, RN    Diagnosis: Active Problems:    Anxiety    Current severe episode of major depressive disorder without psychotic features without prior episode (HCC)    PTSD (post-traumatic stress disorder)      Patient Strengths/Assets: ability for insight, average or above intelligence, cooperative, communication skills   Patient Barriers/Limitations: chronic pain, family conflict, low self esteem, marital conflict    Short Term Goals: decrease in depressive symptoms, decrease in anxiety symptoms    Long Term Goals: improvement in depression, improvement in anxiety    Progress Towards Goals: continue psychiatric medications as prescribed    Recommended Treatment: medication management, patient medication education, group therapy, milieu therapy, continued Behavioral Health psychiatric evaluation/assessment process    Treatment Frequency: daily medication monitoring, group and milieu therapy daily, monitoring through interdisciplinary rounds, monitoring through weekly patient care conferences    Expected Discharge Date:  7/8/2024    Discharge Plan: discharge to home, referral for outpatient medication management with a psychiatrist, referral for outpatient psychotherapy    Treatment Plan Created/Updated By: Inocente Gu MD
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
## 2024-06-29 NOTE — PLAN OF CARE
Problem: Alteration in Thoughts and Perception  Goal: Treatment Goal: Gain control of psychotic behaviors/thinking, reduce/eliminate presenting symptoms and demonstrate improved reality functioning upon discharge  Outcome: Progressing  Goal: Verbalize thoughts and feelings  Description: Interventions:  - Promote a nonjudgmental and trusting relationship with the patient through active listening and therapeutic communication  - Assess patient's level of functioning, behavior and potential for risk  - Engage patient in 1 on 1 interactions  - Encourage patient to express fears, feelings, frustrations, and discuss symptoms    - [place] patient to reality, help patient recognize reality-based thinking   - Administer medications as ordered and assess for potential side effects  - Provide the patient education related to the signs and symptoms of the illness and desired effects of prescribed medications  Outcome: Progressing  Goal: Refrain from acting on delusional thinking/internal stimuli  Description: Interventions:  - Monitor patient closely, per order   - Utilize least restrictive measures   - Set reasonable limits, give positive feedback for acceptable   - Administer medications as ordered and monitor of potential side effects  Outcome: Progressing  Goal: Agree to be compliant with medication regime, as prescribed and report medication side effects  Description: Interventions:  - Offer appropriate PRN medication and supervise ingestion; conduct AIMS, as needed   Outcome: Progressing  Goal: Attend and participate in unit activities, including therapeutic, recreational, and educational groups  Description: Interventions:  -Encourage Visitation and family involvement in care  Outcome: Progressing  Goal: Recognize dysfunctional thoughts, communicate reality-based thoughts at the time of discharge  Description: Interventions:  - Provide medication and psycho-education to assist patient in compliance and developing  insight into his/her illness   Outcome: Progressing  Goal: Complete daily ADLs, including personal hygiene independently, as able  Description: Interventions:  - Observe, teach, and assist patient with ADLS  - Monitor and promote a balance of rest/activity, with adequate nutrition and elimination   Outcome: Progressing     Problem: Ineffective Coping  Goal: Cooperates with admission process  Description: Interventions:   - Complete admission process  Outcome: Progressing  Goal: Identifies ineffective coping skills  Outcome: Progressing  Goal: Identifies healthy coping skills  Outcome: Progressing  Goal: Demonstrates healthy coping skills  Outcome: Progressing  Goal: Participates in unit activities  Description: Interventions:  - Provide therapeutic environment   - Provide required programming   - Redirect inappropriate behaviors   Outcome: Progressing  Goal: Patient/Family participate in treatment and DC plans  Description: Interventions:  - Provide therapeutic environment  Outcome: Progressing  Goal: Patient/Family verbalizes awareness of resources  Outcome: Progressing  Goal: Understands least restrictive measures  Description: Interventions:  - Utilize least restrictive behavior  Outcome: Progressing  Goal: Free from restraint events  Description: - Utilize least restrictive measures   - Provide behavioral interventions   - Redirect inappropriate behaviors   Outcome: Progressing     Problem: Risk for Self Injury/Neglect  Goal: Treatment Goal: Remain safe during length of stay, learn and adopt new coping skills, and be free of self-injurious ideation, impulses and acts at the time of discharge  Outcome: Progressing  Goal: Verbalize thoughts and feelings  Description: Interventions:  - Assess and re-assess patient's lethality and potential for self-injury  - Engage patient in 1:1 interactions, daily, for a minimum of 15 minutes  - Encourage patient to express feelings, fears, frustrations, hopes  - Establish  rapport/trust with patient   Outcome: Progressing  Goal: Refrain from harming self  Description: Interventions:  - Monitor patient closely, per order  - Develop a trusting relationship  - Supervise medication ingestion, monitor effects and side effects   Outcome: Progressing  Goal: Attend and participate in unit activities, including therapeutic, recreational, and educational groups  Description: Interventions:  - Provide therapeutic and educational activities daily, encourage attendance and participation, and document same in the medical record  - Obtain collateral information, encourage visitation and family involvement in care   Outcome: Progressing  Goal: Recognize maladaptive responses and adopt new coping mechanisms  Outcome: Progressing  Goal: Complete daily ADLs, including personal hygiene independently, as able  Description: Interventions:  - Observe, teach, and assist patient with ADLS  - Monitor and promote a balance of rest/activity, with adequate nutrition and elimination  Outcome: Progressing     Problem: Depression  Goal: Treatment Goal: Demonstrate behavioral control of depressive symptoms, verbalize feelings of improved mood/affect, and adopt new coping skills prior to discharge  Outcome: Progressing  Goal: Verbalize thoughts and feelings  Description: Interventions:  - Assess and re-assess patient's level of risk   - Engage patient in 1:1 interactions, daily, for a minimum of 15 minutes   - Encourage patient to express feelings, fears, frustrations, hopes   Outcome: Progressing  Goal: Refrain from harming self  Description: Interventions:  - Monitor patient closely, per order   - Supervise medication ingestion, monitor effects and side effects   Outcome: Progressing  Goal: Refrain from isolation  Description: Interventions:  - Develop a trusting relationship   - Encourage socialization   Outcome: Progressing  Goal: Refrain from self-neglect  Outcome: Progressing  Goal: Attend and participate in  unit activities, including therapeutic, recreational, and educational groups  Description: Interventions:  - Provide therapeutic and educational activities daily, encourage attendance and participation, and document same in the medical record   Outcome: Progressing  Goal: Complete daily ADLs, including personal hygiene independently, as able  Description: Interventions:  - Observe, teach, and assist patient with ADLS  -  Monitor and promote a balance of rest/activity, with adequate nutrition and elimination   Outcome: Progressing     Problem: Anxiety  Goal: Anxiety is at manageable level  Description: Interventions:  - Assess and monitor patient's anxiety level.   - Monitor for signs and symptoms (heart palpitations, chest pain, shortness of breath, headaches, nausea, feeling jumpy, restlessness, irritable, apprehensive).   - Collaborate with interdisciplinary team and initiate plan and interventions as ordered.  - [place] patient to unit/surroundings  - Explain treatment plan  - Encourage participation in care  - Encourage verbalization of concerns/fears  - Identify coping mechanisms  - Assist in developing anxiety-reducing skills  - Administer/offer alternative therapies  - Limit or eliminate stimulants  Outcome: Progressing     Problem: Alteration in Orientation  Goal: Treatment Goal: Demonstrate a reduction of confusion and improved orientation to person, place, time and/or situation upon discharge, according to optimum baseline/ability  Outcome: Progressing  Goal: Interact with staff daily  Description: Interventions:  - Assess and re-assess patient's level of orientation  - Engage patient in 1 on 1 interactions, daily, for a minimum of 15 minutes   - Establish rapport/trust with patient   Outcome: Progressing  Goal: Express concerns related to confused thinking related to:  Description: Interventions:  - Encourage patient to express feelings, fears, frustrations, hopes  - Assign consistent caregivers   -  [place]/re-orient patient as needed  - Allow comfort items, as appropriate  - Provide visual cues, signs, etc.   Outcome: Progressing  Goal: Allow medical examinations, as recommended  Description: Interventions:  - Provide physical/neurological exams and/or referrals, per provider   Outcome: Progressing  Goal: Cooperate with recommended testing/procedures  Description: Interventions:  - Determine need for ancillary testing  - Observe for mental status changes  - Implement falls/precaution protocol   Outcome: Progressing  Goal: Attend and participate in unit activities, including therapeutic, recreational, and educational groups  Description: Interventions:  - Provide therapeutic and educational activities daily, encourage attendance and participation, and document same in the medical record   - Provide appropriate opportunities for reminiscence   - Provide a consistent daily routine   - Encourage family contact/visitation   Outcome: Progressing  Goal: Complete daily ADLs, including personal hygiene independently, as able  Description: Interventions:  - Observe, teach, and assist patient with ADLS  Outcome: Progressing

## 2024-06-29 NOTE — NURSING NOTE
Pt up ad oscar with cane. Pt c/o mild depression & mild anxiety. Pt is pleasant & brightens upon approach. Pt is cooperative & compliant with medications. Pt denies any hallucinations, suicidal or homicidal ideations. Q 7 min checks maintained to monitor pt's behavior & safety.
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
## 2024-06-30 LAB
25(OH)D3 SERPL-MCNC: 45.5 NG/ML (ref 30–100)
ALBUMIN SERPL BCG-MCNC: 4.3 G/DL (ref 3.5–5)
ALP SERPL-CCNC: 64 U/L (ref 34–104)
ALT SERPL W P-5'-P-CCNC: 15 U/L (ref 7–52)
ANION GAP SERPL CALCULATED.3IONS-SCNC: 8 MMOL/L (ref 4–13)
AST SERPL W P-5'-P-CCNC: 29 U/L (ref 13–39)
BASOPHILS # BLD AUTO: 0.03 THOUSANDS/ÂΜL (ref 0–0.1)
BASOPHILS NFR BLD AUTO: 0 % (ref 0–1)
BILIRUB SERPL-MCNC: 0.6 MG/DL (ref 0.2–1)
BUN SERPL-MCNC: 15 MG/DL (ref 5–25)
CALCIUM SERPL-MCNC: 9.6 MG/DL (ref 8.4–10.2)
CHLORIDE SERPL-SCNC: 103 MMOL/L (ref 96–108)
CHOLEST SERPL-MCNC: 175 MG/DL
CO2 SERPL-SCNC: 29 MMOL/L (ref 21–32)
CREAT SERPL-MCNC: 0.76 MG/DL (ref 0.6–1.3)
EOSINOPHIL # BLD AUTO: 0.27 THOUSAND/ÂΜL (ref 0–0.61)
EOSINOPHIL NFR BLD AUTO: 3 % (ref 0–6)
ERYTHROCYTE [DISTWIDTH] IN BLOOD BY AUTOMATED COUNT: 13.9 % (ref 11.6–15.1)
EST. AVERAGE GLUCOSE BLD GHB EST-MCNC: 117 MG/DL
FOLATE SERPL-MCNC: >22.3 NG/ML
GFR SERPL CREATININE-BSD FRML MDRD: 80 ML/MIN/1.73SQ M
GLUCOSE P FAST SERPL-MCNC: 97 MG/DL (ref 65–99)
GLUCOSE SERPL-MCNC: 97 MG/DL (ref 65–140)
HBA1C MFR BLD: 5.7 %
HCT VFR BLD AUTO: 42.3 % (ref 34.8–46.1)
HDLC SERPL-MCNC: 56 MG/DL
HGB BLD-MCNC: 13.2 G/DL (ref 11.5–15.4)
IMM GRANULOCYTES # BLD AUTO: 0.02 THOUSAND/UL (ref 0–0.2)
IMM GRANULOCYTES NFR BLD AUTO: 0 % (ref 0–2)
LDLC SERPL CALC-MCNC: 104 MG/DL (ref 0–100)
LYMPHOCYTES # BLD AUTO: 2.59 THOUSANDS/ÂΜL (ref 0.6–4.47)
LYMPHOCYTES NFR BLD AUTO: 28 % (ref 14–44)
MCH RBC QN AUTO: 28.4 PG (ref 26.8–34.3)
MCHC RBC AUTO-ENTMCNC: 31.2 G/DL (ref 31.4–37.4)
MCV RBC AUTO: 91 FL (ref 82–98)
MONOCYTES # BLD AUTO: 0.81 THOUSAND/ÂΜL (ref 0.17–1.22)
MONOCYTES NFR BLD AUTO: 9 % (ref 4–12)
NEUTROPHILS # BLD AUTO: 5.56 THOUSANDS/ÂΜL (ref 1.85–7.62)
NEUTS SEG NFR BLD AUTO: 60 % (ref 43–75)
NONHDLC SERPL-MCNC: 119 MG/DL
NRBC BLD AUTO-RTO: 0 /100 WBCS
PLATELET # BLD AUTO: 284 THOUSANDS/UL (ref 149–390)
PMV BLD AUTO: 9.2 FL (ref 8.9–12.7)
POTASSIUM SERPL-SCNC: 3.7 MMOL/L (ref 3.5–5.3)
PROT SERPL-MCNC: 7.8 G/DL (ref 6.4–8.4)
RBC # BLD AUTO: 4.65 MILLION/UL (ref 3.81–5.12)
SODIUM SERPL-SCNC: 140 MMOL/L (ref 135–147)
TRIGL SERPL-MCNC: 73 MG/DL
VIT B12 SERPL-MCNC: 261 PG/ML (ref 180–914)
WBC # BLD AUTO: 9.28 THOUSAND/UL (ref 4.31–10.16)

## 2024-06-30 PROCEDURE — 80053 COMPREHEN METABOLIC PANEL: CPT

## 2024-06-30 PROCEDURE — 99232 SBSQ HOSP IP/OBS MODERATE 35: CPT | Performed by: STUDENT IN AN ORGANIZED HEALTH CARE EDUCATION/TRAINING PROGRAM

## 2024-06-30 PROCEDURE — 85025 COMPLETE CBC W/AUTO DIFF WBC: CPT

## 2024-06-30 PROCEDURE — 82607 VITAMIN B-12: CPT

## 2024-06-30 PROCEDURE — 82746 ASSAY OF FOLIC ACID SERUM: CPT

## 2024-06-30 PROCEDURE — 83036 HEMOGLOBIN GLYCOSYLATED A1C: CPT

## 2024-06-30 PROCEDURE — 80061 LIPID PANEL: CPT

## 2024-06-30 PROCEDURE — 82306 VITAMIN D 25 HYDROXY: CPT

## 2024-06-30 RX ORDER — ONDANSETRON 4 MG/1
4 TABLET, ORALLY DISINTEGRATING ORAL EVERY 6 HOURS PRN
Status: DISCONTINUED | OUTPATIENT
Start: 2024-06-30 | End: 2024-07-09 | Stop reason: HOSPADM

## 2024-06-30 RX ADMIN — APIXABAN 2.5 MG: 2.5 TABLET, FILM COATED ORAL at 08:04

## 2024-06-30 RX ADMIN — PANTOPRAZOLE SODIUM 40 MG: 40 TABLET, DELAYED RELEASE ORAL at 08:04

## 2024-06-30 RX ADMIN — LISINOPRIL 40 MG: 20 TABLET ORAL at 08:04

## 2024-06-30 RX ADMIN — LEVOTHYROXINE SODIUM 50 MCG: 50 TABLET ORAL at 05:43

## 2024-06-30 RX ADMIN — DULOXETINE HYDROCHLORIDE 30 MG: 60 CAPSULE, DELAYED RELEASE ORAL at 08:04

## 2024-06-30 RX ADMIN — PANTOPRAZOLE SODIUM 40 MG: 40 TABLET, DELAYED RELEASE ORAL at 17:04

## 2024-06-30 RX ADMIN — DOCUSATE SODIUM 100 MG: 100 CAPSULE, LIQUID FILLED ORAL at 08:03

## 2024-06-30 RX ADMIN — FLUTICASONE PROPIONATE 2 SPRAY: 50 SPRAY, METERED NASAL at 08:07

## 2024-06-30 RX ADMIN — NYSTATIN 1 APPLICATION: 100000 POWDER TOPICAL at 17:07

## 2024-06-30 RX ADMIN — AMLODIPINE BESYLATE 5 MG: 5 TABLET ORAL at 08:03

## 2024-06-30 RX ADMIN — ATORVASTATIN CALCIUM 40 MG: 40 TABLET, FILM COATED ORAL at 08:04

## 2024-06-30 RX ADMIN — CLOTRIMAZOLE 1 APPLICATION: 1 CREAM TOPICAL at 17:07

## 2024-06-30 RX ADMIN — ALPRAZOLAM 0.5 MG: 0.5 TABLET ORAL at 08:03

## 2024-06-30 RX ADMIN — DOCUSATE SODIUM 100 MG: 100 CAPSULE, LIQUID FILLED ORAL at 17:04

## 2024-06-30 RX ADMIN — DULOXETINE HYDROCHLORIDE 60 MG: 60 CAPSULE, DELAYED RELEASE ORAL at 08:04

## 2024-06-30 RX ADMIN — NYSTATIN 1 APPLICATION: 100000 POWDER TOPICAL at 08:05

## 2024-06-30 RX ADMIN — APIXABAN 2.5 MG: 2.5 TABLET, FILM COATED ORAL at 17:04

## 2024-06-30 RX ADMIN — TRAMADOL HYDROCHLORIDE 50 MG: 50 TABLET, COATED ORAL at 17:29

## 2024-06-30 RX ADMIN — CLOTRIMAZOLE 1 APPLICATION: 1 CREAM TOPICAL at 08:06

## 2024-06-30 RX ADMIN — ALPRAZOLAM 0.5 MG: 0.5 TABLET ORAL at 21:29

## 2024-06-30 NOTE — PROGRESS NOTES
"Progress Note - Benjamin Puga 69 y.o. female MRN: [id number]    Unit/Bed#: [facility] 202-01 Encounter: [id number]        Subjective:   Patient seen and examined at bedside after reviewing the chart and discussing the case with the caring staff.      Patient examined at bedside.  Patient states she had a stomach ache overnight. Denies current symptoms.  Also reports she cannot use powder inhalers. Will have her  bring her inhaler from home.     CBC, CMP, lipid panel normal.  Vitamins and Hgb A1c pending.     Physical Exam   Vitals: Blood pressure 136/63, pulse 84, temperature 98.5 °F (36.9 °C), temperature source Temporal, resp. rate 16, height 4' 11\" (1.499 m), weight 88.5 kg (195 lb), SpO2 96%.,Body mass index is 39.39 kg/m².  Constitutional: Patient in no acute distress.  HEENT: PERR, EOMI, MMM.  Cardiovascular: Normal rate and regular rhythm.    Pulmonary/Chest: Effort normal and breath sounds normal.   Abdomen: Soft, + BS, NT.    Assessment/Plan:  Benjamin Puga is a(n) 69 y.o. female with MDD.     Allergic rhinitis.  Continue Flonase daily.   Asthma.  Stable.  Patient will have  bring inhaler from home.   Cardiac hx w/ HTN, HLD. Hx of PE.  Continue lisinopril 40 mg daily, Norvasc 5 mg, Eliquis 2.5 mg daily, atorvastatin 40 mg daily (Crestor non form).  Prediabetes.  Hgb A1c 5.7% on 1/20/24.  Lifestyle modifications.  Recheck A1c.   Hypothyroidism.  Continue levothyroxine 50 mcg daily.  Dose decreased from 88 mcg at previous hospital 6/26.   GERD.  Continue Protonix 40 mg daily.   IBS-C.   Continue Colace 100 mg twice daily and Miralax daily.   Intertrigo.  Apply Nystatin powder twice daily.   Tinea corporis.  Apply clotrimazole cream twice daily x 14 days.    The patient was discussed with Dr. Trinidad and he is in agreement with the above note.  "

## 2024-06-30 NOTE — NURSING NOTE
"Patient appears to be sleeping well thus far,  no noted distress. Remains on 7\" checks for safety and behaviors.  "

## 2024-06-30 NOTE — PROGRESS NOTES
"Progress Note - Behavioral Health   Benjamin Puga 69 y.o. female MRN: [id number]  Unit/Bed#: [facility] 202-01 Encounter: [id number]    Subjective:    Per nursing, patient continues to have rambling speech, pleasant and calm during interactions, social with roommate.      Per patient, patient reports no acute events overnight.  She reports some GI upset today.  She did eat her breakfast and lunch.  She describes mood as \"fine,\" denying overwhelming sadness or depression.  She does report some anxiety symptoms.  She has been thinking about things.  She had a conversation with  yesterday, he said he would bring her some things needed while in hospital.  She recognizes she should've sought out help sooner when she wasn't feeling well at home.  She reports working on her sleep.  She wants to expand her hobbies.    Behavior over the last 24 hours:  improved  Medication side effects: No  ROS:  stomach upset    Objective:    Temp:  [98 °F (36.7 °C)-99.4 °F (37.4 °C)] 98.5 °F (36.9 °C)  HR:  [66-91] 84  Resp:  [16-18] 16  BP: (108-136)/(53-63) 136/63    Mental Status Evaluation:  Appearance:  sitting comfortably in chair, dressed in casual clothing, adequate hygiene and grooming, cooperative with interview   Behavior:  No tics, tremors, or behaviors observed   Speech:  Normal rate, rhythm, volume   Mood:  \"fine\"   Affect:  Appears mildly anxious, mildly constricted, stable   Thought Process:  Over-inclusive and Circumstantial   Associations intact associations   Thought Content:  No passive or active suicidal or homicidal ideation, intent, or plan.   Perceptual Disturbances: Denies any auditory or visual hallucinations   Sensorium:  Oriented to person, place, time, and situation   Memory:  recent and remote memory grossly intact   Consciousness:  alert and awake   Attention: attention span and concentration were age appropriate   Insight:  fair   Judgment: fair   Gait/Station: normal gait/station   Motor Activity: no " abnormal movements       Labs: I have personally reviewed all pertinent laboratory/tests results.  Labs in last 72 hours:   Recent Labs     06/30/24  0514   WBC 9.28   RBC 4.65   HGB 13.2   HCT 42.3      RDW 13.9   NEUTROABS 5.56   SODIUM 140   K 3.7      CO2 29   BUN 15   CREATININE 0.76   GLUC 97   GLUF 97   CALCIUM 9.6   AST 29   ALT 15   ALKPHOS 64   TP 7.8   ALB 4.3   TBILI 0.60   CHOLESTEROL 175   HDL 56   TRIG 73   LDLCALC 104*       Progress Toward Goals: Progressing    Recommended Treatment: Continue with group therapy, milieu therapy and occupational therapy.      Risks, benefits and possible side effects of Medications:   Risks, benefits, and possible side effects of medications explained to patient and patient verbalizes understanding.      Medications: all current active meds have been reviewed.  Current Facility-Administered Medications   Medication Dose Route Frequency Provider Last Rate    acetaminophen  650 mg Oral Q4H PRN Ranjan Bryson, CRNP      acetaminophen  650 mg Oral Q4H PRN Ranjan Bryson, CRNP      albuterol  2 puff Inhalation Q4H PRN Ranjan Bryson, CRNP      ALPRAZolam  0.5 mg Oral Q8H PRN Inocente Gu MD      ALPRAZolam  0.5 mg Oral BID Inocente Gu MD      amLODIPine  5 mg Oral Daily Ranjan Bryson, CRNP      apixaban  2.5 mg Oral BID Ranjan Bryson, CRNP      atorvastatin  40 mg Oral Daily Ranjan Bryson, CRNP      benztropine  0.5 mg Oral Q4H PRN Max 6/day Ranjan Bryson, CRTRACI      clotrimazole   Topical BID Cortney Cabezas PA-C      docusate sodium  100 mg Oral BID Ranjan Bryson, CRNP      DULoxetine  30 mg Oral Daily Ranjan Bryson, CRNP      DULoxetine  60 mg Oral Daily Ranjan Bryson, CRTRACI      fluticasone  2 spray Each Nare Daily Cortney Cabezas PA-C      levothyroxine  50 mcg Oral Early Morning Ranjan Bryson, CRTRACI      lisinopril  40 mg Oral Daily Ranjan Bryson, BERNARD      LORazepam  1 mg  Intramuscular Q6H PRN Max 3/day Ranjan Jerome-Anom, CRNP      LORazepam  0.5 mg Oral Q6H PRN Max 4/day Ranjan Jerome-Anom, CRNP      LORazepam  1 mg Oral Q6H PRN Max 3/day Ranjan Jerome-Anom, CRNP      methocarbamol  500 mg Oral Q6H PRN Cortney Cabezas PA-C      nystatin   Topical BID Cortney Cabezas PA-C      OLANZapine  5 mg Intramuscular Q3H PRN Max 3/day Ranjan Jerome-Anom, CRNP      OLANZapine  2.5 mg Oral Q4H PRN Max 6/day Ranjan Jerome-Anom, CRNP      OLANZapine  5 mg Oral Q4H PRN Max 3/day Ranjan Jerome-Anom, CRNP      OLANZapine  5 mg Oral Q3H PRN Max 3/day Ranjan Jerome-Anom, CRNP      ondansetron  4 mg Oral Q6H PRN Cortney Cabezas PA-C      pantoprazole  40 mg Oral BID Ranjan Jerome-Anom, CRTRACI      polyethylene glycol  17 g Oral Daily Ranjan Jerome-Anom, CRNP      traMADol  50 mg Oral Q8H PRN Inocente Gu MD      traZODone  50 mg Oral HS PRN [facility], BERNARD             Assessment & Plan   Principal Problem:    Current severe episode of major depressive disorder without psychotic features without prior episode (HCC)  Active Problems:    Anxiety    PTSD (post-traumatic stress disorder)    68 y/o Female with MDD, Anxiety, PTSD- continues to have some anxiety, some improvements in mood, denying any current SI    Plan:  -Continue current med regimen.

## 2024-06-30 NOTE — PLAN OF CARE
Problem: Alteration in Thoughts and Perception  Goal: Agree to be compliant with medication regime, as prescribed and report medication side effects  Description: Interventions:  - Offer appropriate PRN medication and supervise ingestion; conduct AIMS, as needed   Outcome: Progressing     Problem: Ineffective Coping  Goal: Participates in unit activities  Description: Interventions:  - Provide therapeutic environment   - Provide required programming   - Redirect inappropriate behaviors   Outcome: Progressing     Problem: Depression  Goal: Verbalize thoughts and feelings  Description: Interventions:  - Assess and re-assess patient's level of risk   - Engage patient in 1:1 interactions, daily, for a minimum of 15 minutes   - Encourage patient to express feelings, fears, frustrations, hopes   Outcome: Progressing  Goal: Refrain from harming self  Description: Interventions:  - Monitor patient closely, per order   - Supervise medication ingestion, monitor effects and side effects   Outcome: Progressing     Problem: Anxiety  Goal: Anxiety is at manageable level  Description: Interventions:  - Assess and monitor patient's anxiety level.   - Monitor for signs and symptoms (heart palpitations, chest pain, shortness of breath, headaches, nausea, feeling jumpy, restlessness, irritable, apprehensive).   - Collaborate with interdisciplinary team and initiate plan and interventions as ordered.  - [place] patient to unit/surroundings  - Explain treatment plan  - Encourage participation in care  - Encourage verbalization of concerns/fears  - Identify coping mechanisms  - Assist in developing anxiety-reducing skills  - Administer/offer alternative therapies  - Limit or eliminate stimulants  Outcome: Progressing     Problem: Alteration in Orientation  Goal: Interact with staff daily  Description: Interventions:  - Assess and re-assess patient's level of orientation  - Engage patient in 1 on 1 interactions, daily, for a minimum of  15 minutes   - Establish rapport/trust with patient   Outcome: Progressing     Problem: Potential for Falls.bhtfall  Goal: Patient will remain free of falls  Description: INTERVENTIONS:  - Educate patient/family on patient safety including physical limitations  - Instruct patient to call for assistance with activity   - Consult OT/PT to assist with strengthening/mobility   - Keep Call bell within reach  - Keep bed low and locked with side rails adjusted as appropriate  - Keep care items and personal belongings within reach  - Initiate and maintain comfort rounds  - Make Fall Risk Sign visible to staff  - Offer Toileting every 2 Hours, in advance of need  - Initiate/Maintain bed alarm  - Obtain necessary fall risk management equipment: cane  - Apply yellow socks and bracelet for high fall risk patients  - Consider moving patient to room near nurses station  Outcome: Progressing     Problem: BHFall  Goal: Patient will remain free of falls  Description: INTERVENTIONS:  - Educate patient/family on patient safety including physical limitations  - Instruct patient to call for assistance with activity   - Consult OT/PT to assist with strengthening/mobility   - Keep Call bell within reach  - Keep bed low and locked with side rails adjusted as appropriate  - Keep care items and personal belongings within reach  - Initiate and maintain comfort rounds  - Make Fall Risk Sign visible to staff    - Apply yellow socks and bracelet for high fall risk patients  - Consider moving patient to room near nurses station  Outcome: Progressing
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
## 2024-06-30 NOTE — NURSING NOTE
No further c/o pain voiced by patient.  She appears to be sleeping in her bed; no obvious signs of distress or discomfort noted.  PRN Tylenol seemingly effective for this patient.

## 2024-06-30 NOTE — NURSING NOTE
"BLEPS assessment completed & see Head to Toe assessment. Lungs clear upon auscultation & trace edema noted BLE. Pt denies depression & c/o moderate anxiety. Pt denies any hallucinations, suicidal or homicidal ideations. Q 7 min checks maintained to monitor pt's behavior & safety. Occasional non-productive cough noted. Pt refused Ellipta inhaler & states \" powder inhalers burn my esophagus\". Pt is pleasant & socializes with other patients. Pt is cooperative & compliant with medications. Pt up ad oscar with cane. Clotrimazole cream applied to possible ringworm areas on chest & abdomen as ordered by MD.  "

## 2024-07-01 PROCEDURE — 99232 SBSQ HOSP IP/OBS MODERATE 35: CPT | Performed by: PSYCHIATRY & NEUROLOGY

## 2024-07-01 RX ORDER — CYANOCOBALAMIN 1000 UG/ML
1000 INJECTION, SOLUTION INTRAMUSCULAR; SUBCUTANEOUS
Status: DISCONTINUED | OUTPATIENT
Start: 2024-07-01 | End: 2024-07-09 | Stop reason: HOSPADM

## 2024-07-01 RX ADMIN — LISINOPRIL 40 MG: 20 TABLET ORAL at 08:42

## 2024-07-01 RX ADMIN — PANTOPRAZOLE SODIUM 40 MG: 40 TABLET, DELAYED RELEASE ORAL at 08:43

## 2024-07-01 RX ADMIN — CYANOCOBALAMIN 1000 MCG: 1000 INJECTION, SOLUTION INTRAMUSCULAR; SUBCUTANEOUS at 14:16

## 2024-07-01 RX ADMIN — LEVOTHYROXINE SODIUM 50 MCG: 50 TABLET ORAL at 06:24

## 2024-07-01 RX ADMIN — CLOTRIMAZOLE 1 APPLICATION: 1 CREAM TOPICAL at 08:50

## 2024-07-01 RX ADMIN — DULOXETINE HYDROCHLORIDE 30 MG: 60 CAPSULE, DELAYED RELEASE ORAL at 08:46

## 2024-07-01 RX ADMIN — ATORVASTATIN CALCIUM 40 MG: 40 TABLET, FILM COATED ORAL at 08:43

## 2024-07-01 RX ADMIN — NYSTATIN 1 APPLICATION: 100000 POWDER TOPICAL at 08:54

## 2024-07-01 RX ADMIN — CLOTRIMAZOLE: 1 CREAM TOPICAL at 18:03

## 2024-07-01 RX ADMIN — TRAMADOL HYDROCHLORIDE 50 MG: 50 TABLET, COATED ORAL at 14:16

## 2024-07-01 RX ADMIN — FLUTICASONE PROPIONATE 2 SPRAY: 50 SPRAY, METERED NASAL at 08:51

## 2024-07-01 RX ADMIN — ALPRAZOLAM 0.5 MG: 0.5 TABLET ORAL at 21:27

## 2024-07-01 RX ADMIN — DULOXETINE HYDROCHLORIDE 60 MG: 60 CAPSULE, DELAYED RELEASE ORAL at 08:42

## 2024-07-01 RX ADMIN — AMLODIPINE BESYLATE 5 MG: 5 TABLET ORAL at 08:43

## 2024-07-01 RX ADMIN — ALPRAZOLAM 0.5 MG: 0.5 TABLET ORAL at 08:53

## 2024-07-01 RX ADMIN — APIXABAN 2.5 MG: 2.5 TABLET, FILM COATED ORAL at 08:43

## 2024-07-01 RX ADMIN — NYSTATIN 1 APPLICATION: 100000 POWDER TOPICAL at 18:09

## 2024-07-01 RX ADMIN — DOCUSATE SODIUM 100 MG: 100 CAPSULE, LIQUID FILLED ORAL at 08:43

## 2024-07-01 RX ADMIN — APIXABAN 2.5 MG: 2.5 TABLET, FILM COATED ORAL at 17:31

## 2024-07-01 RX ADMIN — PANTOPRAZOLE SODIUM 40 MG: 40 TABLET, DELAYED RELEASE ORAL at 17:31
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
## 2024-07-01 NOTE — PROGRESS NOTES
Progress Note - Behavioral Health     Benjamin Puga 69 y.o. female MRN: [id number]   Unit/Bed#: [facility] 202-01 Encounter: [id number]    Behavior over the last 24 hours: some improvement.     Benjamin was seen today, appears mildly anxious but able to be engaged in appropriate conversation. Patient reports she came to the hospital due to worsening of depressive symptoms, anxiety that culminates in OD on pills. Patient was overwhelmed with ongoing financial problems because she was scam can. Reports she made fair progress in the unit and she has been compliant with medication. Continue verbalizing passive wish to die but denies any active plan or intent to hurt herself. Staff report some participation in groups and on the unit.    Sleep: slept off and on  Appetite: fair  Medication side effects: denies  ROS: no complaints, all other systems are negative    Mental Status Evaluation:    Appearance:  age appropriate, overweight   Behavior:  cooperative, calm   Speech:  normal rate and volume   Mood:  depressed, anxious   Affect:  constricted   Thought Process:  goal directed   Associations: intact associations   Thought Content:  no overt delusions   Perceptual Disturbances: denies auditory hallucinations when asked   Risk Potential: Suicidal ideation - Yes, passive death wish  Homicidal ideation - None   Sensorium:  oriented to person, place, and time/date   Memory:  recent and remote memory grossly intact   Consciousness:  alert and awake   Attention: attention span and concentration are age appropriate   Insight:  limited   Judgment: fair   Gait/Station: also uses walker   Motor Activity: no abnormal movements     Vital signs in last 24 hours:    Temp:  [97.9 °F (36.6 °C)-98.9 °F (37.2 °C)] 98.8 °F (37.1 °C)  HR:  [] 85  Resp:  [16-19] 16  BP: (125-137)/(62-72) 125/62    Laboratory results: I have personally reviewed all pertinent laboratory/tests results.  Most Recent Labs:   Lab Results   Component Value Date     WBC 9.28 06/30/2024    RBC 4.65 06/30/2024    HGB 13.2 06/30/2024    HCT 42.3 06/30/2024     06/30/2024    RDW 13.9 06/30/2024    NEUTROABS 5.56 06/30/2024    SODIUM 140 06/30/2024    K 3.7 06/30/2024     06/30/2024    CO2 29 06/30/2024    BUN 15 06/30/2024    CREATININE 0.76 06/30/2024    GLUC 97 06/30/2024    GLUF 97 06/30/2024    CALCIUM 9.6 06/30/2024    AST 29 06/30/2024    ALT 15 06/30/2024    ALKPHOS 64 06/30/2024    TP 7.8 06/30/2024    ALB 4.3 06/30/2024    TBILI 0.60 06/30/2024    CHOLESTEROL 175 06/30/2024    HDL 56 06/30/2024    TRIG 73 06/30/2024    LDLCALC 104 (H) 06/30/2024    NONHDLC 119 06/30/2024    [id number] 0.215 (L) 06/25/2024    FREET4 1.00 01/30/2024    HGBA1C 5.7 (H) 06/30/2024     06/30/2024       Assessment & Plan   Principal Problem:    Current severe episode of major depressive disorder without psychotic features without prior episode (HCC)  Active Problems:    Asthma    HTN (hypertension)    Anxiety    Hyperlipidemia    Hypothyroidism    Peripheral neuropathy    History of pulmonary embolism    PTSD (post-traumatic stress disorder)    IBS (irritable bowel syndrome)    GERD (gastroesophageal reflux disease)    Suicidal behavior with attempted self-injury (HCC)    Recommended Treatment:     Planned medication and treatment changes:    All current active medications have been reviewed  Encourage group therapy, milieu therapy and occupational therapy  Behavioral Health checks every 7 minutes  Ct with current meds and support.    Current Facility-Administered Medications   Medication Dose Route Frequency Provider Last Rate    acetaminophen  650 mg Oral Q4H PRN BERNARD Cloud      acetaminophen  650 mg Oral Q4H PRN BERNARD Cloud      albuterol  2 puff Inhalation Q4H PRN BERNARD Cloud      ALPRAZolam  0.5 mg Oral Q8H PRN Inocente Gu MD      ALPRAZolam  0.5 mg Oral BID Inocente Gu MD      amLODIPine  5 mg Oral Daily Ranjan  Jerome-Anom, CRNP      apixaban  2.5 mg Oral BID Ranjan Jerome-Anom, CRNP      atorvastatin  40 mg Oral Daily Ranjan Jerome-Anom, CRNP      benztropine  0.5 mg Oral Q4H PRN Max 6/day Ranjan Jreome-Anom, CRNP      clotrimazole   Topical BID Cortney Cabezas PA-C      cyanocobalamin  1,000 mcg Intramuscular Q30 Days Markus Trinidad MD      docusate sodium  100 mg Oral BID Ranjan Jerome-Anom, CRNP      DULoxetine  30 mg Oral Daily Ranjan Jerome-Anom, CRNP      DULoxetine  60 mg Oral Daily Ranjan Jerome-Anom, CRNP      fluticasone  2 spray Each Nare Daily Cortney Cabezas PA-C      levothyroxine  50 mcg Oral Early Morning Ranjan Jerome-Anom, CRNP      lisinopril  40 mg Oral Daily Ranjan Jerome-Anom, CRNP      LORazepam  1 mg Intramuscular Q6H PRN Max 3/day Ranjan Jerome-Anom, CRNP      LORazepam  0.5 mg Oral Q6H PRN Max 4/day Ranjan Jerome-Anom, CRNP      LORazepam  1 mg Oral Q6H PRN Max 3/day Ranjan Jerome-Anom, CRNP      methocarbamol  500 mg Oral Q6H PRN Cortney Cabezas PA-C      nystatin   Topical BID Cortney Cabezas PA-C      OLANZapine  5 mg Intramuscular Q3H PRN Max 3/day Ranjan Jerome-Anom, CRNP      OLANZapine  2.5 mg Oral Q4H PRN Max 6/day [place] Jerome-Anom, CRNP      OLANZapine  5 mg Oral Q4H PRN Max 3/day Ranjan Jerome-Anom, CRNP      OLANZapine  5 mg Oral Q3H PRN Max 3/day Ranjan Jerome-Anom, CRNP      ondansetron  4 mg Oral Q6H PRN Cortney Cabezas PA-C      pantoprazole  40 mg Oral BID Ranjan Jerome-Anom, CRNP      polyethylene glycol  17 g Oral Daily Ranjan Jerome-Anom, CRNP      traMADol  50 mg Oral Q8H PRN Inocente Gu MD      traZODone  50 mg Oral HS PRN Ranjan Jerome-Anom, CRNP         Risks / Benefits of Treatment:    Risks, benefits, and possible side effects of medications explained to patient and patient verbalizes understanding and agreement for treatment.    Counseling / Coordination of Care:    Patient's progress discussed with staff in treatment team  meeting.  Medications, treatment progress and treatment plan reviewed with patient.  Supportive therapy provided to patient.  Group attendance encouraged.    Graham Calderón MD 07/01/24

## 2024-07-01 NOTE — NURSING NOTE
Patient visible on the unit. Calm and cooperative, social with peers. Endorses mild anxiety and depression. No reports of SI, HI, or hallucinations. Ambulates with cane. Will continue to monitor and access. Continuous rounding maintained.

## 2024-07-01 NOTE — PROGRESS NOTES
"Progress Note - Benjamin Puga 69 y.o. female MRN: [id number]    Unit/Bed#: [facility] 202-01 Encounter: [id number]        Subjective:   Patient seen and examined at bedside after reviewing the chart and discussing the case with the caring staff.      Patient examined at bedside.  Patient reports no acute symptoms except his troubles with her stomach with diarrhea.    On review of patient's labs it was found that patient's vitamin D level was 45.5 and vitamin B12 level was low 261.  Patient's A1c was found to be 5.7.    Physical Exam   Vitals: Blood pressure 125/62, pulse 85, temperature 98.8 °F (37.1 °C), temperature source Temporal, resp. rate 16, height 4' 11\" (1.499 m), weight 88.5 kg (195 lb), SpO2 94%.,Body mass index is 39.39 kg/m².  Constitutional: Patient in no acute distress.  HEENT: PERR, EOMI, MMM.  Cardiovascular: Normal rate and regular rhythm.    Pulmonary/Chest: Effort normal and breath sounds normal.   Abdomen: Soft, + BS, NT.    Assessment/Plan:  Benjamin Puga is a(n) 69 y.o. female with MDD.     Allergic rhinitis.  Continue Flonase daily.   Asthma.  Stable.  Patient will have  bring inhaler from home.   Cardiac hx w/ HTN, HLD. Hx of PE.  Continue lisinopril 40 mg daily, Norvasc 5 mg, Eliquis 2.5 mg daily, atorvastatin 40 mg daily (Crestor non form).  Prediabetes.  Hgb A1c 5.7% on 6/30/2024.  Lifestyle modifications.  Recheck A1c.   Hypothyroidism.  Continue levothyroxine 50 mcg daily.  Dose decreased from 88 mcg at previous hospital 6/26.   GERD.  Continue Protonix 40 mg daily.   IBS-C.   Continue Colace 100 mg twice daily and Miralax daily.   Intertrigo.  Apply Nystatin powder twice daily.   Tinea corporis.  Apply clotrimazole cream twice daily x 14 days.  New vitamin B12 deficiency.  I will put the patient on monthly B12 injections.  "

## 2024-07-01 NOTE — PROGRESS NOTES
07/01/24 1050   Team Meeting   Meeting Type Tx Team Meeting   Team Members Present   Team Members Present Physician;Nurse;   Physician Team Member Dr. Kaitlynn MD   Nursing Team Member Mimi Gómez, RN   Care Management Team Member Macrina Gillespie, MS, NCC, LPC   Patient/Family Present   Patient Present Yes   Patient's Family Present No     PT met with TX team, reviewed TX plan and goals, all in agreement and signed.

## 2024-07-01 NOTE — PROGRESS NOTES
Met with Benjamin completing her admission self assessment. She has the tendency to dwell on the past issues and physical pain.

## 2024-07-01 NOTE — PLAN OF CARE
Problem: Risk for Self Injury/Neglect  Goal: Attend and participate in unit activities, including therapeutic, recreational, and educational groups  Description: Interventions:  - Provide therapeutic and educational activities daily, encourage attendance and participation, and document same in the medical record  - Obtain collateral information, encourage visitation and family involvement in care   Outcome: Progressing     Problem: Ineffective Coping  Goal: Participates in unit activities  Description: Interventions:  - Provide therapeutic environment   - Provide required programming   - Redirect inappropriate behaviors   Outcome: Progressing

## 2024-07-01 NOTE — UTILIZATION REVIEW
NOTIFICATION OF ADMISSION DISCHARGE   This is a Notification of Discharge from [facility]. Please be advised that this patient has been discharge from our facility. Below you will find the admission and discharge date and time including the patient’s disposition.   UTILIZATION REVIEW CONTACT:  Solange Pate  Utilization   Network Utilization Review Department  Phone: [phone] x carefully listen to the prompts. All voicemails are confidential.  Email: [email or web address]     ADMISSION INFORMATION  PRESENTATION DATE: 6/25/2024 11:10 PM  OBERVATION ADMISSION DATE: 06/26/2024 0153  INPATIENT ADMISSION DATE: 6/26/24  5:12 PM   DISCHARGE DATE: 6/28/2024  5:36 PM   DISPOSITION:[facility] Utilization Review Department  ATTENTION: Please call with any questions or concerns to [phone] and carefully listen to the prompts so that you are directed to the right person. All voicemails are confidential.   For Discharge needs, contact Care Management DC Support Team at [phone] opt. [phone]  Send all requests for admission clinical reviews, approved or denied determinations and any other requests to dedicated fax number below belonging to the campus where the patient is receiving treatment. List of dedicated fax numbers for the Facilities:  FACILITY NAME UR FAX NUMBER   ADMISSION DENIALS (Administrative/Medical Necessity) [phone]   DISCHARGE SUPPORT TEAM ([facility]) [phone]   PARENT CHILD HEALTH (Maternity/NICU/Pediatrics) [phone]   [facility] [phone]   [facility] [phone]   [facility] [phone]   [facility] [phone]   [facility] [phone]   [facility] [phone]   [facility] [phone]   [facility]  [facility] [phone]   [facility] [phone]   [facility] [phone]   [facility] [phone]   [facility] [phone]

## 2024-07-01 NOTE — SOCIAL WORK
ISHAAN placed call to PT  Terry [phone] for family check in. Updated on PT status and plan of care. PT  in agreement with all. Provided contact information. Call ended mutually.     ISHAAN placed call to PT PCP  primary care [phone], spoke with AJ she will updated team. Call ended mutually.     ISHAAN placed call to Dr. Tessa MD, spoke with Sasha, completed psych referral. PT is scheduled for follow up 7/11/24 at 3:15pm in person with BERNARD Reece. Call ended mutually.

## 2024-07-02 PROCEDURE — 97161 PT EVAL LOW COMPLEX 20 MIN: CPT

## 2024-07-02 PROCEDURE — 99232 SBSQ HOSP IP/OBS MODERATE 35: CPT | Performed by: PSYCHIATRY & NEUROLOGY

## 2024-07-02 RX ORDER — SACCHAROMYCES BOULARDII 250 MG
250 CAPSULE ORAL 2 TIMES DAILY
Status: DISCONTINUED | OUTPATIENT
Start: 2024-07-02 | End: 2024-07-09 | Stop reason: HOSPADM

## 2024-07-02 RX ADMIN — DOCUSATE SODIUM 100 MG: 100 CAPSULE, LIQUID FILLED ORAL at 08:30

## 2024-07-02 RX ADMIN — NYSTATIN 1 APPLICATION: 100000 POWDER TOPICAL at 08:32

## 2024-07-02 RX ADMIN — CLOTRIMAZOLE 1 APPLICATION: 1 CREAM TOPICAL at 08:32

## 2024-07-02 RX ADMIN — APIXABAN 2.5 MG: 2.5 TABLET, FILM COATED ORAL at 08:31

## 2024-07-02 RX ADMIN — AMLODIPINE BESYLATE 5 MG: 5 TABLET ORAL at 08:31

## 2024-07-02 RX ADMIN — DULOXETINE HYDROCHLORIDE 30 MG: 60 CAPSULE, DELAYED RELEASE ORAL at 08:31

## 2024-07-02 RX ADMIN — PANTOPRAZOLE SODIUM 40 MG: 40 TABLET, DELAYED RELEASE ORAL at 08:31

## 2024-07-02 RX ADMIN — LISINOPRIL 40 MG: 20 TABLET ORAL at 08:31

## 2024-07-02 RX ADMIN — LEVOTHYROXINE SODIUM 50 MCG: 50 TABLET ORAL at 05:53

## 2024-07-02 RX ADMIN — NYSTATIN 1 APPLICATION: 100000 POWDER TOPICAL at 17:11

## 2024-07-02 RX ADMIN — ALPRAZOLAM 0.5 MG: 0.5 TABLET ORAL at 21:12

## 2024-07-02 RX ADMIN — ATORVASTATIN CALCIUM 40 MG: 40 TABLET, FILM COATED ORAL at 08:30

## 2024-07-02 RX ADMIN — TRAMADOL HYDROCHLORIDE 50 MG: 50 TABLET, COATED ORAL at 20:27

## 2024-07-02 RX ADMIN — APIXABAN 2.5 MG: 2.5 TABLET, FILM COATED ORAL at 17:10

## 2024-07-02 RX ADMIN — CLOTRIMAZOLE: 1 CREAM TOPICAL at 17:11

## 2024-07-02 RX ADMIN — DULOXETINE HYDROCHLORIDE 60 MG: 60 CAPSULE, DELAYED RELEASE ORAL at 08:30

## 2024-07-02 RX ADMIN — Medication 250 MG: at 12:20

## 2024-07-02 RX ADMIN — Medication 250 MG: at 17:10

## 2024-07-02 RX ADMIN — TRAMADOL HYDROCHLORIDE 50 MG: 50 TABLET, COATED ORAL at 10:06

## 2024-07-02 RX ADMIN — FLUTICASONE PROPIONATE 2 SPRAY: 50 SPRAY, METERED NASAL at 08:30

## 2024-07-02 RX ADMIN — ALPRAZOLAM 0.5 MG: 0.5 TABLET ORAL at 08:30

## 2024-07-02 RX ADMIN — PANTOPRAZOLE SODIUM 40 MG: 40 TABLET, DELAYED RELEASE ORAL at 17:10

## 2024-07-02 NOTE — PHYSICAL THERAPY NOTE
PHYSICAL THERAPY EVALUATION  NAME:  Benjamin Puga  DATE: 07/02/24    AGE:   69 y.o.  Mrn:   [id number]  ADMIT DX:  Major depressive disorder, single episode, unspecified [F32.9]  Problem List:   Patient Active Problem List   Diagnosis    Asthma    HTN (hypertension)    Allergic rhinitis    Anxiety    Candidal intertrigo    Current severe episode of major depressive disorder without psychotic features without prior episode (HCC)    Herpes simplex infection    Hyperlipidemia    Hypothyroidism    Impaired fasting glucose    Peripheral neuropathy    Ductal carcinoma in situ (DCIS) of left breast    Diastasis recti    Gross hematuria    Anemia    Other hydronephrosis    History of pulmonary embolism    PTSD (post-traumatic stress disorder)    Pain with swallowing    IBS (irritable bowel syndrome)    GERD (gastroesophageal reflux disease)    Morbid obesity with BMI of 40.0-44.9, adult (HCC)    Suicidal behavior with attempted self-injury (HCC)       Past Medical History  Past Medical History:   Diagnosis Date    Allergic rhinitis     Anemia     Cancer (HCC)     breast cancer, Left side    Colon polyp     Depression     Disease of thyroid gland     GERD (gastroesophageal reflux disease)     Hyperlipemia     Hypertension     Hypothyroidism     Last assessed: 3/25/14    Obesity     Osteoarthritis     Pre-operative laboratory examination     Pulmonary embolism (HCC)        Past Surgical History  Past Surgical History:   Procedure Laterality Date    HAND SURGERY Left 03/2020    HYSTERECTOMY      INCISIONAL BREAST BIOPSY      JOINT REPLACEMENT      KNEE ARTHROPLASTY      MAMMO NEEDLE LOCALIZATION LEFT (ALL INC) Left 6/6/2019    ROTATOR CUFF REPAIR      SHOULDER SURGERY      TRIGGER FINGER RELEASE      TRIGGER FINGER RELEASE         Length Of Stay: 4  Performed at least 2 patient identifiers during session: Name and ID bracelet       07/02/24 0839   Note Type   Note type Evaluation   Pain Assessment   Pain Assessment Tool  0-10   Pain Score No Pain   Restrictions/Precautions   Weight Bearing Precautions Per Order No   Home Living   Type of Home House   Home Layout Multi-level;Bed/bath upstairs  (full bath on 1st FL; 0 steps to enter garage and then FF to 1st FL)   Bathroom Shower/Tub Tub/shower unit   Bathroom Toilet Standard   Bathroom Equipment Shower chair;Grab bars in shower   Home Equipment Cane  (used SPC occ at baseline)   Prior Function   Level of [place] Independent with ADLs;Independent with functional mobility;Independent with IADLS   Lives With Spouse   IADLs Independent with driving;Independent with meal prep;Independent with medication management   Falls in the last 6 months 5 to 10   Vocational Retired   General   Family/Caregiver Present No   Cognition   Overall Cognitive Status WFL   Arousal/Participation Alert   Orientation Level Oriented X4   Memory Within functional limits   Following Commands Follows all commands and directions without difficulty   RLE Assessment   RLE Assessment WFL   LLE Assessment   LLE Assessment WFL   Vision-Basic Assessment   Current Vision Wears glasses all the time   Light Touch   RLE Light Touch Impaired   LLE Light Touch Impaired   Bed Mobility   Supine to Sit 6  Modified independent   Additional Comments pt denied dizziness with transitional movement   Transfers   Sit to Stand 6  Modified independent   Stand to Sit 6  Modified independent   Toilet transfer 6  Modified independent   Additional items Standard toilet   Ambulation/Elevation   Gait pattern WNL   Gait Assistance 6  Modified independent   Assistive Device Straight cane   Distance 200 ft   Ambulation/Elevation Additional Comments no LOB or deficts noted   Balance   Static Sitting Normal   Dynamic Sitting Normal   Static Standing Good   Dynamic Standing Good   Ambulatory Good   Endurance Deficit   Endurance Deficit No   Activity Tolerance   Activity Tolerance Patient tolerated treatment well   Assessment   Prognosis Good    Assessment Pt is 69 y.o. female seen for PT evaluation s/p admit to [facility] on 6/28/2024 w/ Current severe episode of major depressive disorder without psychotic features without prior episode (HCC). PT consulted to assess pt's functional mobility and d/c needs. Order placed for PT eval and tx, w/ up and OOB as tolerated order. Pt agreeable to PT  session upon arrival, pt found supine in bed. The following objective measures performed on IE also reveal limitations: AM-PAC 6 Clicks 24/24.  Patient was independent w/ all functional mobility w/ cane.  Pt presents at [facility] with transfers, ambulation, and bed mobility.  From PT/mobility standpoint, recommendation at time of d/c would be anticipate no needs/resources. Upon conclusion pt seated in chair. D/c PT services at this time. Complexity: Comorbidities affecting pt's physical performance at time of assessment include: DM, htn, neuropathy, anxiety, and depression. Personal factors affecting pt at time of IE include: ambulating with assistive device, steps to enter home, depression, and anxiety. Please find objective findings from PT assessment regarding body systems outlined above with impairments and limitations including altered sensation.  Pt's clinical presentation is currently stable  seen in pt's presentation of  lack of deficits . The patient's AM-PAC Basic Mobility Inpatient Short Form Raw Score is 24. Please also refer to the recommendation of the Physical Therapist for safe discharge planning.   Barriers to Discharge None   Goals   Patient Goals to go home   Discharge Recommendation   Rehab Resource Intensity Level, PT No post-acute rehabilitation needs   AM-PAC Basic Mobility Inpatient   Turning in Flat Bed Without Bedrails 4   Lying on Back to Sitting on Edge of Flat Bed Without Bedrails 4   Moving Bed to Chair 4   Standing Up From Chair Using Arms 4   Walk in Room 4   Climb 3-5 Stairs With Railing 4   Basic Mobility Inpatient Raw Score 24    Basic Mobility Standardized Score 57.68   [facility] Highest Level Of Mobility   -HLM Goal 8: Walk 250 feet or more   JH-HLM Achieved 8: Walk 250 feet ot more         Time In: 0825  Time Out: 0839  Total Evaluation Minutes: 14    Desi Hendricks, PT

## 2024-07-02 NOTE — NURSING NOTE
Patient was visible in the community this evening; spending time in the dining area with peers. She is pleasant and cooperative during staff interactions.  Social with peers.  Endorses mild anxiety and depression, denies SI, HI and hallucinations. Positive for snack and fluids tonight.  Benjamin was medication compliant at  .  Continuous safety rounding in progress.

## 2024-07-02 NOTE — NURSING NOTE
Pt up ad oscar with cane. Pt is pleasant & socializes with other patients. Pt c/o mild depression & mild anxiety. Pt denies any hallucinations, suicidal or homicidal ideations. Q 7 min checks maintained to monitor pt's behavior & safety. Pt is cooperative & compliant with medications.

## 2024-07-02 NOTE — PLAN OF CARE
Problem: Alteration in Thoughts and Perception  Goal: Treatment Goal: Gain control of psychotic behaviors/thinking, reduce/eliminate presenting symptoms and demonstrate improved reality functioning upon discharge  Outcome: Progressing  Goal: Refrain from acting on delusional thinking/internal stimuli  Description: Interventions:  - Monitor patient closely, per order   - Utilize least restrictive measures   - Set reasonable limits, give positive feedback for acceptable   - Administer medications as ordered and monitor of potential side effects  Outcome: Progressing  Goal: Agree to be compliant with medication regime, as prescribed and report medication side effects  Description: Interventions:  - Offer appropriate PRN medication and supervise ingestion; conduct AIMS, as needed   Outcome: Progressing  Goal: Recognize dysfunctional thoughts, communicate reality-based thoughts at the time of discharge  Description: Interventions:  - Provide medication and psycho-education to assist patient in compliance and developing insight into his/her illness   Outcome: Progressing     Problem: Ineffective Coping  Goal: Demonstrates healthy coping skills  Outcome: Progressing  Goal: Participates in unit activities  Description: Interventions:  - Provide therapeutic environment   - Provide required programming   - Redirect inappropriate behaviors   Outcome: Progressing  Goal: Patient/Family participate in treatment and DC plans  Description: Interventions:  - Provide therapeutic environment  Outcome: Progressing  Goal: Patient/Family verbalizes awareness of resources  Outcome: Progressing  Goal: Understands least restrictive measures  Description: Interventions:  - Utilize least restrictive behavior  Outcome: Progressing  Goal: Free from restraint events  Description: - Utilize least restrictive measures   - Provide behavioral interventions   - Redirect inappropriate behaviors   Outcome: Progressing     Problem: Risk for Self  Injury/Neglect  Goal: Treatment Goal: Remain safe during length of stay, learn and adopt new coping skills, and be free of self-injurious ideation, impulses and acts at the time of discharge  Outcome: Progressing     Problem: Depression  Goal: Verbalize thoughts and feelings  Description: Interventions:  - Assess and re-assess patient's level of risk   - Engage patient in 1:1 interactions, daily, for a minimum of 15 minutes   - Encourage patient to express feelings, fears, frustrations, hopes   Outcome: Progressing  Goal: Refrain from harming self  Description: Interventions:  - Monitor patient closely, per order   - Supervise medication ingestion, monitor effects and side effects   Outcome: Progressing  Goal: Refrain from isolation  Description: Interventions:  - Develop a trusting relationship   - Encourage socialization   Outcome: Progressing  Goal: Refrain from self-neglect  Outcome: Progressing     Problem: Anxiety  Goal: Anxiety is at manageable level  Description: Interventions:  - Assess and monitor patient's anxiety level.   - Monitor for signs and symptoms (heart palpitations, chest pain, shortness of breath, headaches, nausea, feeling jumpy, restlessness, irritable, apprehensive).   - Collaborate with interdisciplinary team and initiate plan and interventions as ordered.  - [place] patient to unit/surroundings  - Explain treatment plan  - Encourage participation in care  - Encourage verbalization of concerns/fears  - Identify coping mechanisms  - Assist in developing anxiety-reducing skills  - Administer/offer alternative therapies  - Limit or eliminate stimulants  Outcome: Progressing     Problem: Alteration in Orientation  Goal: Interact with staff daily  Description: Interventions:  - Assess and re-assess patient's level of orientation  - Engage patient in 1 on 1 interactions, daily, for a minimum of 15 minutes   - Establish rapport/trust with patient   Outcome: Progressing  Goal: Allow medical  examinations, as recommended  Description: Interventions:  - Provide physical/neurological exams and/or referrals, per provider   Outcome: Progressing  Goal: Cooperate with recommended testing/procedures  Description: Interventions:  - Determine need for ancillary testing  - Observe for mental status changes  - Implement falls/precaution protocol   Outcome: Progressing  Goal: Complete daily ADLs, including personal hygiene independently, as able  Description: Interventions:  - Observe, teach, and assist patient with ADLS  Outcome: Progressing     Problem: Potential for Falls.bhtfall  Goal: Patient will remain free of falls  Description: INTERVENTIONS:  - Educate patient/family on patient safety including physical limitations  - Instruct patient to call for assistance with activity   - Consult OT/PT to assist with strengthening/mobility   - Keep Call bell within reach  - Keep bed low and locked with side rails adjusted as appropriate  - Keep care items and personal belongings within reach  - Initiate and maintain comfort rounds  - Make Fall Risk Sign visible to staff  - Offer Toileting every 2 Hours, in advance of need  - Initiate/Maintain bed alarm  - Obtain necessary fall risk management equipment: walker  - Apply yellow socks and bracelet for high fall risk patients  - Consider moving patient to room near nurses station  Outcome: Progressing

## 2024-07-02 NOTE — PROGRESS NOTES
Progress Note - Behavioral Health     Benjamin Puga 69 y.o. female MRN: [id number]   Unit/Bed#: [facility] 202-01 Encounter: [id number]    Behavior over the last 24 hours: minimal improvement.     Benjamin was seen for continuing care. She reports feeling slightly better but mildly anxious due to GI discomfort this morning. She reported fair adjustment to the group and has been interacting with selective peer appeared appropriately. Still ruminates about her  mental distress she experienced due to scam. Continue verbalizing vague wish to die but denies any active plan or intent to hurt herself. She has been compliant with medication and denies any current side effects. Staff report some participation in groups and on the unit.    Sleep: slept better  Appetite: fair  Medication side effects: denies  ROS: all other systems are negative, mild GI discomfort    Mental Status Evaluation:    Appearance:  age appropriate, overweight   Behavior:  cooperative, calm   Speech:  normal rate and volume   Mood:  anxious   Affect:  constricted   Thought Process:  goal directed   Associations: intact associations   Thought Content:  no overt delusions   Perceptual Disturbances: denies auditory hallucinations when asked   Risk Potential: Suicidal ideation - Yes, passive death wish, contracts for safety on the unit  Homicidal ideation - None   Sensorium:  oriented to person, place, and time/date   Memory:  recent and remote memory grossly intact   Consciousness:  alert and awake   Attention: attention span and concentration are age appropriate   Insight:  limited   Judgment: fair   Gait/Station: also uses walker   Motor Activity: no abnormal movements     Vital signs in last 24 hours:    Temp:  [97.5 °F (36.4 °C)-97.8 °F (36.6 °C)] 97.8 °F (36.6 °C)  HR:  [67-99] 67  Resp:  [18] 18  BP: (119-172)/(59-72) 138/72    Laboratory results: I have personally reviewed all pertinent laboratory/tests results.  Most Recent Labs:   Lab Results    Component Value Date    WBC 9.28 06/30/2024    RBC 4.65 06/30/2024    HGB 13.2 06/30/2024    HCT 42.3 06/30/2024     06/30/2024    RDW 13.9 06/30/2024    NEUTROABS 5.56 06/30/2024    SODIUM 140 06/30/2024    K 3.7 06/30/2024     06/30/2024    CO2 29 06/30/2024    BUN 15 06/30/2024    CREATININE 0.76 06/30/2024    GLUC 97 06/30/2024    GLUF 97 06/30/2024    CALCIUM 9.6 06/30/2024    AST 29 06/30/2024    ALT 15 06/30/2024    ALKPHOS 64 06/30/2024    TP 7.8 06/30/2024    ALB 4.3 06/30/2024    TBILI 0.60 06/30/2024    CHOLESTEROL 175 06/30/2024    HDL 56 06/30/2024    TRIG 73 06/30/2024    LDLCALC 104 (H) 06/30/2024    NONHDLC 119 06/30/2024    [id number] 0.215 (L) 06/25/2024    FREET4 1.00 01/30/2024    HGBA1C 5.7 (H) 06/30/2024     06/30/2024       Assessment & Plan   Principal Problem:    Current severe episode of major depressive disorder without psychotic features without prior episode (HCC)  Active Problems:    Asthma    HTN (hypertension)    Anxiety    Hyperlipidemia    Hypothyroidism    Peripheral neuropathy    History of pulmonary embolism    PTSD (post-traumatic stress disorder)    IBS (irritable bowel syndrome)    GERD (gastroesophageal reflux disease)    Suicidal behavior with attempted self-injury (HCC)    Recommended Treatment:     Planned medication and treatment changes:    All current active medications have been reviewed  Encourage group therapy, milieu therapy and occupational therapy  Behavioral Health checks every 7 minutes  Ct with current meds and support.    Current Facility-Administered Medications   Medication Dose Route Frequency Provider Last Rate    acetaminophen  650 mg Oral Q4H PRN BERNARD Cloud      acetaminophen  650 mg Oral Q4H PRN BERNARD Cloud      albuterol  2 puff Inhalation Q4H PRN BERNARD Cloud      ALPRAZolam  0.5 mg Oral Q8H PRN Inocente Gu MD      ALPRAZolam  0.5 mg Oral BID Inocente Gu MD      amLODIPine  5  mg Oral Daily Ranjan Jerome-Anom, CRNP      apixaban  2.5 mg Oral BID Ranjan Jerome-Anom, CRNP      atorvastatin  40 mg Oral Daily Ranjan Jerome-Anom, CRNP      benztropine  0.5 mg Oral Q4H PRN Max 6/day Ranjan Jerome-Anom, CRNP      clotrimazole   Topical BID Cortney Cabezas PA-C      cyanocobalamin  1,000 mcg Intramuscular Q30 Days Markus Trinidad MD      DULoxetine  30 mg Oral Daily Ranjan Jerome-Anom, CRNP      DULoxetine  60 mg Oral Daily Ranjan Jerome-Anom, CRNP      fluticasone  2 spray Each Nare Daily Cortney Cabezas PA-C      levothyroxine  50 mcg Oral Early Morning Ranjan Jerome-Anom, CRNP      lisinopril  40 mg Oral Daily Ranjan Jerome-Anom, CRNP      LORazepam  1 mg Intramuscular Q6H PRN Max 3/day [place] Jerome-Anom, CRNP      LORazepam  0.5 mg Oral Q6H PRN Max 4/day [place] Jerome-Anom, CRNP      LORazepam  1 mg Oral Q6H PRN Max 3/day Ranjan Jerome-Anom, CRNP      methocarbamol  500 mg Oral Q6H PRN Cortney Cabezas PA-C      nystatin   Topical BID Cortney Cabezas PA-C      OLANZapine  5 mg Intramuscular Q3H PRN Max 3/day [place] Jerome-Anom, CRNP      OLANZapine  2.5 mg Oral Q4H PRN Max 6/day [place] Jerome-Anom, CRNP      OLANZapine  5 mg Oral Q4H PRN Max 3/day [place] Jerome-Anom, CRNP      OLANZapine  5 mg Oral Q3H PRN Max 3/day [place] Jerome-Anom, CRNP      ondansetron  4 mg Oral Q6H PRN Cortney Cabezas PA-C      pantoprazole  40 mg Oral BID Ranjan Jerome-Anom, CRTRACI      saccharomyces boulardii  250 mg Oral BID Daniela Lai PA-C      traMADol  50 mg Oral Q8H PRN Inocente Gu MD      traZODone  50 mg Oral HS PRN Ranjan Jerome-Ano, CRNP         Risks / Benefits of Treatment:    Risks, benefits, and possible side effects of medications explained to patient and patient verbalizes understanding and agreement for treatment.    Counseling / Coordination of Care:    Patient's progress discussed with staff in treatment team meeting.  Medications, treatment  progress and treatment plan reviewed with patient.  Supportive therapy provided to patient.  Group attendance encouraged.    Graham Calderón MD 07/02/24

## 2024-07-02 NOTE — PROGRESS NOTES
"Progress Note - Benjamin Puga 69 y.o. female MRN: [id number]    Unit/Bed#: -01 Encounter: [id number]        Subjective:   Patient seen and examined at bedside after reviewing the chart and discussing the case with the caring staff.      Patient examined at bedside.  Patient complaining of diarrhea.  She has history of irritable bowel syndrome and alternates between constipation and diarrhea.  Currently taking medication for constipation.      Physical Exam   Vitals: Blood pressure 138/72, pulse 67, temperature 97.8 °F (36.6 °C), temperature source Temporal, resp. rate 18, height 4' 11\" (1.499 m), weight 88.5 kg (195 lb), SpO2 100%.,Body mass index is 39.39 kg/m².  Constitutional: Patient in no acute distress.  HEENT: PERR, EOMI, MMM.  Cardiovascular: Normal rate and regular rhythm.    Pulmonary/Chest: Effort normal and breath sounds normal.   Abdomen: Soft, + BS, NT.    Assessment/Plan:  Benjamin Puga is a(n) 69 y.o. female with MDD.     Cardiac with hx of HTN, HLD.  Continue lisinopril 40 mg daily, amlodipine 5 mg, atorvastatin 40 mg daily (rosuvastatin nonform).  Hx of PE.  Continue Eliquis 2.5 mg twice daily.   Hypothyroidism.  Continue levothyroxine 50 mcg daily.  Dose decreased from 88 mcg previously at acute care on 6/26.  Follow-up with PCP for repeat TFT in 4-6 weeks.  Prediabetes.  Hgb a1c 5.7% on 6/30/24.  Lifestyle modifications.   Allergic rhinitis.  Continue Flonase daily.   Asthma.  Stable.  Qvar RediHaler nonform - patient will have  bring inhaler from home.  Albuterol inhaler as needed.  GERD.  Continue Protonix 40 mg daily.   Intertrigo.  Apply Nystatin powder twice daily.   Tinea corporis.  Apply clotrimazole cream twice daily x 14 days.  Vitamin b12 deficiency.  Patient started on monthly vitamin B12 injections.  IBS/constipation.  Now with diarrhea.  Discontinue Miralax and Colace.  Start Florastor 250 mg twice daily.     The patient was discussed with Dr. Trinidad and he is " in agreement with the above note.

## 2024-07-03 LAB — TSH SERPL DL<=0.05 MIU/L-ACNC: 0.79 UIU/ML (ref 0.45–4.5)

## 2024-07-03 PROCEDURE — 84443 ASSAY THYROID STIM HORMONE: CPT

## 2024-07-03 PROCEDURE — 99232 SBSQ HOSP IP/OBS MODERATE 35: CPT | Performed by: PSYCHIATRY & NEUROLOGY

## 2024-07-03 RX ADMIN — CLOTRIMAZOLE: 1 CREAM TOPICAL at 08:43

## 2024-07-03 RX ADMIN — NYSTATIN: 100000 POWDER TOPICAL at 08:45

## 2024-07-03 RX ADMIN — PANTOPRAZOLE SODIUM 40 MG: 40 TABLET, DELAYED RELEASE ORAL at 16:39

## 2024-07-03 RX ADMIN — LEVOTHYROXINE SODIUM 50 MCG: 50 TABLET ORAL at 05:50

## 2024-07-03 RX ADMIN — PANTOPRAZOLE SODIUM 40 MG: 40 TABLET, DELAYED RELEASE ORAL at 08:40

## 2024-07-03 RX ADMIN — LISINOPRIL 40 MG: 20 TABLET ORAL at 08:40

## 2024-07-03 RX ADMIN — NYSTATIN: 100000 POWDER TOPICAL at 16:42

## 2024-07-03 RX ADMIN — FLUTICASONE PROPIONATE 2 SPRAY: 50 SPRAY, METERED NASAL at 08:39

## 2024-07-03 RX ADMIN — ALPRAZOLAM 0.5 MG: 0.5 TABLET ORAL at 08:40

## 2024-07-03 RX ADMIN — DULOXETINE HYDROCHLORIDE 90 MG: 60 CAPSULE, DELAYED RELEASE ORAL at 08:39

## 2024-07-03 RX ADMIN — Medication 250 MG: at 08:39

## 2024-07-03 RX ADMIN — TRAMADOL HYDROCHLORIDE 50 MG: 50 TABLET, COATED ORAL at 19:53

## 2024-07-03 RX ADMIN — CLOTRIMAZOLE: 1 CREAM TOPICAL at 16:42

## 2024-07-03 RX ADMIN — APIXABAN 2.5 MG: 2.5 TABLET, FILM COATED ORAL at 16:40

## 2024-07-03 RX ADMIN — ALPRAZOLAM 0.5 MG: 0.5 TABLET ORAL at 21:08

## 2024-07-03 RX ADMIN — ATORVASTATIN CALCIUM 40 MG: 40 TABLET, FILM COATED ORAL at 08:40

## 2024-07-03 RX ADMIN — Medication 250 MG: at 16:39

## 2024-07-03 RX ADMIN — APIXABAN 2.5 MG: 2.5 TABLET, FILM COATED ORAL at 08:40

## 2024-07-03 RX ADMIN — AMLODIPINE BESYLATE 5 MG: 5 TABLET ORAL at 08:39

## 2024-07-03 NOTE — PLAN OF CARE
Problem: Alteration in Thoughts and Perception  Goal: Refrain from acting on delusional thinking/internal stimuli  Description: Interventions:  - Monitor patient closely, per order   - Utilize least restrictive measures   - Set reasonable limits, give positive feedback for acceptable   - Administer medications as ordered and monitor of potential side effects  Outcome: Progressing  Goal: Agree to be compliant with medication regime, as prescribed and report medication side effects  Description: Interventions:  - Offer appropriate PRN medication and supervise ingestion; conduct AIMS, as needed   Outcome: Progressing     Problem: Ineffective Coping  Goal: Demonstrates healthy coping skills  Outcome: Progressing  Goal: Participates in unit activities  Description: Interventions:  - Provide therapeutic environment   - Provide required programming   - Redirect inappropriate behaviors   Outcome: Progressing  Goal: Patient/Family participate in treatment and DC plans  Description: Interventions:  - Provide therapeutic environment  Outcome: Progressing  Goal: Patient/Family verbalizes awareness of resources  Outcome: Progressing  Goal: Understands least restrictive measures  Description: Interventions:  - Utilize least restrictive behavior  Outcome: Progressing  Goal: Free from restraint events  Description: - Utilize least restrictive measures   - Provide behavioral interventions   - Redirect inappropriate behaviors   Outcome: Progressing     Problem: Depression  Goal: Verbalize thoughts and feelings  Description: Interventions:  - Assess and re-assess patient's level of risk   - Engage patient in 1:1 interactions, daily, for a minimum of 15 minutes   - Encourage patient to express feelings, fears, frustrations, hopes   Outcome: Progressing  Goal: Refrain from harming self  Description: Interventions:  - Monitor patient closely, per order   - Supervise medication ingestion, monitor effects and side effects   Outcome:  Progressing  Goal: Refrain from isolation  Description: Interventions:  - Develop a trusting relationship   - Encourage socialization   Outcome: Progressing  Goal: Refrain from self-neglect  Outcome: Progressing     Problem: Anxiety  Goal: Anxiety is at manageable level  Description: Interventions:  - Assess and monitor patient's anxiety level.   - Monitor for signs and symptoms (heart palpitations, chest pain, shortness of breath, headaches, nausea, feeling jumpy, restlessness, irritable, apprehensive).   - Collaborate with interdisciplinary team and initiate plan and interventions as ordered.  - [place] patient to unit/surroundings  - Explain treatment plan  - Encourage participation in care  - Encourage verbalization of concerns/fears  - Identify coping mechanisms  - Assist in developing anxiety-reducing skills  - Administer/offer alternative therapies  - Limit or eliminate stimulants  Outcome: Progressing     Problem: Alteration in Orientation  Goal: Interact with staff daily  Description: Interventions:  - Assess and re-assess patient's level of orientation  - Engage patient in 1 on 1 interactions, daily, for a minimum of 15 minutes   - Establish rapport/trust with patient   Outcome: Progressing  Goal: Allow medical examinations, as recommended  Description: Interventions:  - Provide physical/neurological exams and/or referrals, per provider   Outcome: Progressing  Goal: Cooperate with recommended testing/procedures  Description: Interventions:  - Determine need for ancillary testing  - Observe for mental status changes  - Implement falls/precaution protocol   Outcome: Progressing  Goal: Complete daily ADLs, including personal hygiene independently, as able  Description: Interventions:  - Observe, teach, and assist patient with ADLS  Outcome: Progressing     Problem: Potential for Falls.bhtfall  Goal: Patient will remain free of falls  Description: INTERVENTIONS:  - Educate patient/family on patient safety  including physical limitations  - Instruct patient to call for assistance with activity   - Consult OT/PT to assist with strengthening/mobility   - Keep Call bell within reach  - Keep bed low and locked with side rails adjusted as appropriate  - Keep care items and personal belongings within reach  - Initiate and maintain comfort rounds  - Make Fall Risk Sign visible to staff  - Apply yellow socks and bracelet for high fall risk patients  - Consider moving patient to room near nurses station  Outcome: Progressing     Problem: BHFall  Goal: Patient will remain free of falls  Description: INTERVENTIONS:  - Educate patient/family on patient safety including physical limitations  - Instruct patient to call for assistance with activity   - Consult OT/PT to assist with strengthening/mobility   - Keep Call bell within reach  - Keep bed low and locked with side rails adjusted as appropriate  - Keep care items and personal belongings within reach  - Initiate and maintain comfort rounds  - Make Fall Risk Sign visible to staff  - Offer Toileting every 2 Hours, in advance of need  - Initiate/Maintain bed alarm  - Obtain necessary fall risk management equipment: cane  - Apply yellow socks and bracelet for high fall risk patients  - Consider moving patient to room near nurses station  Outcome: Progressing     Problem: Prexisting or High Potential for Compromised Skin Integrity  Goal: Skin integrity is maintained or improved  Description: INTERVENTIONS:  - Identify patients at risk for skin breakdown  - Assess and monitor skin integrity  - Assess and monitor nutrition and hydration status  - Monitor labs   - Assess for incontinence   - Turn and reposition patient  - Assist with mobility/ambulation  - Relieve pressure over bony prominences  - Avoid friction and shearing  - Provide appropriate hygiene as needed including keeping skin clean and dry  - Evaluate need for skin moisturizer/barrier cream  - Collaborate with  interdisciplinary team   - Patient/family teaching  - Consider wound care consult   Outcome: Progressing

## 2024-07-03 NOTE — DISCHARGE INSTR - OTHER ORDERS
You are being discharged to your home located at [address] [place] [address], Phone: [phone].     Triggers you have identified during your hospitalization that led to your admission distressed mood, include regression in mental health. Coping skills you have identified during your hospitalization include talking with other, music, movies, time with family and dogs. If you are unable to deal with your distressed mood alone please contact your provider Dr. Tessa MD at [phone] or your primary care provider Dr. Esthela MD at . If that is not effective and you continue to have (ex: suicidal ideation, homicidal ideation, distressed mood, overwhelmed, in crisis) please contact (Crisis #) [facility] 1 [phone], dial 911 or go to the nearest emergency center.      *[facility] Crisis Hotline: 1 375.299.7823  *[place] Suicide Prevention Lifeline:  1-726.972.4282  *Alcohol Anonymous: 220.833.5195  *[place] Drug & Alcohol Commission: (185) 215-6353  *National Walker on Mental Illness ([place]) HELPLINE: 988.750.9155/Website: www.jeffery.org  *Substance Abuse and Mental Health Services Administration([facility]) National Helpline, which is a confidential, free, 24-hour-a-day, 365-day-a-year, information service for individuals and family members facing mental health and/or substance use disorders. This service provides referrals to local treatment facilities, support groups, and community-based organizations. Callers can also order free publications and other information.  Call 1-332.859.2922/Website: www.[facility].gov  *[facility] 2-1-1: This is a toll free, confidential, 24-hour-a-day service which connects you to a community  in your area who can help you find services and resources that are available to you locally and provide critical services that can improve and save lives.  Call: 211  /Website: http://www.[employer]/       shanika Bright our Behavioral  Health Nurse Navigators, will be calling you after your discharge, on the phone number that you provided.  They will be available as an additional support, if needed.   If you wish to speak with one of them, you may contact Kaila at [phone] or Isaura at [phone]

## 2024-07-03 NOTE — NURSING NOTE
Patient requested and received PRN Ultram for c/o 7/10 generalized pain. Will monitor for medication effectiveness.

## 2024-07-03 NOTE — NURSING NOTE
Patient was observed to be visible in the community this evening.  She has been pleasant and calm during staff interactions. Social with her peers. She endorses mild anxiety, denies depression.  Denies SI, HI and hallucinations. Benjamin was medication compliant at HS. Positive for snack and fluids tonight. Ambulates well with cane.  Continuous safety rounding in progress.

## 2024-07-03 NOTE — NURSING NOTE
Patient ambulates ad oscar with cane. Patient pleasant and cooperative. She showered this morning. She had a hard time sleeping last night. Endorsed a little anxiety. Denied depression. Denied SI,HI, or hallucinations. She had a BM this AM which was a little loose. Medication compliant. Safety precautions maintained.

## 2024-07-03 NOTE — SOCIAL WORK
CM met with PT for PT check in. Reviewed after care plan. PT in agreement. PT reflected on her thoughts and feelings in recent events. Reassurance provided. PT denies si/hi/avh, reported mild anxiety and depression. Reassurance provided.

## 2024-07-03 NOTE — PROGRESS NOTES
"Progress Note - Benjamin Puga 69 y.o. female MRN: [id number]    Unit/Bed#: -01 Encounter: [id number]        Subjective:   Patient seen and examined at bedside after reviewing the chart and discussing the case with the caring staff.      Patient examined at bedside.  Patient reports her diarrhea is improving since stopping bowel meds and adding probiotic.  She otherwise has no acute symptoms.     Physical Exam   Vitals: Blood pressure 128/63, pulse 58, temperature 97.5 °F (36.4 °C), temperature source Temporal, resp. rate 18, height 4' 11\" (1.499 m), weight 88.5 kg (195 lb), SpO2 100%.,Body mass index is 39.39 kg/m².  Constitutional: Patient in no acute distress.  HEENT: PERR, EOMI, MMM.  Cardiovascular: Normal rate and regular rhythm.    Pulmonary/Chest: Effort normal and breath sounds normal.   Abdomen: Soft, + BS, NT.    Assessment/Plan:  Benjamin Puga is a(n) 69 y.o. female with MDD.     Cardiac with hx of HTN, HLD.  Continue lisinopril 40 mg daily, amlodipine 5 mg, atorvastatin 40 mg daily (rosuvastatin nonform).  Hx of PE.  Continue Eliquis 2.5 mg twice daily.   Hypothyroidism.  Continue levothyroxine 50 mcg daily.  Dose decreased from 88 mcg previously at acute care on 6/26.  Follow-up with PCP for repeat TFT in 4-6 weeks.  Prediabetes.  Hgb a1c 5.7% on 6/30/24.  Lifestyle modifications.   Allergic rhinitis.  Continue Flonase daily.   Asthma.  Stable.  Qvar RediHaler nonform - patient will have  bring inhaler from home.  Albuterol inhaler as needed.  GERD.  Continue Protonix 40 mg daily.   Intertrigo.  Apply Nystatin powder twice daily.   Tinea corporis.  Apply clotrimazole cream twice daily x 14 days.  Vitamin b12 deficiency.  Patient started on monthly vitamin B12 injections.  IBS/constipation.  Discontinued Miralax and Colace on 7/2 and started Florastor 250 mg twice daily due to diarrhea.     The patient was discussed with Dr. Trinidad and he is in agreement with the above note.  "

## 2024-07-03 NOTE — NURSING NOTE
On reassessment of PRN Ultram, patient informs the medication was effective in reducing her generalized pain.  No further complaints voiced.

## 2024-07-03 NOTE — PLAN OF CARE
Problem: Ineffective Coping  Goal: Participates in unit activities  Description: Interventions:  - Provide therapeutic environment   - Provide required programming   - Redirect inappropriate behaviors   Outcome: Progressing   Patient remained out of room, socializing with select peers throughout the AM. Participated actively in RT AM group; admitted to, and reported regret, for 'bad choices' prior to admission.

## 2024-07-03 NOTE — PROGRESS NOTES
"Progress Note - Behavioral Health     Benjamin Puga 69 y.o. female MRN: [id number]   Unit/Bed#: [facility] 202-01 Encounter: [id number]    Behavior over the last 24 hours: slowly improving.     Benjamni was seen for continuing care. She reports feeling better with improved GI issues today. She reports feeling less anxious and depressed with increased energy level and interaction with selective peers in the unit. Still perseverates about her \"nerve breakdown\" due to scam but denies any current active plan or intent to hurt herself. She has been compliant with medication and denies any current side effects. Staff report fair participation in groups and on the unit.    Sleep: slept better  Appetite: fair  Medication side effects: denies  ROS: no complaints, all other systems are negative    Mental Status Evaluation:    Appearance:  age appropriate, casually dressed   Behavior:  cooperative, calm   Speech:  normal rate and volume   Mood:  depressed, anxious   Affect:  constricted   Thought Process:  goal directed   Associations: intact associations   Thought Content:  no overt delusions   Perceptual Disturbances: none   Risk Potential: Suicidal ideation - Yes, passive death wish  Homicidal ideation - None   Sensorium:  oriented to person, place, and time/date   Memory:  recent and remote memory grossly intact   Consciousness:  alert and awake   Attention: attention span and concentration are age appropriate   Insight:  limited   Judgment: fair   Gait/Station: also uses walker   Motor Activity: no abnormal movements     Vital signs in last 24 hours:    Temp:  [96.7 °F (35.9 °C)-98.1 °F (36.7 °C)] 96.7 °F (35.9 °C)  HR:  [66-75] 75  Resp:  [18] 18  BP: (103-172)/(57-75) 117/57    Laboratory results: I have personally reviewed all pertinent laboratory/tests results.  Most Recent Labs:   Lab Results   Component Value Date    WBC 9.28 06/30/2024    RBC 4.65 06/30/2024    HGB 13.2 06/30/2024    HCT 42.3 06/30/2024     " 06/30/2024    RDW 13.9 06/30/2024    NEUTROABS 5.56 06/30/2024    SODIUM 140 06/30/2024    K 3.7 06/30/2024     06/30/2024    CO2 29 06/30/2024    BUN 15 06/30/2024    CREATININE 0.76 06/30/2024    GLUC 97 06/30/2024    GLUF 97 06/30/2024    CALCIUM 9.6 06/30/2024    AST 29 06/30/2024    ALT 15 06/30/2024    ALKPHOS 64 06/30/2024    TP 7.8 06/30/2024    ALB 4.3 06/30/2024    TBILI 0.60 06/30/2024    CHOLESTEROL 175 06/30/2024    HDL 56 06/30/2024    TRIG 73 06/30/2024    LDLCALC 104 (H) 06/30/2024    NONHDLC 119 06/30/2024    YNU4NPNENYSL 0.215 (L) 06/25/2024    FREET4 1.00 01/30/2024    HGBA1C 5.7 (H) 06/30/2024     06/30/2024       Assessment & Plan   Principal Problem:    Current severe episode of major depressive disorder without psychotic features without prior episode (HCC)  Active Problems:    Asthma    HTN (hypertension)    Anxiety    Hyperlipidemia    Hypothyroidism    Peripheral neuropathy    History of pulmonary embolism    PTSD (post-traumatic stress disorder)    IBS (irritable bowel syndrome)    GERD (gastroesophageal reflux disease)    Suicidal behavior with attempted self-injury (HCC)    Recommended Treatment:     Planned medication and treatment changes:    All current active medications have been reviewed  Encourage group therapy, milieu therapy and occupational therapy  Behavioral Health checks every 7 minutes  Ct with current meds and support.    Current Facility-Administered Medications   Medication Dose Route Frequency Provider Last Rate    acetaminophen  650 mg Oral Q4H PRN BERNARD Cloud      acetaminophen  650 mg Oral Q4H PRN BERNARD Cloud      albuterol  2 puff Inhalation Q4H PRN BERNARD Cloud      ALPRAZolam  0.5 mg Oral Q8H PRN Inocente Gu MD      ALPRAZolam  0.5 mg Oral BID Inocente Gu MD      amLODIPine  5 mg Oral Daily BERNARD Cloud      apixaban  2.5 mg Oral BID BERNARD Cloud      atorvastatin  40 mg Oral  Daily Ranjan Jerome-Anom, CRNP      benztropine  0.5 mg Oral Q4H PRN Max 6/day Ranjan Jerome-Anom, CRNP      clotrimazole   Topical BID Cortney Cabezas PA-C      cyanocobalamin  1,000 mcg Intramuscular Q30 Days Markus Trinidad MD      DULoxetine  90 mg Oral Daily Graham Calderón MD      fluticasone  2 spray Each Nare Daily Cortney Cabezas PA-C      levothyroxine  50 mcg Oral Early Morning Ranjan Jerome-Anom, CRNP      lisinopril  40 mg Oral Daily Ranjan Jerome-Anom, CRNP      LORazepam  1 mg Intramuscular Q6H PRN Max 3/day Ranjan Jerome-Anom, CRNP      LORazepam  0.5 mg Oral Q6H PRN Max 4/day Ranjan Jerome-Anom, CRNP      LORazepam  1 mg Oral Q6H PRN Max 3/day Ranjan Jerome-Anom, CRNP      methocarbamol  500 mg Oral Q6H PRN Cortney Cabezas PA-C      nystatin   Topical BID Cortney Cabezas PA-C      OLANZapine  5 mg Intramuscular Q3H PRN Max 3/day Ranjan Jerome-Anom, CRNP      OLANZapine  2.5 mg Oral Q4H PRN Max 6/day Ranjan Jerome-Anom, CRNP      OLANZapine  5 mg Oral Q4H PRN Max 3/day Ranjan Jerome-Anom, CRNP      OLANZapine  5 mg Oral Q3H PRN Max 3/day Ranjan Jerome-Anom, CRNP      ondansetron  4 mg Oral Q6H PRN Cortnye Cabezas PA-C      pantoprazole  40 mg Oral BID Ranjan Jerome-Anom, CRNP      saccharomyces boulardii  250 mg Oral BID Daniela Lai PA-C      traMADol  50 mg Oral Q8H PRN Inocente Gu MD      traZODone  50 mg Oral HS PRN Ranjan Jerome-Anom, CRNP         Risks / Benefits of Treatment:    Risks, benefits, and possible side effects of medications explained to patient and patient verbalizes understanding and agreement for treatment.    Counseling / Coordination of Care:    Patient's progress discussed with staff in treatment team meeting.  Medications, treatment progress and treatment plan reviewed with patient.  Supportive therapy provided to patient.  Group attendance encouraged.    Graham Calderón MD 07/03/24

## 2024-07-03 NOTE — PROGRESS NOTES
07/03/24 0930   Team Meeting   Meeting Type Daily Rounds   Team Members Present   Team Members Present Physician;Nurse;   Physician Team Member Dr. Kaitlynn MD; BERNARD Vega   Nursing Team Member Junie Dempsey RN    Care Management Team Member Macrina Gillespie, MS, NCC, LPC   Patient/Family Present   Patient Present No   Patient's Family Present No   Denies all, will return home and follow up with dr monahan when stable, medication compliant, cooperative.

## 2024-07-03 NOTE — PROGRESS NOTES
07/01/24 0930   Team Meeting   Meeting Type Daily Rounds   Team Members Present   Team Members Present Physician;Nurse;   Physician Team Member Dr. Kaitlynn MD; BERNARD Vega   Nursing Team Member Mimi Gómez, RN   Care Management Team Member Macrina Gillespie, MS, NCC, LPC   Patient/Family Present   Patient Present No   Patient's Family Present No   302 trans to 201, suicidal at time of admission with plan to od. kristin Winn for ring worm. Cooperative pleasant. Will return home when stable and follow up with Dr. Zarate.

## 2024-07-04 PROCEDURE — 99232 SBSQ HOSP IP/OBS MODERATE 35: CPT | Performed by: PHYSICIAN ASSISTANT

## 2024-07-04 RX ADMIN — PANTOPRAZOLE SODIUM 40 MG: 40 TABLET, DELAYED RELEASE ORAL at 17:11

## 2024-07-04 RX ADMIN — FLUTICASONE PROPIONATE 2 SPRAY: 50 SPRAY, METERED NASAL at 08:24

## 2024-07-04 RX ADMIN — Medication 250 MG: at 08:25

## 2024-07-04 RX ADMIN — TRAMADOL HYDROCHLORIDE 50 MG: 50 TABLET, COATED ORAL at 10:25

## 2024-07-04 RX ADMIN — NYSTATIN: 100000 POWDER TOPICAL at 17:13

## 2024-07-04 RX ADMIN — APIXABAN 2.5 MG: 2.5 TABLET, FILM COATED ORAL at 17:11

## 2024-07-04 RX ADMIN — LEVOTHYROXINE SODIUM 50 MCG: 50 TABLET ORAL at 05:55

## 2024-07-04 RX ADMIN — PANTOPRAZOLE SODIUM 40 MG: 40 TABLET, DELAYED RELEASE ORAL at 08:25

## 2024-07-04 RX ADMIN — DULOXETINE HYDROCHLORIDE 90 MG: 60 CAPSULE, DELAYED RELEASE ORAL at 08:25

## 2024-07-04 RX ADMIN — ALPRAZOLAM 0.5 MG: 0.5 TABLET ORAL at 21:42

## 2024-07-04 RX ADMIN — TRAMADOL HYDROCHLORIDE 50 MG: 50 TABLET, COATED ORAL at 19:55

## 2024-07-04 RX ADMIN — CLOTRIMAZOLE: 1 CREAM TOPICAL at 17:13

## 2024-07-04 RX ADMIN — METHOCARBAMOL TABLETS 500 MG: 500 TABLET, COATED ORAL at 19:55

## 2024-07-04 RX ADMIN — CLOTRIMAZOLE: 1 CREAM TOPICAL at 08:23

## 2024-07-04 RX ADMIN — AMLODIPINE BESYLATE 5 MG: 5 TABLET ORAL at 08:25

## 2024-07-04 RX ADMIN — Medication 250 MG: at 17:11

## 2024-07-04 RX ADMIN — ATORVASTATIN CALCIUM 40 MG: 40 TABLET, FILM COATED ORAL at 08:26

## 2024-07-04 RX ADMIN — NYSTATIN: 100000 POWDER TOPICAL at 08:24

## 2024-07-04 RX ADMIN — APIXABAN 2.5 MG: 2.5 TABLET, FILM COATED ORAL at 08:25

## 2024-07-04 RX ADMIN — LISINOPRIL 40 MG: 20 TABLET ORAL at 08:24

## 2024-07-04 RX ADMIN — ALPRAZOLAM 0.5 MG: 0.5 TABLET ORAL at 08:25

## 2024-07-04 NOTE — PROGRESS NOTES
"Progress Note - Benjamin Puga 69 y.o. female MRN: [id number]    Unit/Bed#: -01 Encounter: [id number]        Subjective:   Patient seen and examined at bedside after reviewing the chart and discussing the case with the caring staff.      Patient examined at bedside.  Patient reports no acute symptoms.     Physical Exam   Vitals: Blood pressure 125/60, pulse 72, temperature 98 °F (36.7 °C), temperature source Temporal, resp. rate 18, height 4' 11\" (1.499 m), weight 88.5 kg (195 lb), SpO2 98%.,Body mass index is 39.39 kg/m².  Constitutional: Patient in no acute distress.  HEENT: PERR, EOMI, MMM.  Cardiovascular: Normal rate and regular rhythm.    Pulmonary/Chest: Effort normal and breath sounds normal.   Abdomen: Soft, + BS, NT.    Assessment/Plan:  Benjamin Puga is a(n) 69 y.o. female with MDD.     Cardiac with hx of HTN, HLD.  Continue lisinopril 40 mg daily, amlodipine 5 mg, atorvastatin 40 mg daily (rosuvastatin nonform).  Hx of PE.  Continue Eliquis 2.5 mg twice daily.   Hypothyroidism.  Continue levothyroxine 50 mcg daily.  Dose decreased from 88 mcg previously at acute care on 6/26.  Follow-up with PCP for repeat TFT in 4-6 weeks.  Prediabetes.  Hgb a1c 5.7% on 6/30/24.  Lifestyle modifications.   Allergic rhinitis.  Continue Flonase daily.   Asthma.  Stable.  Qvar RediHaler nonform - patient will have  bring inhaler from home.  Albuterol inhaler as needed.  GERD.  Continue Protonix 40 mg daily.   Intertrigo.  Apply Nystatin powder twice daily.   Tinea corporis.  Apply clotrimazole cream twice daily x 14 days.  Vitamin b12 deficiency.  Patient started on monthly vitamin B12 injections.  IBS/constipation.  Discontinued Miralax and Colace on 7/2 and started Florastor 250 mg twice daily due to diarrhea.   "

## 2024-07-04 NOTE — PLAN OF CARE
Problem: Ineffective Coping  Goal: Cooperates with admission process  Description: Interventions:   - Complete admission process  Outcome: Completed     Problem: Depression  Goal: Verbalize thoughts and feelings  Description: Interventions:  - Assess and re-assess patient's level of risk   - Engage patient in 1:1 interactions, daily, for a minimum of 15 minutes   - Encourage patient to express feelings, fears, frustrations, hopes   Outcome: Progressing  Goal: Refrain from harming self  Description: Interventions:  - Monitor patient closely, per order   - Supervise medication ingestion, monitor effects and side effects   Outcome: Progressing

## 2024-07-04 NOTE — PROGRESS NOTES
"  Progress Note - Behavioral Health     Benjamin Puga 69 y.o. female MRN: [id number]   Unit/Bed#: [facility] 202-01 Encounter: [id number]      Documentation, nursing notes, medication reconciliation, labs, and vitals reviewed. Patient was seen for continuing care and reviewed with treatment team. No acute events over the past 24 hours.    Benjamin seen today for psychiatric follow-up visit.  Patient remained calm, pleasant and cooperative throughout our conversation.  Patient has improved insight and judgment and stating that we will continue with outpatient counseling however definitely needs to follow-up with psychiatrist.  She realizes that she had too many things on her plate with family and multiple medical issues with herself her  and sisters.  Also reporting that large fracture of her \"nervous breakdown\" was due to scam which caused some financial difficulties.  Realizes she needs to step back and take a deep breath and learn how to cope with these issues.  Patient endorses some increased anxiety but feels her depression is improving.  Denies any SI HI or AVH.  Reports her mood as \"happier\"  Compliant with medications and denies any adverse side effects.    Sleep: normal  Appetite: normal  Medication side effects: No   ROS: all other systems are negative    Mental Status Evaluation:    Appearance:  age appropriate, casually dressed, adequate grooming   Behavior:  pleasant, cooperative, calm   Speech:  normal rate and volume   Mood:  \"Happier\"   Affect:  brighter   Thought Process:  goal directed, linear   Associations: intact associations   Thought Content:  no overt delusions   Perceptual Disturbances: none   Risk Potential: Suicidal ideation - None at present  Homicidal ideation - None  Potential for aggression - No   Sensorium:  oriented to person, place, and time/date   Memory:  recent and remote memory grossly intact   Consciousness:  alert and awake   Attention: attention span and concentration are " age appropriate   Insight:  improving   Judgment: improving   Gait/Station: normal gait/station except  , also uses cane   Motor Activity: no abnormal movements     Vital signs in last 24 hours:    Temp:  [97.5 °F (36.4 °C)-98 °F (36.7 °C)] 98 °F (36.7 °C)  HR:  [72-92] 72  Resp:  [18] 18  BP: (123-141)/(60-66) 125/60    Laboratory results: I have personally reviewed all pertinent laboratory/tests results.  Most Recent Labs:   Lab Results   Component Value Date    WBC 9.28 06/30/2024    RBC 4.65 06/30/2024    HGB 13.2 06/30/2024    HCT 42.3 06/30/2024     06/30/2024    RDW 13.9 06/30/2024    NEUTROABS 5.56 06/30/2024    SODIUM 140 06/30/2024    K 3.7 06/30/2024     06/30/2024    CO2 29 06/30/2024    BUN 15 06/30/2024    CREATININE 0.76 06/30/2024    GLUC 97 06/30/2024    GLUF 97 06/30/2024    CALCIUM 9.6 06/30/2024    AST 29 06/30/2024    ALT 15 06/30/2024    ALKPHOS 64 06/30/2024    TP 7.8 06/30/2024    ALB 4.3 06/30/2024    TBILI 0.60 06/30/2024    CHOLESTEROL 175 06/30/2024    HDL 56 06/30/2024    TRIG 73 06/30/2024    LDLCALC 104 (H) 06/30/2024    NONHDLC 119 06/30/2024    [id number] 0.789 07/03/2024    FREET4 1.00 01/30/2024    HGBA1C 5.7 (H) 06/30/2024     06/30/2024         Progress Toward Goals: improving    Assessment & Plan   Principal Problem:    Current severe episode of major depressive disorder without psychotic features without prior episode (HCC)  Active Problems:    Asthma    HTN (hypertension)    Anxiety    Hyperlipidemia    Hypothyroidism    Peripheral neuropathy    History of pulmonary embolism    PTSD (post-traumatic stress disorder)    IBS (irritable bowel syndrome)    GERD (gastroesophageal reflux disease)    Suicidal behavior with attempted self-injury (HCC)    Recommended Treatment:     Planned medication and treatment changes:    All current active medications have been reviewed  Encourage group therapy, milieu therapy and occupational therapy  Behavioral Health checks  every 7 minutes  No medication changes today.    Requires continued inpatient treatment due to chronic illness and high risk of decompensation if discharged before long term stability is achieved.      Current Facility-Administered Medications   Medication Dose Route Frequency Provider Last Rate    acetaminophen  650 mg Oral Q4H PRN Ranajn Jerome-Anom, CRNP      acetaminophen  650 mg Oral Q4H PRN Ranjan Jerome-Anom, CRNP      albuterol  2 puff Inhalation Q4H PRN Ranjan Jerome-Anom, CRNP      ALPRAZolam  0.5 mg Oral Q8H PRN Inocente Gu MD      ALPRAZolam  0.5 mg Oral BID Inocente Gu MD      amLODIPine  5 mg Oral Daily Ranjan Jerome-Anom, CRNP      apixaban  2.5 mg Oral BID Ranjan Jerome-Anom, CRNP      atorvastatin  40 mg Oral Daily Ranjan Jerome-Anom, CRNP      benztropine  0.5 mg Oral Q4H PRN Max 6/day Ranjan Jerome-Anom, CRNP      clotrimazole   Topical BID Cortney Cabezas PA-C      cyanocobalamin  1,000 mcg Intramuscular Q30 Days Markus Trinidad MD      DULoxetine  90 mg Oral Daily Graham Calderón MD      fluticasone  2 spray Each Nare Daily Cortney Cabezsa PA-C      levothyroxine  50 mcg Oral Early Morning Ranjan Jerome-Anom, CRNP      lisinopril  40 mg Oral Daily Ranjan Jerome-Anom, CRNP      LORazepam  1 mg Intramuscular Q6H PRN Max 3/day Ranjan Jerome-Anom, CRNP      LORazepam  0.5 mg Oral Q6H PRN Max 4/day Ranjan Jerome-Anom, CRNP      LORazepam  1 mg Oral Q6H PRN Max 3/day Ranjan Jerome-Anom, CRNP      methocarbamol  500 mg Oral Q6H PRN Cortney Cabezas PA-C      nystatin   Topical BID Cortney Cabezas PA-C      OLANZapine  5 mg Intramuscular Q3H PRN Max 3/day Ranjan Jerome-Anom, CRNP      OLANZapine  2.5 mg Oral Q4H PRN Max 6/day Ranjan Jreome-Anom, CRNP      OLANZapine  5 mg Oral Q4H PRN Max 3/day Ranjan Jerome-Anom, CRNP      OLANZapine  5 mg Oral Q3H PRN Max 3/day BERNARD Cloud      ondansetron  4 mg Oral Q6H PRN Cortney Cabezas PA-C       pantoprazole  40 mg Oral BID BERNARD Cloud      saccharomyces boulardii  250 mg Oral BID Daniela Lai PA-C      traMADol  50 mg Oral Q8H PRN Inocente Gu MD      traZODone  50 mg Oral HS PRN BERNARD Cloud         Risks / Benefits of Treatment:    Risks, benefits, and possible side effects of medications explained to patient and patient verbalizes understanding and agreement for treatment.    Counseling / Coordination of Care:    Total floor / unit time spent today 25 minutes. Greater than 50% of total time was spent with the patient and / or family counseling and / or coordination of care. A description of counseling / coordination of care:  Patient's progress discussed with staff in treatment team meeting.  Medications, treatment progress and treatment plan reviewed with patient.    Wayne Mendoza PA-C 07/04/24

## 2024-07-04 NOTE — NURSING NOTE
Pt was visible on the milieu, social w/ peers. Pt endorsed mild anxiety due to some peers on the unit discharging. Pt was pleasant, cooperative, and med compliant. Will CTM. Continuous pt safety checks ongoing.

## 2024-07-04 NOTE — NURSING NOTE
Patient has been visible on the unit. She slept well last night. Endorsed moderate anxiety and small amount of depression. Denied SI,HI, or hallucinations. Attends groups. Administered ultram at 10:24 am for c/o #8 bilateral leg pain which was effective. Medication compliant. Attends groups. Social with staff and peers. Safety precautions maintained.

## 2024-07-05 PROCEDURE — 99232 SBSQ HOSP IP/OBS MODERATE 35: CPT | Performed by: PSYCHIATRY & NEUROLOGY

## 2024-07-05 RX ORDER — SIMETHICONE 80 MG
80 TABLET,CHEWABLE ORAL EVERY 6 HOURS PRN
Status: DISCONTINUED | OUTPATIENT
Start: 2024-07-05 | End: 2024-07-09 | Stop reason: HOSPADM

## 2024-07-05 RX ADMIN — LISINOPRIL 40 MG: 20 TABLET ORAL at 08:20

## 2024-07-05 RX ADMIN — TRAMADOL HYDROCHLORIDE 50 MG: 50 TABLET, COATED ORAL at 20:56

## 2024-07-05 RX ADMIN — ALPRAZOLAM 0.5 MG: 0.5 TABLET ORAL at 08:20

## 2024-07-05 RX ADMIN — TRAMADOL HYDROCHLORIDE 50 MG: 50 TABLET, COATED ORAL at 10:47

## 2024-07-05 RX ADMIN — PANTOPRAZOLE SODIUM 40 MG: 40 TABLET, DELAYED RELEASE ORAL at 08:21

## 2024-07-05 RX ADMIN — FLUTICASONE PROPIONATE 2 SPRAY: 50 SPRAY, METERED NASAL at 08:18

## 2024-07-05 RX ADMIN — AMLODIPINE BESYLATE 5 MG: 5 TABLET ORAL at 08:20

## 2024-07-05 RX ADMIN — CLOTRIMAZOLE: 1 CREAM TOPICAL at 08:19

## 2024-07-05 RX ADMIN — ALPRAZOLAM 0.5 MG: 0.5 TABLET ORAL at 20:57

## 2024-07-05 RX ADMIN — CLOTRIMAZOLE: 1 CREAM TOPICAL at 17:49

## 2024-07-05 RX ADMIN — SIMETHICONE 80 MG: 80 TABLET, CHEWABLE ORAL at 12:26

## 2024-07-05 RX ADMIN — Medication 250 MG: at 17:09

## 2024-07-05 RX ADMIN — NYSTATIN: 100000 POWDER TOPICAL at 08:19

## 2024-07-05 RX ADMIN — APIXABAN 2.5 MG: 2.5 TABLET, FILM COATED ORAL at 08:20

## 2024-07-05 RX ADMIN — APIXABAN 2.5 MG: 2.5 TABLET, FILM COATED ORAL at 17:09

## 2024-07-05 RX ADMIN — Medication 250 MG: at 08:20

## 2024-07-05 RX ADMIN — ATORVASTATIN CALCIUM 40 MG: 40 TABLET, FILM COATED ORAL at 08:20

## 2024-07-05 RX ADMIN — DULOXETINE HYDROCHLORIDE 90 MG: 60 CAPSULE, DELAYED RELEASE ORAL at 08:20

## 2024-07-05 RX ADMIN — LEVOTHYROXINE SODIUM 50 MCG: 50 TABLET ORAL at 06:24

## 2024-07-05 RX ADMIN — NYSTATIN: 100000 POWDER TOPICAL at 17:10

## 2024-07-05 RX ADMIN — PANTOPRAZOLE SODIUM 40 MG: 40 TABLET, DELAYED RELEASE ORAL at 17:09

## 2024-07-05 NOTE — PROGRESS NOTES
07/05/24 1047   Pain Assessment   Pain Assessment Tool 0-10   Pain Score 9   Pain Location/Orientation Location: Generalized   Hospital Pain Intervention(s) Medication (See MAR)     50mg tramadol administered at 1047 for severe generalized pain.

## 2024-07-05 NOTE — PROGRESS NOTES
07/05/24 1147   Pain Assessment Post Intervention   Pain Assessment Tool Used: 0-10   Post Intervention Pain Score 3   Post Intervention Pain Location/Orientation Location: Generalized   Post Intervention POSS 1   Response to Interventions   (Patient reports relief)     Medication effective.

## 2024-07-05 NOTE — PROGRESS NOTES
Progress Note - Behavioral Health   Benjamin Puga 69 y.o. female MRN: [id number]  Unit/Bed#: [facility] 202[facility]01 Encounter: [id number]    Assessment & Plan   Principal Problem:    Current severe episode of major depressive disorder without psychotic features without prior episode (HCC)  Active Problems:    Asthma    HTN (hypertension)    Anxiety    Hyperlipidemia    Hypothyroidism    Peripheral neuropathy    History of pulmonary embolism    PTSD (post-traumatic stress disorder)    IBS (irritable bowel syndrome)    GERD (gastroesophageal reflux disease)    Suicidal behavior with attempted self-injury (HCC)      Behavior over the last 24 hours:  improving  Sleep: normal  Appetite: normal  Medication side effects: No  ROS:  B/L lower leg pain and all other systems are negative    Subjective: Benjamin was seen today for psychiatric follow-up.  On assessment patient is calm, cooperative.  She is visible on the unit social with peers.  Patient reports a l a little anxiety.  Her mood is controlled on the unit with no recent aggression agitation toward staff or peers.  She is compliant with her current medication regimen.  She denied any sleep disturbance, SI/HI/AVH.  She did not appear internally preoccupied.    Mental Status Evaluation:  Appearance:  age appropriate, casually dressed, and overweight   Behavior:  Calm, cooperative   Speech:  normal pitch and normal volume   Mood:  improving   Affect:  mood-congruent   Thought Process:  goal directed   Associations: intact associations   Thought Content:  No overt delusions   Perceptual Disturbances: Denied AVH, did not appear internally preoccupied   Risk Potential: Suicidal Ideations none at present  Homicidal Ideations none at present  Potential for Aggression No   Sensorium:  person, place, and time/date   Memory:  recent and remote memory grossly intact   Consciousness:  alert and awake    Attention: attention span and concentration were age appropriate   Insight:  fair    Judgment: fair   Gait/Station: In bed   Motor Activity: no abnormal movements     Progress Toward Goals: Improving.  Patient appears ruminative in thought, continues to ruminate on issues with her family and ongoing medical, patient's.  Her mood is controlled with no recent behavioral outburst.  She is compliant with her current psychotropic medication regimen.  She denies side effects on current psychotropic medication regimen.  Will continue current psychotropic medication regimen.  Anticipated discharge on 7/9/2024.    Recommended Treatment: Continue with group therapy, milieu therapy and occupational therapy.      Risks, benefits and possible side effects of Medications:   Risks, benefits, and possible side effects of medications explained to patient and patient verbalizes understanding.      Medications: all current active meds have been reviewed.    Labs: I have personally reviewed all pertinent laboratory/tests results. Most Recent Labs:   Lab Results   Component Value Date    WBC 9.28 06/30/2024    RBC 4.65 06/30/2024    HGB 13.2 06/30/2024    HCT 42.3 06/30/2024     06/30/2024    RDW 13.9 06/30/2024    NEUTROABS 5.56 06/30/2024    SODIUM 140 06/30/2024    K 3.7 06/30/2024     06/30/2024    CO2 29 06/30/2024    BUN 15 06/30/2024    CREATININE 0.76 06/30/2024    GLUC 97 06/30/2024    GLUF 97 06/30/2024    CALCIUM 9.6 06/30/2024    AST 29 06/30/2024    ALT 15 06/30/2024    ALKPHOS 64 06/30/2024    TP 7.8 06/30/2024    ALB 4.3 06/30/2024    TBILI 0.60 06/30/2024    CHOLESTEROL 175 06/30/2024    HDL 56 06/30/2024    TRIG 73 06/30/2024    LDLCALC 104 (H) 06/30/2024    NONHDLC 119 06/30/2024    [id number] 0.789 07/03/2024    FREET4 1.00 01/30/2024    HGBA1C 5.7 (H) 06/30/2024     06/30/2024       Counseling / Coordination of Care  Total floor / unit time spent today 25 minutes.

## 2024-07-05 NOTE — PLAN OF CARE
Problem: Alteration in Thoughts and Perception  Goal: Treatment Goal: Gain control of psychotic behaviors/thinking, reduce/eliminate presenting symptoms and demonstrate improved reality functioning upon discharge  Outcome: Progressing  Goal: Refrain from acting on delusional thinking/internal stimuli  Description: Interventions:  - Monitor patient closely, per order   - Utilize least restrictive measures   - Set reasonable limits, give positive feedback for acceptable   - Administer medications as ordered and monitor of potential side effects  Outcome: Progressing  Goal: Agree to be compliant with medication regime, as prescribed and report medication side effects  Description: Interventions:  - Offer appropriate PRN medication and supervise ingestion; conduct AIMS, as needed   Outcome: Progressing  Goal: Recognize dysfunctional thoughts, communicate reality-based thoughts at the time of discharge  Description: Interventions:  - Provide medication and psycho-education to assist patient in compliance and developing insight into his/her illness   Outcome: Progressing     Problem: Risk for Self Injury/Neglect  Goal: Treatment Goal: Remain safe during length of stay, learn and adopt new coping skills, and be free of self-injurious ideation, impulses and acts at the time of discharge  Outcome: Progressing  Goal: Attend and participate in unit activities, including therapeutic, recreational, and educational groups  Description: Interventions:  - Provide therapeutic and educational activities daily, encourage attendance and participation, and document same in the medical record  - Obtain collateral information, encourage visitation and family involvement in care   Outcome: Progressing  Goal: Recognize maladaptive responses and adopt new coping mechanisms  Outcome: Progressing     Problem: Depression  Goal: Treatment Goal: Demonstrate behavioral control of depressive symptoms, verbalize feelings of improved mood/affect,  and adopt new coping skills prior to discharge  Outcome: Progressing  Goal: Verbalize thoughts and feelings  Description: Interventions:  - Assess and re-assess patient's level of risk   - Engage patient in 1:1 interactions, daily, for a minimum of 15 minutes   - Encourage patient to express feelings, fears, frustrations, hopes   Outcome: Progressing  Goal: Refrain from harming self  Description: Interventions:  - Monitor patient closely, per order   - Supervise medication ingestion, monitor effects and side effects   Outcome: Progressing  Goal: Refrain from isolation  Description: Interventions:  - Develop a trusting relationship   - Encourage socialization   Outcome: Progressing  Goal: Refrain from self-neglect  Outcome: Progressing     Problem: Anxiety  Goal: Anxiety is at manageable level  Description: Interventions:  - Assess and monitor patient's anxiety level.   - Monitor for signs and symptoms (heart palpitations, chest pain, shortness of breath, headaches, nausea, feeling jumpy, restlessness, irritable, apprehensive).   - Collaborate with interdisciplinary team and initiate plan and interventions as ordered.  - [place] patient to unit/surroundings  - Explain treatment plan  - Encourage participation in care  - Encourage verbalization of concerns/fears  - Identify coping mechanisms  - Assist in developing anxiety-reducing skills  - Administer/offer alternative therapies  - Limit or eliminate stimulants  Outcome: Progressing     Problem: Alteration in Orientation  Goal: Treatment Goal: Demonstrate a reduction of confusion and improved orientation to person, place, time and/or situation upon discharge, according to optimum baseline/ability  Outcome: Progressing  Goal: Interact with staff daily  Description: Interventions:  - Assess and re-assess patient's level of orientation  - Engage patient in 1 on 1 interactions, daily, for a minimum of 15 minutes   - Establish rapport/trust with patient   Outcome:  Progressing  Goal: Allow medical examinations, as recommended  Description: Interventions:  - Provide physical/neurological exams and/or referrals, per provider   Outcome: Progressing  Goal: Cooperate with recommended testing/procedures  Description: Interventions:  - Determine need for ancillary testing  - Observe for mental status changes  - Implement falls/precaution protocol   Outcome: Progressing  Goal: Complete daily ADLs, including personal hygiene independently, as able  Description: Interventions:  - Observe, teach, and assist patient with ADLS  Outcome: Progressing     Problem: Potential for Falls.bhtfall  Goal: Patient will remain free of falls  Description: INTERVENTIONS:  - Educate patient/family on patient safety including physical limitations  - Instruct patient to call for assistance with activity   - Consult OT/PT to assist with strengthening/mobility   - Keep Call bell within reach  - Keep bed low and locked with side rails adjusted as appropriate  - Keep care items and personal belongings within reach  - Initiate and maintain comfort rounds  - Make Fall Risk Sign visible to staff  - Offer Toileting every 2 Hours, in advance of need  - Initiate/Maintain bed alarm  - Obtain necessary fall risk management equipment:   - Apply yellow socks and bracelet for high fall risk patients  - Consider moving patient to room near nurses station  Outcome: Progressing

## 2024-07-05 NOTE — PROGRESS NOTES
07/05/24 942   Team Meeting   Meeting Type Daily Rounds   Team Members Present   Team Members Present Physician;Speech Language Pathologist;Nurse;;Occupational Therapist   Physician Team Member Dr Kaitlynn WAGNER   Nursing Team Member Mimi Gómez RN   Social Work Team Member JET Mercedes   Patient/Family Present   Patient Present No   Patient's Family Present No   Slept, endorsed anx, denies si/hi/avh/dep, ring worm being treated, pleasant, visible, cooperative, d/c early next week with Dr Zarate

## 2024-07-05 NOTE — PLAN OF CARE
Problem: Depression  Goal: Refrain from harming self  Description: Interventions:  - Monitor patient closely, per order   - Supervise medication ingestion, monitor effects and side effects   Outcome: Progressing  Goal: Refrain from isolation  Description: Interventions:  - Develop a trusting relationship   - Encourage socialization   Outcome: Progressing  Goal: Refrain from self-neglect  Outcome: Progressing

## 2024-07-05 NOTE — NUTRITION
07/05/24 1224   Biochemical Data,Medical Tests, and Procedures   Biochemical Data/Medical Tests/Procedures Lab values reviewed;Meds reviewed   Labs (Comment) 6/30 CMP, Vit D, Vit B12 all WNL. LDL:104, A1c%:5.7%   Meds (Comment) xanax, norvasc, eliquis, levothyroxine, zestril, ativan, robaxin, zyprexa, zofran, protonix, florastor, desyrel   Nutrition-Focused Physical Exam   Nutrition-Focused Physical Exam Findings RN skin assessment reviewed;No skin issues documented   Nutrition-Focused Physical Exam Findings LBM 7/3. Reports dry mouth and gas; reported to nursing. Upper and lower dentures present, uses fixodent.   Medical-Related Concerns Allergic rhinitis     Anemia     Cancer (HCC)  breast cancer, Left side   Colon polyp     Depression     Disease of thyroid gland     GERD (gastroesophageal reflux disease)     Hyperlipemia     Hypertension     Hypothyroidism  Last assessed: 3/25/14   Obesity     Osteoarthritis     Pre-operative laboratory examination     Pulmonary embolism (HCC)   Adequacy of Intake   Nutrition Modality PO   Feeding Route   PO Independent   Current PO Intake   Current Diet Order CCD 2 diet thin liquids   Current Meal Intake %   Estimated calorie intake compared to estimated need Nutrient needs met.   PES Statement   Problem Clinical   Weight (3) Overweight/obesity NC-3.3   Overweight/ obesity specific to Obese, Class II (4)   Related to Energy intake>energy output over time   As evidenced by: BMI   Recommendations/Interventions   Malnutrition/BMI Present No  (does not meet criteria)   Summary Length of stay. CCD 2 diet thin liquids. Meal completions 100%. Reports her appetite is fine. Tries to watch CHO intake. Avoids salt and sugar. Takes a probiotic at home. Enjoys sandwiches, cereal, Atkins snack bars, cream of wheat, oats, fat free/2% milk. Consumes 3 meals per day with lunch being a light meal. Reports she does not check her BG at home. 6/28/#; 4/18/#; 1/25/#;  9/8/#. Weight appears stable.LBM 7/3. Reports dry mouth and gas; reported to nursing. Upper and lower dentures present, uses fixodent. Skin intact.   Interventions/Recommendations Continue current diet order   Education Assessment   Education Patient declined nutrition education;Patient/caregiver not appropriate for education at this time   Patient Nutrition Goals   Goal Avoid weight gain   Goal Status Initiated   Timeframe to complete goal by next f/u   Nutrition Complexity Risk   Nutrition complexity level Low risk   Follow up date 07/19/24

## 2024-07-05 NOTE — NURSING NOTE
Patient has been visible on the unit. She is pleasant and cooperative. Medication compliant. She slept well. Endorsed a little anxiety. Denied depression,SI,HI, or hallucinations. Attends groups. Social with staff and peers. Safety precautions maintained.    normal... Well appearing, awake, alert, oriented to person, place, time/situation and in no apparent distress.

## 2024-07-05 NOTE — PROGRESS NOTES
"Progress Note - Benjamin Puga 69 y.o. female MRN: [id number]    Unit/Bed#: [facility] 202[facility]01 Encounter: [id number]        Subjective:   Patient seen and examined at bedside after reviewing the chart and discussing the case with the caring staff.      Patient examined at bedside.  Patient complaining of excessive gas at night.  She otherwise has no complaints.     Physical Exam   Vitals: Blood pressure 115/56, pulse 100, temperature 97.8 °F (36.6 °C), temperature source Temporal, resp. rate 18, height 4' 11\" (1.499 m), weight 88.5 kg (195 lb), SpO2 95%.,Body mass index is 39.39 kg/m².  Constitutional: Patient in no acute distress.  HEENT: PERR, EOMI, MMM.  Cardiovascular: Normal rate and regular rhythm.    Pulmonary/Chest: Effort normal and breath sounds normal.   Abdomen: Soft, + BS, NT.    Assessment/Plan:  Benjamin Puga is a(n) 69 y.o. female with MDD.     Cardiac with hx of HTN, HLD.  Continue lisinopril 40 mg daily, amlodipine 5 mg, atorvastatin 40 mg daily (rosuvastatin nonform).  Hx of PE.  Continue Eliquis 2.5 mg twice daily.   Hypothyroidism.  Continue levothyroxine 50 mcg daily.  Dose decreased from 88 mcg previously at acute care on 6/26.  Follow-up with PCP for repeat TFT in 4-6 weeks.  Prediabetes.  Hgb a1c 5.7% on 6/30/24.  Lifestyle modifications.   Allergic rhinitis.  Continue Flonase daily.   Asthma.  Stable.  Qvar RediHaler nonform - patient will have  bring inhaler from home.  Albuterol inhaler as needed.  GERD.  Continue Protonix 40 mg daily.   Intertrigo.  Apply Nystatin powder twice daily.   Tinea corporis.  Apply clotrimazole cream twice daily x 14 days.  Vitamin b12 deficiency.  Patient started on monthly vitamin B12 injections.  IBS/constipation/flatus.  Discontinued Miralax and Colace on 7/2 and started Florastor 250 mg twice daily due to diarrhea.  Add simethicone 80 mg q6h prn.     The patient was discussed with Dr. Trinidad and he is in agreement with the above note.  "

## 2024-07-05 NOTE — NURSING NOTE
Patient visible on unit. Pleasant and cooperative. Social with staff and peers. Denies IS,HI,AVH. Positive results from Ultram and Robaxin noted. Safety checks continue Q 7 minutes.

## 2024-07-06 PROCEDURE — 99232 SBSQ HOSP IP/OBS MODERATE 35: CPT | Performed by: PHYSICIAN ASSISTANT

## 2024-07-06 RX ADMIN — ALPRAZOLAM 0.5 MG: 0.5 TABLET ORAL at 08:17

## 2024-07-06 RX ADMIN — NYSTATIN 1 APPLICATION: 100000 POWDER TOPICAL at 17:04

## 2024-07-06 RX ADMIN — Medication 250 MG: at 17:03

## 2024-07-06 RX ADMIN — TRAMADOL HYDROCHLORIDE 50 MG: 50 TABLET, COATED ORAL at 10:16

## 2024-07-06 RX ADMIN — DULOXETINE HYDROCHLORIDE 90 MG: 60 CAPSULE, DELAYED RELEASE ORAL at 08:17

## 2024-07-06 RX ADMIN — NYSTATIN 1 APPLICATION: 100000 POWDER TOPICAL at 08:18

## 2024-07-06 RX ADMIN — FLUTICASONE PROPIONATE 2 SPRAY: 50 SPRAY, METERED NASAL at 08:18

## 2024-07-06 RX ADMIN — TRAMADOL HYDROCHLORIDE 50 MG: 50 TABLET, COATED ORAL at 21:03

## 2024-07-06 RX ADMIN — CLOTRIMAZOLE 1 APPLICATION: 1 CREAM TOPICAL at 17:04

## 2024-07-06 RX ADMIN — LEVOTHYROXINE SODIUM 50 MCG: 50 TABLET ORAL at 06:45

## 2024-07-06 RX ADMIN — APIXABAN 2.5 MG: 2.5 TABLET, FILM COATED ORAL at 08:17

## 2024-07-06 RX ADMIN — PANTOPRAZOLE SODIUM 40 MG: 40 TABLET, DELAYED RELEASE ORAL at 17:03

## 2024-07-06 RX ADMIN — Medication 250 MG: at 08:17

## 2024-07-06 RX ADMIN — PANTOPRAZOLE SODIUM 40 MG: 40 TABLET, DELAYED RELEASE ORAL at 08:17

## 2024-07-06 RX ADMIN — SIMETHICONE 80 MG: 80 TABLET, CHEWABLE ORAL at 18:08

## 2024-07-06 RX ADMIN — CLOTRIMAZOLE 1 APPLICATION: 1 CREAM TOPICAL at 08:18

## 2024-07-06 RX ADMIN — APIXABAN 2.5 MG: 2.5 TABLET, FILM COATED ORAL at 17:04

## 2024-07-06 RX ADMIN — ATORVASTATIN CALCIUM 40 MG: 40 TABLET, FILM COATED ORAL at 08:17

## 2024-07-06 RX ADMIN — ALPRAZOLAM 0.5 MG: 0.5 TABLET ORAL at 21:03

## 2024-07-06 NOTE — NURSING NOTE
Pt up ad oscar with walker. Pt is pleasant & socializes with other patients. Pt is cooperative & compliant with medications. Pt denies any depression & c/o mild anxiety. Pt denies any hallucinations, suicidal or homicidal ideations. Q 7 min checks maintained to monitor pt's behavior & safety. Pt medicated for c/o generalized pain @ 1016 with Ultram po with relief obtained.

## 2024-07-06 NOTE — NURSING NOTE
Pt was visible on the unit this evening. Pt is pleasant, cooperative, and med compliant. Pt endorsed mild anxiety, but overall she states she is feeling better. Will CTM. Continuous pt safety checks ongoing.

## 2024-07-06 NOTE — PROGRESS NOTES
"Progress Note - Benjamin Puga 69 y.o. female MRN: [id number]    Unit/Bed#: -01 Encounter: [id number]        Subjective:   Patient seen and examined at bedside after reviewing the chart and discussing the case with the caring staff.      Patient examined at bedside.  Patient reports having a normal bowel movement this morning.  She otherwise has no acute symptoms.     Physical Exam   Vitals: Blood pressure 95/52, pulse 69, temperature 97.8 °F (36.6 °C), temperature source Temporal, resp. rate 16, height 4' 11\" (1.499 m), weight 85.6 kg (188 lb 12.8 oz), SpO2 97%.,Body mass index is 38.13 kg/m².  Constitutional: Patient in no acute distress.  HEENT: PERR, EOMI, MMM.  Cardiovascular: Normal rate and regular rhythm.    Pulmonary/Chest: Effort normal and breath sounds normal.   Abdomen: Soft, + BS, NT.    Assessment/Plan:  Benjamin Puga is a(n) 69 y.o. female with MDD.     Cardiac with hx of HTN, HLD.  Continue lisinopril 40 mg daily, amlodipine 5 mg, atorvastatin 40 mg daily (rosuvastatin nonform).  Hx of PE.  Continue Eliquis 2.5 mg twice daily.   Hypothyroidism.  Continue levothyroxine 50 mcg daily.  Dose decreased from 88 mcg previously at acute care on 6/26.  Follow-up with PCP for repeat TFT in 4-6 weeks.  Prediabetes.  Hgb a1c 5.7% on 6/30/24.  Lifestyle modifications.   Allergic rhinitis.  Continue Flonase daily.   Asthma.  Stable.  Qvar RediHaler nonform - patient will have  bring inhaler from home.  Albuterol inhaler as needed.  GERD.  Continue Protonix 40 mg daily.   Intertrigo.  Apply Nystatin powder twice daily.   Tinea corporis.  Apply clotrimazole cream twice daily x 14 days.  Vitamin b12 deficiency.  Patient started on monthly vitamin B12 injections.  IBS/constipation/flatus.  Discontinued Miralax and Colace on 7/2 and started Florastor 250 mg twice daily due to diarrhea.  Simethicone 80 mg q6h prn.     The patient was discussed with Dr. Trinidad and he is in agreement with the above " note.

## 2024-07-06 NOTE — PROGRESS NOTES
"Progress Note - Behavioral Health     Benjamin Puga 69 y.o. female MRN: [id number]   Unit/Bed#: [facility] 202-01 Encounter: [id number]  This note was not shared with the patient due to reasonable likelihood of causing patient harm    Behavior over the last 24 hours: improving.       Chart reviewed.  Benjamin seen in office. Emotionally feels \"a lot better\" and agrees with d/c plan of early next week.  Expresses intention to reconnect with family, friends and hobbies.  Reviewed mult stressors over past few years leading to present admission.  Expresses motivation for OP treatment. Chief complaint today is arthralgias and myalgias.       Sleep:  adequate  Appetite:  adequate  Medication side effects: none reported  ROS: arthralgias and myalgias.     Mental Status Evaluation:    Appearance:  age appropriate   Behavior:  pleasant, cooperative   Speech:  normal rate and volume   Mood:  improved   Affect:  mood-congruent   Thought Process:  goal directed   Associations: Mild circumstantiality   Thought Content:  ruminations   Perceptual Disturbances: none   Risk Potential: Suicidal ideation - None at present  Homicidal ideation - None at present   Sensorium:  oriented to person, place, and time/date   Memory:  recent and remote memory grossly intact   Consciousness:  alert and awake   Attention: attention span and concentration are age appropriate   Insight:  intact   Judgment: intact   Gait/Station: uses cane   Motor Activity: no abnormal movements     Vital signs in last 24 hours:    Temp:  [96.2 °F (35.7 °C)-98.9 °F (37.2 °C)] 97.8 °F (36.6 °C)  HR:  [69-86] 69  Resp:  [16-18] 16  BP: ()/(51-68) 95/52    Laboratory results: I have personally reviewed all pertinent laboratory/tests results.        Assessment & Plan   Principal Problem:    Current severe episode of major depressive disorder without psychotic features without prior episode (HCC)  Active Problems:    Asthma    HTN (hypertension)    Anxiety    " Hyperlipidemia    Hypothyroidism    Peripheral neuropathy    History of pulmonary embolism    PTSD (post-traumatic stress disorder)    IBS (irritable bowel syndrome)    GERD (gastroesophageal reflux disease)    Suicidal behavior with attempted self-injury (HCC)    Recommended Treatment: cont present treatment    Planned medication and treatment changes:  No med change- xanax 0.5 mg bid, cymbalta 90 mg/d    All current active medications have been reviewed  Encourage group therapy, milieu therapy and occupational therapy  Behavioral Health checks every 7 minutes  Current Facility-Administered Medications   Medication Dose Route Frequency Provider Last Rate    acetaminophen  650 mg Oral Q4H PRN Hepler Jerome-Anom, CRNP      acetaminophen  650 mg Oral Q4H PRN Ranjan Jerome-Anom, CRNP      albuterol  2 puff Inhalation Q4H PRN Hepler Jerome-Anom, CRNP      ALPRAZolam  0.5 mg Oral Q8H PRN Inocente Gu MD      ALPRAZolam  0.5 mg Oral BID Inocente Gu MD      amLODIPine  5 mg Oral Daily [facility]-Anom, CRNP      apixaban  2.5 mg Oral BID [facility]-Anom, CRNP      atorvastatin  40 mg Oral Daily [facility]-Anom, CRNP      benztropine  0.5 mg Oral Q4H PRN Max 6/day Ranjan Jerome-Anom, CRNP      clotrimazole   Topical BID Cortney Cabezas PA-C      cyanocobalamin  1,000 mcg Intramuscular Q30 Days Markus Trinidad MD      DULoxetine  90 mg Oral Daily Graham Calderón MD      fluticasone  2 spray Each Nare Daily Cortney Cabezas PA-C      levothyroxine  50 mcg Oral Early Morning Ranjan Jerome-Anom, CRNP      lisinopril  40 mg Oral Daily Hepler Jerome-Anom, CRNP      LORazepam  1 mg Intramuscular Q6H PRN Max 3/day Ranjan Jerome-Anom, CRNP      LORazepam  0.5 mg Oral Q6H PRN Max 4/day Ranjan Jerome-Anom, CRNP      LORazepam  1 mg Oral Q6H PRN Max 3/day Ranjan Jerome-Anom, CRNP      methocarbamol  500 mg Oral Q6H PRN Cortney Cabezas PA-C      nystatin   Topical BID Cortney Cabezas PA-C       OLANZapine  5 mg Intramuscular Q3H PRN Max 3/day Ranjan Bryson, CRNP      OLANZapine  2.5 mg Oral Q4H PRN Max 6/day Ranjan MckeeoriJuana, CRNP      OLANZapine  5 mg Oral Q4H PRN Max 3/day Ranjan MckeeoriJuana, CRNP      OLANZapine  5 mg Oral Q3H PRN Max 3/day Ranjan MckeeoriJuana, CRNP      ondansetron  4 mg Oral Q6H PRN Cortney Cabezas PA-C      pantoprazole  40 mg Oral BID Ranjan MckeeoriJuana, BERNARD      saccharomyces boulardii  250 mg Oral BID Daniela Lai PA-C      simethicone  80 mg Oral Q6H PRN Daniela Lai PA-C      traMADol  50 mg Oral Q8H PRN Inocente Gu MD      traZODone  50 mg Oral HS PRN Ranjan Bryson, BERNARD         Risks / Benefits of Treatment:    Risks, benefits, and possible side effects of medications explained to patient and patient verbalizes understanding and agreement for treatment.    Counseling / Coordination of Care:    Total floor / unit time spent today 15 minutes. Greater than 50% of total time was spent with the patient and / or family counseling and / or coordination of care. A description of counseling / coordination of care:  Patient's progress discussed with staff in treatment team meeting.  Medications, treatment progress and treatment plan reviewed with patient.    Jocelin Mcduffie PA-C 07/06/24

## 2024-07-06 NOTE — PLAN OF CARE
Problem: Ineffective Coping  Goal: Free from restraint events  Description: - Utilize least restrictive measures   - Provide behavioral interventions   - Redirect inappropriate behaviors   Outcome: Progressing     Problem: Depression  Goal: Refrain from harming self  Description: Interventions:  - Monitor patient closely, per order   - Supervise medication ingestion, monitor effects and side effects   Outcome: Progressing  Goal: Refrain from isolation  Description: Interventions:  - Develop a trusting relationship   - Encourage socialization   Outcome: Progressing

## 2024-07-07 PROCEDURE — 99232 SBSQ HOSP IP/OBS MODERATE 35: CPT | Performed by: PHYSICIAN ASSISTANT

## 2024-07-07 RX ADMIN — LEVOTHYROXINE SODIUM 50 MCG: 50 TABLET ORAL at 06:10

## 2024-07-07 RX ADMIN — ALPRAZOLAM 0.5 MG: 0.5 TABLET ORAL at 08:21

## 2024-07-07 RX ADMIN — NYSTATIN 1 APPLICATION: 100000 POWDER TOPICAL at 08:19

## 2024-07-07 RX ADMIN — PANTOPRAZOLE SODIUM 40 MG: 40 TABLET, DELAYED RELEASE ORAL at 08:22

## 2024-07-07 RX ADMIN — NYSTATIN 1 APPLICATION: 100000 POWDER TOPICAL at 17:18

## 2024-07-07 RX ADMIN — TRAMADOL HYDROCHLORIDE 50 MG: 50 TABLET, COATED ORAL at 13:06

## 2024-07-07 RX ADMIN — APIXABAN 2.5 MG: 2.5 TABLET, FILM COATED ORAL at 17:17

## 2024-07-07 RX ADMIN — PANTOPRAZOLE SODIUM 40 MG: 40 TABLET, DELAYED RELEASE ORAL at 17:17

## 2024-07-07 RX ADMIN — TRAMADOL HYDROCHLORIDE 50 MG: 50 TABLET, COATED ORAL at 21:11

## 2024-07-07 RX ADMIN — CLOTRIMAZOLE 1 APPLICATION: 1 CREAM TOPICAL at 17:18

## 2024-07-07 RX ADMIN — CLOTRIMAZOLE 1 APPLICATION: 1 CREAM TOPICAL at 08:18

## 2024-07-07 RX ADMIN — Medication 250 MG: at 17:17

## 2024-07-07 RX ADMIN — ALPRAZOLAM 0.5 MG: 0.5 TABLET ORAL at 21:10

## 2024-07-07 RX ADMIN — TRAZODONE HYDROCHLORIDE 50 MG: 50 TABLET ORAL at 22:21

## 2024-07-07 RX ADMIN — DULOXETINE HYDROCHLORIDE 90 MG: 60 CAPSULE, DELAYED RELEASE ORAL at 08:21

## 2024-07-07 RX ADMIN — Medication 250 MG: at 08:21

## 2024-07-07 RX ADMIN — APIXABAN 2.5 MG: 2.5 TABLET, FILM COATED ORAL at 08:22

## 2024-07-07 RX ADMIN — ATORVASTATIN CALCIUM 40 MG: 40 TABLET, FILM COATED ORAL at 08:21

## 2024-07-07 RX ADMIN — FLUTICASONE PROPIONATE 2 SPRAY: 50 SPRAY, METERED NASAL at 08:22

## 2024-07-07 NOTE — NURSING NOTE
BLEPS assessment completed & see Head to Toe assessment. Lungs clear upon auscultation & no peripheral edema noted. Pt c/o mild depression & mild anxiety. Pt denies any hallucinations, suicidal or homicidal ideations. Q 7 min checks maintained to monitor pt's behavior & safety. Pt is pleasant & socializes with other patients. Pt is cooperative & compliant with medications. Pt up ad oscar with cane.

## 2024-07-07 NOTE — PROGRESS NOTES
"Progress Note - Benjamin Puga 69 y.o. female MRN: [id number]    Unit/Bed#: -01 Encounter: [id number]        Subjective:   Patient seen and examined at bedside after reviewing the chart and discussing the case with the caring staff.      Patient examined at bedside.  Patient has no acute symptoms.     Physical Exam   Vitals: Blood pressure 109/57, pulse 80, temperature 98.6 °F (37 °C), temperature source Temporal, resp. rate 16, height 4' 11\" (1.499 m), weight 85.6 kg (188 lb 12.8 oz), SpO2 95%.,Body mass index is 38.13 kg/m².  Constitutional: Patient in no acute distress.  HEENT: PERR, EOMI, MMM.  Cardiovascular: Normal rate and regular rhythm.    Pulmonary/Chest: Effort normal and breath sounds normal.   Abdomen: Soft, + BS, NT.    Assessment/Plan:  Benjamin Puga is a(n) 69 y.o. female with MDD.     Cardiac with hx of HTN, HLD.  Continue lisinopril 40 mg daily, amlodipine 5 mg, atorvastatin 40 mg daily (rosuvastatin nonform).  Hx of PE.  Continue Eliquis 2.5 mg twice daily.   Hypothyroidism.  Continue levothyroxine 50 mcg daily.  Dose decreased from 88 mcg previously at acute care on 6/26.  Follow-up with PCP for repeat TFT in 4-6 weeks.  Prediabetes.  Hgb a1c 5.7% on 6/30/24.  Lifestyle modifications.   Allergic rhinitis.  Continue Flonase daily.   Asthma.  Stable.  Qvar RediHaler nonform - patient will have  bring inhaler from home.  Albuterol inhaler as needed.  GERD.  Continue Protonix 40 mg daily.   Intertrigo.  Apply Nystatin powder twice daily.   Tinea corporis.  Apply clotrimazole cream twice daily x 14 days.  Vitamin b12 deficiency.  Patient started on monthly vitamin B12 injections.  IBS/constipation/flatus.  Discontinued Miralax and Colace on 7/2 and started Florastor 250 mg twice daily due to diarrhea.  Simethicone 80 mg q6h prn.     The patient was discussed with Dr. Trinidad and he is in agreement with the above note.  "

## 2024-07-07 NOTE — PLAN OF CARE
Problem: Ineffective Coping  Goal: Free from restraint events  Description: - Utilize least restrictive measures   - Provide behavioral interventions   - Redirect inappropriate behaviors   Outcome: Progressing     Problem: Depression  Goal: Verbalize thoughts and feelings  Description: Interventions:  - Assess and re-assess patient's level of risk   - Engage patient in 1:1 interactions, daily, for a minimum of 15 minutes   - Encourage patient to express feelings, fears, frustrations, hopes   Outcome: Progressing  Goal: Refrain from harming self  Description: Interventions:  - Monitor patient closely, per order   - Supervise medication ingestion, monitor effects and side effects   Outcome: Progressing

## 2024-07-07 NOTE — PROGRESS NOTES
"Progress Note - Behavioral Health     Benjamin Puga 69 y.o. female MRN: [id number]   Unit/Bed#: [facility] 202-01 Encounter: [id number]  This note was not shared with the patient due to reasonable likelihood of causing patient harm    Behavior over the last 24 hours: improving.     Chart reviewed and discussed with nursing staff.  Seen in office.  Says this admission has been helpful and she feels better.  Denies SI- \"I want to live\".   Says she has no depression or \"bad thoughts\".  Looking forward to going home. Continues to use tramadol regularly for pain which has been effective.         Sleep:  adequate  Appetite:  adequate  Medication side effects: none reported  ROS: arthralgias    Mental Status Evaluation:    Appearance:  age appropriate   Behavior:  pleasant, cooperative   Speech:  normal rate and volume   Mood:  improved   Affect:  brighter   Thought Process:  goal directed   Associations: intact associations   Thought Content:  no overt delusions   Perceptual Disturbances: none   Risk Potential: Suicidal ideation - None  Homicidal ideation - None   Sensorium:  oriented to person, place, and time/date   Memory:  recent and remote memory grossly intact   Consciousness:  alert and awake   Attention: attention span and concentration are age appropriate   Insight:  intact   Judgment: intact   Gait/Station: uses cane   Motor Activity: no abnormal movements     Vital signs in last 24 hours:    Temp:  [98.3 °F (36.8 °C)-98.6 °F (37 °C)] 98.6 °F (37 °C)  HR:  [79-82] 80  Resp:  [16-18] 16  BP: (109-139)/(54-65) 109/57    Laboratory results: I have personally reviewed all pertinent laboratory/tests results.        Assessment & Plan   Principal Problem:    Current severe episode of major depressive disorder without psychotic features without prior episode (HCC)  Active Problems:    Asthma    HTN (hypertension)    Anxiety    Hyperlipidemia    Hypothyroidism    Peripheral neuropathy    History of pulmonary embolism    " PTSD (post-traumatic stress disorder)    IBS (irritable bowel syndrome)    GERD (gastroesophageal reflux disease)    Suicidal behavior with attempted self-injury (HCC)    Recommended Treatment: cont present treatment    Planned medication and treatment changes:  No change- cymbalta 90 mg/d and xanax 0.5 mg bid    All current active medications have been reviewed  Encourage group therapy, milieu therapy and occupational therapy  Behavioral Health checks every 7 minutes  Current Facility-Administered Medications   Medication Dose Route Frequency Provider Last Rate    acetaminophen  650 mg Oral Q4H PRN Ranjan Jerome-Anom, CRNP      acetaminophen  650 mg Oral Q4H PRN Ranjan Jerome-Anom, CRNP      albuterol  2 puff Inhalation Q4H PRN Ranjan Jerome-Anom, CRNP      ALPRAZolam  0.5 mg Oral Q8H PRN Inocente Gu MD      ALPRAZolam  0.5 mg Oral BID Inocente Gu MD      amLODIPine  5 mg Oral Daily Ranjan Jerome-Anom, CRNP      apixaban  2.5 mg Oral BID Ranjan Jerome-Anom, CRNP      atorvastatin  40 mg Oral Daily Ranjan Jerome-Anom, CRNP      benztropine  0.5 mg Oral Q4H PRN Max 6/day Ranjan Jerome-Anom, CRNP      clotrimazole   Topical BID Cortney Cabezas PA-C      cyanocobalamin  1,000 mcg Intramuscular Q30 Days Markus Trinidad MD      DULoxetine  90 mg Oral Daily Graham Calderón MD      fluticasone  2 spray Each Nare Daily Cortney Cabezas PA-C      levothyroxine  50 mcg Oral Early Morning Ranjan Jerome-Anom, CRNP      lisinopril  40 mg Oral Daily Ranjan Jerome-Anom, CRNP      LORazepam  1 mg Intramuscular Q6H PRN Max 3/day Ranjan Jerome-Anom, CRNP      LORazepam  0.5 mg Oral Q6H PRN Max 4/day Ranjan Jerome-Anom, CRNP      LORazepam  1 mg Oral Q6H PRN Max 3/day Ranjan Jerome-Anom, CRNP      methocarbamol  500 mg Oral Q6H PRN Cortney Cabezas PA-C      nystatin   Topical BID Cortney Cabezas PA-C      OLANZapine  5 mg Intramuscular Q3H PRN Max 3/day Ranjan Jerome-Anom, CRNP      OLANZapine   2.5 mg Oral Q4H PRN Max 6/day Ranjan Bryson, BERNARD      OLANZapine  5 mg Oral Q4H PRN Max 3/day Ranjan Bryson, CRNP      OLANZapine  5 mg Oral Q3H PRN Max 3/day Ranjan Bryson, CRNP      ondansetron  4 mg Oral Q6H PRN Cortney Cabezas PA-C      pantoprazole  40 mg Oral BID Ranjan Bryson, BERNARD      saccharomyces boulardii  250 mg Oral BID Daniela Lai PA-C      simethicone  80 mg Oral Q6H PRN Daniela Lai PA-C      traMADol  50 mg Oral Q8H PRN Inocente Gu MD      traZODone  50 mg Oral HS PRN BERNARD Cloud         Risks / Benefits of Treatment:    Risks, benefits, and possible side effects of medications explained to patient and patient verbalizes understanding and agreement for treatment.    Counseling / Coordination of Care:    Total floor / unit time spent today 15 minutes. Greater than 50% of total time was spent with the patient and / or family counseling and / or coordination of care. A description of counseling / coordination of care:  Patient's progress discussed with staff in treatment team meeting.  Medications, treatment progress and treatment plan reviewed with patient.    Jocelin Mcduffie PA-C 07/07/24

## 2024-07-07 NOTE — NURSING NOTE
Pt was visible on the milieu this evening. Pt endorsed mild anxiety, denied all other psych symptoms. Will CTM. Continuous pt safety checks ongoing.

## 2024-07-08 ENCOUNTER — TELEPHONE (OUTPATIENT)
Age: 69
End: 2024-07-08

## 2024-07-08 ENCOUNTER — TELEPHONE (OUTPATIENT)
Dept: PSYCHIATRY | Facility: CLINIC | Age: 69
End: 2024-07-08

## 2024-07-08 PROBLEM — F32.2 CURRENT SEVERE EPISODE OF MAJOR DEPRESSIVE DISORDER WITHOUT PSYCHOTIC FEATURES WITHOUT PRIOR EPISODE (HCC): Status: RESOLVED | Noted: 2017-11-30 | Resolved: 2024-07-08

## 2024-07-08 PROBLEM — T14.91XA SUICIDAL BEHAVIOR WITH ATTEMPTED SELF-INJURY (HCC): Status: RESOLVED | Noted: 2024-06-26 | Resolved: 2024-07-08

## 2024-07-08 RX ORDER — CYANOCOBALAMIN 1000 UG/ML
1000 INJECTION, SOLUTION INTRAMUSCULAR; SUBCUTANEOUS
Qty: 1 ML | Refills: 0 | Status: SHIPPED | OUTPATIENT
Start: 2024-07-31

## 2024-07-08 RX ORDER — TIZANIDINE 4 MG/1
4 TABLET ORAL EVERY 8 HOURS PRN
Qty: 60 TABLET | Refills: 0 | Status: SHIPPED | OUTPATIENT
Start: 2024-07-08

## 2024-07-08 RX ORDER — NYSTATIN 100000 [USP'U]/G
1 POWDER TOPICAL 2 TIMES DAILY
Qty: 60 G | Refills: 0 | Status: SHIPPED | OUTPATIENT
Start: 2024-07-08

## 2024-07-08 RX ORDER — AMLODIPINE BESYLATE 5 MG/1
5 TABLET ORAL DAILY
Qty: 100 TABLET | Refills: 0 | Status: SHIPPED | OUTPATIENT
Start: 2024-07-08

## 2024-07-08 RX ORDER — SACCHAROMYCES BOULARDII 250 MG
250 CAPSULE ORAL 2 TIMES DAILY
Qty: 60 CAPSULE | Refills: 0 | Status: SHIPPED | OUTPATIENT
Start: 2024-07-08

## 2024-07-08 RX ORDER — TRIAMCINOLONE ACETONIDE 55 UG/1
2 SPRAY, METERED NASAL AS NEEDED
Qty: 16.9 ML | Refills: 0 | Status: SHIPPED | OUTPATIENT
Start: 2024-07-08

## 2024-07-08 RX ORDER — DULOXETIN HYDROCHLORIDE 30 MG/1
90 CAPSULE, DELAYED RELEASE ORAL DAILY
Qty: 30 CAPSULE | Refills: 0 | Status: SHIPPED | OUTPATIENT
Start: 2024-07-09

## 2024-07-08 RX ORDER — PANTOPRAZOLE SODIUM 40 MG/1
40 TABLET, DELAYED RELEASE ORAL 2 TIMES DAILY
Qty: 180 TABLET | Refills: 0 | Status: SHIPPED | OUTPATIENT
Start: 2024-07-08

## 2024-07-08 RX ORDER — TRAMADOL HYDROCHLORIDE 50 MG/1
50 TABLET ORAL EVERY 8 HOURS PRN
Qty: 60 TABLET | Refills: 0 | Status: SHIPPED | OUTPATIENT
Start: 2024-07-08 | End: 2024-07-28

## 2024-07-08 RX ORDER — ALBUTEROL SULFATE 90 UG/1
2 AEROSOL, METERED RESPIRATORY (INHALATION) EVERY 6 HOURS PRN
Qty: 18 G | Refills: 0 | Status: SHIPPED | OUTPATIENT
Start: 2024-07-08

## 2024-07-08 RX ORDER — LISINOPRIL 40 MG/1
40 TABLET ORAL DAILY
Qty: 100 TABLET | Refills: 0 | Status: SHIPPED | OUTPATIENT
Start: 2024-07-08

## 2024-07-08 RX ORDER — HYDROXYZINE HYDROCHLORIDE 25 MG/1
25 TABLET, FILM COATED ORAL 2 TIMES DAILY
Status: DISCONTINUED | OUTPATIENT
Start: 2024-07-08 | End: 2024-07-09 | Stop reason: HOSPADM

## 2024-07-08 RX ORDER — LEVOTHYROXINE SODIUM 0.05 MG/1
50 TABLET ORAL
Qty: 30 TABLET | Refills: 0 | Status: SHIPPED | OUTPATIENT
Start: 2024-07-09

## 2024-07-08 RX ORDER — CLOTRIMAZOLE 1 %
CREAM (GRAM) TOPICAL 2 TIMES DAILY
Qty: 45 G | Refills: 0 | Status: SHIPPED | OUTPATIENT
Start: 2024-07-08 | End: 2024-07-13

## 2024-07-08 RX ORDER — ALPRAZOLAM 0.5 MG/1
TABLET ORAL
Status: DISPENSED
Start: 2024-07-08 | End: 2024-07-08

## 2024-07-08 RX ORDER — ATORVASTATIN CALCIUM 40 MG/1
40 TABLET, FILM COATED ORAL DAILY
Qty: 30 TABLET | Refills: 0 | Status: SHIPPED | OUTPATIENT
Start: 2024-07-08

## 2024-07-08 RX ORDER — HYDROXYZINE HYDROCHLORIDE 25 MG/1
25 TABLET, FILM COATED ORAL 2 TIMES DAILY
Qty: 60 TABLET | Refills: 0 | Status: SHIPPED | OUTPATIENT
Start: 2024-07-08

## 2024-07-08 RX ADMIN — NYSTATIN: 100000 POWDER TOPICAL at 17:35

## 2024-07-08 RX ADMIN — AMLODIPINE BESYLATE 5 MG: 5 TABLET ORAL at 08:41

## 2024-07-08 RX ADMIN — TRAMADOL HYDROCHLORIDE 50 MG: 50 TABLET, COATED ORAL at 12:32

## 2024-07-08 RX ADMIN — APIXABAN 2.5 MG: 2.5 TABLET, FILM COATED ORAL at 17:35

## 2024-07-08 RX ADMIN — APIXABAN 2.5 MG: 2.5 TABLET, FILM COATED ORAL at 08:42

## 2024-07-08 RX ADMIN — LEVOTHYROXINE SODIUM 50 MCG: 50 TABLET ORAL at 06:15

## 2024-07-08 RX ADMIN — Medication 250 MG: at 08:41

## 2024-07-08 RX ADMIN — ATORVASTATIN CALCIUM 40 MG: 40 TABLET, FILM COATED ORAL at 08:42

## 2024-07-08 RX ADMIN — HYDROXYZINE HYDROCHLORIDE 25 MG: 25 TABLET ORAL at 21:41

## 2024-07-08 RX ADMIN — PANTOPRAZOLE SODIUM 40 MG: 40 TABLET, DELAYED RELEASE ORAL at 08:42

## 2024-07-08 RX ADMIN — TRAMADOL HYDROCHLORIDE 50 MG: 50 TABLET, COATED ORAL at 22:20

## 2024-07-08 RX ADMIN — FLUTICASONE PROPIONATE 2 SPRAY: 50 SPRAY, METERED NASAL at 08:41

## 2024-07-08 RX ADMIN — Medication 250 MG: at 17:34

## 2024-07-08 RX ADMIN — CLOTRIMAZOLE: 1 CREAM TOPICAL at 08:41

## 2024-07-08 RX ADMIN — HYDROXYZINE HYDROCHLORIDE 25 MG: 25 TABLET ORAL at 12:32

## 2024-07-08 RX ADMIN — LISINOPRIL 40 MG: 20 TABLET ORAL at 08:42

## 2024-07-08 RX ADMIN — CLOTRIMAZOLE: 1 CREAM TOPICAL at 17:35

## 2024-07-08 RX ADMIN — ALPRAZOLAM 0.5 MG: 0.5 TABLET ORAL at 08:42

## 2024-07-08 RX ADMIN — PANTOPRAZOLE SODIUM 40 MG: 40 TABLET, DELAYED RELEASE ORAL at 17:35

## 2024-07-08 RX ADMIN — DULOXETINE HYDROCHLORIDE 90 MG: 60 CAPSULE, DELAYED RELEASE ORAL at 08:42

## 2024-07-08 RX ADMIN — NYSTATIN: 100000 POWDER TOPICAL at 08:41

## 2024-07-08 NOTE — PROGRESS NOTES
07/08/24 1606    Discharge Notification   Notification of Discharge Provided to: Family;PCP;Psychiatrist   Family Notified via: Phone call  (CM spoke to Stepan () [phone]. Reviewed d/c plan and supports. He will p/u pt preeti for d/c at 10:30am.)   PCP Notified via: Phone call  (ISHAAN spoke to Parveen and secured appt on 7/22 at 1pm with dr Proctor)   Psychiatrist Notified via: Phone call  (appt secured on 7/11 at 3:15pm with Daniela WAGNER)

## 2024-07-08 NOTE — PLAN OF CARE
Problem: Ineffective Coping  Goal: Free from restraint events  Description: - Utilize least restrictive measures   - Provide behavioral interventions   - Redirect inappropriate behaviors   7/8/2024 0201 by Rigoberto Wang, RN  Outcome: Progressing  7/8/2024 0158 by Rigoberto Wang RN  Outcome: Progressing     Problem: Depression  Goal: Verbalize thoughts and feelings  Description: Interventions:  - Assess and re-assess patient's level of risk   - Engage patient in 1:1 interactions, daily, for a minimum of 15 minutes   - Encourage patient to express feelings, fears, frustrations, hopes   Outcome: Progressing  Goal: Refrain from harming self  Description: Interventions:  - Monitor patient closely, per order   - Supervise medication ingestion, monitor effects and side effects   7/8/2024 0201 by Rigoberto Wang, RN  Outcome: Progressing  7/8/2024 0158 by Rigoberto Wang, RN  Outcome: Progressing

## 2024-07-08 NOTE — PLAN OF CARE
Problem: Ineffective Coping  Goal: Participates in unit activities  Description: Interventions:  - Provide therapeutic environment   - Provide required programming   - Redirect inappropriate behaviors   Outcome: Not Progressing     Problem: Risk for Self Injury/Neglect  Goal: Attend and participate in unit activities, including therapeutic, recreational, and educational groups  Description: Interventions:  - Provide therapeutic and educational activities daily, encourage attendance and participation, and document same in the medical record  - Obtain collateral information, encourage visitation and family involvement in care   Outcome: Not Progressing

## 2024-07-08 NOTE — NURSING NOTE
Patient appears to have slept thru the night after initially needing trazodone 50mg b/c they had difficulty falling asleep, No acute behaviors observed or concerns voiced. Will CTM. Continuous patient safety checks in progress.

## 2024-07-08 NOTE — PLAN OF CARE
Problem: Ineffective Coping  Goal: Free from restraint events  Description: - Utilize least restrictive measures   - Provide behavioral interventions   - Redirect inappropriate behaviors   Outcome: Progressing     Problem: Depression  Goal: Refrain from harming self  Description: Interventions:  - Monitor patient closely, per order   - Supervise medication ingestion, monitor effects and side effects   Outcome: Progressing

## 2024-07-08 NOTE — PROGRESS NOTES
"Progress Note - Benjamin Puga 69 y.o. female MRN: [id number]    Unit/Bed#: -01 Encounter: [id number]        Subjective:   Patient seen and examined at bedside after reviewing the chart and discussing the case with the caring staff.      Patient examined at bedside.  Patient has no acute symptoms.    Patient is being discharged on Tuesday, 7/9/2024.  Patient is requesting her prescriptions.  I reviewed and reconciled patient's problem list and medications.    Physical Exam   Vitals: Blood pressure 128/66, pulse 99, temperature 98 °F (36.7 °C), temperature source Temporal, resp. rate 16, height 4' 11\" (1.499 m), weight 85.6 kg (188 lb 12.8 oz), SpO2 98%.,Body mass index is 38.13 kg/m².  Constitutional: Patient in no acute distress.  HEENT: PERR, EOMI, MMM.  Cardiovascular: Normal rate and regular rhythm.    Pulmonary/Chest: Effort normal and breath sounds normal.   Abdomen: Soft, + BS, NT.    Assessment/Plan:  Benjamin Puga is a(n) 69 y.o. female with MDD.     Medical clearance.  Patient is medically cleared for discharge.  All scripts will be sent out for the patient.    Cardiac with hx of HTN, HLD.  Continue lisinopril 40 mg daily, amlodipine 5 mg, atorvastatin 40 mg daily (rosuvastatin nonform).  Hx of PE.  Continue Eliquis 2.5 mg twice daily.   Hypothyroidism.  Continue levothyroxine 50 mcg daily.  Dose decreased from 88 mcg previously at acute care on 6/26.  Follow-up with PCP for repeat TFT in 4-6 weeks.  Prediabetes.  Hgb a1c 5.7% on 6/30/24.  Lifestyle modifications.   Allergic rhinitis.  Continue Flonase daily.   Asthma.  Stable.  Qvar RediHaler nonform - patient will have  bring inhaler from home.  Albuterol inhaler as needed.  GERD.  Continue Protonix 40 mg daily.   Intertrigo.  Apply Nystatin powder twice daily.   Tinea corporis.  Apply clotrimazole cream twice daily x 14 days.  Vitamin b12 deficiency.  Patient started on monthly vitamin B12 injections.  IBS/constipation/flatus.  " Discontinued Miralax and Colace on 7/2 and started Florastor 250 mg twice daily due to diarrhea.  Simethicone 80 mg q6h prn.

## 2024-07-08 NOTE — TELEPHONE ENCOUNTER
Pharmacy called office in regards to a call back about medication Cymbalta. Please call [facility] pharmacy back at [phone]

## 2024-07-08 NOTE — DISCHARGE SUMMARY
"  Discharge Summary - Behavioral Health   Benjamin Puga 69 y.o. female MRN: [id number]  Unit/Bed#: [facility] 202-01 Encounter: [id number]     Admission Date: 6/28/2024         Discharge Date: 7/9/2024    Attending Psychiatrist: Graham Calderón MD    Reason for Admission/HPI: Major depressive disorder, single episode, unspecified [F32.9]  Depression [F32.A]    Per Psych Consult Note by Luis Horton/Dr. Batres on 6/27/24:  \"Benjamin Puga is a 69 y.o. female, with history of osteoarthritis, asthma, obesity, diabetes, anxiety/depression, breast cancer s/p lumpectomies who initially presented to ED via [facility] for intentional overdose on medications with intent to die.  Per provider note by Dr. Colton Larsen on 6/26/24, \"Benjamin Puga is a 69 y.o. 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  She appears slightly confused and was unable to provide full history of why she made that decision but she stated that \"she wanted to put an end to this misery \".  Patient was found to be slightly lethargic, IV hydrated, oxygenated, on examination she had some mild wheezing and was placed on oxygen and nebulizer was given.  Will place her in observation while on one-to-one observation and when medically stable, to be seen by psychiatry \"  Symptoms preceding psychiatry consultation included depression, suicidal ideation, suicide attempt, hopelessness, helplessness, poor concentration, poor appetite, weight loss, hypersomnia, increased irritability, anxiety symptoms, and difficulty attending to activities of daily living. Onset of symptoms was gradual starting 5 years ago with slowly worsening and fluctuating course since that " "time. Stressors preceding admission included marital problems, medical problems, limited support, chronic mental illness, and thoughts about the losses she has been experiencing over the past five years .   On initial evaluation, Benjamin was bright, cooperative, and pleasant, initially discussing the details of how she threatened to report her scammer to the police and his promises to reimburse the patient for the \"money he owes me.\" She required frequent redirection and reorientation due to her propensity to discuss things from the past including how she came into contact with her scammer two years ago, her diagnosis of breast cancer five years ago, her family's health problems, the death of her dog, and other recently traumatic events that the pt reports all contributed to how depressed she was feeling yesterday when she intentionally overdosed. Patient denies current auditory hallucinations. She denies current visual hallucinations. Patient denies current passive or active suicidal ideation, intent, or plan despite suicide attempt on 6/26/24. She denies current passive or active homicidal ideation, intent, or plan.\"      Patient was admitted to psychiatric unit on an involuntary 302 commitment basis.     Per Admission Interview with Dr. Gu on 6/29/24:  69 y.o. 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 for inpatient psychiatric hospitalization, with Benjamin Vivien reporting \"I was scammed " "and it has been a bad 9 months.\"      On interview with patient, patient reports that  retired in fall 2023.  Patient reports that in end in 2022, she saw a facebook ad for a trip to [place] to \"meet the mehnaz Price,\" patient reports that she was willing to pay the money for the trip to meet him.  Patient reports that her  thought it was scam.  She reports that she paid $20,000 and then didn't hear anything for a year.  Patient reports that what she thought was the actor started talking to her through Facebook.  Patient reports that her whole family and  were trying to convince her that it was a scam but she was \"obsessed\" with the idea of meeting the actor in [place].  Patient endorses that she was tired of everything that was going on.  She reports that she took the trip in 11/2023, reports that  didn't go with her.  She reports that she arrived at [facility] waiting for the travel arrangements that never showed up.  Patient reports that she realized when nobody showed up after severe hours that she was tricked.  Patient reports that she was breaking down emotionally and  [facility].  Patient reports that she overdosed on her Xanax, Tramadol, Cymbalta at the time.  Patient reports that she was admitted to an inpatient psychiatry unit for about a week.  She reports following the hospitalization,  paid for her to fly home.  Patient reports since that experience, she has been having extreme feelings of \"guilt.\"  \"I have shunned everybody\" feeling that she ruined relationships with her family.  Patient reports feeling that everybody \"is controlling me,\" feeling that she has never had freedom to make her choices.       Patient reports that she has been arguing with her  about the money loss, everything that happened over the past year.  She reports this past Tuesday, she was feeling so badly about everything that happened, how she ruined her " "relationships.  Patient reports after getting frustrated with her  on Tuesday, she went upstairs to lay in bed.  She started thinking about being a burden and wanting to \"end it now.\"  She reports that she prayed to God, put a crucifix on her chest, \"if it is meant to be, I will see God's face.\"  She subsequently overdosed on her night medications including Tramadol, Xanax, and Cymbalta.  She reports that she \"wanted to go to sleep and didn't want to come back.\"  Patient reports that she must have called the EMS, police and EMS arrive at the house.  She reports that she sees an outpatient therapist.  She reports her PCP has been prescribing her Cymbalta.       Patient reports while in the hospital, she has had withdrawal symptoms from not getting Xanax.  On psychiatric ROS, patient denies any auditory or visual hallucinations.  Patient denies any current passive or active suicidal ideation, intent, or plan.  Patient reports that she has nightmares and flashbacks, reports that she has had poor sleep recently.  Patient reports that she eating okay.  She reports that her energy is back.      Hospital Course: The patient was admitted to the inpatient psychiatric unit and started on every 7 minutes precautions. During the hospitalization the patient was attending individual therapy, group therapy, milieu therapy and occupational therapy.    Psychiatric medications were titrated over the hospital stay. To address depressive symptoms and anxiety symptoms the patient was started on antidepressant Cymbalta and anxiolytic medication Atarax. Medication doses were titrated during the hospital course. Prior to beginning of treatment medications risks and benefits and possible side effects including risk of suicidality and serotonin syndrome related to treatment with antidepressants and risk of impaired next-day mental alertness, complex sleep-related behavior and dependence related to treatment with hypnotic medications " were reviewed with the patient. The patient verbalized understanding and agreement for treatment.     Patient's symptoms improved gradually over the hospital course. At the end of treatment the patient was doing well. Mood was stable at the time of discharge. The patient denied suicidal ideation, intent or plan at the time of discharge and denied homicidal ideation, intent or plan at the time of discharge. There was no overt psychosis at the time of discharge. Sleep and appetite were improved. The patient was tolerating medications and was not reporting any significant side effects at the time of discharge.    Since the patient was doing well at the end of the hospitalization, treatment team felt that the patient could be safely discharged to outpatient care.     The outpatient follow up with  Psych and Pcp  was arranged by the unit  upon discharge.    Mental Status at time of Discharge:     Appearance:  age appropriate and casually dressed   Behavior:  calm   Speech:  normal pitch and normal volume   Mood:  euthymic   Affect:  mood-congruent   Thought Process:  goal directed   Thought Content:  No overt delusions   Perceptual Disturbances: Denied AVH, did not appear internally preoccupied   Risk Potential: none   Sensorium:  person, place, and time/date   Cognition:  recent and remote memory grossly intact   Consciousness:  alert and awake    Attention: attention span and concentration were age appropriate   Insight:  good   Judgment: good   Gait/Station: normal gait/station   Motor Activity: no abnormal movements     Admission Diagnosis:Major depressive disorder, single episode, unspecified [F32.9]  Depression [F32.A]    Discharge Diagnosis:   Principal Problem (Resolved):    Current severe episode of major depressive disorder without psychotic features without prior episode (HCC)  Active Problems:    Asthma    HTN (hypertension)    Hyperlipidemia    Hypothyroidism    Peripheral neuropathy    History  of pulmonary embolism    PTSD (post-traumatic stress disorder)    IBS (irritable bowel syndrome)    GERD (gastroesophageal reflux disease)  Resolved Problems:    Anxiety    Suicidal behavior with attempted self-injury (HCC)        Lab results:  Admission on 06/28/2024   Component Date Value    Vitamin B-12 06/29/2024 288     Vit D, 25-Hydroxy 06/29/2024 46.3     Cholesterol 06/29/2024 164     Triglycerides 06/29/2024 64     HDL, Direct 06/29/2024 51     LDL Calculated 06/29/2024 100     Non-HDL-Chol (CHOL-HDL) 06/29/2024 113     Hemoglobin A1C 06/30/2024 5.7 (H)     EAG 06/30/2024 117     WBC 06/30/2024 9.28     RBC 06/30/2024 4.65     Hemoglobin 06/30/2024 13.2     Hematocrit 06/30/2024 42.3     MCV 06/30/2024 91     MCH 06/30/2024 28.4     MCHC 06/30/2024 31.2 (L)     RDW 06/30/2024 13.9     MPV 06/30/2024 9.2     Platelets 06/30/2024 284     nRBC 06/30/2024 0     Segmented % 06/30/2024 60     Immature Grans % 06/30/2024 0     Lymphocytes % 06/30/2024 28     Monocytes % 06/30/2024 9     Eosinophils Relative 06/30/2024 3     Basophils Relative 06/30/2024 0     Absolute Neutrophils 06/30/2024 5.56     Absolute Immature Grans 06/30/2024 0.02     Absolute Lymphocytes 06/30/2024 2.59     Absolute Monocytes 06/30/2024 0.81     Eosinophils Absolute 06/30/2024 0.27     Basophils Absolute 06/30/2024 0.03     Sodium 06/30/2024 140     Potassium 06/30/2024 3.7     Chloride 06/30/2024 103     CO2 06/30/2024 29     ANION GAP 06/30/2024 8     BUN 06/30/2024 15     Creatinine 06/30/2024 0.76     Glucose 06/30/2024 97     Glucose, Fasting 06/30/2024 97     Calcium 06/30/2024 9.6     AST 06/30/2024 29     ALT 06/30/2024 15     Alkaline Phosphatase 06/30/2024 64     Total Protein 06/30/2024 7.8     Albumin 06/30/2024 4.3     Total Bilirubin 06/30/2024 0.60     eGFR 06/30/2024 80     Vit D, 25-Hydroxy 06/30/2024 45.5     Folate 06/30/2024 >22.3     Vitamin B-12 06/30/2024 261     Cholesterol 06/30/2024 175     Triglycerides  06/30/2024 73     HDL, Direct 06/30/2024 56     LDL Calculated 06/30/2024 104 (H)     Non-HDL-Chol (CHOL-HDL) 06/30/2024 119     TSH 3RD GENERATON 07/03/2024 0.789        Discharge Medications:  Current Discharge Medication List        START taking these medications    Details   cyanocobalamin 1,000 mcg/mL Inject 1 mL (1,000 mcg total) into a muscle every 30 (thirty) days  Qty: 1 mL, Refills: 0    Associated Diagnoses: Vitamin B12 deficiency      saccharomyces boulardii (FLORASTOR) 250 mg capsule Take 1 capsule (250 mg total) by mouth 2 (two) times a day  Qty: 60 capsule, Refills: 0    Associated Diagnoses: Dyspepsia      tiZANidine (ZANAFLEX) 4 mg tablet Take 1 tablet (4 mg total) by mouth every 8 (eight) hours as needed for muscle spasms  Qty: 60 tablet, Refills: 0    Associated Diagnoses: Back pain, unspecified back location, unspecified back pain laterality, unspecified chronicity              Current Discharge Medication List        STOP taking these medications       ALPRAZolam (XANAX) 1 mg tablet Comments:   Reason for Stopping:         beclomethasone (Qvar RediHaler) 40 MCG/ACT inhaler Comments:   Reason for Stopping:         clotrimazole-betamethasone (LOTRISONE) 1-0.05 % cream Comments:   Reason for Stopping:         hydrocortisone-pramoxine (ANALPRAM-HC) 2.5-1 % rectal cream Comments:   Reason for Stopping:         mometasone-formoterol (DULERA) 100-5 MCG/ACT inhaler Comments:   Reason for Stopping:         rosuvastatin (CRESTOR) 10 MG tablet Comments:   Reason for Stopping:         methocarbamol (ROBAXIN) 500 mg tablet Comments:   Reason for Stopping:         PARoxetine (PAXIL) 10 mg tablet Comments:   Reason for Stopping:         PEG 3350-KCl-NaBcb-NaCl-NaSulf (PEG 3350/ELECTROLYTES PO) Comments:   Reason for Stopping:                Current Discharge Medication List        CONTINUE these medications which have CHANGED    Details   albuterol (PROVENTIL HFA,VENTOLIN HFA) 90 mcg/act inhaler Inhale 2 puffs  every 6 (six) hours as needed for wheezing  Qty: 18 g, Refills: 0    Comments: Substitution to a formulary equivalent within the same pharmaceutical class is authorized.  Associated Diagnoses: Mild intermittent asthma without complication      amLODIPine (NORVASC) 5 mg tablet Take 1 tablet (5 mg total) by mouth daily  Qty: 100 tablet, Refills: 0    Comments: Please send a replace/new response with 100-Day Supply if appropriate to maximize member benefit. Requesting 1 year supply.  Associated Diagnoses: Essential hypertension      apixaban (Eliquis) 2.5 mg Take 1 tablet (2.5 mg total) by mouth 2 (two) times a day  Qty: 180 tablet, Refills: 0    Comments: Requesting 1 year supply  Associated Diagnoses: Pulmonary embolism, other, unspecified chronicity, unspecified whether acute cor pulmonale present ([facility])      atorvastatin (LIPITOR) 40 mg tablet Take 1 tablet (40 mg total) by mouth daily  Qty: 30 tablet, Refills: 0    Associated Diagnoses: Dyslipidemia      clotrimazole (LOTRIMIN) 1 % cream Apply topically 2 (two) times a day for 10 doses  Qty: 45 g, Refills: 0    Associated Diagnoses: Dermatomycosis      DULoxetine (CYMBALTA) 30 mg delayed release capsule Take 3 capsules (90 mg total) by mouth daily  Qty: 30 capsule, Refills: 0    Associated Diagnoses: Current severe episode of major depressive disorder without psychotic features without prior episode ([facility])      hydrOXYzine HCL (ATARAX) 25 mg tablet Take 1 tablet (25 mg total) by mouth 2 (two) times a day  Qty: 60 tablet, Refills: 0    Associated Diagnoses: Anxiety      levothyroxine 50 mcg tablet Take 1 tablet (50 mcg total) by mouth daily in the early morning  Qty: 30 tablet, Refills: 0    Associated Diagnoses: Acquired hypothyroidism      lisinopril (ZESTRIL) 40 mg tablet Take 1 tablet (40 mg total) by mouth daily  Qty: 100 tablet, Refills: 0    Associated Diagnoses: Primary hypertension      nystatin powder Apply 1 Application topically 2 (two) times a day  Qty:  60 g, Refills: 0    Associated Diagnoses: Yeast infection      pantoprazole (PROTONIX) 40 mg tablet Take 1 tablet (40 mg total) by mouth 2 (two) times a day  Qty: 180 tablet, Refills: 0    Comments: Requesting 1 year supply  Associated Diagnoses: Gastroesophageal reflux disease without esophagitis      traMADol (ULTRAM) 50 mg tablet Take 1 tablet (50 mg total) by mouth every 8 (eight) hours as needed for moderate pain for up to 20 days  Qty: 60 tablet, Refills: 0    Associated Diagnoses: Other chronic pain      Triamcinolone Acetonide (Nasacort Allergy 24HR) 55 MCG/ACT nasal spray 2 sprays into each nostril as needed (Allergies)  Qty: 16.9 mL, Refills: 0    Associated Diagnoses: Seasonal allergic rhinitis due to pollen              Current Discharge Medication List        CONTINUE these medications which have NOT CHANGED    Details   azelastine (ASTELIN) 0.1 % nasal spray Spray 2 sprays twice a day by intranasal route.      Multiple Vitamins-Minerals (OCUVITE ADULT 50+ PO) Take by mouth      famotidine (PEPCID) 10 mg tablet Take 1 tablet (10 mg total) by mouth daily  Qty: 90 tablet, Refills: 3    Associated Diagnoses: GERD (gastroesophageal reflux disease)      ferrous sulfate 325 (65 Fe) mg tablet Take 325 mg by mouth daily              Discharge instructions/Information to patient and family:   See after visit summary for information provided to patient and family.      Provisions for Follow-Up Care:  See after visit summary for information related to follow-up care and any pertinent home health orders.      Discharge Statement     I spent 30 minutes discharging the patient. This time was spent on the day of discharge. I had direct contact with the patient on the day of discharge.     Additional documentation is required if more than 30 minutes were spent on discharge:    I reviewed with Benjamin importance of compliance with medications and outpatient treatment after discharge.  I discussed the medication regimen  and possible side effects of the medications with Benjamin prior to discharge. At the time of discharge she was tolerating psychiatric medications.  I discussed outpatient follow up with Benjamin.  I reviewed with Benjamin crisis plan and safety plan upon discharge.  Patient weaned off benzodiazepines and educated on the misuse use of benzodiazepines and is associated side effects including risk of dependency, abuse and suicidality.  Patient in agreement with education and expressed a willingness to stop taking benzodiazepines upon discharge.

## 2024-07-08 NOTE — PROGRESS NOTES
07/08/24 1244   Activity/Group Checklist   Group Admission/Discharge  (relapse prevention plan)   Attendance Attended   Attendance Duration (min) 0-15   Interactions Interacted appropriately   Affect/Mood Appropriate   Goals Achieved Identified feelings;Identified triggers;Identified relapse prevention strategies;Discussed coping strategies;Discussed discharge plans;Identified resources and support systems;Able to engage in interactions;Able to listen to others;Able to reflect/comment on own behavior;Able to manage/cope with feelings;Able to self-disclose;Able to recieve feedback;Able to experience relief/decrease in symptoms     Patient agreeable to meeting and completing relapse prevention plan with [facility].  Patient information from forms can be found in media.

## 2024-07-08 NOTE — PROGRESS NOTES
07/08/24    Team Meeting   Meeting Type Daily Rounds   Team Members Present   Team Members Present Physician;Nurse;;Occupational Therapist   Physician Team Member Dr Chivo WAGNER   Nursing Team Member Mimi Gómez RN   Social Work Team Member Gabriela Zee MSJEFFRY   OT Team Member Sabrina ESTEBANS   Patient/Family Present   Patient Present No   Patient's Family Present No   Return home with f/u at [facility], [facility], d/c preeti,

## 2024-07-08 NOTE — PROGRESS NOTES
Progress Note - Behavioral Health   Benjamin Puga 69 y.o. female MRN: [id number]  Unit/Bed#: [facility] 202-01 Encounter: [id number]    Assessment & Plan   Principal Problem:    Current severe episode of major depressive disorder without psychotic features without prior episode (HCC)  Active Problems:    Asthma    HTN (hypertension)    Anxiety    Hyperlipidemia    Hypothyroidism    Peripheral neuropathy    History of pulmonary embolism    PTSD (post-traumatic stress disorder)    IBS (irritable bowel syndrome)    GERD (gastroesophageal reflux disease)    Suicidal behavior with attempted self-injury (HCC)      Behavior over the last 24 hours:  improved  Sleep: normal  Appetite: normal  Medication side effects: No  ROS: no complaints and all other systems are negative    Subjective: Benjamin was seen today for psychiatric follow-up. On assessment, patient is calm, cooperative. She is visible on the unit, social with peers. She continues to exhibit a controlled mood on the unit, with no recent aggression or agitation towards staff or peers. She is compliant with her current medication regimen. She denied any depression, anxiety, SI/HI/AVH. She did not appear internally preoccupied.    Mental Status Evaluation:  Appearance:  age appropriate and casually dressed   Behavior:  Calm, pleasant   Speech:  normal pitch and normal volume   Mood:  euthymic   Affect:  mood-congruent   Thought Process:  concrete   Associations: concrete associations   Thought Content:  No overt delusions   Perceptual Disturbances: Denied AVH, did not appear internally preoccupied   Risk Potential: Suicidal Ideations none at present  Homicidal Ideations none at present  Potential for Aggression No   Sensorium:  person, place, and time/date   Memory:  recent and remote memory grossly intact   Consciousness:  alert and awake    Attention: attention span appeared shorter than expected for age   Insight:  good   Judgment: good   Gait/Station: Seated in a  chair   Motor Activity: no abnormal movements     Progress Toward Goals: Improved. Patient is controlled on the unit. She is compliant with her current psychotropic medication regimen. She denied side effects from current psychotropic medication regimen. Will continue current psychotropic medication regimen. Anticipated discharge on 7/9/2024.    Recommended Treatment: Continue with group therapy, milieu therapy and occupational therapy.      Risks, benefits and possible side effects of Medications:   Risks, benefits, and possible side effects of medications explained to patient and patient verbalizes understanding.      Medications: all current active meds have been reviewed.    Labs: I have personally reviewed all pertinent laboratory/tests results. Most Recent Labs:   Lab Results   Component Value Date    WBC 9.28 06/30/2024    RBC 4.65 06/30/2024    HGB 13.2 06/30/2024    HCT 42.3 06/30/2024     06/30/2024    RDW 13.9 06/30/2024    NEUTROABS 5.56 06/30/2024    SODIUM 140 06/30/2024    K 3.7 06/30/2024     06/30/2024    CO2 29 06/30/2024    BUN 15 06/30/2024    CREATININE 0.76 06/30/2024    GLUC 97 06/30/2024    GLUF 97 06/30/2024    CALCIUM 9.6 06/30/2024    AST 29 06/30/2024    ALT 15 06/30/2024    ALKPHOS 64 06/30/2024    TP 7.8 06/30/2024    ALB 4.3 06/30/2024    TBILI 0.60 06/30/2024    CHOLESTEROL 175 06/30/2024    HDL 56 06/30/2024    TRIG 73 06/30/2024    LDLCALC 104 (H) 06/30/2024    NONHDLC 119 06/30/2024    QOO2PCRIQGQW 0.789 07/03/2024    FREET4 1.00 01/30/2024    HGBA1C 5.7 (H) 06/30/2024     06/30/2024       Counseling / Coordination of Care  Total floor / unit time spent today 25 minutes.

## 2024-07-08 NOTE — NURSING NOTE
Patient visible on the unit. She is pleasant and social with staff and peers. She is medication compliant. Ambulates with cane. Attends groups. Denied anxiety,depression,SI,HI, or hallucinations. Safety precautions maintained.

## 2024-07-08 NOTE — BH TRANSITION RECORD
Contact Information: If you have any questions, concerns, pended studies, tests and/or procedures, or emergencies regarding your inpatient behavioral health visit. Please contact [facility] [phone] and ask to speak to a , nurse or physician. A contact is available 24 hours/ 7 days a week at this number.     Summary of Procedures Performed During your Stay:  Below is a list of major procedures performed during your hospital stay and a summary of results:  - No major procedures performed.    Pending Studies (From admission, onward)      None          Please follow up on the above pending studies with your PCP and/or referring provider.

## 2024-07-08 NOTE — TELEPHONE ENCOUNTER
Patients  called to set up a TCM appointment for her because she will be getting discharged from [facility] by tomorrow as per the patients caserworker

## 2024-07-08 NOTE — NURSING NOTE
Pt was visible on the unit this evening social w/ peers. Pt endorsed mild anxiety, denied all other psych symptoms. Pt was pleasant, cooperative, and med compliant. Will CTM. Continuous pt safety checks ongoing.

## 2024-07-09 VITALS
TEMPERATURE: 98.6 F | WEIGHT: 188.8 LBS | SYSTOLIC BLOOD PRESSURE: 129 MMHG | HEIGHT: 59 IN | OXYGEN SATURATION: 92 % | HEART RATE: 58 BPM | BODY MASS INDEX: 38.06 KG/M2 | RESPIRATION RATE: 18 BRPM | DIASTOLIC BLOOD PRESSURE: 71 MMHG

## 2024-07-09 RX ADMIN — DULOXETINE HYDROCHLORIDE 90 MG: 60 CAPSULE, DELAYED RELEASE ORAL at 08:45

## 2024-07-09 RX ADMIN — APIXABAN 2.5 MG: 2.5 TABLET, FILM COATED ORAL at 08:46

## 2024-07-09 RX ADMIN — LEVOTHYROXINE SODIUM 50 MCG: 50 TABLET ORAL at 05:56

## 2024-07-09 RX ADMIN — AMLODIPINE BESYLATE 5 MG: 5 TABLET ORAL at 08:45

## 2024-07-09 RX ADMIN — NYSTATIN: 100000 POWDER TOPICAL at 08:47

## 2024-07-09 RX ADMIN — ATORVASTATIN CALCIUM 40 MG: 40 TABLET, FILM COATED ORAL at 08:45

## 2024-07-09 RX ADMIN — FLUTICASONE PROPIONATE 2 SPRAY: 50 SPRAY, METERED NASAL at 08:48

## 2024-07-09 RX ADMIN — PANTOPRAZOLE SODIUM 40 MG: 40 TABLET, DELAYED RELEASE ORAL at 08:45

## 2024-07-09 RX ADMIN — LISINOPRIL 40 MG: 20 TABLET ORAL at 08:45

## 2024-07-09 RX ADMIN — Medication 250 MG: at 08:46

## 2024-07-09 RX ADMIN — TRAMADOL HYDROCHLORIDE 50 MG: 50 TABLET, COATED ORAL at 10:04

## 2024-07-09 RX ADMIN — TRAZODONE HYDROCHLORIDE 50 MG: 50 TABLET ORAL at 00:39

## 2024-07-09 RX ADMIN — HYDROXYZINE HYDROCHLORIDE 25 MG: 25 TABLET ORAL at 08:46

## 2024-07-09 NOTE — PROGRESS NOTES
07/09/24    Team Meeting   Meeting Type Daily Rounds   Team Members Present   Team Members Present Physician;;Nurse;Occupational Therapist   Physician Team Member Chivo WAGNER   Nursing Team Member Rico BAUMAN   Social Work Team Member Yayo CHAUDHARY   OT Team Member Crystal CAM   Patient/Family Present   Patient Present No   Patient's Family Present No   Pt to d/c home today with psych and pcp f/u, some anx last night over d/c, denies all

## 2024-07-09 NOTE — SOCIAL WORK
CM met with pt to review d/c plan and f/u supports. IMM explained to pt who expressed verbal confirmation of understanding and in agreement. Pt declined the right to appeal d/c at this time. Pt signed IMM and verbalized readiness to d/c home today.  IMM placed in scan bin.

## 2024-07-09 NOTE — NURSING NOTE
Patient appears to have slept through the remainder of the overnight hours, after receiving PRN Trazodone.  No further c/o insomnia voiced by patient.  She is currently in bed, appears to be sleeping.  Breathing is easy and non-labored on room air.  Continuous safety rounding in progress.

## 2024-07-09 NOTE — NURSING NOTE
Patient informs she is awake and cannot reinitiate sleep.  Administered PRN Trazodone as per order.  Will monitor for medication effectiveness.

## 2024-07-09 NOTE — NURSING NOTE
No further c/o pain voiced by patient.  She is currently in bed, appears to be comfortable with no obvious signs of distress or discomfort noted.  PRN Ultram seemingly effective.

## 2024-07-09 NOTE — PLAN OF CARE
Problem: Alteration in Thoughts and Perception  Goal: Treatment Goal: Gain control of psychotic behaviors/thinking, reduce/eliminate presenting symptoms and demonstrate improved reality functioning upon discharge  Outcome: Progressing  Goal: Refrain from acting on delusional thinking/internal stimuli  Description: Interventions:  - Monitor patient closely, per order   - Utilize least restrictive measures   - Set reasonable limits, give positive feedback for acceptable   - Administer medications as ordered and monitor of potential side effects  Outcome: Progressing  Goal: Agree to be compliant with medication regime, as prescribed and report medication side effects  Description: Interventions:  - Offer appropriate PRN medication and supervise ingestion; conduct AIMS, as needed   Outcome: Progressing  Goal: Recognize dysfunctional thoughts, communicate reality-based thoughts at the time of discharge  Description: Interventions:  - Provide medication and psycho-education to assist patient in compliance and developing insight into his/her illness   Outcome: Progressing     Problem: Ineffective Coping  Goal: Identifies ineffective coping skills  Outcome: Progressing  Goal: Identifies healthy coping skills  Outcome: Progressing  Goal: Demonstrates healthy coping skills  Outcome: Progressing  Goal: Participates in unit activities  Description: Interventions:  - Provide therapeutic environment   - Provide required programming   - Redirect inappropriate behaviors   Outcome: Progressing  Goal: Patient/Family participate in treatment and DC plans  Description: Interventions:  - Provide therapeutic environment  Outcome: Progressing  Goal: Patient/Family verbalizes awareness of resources  Outcome: Progressing  Goal: Understands least restrictive measures  Description: Interventions:  - Utilize least restrictive behavior  Outcome: Progressing  Goal: Free from restraint events  Description: - Utilize least restrictive measures    - Provide behavioral interventions   - Redirect inappropriate behaviors   Outcome: Progressing     Problem: Risk for Self Injury/Neglect  Goal: Treatment Goal: Remain safe during length of stay, learn and adopt new coping skills, and be free of self-injurious ideation, impulses and acts at the time of discharge  Outcome: Progressing  Goal: Attend and participate in unit activities, including therapeutic, recreational, and educational groups  Description: Interventions:  - Provide therapeutic and educational activities daily, encourage attendance and participation, and document same in the medical record  - Obtain collateral information, encourage visitation and family involvement in care   Outcome: Progressing  Goal: Recognize maladaptive responses and adopt new coping mechanisms  Outcome: Progressing     Problem: Depression  Goal: Treatment Goal: Demonstrate behavioral control of depressive symptoms, verbalize feelings of improved mood/affect, and adopt new coping skills prior to discharge  Outcome: Progressing  Goal: Verbalize thoughts and feelings  Description: Interventions:  - Assess and re-assess patient's level of risk   - Engage patient in 1:1 interactions, daily, for a minimum of 15 minutes   - Encourage patient to express feelings, fears, frustrations, hopes   Outcome: Progressing  Goal: Refrain from harming self  Description: Interventions:  - Monitor patient closely, per order   - Supervise medication ingestion, monitor effects and side effects   Outcome: Progressing  Goal: Refrain from isolation  Description: Interventions:  - Develop a trusting relationship   - Encourage socialization   Outcome: Progressing  Goal: Refrain from self-neglect  Outcome: Progressing     Problem: Anxiety  Goal: Anxiety is at manageable level  Description: Interventions:  - Assess and monitor patient's anxiety level.   - Monitor for signs and symptoms (heart palpitations, chest pain, shortness of breath, headaches, nausea,  feeling jumpy, restlessness, irritable, apprehensive).   - Collaborate with interdisciplinary team and initiate plan and interventions as ordered.  - [place] patient to unit/surroundings  - Explain treatment plan  - Encourage participation in care  - Encourage verbalization of concerns/fears  - Identify coping mechanisms  - Assist in developing anxiety-reducing skills  - Administer/offer alternative therapies  - Limit or eliminate stimulants  Outcome: Progressing     Problem: Alteration in Orientation  Goal: Treatment Goal: Demonstrate a reduction of confusion and improved orientation to person, place, time and/or situation upon discharge, according to optimum baseline/ability  Outcome: Progressing  Goal: Interact with staff daily  Description: Interventions:  - Assess and re-assess patient's level of orientation  - Engage patient in 1 on 1 interactions, daily, for a minimum of 15 minutes   - Establish rapport/trust with patient   Outcome: Progressing  Goal: Allow medical examinations, as recommended  Description: Interventions:  - Provide physical/neurological exams and/or referrals, per provider   Outcome: Progressing  Goal: Cooperate with recommended testing/procedures  Description: Interventions:  - Determine need for ancillary testing  - Observe for mental status changes  - Implement falls/precaution protocol   Outcome: Progressing  Goal: Complete daily ADLs, including personal hygiene independently, as able  Description: Interventions:  - Observe, teach, and assist patient with ADLS  Outcome: Progressing     Problem: Potential for Falls.bhtfall  Goal: Patient will remain free of falls  Description: INTERVENTIONS:  - Educate patient/family on patient safety including physical limitations  - Instruct patient to call for assistance with activity   - Consult OT/PT to assist with strengthening/mobility   - Keep Call bell within reach  - Keep bed low and locked with side rails adjusted as appropriate  - Keep care  items and personal belongings within reach  - Initiate and maintain comfort rounds  - Make Fall Risk Sign visible to staff  - Apply yellow socks and bracelet for high fall risk patients  - Consider moving patient to room near nurses station  Outcome: Progressing     Problem: BHFall  Goal: Patient will remain free of falls  Description: INTERVENTIONS:  - Educate patient/family on patient safety including physical limitations  - Instruct patient to call for assistance with activity   - Consult OT/PT to assist with strengthening/mobility   - Keep Call bell within reach  - Keep bed low and locked with side rails adjusted as appropriate  - Keep care items and personal belongings within reach  - Initiate and maintain comfort rounds  - Make Fall Risk Sign visible to staff  - Offer Toileting every 2 Hours, in advance of need  - Initiate/Maintain bed alarm  - Obtain necessary fall risk management equipment: walker  - Apply yellow socks and bracelet for high fall risk patients  - Consider moving patient to room near nurses station  Outcome: Progressing     Problem: Prexisting or High Potential for Compromised Skin Integrity  Goal: Skin integrity is maintained or improved  Description: INTERVENTIONS:  - Identify patients at risk for skin breakdown  - Assess and monitor skin integrity  - Assess and monitor nutrition and hydration status  - Monitor labs   - Assess for incontinence   - Turn and reposition patient  - Assist with mobility/ambulation  - Relieve pressure over bony prominences  - Avoid friction and shearing  - Provide appropriate hygiene as needed including keeping skin clean and dry  - Evaluate need for skin moisturizer/barrier cream  - Collaborate with interdisciplinary team   - Patient/family teaching  - Consider wound care consult   Outcome: Progressing     Problem: DISCHARGE PLANNING - CARE MANAGEMENT  Goal: Discharge to post-acute care or home with appropriate resources  Description: INTERVENTIONS:  - Conduct  assessment to determine patient/family and health care team treatment goals, and need for post-acute services based on payer coverage, community resources, and patient preferences, and barriers to discharge  - Address psychosocial, clinical, and financial barriers to discharge as identified in assessment in conjunction with the patient/family and health care team  - Arrange appropriate level of post-acute services according to patient’s   needs and preference and payer coverage in collaboration with the physician and health care team  - Communicate with and update the patient/family, physician, and health care team regarding progress on the discharge plan  - Arrange appropriate transportation to post-acute venues  Outcome: Progressing     Problem: Nutrition/Hydration-ADULT  Goal: Nutrient/Hydration intake appropriate for improving, restoring or maintaining nutritional needs  Description: Monitor and assess patient's nutrition/hydration status for malnutrition. Collaborate with interdisciplinary team and initiate plan and interventions as ordered.  Monitor patient's weight and dietary intake as ordered or per policy. Utilize nutrition screening tool and intervene as necessary. Determine patient's food preferences and provide high-protein, high-caloric foods as appropriate.     INTERVENTIONS:  - Monitor oral intake, urinary output, labs, and treatment plans  - Assess nutrition and hydration status and recommend course of action  - Evaluate amount of meals eaten  - Assist patient with eating if necessary   - Allow adequate time for meals  - Recommend/ encourage appropriate diets, oral nutritional supplements, and vitamin/mineral supplements  - Order, calculate, and assess calorie counts as needed  - Recommend, monitor, and adjust tube feedings and TPN/PPN based on assessed needs  - Assess need for intravenous fluids  - Provide specific nutrition/hydration education as appropriate  - Include patient/family/caregiver in  decisions related to nutrition  Outcome: Progressing

## 2024-07-09 NOTE — PROGRESS NOTES
"Progress Note - Benjamin Puga 69 y.o. female MRN: [id number]    Unit/Bed#: [facility] 202-01 Encounter: [id number]        Subjective:   Patient seen and examined at bedside after reviewing the chart and discussing the case with the caring staff.      Patient examined at bedside.  Patient has no acute symptoms.    Patient is being discharged today, Tuesday, 7/9/2024.     Physical Exam   Vitals: Blood pressure 129/71, pulse 58, temperature 98.6 °F (37 °C), temperature source Temporal, resp. rate 18, height 4' 11\" (1.499 m), weight 85.6 kg (188 lb 12.8 oz), SpO2 92%.,Body mass index is 38.13 kg/m².  Constitutional: Patient in no acute distress.  HEENT: PERR, EOMI, MMM.  Cardiovascular: Normal rate and regular rhythm.    Pulmonary/Chest: Effort normal and breath sounds normal.   Abdomen: Soft, + BS, NT.    Assessment/Plan:  Benjamin Puga is a(n) 69 y.o. female with MDD.     Medical clearance.  Patient is medically cleared for discharge.  All scripts were sent out for the patient.    Cardiac with hx of HTN, HLD.  Continue lisinopril 40 mg daily, amlodipine 5 mg, atorvastatin 40 mg daily (rosuvastatin nonform).  Hx of PE.  Continue Eliquis 2.5 mg twice daily.   Hypothyroidism.  Continue levothyroxine 50 mcg daily.  Dose decreased from 88 mcg previously at acute care on 6/26.  Follow-up with PCP for repeat TFT in 4-6 weeks.  Prediabetes.  Hgb a1c 5.7% on 6/30/24.  Lifestyle modifications.   Allergic rhinitis.  Continue Flonase daily.   Asthma.  Stable.  Qvar RediHaler nonform - patient will have  bring inhaler from home.  Albuterol inhaler as needed.  GERD.  Continue Protonix 40 mg daily.   Intertrigo.  Apply Nystatin powder twice daily.   Tinea corporis.  Apply clotrimazole cream twice daily x 14 days.  Vitamin b12 deficiency.  Patient started on monthly vitamin B12 injections.  IBS/constipation/flatus.  Discontinued Miralax and Colace on 7/2 and started Florastor 250 mg twice daily due to diarrhea.  " Simethicone 80 mg q6h prn.     The patient was discussed with Dr. Trinidad and he is in agreement with the above note.

## 2024-07-09 NOTE — NURSING NOTE
Patient was observed to be visible in the community this evening. She states she feels great.  Social with both staff and peers. Pleasant and cooperative during staff interactions.  No acute behaviors observed. Denies feeling anxious and/ or depressed, denies SI, HI and hallucinations. Benjamin was medication compliant at .  Ambulates well with cane.  LBM 7/7/2024 per patient. Continuous safety rounding in progress.

## 2024-07-09 NOTE — NURSING NOTE
Patient requested and received PRN Ultram for c/o 8/10 b/l leg and foot pain. Administered PRN Ultram as per order for severe pain at 2220.  Will monitor for medication effectiveness.

## 2024-07-09 NOTE — NURSING NOTE
Patient ambulated form unit accompanied by staff. AVS reviewed w/ pt and she verbalizes understanding. All belongings sent w/ pt.

## 2024-07-09 NOTE — NURSING NOTE
"Upon assessment pt is calm and cooperative. Pleasant and engages in conversation. She reports feeling ready for discharge this morning and is \"very excited\" to see her . She endorses feeling ready for discharge and has \"learned a lot\" since her admission. She expresses positive thinking and much hope for the future. She denies any current anxiety or depressive symptoms. She denies Si/Hi and hallucinations. She is up and ambulatory- continues to utilize a cane. She denies pain. She is able to make her needs known and offers no current complaints/ concerns. Plan of care continues. Q7 minute safety checks in progress.   "

## 2024-07-16 ENCOUNTER — RA CDI HCC (OUTPATIENT)
Dept: OTHER | Facility: HOSPITAL | Age: 69
End: 2024-07-16

## 2024-07-22 ENCOUNTER — OFFICE VISIT (OUTPATIENT)
Age: 69
End: 2024-07-22
Payer: COMMERCIAL

## 2024-07-22 ENCOUNTER — TELEPHONE (OUTPATIENT)
Age: 69
End: 2024-07-22

## 2024-07-22 VITALS
DIASTOLIC BLOOD PRESSURE: 82 MMHG | OXYGEN SATURATION: 95 % | HEART RATE: 120 BPM | HEIGHT: 59 IN | SYSTOLIC BLOOD PRESSURE: 150 MMHG | WEIGHT: 190 LBS | BODY MASS INDEX: 38.3 KG/M2

## 2024-07-22 DIAGNOSIS — K59.01 SLOW TRANSIT CONSTIPATION: ICD-10-CM

## 2024-07-22 DIAGNOSIS — K64.9 HEMORRHOIDS, UNSPECIFIED HEMORRHOID TYPE: Primary | ICD-10-CM
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
## 2024-07-22 DIAGNOSIS — S32.020A CLOSED COMPRESSION FRACTURE OF L2 VERTEBRA, INITIAL ENCOUNTER (HCC): ICD-10-CM

## 2024-07-22 DIAGNOSIS — I26.99 PULMONARY EMBOLISM, OTHER, UNSPECIFIED CHRONICITY, UNSPECIFIED WHETHER ACUTE COR PULMONALE PRESENT (HCC): ICD-10-CM

## 2024-07-22 DIAGNOSIS — F33.3 MDD (MAJOR DEPRESSIVE DISORDER), RECURRENT, SEVERE, WITH PSYCHOSIS (HCC): ICD-10-CM

## 2024-07-22 DIAGNOSIS — F43.10 PTSD (POST-TRAUMATIC STRESS DISORDER): ICD-10-CM

## 2024-07-22 DIAGNOSIS — I10 PRIMARY HYPERTENSION: ICD-10-CM

## 2024-07-22 DIAGNOSIS — K64.9 HEMORRHOIDS, UNSPECIFIED HEMORRHOID TYPE: ICD-10-CM

## 2024-07-22 DIAGNOSIS — R73.01 IMPAIRED FASTING GLUCOSE: ICD-10-CM

## 2024-07-22 PROCEDURE — 99495 TRANSJ CARE MGMT MOD F2F 14D: CPT | Performed by: INTERNAL MEDICINE

## 2024-07-22 RX ORDER — METHOCARBAMOL 500 MG/1
TABLET, FILM COATED ORAL
COMMUNITY
Start: 2024-07-17

## 2024-07-22 RX ORDER — HYDROCORTISONE 25 MG/G
CREAM TOPICAL 2 TIMES DAILY
Qty: 28 G | Refills: 3 | Status: SHIPPED | OUTPATIENT
Start: 2024-07-22

## 2024-07-22 RX ORDER — PROPRANOLOL HYDROCHLORIDE 80 MG/1
80 CAPSULE, EXTENDED RELEASE ORAL DAILY
Qty: 30 CAPSULE | Refills: 3 | Status: SHIPPED | OUTPATIENT
Start: 2024-07-22

## 2024-07-22 RX ORDER — HYDROCORTISONE ACETATE PRAMOXINE HCL 2.5; 1 G/100G; G/100G
CREAM TOPICAL 3 TIMES DAILY
Qty: 30 G | Refills: 3 | Status: SHIPPED | OUTPATIENT
Start: 2024-07-22 | End: 2024-07-22

## 2024-07-22 NOTE — PATIENT INSTRUCTIONS
Depression/anxiety/PTSD/s/p SA-referral to psychiatry for med management.     HTN w/ tachycardia likely r/t anxiety-add Proprnolol, monitor home bp's and HR and f/u 3 wks.     Constipation-add benefiber, then add miralax if needed.  Reserve dulcolax for failure of benefiber and miralax.      Analpram refilled for hemorrhoids.

## 2024-07-22 NOTE — PROGRESS NOTES
Assessment/Plan:     No problem-specific Assessment & Plan notes found for this encounter.       Diagnoses and all orders for this visit:    Suicide, subsequent encounter (HCC)  -     Ambulatory referral to Psych Services; Future    Closed compression fracture of L2 vertebra, initial encounter ([facility])  -     DXA bone density spine hip and pelvis; Future    PTSD (post-traumatic stress disorder)  -     Ambulatory referral to Psych Services; Future    MDD (major depressive disorder), recurrent, severe, with psychosis ([facility])    Pulmonary embolism, other, unspecified chronicity, unspecified whether acute cor pulmonale present ([facility])    Slow transit constipation    Hemorrhoids, unspecified hemorrhoid type  -     Discontinue: hydrocortisone-pramoxine (ANALPRAM-HC) 2.5-1 % rectal cream; Apply topically 3 (three) times a day    Primary hypertension  -     CBC and differential; Future  -     Comprehensive metabolic panel; Future  -     Lipid panel; Future  -     Hemoglobin A1C; Future  -     TSH, 3rd generation with Free T4 reflex; Future  -     propranolol (INDERAL LA) 80 mg 24 hr capsule; Take 1 capsule (80 mg total) by mouth daily    Impaired fasting glucose  -     Hemoglobin A1C; Future    Other orders  -     methocarbamol (ROBAXIN) 500 mg tablet; TAKE ONE TABLET BY MOUTH FOUR TIMES A DAY AS NEEDED FOR MUSCLE SPASMS              Patient Instructions   Depression/anxiety/PTSD/s/p SA-referral to psychiatry for med management.     HTN w/ tachycardia likely r/t anxiety-add Proprnolol, monitor home bp's and HR and f/u 3 wks.     Constipation-add benefiber, then add miralax if needed.  Reserve dulcolax for failure of benefiber and miralax.      Analpram refilled for hemorrhoids.         Subjective:     Patient ID: Benjamin Puga is a 69 y.o. female.    CARMEN  D/c'd from inpatient psych 7/9, weaned from alprazolam, but this wasn't communicated w/ pt, so now states having w/d w/ night sweats and upset stomach  Seen by Dr. Zarate  and had awful experience.  Doesn't want to go back.  Seeing Denise weekly.   Notes anxiety, but no si/hi.    Took metamucil and got hives, improved w/ benadryl  Constipation resolved w/ dulcolax.    Notes brbpr after straining for stool.  Wants refill analpram  Started B12, but levels nl.  Noted significant improvement in energy.            Review of Systems   Constitutional:  Negative for appetite change, chills, diaphoresis, fatigue, fever and unexpected weight change.   HENT:  Negative for congestion, hearing loss and rhinorrhea.    Eyes:  Negative for visual disturbance.   Respiratory:  Negative for cough, chest tightness, shortness of breath and wheezing.    Cardiovascular:  Negative for chest pain, palpitations and leg swelling.   Gastrointestinal:  Positive for anal bleeding and constipation. Negative for abdominal pain and blood in stool.   Endocrine: Negative for cold intolerance, heat intolerance, polydipsia and polyuria.   Genitourinary:  Negative for difficulty urinating, dysuria, frequency and urgency.   Musculoskeletal:  Negative for arthralgias and myalgias.   Skin:  Negative for rash.   Neurological:  Negative for dizziness, weakness, light-headedness and headaches.   Hematological:  Does not bruise/bleed easily.   Psychiatric/Behavioral:  Negative for dysphoric mood and sleep disturbance.          Objective:     Physical Exam  Constitutional:       Appearance: She is well-developed.   HENT:      Head: Normocephalic and atraumatic.      Nose: Nose normal.   Eyes:      General: No scleral icterus.     Conjunctiva/sclera: Conjunctivae normal.      Pupils: Pupils are equal, round, and reactive to light.   Neck:      Thyroid: No thyromegaly.      Vascular: No JVD.      Trachea: No tracheal deviation.   Cardiovascular:      Rate and Rhythm: Regular rhythm. Tachycardia present.      Heart sounds: No murmur heard.     No friction rub. No gallop.   Pulmonary:      Effort: Pulmonary effort is normal. No  "respiratory distress.      Breath sounds: Normal breath sounds. No wheezing or rales.   Musculoskeletal:         General: No deformity.      Cervical back: Normal range of motion and neck supple.   Lymphadenopathy:      Cervical: No cervical adenopathy.   Skin:     General: Skin is warm and dry.      Coloration: Skin is not pale.      Findings: No erythema or rash.   Neurological:      Mental Status: She is alert and oriented to person, place, and time.      Cranial Nerves: No cranial nerve deficit.   Psychiatric:         Behavior: Behavior normal.         Thought Content: Thought content normal.         Judgment: Judgment normal.           Vitals:    07/22/24 1309   BP: 150/82   Pulse: (!) 120   SpO2: 95%   Weight: 86.2 kg (190 lb)   Height: 4' 11\" (1.499 m)       Transitional Care Management Review:  Benjamin Puga is a 69 y.o. female here for TCM follow up.     During the TCM phone call patient stated:    TCM Call       Date and time call was made  8/25/2020  9:44 AM    Hospital care reviewed  Records reviewed    Patient was hospitialized at  [facility]    Date of Admission  08/12/20    Date of discharge  08/22/20    Diagnosis  Acute pulmonary embolism     Disposition  Home    Were the patients medications reviewed and updated  Yes  magic mouthwash, amlodipine, apixaban, pepcid, tramadol    Current Symptoms  None          TCM Call       Scheduled for follow up?  Yes    Patients specialists  Other (comment)    Other specialists names  Dr. Turner    Did you obtain your prescribed medications  Yes    Do you need help managing your prescriptions or medications  No    Is transportation to your appointment needed  No    I have advised the patient to call PCP with any new or worsening symptoms  April Will CMA    Living Arrangements  Spouse or Significiant other    Are you recieving any outpatient services  No    Are you recieving home care services  Yes    Types of home care services  Nurse visit; Home PT    " Have you fallen in the last 12 months  Yes    How many times  Patients  said there has been multiple falls.            Roland Proctor MD

## 2024-07-22 NOTE — TELEPHONE ENCOUNTER
Contacted patient in regards to ASAP Referral  in attempts to verify patient's needs of services and add patient to proper wait list. spoke with patient whom stated they were interested.

## 2024-07-22 NOTE — TELEPHONE ENCOUNTER
"Behavioral Health Outpatient Intake Questions    Referred By   : Dr. Proctor    Please advise interviewee that they need to answer all questions truthfully to allow for best care, and any misrepresentations of information may affect their ability to be seen at this clinic   => Was this discussed? Yes     If Minor Child (under age 18)    Who is/are the legal guardian(s) of the child?     Is there a custody agreement? No     If \"YES\"- Custody orders must be obtained prior to scheduling the first appointment  In addition, Consent to Treatment must be signed by all legal guardians prior to scheduling the first appointment    If \"NO\"- Consent to Treatment must be signed by all legal guardians prior to scheduling the first appointment    Behavioral Health Outpatient Intake History -     Presenting Problem (in patient's own words):     Attempted suicide due to stress and anxiety     Are there any communication barriers for this patient?     No                                               If yes, please describe barriers:  none  If there is a unique situation, please refer to [facility]/Tiana Suarez for final determination.    Are you taking any psychiatric medications? Yes     If \"YES\" -What are they Cymbalta, Lipitor, Atarax, Zestril     If \"YES\" -Who prescribes? Hospital - Dr. Proctor    Has the Patient previously received outpatient Talk Therapy or Medication Management from [facility]  No        If \"YES\"- When, Where and with Whom?         If \"NO\" -Has Patient received these services elsewhere?       If \"YES\" -When, Where, and with Whom?     Has the Patient abused alcohol or other substances in the last 6 months ? No  No concerns of substance abuse are reported.     If \"YES\" -What substance, How much, How often?     If illegal substance: Refer to [facility] (for AIDE) or [facility].   If Alcohol in excess of 10 drinks per week:  Refer to [facility] (for AIDE) or [facility]    Legal History-     Is " "this treatment court ordered? No   If \"yes \"send to :  Talk Therapy : Send to Luis F Myers/Tiana Suarez for final determination   Med Management: Send to Dr Gu for final determination     Has the Patient been convicted of a felony?  No   If \"Yes\" send to -When, What?  Talk Therapy: Send to Luis F Suarez for final determination   Med Management: Send to Dr Gu for final determination     ACCEPTED as a patient Yes  If \"Yes\" Appointment Date: 10/3 at 11am with Dr. Padgett    Referred Elsewhere? No  If “Yes” - (Where? Ex: [facility], [facility], [facility], [facility], etc.)       Name of Insurance Co:[facility]  Insurance ID#  Insurance Phone #  If ins is primary or secondary?Primary   If patient is a minor, parents information such as Name, D.O.B of guarantor.  "

## 2024-07-22 NOTE — TELEPHONE ENCOUNTER
[facility] called hydrocortisone- pramoxine 2.5-1% rectal cream isn't covered by insurance also pharmacy use discount card and its 200 dollars provider need to prescribe something else.

## 2024-07-29 ENCOUNTER — TELEPHONE (OUTPATIENT)
Dept: PSYCHIATRY | Facility: CLINIC | Age: 69
End: 2024-07-29

## 2024-07-29 NOTE — TELEPHONE ENCOUNTER
One week follow up call for New Patient appointment with Parul Padgett [[id number]] on 10/3  was made on 7/22. Writer informed patient of New Patient paperwork needing to be completed 5 days prior to the appointment. Writer confirmed paperwork has been sent via My Chart.

## 2024-07-30 ENCOUNTER — NURSE TRIAGE (OUTPATIENT)
Age: 69
End: 2024-07-30

## 2024-07-30 ENCOUNTER — TELEPHONE (OUTPATIENT)
Age: 69
End: 2024-07-30

## 2024-07-30 NOTE — TELEPHONE ENCOUNTER
Transferred from the pods. As per the patient she is having withdrawal like symptoms since starting her beta blocker- sweating and shaking, mucus in eyes(clear), hives (on neck and face). Patient also stated that her back is hurting on both sides and she thinks it maybe her kidneys. She would like to come in right away to see you but there is no open availability. Patient stated that she has been home for only 3 weeks from the psych [facility] since trying to kill herself, please advise

## 2024-07-31 ENCOUNTER — OFFICE VISIT (OUTPATIENT)
Age: 69
End: 2024-07-31
Payer: COMMERCIAL

## 2024-07-31 ENCOUNTER — TELEPHONE (OUTPATIENT)
Age: 69
End: 2024-07-31

## 2024-07-31 VITALS
DIASTOLIC BLOOD PRESSURE: 74 MMHG | BODY MASS INDEX: 38.71 KG/M2 | TEMPERATURE: 97.9 F | WEIGHT: 192 LBS | OXYGEN SATURATION: 98 % | HEIGHT: 59 IN | RESPIRATION RATE: 18 BRPM | SYSTOLIC BLOOD PRESSURE: 122 MMHG | HEART RATE: 88 BPM

## 2024-07-31 DIAGNOSIS — F51.02 ADJUSTMENT INSOMNIA: ICD-10-CM

## 2024-07-31 DIAGNOSIS — G25.81 RESTLESS LEGS SYNDROME: Primary | ICD-10-CM

## 2024-07-31 DIAGNOSIS — F41.9 ANXIETY: ICD-10-CM

## 2024-07-31 DIAGNOSIS — I10 PRIMARY HYPERTENSION: Chronic | ICD-10-CM

## 2024-07-31 DIAGNOSIS — D50.8 IRON DEFICIENCY ANEMIA SECONDARY TO INADEQUATE DIETARY IRON INTAKE: ICD-10-CM

## 2024-07-31 PROCEDURE — 3074F SYST BP LT 130 MM HG: CPT | Performed by: INTERNAL MEDICINE

## 2024-07-31 PROCEDURE — G2211 COMPLEX E/M VISIT ADD ON: HCPCS | Performed by: INTERNAL MEDICINE

## 2024-07-31 PROCEDURE — 1159F MED LIST DOCD IN RCRD: CPT | Performed by: INTERNAL MEDICINE

## 2024-07-31 PROCEDURE — 1160F RVW MEDS BY RX/DR IN RCRD: CPT | Performed by: INTERNAL MEDICINE

## 2024-07-31 PROCEDURE — 3078F DIAST BP <80 MM HG: CPT | Performed by: INTERNAL MEDICINE

## 2024-07-31 PROCEDURE — 99214 OFFICE O/P EST MOD 30 MIN: CPT | Performed by: INTERNAL MEDICINE

## 2024-07-31 RX ORDER — ROPINIROLE 0.25 MG/1
TABLET, FILM COATED ORAL
Qty: 40 TABLET | Refills: 0 | Status: SHIPPED | OUTPATIENT
Start: 2024-07-31 | End: 2024-08-14

## 2024-07-31 RX ORDER — ROSUVASTATIN CALCIUM 20 MG/1
20 TABLET, COATED ORAL DAILY
COMMUNITY

## 2024-07-31 NOTE — PROGRESS NOTES
Assessment/Plan:    There are no diagnoses linked to this encounter.          There are no Patient Instructions on file for this visit.    Subjective:      Patient ID: Benjamin Puga is a 69 y.o. female    F/u MMP  Not feeling well.  No sleep in 4 days.  Feeling anxious and restless w/ jerky arm and restless legs.    Hydroxyzine seemed to make things worse, so not taking  Propranolol caused stomach upset, hives, and headache, but continues to take and hr and bp's have been better.    Anxiety is still bad, seeing psych in October.  Continues on cymbalta.      Headache  Generalized Body Aches  Associated symptoms include headaches. Pertinent negatives include no congestion, hearing loss, rhinorrhea, fatigue, fever, chest pain, coughing, shortness of breath, wheezing, abdominal pain or rash.   Fever  Associated symptoms include headaches. Pertinent negatives include no abdominal pain, arthralgias, chest pain, chills, congestion, coughing, diaphoresis, fatigue, fever, myalgias, rash or weakness.         Current Outpatient Medications:     albuterol (PROVENTIL HFA,VENTOLIN HFA) 90 mcg/act inhaler, Inhale 2 puffs every 6 (six) hours as needed for wheezing, Disp: 18 g, Rfl: 0    amLODIPine (NORVASC) 5 mg tablet, Take 1 tablet (5 mg total) by mouth daily, Disp: 100 tablet, Rfl: 0    apixaban (Eliquis) 2.5 mg, Take 1 tablet (2.5 mg total) by mouth 2 (two) times a day, Disp: 180 tablet, Rfl: 0    azelastine (ASTELIN) 0.1 % nasal spray, Spray 2 sprays twice a day by intranasal route., Disp: , Rfl:     clotrimazole (LOTRIMIN) 1 % cream, Apply topically 2 (two) times a day for 10 doses, Disp: 45 g, Rfl: 0    DULoxetine (CYMBALTA) 30 mg delayed release capsule, Take 3 capsules (90 mg total) by mouth daily, Disp: 30 capsule, Rfl: 0    famotidine (PEPCID) 10 mg tablet, Take 1 tablet (10 mg total) by mouth daily, Disp: 90 tablet, Rfl: 3    ferrous sulfate 325 (65 Fe) mg tablet, Take 325 mg by mouth daily, Disp: , Rfl:      hydrocortisone (ANUSOL-HC) 2.5 % rectal cream, Apply topically 2 (two) times a day, Disp: 28 g, Rfl: 3    levothyroxine 50 mcg tablet, Take 1 tablet (50 mcg total) by mouth daily in the early morning, Disp: 30 tablet, Rfl: 0    lisinopril (ZESTRIL) 40 mg tablet, Take 1 tablet (40 mg total) by mouth daily, Disp: 100 tablet, Rfl: 0    methocarbamol (ROBAXIN) 500 mg tablet, TAKE ONE TABLET BY MOUTH FOUR TIMES A DAY AS NEEDED FOR MUSCLE SPASMS, Disp: , Rfl:     Multiple Vitamins-Minerals (OCUVITE ADULT 50+ PO), Take by mouth, Disp: , Rfl:     nystatin powder, Apply 1 Application topically 2 (two) times a day, Disp: 60 g, Rfl: 0    pantoprazole (PROTONIX) 40 mg tablet, Take 1 tablet (40 mg total) by mouth 2 (two) times a day, Disp: 180 tablet, Rfl: 0    propranolol (INDERAL LA) 80 mg 24 hr capsule, Take 1 capsule (80 mg total) by mouth daily, Disp: 30 capsule, Rfl: 3    rosuvastatin (CRESTOR) 20 MG tablet, Take 20 mg by mouth daily, Disp: , Rfl:     saccharomyces boulardii (FLORASTOR) 250 mg capsule, Take 1 capsule (250 mg total) by mouth 2 (two) times a day, Disp: 60 capsule, Rfl: 0    Triamcinolone Acetonide (Nasacort Allergy 24HR) 55 MCG/ACT nasal spray, 2 sprays into each nostril as needed (Allergies), Disp: 16.9 mL, Rfl: 0    atorvastatin (LIPITOR) 40 mg tablet, Take 1 tablet (40 mg total) by mouth daily (Patient not taking: Reported on 7/31/2024), Disp: 30 tablet, Rfl: 0    cyanocobalamin 1,000 mcg/mL, Inject 1 mL (1,000 mcg total) into a muscle every 30 (thirty) days (Patient not taking: Reported on 7/31/2024), Disp: 1 mL, Rfl: 0    tiZANidine (ZANAFLEX) 4 mg tablet, Take 1 tablet (4 mg total) by mouth every 8 (eight) hours as needed for muscle spasms (Patient not taking: Reported on 7/31/2024), Disp: 60 tablet, Rfl: 0    Recent Results (from the past 1008 hour(s))   ECG 12 lead    Collection Time: 06/25/24 11:16 PM   Result Value Ref Range    Ventricular Rate 110 BPM    Atrial Rate 110 BPM    MN Interval 194 ms  "   QRSD Interval 86 ms    QT Interval 324 ms    QTC Interval 438 ms    P Axis 56 degrees    QRS Axis 105 degrees    T Wave Axis 40 degrees   CBC and differential    Collection Time: 06/25/24 11:22 PM   Result Value Ref Range    WBC 8.17 4.31 - 10.16 Thousand/uL    RBC 4.62 3.81 - 5.12 Million/uL    Hemoglobin 13.0 11.5 - 15.4 g/dL    Hematocrit 40.4 34.8 - 46.1 %    MCV 87 82 - 98 fL    MCH 28.1 26.8 - 34.3 pg    MCHC 32.2 31.4 - 37.4 g/dL    RDW 13.2 11.6 - 15.1 %    MPV 8.8 (L) 8.9 - 12.7 fL    Platelets 251 149 - 390 Thousands/uL    nRBC 0 /100 WBCs    Segmented % 62 43 - 75 %    Immature Grans % 0 0 - 2 %    Lymphocytes % 27 14 - 44 %    Monocytes % 7 4 - 12 %    Eosinophils Relative 4 0 - 6 %    Basophils Relative 0 0 - 1 %    Absolute Neutrophils 5.04 1.85 - 7.62 Thousands/µL    Absolute Immature Grans 0.02 0.00 - 0.20 Thousand/uL    Absolute Lymphocytes 2.23 0.60 - 4.47 Thousands/µL    Absolute Monocytes 0.56 0.17 - 1.22 Thousand/µL    Eosinophils Absolute 0.29 0.00 - 0.61 Thousand/µL    Basophils Absolute 0.03 0.00 - 0.10 Thousands/µL   Comprehensive metabolic panel    Collection Time: 06/25/24 11:22 PM   Result Value Ref Range    Sodium 139 135 - 147 mmol/L    Potassium 4.1 3.5 - 5.3 mmol/L    Chloride 100 96 - 108 mmol/L    CO2 29 21 - 32 mmol/L    ANION GAP 10 4 - 13 mmol/L    BUN 11 5 - 25 mg/dL    Creatinine 0.75 0.60 - 1.30 mg/dL    Glucose 128 65 - 140 mg/dL    Calcium 9.6 8.4 - 10.2 mg/dL    AST 17 13 - 39 U/L    ALT 12 7 - 52 U/L    Alkaline Phosphatase 82 34 - 104 U/L    Total Protein 7.1 6.4 - 8.4 g/dL    Albumin 4.1 3.5 - 5.0 g/dL    Total Bilirubin 0.42 0.20 - 1.00 mg/dL    eGFR 81 ml/min/1.73sq m   HS Troponin 0hr (reflex protocol)    Collection Time: 06/25/24 11:22 PM   Result Value Ref Range    hs TnI 0hr <2 \"Refer to ACS Flowchart\"- see link ng/L   TSH    Collection Time: 06/25/24 11:22 PM   Result Value Ref Range    TSH 3RD GENERATON 0.215 (L) 0.450 - 4.500 uIU/mL   Ethanol    Collection " "Time: 06/25/24 11:22 PM   Result Value Ref Range    Ethanol Lvl <10 <10 mg/dL   Salicylate level    Collection Time: 06/25/24 11:22 PM   Result Value Ref Range    Salicylate Lvl <5 3 - 20 mg/dL   Acetaminophen level-If concentration is detectable, please discuss with medical  on call.    Collection Time: 06/25/24 11:22 PM   Result Value Ref Range    Acetaminophen Level <2 (L) 10 - 20 ug/mL   Blood gas, venous    Collection Time: 06/25/24 11:37 PM   Result Value Ref Range    pH, Juanito 7.422 (H) 7.300 - 7.400    pCO2, Juanito 33.1 (L) 42.0 - 50.0 mm Hg    pO2, Juanito 44.0 35.0 - 45.0 mm Hg    HCO3, Juanito 21.1 (L) 24 - 30 mmol/L    Base Excess, Juanito -2.7 mmol/L    O2 Content, Juanito 13.6 ml/dL    O2 HGB, VENOUS 82.3 (H) 60.0 - 80.0 %   HS Troponin I 2hr    Collection Time: 06/26/24  1:14 AM   Result Value Ref Range    hs TnI 2hr <2 \"Refer to ACS Flowchart\"- see link ng/L    Delta 2hr hsTnI     HS Troponin I 4hr    Collection Time: 06/26/24  2:56 AM   Result Value Ref Range    hs TnI 4hr <2 \"Refer to ACS Flowchart\"- see link ng/L    Delta 4hr hsTnI     Rapid drug screen, urine    Collection Time: 06/26/24 11:54 PM   Result Value Ref Range    Amph/Meth UR Negative Negative    Barbiturate Ur Negative Negative    Benzodiazepine Urine Positive (A) Negative    Cocaine Urine Negative Negative    Methadone Urine Negative Negative    Opiate Urine Negative Negative    PCP Ur Negative Negative    THC Urine Negative Negative    Oxycodone Urine Negative Negative    Fentanyl Urine Negative Negative    HYDROCODONE URINE Negative Negative   UA w Reflex to Microscopic w Reflex to Culture    Collection Time: 06/26/24 11:56 PM    Specimen: Urine, Clean Catch   Result Value Ref Range    Color, UA Yellow     Clarity, UA Turbid     Specific Gravity, UA 1.020 1.003 - 1.030    pH, UA 5.0 4.5, 5.0, 5.5, 6.0, 6.5, 7.0, 7.5, 8.0    Leukocytes, UA Large (A) Negative    Nitrite, UA Negative Negative    Protein, UA Negative Negative mg/dl    Glucose, " UA Negative Negative mg/dl    Ketones, UA Negative Negative mg/dl    Urobilinogen, UA <2.0 <2.0 mg/dl mg/dl    Bilirubin, UA Negative Negative    Occult Blood, UA Negative Negative   Urine Microscopic    Collection Time: 06/26/24 11:56 PM   Result Value Ref Range    RBC, UA 10-20 (A) None Seen, 1-2 /hpf    WBC, UA 10-20 (A) None Seen, 1-2 /hpf    Epithelial Cells Moderate (A) None Seen, Occasional /hpf    Bacteria, UA None Seen None Seen, Occasional /hpf    MUCUS THREADS Moderate (A) None Seen   Urine culture    Collection Time: 06/26/24 11:56 PM    Specimen: Urine, Clean Catch   Result Value Ref Range    Urine Culture 70,000-79,000 cfu/ml    Vitamin B12    Collection Time: 06/29/24  5:37 AM   Result Value Ref Range    Vitamin B-12 288 180 - 914 pg/mL   Vitamin D 25 hydroxy    Collection Time: 06/29/24  5:37 AM   Result Value Ref Range    Vit D, 25-Hydroxy 46.3 30.0 - 100.0 ng/mL   Lipid panel    Collection Time: 06/29/24  5:37 AM   Result Value Ref Range    Cholesterol 164 See Comment mg/dL    Triglycerides 64 See Comment mg/dL    HDL, Direct 51 >=50 mg/dL    LDL Calculated 100 0 - 100 mg/dL    Non-HDL-Chol (CHOL-HDL) 113 mg/dl   Hemoglobin A1C w/ EAG Estimation    Collection Time: 06/30/24  5:14 AM   Result Value Ref Range    Hemoglobin A1C 5.7 (H) Normal 4.0-5.6%; PreDiabetic 5.7-6.4%; Diabetic >=6.5%; Glycemic control for adults with diabetes <7.0% %     mg/dl   CBC and differential    Collection Time: 06/30/24  5:14 AM   Result Value Ref Range    WBC 9.28 4.31 - 10.16 Thousand/uL    RBC 4.65 3.81 - 5.12 Million/uL    Hemoglobin 13.2 11.5 - 15.4 g/dL    Hematocrit 42.3 34.8 - 46.1 %    MCV 91 82 - 98 fL    MCH 28.4 26.8 - 34.3 pg    MCHC 31.2 (L) 31.4 - 37.4 g/dL    RDW 13.9 11.6 - 15.1 %    MPV 9.2 8.9 - 12.7 fL    Platelets 284 149 - 390 Thousands/uL    nRBC 0 /100 WBCs    Segmented % 60 43 - 75 %    Immature Grans % 0 0 - 2 %    Lymphocytes % 28 14 - 44 %    Monocytes % 9 4 - 12 %    Eosinophils  Relative 3 0 - 6 %    Basophils Relative 0 0 - 1 %    Absolute Neutrophils 5.56 1.85 - 7.62 Thousands/µL    Absolute Immature Grans 0.02 0.00 - 0.20 Thousand/uL    Absolute Lymphocytes 2.59 0.60 - 4.47 Thousands/µL    Absolute Monocytes 0.81 0.17 - 1.22 Thousand/µL    Eosinophils Absolute 0.27 0.00 - 0.61 Thousand/µL    Basophils Absolute 0.03 0.00 - 0.10 Thousands/µL   Comprehensive metabolic panel    Collection Time: 06/30/24  5:14 AM   Result Value Ref Range    Sodium 140 135 - 147 mmol/L    Potassium 3.7 3.5 - 5.3 mmol/L    Chloride 103 96 - 108 mmol/L    CO2 29 21 - 32 mmol/L    ANION GAP 8 4 - 13 mmol/L    BUN 15 5 - 25 mg/dL    Creatinine 0.76 0.60 - 1.30 mg/dL    Glucose 97 65 - 140 mg/dL    Glucose, Fasting 97 65 - 99 mg/dL    Calcium 9.6 8.4 - 10.2 mg/dL    AST 29 13 - 39 U/L    ALT 15 7 - 52 U/L    Alkaline Phosphatase 64 34 - 104 U/L    Total Protein 7.8 6.4 - 8.4 g/dL    Albumin 4.3 3.5 - 5.0 g/dL    Total Bilirubin 0.60 0.20 - 1.00 mg/dL    eGFR 80 ml/min/1.73sq m   Vitamin D 25 hydroxy    Collection Time: 06/30/24  5:14 AM   Result Value Ref Range    Vit D, 25-Hydroxy 45.5 30.0 - 100.0 ng/mL   Folate    Collection Time: 06/30/24  5:14 AM   Result Value Ref Range    Folate >22.3 >5.9 ng/mL   Vitamin B12    Collection Time: 06/30/24  5:14 AM   Result Value Ref Range    Vitamin B-12 261 180 - 914 pg/mL   Lipid panel    Collection Time: 06/30/24  5:14 AM   Result Value Ref Range    Cholesterol 175 See Comment mg/dL    Triglycerides 73 See Comment mg/dL    HDL, Direct 56 >=50 mg/dL    LDL Calculated 104 (H) 0 - 100 mg/dL    Non-HDL-Chol (CHOL-HDL) 119 mg/dl   TSH, 3rd generation with Free T4 reflex    Collection Time: 07/03/24  5:54 AM   Result Value Ref Range    TSH 3RD GENERATON 0.789 0.450 - 4.500 uIU/mL       The following portions of the patient's history were reviewed and updated as appropriate: allergies, current medications, past family history, past medical history, past social history, past  surgical history and problem list.     Review of Systems   Constitutional:  Negative for appetite change, chills, diaphoresis, fatigue, fever and unexpected weight change.   HENT:  Negative for congestion, hearing loss and rhinorrhea.    Eyes:  Negative for visual disturbance.   Respiratory:  Negative for cough, chest tightness, shortness of breath and wheezing.    Cardiovascular:  Negative for chest pain, palpitations and leg swelling.   Gastrointestinal:  Negative for abdominal pain and blood in stool.   Endocrine: Negative for cold intolerance, heat intolerance, polydipsia and polyuria.   Genitourinary:  Negative for difficulty urinating, dysuria, frequency and urgency.   Musculoskeletal:  Negative for arthralgias and myalgias.   Skin:  Negative for rash.   Neurological:  Positive for headaches. Negative for dizziness, weakness and light-headedness.   Hematological:  Does not bruise/bleed easily.   Psychiatric/Behavioral:  Positive for sleep disturbance. Negative for dysphoric mood.          Objective:      Vitals:    07/31/24 1432   BP: 122/74   Pulse: 88   Resp: 18   Temp: 97.9 °F (36.6 °C)   SpO2: 98%          Physical Exam  Constitutional:       Appearance: She is well-developed.   HENT:      Head: Normocephalic and atraumatic.   Pulmonary:      Effort: Pulmonary effort is normal.   Neurological:      Mental Status: She is alert and oriented to person, place, and time.   Psychiatric:         Behavior: Behavior normal. Behavior is cooperative.         Thought Content: Thought content normal.         Judgment: Judgment normal.

## 2024-07-31 NOTE — PATIENT INSTRUCTIONS
Restless legs-will start propranolol as outlined on prescription start 0.25 mg, can increase to 0.5 mg on day 3 if needed, can increase to 1 mg after 1 week if needed, return for follow-up as scheduled on the 12th    Hypertension and tachycardia have improved with propranolol.  We we will continue but if she has persistent perceived side effects to this medication would consider changing to atenolol or other beta-blocker.    Anxiety-continue Cymbalta, okay to change to nighttime dosing

## 2024-07-31 NOTE — TELEPHONE ENCOUNTER
Called and left message for her to come in with her , as per her  she was finally getting some rest

## 2024-08-12 ENCOUNTER — OFFICE VISIT (OUTPATIENT)
Age: 69
End: 2024-08-12
Payer: COMMERCIAL

## 2024-08-12 ENCOUNTER — APPOINTMENT (OUTPATIENT)
Dept: LAB | Facility: HOSPITAL | Age: 69
End: 2024-08-12
Payer: COMMERCIAL

## 2024-08-12 VITALS
SYSTOLIC BLOOD PRESSURE: 130 MMHG | DIASTOLIC BLOOD PRESSURE: 74 MMHG | HEART RATE: 72 BPM | TEMPERATURE: 98.1 F | HEIGHT: 59 IN | BODY MASS INDEX: 38.51 KG/M2 | OXYGEN SATURATION: 98 % | RESPIRATION RATE: 18 BRPM | WEIGHT: 191 LBS

## 2024-08-12 DIAGNOSIS — F33.3 MDD (MAJOR DEPRESSIVE DISORDER), RECURRENT, SEVERE, WITH PSYCHOSIS (HCC): Primary | ICD-10-CM

## 2024-08-12 DIAGNOSIS — G25.81 RESTLESS LEGS SYNDROME: ICD-10-CM

## 2024-08-12 DIAGNOSIS — D50.8 IRON DEFICIENCY ANEMIA SECONDARY TO INADEQUATE DIETARY IRON INTAKE: ICD-10-CM

## 2024-08-12 DIAGNOSIS — E03.9 ACQUIRED HYPOTHYROIDISM: ICD-10-CM

## 2024-08-12 DIAGNOSIS — I10 PRIMARY HYPERTENSION: Chronic | ICD-10-CM

## 2024-08-12 DIAGNOSIS — I26.99 PULMONARY EMBOLISM, OTHER, UNSPECIFIED CHRONICITY, UNSPECIFIED WHETHER ACUTE COR PULMONALE PRESENT (HCC): ICD-10-CM

## 2024-08-12 LAB
FERRITIN SERPL-MCNC: 185 NG/ML (ref 11–307)
IRON SATN MFR SERPL: 22 % (ref 15–50)
IRON SERPL-MCNC: 68 UG/DL (ref 50–212)
TIBC SERPL-MCNC: 315 UG/DL (ref 250–450)
UIBC SERPL-MCNC: 247 UG/DL (ref 155–355)

## 2024-08-12 PROCEDURE — G2211 COMPLEX E/M VISIT ADD ON: HCPCS | Performed by: INTERNAL MEDICINE

## 2024-08-12 PROCEDURE — 36415 COLL VENOUS BLD VENIPUNCTURE: CPT

## 2024-08-12 PROCEDURE — 83550 IRON BINDING TEST: CPT

## 2024-08-12 PROCEDURE — 83540 ASSAY OF IRON: CPT

## 2024-08-12 PROCEDURE — 82728 ASSAY OF FERRITIN: CPT

## 2024-08-12 PROCEDURE — 99214 OFFICE O/P EST MOD 30 MIN: CPT | Performed by: INTERNAL MEDICINE

## 2024-08-12 RX ORDER — QUETIAPINE FUMARATE 50 MG/1
TABLET, FILM COATED ORAL
Qty: 30 TABLET | Refills: 1 | Status: SHIPPED | OUTPATIENT
Start: 2024-08-12 | End: 2024-08-22

## 2024-08-12 NOTE — PATIENT INSTRUCTIONS
Dear Benjamin Puga,     Notable Medication Adjustments -   Please take Seroquel 25 mg daily for 3 days.  If symptoms do not improve, we can increase to 50 mg daily.  If symptoms continue, please call us.  We may use up to 100 mg daily if needed.  Testing Required -   Mammogram      Sincerely,   Lebron Rhoades MD

## 2024-08-12 NOTE — ASSESSMENT & PLAN NOTE
Patient has had admission for SI.  Has follow up with psych in October.  Currently seeing psychology with some improvement.    She continues to struggle sleeping.  We recommend seroquel 25 mg daily.  Dose can be increased to 50 after 3-4 days if no improvement.

## 2024-08-12 NOTE — PROGRESS NOTES
Ambulatory Visit  Name: Benjamin Puga      : 1955      MRN: [id number]  Encounter Provider: Roland Proctor MD  Encounter Date: 2024   Encounter department: [facility]    Assessment & Plan   1. MDD (major depressive disorder), recurrent, severe, with psychosis (HCC)  Assessment & Plan:  Patient has had admission for SI.  Has follow up with psych in October.  Currently seeing psychology with some improvement.    She continues to struggle sleeping.  We recommend seroquel 25 mg daily.  Dose can be increased to 50 after 3-4 days if no improvement.   Orders:  -     QUEtiapine (SEROquel) 50 mg tablet; Take half tablet for 3-4 days then increase to whole tablet if needed.  2. Primary hypertension  Assessment & Plan:  Well controlled on current medical regimen.  3. Pulmonary embolism, other, unspecified chronicity, unspecified whether acute cor pulmonale present (HCC)  Assessment & Plan:  On Eliquis.  No signs and symptoms of bleeding.  4. Acquired hypothyroidism  Assessment & Plan:  Continue levothyroxine.   No symptoms of hypothyroidism on exam.       History of Present Illness     Fever  Associated symptoms include fatigue and headaches.   Headache      Review of Systems   Constitutional:  Positive for fatigue.   Eyes: Negative.    Respiratory: Negative.     Cardiovascular: Negative.    Gastrointestinal: Negative.    Endocrine: Negative.    Genitourinary: Negative.    Musculoskeletal: Negative.         Shaky   Skin: Negative.    Neurological:  Positive for headaches.     Current Outpatient Medications on File Prior to Visit   Medication Sig Dispense Refill    albuterol (PROVENTIL HFA,VENTOLIN HFA) 90 mcg/act inhaler Inhale 2 puffs every 6 (six) hours as needed for wheezing 18 g 0    amLODIPine (NORVASC) 5 mg tablet Take 1 tablet (5 mg total) by mouth daily 100 tablet 0    apixaban (Eliquis) 2.5 mg Take 1 tablet (2.5 mg total) by mouth 2 (two) times a day 180 tablet 0     azelastine (ASTELIN) 0.1 % nasal spray Spray 2 sprays twice a day by intranasal route.      clotrimazole (LOTRIMIN) 1 % cream Apply topically 2 (two) times a day for 10 doses 45 g 0    DULoxetine (CYMBALTA) 30 mg delayed release capsule Take 3 capsules (90 mg total) by mouth daily 30 capsule 0    famotidine (PEPCID) 10 mg tablet Take 1 tablet (10 mg total) by mouth daily 90 tablet 3    ferrous sulfate 325 (65 Fe) mg tablet Take 325 mg by mouth daily      hydrocortisone (ANUSOL-HC) 2.5 % rectal cream Apply topically 2 (two) times a day 28 g 3    levothyroxine 50 mcg tablet Take 1 tablet (50 mcg total) by mouth daily in the early morning 30 tablet 0    lisinopril (ZESTRIL) 40 mg tablet Take 1 tablet (40 mg total) by mouth daily 100 tablet 0    methocarbamol (ROBAXIN) 500 mg tablet TAKE ONE TABLET BY MOUTH FOUR TIMES A DAY AS NEEDED FOR MUSCLE SPASMS      Multiple Vitamins-Minerals (OCUVITE ADULT 50+ PO) Take by mouth      nystatin powder Apply 1 Application topically 2 (two) times a day 60 g 0    pantoprazole (PROTONIX) 40 mg tablet Take 1 tablet (40 mg total) by mouth 2 (two) times a day 180 tablet 0    propranolol (INDERAL LA) 80 mg 24 hr capsule Take 1 capsule (80 mg total) by mouth daily 30 capsule 3    rosuvastatin (CRESTOR) 20 MG tablet Take 20 mg by mouth daily      saccharomyces boulardii (FLORASTOR) 250 mg capsule Take 1 capsule (250 mg total) by mouth 2 (two) times a day 60 capsule 0    Triamcinolone Acetonide (Nasacort Allergy 24HR) 55 MCG/ACT nasal spray 2 sprays into each nostril as needed (Allergies) 16.9 mL 0    cyanocobalamin 1,000 mcg/mL Inject 1 mL (1,000 mcg total) into a muscle every 30 (thirty) days (Patient not taking: Reported on 7/31/2024) 1 mL 0    rOPINIRole (REQUIP) 0.25 mg tablet Take 1 tablet (0.25 mg total) by mouth daily at bedtime for 2 days, THEN 2 tablets (0.5 mg total) daily at bedtime for 5 days, THEN 4 tablets (1 mg total) daily at bedtime for 7 days. (Patient not taking: Reported  "on 8/12/2024) 40 tablet 0    tiZANidine (ZANAFLEX) 4 mg tablet Take 1 tablet (4 mg total) by mouth every 8 (eight) hours as needed for muscle spasms (Patient not taking: Reported on 7/31/2024) 60 tablet 0     No current facility-administered medications on file prior to visit.      Social History     Tobacco Use    Smoking status: Never    Smokeless tobacco: Never   Vaping Use    Vaping status: Never Used   Substance and Sexual Activity    Alcohol use: Yes     Comment: socially     Drug use: No    Sexual activity: Not Currently     Partners: Male     Objective     /74 (BP Location: Left arm, Patient Position: Sitting, Cuff Size: Standard)   Pulse 72   Temp 98.1 °F (36.7 °C) (Tympanic)   Resp 18   Ht 4' 11\" (1.499 m)   Wt 86.6 kg (191 lb)   SpO2 98%   BMI 38.58 kg/m²     Physical Exam  Vitals and nursing note reviewed.   Constitutional:       General: She is not in acute distress.     Appearance: She is well-developed.   HENT:      Head: Normocephalic and atraumatic.   Eyes:      Conjunctiva/sclera: Conjunctivae normal.   Cardiovascular:      Rate and Rhythm: Normal rate and regular rhythm.      Heart sounds: No murmur heard.  Pulmonary:      Effort: Pulmonary effort is normal. No respiratory distress.      Breath sounds: Normal breath sounds.   Abdominal:      Palpations: Abdomen is soft.      Tenderness: There is no abdominal tenderness.   Musculoskeletal:         General: No swelling.      Cervical back: Neck supple.   Skin:     General: Skin is warm and dry.      Capillary Refill: Capillary refill takes less than 2 seconds.   Neurological:      Mental Status: She is alert.   Psychiatric:         Mood and Affect: Mood normal.       Administrative Statements   I have spent a total time of 20 minutes in caring for this patient on the day of the visit/encounter including Instructions for management, Importance of tx compliance, and Reviewing / ordering tests, medicine, procedures  .        "

## 2024-08-19 DIAGNOSIS — I26.99 PULMONARY EMBOLISM, OTHER, UNSPECIFIED CHRONICITY, UNSPECIFIED WHETHER ACUTE COR PULMONALE PRESENT (HCC): ICD-10-CM

## 2024-08-19 DIAGNOSIS — K21.9 GASTROESOPHAGEAL REFLUX DISEASE WITHOUT ESOPHAGITIS: ICD-10-CM

## 2024-08-19 DIAGNOSIS — F32.2 CURRENT SEVERE EPISODE OF MAJOR DEPRESSIVE DISORDER WITHOUT PSYCHOTIC FEATURES WITHOUT PRIOR EPISODE (HCC): ICD-10-CM

## 2024-08-19 RX ORDER — PANTOPRAZOLE SODIUM 40 MG/1
40 TABLET, DELAYED RELEASE ORAL 2 TIMES DAILY
Qty: 200 TABLET | Refills: 3 | Status: SHIPPED | OUTPATIENT
Start: 2024-08-19

## 2024-08-19 RX ORDER — PANTOPRAZOLE SODIUM 40 MG/1
40 TABLET, DELAYED RELEASE ORAL 2 TIMES DAILY
Qty: 200 TABLET | Refills: 3 | Status: SHIPPED | OUTPATIENT
Start: 2024-08-19 | End: 2024-08-19 | Stop reason: SDUPTHER

## 2024-08-19 NOTE — TELEPHONE ENCOUNTER
Patient states her refills are being sent to [facility] was [facility] to the patient's preferred pharmacy.

## 2024-08-20 RX ORDER — DULOXETIN HYDROCHLORIDE 30 MG/1
90 CAPSULE, DELAYED RELEASE ORAL DAILY
Qty: 270 CAPSULE | Refills: 1 | Status: SHIPPED | OUTPATIENT
Start: 2024-08-20 | End: 2024-11-18

## 2024-08-22 ENCOUNTER — APPOINTMENT (OUTPATIENT)
Dept: LAB | Facility: CLINIC | Age: 69
End: 2024-08-22
Payer: COMMERCIAL

## 2024-08-22 ENCOUNTER — TELEPHONE (OUTPATIENT)
Age: 69
End: 2024-08-22

## 2024-08-22 ENCOUNTER — OFFICE VISIT (OUTPATIENT)
Age: 69
End: 2024-08-22
Payer: COMMERCIAL

## 2024-08-22 VITALS
HEART RATE: 128 BPM | SYSTOLIC BLOOD PRESSURE: 146 MMHG | HEIGHT: 59 IN | RESPIRATION RATE: 18 BRPM | DIASTOLIC BLOOD PRESSURE: 86 MMHG | BODY MASS INDEX: 38.71 KG/M2 | WEIGHT: 192 LBS | TEMPERATURE: 98.1 F | OXYGEN SATURATION: 97 %

## 2024-08-22 DIAGNOSIS — D05.12 DUCTAL CARCINOMA IN SITU (DCIS) OF LEFT BREAST: ICD-10-CM

## 2024-08-22 DIAGNOSIS — N63.23 MASS OF LOWER OUTER QUADRANT OF LEFT BREAST: Primary | ICD-10-CM

## 2024-08-22 DIAGNOSIS — R07.9 CHEST PAIN, UNSPECIFIED TYPE: ICD-10-CM

## 2024-08-22 DIAGNOSIS — E03.9 ACQUIRED HYPOTHYROIDISM: ICD-10-CM

## 2024-08-22 DIAGNOSIS — R31.29 MICROSCOPIC HEMATURIA: Primary | ICD-10-CM

## 2024-08-22 DIAGNOSIS — I47.11 INAPPROPRIATE SINUS TACHYCARDIA: ICD-10-CM

## 2024-08-22 DIAGNOSIS — R82.90 FOUL SMELLING URINE: ICD-10-CM

## 2024-08-22 DIAGNOSIS — G61.9 INFLAMMATORY NEUROPATHY (HCC): ICD-10-CM

## 2024-08-22 DIAGNOSIS — R73.01 IMPAIRED FASTING GLUCOSE: ICD-10-CM

## 2024-08-22 DIAGNOSIS — I10 PRIMARY HYPERTENSION: ICD-10-CM

## 2024-08-22 LAB
ALBUMIN SERPL BCG-MCNC: 4.3 G/DL (ref 3.5–5)
ALP SERPL-CCNC: 85 U/L (ref 34–104)
ALT SERPL W P-5'-P-CCNC: 18 U/L (ref 7–52)
ANION GAP SERPL CALCULATED.3IONS-SCNC: 10 MMOL/L (ref 4–13)
AST SERPL W P-5'-P-CCNC: 19 U/L (ref 13–39)
BACTERIA UR QL AUTO: ABNORMAL /HPF
BASOPHILS # BLD AUTO: 0.06 THOUSANDS/ÂΜL (ref 0–0.1)
BASOPHILS NFR BLD AUTO: 1 % (ref 0–1)
BILIRUB SERPL-MCNC: 0.52 MG/DL (ref 0.2–1)
BILIRUB UR QL STRIP: NEGATIVE
BUN SERPL-MCNC: 11 MG/DL (ref 5–25)
CALCIUM SERPL-MCNC: 9.9 MG/DL (ref 8.4–10.2)
CAOX CRY URNS QL MICRO: ABNORMAL /HPF
CARDIAC TROPONIN I PNL SERPL HS: <2 NG/L (ref 8–18)
CHLORIDE SERPL-SCNC: 96 MMOL/L (ref 96–108)
CHOLEST SERPL-MCNC: 187 MG/DL
CLARITY UR: ABNORMAL
CO2 SERPL-SCNC: 29 MMOL/L (ref 21–32)
COLOR UR: YELLOW
CREAT SERPL-MCNC: 0.75 MG/DL (ref 0.6–1.3)
EOSINOPHIL # BLD AUTO: 0.64 THOUSAND/ÂΜL (ref 0–0.61)
EOSINOPHIL NFR BLD AUTO: 6 % (ref 0–6)
ERYTHROCYTE [DISTWIDTH] IN BLOOD BY AUTOMATED COUNT: 14.1 % (ref 11.6–15.1)
EST. AVERAGE GLUCOSE BLD GHB EST-MCNC: 134 MG/DL
GFR SERPL CREATININE-BSD FRML MDRD: 81 ML/MIN/1.73SQ M
GLUCOSE SERPL-MCNC: 109 MG/DL (ref 65–140)
GLUCOSE UR STRIP-MCNC: NEGATIVE MG/DL
HBA1C MFR BLD: 6.3 %
HCT VFR BLD AUTO: 42.4 % (ref 34.8–46.1)
HDLC SERPL-MCNC: 61 MG/DL
HGB BLD-MCNC: 13.4 G/DL (ref 11.5–15.4)
HGB UR QL STRIP.AUTO: NEGATIVE
IMM GRANULOCYTES # BLD AUTO: 0.04 THOUSAND/UL (ref 0–0.2)
IMM GRANULOCYTES NFR BLD AUTO: 0 % (ref 0–2)
KETONES UR STRIP-MCNC: NEGATIVE MG/DL
LDLC SERPL CALC-MCNC: 92 MG/DL (ref 0–100)
LEUKOCYTE ESTERASE UR QL STRIP: NEGATIVE
LYMPHOCYTES # BLD AUTO: 3.3 THOUSANDS/ÂΜL (ref 0.6–4.47)
LYMPHOCYTES NFR BLD AUTO: 33 % (ref 14–44)
MCH RBC QN AUTO: 28.7 PG (ref 26.8–34.3)
MCHC RBC AUTO-ENTMCNC: 31.6 G/DL (ref 31.4–37.4)
MCV RBC AUTO: 91 FL (ref 82–98)
MONOCYTES # BLD AUTO: 0.91 THOUSAND/ÂΜL (ref 0.17–1.22)
MONOCYTES NFR BLD AUTO: 9 % (ref 4–12)
MUCOUS THREADS UR QL AUTO: ABNORMAL
NEUTROPHILS # BLD AUTO: 5.17 THOUSANDS/ÂΜL (ref 1.85–7.62)
NEUTS SEG NFR BLD AUTO: 51 % (ref 43–75)
NITRITE UR QL STRIP: NEGATIVE
NON-SQ EPI CELLS URNS QL MICRO: ABNORMAL /HPF
NONHDLC SERPL-MCNC: 126 MG/DL
NRBC BLD AUTO-RTO: 0 /100 WBCS
PH UR STRIP.AUTO: 5.5 [PH]
PLATELET # BLD AUTO: 279 THOUSANDS/UL (ref 149–390)
PMV BLD AUTO: 9.2 FL (ref 8.9–12.7)
POTASSIUM SERPL-SCNC: 4 MMOL/L (ref 3.5–5.3)
PROT SERPL-MCNC: 7.8 G/DL (ref 6.4–8.4)
PROT UR STRIP-MCNC: ABNORMAL MG/DL
RBC # BLD AUTO: 4.67 MILLION/UL (ref 3.81–5.12)
RBC #/AREA URNS AUTO: ABNORMAL /HPF
SODIUM SERPL-SCNC: 135 MMOL/L (ref 135–147)
SP GR UR STRIP.AUTO: 1.02 (ref 1–1.03)
TRIGL SERPL-MCNC: 171 MG/DL
TSH SERPL DL<=0.05 MIU/L-ACNC: 0.88 UIU/ML (ref 0.45–4.5)
URATE CRY URNS QL MICRO: ABNORMAL /HPF
UROBILINOGEN UR STRIP-ACNC: <2 MG/DL
WBC # BLD AUTO: 10.12 THOUSAND/UL (ref 4.31–10.16)
WBC #/AREA URNS AUTO: ABNORMAL /HPF

## 2024-08-22 PROCEDURE — 84484 ASSAY OF TROPONIN QUANT: CPT

## 2024-08-22 PROCEDURE — 84443 ASSAY THYROID STIM HORMONE: CPT

## 2024-08-22 PROCEDURE — G2211 COMPLEX E/M VISIT ADD ON: HCPCS | Performed by: INTERNAL MEDICINE

## 2024-08-22 PROCEDURE — 80053 COMPREHEN METABOLIC PANEL: CPT

## 2024-08-22 PROCEDURE — 99214 OFFICE O/P EST MOD 30 MIN: CPT | Performed by: INTERNAL MEDICINE

## 2024-08-22 PROCEDURE — 80061 LIPID PANEL: CPT

## 2024-08-22 PROCEDURE — 83036 HEMOGLOBIN GLYCOSYLATED A1C: CPT

## 2024-08-22 PROCEDURE — 36415 COLL VENOUS BLD VENIPUNCTURE: CPT

## 2024-08-22 PROCEDURE — 85025 COMPLETE CBC W/AUTO DIFF WBC: CPT

## 2024-08-22 PROCEDURE — 81001 URINALYSIS AUTO W/SCOPE: CPT

## 2024-08-22 RX ORDER — TRAMADOL HYDROCHLORIDE 50 MG/1
50 TABLET ORAL EVERY 6 HOURS PRN
Qty: 90 TABLET | Refills: 0 | Status: SHIPPED | OUTPATIENT
Start: 2024-08-22

## 2024-08-22 NOTE — PROGRESS NOTES
"Assessment/Plan:    Diagnoses and all orders for this visit:    Mass of lower outer quadrant of left breast  -     Mammo diagnostic left w 3d & cad; Future    Ductal carcinoma in situ (DCIS) of left breast  -     Mammo diagnostic left w 3d & cad; Future    Acquired hypothyroidism    Inappropriate sinus tachycardia  -     Echo complete w/ contrast if indicated; Future    Foul smelling urine  -     UA w Reflex to Microscopic w Reflex to Culture; Future    Chest pain, unspecified type  -     Echo complete w/ contrast if indicated; Future  -     High Sensitivity Troponin I Random; Future    Inflammatory neuropathy (HCC)  -     traMADol (Ultram) 50 mg tablet; Take 1 tablet (50 mg total) by mouth every 6 (six) hours as needed for moderate pain              Patient Instructions   Mastodynia with mass in area of prior resection-patient is scheduled for mammogram on Monday.  Will change to diagnostic and expedite.  She has follow-up with her breast surgeon on Tuesday.    Inappropriate sinus tachycardia-patient was intolerant to propranolol.  Consider different beta-blocker versus calcium blocker.  Check echocardiogram.    Foul-smelling urine-check urinalysis    Chest pain-atypical and prolonged however given tachycardia will check troponin and follow accordingly.  Echo as above.    Follow-up 9/5 as scheduled      Subjective:      Patient ID: Benjamin Puga is a 69 y.o. female    Acute visit w/ multiple complaints  Notes bloating abd f/b L breast swelling and pain w/ formation of a lump in area of prior lumpectomy.  Pain into axilla and back.    Notes persistent elevated HR w/ occasional sob  Didn't tolerate seroquel, it made her \"rock\" side to side  Notes epigastric and L chest discomfort x 3 days.  Worse at night.  Worse laying on L side.    Notes increased urinary frequency and foul smell    Back Pain  Associated symptoms include chest pain. Pertinent negatives include no abdominal pain, dysuria, fever, headaches or " weakness.         Current Outpatient Medications:     albuterol (PROVENTIL HFA,VENTOLIN HFA) 90 mcg/act inhaler, Inhale 2 puffs every 6 (six) hours as needed for wheezing, Disp: 18 g, Rfl: 0    amLODIPine (NORVASC) 5 mg tablet, Take 1 tablet (5 mg total) by mouth daily, Disp: 100 tablet, Rfl: 0    apixaban (Eliquis) 2.5 mg, Take 1 tablet (2.5 mg total) by mouth 2 (two) times a day, Disp: 200 tablet, Rfl: 3    azelastine (ASTELIN) 0.1 % nasal spray, Spray 2 sprays twice a day by intranasal route., Disp: , Rfl:     clotrimazole (LOTRIMIN) 1 % cream, Apply topically 2 (two) times a day for 10 doses, Disp: 45 g, Rfl: 0    DULoxetine (CYMBALTA) 30 mg delayed release capsule, Take 3 capsules (90 mg total) by mouth daily, Disp: 270 capsule, Rfl: 1    famotidine (PEPCID) 10 mg tablet, Take 1 tablet (10 mg total) by mouth daily, Disp: 90 tablet, Rfl: 3    ferrous sulfate 325 (65 Fe) mg tablet, Take 325 mg by mouth daily, Disp: , Rfl:     hydrocortisone (ANUSOL-HC) 2.5 % rectal cream, Apply topically 2 (two) times a day, Disp: 28 g, Rfl: 3    levothyroxine 50 mcg tablet, Take 1 tablet (50 mcg total) by mouth daily in the early morning, Disp: 30 tablet, Rfl: 0    lisinopril (ZESTRIL) 40 mg tablet, Take 1 tablet (40 mg total) by mouth daily, Disp: 100 tablet, Rfl: 0    methocarbamol (ROBAXIN) 500 mg tablet, TAKE ONE TABLET BY MOUTH FOUR TIMES A DAY AS NEEDED FOR MUSCLE SPASMS, Disp: , Rfl:     Multiple Vitamins-Minerals (OCUVITE ADULT 50+ PO), Take by mouth, Disp: , Rfl:     nystatin powder, Apply 1 Application topically 2 (two) times a day, Disp: 60 g, Rfl: 0    pantoprazole (PROTONIX) 40 mg tablet, Take 1 tablet (40 mg total) by mouth 2 (two) times a day, Disp: 200 tablet, Rfl: 3    rosuvastatin (CRESTOR) 20 MG tablet, Take 20 mg by mouth daily, Disp: , Rfl:     saccharomyces boulardii (FLORASTOR) 250 mg capsule, Take 1 capsule (250 mg total) by mouth 2 (two) times a day, Disp: 60 capsule, Rfl: 0    traMADol (Ultram) 50 mg  tablet, Take 1 tablet (50 mg total) by mouth every 6 (six) hours as needed for moderate pain, Disp: 90 tablet, Rfl: 0    Triamcinolone Acetonide (Nasacort Allergy 24HR) 55 MCG/ACT nasal spray, 2 sprays into each nostril as needed (Allergies), Disp: 16.9 mL, Rfl: 0    cyanocobalamin 1,000 mcg/mL, Inject 1 mL (1,000 mcg total) into a muscle every 30 (thirty) days (Patient not taking: Reported on 7/31/2024), Disp: 1 mL, Rfl: 0    tiZANidine (ZANAFLEX) 4 mg tablet, Take 1 tablet (4 mg total) by mouth every 8 (eight) hours as needed for muscle spasms (Patient not taking: Reported on 7/31/2024), Disp: 60 tablet, Rfl: 0    Recent Results (from the past 1008 hour(s))   TIBC Panel (incl. Iron, TIBC, % Iron Saturation)    Collection Time: 08/12/24  4:45 PM   Result Value Ref Range    Iron Saturation 22 15 - 50 %    TIBC 315 250 - 450 ug/dL    Iron 68 50 - 212 ug/dL    UIBC 247 155 - 355 ug/dL   Ferritin    Collection Time: 08/12/24  4:45 PM   Result Value Ref Range    Ferritin 185 11 - 307 ng/mL       The following portions of the patient's history were reviewed and updated as appropriate: allergies, current medications, past family history, past medical history, past social history, past surgical history and problem list.     Review of Systems   Constitutional:  Negative for appetite change, chills, diaphoresis, fatigue, fever and unexpected weight change.   HENT:  Negative for congestion, hearing loss and rhinorrhea.    Eyes:  Negative for visual disturbance.   Respiratory:  Negative for cough, chest tightness, shortness of breath and wheezing.    Cardiovascular:  Positive for chest pain. Negative for palpitations and leg swelling.   Gastrointestinal:  Positive for abdominal distention. Negative for abdominal pain and blood in stool.   Endocrine: Negative for cold intolerance, heat intolerance, polydipsia and polyuria.   Genitourinary:  Negative for difficulty urinating, dysuria, frequency and urgency.   Musculoskeletal:   Positive for back pain. Negative for arthralgias and myalgias.   Skin:  Negative for rash.   Neurological:  Negative for dizziness, weakness, light-headedness and headaches.   Hematological:  Does not bruise/bleed easily.   Psychiatric/Behavioral:  Negative for dysphoric mood and sleep disturbance.          Objective:      Vitals:    08/22/24 1007   BP: 146/86   Pulse: (!) 128   Resp: 18   Temp: 98.1 °F (36.7 °C)   SpO2: 97%          Physical Exam  Constitutional:       Appearance: She is well-developed.   HENT:      Head: Normocephalic and atraumatic.      Nose: Nose normal.   Eyes:      General: No scleral icterus.     Conjunctiva/sclera: Conjunctivae normal.      Pupils: Pupils are equal, round, and reactive to light.   Neck:      Thyroid: No thyromegaly.      Vascular: No JVD.      Trachea: No tracheal deviation.   Cardiovascular:      Rate and Rhythm: Regular rhythm. Tachycardia present.      Heart sounds: No murmur heard.     No friction rub. No gallop.   Pulmonary:      Effort: Pulmonary effort is normal. No respiratory distress.      Breath sounds: Normal breath sounds. No wheezing or rales.   Chest:      Chest wall: Mass present.   Breasts:     Right: Tenderness present.          Comments: Palpable mass, entire breast is tender, no axillary adenopathy, no rash.    Musculoskeletal:         General: No deformity.      Cervical back: Normal range of motion and neck supple.   Lymphadenopathy:      Cervical: No cervical adenopathy.   Skin:     General: Skin is warm and dry.      Coloration: Skin is not pale.      Findings: No erythema or rash.   Neurological:      Mental Status: She is alert and oriented to person, place, and time.      Cranial Nerves: No cranial nerve deficit.   Psychiatric:         Behavior: Behavior normal.         Thought Content: Thought content normal.         Judgment: Judgment normal.

## 2024-08-22 NOTE — TELEPHONE ENCOUNTER
Radha from [facility] called to confirm mamogram orders. She states the the order placed needs to be for Mammo diagnostic Bilateral w 3d & cad rather than Left. Also they need a Left breast ultrasound ordered. She requests that these be faxed as soon as they are ordered to [phone].    Any questions can be directed to Rosy at   [phone].

## 2024-08-22 NOTE — PATIENT INSTRUCTIONS
Mastodynia with mass in area of prior resection-patient is scheduled for mammogram on Monday.  Will change to diagnostic and expedite.  She has follow-up with her breast surgeon on Tuesday.    Inappropriate sinus tachycardia-patient was intolerant to propranolol.  Consider different beta-blocker versus calcium blocker.  Check echocardiogram.    Foul-smelling urine-check urinalysis    Chest pain-atypical and prolonged however given tachycardia will check troponin and follow accordingly.  Echo as above.    Follow-up 9/5 as scheduled

## 2024-08-29 ENCOUNTER — HOSPITAL ENCOUNTER (OUTPATIENT)
Dept: ULTRASOUND IMAGING | Facility: HOSPITAL | Age: 69
End: 2024-08-29
Attending: INTERNAL MEDICINE
Payer: COMMERCIAL

## 2024-08-29 DIAGNOSIS — R31.29 MICROSCOPIC HEMATURIA: ICD-10-CM

## 2024-08-29 PROCEDURE — 76775 US EXAM ABDO BACK WALL LIM: CPT

## 2024-08-30 ENCOUNTER — TELEPHONE (OUTPATIENT)
Dept: UROLOGY | Facility: CLINIC | Age: 69
End: 2024-08-30

## 2024-08-30 ENCOUNTER — CONSULT (OUTPATIENT)
Dept: UROLOGY | Facility: CLINIC | Age: 69
End: 2024-08-30
Payer: COMMERCIAL

## 2024-08-30 VITALS
DIASTOLIC BLOOD PRESSURE: 78 MMHG | HEART RATE: 118 BPM | HEIGHT: 59 IN | SYSTOLIC BLOOD PRESSURE: 136 MMHG | WEIGHT: 194 LBS | BODY MASS INDEX: 39.11 KG/M2 | OXYGEN SATURATION: 98 % | TEMPERATURE: 97.6 F

## 2024-08-30 DIAGNOSIS — Z90.710 H/O TOTAL HYSTERECTOMY: Primary | ICD-10-CM

## 2024-08-30 DIAGNOSIS — R31.29 MICROSCOPIC HEMATURIA: ICD-10-CM

## 2024-08-30 LAB
SL AMB  POCT GLUCOSE, UA: NORMAL
SL AMB LEUKOCYTE ESTERASE,UA: NORMAL
SL AMB POCT BILIRUBIN,UA: NORMAL
SL AMB POCT BLOOD,UA: NORMAL
SL AMB POCT CLARITY,UA: NORMAL
SL AMB POCT COLOR,UA: YELLOW
SL AMB POCT KETONES,UA: NORMAL
SL AMB POCT NITRITE,UA: NORMAL
SL AMB POCT PH,UA: 5
SL AMB POCT SPECIFIC GRAVITY,UA: 1.02
SL AMB POCT URINE PROTEIN: 0.15
SL AMB POCT UROBILINOGEN: 0.2

## 2024-08-30 PROCEDURE — 99204 OFFICE O/P NEW MOD 45 MIN: CPT | Performed by: PHYSICIAN ASSISTANT

## 2024-08-30 PROCEDURE — 81002 URINALYSIS NONAUTO W/O SCOPE: CPT | Performed by: PHYSICIAN ASSISTANT

## 2024-08-30 NOTE — TELEPHONE ENCOUNTER
Return for cystoscopy .     Call [phone] to schedule    Late morning or early afternoon cysto    Can schedule and than call pt with appt.

## 2024-08-30 NOTE — PROGRESS NOTES
8/30/2024      Chief Complaint   Patient presents with    Consult         Assessment and Plan    69 y.o. female -- New patient    1. Microscopic hematuria   - UA today negative for infection or blood   - US kidney and bladder (8/29/24) negative   - UA micro (8/22/24) showing 10-20 RBC/hpf. Negative for infection  - Follow up for cystoscopy to complete hematuria workup   - Call with any questions or concerns in the meantime  - All questions answered; patient understands and agrees with plan       History of Present Illness  Benjamin Puga is a 69 y.o. female new patient here for evaluation of microscopic hematuria.     Underwent full hematuria workup in 2021 for gross hematuria.  Patient had recent urine testing performed showing microscopic hematuria.  She underwent ultrasound kidney and bladder yesterday which was negative for any malignancy, stones, hydronephrosis, obstruction.  Patient having flank pain, low back pain, groin pain.  Denies gross hematuria, dysuria, fever, chills, nausea, vomiting, prior nephrolithiasis, tobacco history, exposures to chemicals on a daily basis, family history of  malignancies.    Review of Systems   Constitutional:  Negative for activity change, appetite change, chills and fever.   HENT:  Negative for congestion and trouble swallowing.    Respiratory:  Negative for cough and shortness of breath.    Cardiovascular:  Negative for chest pain, palpitations and leg swelling.   Gastrointestinal:  Negative for abdominal pain, constipation, diarrhea, nausea and vomiting.   Genitourinary:  Positive for flank pain and pelvic pain. Negative for difficulty urinating, dysuria, frequency, hematuria and urgency.   Musculoskeletal:  Positive for back pain. Negative for gait problem.   Skin:  Negative for wound.   Allergic/Immunologic: Negative for immunocompromised state.   Neurological:  Negative for dizziness and syncope.   Hematological:  Does not bruise/bleed easily.  "  Psychiatric/Behavioral:  Negative for confusion.    All other systems reviewed and are negative.      Vitals  Vitals:    08/30/24 1335   BP: 136/78   BP Location: Left arm   Patient Position: Sitting   Cuff Size: Large   Pulse: (!) 118   Temp: 97.6 °F (36.4 °C)   TempSrc: Temporal   SpO2: 98%   Weight: 88 kg (194 lb)   Height: 4' 11\" (1.499 m)       Physical Exam  Constitutional:       General: She is not in acute distress.     Appearance: Normal appearance. She is not ill-appearing, toxic-appearing or diaphoretic.   HENT:      Head: Normocephalic.      Nose: No congestion.   Eyes:      General: No scleral icterus.        Right eye: No discharge.         Left eye: No discharge.      Conjunctiva/sclera: Conjunctivae normal.      Pupils: Pupils are equal, round, and reactive to light.   Pulmonary:      Effort: Pulmonary effort is normal.   Musculoskeletal:      Cervical back: Normal range of motion.   Skin:     General: Skin is warm and dry.      Coloration: Skin is not jaundiced or pale.      Findings: No bruising, erythema, lesion or rash.   Neurological:      General: No focal deficit present.      Mental Status: She is alert and oriented to person, place, and time. Mental status is at baseline.      Gait: Gait normal.   Psychiatric:         Mood and Affect: Mood normal.         Behavior: Behavior normal.         Thought Content: Thought content normal.         Judgment: Judgment normal.           Past History  Past Medical History:   Diagnosis Date    Allergic rhinitis     Anemia     Cancer (HCC)     breast cancer, Left side    Colon polyp     Depression     Disease of thyroid gland     GERD (gastroesophageal reflux disease)     Hyperlipemia     Hypertension     Hypothyroidism     Last assessed: 3/25/14    Obesity     Osteoarthritis     Pre-operative laboratory examination     Pulmonary embolism (HCC)      Social History     Socioeconomic History    Marital status: /Civil Union     Spouse name: None    " Number of children: None    Years of education: None    Highest education level: None   Occupational History    None   Tobacco Use    Smoking status: Never    Smokeless tobacco: Never   Vaping Use    Vaping status: Never Used   Substance and Sexual Activity    Alcohol use: Yes     Comment: socially     Drug use: No    Sexual activity: Not Currently     Partners: Male   Other Topics Concern    None   Social History Narrative    Living independently w/spouse     Social Determinants of Health     Financial Resource Strain: Low Risk  (9/11/2023)    Overall Financial Resource Strain (CARDIA)     Difficulty of Paying Living Expenses: Not hard at all   Food Insecurity: No Food Insecurity (7/1/2024)    Hunger Vital Sign     Worried About Running Out of Food in the Last Year: Never true     Ran Out of Food in the Last Year: Never true   Transportation Needs: No Transportation Needs (7/1/2024)    PRAPARE - Transportation     Lack of Transportation (Medical): No     Lack of Transportation (Non-Medical): No   Physical Activity: Insufficiently Active (6/24/2022)    Exercise Vital Sign     Days of Exercise per Week: 4 days     Minutes of Exercise per Session: 30 min   Stress: No Stress Concern Present (6/24/2022)    [employer] [place] [employer] - Occupational Stress Questionnaire     Feeling of Stress : Only a little   Social Connections: Not on file   Intimate Partner Violence: Not At Risk (7/1/2024)    Humiliation, Afraid, Rape, and Kick questionnaire     Fear of Current or Ex-Partner: No     Emotionally Abused: No     Physically Abused: No     Sexually Abused: No   Housing Stability: Low Risk  (7/1/2024)    Housing Stability Vital Sign     Unable to Pay for Housing in the Last Year: No     Number of Times Moved in the Last Year: 1     Homeless in the Last Year: No     Social History     Tobacco Use   Smoking Status Never   Smokeless Tobacco Never     Family History   Problem Relation Age of Onset    Coronary artery  "disease Mother     Hypertension Mother         Essential    Coronary artery disease Father        The following portions of the patient's history were reviewed and updated as appropriate: allergies, current medications, past medical history, past social history, past surgical history and problem list.    Results  Recent Results (from the past 1 hour(s))   POCT urine dip    Collection Time: 08/30/24  1:38 PM   Result Value Ref Range    LEUKOCYTE ESTERASE,UA -     NITRITE,UA -     SL AMB POCT UROBILINOGEN 0.2     POCT URINE PROTEIN 0.15      PH,UA 5.0     BLOOD,UA -     SPECIFIC GRAVITY,UA 1.025     KETONES,UA -     BILIRUBIN,UA -     GLUCOSE, UA -      COLOR,UA yellow     CLARITY,UA cloudy    ]  No results found for: \"PSA\"  Lab Results   Component Value Date    GLUCOSE 90 05/04/2017    CALCIUM 9.9 08/22/2024     05/04/2017    K 4.0 08/22/2024    CO2 29 08/22/2024    CL 96 08/22/2024    BUN 11 08/22/2024    CREATININE 0.75 08/22/2024     Lab Results   Component Value Date    WBC 10.12 08/22/2024    HGB 13.4 08/22/2024    HCT 42.4 08/22/2024    MCV 91 08/22/2024     08/22/2024       Chana Rees PA-C  "

## 2024-09-04 ENCOUNTER — TELEPHONE (OUTPATIENT)
Age: 69
End: 2024-09-04

## 2024-09-04 DIAGNOSIS — G61.9 INFLAMMATORY NEUROPATHY (HCC): Primary | ICD-10-CM

## 2024-09-04 RX ORDER — METHOCARBAMOL 500 MG/1
TABLET, FILM COATED ORAL
Qty: 60 TABLET | Refills: 3 | Status: SHIPPED | OUTPATIENT
Start: 2024-09-04

## 2024-09-04 NOTE — TELEPHONE ENCOUNTER
PA for TRAMADOL (Ultram) 50 mg SUBMITTED     via    [x]CMM-KEY: [id number]  [][employer]-dor-Case ID #   []Faxed to plan   []Other website   []Phone call Case ID #     Office notes sent, clinical questions answered. Awaiting determination    Turnaround time for your insurance to make a decision on your Prior Authorization can take 7-21 business days.

## 2024-09-05 ENCOUNTER — OFFICE VISIT (OUTPATIENT)
Age: 69
End: 2024-09-05
Payer: COMMERCIAL

## 2024-09-05 VITALS
RESPIRATION RATE: 16 BRPM | BODY MASS INDEX: 39.11 KG/M2 | SYSTOLIC BLOOD PRESSURE: 130 MMHG | OXYGEN SATURATION: 97 % | HEART RATE: 124 BPM | DIASTOLIC BLOOD PRESSURE: 72 MMHG | HEIGHT: 59 IN | WEIGHT: 194 LBS

## 2024-09-05 DIAGNOSIS — I47.11 INAPPROPRIATE SINUS TACHYCARDIA: Primary | ICD-10-CM

## 2024-09-05 DIAGNOSIS — F33.3 MDD (MAJOR DEPRESSIVE DISORDER), RECURRENT, SEVERE, WITH PSYCHOSIS (HCC): ICD-10-CM

## 2024-09-05 DIAGNOSIS — R73.01 IMPAIRED FASTING GLUCOSE: ICD-10-CM

## 2024-09-05 DIAGNOSIS — J30.1 SEASONAL ALLERGIC RHINITIS DUE TO POLLEN: ICD-10-CM

## 2024-09-05 DIAGNOSIS — I26.99 PULMONARY EMBOLISM, OTHER, UNSPECIFIED CHRONICITY, UNSPECIFIED WHETHER ACUTE COR PULMONALE PRESENT (HCC): ICD-10-CM

## 2024-09-05 DIAGNOSIS — I10 PRIMARY HYPERTENSION: Chronic | ICD-10-CM

## 2024-09-05 DIAGNOSIS — K21.9 GASTROESOPHAGEAL REFLUX DISEASE WITHOUT ESOPHAGITIS: ICD-10-CM

## 2024-09-05 DIAGNOSIS — D05.12 DUCTAL CARCINOMA IN SITU (DCIS) OF LEFT BREAST: ICD-10-CM

## 2024-09-05 DIAGNOSIS — E78.2 MIXED HYPERLIPIDEMIA: ICD-10-CM

## 2024-09-05 DIAGNOSIS — F43.10 PTSD (POST-TRAUMATIC STRESS DISORDER): ICD-10-CM

## 2024-09-05 DIAGNOSIS — E03.9 ACQUIRED HYPOTHYROIDISM: ICD-10-CM

## 2024-09-05 DIAGNOSIS — J45.20 MILD INTERMITTENT ASTHMA WITHOUT COMPLICATION: ICD-10-CM

## 2024-09-05 DIAGNOSIS — F41.9 ANXIETY: ICD-10-CM

## 2024-09-05 PROCEDURE — 99214 OFFICE O/P EST MOD 30 MIN: CPT | Performed by: INTERNAL MEDICINE

## 2024-09-05 PROCEDURE — 1160F RVW MEDS BY RX/DR IN RCRD: CPT | Performed by: INTERNAL MEDICINE

## 2024-09-05 PROCEDURE — G2211 COMPLEX E/M VISIT ADD ON: HCPCS | Performed by: INTERNAL MEDICINE

## 2024-09-05 PROCEDURE — 1159F MED LIST DOCD IN RCRD: CPT | Performed by: INTERNAL MEDICINE

## 2024-09-05 RX ORDER — RISPERIDONE 1 MG/1
1 TABLET, ORALLY DISINTEGRATING ORAL DAILY
Qty: 30 TABLET | Refills: 5 | Status: SHIPPED | OUTPATIENT
Start: 2024-09-05 | End: 2024-09-11 | Stop reason: SDUPTHER

## 2024-09-05 NOTE — PATIENT INSTRUCTIONS
Lab data reviewed in detail and compared to prior    Impaired fasting glucose-A1c up to 6.3, implications discussed, low-carb diet, exercise and weight loss as able.    Hypertension appears stable on present regimen    Inappropriate sinus tachycardia-await echo, consider starting a different beta-blocker.  She did not tolerate propranolol.    Ongoing anxiety and insomnia-she did not tolerate Seroquel I believe she will benefit from respite all.  Will start 1 mg at night and monitor symptoms.  Continue to follow with psychiatry.    Hyperlipidemia-LDL at goal on rosuvastatin    History of pulmonary embolism-continue with Eliquis    Inflammation of breast-followed by surgical oncology and no evidence of recurrent disease    Microscopic hematuria-workup negative thus far, cystoscopy scheduled for future    Hypothyroidism at goal on levothyroxine

## 2024-09-05 NOTE — PROGRESS NOTES
Assessment/Plan:    Diagnoses and all orders for this visit:    Inappropriate sinus tachycardia    Pulmonary embolism, other, unspecified chronicity, unspecified whether acute cor pulmonale present (HCC)    Primary hypertension  -     CBC and differential; Future  -     Comprehensive metabolic panel; Future  -     Lipid panel; Future    Mild intermittent asthma without complication    Seasonal allergic rhinitis due to pollen    Gastroesophageal reflux disease without esophagitis    Impaired fasting glucose  -     Hemoglobin A1C; Future    Acquired hypothyroidism  -     TSH, 3rd generation with Free T4 reflex; Future    PTSD (post-traumatic stress disorder)    MDD (major depressive disorder), recurrent, severe, with psychosis (HCC)    Anxiety  -     risperiDONE (RisperDAL M-TAB) 1 mg disintegrating tablet; Take 1 tablet (1 mg total) by mouth daily    Ductal carcinoma in situ (DCIS) of left breast    Mixed hyperlipidemia              Patient Instructions   Lab data reviewed in detail and compared to prior    Impaired fasting glucose-A1c up to 6.3, implications discussed, low-carb diet, exercise and weight loss as able.    Hypertension appears stable on present regimen    Inappropriate sinus tachycardia-await echo, consider starting a different beta-blocker.  She did not tolerate propranolol.    Ongoing anxiety and insomnia-she did not tolerate Seroquel I believe she will benefit from respite all.  Will start 1 mg at night and monitor symptoms.  Continue to follow with psychiatry.    Hyperlipidemia-LDL at goal on rosuvastatin    History of pulmonary embolism-continue with Eliquis    Inflammation of breast-followed by surgical oncology and no evidence of recurrent disease    Microscopic hematuria-workup negative thus far, cystoscopy scheduled for future    Hypothyroidism at goal on levothyroxine    Subjective:      Patient ID: Benjamin Puga is a 69 y.o. female    F/u MMP and review labs and consultations  Feeling  better  Breast inflammation-diag mammo/u/s is unremarkable.  Seen by EB Jaramillo.  Routine f/u.  Micro hematuria-kb u/s negative, seen by urology and scheduling cysto to complete w/u  Anxiety remains severe, insomnia remains troubling.  F/b psych.  Seroquel made her hyper.    HTN/HPL-taking rx as directed, home bp's 130/80's, HR still up >100  H/o PE-no bleeding on eliquis  GERD-stable on pantoprazole  Hypothyroid-taking rx on empty stomach          Current Outpatient Medications:     albuterol (PROVENTIL HFA,VENTOLIN HFA) 90 mcg/act inhaler, Inhale 2 puffs every 6 (six) hours as needed for wheezing, Disp: 18 g, Rfl: 0    amLODIPine (NORVASC) 5 mg tablet, Take 1 tablet (5 mg total) by mouth daily, Disp: 100 tablet, Rfl: 0    apixaban (Eliquis) 2.5 mg, Take 1 tablet (2.5 mg total) by mouth 2 (two) times a day, Disp: 200 tablet, Rfl: 3    clotrimazole (LOTRIMIN) 1 % cream, Apply topically 2 (two) times a day for 10 doses, Disp: 45 g, Rfl: 0    DULoxetine (CYMBALTA) 30 mg delayed release capsule, Take 3 capsules (90 mg total) by mouth daily, Disp: 270 capsule, Rfl: 1    famotidine (PEPCID) 10 mg tablet, Take 1 tablet (10 mg total) by mouth daily, Disp: 90 tablet, Rfl: 3    ferrous sulfate 325 (65 Fe) mg tablet, Take 325 mg by mouth daily, Disp: , Rfl:     hydrocortisone (ANUSOL-HC) 2.5 % rectal cream, Apply topically 2 (two) times a day, Disp: 28 g, Rfl: 3    levothyroxine 50 mcg tablet, Take 1 tablet (50 mcg total) by mouth daily in the early morning (Patient taking differently: Take 88 mcg by mouth daily in the early morning), Disp: 30 tablet, Rfl: 0    lisinopril (ZESTRIL) 40 mg tablet, Take 1 tablet (40 mg total) by mouth daily, Disp: 100 tablet, Rfl: 0    methocarbamol (ROBAXIN) 500 mg tablet, TAKE ONE TABLET BY MOUTH FOUR TIMES A DAY AS NEEDED FOR MUSCLE SPASMS, Disp: 60 tablet, Rfl: 3    Multiple Vitamins-Minerals (OCUVITE ADULT 50+ PO), Take by mouth, Disp: , Rfl:     nystatin powder, Apply 1 Application  topically 2 (two) times a day, Disp: 60 g, Rfl: 0    pantoprazole (PROTONIX) 40 mg tablet, Take 1 tablet (40 mg total) by mouth 2 (two) times a day, Disp: 200 tablet, Rfl: 3    risperiDONE (RisperDAL M-TAB) 1 mg disintegrating tablet, Take 1 tablet (1 mg total) by mouth daily, Disp: 30 tablet, Rfl: 5    rosuvastatin (CRESTOR) 20 MG tablet, Take 20 mg by mouth daily, Disp: , Rfl:     saccharomyces boulardii (FLORASTOR) 250 mg capsule, Take 1 capsule (250 mg total) by mouth 2 (two) times a day, Disp: 60 capsule, Rfl: 0    traMADol (Ultram) 50 mg tablet, Take 1 tablet (50 mg total) by mouth every 6 (six) hours as needed for moderate pain, Disp: 90 tablet, Rfl: 0    Triamcinolone Acetonide (Nasacort Allergy 24HR) 55 MCG/ACT nasal spray, 2 sprays into each nostril as needed (Allergies), Disp: 16.9 mL, Rfl: 0    cyanocobalamin 1,000 mcg/mL, Inject 1 mL (1,000 mcg total) into a muscle every 30 (thirty) days (Patient not taking: Reported on 7/31/2024), Disp: 1 mL, Rfl: 0    Recent Results (from the past 1008 hour(s))   TIBC Panel (incl. Iron, TIBC, % Iron Saturation)    Collection Time: 08/12/24  4:45 PM   Result Value Ref Range    Iron Saturation 22 15 - 50 %    TIBC 315 250 - 450 ug/dL    Iron 68 50 - 212 ug/dL    UIBC 247 155 - 355 ug/dL   Ferritin    Collection Time: 08/12/24  4:45 PM   Result Value Ref Range    Ferritin 185 11 - 307 ng/mL   TSH, 3rd generation with Free T4 reflex    Collection Time: 08/22/24 11:23 AM   Result Value Ref Range    TSH 3RD GENERATON 0.875 0.450 - 4.500 uIU/mL   CBC and differential    Collection Time: 08/22/24 11:23 AM   Result Value Ref Range    WBC 10.12 4.31 - 10.16 Thousand/uL    RBC 4.67 3.81 - 5.12 Million/uL    Hemoglobin 13.4 11.5 - 15.4 g/dL    Hematocrit 42.4 34.8 - 46.1 %    MCV 91 82 - 98 fL    MCH 28.7 26.8 - 34.3 pg    MCHC 31.6 31.4 - 37.4 g/dL    RDW 14.1 11.6 - 15.1 %    MPV 9.2 8.9 - 12.7 fL    Platelets 279 149 - 390 Thousands/uL    nRBC 0 /100 WBCs    Segmented % 51 43  - 75 %    Immature Grans % 0 0 - 2 %    Lymphocytes % 33 14 - 44 %    Monocytes % 9 4 - 12 %    Eosinophils Relative 6 0 - 6 %    Basophils Relative 1 0 - 1 %    Absolute Neutrophils 5.17 1.85 - 7.62 Thousands/µL    Absolute Immature Grans 0.04 0.00 - 0.20 Thousand/uL    Absolute Lymphocytes 3.30 0.60 - 4.47 Thousands/µL    Absolute Monocytes 0.91 0.17 - 1.22 Thousand/µL    Eosinophils Absolute 0.64 (H) 0.00 - 0.61 Thousand/µL    Basophils Absolute 0.06 0.00 - 0.10 Thousands/µL   Comprehensive metabolic panel    Collection Time: 08/22/24 11:23 AM   Result Value Ref Range    Sodium 135 135 - 147 mmol/L    Potassium 4.0 3.5 - 5.3 mmol/L    Chloride 96 96 - 108 mmol/L    CO2 29 21 - 32 mmol/L    ANION GAP 10 4 - 13 mmol/L    BUN 11 5 - 25 mg/dL    Creatinine 0.75 0.60 - 1.30 mg/dL    Glucose 109 65 - 140 mg/dL    Calcium 9.9 8.4 - 10.2 mg/dL    AST 19 13 - 39 U/L    ALT 18 7 - 52 U/L    Alkaline Phosphatase 85 34 - 104 U/L    Total Protein 7.8 6.4 - 8.4 g/dL    Albumin 4.3 3.5 - 5.0 g/dL    Total Bilirubin 0.52 0.20 - 1.00 mg/dL    eGFR 81 ml/min/1.73sq m   Lipid panel    Collection Time: 08/22/24 11:23 AM   Result Value Ref Range    Cholesterol 187 See Comment mg/dL    Triglycerides 171 (H) See Comment mg/dL    HDL, Direct 61 >=50 mg/dL    LDL Calculated 92 0 - 100 mg/dL    Non-HDL-Chol (CHOL-HDL) 126 mg/dl   Hemoglobin A1C    Collection Time: 08/22/24 11:23 AM   Result Value Ref Range    Hemoglobin A1C 6.3 (H) Normal 4.0-5.6%; PreDiabetic 5.7-6.4%; Diabetic >=6.5%; Glycemic control for adults with diabetes <7.0% %     mg/dl   UA w Reflex to Microscopic w Reflex to Culture    Collection Time: 08/22/24 11:23 AM    Specimen: Urine, Clean Catch   Result Value Ref Range    Color, UA Yellow     Clarity, UA Extra Turbid     Specific Gravity, UA 1.022 1.003 - 1.030    pH, UA 5.5 4.5, 5.0, 5.5, 6.0, 6.5, 7.0, 7.5, 8.0    Leukocytes, UA Negative Negative    Nitrite, UA Negative Negative    Protein, UA Trace (A) Negative  mg/dl    Glucose, UA Negative Negative mg/dl    Ketones, UA Negative Negative mg/dl    Urobilinogen, UA <2.0 <2.0 mg/dl mg/dl    Bilirubin, UA Negative Negative    Occult Blood, UA Negative Negative   High Sensitivity Troponin I Random    Collection Time: 08/22/24 11:23 AM   Result Value Ref Range    HS TnI random <2 (L) 8 - 18 ng/L   Urine Microscopic    Collection Time: 08/22/24 11:23 AM   Result Value Ref Range    RBC, UA 10-20 (A) None Seen, 1-2 /hpf    WBC, UA 1-2 None Seen, 1-2 /hpf    Epithelial Cells Occasional None Seen, Occasional /hpf    Bacteria, UA Occasional None Seen, Occasional /hpf    MUCUS THREADS Occasional (A) None Seen    Ca Oxalate Swati, UA Innumerable (A) None Seen /hpf    Uric Acid Swati, UA Occasional /hpf   POCT urine dip    Collection Time: 08/30/24  1:38 PM   Result Value Ref Range    LEUKOCYTE ESTERASE,UA -     NITRITE,UA -     SL AMB POCT UROBILINOGEN 0.2     POCT URINE PROTEIN 0.15      PH,UA 5.0     BLOOD,UA -     SPECIFIC GRAVITY,UA 1.025     KETONES,UA -     BILIRUBIN,UA -     GLUCOSE, UA -      COLOR,UA yellow     CLARITY,UA cloudy        The following portions of the patient's history were reviewed and updated as appropriate: allergies, current medications, past family history, past medical history, past social history, past surgical history and problem list.     Review of Systems   Constitutional:  Negative for appetite change, chills, diaphoresis, fatigue, fever and unexpected weight change.   HENT:  Positive for rhinorrhea, sneezing and tinnitus. Negative for congestion and hearing loss.    Eyes:  Negative for visual disturbance.   Respiratory:  Negative for cough, chest tightness, shortness of breath and wheezing.    Cardiovascular:  Negative for chest pain, palpitations and leg swelling.   Gastrointestinal:  Negative for abdominal pain and blood in stool.   Endocrine: Negative for cold intolerance, heat intolerance, polydipsia and polyuria.   Genitourinary:  Negative for  difficulty urinating, dysuria, frequency and urgency.   Musculoskeletal:  Negative for arthralgias and myalgias.   Skin:  Negative for rash.   Neurological:  Negative for dizziness, weakness, light-headedness and headaches.   Hematological:  Does not bruise/bleed easily.   Psychiatric/Behavioral:  Negative for dysphoric mood and sleep disturbance.          Objective:      Vitals:    09/05/24 1535   BP: 130/72   Pulse: (!) 124   Resp: 16   SpO2: 97%          Physical Exam  Constitutional:       Appearance: She is well-developed.   HENT:      Head: Normocephalic and atraumatic.      Nose: Nose normal.   Eyes:      General: No scleral icterus.     Conjunctiva/sclera: Conjunctivae normal.      Pupils: Pupils are equal, round, and reactive to light.   Neck:      Thyroid: No thyromegaly.      Vascular: No JVD.      Trachea: No tracheal deviation.   Cardiovascular:      Rate and Rhythm: Normal rate and regular rhythm.      Heart sounds: No murmur heard.     No friction rub. No gallop.   Pulmonary:      Effort: Pulmonary effort is normal. No respiratory distress.      Breath sounds: Wheezing present. No rales.      Comments: Faint upper lobe end exp wheezes  Musculoskeletal:         General: No deformity.      Cervical back: Normal range of motion and neck supple.   Lymphadenopathy:      Cervical: No cervical adenopathy.   Skin:     General: Skin is warm and dry.      Coloration: Skin is not pale.      Findings: No erythema, lesion or rash.   Neurological:      Mental Status: She is alert and oriented to person, place, and time.      Cranial Nerves: No cranial nerve deficit.   Psychiatric:         Behavior: Behavior normal.         Thought Content: Thought content normal.         Judgment: Judgment normal.

## 2024-09-09 ENCOUNTER — HOSPITAL ENCOUNTER (OUTPATIENT)
Dept: NON INVASIVE DIAGNOSTICS | Facility: CLINIC | Age: 69
Discharge: HOME | End: 2024-09-09
Payer: COMMERCIAL

## 2024-09-09 VITALS
HEART RATE: 100 BPM | BODY MASS INDEX: 39.11 KG/M2 | DIASTOLIC BLOOD PRESSURE: 72 MMHG | HEIGHT: 59 IN | WEIGHT: 194 LBS | SYSTOLIC BLOOD PRESSURE: 130 MMHG

## 2024-09-09 DIAGNOSIS — R07.9 CHEST PAIN, UNSPECIFIED TYPE: ICD-10-CM

## 2024-09-09 DIAGNOSIS — I47.11 INAPPROPRIATE SINUS TACHYCARDIA: ICD-10-CM

## 2024-09-09 LAB
AORTIC ROOT: 3.5 CM
APICAL FOUR CHAMBER EJECTION FRACTION: 64 %
BSA FOR ECHO PROCEDURE: 1.82 M2
E WAVE DECELERATION TIME: 198 MS
E/A RATIO: 0.71
FRACTIONAL SHORTENING: 33 (ref 28–44)
INTERVENTRICULAR SEPTUM IN DIASTOLE (PARASTERNAL SHORT AXIS VIEW): 1 CM
INTERVENTRICULAR SEPTUM: 1 CM (ref 0.6–1.1)
LAAS-AP2: 10.8 CM2
LAAS-AP4: 14 CM2
LEFT ATRIUM AREA SYSTOLE SINGLE PLANE A4C: 14 CM2
LEFT ATRIUM SIZE: 3.1 CM
LEFT ATRIUM VOLUME (MOD BIPLANE): 28 ML
LEFT ATRIUM VOLUME INDEX (MOD BIPLANE): 15.4 ML/M2
LEFT INTERNAL DIMENSION IN SYSTOLE: 3 CM (ref 2.1–4)
LEFT VENTRICULAR INTERNAL DIMENSION IN DIASTOLE: 4.5 CM (ref 3.5–6)
LEFT VENTRICULAR POSTERIOR WALL IN END DIASTOLE: 0.8 CM
LEFT VENTRICULAR STROKE VOLUME: 60 ML
LVSV (TEICH): 60 ML
MV E'TISSUE VEL-SEP: 6 CM/S
MV PEAK A VEL: 1.15 M/S
MV PEAK E VEL: 82 CM/S
MV STENOSIS PRESSURE HALF TIME: 57 MS
MV VALVE AREA P 1/2 METHOD: 3.86
RIGHT ATRIUM AREA SYSTOLE A4C: 9.6 CM2
RIGHT VENTRICLE ID DIMENSION: 1.9 CM
SL CV LEFT ATRIUM LENGTH A2C: 4.3 CM
SL CV LV EF: 65
SL CV PED ECHO LEFT VENTRICLE DIASTOLIC VOLUME (MOD BIPLANE) 2D: 94 ML
SL CV PED ECHO LEFT VENTRICLE SYSTOLIC VOLUME (MOD BIPLANE) 2D: 34 ML
TR MAX PG: 13 MMHG
TR PEAK VELOCITY: 1.8 M/S
TRICUSPID ANNULAR PLANE SYSTOLIC EXCURSION: 2.3 CM
TRICUSPID VALVE PEAK REGURGITATION VELOCITY: 1.83 M/S

## 2024-09-09 PROCEDURE — 93306 TTE W/DOPPLER COMPLETE: CPT | Performed by: INTERNAL MEDICINE

## 2024-09-09 PROCEDURE — 93306 TTE W/DOPPLER COMPLETE: CPT

## 2024-09-11 DIAGNOSIS — F41.9 ANXIETY: ICD-10-CM

## 2024-09-11 RX ORDER — RISPERIDONE 1 MG/1
1 TABLET, ORALLY DISINTEGRATING ORAL DAILY
Qty: 30 TABLET | Refills: 5 | Status: SHIPPED | OUTPATIENT
Start: 2024-09-11

## 2024-10-03 ENCOUNTER — TELEPHONE (OUTPATIENT)
Age: 69
End: 2024-10-03

## 2024-10-03 NOTE — TELEPHONE ENCOUNTER
Benjamin Puga called and  requested a call back to discuss rescheduling her New patient appt with Dr Padgett from her ASAP referral..    They can be reached at P# [phone].       Thank you.

## 2024-10-03 NOTE — TELEPHONE ENCOUNTER
Patient canceled her appointment today due to being sick. Pt sounds really congested. Pt has been re-scheduled 1/6/25 at 11:00am with Dr. Padgett. Pt sends her apologies to  as she was really looking forward to meeting with her. Pt asked for this message to be sent to provider.

## 2024-10-08 ENCOUNTER — TELEPHONE (OUTPATIENT)
Age: 69
End: 2024-10-08

## 2024-10-08 NOTE — TELEPHONE ENCOUNTER
Pt had to cancel due to being sick and would like a call back as she has been seen every 4 weeks as the dr is monitoring her medications, please call pt back when she can be fit in as this was also her AWV

## 2024-10-09 DIAGNOSIS — B37.9 YEAST INFECTION: ICD-10-CM

## 2024-10-09 DIAGNOSIS — E78.2 MIXED HYPERLIPIDEMIA: Primary | ICD-10-CM

## 2024-10-09 NOTE — TELEPHONE ENCOUNTER
Reason for call:   [x] Refill   [] Prior Auth  [x] Other: Would like provider to prescribed nystatin powder as it was given in the hospital.     Also requesting 100 day supply for rosuvastatin (CRESTOR) 20 MG tablet     Office:   [x] PCP/Provider - [facility] - Historical Provider, MD   [] Specialty/Provider -     Medication: nystatin powder     Dose/Frequency: Apply 1 Application topically 2 (two) times a day     Quantity: 60 g    [id number] [address]  [address] [facility] [address] [place] [id number]        Medication: rosuvastatin (CRESTOR) 20 MG tablet     Dose/Frequency: Take 20 mg by mouth daily     Quantity: not listed    Pharmacy: [facility] - [address] [address]      Does the patient have enough for 3 days?   [x] Yes   [] No - Send as HP to POD

## 2024-10-10 RX ORDER — NYSTATIN 100000 [USP'U]/G
1 POWDER TOPICAL 2 TIMES DAILY
Qty: 60 G | Refills: 0 | Status: SHIPPED | OUTPATIENT
Start: 2024-10-10

## 2024-10-10 RX ORDER — ROSUVASTATIN CALCIUM 20 MG/1
20 TABLET, COATED ORAL DAILY
Qty: 100 TABLET | Refills: 1 | Status: SHIPPED | OUTPATIENT
Start: 2024-10-10

## 2024-10-15 ENCOUNTER — TELEPHONE (OUTPATIENT)
Dept: ADMINISTRATIVE | Facility: OTHER | Age: 69
End: 2024-10-15

## 2024-10-15 NOTE — TELEPHONE ENCOUNTER
10/15/24 2:50 PM    The patient was called and a message was left with the return number for Central Scheduling [phone].    Thank you.  Niecy Chappell  [facility]     Problem: Psychotic Symptoms  Goal: Psychotic Symptoms  Signs and symptoms of listed problems will be absent or manageable.   Outcome: No Change  48 Hour Nursing Assessment:  Day 80 of hospitalization.  Mildred had court yesterday regarding re commitment hearing.  The court has taken the issue under advisement.  She was reluctant to give her clothes back on return and only returned her tennis shoes.  She has been keeping these clothes close to her all day.  We would prefer her to be in scrubs because she is an elopement risk.  However, if we were to take her clothes it would most likely cause an incident which is not necessary.  She is fully oriented today.  Last showered 2 days ago.  Drank all of her lactulose this morning and came out for her tray.  Denies SI and hallucinations but appears to be responding to internal stimuli.

## 2024-10-25 DIAGNOSIS — E03.9 ACQUIRED HYPOTHYROIDISM: Primary | ICD-10-CM

## 2024-10-25 RX ORDER — LEVOTHYROXINE SODIUM 88 UG/1
88 TABLET ORAL
Qty: 100 TABLET | Refills: 3 | Status: SHIPPED | OUTPATIENT
Start: 2024-10-25

## 2024-11-01 ENCOUNTER — RA CDI HCC (OUTPATIENT)
Dept: OTHER | Facility: HOSPITAL | Age: 69
End: 2024-11-01

## 2024-11-07 ENCOUNTER — TELEPHONE (OUTPATIENT)
Age: 69
End: 2024-11-07

## 2024-11-07 ENCOUNTER — OFFICE VISIT (OUTPATIENT)
Age: 69
End: 2024-11-07
Payer: COMMERCIAL

## 2024-11-07 VITALS
SYSTOLIC BLOOD PRESSURE: 126 MMHG | HEART RATE: 118 BPM | RESPIRATION RATE: 16 BRPM | OXYGEN SATURATION: 98 % | DIASTOLIC BLOOD PRESSURE: 80 MMHG | WEIGHT: 197 LBS | HEIGHT: 59 IN | BODY MASS INDEX: 39.72 KG/M2

## 2024-11-07 DIAGNOSIS — F33.3 MDD (MAJOR DEPRESSIVE DISORDER), RECURRENT, SEVERE, WITH PSYCHOSIS (HCC): ICD-10-CM

## 2024-11-07 DIAGNOSIS — E78.2 MIXED HYPERLIPIDEMIA: ICD-10-CM

## 2024-11-07 DIAGNOSIS — G61.9 INFLAMMATORY NEUROPATHY (HCC): ICD-10-CM

## 2024-11-07 DIAGNOSIS — R35.0 FREQUENCY OF URINATION: ICD-10-CM

## 2024-11-07 DIAGNOSIS — I26.99 PULMONARY EMBOLISM, OTHER, UNSPECIFIED CHRONICITY, UNSPECIFIED WHETHER ACUTE COR PULMONALE PRESENT (HCC): ICD-10-CM

## 2024-11-07 DIAGNOSIS — E66.01 MORBID OBESITY WITH BMI OF 40.0-44.9, ADULT (HCC): ICD-10-CM

## 2024-11-07 DIAGNOSIS — Z23 NEED FOR COVID-19 VACCINE: Primary | ICD-10-CM

## 2024-11-07 DIAGNOSIS — F11.20 CONTINUOUS OPIOID DEPENDENCE (HCC): ICD-10-CM

## 2024-11-07 DIAGNOSIS — G47.00 INSOMNIA, UNSPECIFIED TYPE: ICD-10-CM

## 2024-11-07 DIAGNOSIS — I47.11 INAPPROPRIATE SINUS TACHYCARDIA (HCC): ICD-10-CM

## 2024-11-07 DIAGNOSIS — G62.9 NEUROPATHY: ICD-10-CM

## 2024-11-07 PROCEDURE — G0439 PPPS, SUBSEQ VISIT: HCPCS

## 2024-11-07 RX ORDER — GABAPENTIN 100 MG/1
100 CAPSULE ORAL 2 TIMES DAILY
Qty: 30 CAPSULE | Refills: 1 | Status: SHIPPED | OUTPATIENT
Start: 2024-11-07 | End: 2024-11-08 | Stop reason: SDUPTHER

## 2024-11-07 RX ORDER — ZOLPIDEM TARTRATE 10 MG/1
TABLET ORAL
Qty: 30 TABLET | Refills: 0 | Status: SHIPPED | OUTPATIENT
Start: 2024-11-07

## 2024-11-07 NOTE — PROGRESS NOTES
Ambulatory Visit  Name: Benjamin Puga      : 1955      MRN: [id number]  Encounter Provider: Roland Proctor MD  Encounter Date: 2024   Encounter department: [facility] INTERNAL MEDICINE [facility] RO[address]    Assessment & Plan  Pulmonary embolism, other, unspecified chronicity, unspecified whether acute cor pulmonale present (HCC)         Neuropathy    Orders:    gabapentin (NEURONTIN) 100 mg capsule; Take 1 capsule (100 mg total) by mouth 2 (two) times a day    Insomnia, unspecified type            Lab data reviewed in detail and compared to prior    Impaired fasting glucose-complete blood work prior to next visit, low-carb diet, exercise and weight loss as able.    Cough -- Take OTC cough medicine as needed. Continue with inhalers.     Hypertension appears stable on present regimen    Inappropriate sinus tachycardia-Echo reviewed, normal. Consider switching amlodipine to cardizem.     Ongoing anxiety and insomnia-Has not been able to see psychiatry. Start ambien and continue with melatonin. Schedule appointment with psychiatry as early as possible.    Hyperlipidemia-LDL at goal on rosuvastatin. Complete bloodwork at next visit    History of pulmonary embolism-continue with Eliquis.    Frequency of urination -- complete urinalysis.    Hypothyroidism -- complete bloodwork at next visit.     Polyneuropathy -- Worsening neuropathy in lower extremities. Start low dose gabapentin 100 mg twice a day.      Colonoscopy UTD, next in . Follow up in 6 weeks or sooner as needed.     Preventive health issues were discussed with patient, and age appropriate screening tests were ordered as noted in patient's After Visit Summary. Personalized health advice and appropriate referrals for health education or preventive services given if needed, as noted in patient's After Visit Summary.    History of Present Illness     F/u MMP and review labs and consultations  Has had a rough past month due to chronic  sinusitis  Had flu and covid vaccine, had 102 fever and was not feeling well for 3-4 days.   Anxiety remains severe, insomnia remains troubling.  F/b psych.  Seroquel made her hyper.  Takes melatonin, Coricidin (anti histamine) for insomnia  Frequency in urination.   Groin pain.  Straining when BM, pulled a lot of muscles and now in pain.   RLE pain. Saw othorpedic 2 months ago who cleared her hips and knee.   Is having trouble walking due ot the leg pain and cramped muscles.   Has tenderness and no feeling in her R shoulder after getting B12 shot   Worsening neuropathy in toes, feet, and legs.   Has a lot of productive cough with green sputum  Breast inflammation-diag mammo/u/s is unremarkable.  Seen by EB Jaramillo.  Routine f/u.  Micro hematuria-kb u/s negative, seen by urology and scheduling cysto to complete w/u  HTN/HPL-taking rx as directed, home bp's 130/80's, HR still up >100  H/o PE-no bleeding on eliquis  GERD-stable on pantoprazole  Hypothyroid-taking rx on empty stomach     Patient Care Team:  Roland Proctor MD as PCP - General  Roland Proctor MD as PCP - PCP-[facility]  Roland Proctor MD    Review of Systems   Constitutional:  Positive for chills, fatigue and fever.   Respiratory:  Positive for cough and wheezing. Negative for shortness of breath.    Cardiovascular:  Positive for palpitations. Negative for chest pain and leg swelling.   Gastrointestinal:  Positive for abdominal pain, constipation, diarrhea, nausea and vomiting.   Genitourinary:  Positive for frequency. Negative for dysuria and urgency.   Musculoskeletal:  Positive for back pain. Negative for neck pain.   Neurological:  Positive for dizziness, weakness and light-headedness. Negative for headaches.   Psychiatric/Behavioral:  The patient is nervous/anxious.      Medical History Reviewed by provider this encounter:       Annual Wellness Visit Questionnaire   Benjamin is here for her Subsequent Wellness visit.     Health Risk  Assessment:   Patient rates overall health as good. Patient feels that their physical health rating is slightly better. Patient is satisfied with their life. Eyesight was rated as same. Hearing was rated as same. Patient feels that their emotional and mental health rating is much better. Patients states they are sometimes angry. Patient states they are often unusually tired/fatigued. Pain experienced in the last 7 days has been a lot. Patient's pain rating has been 7/10. Patient states that she has experienced no weight loss or gain in last 6 months.     Fall Risk Screening:   In the past year, patient has experienced: history of falling in past year      Urinary Incontinence Screening:   Patient has not leaked urine accidently in the last six months.     Home Safety:  Patient does not have trouble with stairs inside or outside of their home. Patient has working smoke alarms and has working carbon monoxide detector. Home safety hazards include: none.     Nutrition:   Current diet is Regular.     Medications:   Patient is currently taking over-the-counter supplements. OTC medications include: see medication list. Patient is able to manage medications.     Activities of Daily Living (ADLs)/Instrumental Activities of Daily Living (IADLs):   Walk and transfer into and out of bed and chair?: Yes  Dress and groom yourself?: Yes    Bathe or shower yourself?: Yes    Feed yourself? Yes  Do your laundry/housekeeping?: Yes  Manage your money, pay your bills and track your expenses?: Yes  Make your own meals?: Yes    Do your own shopping?: Yes    Previous Hospitalizations:   Any hospitalizations or ED visits within the last 12 months?: Yes    How many hospitalizations have you had in the last year?: 1-2    Advance Care Planning:   Living will: Yes    Durable POA for healthcare: Yes    Advanced directive: Yes      PREVENTIVE SCREENINGS      Cardiovascular Screening:    General: Screening Not Indicated and History Lipid  Disorder      Diabetes Screening:     General: Screening Current      Colorectal Cancer Screening:     General: Screening Current      Breast Cancer Screening:     General: Screening Current      Cervical Cancer Screening:    General: Screening Not Indicated      Osteoporosis Screening:    General: Screening Not Indicated and History Osteoporosis      Lung Cancer Screening:     General: Screening Not Indicated      Hepatitis C Screening:    General: Screening Current    Screening, Brief Intervention, and Referral to Treatment (SBIRT)    Screening  Typical number of drinks in a day: 0  Typical number of drinks in a week: 0  Interpretation: Low risk drinking behavior.    AUDIT-C Screenin) How often did you have a drink containing alcohol in the past year? monthly or less  2) How many drinks did you have on a typical day when you were drinking in the past year? 0  3) How often did you have 6 or more drinks on one occasion in the past year? never    AUDIT-C Score: 1  Interpretation: Score 0-2 (female): Negative screen for alcohol misuse    Single Item Drug Screening:  How often have you used an illegal drug (including marijuana) or a prescription medication for non-medical reasons in the past year? never    Single Item Drug Screen Score: 0  Interpretation: Negative screen for possible drug use disorder    Review of Current Opioid Use    Opioid Risk Tool (ORT) Interpretation: Complete Opioid Risk Tool (ORT)    Social Determinants of Health     Financial Resource Strain: Low Risk  (2023)    Overall Financial Resource Strain (CARDIA)     Difficulty of Paying Living Expenses: Not hard at all   Food Insecurity: No Food Insecurity (2024)    Hunger Vital Sign     Worried About Running Out of Food in the Last Year: Never true     Ran Out of Food in the Last Year: Never true   Transportation Needs: No Transportation Needs (2024)    PRAPARE - Transportation     Lack of Transportation (Medical): No     Lack of  "Transportation (Non-Medical): No   Housing Stability: Low Risk  (11/1/2024)    Housing Stability Vital Sign     Unable to Pay for Housing in the Last Year: No     Number of Times Moved in the Last Year: 0     Homeless in the Last Year: No   Utilities: Not At Risk (11/1/2024)    [facility] Utilities     Threatened with loss of utilities: No     No results found.    Objective     Ht 4' 11\" (1.499 m)   BMI 39.18 kg/m²     Physical Exam  Vitals reviewed.   Constitutional:       General: She is not in acute distress.     Appearance: Normal appearance. She is obese. She is not ill-appearing, toxic-appearing or diaphoretic.   HENT:      Head: Normocephalic and atraumatic.      Mouth/Throat:      Mouth: Mucous membranes are moist.   Eyes:      General: No scleral icterus.     Pupils: Pupils are equal, round, and reactive to light.   Cardiovascular:      Rate and Rhythm: Regular rhythm. Tachycardia present.      Pulses: Normal pulses.      Heart sounds:      No friction rub. No gallop.   Pulmonary:      Effort: Pulmonary effort is normal. No respiratory distress.      Breath sounds: Wheezing present. No rhonchi or rales.   Abdominal:      General: There is no distension.      Tenderness: There is no abdominal tenderness. There is no guarding or rebound.   Musculoskeletal:         General: No swelling. Normal range of motion.      Right lower leg: No edema.      Left lower leg: No edema.   Skin:     General: Skin is warm.      Coloration: Skin is not jaundiced.   Neurological:      General: No focal deficit present.      Mental Status: She is alert and oriented to person, place, and time.      Cranial Nerves: No cranial nerve deficit.     I have spent a total time of 60 minutes in caring for this patient on the day of the visit/encounter including Diagnostic results, Prognosis, Risks and benefits of tx options, Instructions for management, Patient and family education, Importance of tx compliance, Counseling / Coordination of care, " Documenting in the medical record, Reviewing / ordering tests, medicine, procedures  , and Obtaining or reviewing history  .

## 2024-11-07 NOTE — TELEPHONE ENCOUNTER
Patient wanted to remind you about the Ambien prescription, she would like to have it go to [facility] pharmacy along with the gabapentin

## 2024-11-07 NOTE — PATIENT INSTRUCTIONS
Lab data reviewed in detail and compared to prior    Impaired fasting glucose-complete blood work prior to next visit, low-carb diet, exercise and weight loss as able.    Cough -- Take OTC cough medicine as needed. Continue with inhalers.     Hypertension appears stable on present regimen    Inappropriate sinus tachycardia-Echo reviewed, normal. Consider switching amlodipine to cardizem.     Ongoing anxiety and insomnia-Has not been able to see psychiatry. Start ambien and continue with melatonin. Schedule appointment with psychiatry as early as possible.    Hyperlipidemia-LDL at goal on rosuvastatin. Complete bloodwork at next visit    History of pulmonary embolism-continue with Eliquis.    Frequency of urination -- complete urinalysis.    Hypothyroidism -- complete bloodwork at next visit.     Polyneuropathy -- Worsening neuropathy in lower extremities. Start low dose gabapentin 100 mg twice a day.      Colonoscopy UTD, next in 2026. Follow up in 6 weeks or sooner as needed.

## 2024-11-08 DIAGNOSIS — G62.9 NEUROPATHY: ICD-10-CM

## 2024-11-08 RX ORDER — GABAPENTIN 100 MG/1
100 CAPSULE ORAL 2 TIMES DAILY
Qty: 30 CAPSULE | Refills: 1 | Status: SHIPPED | OUTPATIENT
Start: 2024-11-08

## 2024-11-08 NOTE — TELEPHONE ENCOUNTER
Patient's  called asking if Dr. Proctor could send the script for Gabapentin to [facility] in [place].     [facility] requires a Prior Auth for the medication so he will be paying out of pocket.

## 2024-11-20 ENCOUNTER — HOSPITAL ENCOUNTER (OUTPATIENT)
Age: 69
Discharge: HOME | End: 2024-11-20
Payer: COMMERCIAL

## 2024-11-20 VITALS — BODY MASS INDEX: 41.35 KG/M2 | WEIGHT: 197 LBS | HEIGHT: 58 IN

## 2024-11-20 DIAGNOSIS — S32.020A CLOSED COMPRESSION FRACTURE OF L2 VERTEBRA, INITIAL ENCOUNTER (HCC): ICD-10-CM

## 2024-11-20 DIAGNOSIS — G61.9 INFLAMMATORY NEUROPATHY (HCC): ICD-10-CM

## 2024-11-20 PROCEDURE — 77080 DXA BONE DENSITY AXIAL: CPT

## 2024-11-20 NOTE — TELEPHONE ENCOUNTER
Reason for call:   [x] Refill   [] Prior Auth  [] Other:     Office:   [x] PCP/Provider -   [] Specialty/Provider -     Medication: traMADol (Ultram) 50 mg     Dose/Frequency: Take 1 tablet (50 mg total) by mouth every 6 (six) hours as needed     Quantity: 90    Pharmacy: [facility] #[id number] - [place] - [address]     Does the patient have enough for 3 days?   [x] Yes   [] No - Send as HP to POD

## 2024-11-21 RX ORDER — TRAMADOL HYDROCHLORIDE 50 MG/1
50 TABLET ORAL EVERY 6 HOURS PRN
Qty: 90 TABLET | Refills: 0 | Status: SHIPPED | OUTPATIENT
Start: 2024-11-21

## 2024-11-26 DIAGNOSIS — G47.00 INSOMNIA, UNSPECIFIED TYPE: ICD-10-CM

## 2024-11-27 RX ORDER — ZOLPIDEM TARTRATE 10 MG/1
TABLET ORAL
Qty: 30 TABLET | Refills: 0 | Status: SHIPPED | OUTPATIENT
Start: 2024-11-27

## 2024-12-10 NOTE — ANESTHESIA POSTPROCEDURE EVALUATION
Post-Op Assessment Note    CV Status:  Stable  Pain Score: 0    Pain management: adequate     Mental Status:  Sleepy and arousable   Hydration Status:  Stable   PONV Controlled:  None   Airway Patency:  Patent      Post Op Vitals Reviewed: Yes      Staff: CRNA         No complications documented      BP  116/64    Temp 97 6   Pulse 97   Resp 16   SpO2 93
regular

## 2024-12-11 DIAGNOSIS — G62.9 NEUROPATHY: ICD-10-CM

## 2024-12-11 DIAGNOSIS — G61.9 INFLAMMATORY NEUROPATHY (HCC): ICD-10-CM

## 2024-12-11 DIAGNOSIS — I10 ESSENTIAL HYPERTENSION: Chronic | ICD-10-CM

## 2024-12-11 RX ORDER — GABAPENTIN 100 MG/1
100 CAPSULE ORAL 2 TIMES DAILY
Qty: 30 CAPSULE | Refills: 1 | Status: SHIPPED | OUTPATIENT
Start: 2024-12-11 | End: 2024-12-12 | Stop reason: SDUPTHER

## 2024-12-11 RX ORDER — METHOCARBAMOL 500 MG/1
TABLET, FILM COATED ORAL
Qty: 60 TABLET | Refills: 3 | Status: SHIPPED | OUTPATIENT
Start: 2024-12-11

## 2024-12-11 NOTE — TELEPHONE ENCOUNTER
Pt sent refill request of Amlodipine #100 to go to [facility], however this medication was not filled by PCP in the past.  Please review and advise.

## 2024-12-12 DIAGNOSIS — I10 ESSENTIAL HYPERTENSION: Chronic | ICD-10-CM

## 2024-12-12 DIAGNOSIS — G62.9 NEUROPATHY: ICD-10-CM

## 2024-12-12 RX ORDER — AMLODIPINE BESYLATE 5 MG/1
5 TABLET ORAL DAILY
Qty: 100 TABLET | Refills: 1 | Status: SHIPPED | OUTPATIENT
Start: 2024-12-12

## 2024-12-12 RX ORDER — GABAPENTIN 100 MG/1
100 CAPSULE ORAL 2 TIMES DAILY
Qty: 60 CAPSULE | Refills: 0 | Status: SHIPPED | OUTPATIENT
Start: 2024-12-12

## 2024-12-12 RX ORDER — AMLODIPINE BESYLATE 5 MG/1
5 TABLET ORAL DAILY
Qty: 100 TABLET | Refills: 3 | Status: SHIPPED | OUTPATIENT
Start: 2024-12-12 | End: 2024-12-12 | Stop reason: SDUPTHER

## 2024-12-23 ENCOUNTER — TELEPHONE (OUTPATIENT)
Age: 69
End: 2024-12-23

## 2024-12-23 ENCOUNTER — OFFICE VISIT (OUTPATIENT)
Age: 69
End: 2024-12-23
Payer: COMMERCIAL

## 2024-12-23 VITALS
SYSTOLIC BLOOD PRESSURE: 138 MMHG | HEART RATE: 112 BPM | TEMPERATURE: 98.7 F | HEIGHT: 58 IN | WEIGHT: 197 LBS | OXYGEN SATURATION: 100 % | RESPIRATION RATE: 18 BRPM | DIASTOLIC BLOOD PRESSURE: 82 MMHG | BODY MASS INDEX: 41.35 KG/M2

## 2024-12-23 DIAGNOSIS — M54.50 ACUTE LEFT-SIDED LOW BACK PAIN WITHOUT SCIATICA: ICD-10-CM

## 2024-12-23 DIAGNOSIS — I10 PRIMARY HYPERTENSION: ICD-10-CM

## 2024-12-23 DIAGNOSIS — J06.9 VIRAL URI: Primary | ICD-10-CM

## 2024-12-23 DIAGNOSIS — G61.9 INFLAMMATORY NEUROPATHY (HCC): ICD-10-CM

## 2024-12-23 DIAGNOSIS — G47.00 INSOMNIA, UNSPECIFIED TYPE: ICD-10-CM

## 2024-12-23 DIAGNOSIS — R00.2 PALPITATIONS: ICD-10-CM

## 2024-12-23 DIAGNOSIS — Z78.0 MENOPAUSE: ICD-10-CM

## 2024-12-23 DIAGNOSIS — G62.9 PERIPHERAL POLYNEUROPATHY: ICD-10-CM

## 2024-12-23 DIAGNOSIS — G62.9 NEUROPATHY: ICD-10-CM

## 2024-12-23 DIAGNOSIS — R13.14 PHARYNGOESOPHAGEAL DYSPHAGIA: ICD-10-CM

## 2024-12-23 PROCEDURE — 99214 OFFICE O/P EST MOD 30 MIN: CPT | Performed by: INTERNAL MEDICINE

## 2024-12-23 PROCEDURE — G2211 COMPLEX E/M VISIT ADD ON: HCPCS | Performed by: INTERNAL MEDICINE

## 2024-12-23 RX ORDER — LISINOPRIL 40 MG/1
40 TABLET ORAL DAILY
Qty: 100 TABLET | Refills: 3 | Status: SHIPPED | OUTPATIENT
Start: 2024-12-23

## 2024-12-23 RX ORDER — ZOLPIDEM TARTRATE 10 MG/1
TABLET ORAL
Qty: 30 TABLET | Refills: 0 | Status: SHIPPED | OUTPATIENT
Start: 2024-12-23

## 2024-12-23 RX ORDER — GABAPENTIN 100 MG/1
100 CAPSULE ORAL 3 TIMES DAILY
Qty: 90 CAPSULE | Refills: 3 | Status: SHIPPED | OUTPATIENT
Start: 2024-12-23 | End: 2024-12-26 | Stop reason: SDUPTHER

## 2024-12-23 RX ORDER — TRAMADOL HYDROCHLORIDE 50 MG/1
50 TABLET ORAL EVERY 6 HOURS PRN
Qty: 90 TABLET | Refills: 0 | Status: SHIPPED | OUTPATIENT
Start: 2024-12-23 | End: 2024-12-26 | Stop reason: SDUPTHER

## 2024-12-23 NOTE — TELEPHONE ENCOUNTER
PA for zolpidem (AMBIEN) 10 mg tablet SUBMITTED to     via    [x]CMM-KEY: [id number]  []Surescripts-Case ID #   []Availity-Auth ID # NDC #   []Faxed to plan   []Other website   []Phone call Case ID #     [x]PA sent as URGENT    All office notes, labs and other pertaining documents and studies sent. Clinical questions answered. Awaiting determination from insurance company.     Turnaround time for your insurance to make a decision on your Prior Authorization can take 7-21 business days.

## 2024-12-23 NOTE — TELEPHONE ENCOUNTER
PA for gabapentin 100 mg capsule  APPROVED     Date(s) approved 10/23/2024 - 12/22/2025    Patient advised by          [x][employer] Message  []Phone call   []LMOM  []L/M to call office as no active Communication consent on file  []Unable to leave detailed message as VM not approved on Communication consent       Pharmacy advised by    [x]Fax  []Phone call    Approval letter scanned into Media Yes

## 2024-12-23 NOTE — TELEPHONE ENCOUNTER
PA for traMADol (Ultram) 50 mg tablet NOT REQUIRED     Reason (screenshot if applicable)  Per PA completed on 9/4/2024 for same medication, with same insurance.       Pharmacy advised by    [x]Fax  []Phone call     
No

## 2024-12-23 NOTE — ASSESSMENT & PLAN NOTE
As above  Orders:    traMADol (Ultram) 50 mg tablet; Take 1 tablet (50 mg total) by mouth every 6 (six) hours as needed for moderate pain

## 2024-12-23 NOTE — TELEPHONE ENCOUNTER
PA for gabapentin (NEURONTIN) 100 mg capsule SUBMITTED to     via    [x]CM-KEY: [id number]  []Surescripts-Case ID #   []Availity-Auth ID # NDC #   []Faxed to plan   []Other website   []Phone call Case ID #     [x]PA sent as URGENT    All office notes, labs and other pertaining documents and studies sent. Clinical questions answered. Awaiting determination from insurance company.     Turnaround time for your insurance to make a decision on your Prior Authorization can take 7-21 business days.

## 2024-12-23 NOTE — TELEPHONE ENCOUNTER
Patients NP appt r/s for next opening with Dr Padgett on 4/1/25 at 12:00. Patient is aware and on cancellation list.

## 2024-12-23 NOTE — PROGRESS NOTES
Name: Benjamin Puga      : 1955      MRN: [id number]  Encounter Provider: Roland Proctor MD  Encounter Date: 2024   Encounter department: [facility]  :  Assessment & Plan  Neuropathy  Increase gabapentin to 100 mg 3 times daily.  We can slowly titrate as needed  Orders:    gabapentin (NEURONTIN) 100 mg capsule; Take 1 capsule (100 mg total) by mouth 3 (three) times a day    Viral URI  Symptoms have improved       Palpitations  Appears to be in sinus tachycardia based on exam, will check Holter monitor and follow accordingly  Orders:    Holter monitor; Future    Inflammatory neuropathy (HCC)  As above  Orders:    traMADol (Ultram) 50 mg tablet; Take 1 tablet (50 mg total) by mouth every 6 (six) hours as needed for moderate pain    Peripheral polyneuropathy         Pharyngoesophageal dysphagia    Orders:    Ambulatory Referral to Gastroenterology; Future    Primary hypertension    Orders:    lisinopril (ZESTRIL) 40 mg tablet; Take 1 tablet (40 mg total) by mouth daily    Menopause    Orders:    DXA bone density spine hip and pelvis; Future    Acute left-sided low back pain without sciatica    Orders:    Ambulatory Referral to Physical Therapy; Future    Insomnia, unspecified type    Orders:    zolpidem (AMBIEN) 10 mg tablet; TAKE ONE-HALF (1/2) TO ONE TABLET AT BEDTIME AS NEEDED           History of Present Illness     F/u MMP   Not feeling well  Notes recurrent uris and low grade fevers  Notes palpitations over past several weeks  Notes dysphagia despite cutting into small pieces. S/p dilation   Notes lbp on left side  Peripheral neuropathy-gabapentin was helping, but now seems to be less effective.  Only taking 100 bid.   Micro hematuria-kb u/s negative, seen by urology and scheduling cysto to complete w/u  Anxiety remains severe, insomnia remains troubling.  F/b psych.  Seroquel made her hyper.  Sleep better w/ gabapentin and ambien.     HTN/HPL-taking rx as  "directed, home bp's 130/80's, HR still up >100  H/o PE-no bleeding on eliquis  GERD-stable on pantoprazole  Hypothyroid-taking rx on empty stomach    Sore Throat   Pertinent negatives include no abdominal pain, congestion, coughing, headaches or shortness of breath.   Fever  Associated symptoms include a sore throat. Pertinent negatives include no abdominal pain, arthralgias, chest pain, chills, congestion, coughing, diaphoresis, fatigue, fever, headaches, myalgias, rash or weakness.   Palpitations  Associated symptoms include a sore throat. Pertinent negatives include no abdominal pain, arthralgias, chest pain, chills, congestion, coughing, diaphoresis, fatigue, fever, headaches, myalgias, rash or weakness.     Review of Systems   Constitutional:  Negative for appetite change, chills, diaphoresis, fatigue, fever and unexpected weight change.   HENT:  Positive for sore throat. Negative for congestion, hearing loss and rhinorrhea.    Eyes:  Negative for visual disturbance.   Respiratory:  Negative for cough, chest tightness, shortness of breath and wheezing.    Cardiovascular:  Positive for palpitations. Negative for chest pain and leg swelling.   Gastrointestinal:  Negative for abdominal pain and blood in stool.   Endocrine: Negative for cold intolerance, heat intolerance, polydipsia and polyuria.   Genitourinary:  Negative for difficulty urinating, dysuria, frequency and urgency.   Musculoskeletal:  Positive for back pain and gait problem. Negative for arthralgias and myalgias.   Skin:  Negative for rash.   Neurological:  Negative for dizziness, weakness, light-headedness and headaches.   Hematological:  Does not bruise/bleed easily.   Psychiatric/Behavioral:  Negative for dysphoric mood and sleep disturbance.        Objective   /82 (BP Location: Left arm)   Pulse (!) 112   Temp 98.7 °F (37.1 °C) (Temporal)   Resp 18   Ht 4' 9.5\" (1.461 m)   Wt 89.4 kg (197 lb)   SpO2 100%   BMI 41.89 kg/m²    "   Physical Exam  Constitutional:       Appearance: She is well-developed.   HENT:      Head: Normocephalic and atraumatic.      Nose: Nose normal.   Eyes:      General: No scleral icterus.     Conjunctiva/sclera: Conjunctivae normal.      Pupils: Pupils are equal, round, and reactive to light.   Neck:      Thyroid: No thyromegaly.      Vascular: No JVD.      Trachea: No tracheal deviation.   Cardiovascular:      Rate and Rhythm: Regular rhythm. Tachycardia present.      Heart sounds: No murmur heard.     No friction rub. No gallop.   Pulmonary:      Effort: Pulmonary effort is normal. No respiratory distress.      Breath sounds: Normal breath sounds. No wheezing or rales.   Abdominal:      General: Bowel sounds are normal. There is no distension.      Palpations: Abdomen is soft. There is no mass.      Tenderness: There is no abdominal tenderness. There is no guarding or rebound.   Musculoskeletal:         General: No tenderness.      Cervical back: Normal range of motion and neck supple.   Lymphadenopathy:      Cervical: No cervical adenopathy.   Skin:     General: Skin is warm and dry.      Findings: No erythema or rash.   Neurological:      Mental Status: She is alert and oriented to person, place, and time.      Cranial Nerves: No cranial nerve deficit.   Psychiatric:         Behavior: Behavior normal.         Thought Content: Thought content normal.         Judgment: Judgment normal.

## 2024-12-23 NOTE — TELEPHONE ENCOUNTER
PA for zolpidem 10 mg tablet  APPROVED     Date(s) approved 10/23/2024 - 12/22/2025    Patient advised by          [x][employer] Message  []Phone call   []LMOM  []L/M to call office as no active Communication consent on file  [x]Unable to leave detailed message as VM not approved on Communication consent       Pharmacy advised by    [x]Fax  []Phone call    Approval letter scanned into Media Yes

## 2024-12-26 DIAGNOSIS — G62.9 NEUROPATHY: ICD-10-CM

## 2024-12-26 DIAGNOSIS — G61.9 INFLAMMATORY NEUROPATHY (HCC): ICD-10-CM

## 2024-12-26 RX ORDER — GABAPENTIN 100 MG/1
100 CAPSULE ORAL 3 TIMES DAILY
Qty: 90 CAPSULE | Refills: 3 | Status: SHIPPED | OUTPATIENT
Start: 2024-12-26

## 2024-12-26 RX ORDER — TRAMADOL HYDROCHLORIDE 50 MG/1
50 TABLET ORAL EVERY 6 HOURS PRN
Qty: 90 TABLET | Refills: 3 | Status: SHIPPED | OUTPATIENT
Start: 2024-12-26

## 2024-12-26 NOTE — TELEPHONE ENCOUNTER
NOT A DUPLICATE was sent to the wrong pharmacy    Reason for call:   [x] Refill   [] Prior Auth  [] Other:     Office:   [x] PCP/Provider -   [] Specialty/Provider -     Medication: gabapentin (NEURONTIN) 100 mg capsule Take 1 capsule (100 mg total) by mouth 3 (three) times a day     traMADol (Ultram) 50 mg tablet  Take 1 tablet (50 mg total) by mouth every 6 (six) hours as needed for moderate pain       Pharmacy: [id number] - [place], [place] - [address]      Does the patient have enough for 3 days?   [] Yes   [x] No - Send as HP to POD

## 2024-12-31 DIAGNOSIS — G47.00 INSOMNIA, UNSPECIFIED TYPE: ICD-10-CM

## 2024-12-31 RX ORDER — ZOLPIDEM TARTRATE 10 MG/1
TABLET ORAL
Qty: 10 TABLET | Refills: 0 | Status: SHIPPED | OUTPATIENT
Start: 2024-12-31 | End: 2025-01-20 | Stop reason: SDUPTHER

## 2025-01-03 ENCOUNTER — OFFICE VISIT (OUTPATIENT)
Dept: URGENT CARE | Facility: CLINIC | Age: 70
End: 2025-01-03
Payer: COMMERCIAL

## 2025-01-03 ENCOUNTER — APPOINTMENT (OUTPATIENT)
Dept: RADIOLOGY | Facility: CLINIC | Age: 70
End: 2025-01-03
Payer: COMMERCIAL

## 2025-01-03 VITALS
WEIGHT: 193.6 LBS | SYSTOLIC BLOOD PRESSURE: 125 MMHG | OXYGEN SATURATION: 97 % | BODY MASS INDEX: 41.17 KG/M2 | HEART RATE: 90 BPM | RESPIRATION RATE: 22 BRPM | DIASTOLIC BLOOD PRESSURE: 85 MMHG | TEMPERATURE: 98.6 F

## 2025-01-03 DIAGNOSIS — J45.901 ASTHMA WITH ACUTE EXACERBATION, UNSPECIFIED ASTHMA SEVERITY, UNSPECIFIED WHETHER PERSISTENT: ICD-10-CM

## 2025-01-03 DIAGNOSIS — J40 BRONCHITIS: Primary | ICD-10-CM

## 2025-01-03 PROCEDURE — 71046 X-RAY EXAM CHEST 2 VIEWS: CPT

## 2025-01-03 PROCEDURE — 99214 OFFICE O/P EST MOD 30 MIN: CPT | Performed by: PHYSICIAN ASSISTANT

## 2025-01-03 RX ORDER — PREDNISONE 20 MG/1
40 TABLET ORAL ONCE
Status: COMPLETED | OUTPATIENT
Start: 2025-01-03 | End: 2025-01-03

## 2025-01-03 RX ORDER — ALBUTEROL SULFATE 0.83 MG/ML
2.5 SOLUTION RESPIRATORY (INHALATION) ONCE
Status: COMPLETED | OUTPATIENT
Start: 2025-01-03 | End: 2025-01-03

## 2025-01-03 RX ORDER — DOXYCYCLINE 100 MG/1
100 CAPSULE ORAL EVERY 12 HOURS SCHEDULED
Qty: 14 CAPSULE | Refills: 0 | Status: SHIPPED | OUTPATIENT
Start: 2025-01-03 | End: 2025-01-10

## 2025-01-03 RX ORDER — PREDNISONE 20 MG/1
TABLET ORAL
Qty: 7 TABLET | Refills: 0 | Status: SHIPPED | OUTPATIENT
Start: 2025-01-03

## 2025-01-03 RX ORDER — ALBUTEROL SULFATE 0.83 MG/ML
2.5 SOLUTION RESPIRATORY (INHALATION) EVERY 6 HOURS PRN
Qty: 75 ML | Refills: 0 | Status: SHIPPED | OUTPATIENT
Start: 2025-01-03

## 2025-01-03 RX ADMIN — ALBUTEROL SULFATE 2.5 MG: 0.83 SOLUTION RESPIRATORY (INHALATION) at 18:21

## 2025-01-03 RX ADMIN — PREDNISONE 40 MG: 20 TABLET ORAL at 18:21

## 2025-01-03 NOTE — PROGRESS NOTES
[facility]        NAME: Benjamin Puga is a 69 y.o. female  : 1955    MRN: [id number]  DATE: January 3, 2025  TIME: 6:38 PM    Assessment and Plan   Bronchitis [J40]  1. Bronchitis  doxycycline hyclate (VIBRAMYCIN) 100 mg capsule      2. Asthma with acute exacerbation, unspecified asthma severity, unspecified whether persistent  XR chest pa and lateral    predniSONE tablet 40 mg    albuterol inhalation solution 2.5 mg    predniSONE 20 mg tablet    albuterol (2.5 mg/3 mL) 0.083 % nebulizer solution    doxycycline hyclate (VIBRAMYCIN) 100 mg capsule            Patient Instructions   Augmentin and prednisone as prescribed.  Continue to use albuterol nebulizers as needed every 4-6 hours for wheezing.  On exam overall well-appearing, non toxic afebrile. Congested but lungs CTA and no increased work of breathing  Continue supportive treatment.:Vitamin D3 2000 IU daily  Vitamin C 1000mg twice per day  Multivitamin daily  Some studies suggest that Zinc 12.5-15mg every 2 hours while awake x 5 days may shorten the duration cold symptoms by 1-2 days.   Increase fluids and rest.  Nasal saline spray; Afrin if severe congestion (do not use for more than 3 days)  Over the counter decongestant/cough suppressants  Tylenol/Ibuprofen for pain/fever  Salt water gargles and chloraseptic spray  Throat Coat Tea  Warm compresses over sinuses  Cool mist humidifier or vicks vaporizer  Follow up with PCP if symptoms do not improve or worsen  If tests have been performed at [facility], our office will contact you with results if changes need to be made to the care plan discussed with you at the visit.  You can review your full results on [facility]  Follow up with PCP in 3-5 days.  Proceed to  ER if symptoms worsen.    Chief Complaint     Chief Complaint   Patient presents with    Cough     Patient here after being sick for 11 days now. She states her neighbor is a doctor and told her that she most likely had the flu.  She states she has asthma and her cough is not going away. She feels congestion in her chest and shortness of breath with activity as well          History of Present Illness       HPI  This is a 69-year-old female here complaining of nasal congestion, cough, right ear pain, sinus pain and pressure since December 23.  She notes she last had diarrhea on Tuesday but denies any black or bloody stools.  She notes yesterday she vomited 3 times from fall coughing fits and her GERD.  She complains of shortness of breath with exertion.  She denies fever, chest pain, shortness of breath at rest.  She does have a history of asthma and has been using her Dulera and albuterol nebulizer.  Hazelnut an hour ago and now they have a rash patient last albuterol at 1130 today.  Review of Systems   Review of Systems   Constitutional:  Negative for fever.   HENT:  Positive for congestion and ear pain. Negative for sore throat.    Respiratory:  Positive for cough. Negative for shortness of breath.    Cardiovascular:  Negative for chest pain.   Gastrointestinal:  Negative for diarrhea and vomiting.         Current Medications       Current Outpatient Medications:     albuterol (2.5 mg/3 mL) 0.083 % nebulizer solution, Take 3 mL (2.5 mg total) by nebulization every 6 (six) hours as needed for wheezing or shortness of breath, Disp: 75 mL, Rfl: 0    amLODIPine (NORVASC) 5 mg tablet, Take 1 tablet (5 mg total) by mouth daily, Disp: 100 tablet, Rfl: 1    apixaban (Eliquis) 2.5 mg, Take 1 tablet (2.5 mg total) by mouth 2 (two) times a day, Disp: 200 tablet, Rfl: 3    clotrimazole (LOTRIMIN) 1 % cream, Apply topically 2 (two) times a day for 10 doses, Disp: 45 g, Rfl: 0    doxycycline hyclate (VIBRAMYCIN) 100 mg capsule, Take 1 capsule (100 mg total) by mouth every 12 (twelve) hours for 7 days, Disp: 14 capsule, Rfl: 0    DULoxetine (CYMBALTA) 30 mg delayed release capsule, Take 3 capsules (90 mg total) by mouth daily, Disp: 270 capsule, Rfl:  1    famotidine (PEPCID) 10 mg tablet, Take 1 tablet (10 mg total) by mouth daily, Disp: 90 tablet, Rfl: 3    ferrous sulfate 325 (65 Fe) mg tablet, Take 325 mg by mouth daily, Disp: , Rfl:     gabapentin (NEURONTIN) 100 mg capsule, Take 1 capsule (100 mg total) by mouth 3 (three) times a day, Disp: 90 capsule, Rfl: 3    hydrocortisone (ANUSOL-HC) 2.5 % rectal cream, Apply topically 2 (two) times a day, Disp: 28 g, Rfl: 3    levothyroxine 88 mcg tablet, Take 1 tablet (88 mcg total) by mouth daily in the early morning, Disp: 100 tablet, Rfl: 3    lisinopril (ZESTRIL) 40 mg tablet, Take 1 tablet (40 mg total) by mouth daily, Disp: 100 tablet, Rfl: 3    methocarbamol (ROBAXIN) 500 mg tablet, TAKE ONE TABLET BY MOUTH FOUR TIMES A DAY AS NEEDED FOR MUSCLE SPASMS, Disp: 60 tablet, Rfl: 3    Multiple Vitamins-Minerals (OCUVITE ADULT 50+ PO), Take by mouth, Disp: , Rfl:     nystatin powder, Apply 1 Application topically 2 (two) times a day, Disp: 60 g, Rfl: 0    pantoprazole (PROTONIX) 40 mg tablet, Take 1 tablet (40 mg total) by mouth 2 (two) times a day, Disp: 200 tablet, Rfl: 3    predniSONE 20 mg tablet, Take 2 tablets for 2 days then take 1 tablet for 2 days than 1/2 tab for 2 days, Disp: 7 tablet, Rfl: 0    rosuvastatin (CRESTOR) 20 MG tablet, Take 1 tablet (20 mg total) by mouth daily, Disp: 100 tablet, Rfl: 1    saccharomyces boulardii (FLORASTOR) 250 mg capsule, Take 1 capsule (250 mg total) by mouth 2 (two) times a day, Disp: 60 capsule, Rfl: 0    traMADol (Ultram) 50 mg tablet, Take 1 tablet (50 mg total) by mouth every 6 (six) hours as needed for moderate pain, Disp: 90 tablet, Rfl: 3    Triamcinolone Acetonide (Nasacort Allergy 24HR) 55 MCG/ACT nasal spray, 2 sprays into each nostril as needed (Allergies), Disp: 16.9 mL, Rfl: 0    zolpidem (AMBIEN) 10 mg tablet, TAKE ONE-HALF (1/2) TO ONE TABLET AT BEDTIME AS NEEDED, Disp: 10 tablet, Rfl: 0    cyanocobalamin 1,000 mcg/mL, Inject 1 mL (1,000 mcg total) into a  muscle every 30 (thirty) days (Patient not taking: Reported on 1/3/2025), Disp: 1 mL, Rfl: 0  No current facility-administered medications for this visit.    Current Allergies     Allergies as of 01/03/2025 - Reviewed 01/03/2025   Allergen Reaction Noted    Erythromycin Hives 02/02/2012    Tamoxifen Other (See Comments) 01/14/2020    Nsaids Hives 02/03/2012    Oxybutynin  08/17/2020    Singulair [montelukast] Swelling 06/14/2018    Ciprofloxacin Hives 01/17/2017    Penicillins Hives 01/17/2017    Sulfa antibiotics Hives 02/02/2012    Vancomycin Hives 01/17/2017            The following portions of the patient's history were reviewed and updated as appropriate: allergies, current medications, past family history, past medical history, past social history, past surgical history and problem list.     Past Medical History:   Diagnosis Date    Allergic Birth    Allergic rhinitis     Anemia     Anxiety Years    Arthritis 1990    Asthma Birth    Cancer (HCC)     breast cancer, Left side    Clotting disorder (HCC) 8-12-20    Pulmonary embolism    Colon polyp     Depression     Disease of thyroid gland     GERD (gastroesophageal reflux disease)     Hyperlipemia     Hypertension     Hypothyroidism     Last assessed: 3/25/14    Inflammatory bowel disease Years    Obesity     Osteoarthritis     Pre-operative laboratory examination     Pulmonary embolism (HCC)     Shingles 2003    Urinary tract infection Couple of times    Visual impairment Droopy eye lid    Ulcer of the  eye       Past Surgical History:   Procedure Laterality Date    BREAST SURGERY  2019    Left small lump removed    FRACTURE SURGERY  90s    QLeft foot    HAND SURGERY Left 03/2020    HYSTERECTOMY      INCISIONAL BREAST BIOPSY      JOINT REPLACEMENT      KNEE ARTHROPLASTY      MAMMO NEEDLE LOCALIZATION LEFT (ALL INC) Left 06/06/2019    ROTATOR CUFF REPAIR      SHOULDER SURGERY      TRIGGER FINGER RELEASE      TRIGGER FINGER RELEASE         Family History    Problem Relation Age of Onset    Coronary artery disease Mother     Hypertension Mother         Essential    Stroke Mother         Heart attack    Asthma Mother     Arthritis Mother     Coronary artery disease Father     Alcohol abuse Father         Heavy smoker    Depression Sister     Depression Sister     Suicide Attempts Sister          Medications have been verified.        Objective   /85   Pulse 90   Temp 98.6 °F (37 °C)   Resp 22   Wt 87.8 kg (193 lb 9.6 oz)   SpO2 97%   BMI 41.17 kg/m²        Physical Exam     Physical Exam  Vitals and nursing note reviewed.   Constitutional:       General: She is not in acute distress.     Appearance: Normal appearance. She is not ill-appearing, toxic-appearing or diaphoretic.   HENT:      Right Ear: Tympanic membrane, ear canal and external ear normal.      Left Ear: Tympanic membrane, ear canal and external ear normal.      Nose: Congestion present.      Mouth/Throat:      Mouth: Mucous membranes are moist.      Pharynx: No posterior oropharyngeal erythema.   Cardiovascular:      Rate and Rhythm: Normal rate and regular rhythm.   Pulmonary:      Effort: Pulmonary effort is normal.      Breath sounds: Wheezing present.   Neurological:      Mental Status: She is alert.

## 2025-01-05 ENCOUNTER — RESULTS FOLLOW-UP (OUTPATIENT)
Dept: UROLOGY | Facility: CLINIC | Age: 70
End: 2025-01-05

## 2025-01-13 ENCOUNTER — TELEPHONE (OUTPATIENT)
Age: 70
End: 2025-01-13

## 2025-01-13 NOTE — TELEPHONE ENCOUNTER
Benjamin was seen at Urgent Care on 1/3/2025 for bronchitis, prescribed doxycycline, prednisone, and albuterol nebulizer solution.     , Stepan, reports Benjamin's cough persists and she has developed oral thrush.  He requests office visit.     Office visit scheduled for 3:30 pm today (3:15 arrival time) with Physician Assistant Santosh.

## 2025-01-15 ENCOUNTER — NURSE TRIAGE (OUTPATIENT)
Age: 70
End: 2025-01-15

## 2025-01-15 NOTE — TELEPHONE ENCOUNTER
LOV 2/15/22 Dr. Enid delacruz dysphagia, esoph manometry 4/1/22, EGD 2/21/22, Colon 2/11/21     Patient started with burning pain with swallowing liquids, foods started today. She notes burning down esophagus. She is taking medications as ordered with no real improvement (PPI, famotidine) . She is just getting over flu, was on antibiotic prednisone, inhalers and nebulizers. She does rinse after respiratory medications administered. She states still has some thrush on tongue and I asked her to look in mirror and she notes some white patches along with redness in her throat. She has been gargling with warm salt water. Patient placed on urgent appointment 1/22/25 with GALO Cartwright and canceled the 1/28/25.  She states she may report to ED if symptoms worsen in meantime. [facility] confirmed if any orders.      Reason for Disposition   The patient has reflux    Answer Assessment - Initial Assessment Questions  1. Do you have a history of GERD?  yes  2. When did your symptoms start? Please describe your symptoms and how often you experience these symptoms.  Just this morning, has been sick with flu x two weeks, treated with antibiotics, prednisone, inhalers and nebulizer and notes improvement.  3. Are you taking any acid reducing medications currently such as: Prilosec (Omeprazole), Protonix (Pantoprazole), Prevacid (Lansoprazole), Nexium (Esomeprazole), Aciphex (Rabeprazole), Dexilant (Dexlansoprazole)?  Pantoprazole 40 mg BID, famotidine 10 mg daily  4. Have you tried any OTC acid reducing medications? (If so, which medications have you tried?) Zantac (Ranitidine), Pepcid (Famotidine), Tagamet (Cimetidine) Tums, Rolaids, Maalox, Mylanta.  Has not  5. What was the outcome of taking the medications which you have for these symptoms?  Taking meds without relief today  6. Have you experienced any recent changes in your bowel habits?  On and off while sick with flu  7. Have you had any new life stressors or diet  changes?  Rough time with flu recently,   8. Does anything make your symptoms better?  Not currently  9. Have you had any recent blood work, imaging, or procedures done? If yes, what were those? Urgent care for flu symptoms    Protocols used: GI-Gerd-ADULT-[place]

## 2025-01-15 NOTE — TELEPHONE ENCOUNTER
Regarding: pain when eating/reflux  ----- Message from Wendy GREEN sent at 1/15/2025  2:55 PM EST -----  Pt calling because she states she is having pain in her throat and chest when she eats. She also states she is having a lot of reflux. Scheduled on 1/28/25 but would like to be seen sooner. Pt states she is just getting over the flu and thrush on her tongue

## 2025-01-20 DIAGNOSIS — G47.00 INSOMNIA, UNSPECIFIED TYPE: ICD-10-CM

## 2025-01-20 RX ORDER — ZOLPIDEM TARTRATE 10 MG/1
TABLET ORAL
Qty: 10 TABLET | Refills: 0 | Status: SHIPPED | OUTPATIENT
Start: 2025-01-20

## 2025-01-20 NOTE — TELEPHONE ENCOUNTER
Reason for call:   [x] Refill   [] Prior Auth  [] Other:     Office:   [x] PCP/Provider -   [] Specialty/Provider -     Medication: Zolpidem    Dose/Frequency: 10 mg    Quantity: 30 tablets    Pharmacy: [id number] - [place], [place] - [address]     Does the patient have enough for 3 days?   [x] Yes   [] No - Send as HP to POD

## 2025-01-21 ENCOUNTER — PATIENT MESSAGE (OUTPATIENT)
Age: 70
End: 2025-01-21

## 2025-01-21 ENCOUNTER — TELEPHONE (OUTPATIENT)
Age: 70
End: 2025-01-21

## 2025-01-21 DIAGNOSIS — G47.00 INSOMNIA, UNSPECIFIED TYPE: ICD-10-CM

## 2025-01-21 RX ORDER — ZOLPIDEM TARTRATE 10 MG/1
TABLET ORAL
Qty: 30 TABLET | Refills: 0 | OUTPATIENT
Start: 2025-01-21

## 2025-01-27 ENCOUNTER — HOSPITAL ENCOUNTER (OUTPATIENT)
Dept: NON INVASIVE DIAGNOSTICS | Facility: CLINIC | Age: 70
Discharge: HOME | End: 2025-01-27
Payer: COMMERCIAL

## 2025-01-27 ENCOUNTER — PATIENT MESSAGE (OUTPATIENT)
Age: 70
End: 2025-01-27

## 2025-01-27 DIAGNOSIS — G47.00 INSOMNIA, UNSPECIFIED TYPE: ICD-10-CM

## 2025-01-27 DIAGNOSIS — R00.2 PALPITATIONS: ICD-10-CM

## 2025-01-27 PROCEDURE — 93225 XTRNL ECG REC<48 HRS REC: CPT

## 2025-01-27 PROCEDURE — 93226 XTRNL ECG REC<48 HR SCAN A/R: CPT

## 2025-01-28 ENCOUNTER — PATIENT MESSAGE (OUTPATIENT)
Age: 70
End: 2025-01-28

## 2025-01-28 DIAGNOSIS — G47.00 INSOMNIA, UNSPECIFIED TYPE: ICD-10-CM

## 2025-01-28 RX ORDER — ZOLPIDEM TARTRATE 10 MG/1
TABLET ORAL
Qty: 10 TABLET | Refills: 0 | OUTPATIENT
Start: 2025-01-28

## 2025-01-29 ENCOUNTER — RESULTS FOLLOW-UP (OUTPATIENT)
Age: 70
End: 2025-01-29

## 2025-01-29 DIAGNOSIS — G47.00 INSOMNIA, UNSPECIFIED TYPE: ICD-10-CM

## 2025-01-29 PROCEDURE — 93227 XTRNL ECG REC<48 HR R&I: CPT | Performed by: INTERNAL MEDICINE

## 2025-01-29 RX ORDER — ZOLPIDEM TARTRATE 10 MG/1
TABLET ORAL
Qty: 30 TABLET | Refills: 0 | Status: SHIPPED | OUTPATIENT
Start: 2025-01-29

## 2025-02-11 DIAGNOSIS — G47.00 INSOMNIA, UNSPECIFIED TYPE: ICD-10-CM

## 2025-02-11 DIAGNOSIS — B37.9 YEAST INFECTION: ICD-10-CM

## 2025-02-11 RX ORDER — ZOLPIDEM TARTRATE 10 MG/1
TABLET ORAL
Qty: 30 TABLET | Refills: 0 | OUTPATIENT
Start: 2025-02-11

## 2025-02-11 RX ORDER — NYSTATIN 100000 [USP'U]/G
1 POWDER TOPICAL 2 TIMES DAILY
Qty: 60 G | Refills: 0 | Status: SHIPPED | OUTPATIENT
Start: 2025-02-11

## 2025-02-11 NOTE — TELEPHONE ENCOUNTER
Auth#: [id number]  Nahum  10/11 to 11/25/19    STAT test ordered by Dalila Felt today  STAT x-ray was also ordered      I left info for Lucero; if the site has to be changed; she can let me know  No

## 2025-02-12 ENCOUNTER — PREP FOR PROCEDURE (OUTPATIENT)
Age: 70
End: 2025-02-12

## 2025-02-12 ENCOUNTER — OFFICE VISIT (OUTPATIENT)
Age: 70
End: 2025-02-12
Payer: COMMERCIAL

## 2025-02-12 VITALS
SYSTOLIC BLOOD PRESSURE: 122 MMHG | HEART RATE: 96 BPM | OXYGEN SATURATION: 95 % | HEIGHT: 58 IN | BODY MASS INDEX: 41.14 KG/M2 | DIASTOLIC BLOOD PRESSURE: 80 MMHG | WEIGHT: 196 LBS

## 2025-02-12 DIAGNOSIS — R13.10 DYSPHAGIA, UNSPECIFIED TYPE: Primary | ICD-10-CM

## 2025-02-12 DIAGNOSIS — K21.9 GASTROESOPHAGEAL REFLUX DISEASE, UNSPECIFIED WHETHER ESOPHAGITIS PRESENT: ICD-10-CM

## 2025-02-12 PROCEDURE — 99214 OFFICE O/P EST MOD 30 MIN: CPT | Performed by: PHYSICIAN ASSISTANT

## 2025-02-12 RX ORDER — SODIUM CHLORIDE, SODIUM LACTATE, POTASSIUM CHLORIDE, CALCIUM CHLORIDE 600; 310; 30; 20 MG/100ML; MG/100ML; MG/100ML; MG/100ML
125 INJECTION, SOLUTION INTRAVENOUS CONTINUOUS
Status: CANCELLED | OUTPATIENT
Start: 2025-02-12

## 2025-02-12 NOTE — PATIENT INSTRUCTIONS
Scheduled date of EGD(as of today): 2/18/25  Physician performing EGD: Enid  Location of EGD: [place]  Instructions reviewed with patient by: Pearl VU  Clearances: Eliquis- 2 day hold

## 2025-02-12 NOTE — PROGRESS NOTES
Name: Benjamin Puga      : 1955      MRN: [id number]  Encounter Provider: Yelena Cartwright PA-C  Encounter Date: 2025   Encounter department: [facility] [place]  :  Assessment & Plan  Dysphagia, unspecified type    Patient presents with a return of dysphagia for solids since November.  She had a prior hx of dysphagia back in  which resolved when she underwent an EGD with dilation. Biopsies were negative for EoE. Esophagus appeared normal on the EGD but note that a prior barium swallow was suggestive of a small anterior esophageal web.    Will plan for EGD with dilation to investigate.    Note: She is on Eliquis for hx of thromboembolism and will hold 2 days prior to the procedure.    Orders:    EGD; Future    Gastroesophageal reflux disease, unspecified whether esophagitis present    Patient has a long hx of GERD x years.  She is on Pantoprazole 40mg po daily which significantly helps her.      History of Present Illness   HPI  Benjamin Puga is a 69 y.o. female with a PMH of HTN, hyperlipidemia, hypothyroidism, asthma, PE on Eliquis who presents to the office for an evaluation of dysphagia.  Patient reports a return of dysphagia that began in November.  She reports dysphagia for solids like pasta, rice, vegetables, etc.  She had a prior history of dysphagia back in  which resolved when she underwent an EGD with dilation. Biopsies were negative for EoE. Esophagus appeared normal on the EGD but note that a prior barium swallow was suggestive of a small anterior esophageal web.  She also reports she had the flu and thrush previously over the Holidays. She has a history of chronic GERD for many years and takes Pantoprazole daily with relief.    I discussed informed consent with the patient for the EGD. The risks/benefits of the procedure were discussed with the patient. Risks included, but not limited to, infection, bleeding, perforation were discussed.  Patient was agreeable.     History obtained from: patient      Medical History Reviewed by provider this encounter:  Meds     .  Past Medical History   Past Medical History:   Diagnosis Date    Allergic Birth    Allergic rhinitis     Anemia     Anxiety Years    Arthritis 1990    Asthma Birth    Cancer (HCC)     breast cancer, Left side    Clotting disorder (HCC) 8-12-20    Pulmonary embolism    Colon polyp     Depression     Disease of thyroid gland     GERD (gastroesophageal reflux disease)     Hernia     Hyperlipemia     Hypertension     Hypothyroidism     Last assessed: 3/25/14    Inflammatory bowel disease Years    Irritable bowel syndrome Yrs    Obesity     Osteoarthritis     Pre-operative laboratory examination     Pulmonary embolism (HCC)     Shingles 2003    Urinary tract infection Couple of times    Visual impairment Droopy eye lid    Ulcer of the  eye     Past Surgical History:   Procedure Laterality Date    BREAST SURGERY  2019    Left small lump removed    COLONOSCOPY  21or22    FRACTURE SURGERY  90s    QLeft foot    HAND SURGERY Left 03/2020    HYSTERECTOMY      INCISIONAL BREAST BIOPSY      JOINT REPLACEMENT      KNEE ARTHROPLASTY      MAMMO NEEDLE LOCALIZATION LEFT (ALL INC) Left 06/06/2019    ROTATOR CUFF REPAIR      SHOULDER SURGERY      TRIGGER FINGER RELEASE      TRIGGER FINGER RELEASE      UPPER GASTROINTESTINAL ENDOSCOPY  21or 22     Family History   Problem Relation Age of Onset    Coronary artery disease Mother     Hypertension Mother         Essential    Stroke Mother         Heart attack    Asthma Mother     Arthritis Mother         Hbp    Heart disease Mother     Coronary artery disease Father     Alcohol abuse Father         Heavy smoker    Hypertension Father         Heart attack 57yrs    Hypertension Sister         Asthma heart attack allergies    Depression Sister     Depression Sister     Suicide Attempts Sister     Hypertension Sister         Asthma allergies cancer      reports that  she has never smoked. She has never used smokeless tobacco. She reports that she does not currently use alcohol. She reports that she does not use drugs.  Current Outpatient Medications on File Prior to Visit   Medication Sig Dispense Refill    albuterol (2.5 mg/3 mL) 0.083 % nebulizer solution Take 3 mL (2.5 mg total) by nebulization every 6 (six) hours as needed for wheezing or shortness of breath 75 mL 0    amLODIPine (NORVASC) 5 mg tablet Take 1 tablet (5 mg total) by mouth daily 100 tablet 1    ferrous sulfate 325 (65 Fe) mg tablet Take 325 mg by mouth daily      gabapentin (NEURONTIN) 100 mg capsule Take 1 capsule (100 mg total) by mouth 3 (three) times a day 90 capsule 3    levothyroxine 88 mcg tablet Take 1 tablet (88 mcg total) by mouth daily in the early morning 100 tablet 3    lisinopril (ZESTRIL) 40 mg tablet Take 1 tablet (40 mg total) by mouth daily 100 tablet 3    methocarbamol (ROBAXIN) 500 mg tablet TAKE ONE TABLET BY MOUTH FOUR TIMES A DAY AS NEEDED FOR MUSCLE SPASMS 60 tablet 3    Multiple Vitamins-Minerals (OCUVITE ADULT 50+ PO) Take by mouth      nystatin powder Apply 1 Application topically 2 (two) times a day 60 g 0    pantoprazole (PROTONIX) 40 mg tablet Take 1 tablet (40 mg total) by mouth 2 (two) times a day 200 tablet 3    rosuvastatin (CRESTOR) 20 MG tablet Take 1 tablet (20 mg total) by mouth daily 100 tablet 1    traMADol (Ultram) 50 mg tablet Take 1 tablet (50 mg total) by mouth every 6 (six) hours as needed for moderate pain 90 tablet 3    zolpidem (AMBIEN) 10 mg tablet TAKE ONE-HALF (1/2) TO ONE TABLET AT BEDTIME AS NEEDED 30 tablet 0    apixaban (Eliquis) 2.5 mg Take 1 tablet (2.5 mg total) by mouth 2 (two) times a day 200 tablet 3    clotrimazole (LOTRIMIN) 1 % cream Apply topically 2 (two) times a day for 10 doses 45 g 0    cyanocobalamin 1,000 mcg/mL Inject 1 mL (1,000 mcg total) into a muscle every 30 (thirty) days 1 mL 0    DULoxetine (CYMBALTA) 30 mg delayed release capsule  Take 3 capsules (90 mg total) by mouth daily 270 capsule 1    famotidine (PEPCID) 10 mg tablet Take 1 tablet (10 mg total) by mouth daily 90 tablet 3    hydrocortisone (ANUSOL-HC) 2.5 % rectal cream Apply topically 2 (two) times a day 28 g 3    predniSONE 20 mg tablet Take 2 tablets for 2 days then take 1 tablet for 2 days than 1/2 tab for 2 days 7 tablet 0    saccharomyces boulardii (FLORASTOR) 250 mg capsule Take 1 capsule (250 mg total) by mouth 2 (two) times a day 60 capsule 0    Triamcinolone Acetonide (Nasacort Allergy 24HR) 55 MCG/ACT nasal spray 2 sprays into each nostril as needed (Allergies) 16.9 mL 0     No current facility-administered medications on file prior to visit.     Allergies   Allergen Reactions    Erythromycin Hives    Tamoxifen Other (See Comments)     Headaches, stomach pain, sweats,     Nsaids Hives    Oxybutynin     Singulair [Montelukast] Swelling    Ciprofloxacin Hives    Penicillins Hives    Sulfa Antibiotics Hives    Vancomycin Hives      Current Outpatient Medications on File Prior to Visit   Medication Sig Dispense Refill    albuterol (2.5 mg/3 mL) 0.083 % nebulizer solution Take 3 mL (2.5 mg total) by nebulization every 6 (six) hours as needed for wheezing or shortness of breath 75 mL 0    amLODIPine (NORVASC) 5 mg tablet Take 1 tablet (5 mg total) by mouth daily 100 tablet 1    ferrous sulfate 325 (65 Fe) mg tablet Take 325 mg by mouth daily      gabapentin (NEURONTIN) 100 mg capsule Take 1 capsule (100 mg total) by mouth 3 (three) times a day 90 capsule 3    levothyroxine 88 mcg tablet Take 1 tablet (88 mcg total) by mouth daily in the early morning 100 tablet 3    lisinopril (ZESTRIL) 40 mg tablet Take 1 tablet (40 mg total) by mouth daily 100 tablet 3    methocarbamol (ROBAXIN) 500 mg tablet TAKE ONE TABLET BY MOUTH FOUR TIMES A DAY AS NEEDED FOR MUSCLE SPASMS 60 tablet 3    Multiple Vitamins-Minerals (OCUVITE ADULT 50+ PO) Take by mouth      nystatin powder Apply 1 Application  "topically 2 (two) times a day 60 g 0    pantoprazole (PROTONIX) 40 mg tablet Take 1 tablet (40 mg total) by mouth 2 (two) times a day 200 tablet 3    rosuvastatin (CRESTOR) 20 MG tablet Take 1 tablet (20 mg total) by mouth daily 100 tablet 1    traMADol (Ultram) 50 mg tablet Take 1 tablet (50 mg total) by mouth every 6 (six) hours as needed for moderate pain 90 tablet 3    zolpidem (AMBIEN) 10 mg tablet TAKE ONE-HALF (1/2) TO ONE TABLET AT BEDTIME AS NEEDED 30 tablet 0    apixaban (Eliquis) 2.5 mg Take 1 tablet (2.5 mg total) by mouth 2 (two) times a day 200 tablet 3    clotrimazole (LOTRIMIN) 1 % cream Apply topically 2 (two) times a day for 10 doses 45 g 0    cyanocobalamin 1,000 mcg/mL Inject 1 mL (1,000 mcg total) into a muscle every 30 (thirty) days 1 mL 0    DULoxetine (CYMBALTA) 30 mg delayed release capsule Take 3 capsules (90 mg total) by mouth daily 270 capsule 1    famotidine (PEPCID) 10 mg tablet Take 1 tablet (10 mg total) by mouth daily 90 tablet 3    hydrocortisone (ANUSOL-HC) 2.5 % rectal cream Apply topically 2 (two) times a day 28 g 3    predniSONE 20 mg tablet Take 2 tablets for 2 days then take 1 tablet for 2 days than 1/2 tab for 2 days 7 tablet 0    saccharomyces boulardii (FLORASTOR) 250 mg capsule Take 1 capsule (250 mg total) by mouth 2 (two) times a day 60 capsule 0    Triamcinolone Acetonide (Nasacort Allergy 24HR) 55 MCG/ACT nasal spray 2 sprays into each nostril as needed (Allergies) 16.9 mL 0     No current facility-administered medications on file prior to visit.      Social History     Tobacco Use    Smoking status: Never    Smokeless tobacco: Never   Vaping Use    Vaping status: Never Used   Substance and Sexual Activity    Alcohol use: Not Currently     Comment: socially     Drug use: Never    Sexual activity: Not Currently     Partners: Male     Birth control/protection: Post-menopausal        Objective   /80   Pulse 96   Ht 4' 9.5\" (1.461 m)   Wt 88.9 kg (196 lb)   SpO2 " 95%   BMI 41.68 kg/m²      Physical Exam  Constitutional:       General: She is not in acute distress.     Appearance: Normal appearance. She is not ill-appearing.   HENT:      Head: Normocephalic and atraumatic.   Cardiovascular:      Rate and Rhythm: Normal rate and regular rhythm.   Pulmonary:      Effort: Pulmonary effort is normal. No respiratory distress.      Breath sounds: Normal breath sounds.   Skin:     General: Skin is warm and dry.   Neurological:      Mental Status: She is alert and oriented to person, place, and time.

## 2025-02-12 NOTE — ASSESSMENT & PLAN NOTE
Patient has a long hx of GERD x years.  She is on Pantoprazole 40mg po daily which significantly helps her.

## 2025-02-12 NOTE — H&P (VIEW-ONLY)
Name: Benjamin Puga      : 1955      MRN: [id number]  Encounter Provider: Yelena Cartwright PA-C  Encounter Date: 2025   Encounter department: [facility] [place]  :  Assessment & Plan  Dysphagia, unspecified type    Patient presents with a return of dysphagia for solids since November.  She had a prior hx of dysphagia back in  which resolved when she underwent an EGD with dilation. Biopsies were negative for EoE. Esophagus appeared normal on the EGD but note that a prior barium swallow was suggestive of a small anterior esophageal web.    Will plan for EGD with dilation to investigate.    Note: She is on Eliquis for hx of thromboembolism and will hold 2 days prior to the procedure.    Orders:    EGD; Future    Gastroesophageal reflux disease, unspecified whether esophagitis present    Patient has a long hx of GERD x years.  She is on Pantoprazole 40mg po daily which significantly helps her.      History of Present Illness   HPI  Benjamin Puga is a 69 y.o. female with a PMH of HTN, hyperlipidemia, hypothyroidism, asthma, PE on Eliquis who presents to the office for an evaluation of dysphagia.  Patient reports a return of dysphagia that began in November.  She reports dysphagia for solids like pasta, rice, vegetables, etc.  She had a prior history of dysphagia back in  which resolved when she underwent an EGD with dilation. Biopsies were negative for EoE. Esophagus appeared normal on the EGD but note that a prior barium swallow was suggestive of a small anterior esophageal web.  She also reports she had the flu and thrush previously over the Holidays. She has a history of chronic GERD for many years and takes Pantoprazole daily with relief.    I discussed informed consent with the patient for the EGD. The risks/benefits of the procedure were discussed with the patient. Risks included, but not limited to, infection, bleeding, perforation were discussed.  Patient was agreeable.     History obtained from: patient      Medical History Reviewed by provider this encounter:  Meds     .  Past Medical History   Past Medical History:   Diagnosis Date    Allergic Birth    Allergic rhinitis     Anemia     Anxiety Years    Arthritis 1990    Asthma Birth    Cancer (HCC)     breast cancer, Left side    Clotting disorder (HCC) 8-12-20    Pulmonary embolism    Colon polyp     Depression     Disease of thyroid gland     GERD (gastroesophageal reflux disease)     Hernia     Hyperlipemia     Hypertension     Hypothyroidism     Last assessed: 3/25/14    Inflammatory bowel disease Years    Irritable bowel syndrome Yrs    Obesity     Osteoarthritis     Pre-operative laboratory examination     Pulmonary embolism (HCC)     Shingles 2003    Urinary tract infection Couple of times    Visual impairment Droopy eye lid    Ulcer of the  eye     Past Surgical History:   Procedure Laterality Date    BREAST SURGERY  2019    Left small lump removed    COLONOSCOPY  21or22    FRACTURE SURGERY  90s    QLeft foot    HAND SURGERY Left 03/2020    HYSTERECTOMY      INCISIONAL BREAST BIOPSY      JOINT REPLACEMENT      KNEE ARTHROPLASTY      MAMMO NEEDLE LOCALIZATION LEFT (ALL INC) Left 06/06/2019    ROTATOR CUFF REPAIR      SHOULDER SURGERY      TRIGGER FINGER RELEASE      TRIGGER FINGER RELEASE      UPPER GASTROINTESTINAL ENDOSCOPY  21or 22     Family History   Problem Relation Age of Onset    Coronary artery disease Mother     Hypertension Mother         Essential    Stroke Mother         Heart attack    Asthma Mother     Arthritis Mother         Hbp    Heart disease Mother     Coronary artery disease Father     Alcohol abuse Father         Heavy smoker    Hypertension Father         Heart attack 57yrs    Hypertension Sister         Asthma heart attack allergies    Depression Sister     Depression Sister     Suicide Attempts Sister     Hypertension Sister         Asthma allergies cancer      reports that  she has never smoked. She has never used smokeless tobacco. She reports that she does not currently use alcohol. She reports that she does not use drugs.  Current Outpatient Medications on File Prior to Visit   Medication Sig Dispense Refill    albuterol (2.5 mg/3 mL) 0.083 % nebulizer solution Take 3 mL (2.5 mg total) by nebulization every 6 (six) hours as needed for wheezing or shortness of breath 75 mL 0    amLODIPine (NORVASC) 5 mg tablet Take 1 tablet (5 mg total) by mouth daily 100 tablet 1    ferrous sulfate 325 (65 Fe) mg tablet Take 325 mg by mouth daily      gabapentin (NEURONTIN) 100 mg capsule Take 1 capsule (100 mg total) by mouth 3 (three) times a day 90 capsule 3    levothyroxine 88 mcg tablet Take 1 tablet (88 mcg total) by mouth daily in the early morning 100 tablet 3    lisinopril (ZESTRIL) 40 mg tablet Take 1 tablet (40 mg total) by mouth daily 100 tablet 3    methocarbamol (ROBAXIN) 500 mg tablet TAKE ONE TABLET BY MOUTH FOUR TIMES A DAY AS NEEDED FOR MUSCLE SPASMS 60 tablet 3    Multiple Vitamins-Minerals (OCUVITE ADULT 50+ PO) Take by mouth      nystatin powder Apply 1 Application topically 2 (two) times a day 60 g 0    pantoprazole (PROTONIX) 40 mg tablet Take 1 tablet (40 mg total) by mouth 2 (two) times a day 200 tablet 3    rosuvastatin (CRESTOR) 20 MG tablet Take 1 tablet (20 mg total) by mouth daily 100 tablet 1    traMADol (Ultram) 50 mg tablet Take 1 tablet (50 mg total) by mouth every 6 (six) hours as needed for moderate pain 90 tablet 3    zolpidem (AMBIEN) 10 mg tablet TAKE ONE-HALF (1/2) TO ONE TABLET AT BEDTIME AS NEEDED 30 tablet 0    apixaban (Eliquis) 2.5 mg Take 1 tablet (2.5 mg total) by mouth 2 (two) times a day 200 tablet 3    clotrimazole (LOTRIMIN) 1 % cream Apply topically 2 (two) times a day for 10 doses 45 g 0    cyanocobalamin 1,000 mcg/mL Inject 1 mL (1,000 mcg total) into a muscle every 30 (thirty) days 1 mL 0    DULoxetine (CYMBALTA) 30 mg delayed release capsule  Take 3 capsules (90 mg total) by mouth daily 270 capsule 1    famotidine (PEPCID) 10 mg tablet Take 1 tablet (10 mg total) by mouth daily 90 tablet 3    hydrocortisone (ANUSOL-HC) 2.5 % rectal cream Apply topically 2 (two) times a day 28 g 3    predniSONE 20 mg tablet Take 2 tablets for 2 days then take 1 tablet for 2 days than 1/2 tab for 2 days 7 tablet 0    saccharomyces boulardii (FLORASTOR) 250 mg capsule Take 1 capsule (250 mg total) by mouth 2 (two) times a day 60 capsule 0    Triamcinolone Acetonide (Nasacort Allergy 24HR) 55 MCG/ACT nasal spray 2 sprays into each nostril as needed (Allergies) 16.9 mL 0     No current facility-administered medications on file prior to visit.     Allergies   Allergen Reactions    Erythromycin Hives    Tamoxifen Other (See Comments)     Headaches, stomach pain, sweats,     Nsaids Hives    Oxybutynin     Singulair [Montelukast] Swelling    Ciprofloxacin Hives    Penicillins Hives    Sulfa Antibiotics Hives    Vancomycin Hives      Current Outpatient Medications on File Prior to Visit   Medication Sig Dispense Refill    albuterol (2.5 mg/3 mL) 0.083 % nebulizer solution Take 3 mL (2.5 mg total) by nebulization every 6 (six) hours as needed for wheezing or shortness of breath 75 mL 0    amLODIPine (NORVASC) 5 mg tablet Take 1 tablet (5 mg total) by mouth daily 100 tablet 1    ferrous sulfate 325 (65 Fe) mg tablet Take 325 mg by mouth daily      gabapentin (NEURONTIN) 100 mg capsule Take 1 capsule (100 mg total) by mouth 3 (three) times a day 90 capsule 3    levothyroxine 88 mcg tablet Take 1 tablet (88 mcg total) by mouth daily in the early morning 100 tablet 3    lisinopril (ZESTRIL) 40 mg tablet Take 1 tablet (40 mg total) by mouth daily 100 tablet 3    methocarbamol (ROBAXIN) 500 mg tablet TAKE ONE TABLET BY MOUTH FOUR TIMES A DAY AS NEEDED FOR MUSCLE SPASMS 60 tablet 3    Multiple Vitamins-Minerals (OCUVITE ADULT 50+ PO) Take by mouth      nystatin powder Apply 1 Application  "topically 2 (two) times a day 60 g 0    pantoprazole (PROTONIX) 40 mg tablet Take 1 tablet (40 mg total) by mouth 2 (two) times a day 200 tablet 3    rosuvastatin (CRESTOR) 20 MG tablet Take 1 tablet (20 mg total) by mouth daily 100 tablet 1    traMADol (Ultram) 50 mg tablet Take 1 tablet (50 mg total) by mouth every 6 (six) hours as needed for moderate pain 90 tablet 3    zolpidem (AMBIEN) 10 mg tablet TAKE ONE-HALF (1/2) TO ONE TABLET AT BEDTIME AS NEEDED 30 tablet 0    apixaban (Eliquis) 2.5 mg Take 1 tablet (2.5 mg total) by mouth 2 (two) times a day 200 tablet 3    clotrimazole (LOTRIMIN) 1 % cream Apply topically 2 (two) times a day for 10 doses 45 g 0    cyanocobalamin 1,000 mcg/mL Inject 1 mL (1,000 mcg total) into a muscle every 30 (thirty) days 1 mL 0    DULoxetine (CYMBALTA) 30 mg delayed release capsule Take 3 capsules (90 mg total) by mouth daily 270 capsule 1    famotidine (PEPCID) 10 mg tablet Take 1 tablet (10 mg total) by mouth daily 90 tablet 3    hydrocortisone (ANUSOL-HC) 2.5 % rectal cream Apply topically 2 (two) times a day 28 g 3    predniSONE 20 mg tablet Take 2 tablets for 2 days then take 1 tablet for 2 days than 1/2 tab for 2 days 7 tablet 0    saccharomyces boulardii (FLORASTOR) 250 mg capsule Take 1 capsule (250 mg total) by mouth 2 (two) times a day 60 capsule 0    Triamcinolone Acetonide (Nasacort Allergy 24HR) 55 MCG/ACT nasal spray 2 sprays into each nostril as needed (Allergies) 16.9 mL 0     No current facility-administered medications on file prior to visit.      Social History     Tobacco Use    Smoking status: Never    Smokeless tobacco: Never   Vaping Use    Vaping status: Never Used   Substance and Sexual Activity    Alcohol use: Not Currently     Comment: socially     Drug use: Never    Sexual activity: Not Currently     Partners: Male     Birth control/protection: Post-menopausal        Objective   /80   Pulse 96   Ht 4' 9.5\" (1.461 m)   Wt 88.9 kg (196 lb)   SpO2 " 95%   BMI 41.68 kg/m²      Physical Exam  Constitutional:       General: She is not in acute distress.     Appearance: Normal appearance. She is not ill-appearing.   HENT:      Head: Normocephalic and atraumatic.   Cardiovascular:      Rate and Rhythm: Normal rate and regular rhythm.   Pulmonary:      Effort: Pulmonary effort is normal. No respiratory distress.      Breath sounds: Normal breath sounds.   Skin:     General: Skin is warm and dry.   Neurological:      Mental Status: She is alert and oriented to person, place, and time.

## 2025-02-13 DIAGNOSIS — E78.2 MIXED HYPERLIPIDEMIA: ICD-10-CM

## 2025-02-13 DIAGNOSIS — F32.2 CURRENT SEVERE EPISODE OF MAJOR DEPRESSIVE DISORDER WITHOUT PSYCHOTIC FEATURES WITHOUT PRIOR EPISODE (HCC): ICD-10-CM

## 2025-02-13 RX ORDER — DULOXETIN HYDROCHLORIDE 30 MG/1
90 CAPSULE, DELAYED RELEASE ORAL DAILY
Qty: 270 CAPSULE | Refills: 1 | Status: SHIPPED | OUTPATIENT
Start: 2025-02-13 | End: 2025-05-14

## 2025-02-13 RX ORDER — ROSUVASTATIN CALCIUM 20 MG/1
20 TABLET, COATED ORAL DAILY
Qty: 100 TABLET | Refills: 1 | Status: SHIPPED | OUTPATIENT
Start: 2025-02-13

## 2025-02-17 ENCOUNTER — PREP FOR PROCEDURE (OUTPATIENT)
Age: 70
End: 2025-02-17

## 2025-02-18 ENCOUNTER — ANESTHESIA EVENT (OUTPATIENT)
Dept: GASTROENTEROLOGY | Facility: HOSPITAL | Age: 70
End: 2025-02-18
Payer: COMMERCIAL

## 2025-02-18 ENCOUNTER — HOSPITAL ENCOUNTER (OUTPATIENT)
Dept: GASTROENTEROLOGY | Facility: HOSPITAL | Age: 70
Setting detail: OUTPATIENT SURGERY
Discharge: HOME | End: 2025-02-18
Payer: COMMERCIAL

## 2025-02-18 ENCOUNTER — ANESTHESIA (OUTPATIENT)
Dept: GASTROENTEROLOGY | Facility: HOSPITAL | Age: 70
End: 2025-02-18
Payer: COMMERCIAL

## 2025-02-18 VITALS
WEIGHT: 194.89 LBS | OXYGEN SATURATION: 94 % | SYSTOLIC BLOOD PRESSURE: 114 MMHG | HEART RATE: 89 BPM | RESPIRATION RATE: 18 BRPM | HEIGHT: 57 IN | BODY MASS INDEX: 42.05 KG/M2 | DIASTOLIC BLOOD PRESSURE: 64 MMHG | TEMPERATURE: 98.1 F

## 2025-02-18 DIAGNOSIS — R13.10 DYSPHAGIA, UNSPECIFIED TYPE: ICD-10-CM

## 2025-02-18 PROCEDURE — 43248 EGD GUIDE WIRE INSERTION: CPT | Performed by: INTERNAL MEDICINE

## 2025-02-18 PROCEDURE — 43239 EGD BIOPSY SINGLE/MULTIPLE: CPT | Performed by: INTERNAL MEDICINE

## 2025-02-18 PROCEDURE — 88305 TISSUE EXAM BY PATHOLOGIST: CPT | Performed by: PATHOLOGY

## 2025-02-18 RX ORDER — PROPOFOL 10 MG/ML
INJECTION, EMULSION INTRAVENOUS AS NEEDED
Status: DISCONTINUED | OUTPATIENT
Start: 2025-02-18 | End: 2025-02-18

## 2025-02-18 RX ORDER — MOMETASONE FUROATE AND FORMOTEROL FUMARATE DIHYDRATE 50; 5 UG/1; UG/1
2 AEROSOL RESPIRATORY (INHALATION)
COMMUNITY

## 2025-02-18 RX ORDER — LIDOCAINE HYDROCHLORIDE 20 MG/ML
INJECTION, SOLUTION EPIDURAL; INFILTRATION; INTRACAUDAL; PERINEURAL AS NEEDED
Status: DISCONTINUED | OUTPATIENT
Start: 2025-02-18 | End: 2025-02-18

## 2025-02-18 RX ORDER — SODIUM CHLORIDE, SODIUM LACTATE, POTASSIUM CHLORIDE, CALCIUM CHLORIDE 600; 310; 30; 20 MG/100ML; MG/100ML; MG/100ML; MG/100ML
125 INJECTION, SOLUTION INTRAVENOUS CONTINUOUS
Status: DISCONTINUED | OUTPATIENT
Start: 2025-02-18 | End: 2025-02-22 | Stop reason: HOSPADM

## 2025-02-18 RX ADMIN — LIDOCAINE HYDROCHLORIDE 40 MG: 20 INJECTION, SOLUTION EPIDURAL; INFILTRATION; INTRACAUDAL; PERINEURAL at 07:54

## 2025-02-18 RX ADMIN — PROPOFOL 100 MG: 10 INJECTION, EMULSION INTRAVENOUS at 07:54

## 2025-02-18 RX ADMIN — PROPOFOL 20 MG: 10 INJECTION, EMULSION INTRAVENOUS at 07:56

## 2025-02-18 RX ADMIN — SODIUM CHLORIDE, SODIUM LACTATE, POTASSIUM CHLORIDE, AND CALCIUM CHLORIDE: .6; .31; .03; .02 INJECTION, SOLUTION INTRAVENOUS at 07:45

## 2025-02-18 RX ADMIN — PROPOFOL 30 MG: 10 INJECTION, EMULSION INTRAVENOUS at 07:58

## 2025-02-18 RX ADMIN — LIDOCAINE HYDROCHLORIDE 60 MG: 20 INJECTION, SOLUTION EPIDURAL; INFILTRATION; INTRACAUDAL; PERINEURAL at 07:52

## 2025-02-18 NOTE — INTERVAL H&P NOTE
H&P reviewed. After examining the patient I find no changes in the patients condition since the H&P had been written.    Vitals:    02/18/25 0711   BP: 133/69   Pulse: (!) 117   Resp: 16   Temp: 98.3 °F (36.8 °C)   SpO2: 98%

## 2025-02-18 NOTE — ANESTHESIA POSTPROCEDURE EVALUATION
Post-Op Assessment Note    CV Status:  Stable  Pain Score: 0    Pain management: adequate       Mental Status:  Arousable and sleepy   Hydration Status:  Euvolemic   PONV Controlled:  Controlled   Airway Patency:  Patent     Post Op Vitals Reviewed: Yes    No anethesia notable event occurred.    Staff: CRNA           Last Filed PACU Vitals:  Vitals Value Taken Time   Temp 98.1    Pulse 100    /78    Resp 18    SpO2 95% RA

## 2025-02-18 NOTE — ANESTHESIA PREPROCEDURE EVALUATION
Procedure:  EGD    Relevant Problems   CARDIO   (+) HTN (hypertension)   (+) Hyperlipidemia   (+) Inappropriate sinus tachycardia (HCC)   (+) Pulmonary embolism, other, unspecified chronicity, unspecified whether acute cor pulmonale present (HCC)      ENDO   (+) Hypothyroidism      GI/HEPATIC   (+) GERD (gastroesophageal reflux disease)      /RENAL   (+) Other hydronephrosis      HEMATOLOGY   (+) Anemia      MUSCULOSKELETAL   (+) Diastasis recti      NEURO/PSYCH   (+) Anxiety   (+) Continuous opioid dependence (HCC)   (+) MDD (major depressive disorder), recurrent, severe, with psychosis (HCC)   (+) PTSD (post-traumatic stress disorder)      PULMONARY   (+) Asthma      FEN/Gastrointestinal   (+) IBS (irritable bowel syndrome)      Other   (+) History of pulmonary embolism   (+) Morbid obesity with BMI of 40.0-44.9, adult (HCC)          Left Ventricle: Left ventricular cavity size is normal. Wall thickness is normal. The left ventricular ejection fraction is 65%. Systolic function is normal. Wall motion is normal.    Right Ventricle: Right ventricular cavity size is normal. Systolic function is normal.    Mitral Valve: There is trace regurgitation.    Tricuspid Valve: There is trace regurgitation. The right ventricular systolic pressure is normal.            Physical Exam    Airway    Mallampati score: II  TM Distance: >3 FB  Neck ROM: full     Dental       Cardiovascular  Cardiovascular exam normal    Pulmonary  Pulmonary exam normal     Other Findings  post-pubertal.    Hypertension    Disease of thyroid gland    GERD (gastroesophageal reflux disease)    Allergic rhinitis    Depression    Hyperlipemia    Hypothyroidism Last assessed: 3/25/14   Anemia    Obesity    Osteoarthritis    Pre-operative laboratory examination    Cancer (HCC) breast cancer, Left side   Colon polyp    Pulmonary embolism (HCC)    Allergic    Anxiety    Arthritis    Asthma    Clotting disorder (HCC) Pulmonary embolism   Inflammatory bowel  disease    Urinary tract infection    Visual impairment Ulcer of the  eye   Shingles    Hernia    Irritable bowel syndrome        Anesthesia Plan  ASA Score- 3     Anesthesia Type- IV sedation with anesthesia with ASA Monitors.         Additional Monitors:     Airway Plan:            Plan Factors-Exercise tolerance (METS): >4 METS.    Chart reviewed. EKG reviewed. Imaging results reviewed. Existing labs reviewed. Patient summary reviewed.                  Induction- intravenous.    Postoperative Plan-         Informed Consent- Anesthetic plan and risks discussed with patient.  I personally reviewed this patient with the CRNA. Discussed and agreed on the Anesthesia Plan with the CRNA..      NPO Status:  Vitals Value Taken Time   Date of last liquid 02/18/25 02/18/25 0711   Time of last liquid 0500 02/18/25 0711   Date of last solid 02/17/25 02/18/25 0711   Time of last solid 1800 02/18/25 0711

## 2025-02-20 PROCEDURE — 88305 TISSUE EXAM BY PATHOLOGIST: CPT | Performed by: PATHOLOGY

## 2025-02-21 DIAGNOSIS — G47.00 INSOMNIA, UNSPECIFIED TYPE: ICD-10-CM

## 2025-02-21 RX ORDER — ZOLPIDEM TARTRATE 10 MG/1
TABLET ORAL
Qty: 30 TABLET | Refills: 0 | Status: SHIPPED | OUTPATIENT
Start: 2025-02-21

## 2025-02-21 RX ORDER — ZOLPIDEM TARTRATE 10 MG/1
TABLET ORAL
Qty: 30 TABLET | Refills: 0 | OUTPATIENT
Start: 2025-02-21

## 2025-04-08 DIAGNOSIS — G61.9 INFLAMMATORY NEUROPATHY (HCC): ICD-10-CM

## 2025-04-08 RX ORDER — METHOCARBAMOL 500 MG/1
TABLET, FILM COATED ORAL
Qty: 60 TABLET | Refills: 3 | Status: SHIPPED | OUTPATIENT
Start: 2025-04-08

## 2025-06-23 DIAGNOSIS — B37.9 YEAST INFECTION: ICD-10-CM

## 2025-06-23 RX ORDER — NYSTATIN 100000 [USP'U]/G
1 POWDER TOPICAL 2 TIMES DAILY
Qty: 60 G | Refills: 0 | Status: SHIPPED | OUTPATIENT
Start: 2025-06-23

## 2025-07-02 ENCOUNTER — TELEPHONE (OUTPATIENT)
Age: 70
End: 2025-07-02

## 2025-07-02 DIAGNOSIS — B37.9 YEAST INFECTION: ICD-10-CM

## 2025-07-02 RX ORDER — NYSTATIN 100000 [USP'U]/G
POWDER TOPICAL
Qty: 60 G | Refills: 3 | Status: SHIPPED | OUTPATIENT
Start: 2025-07-02

## 2025-07-02 NOTE — TELEPHONE ENCOUNTER
[facility] pharmacy called asking for clarification of sig for pt's Nystatin Powder.    States sig need to include location of use (body part) and also how long to expect a 60g bottle to last for pt.      Please send in new prescription to [facility] including the above informaton.

## 2025-07-21 DIAGNOSIS — G61.9 INFLAMMATORY NEUROPATHY (HCC): ICD-10-CM

## 2025-07-22 RX ORDER — METHOCARBAMOL 500 MG/1
TABLET, FILM COATED ORAL
Qty: 60 TABLET | Refills: 3 | Status: SHIPPED | OUTPATIENT
Start: 2025-07-22

## 2025-07-29 ENCOUNTER — APPOINTMENT (OUTPATIENT)
Dept: RADIOLOGY | Facility: AMBULARY SURGERY CENTER | Age: 70
End: 2025-07-29
Attending: ORTHOPAEDIC SURGERY
Payer: COMMERCIAL

## 2025-07-29 ENCOUNTER — OFFICE VISIT (OUTPATIENT)
Dept: OBGYN CLINIC | Facility: CLINIC | Age: 70
End: 2025-07-29
Payer: COMMERCIAL

## 2025-07-29 VITALS — WEIGHT: 196 LBS | BODY MASS INDEX: 41.14 KG/M2 | HEIGHT: 58 IN

## 2025-07-29 DIAGNOSIS — Z01.89 ENCOUNTER FOR LOWER EXTREMITY COMPARISON IMAGING STUDY: ICD-10-CM

## 2025-07-29 DIAGNOSIS — M20.22 HALLUX RIGIDUS OF LEFT FOOT: ICD-10-CM

## 2025-07-29 DIAGNOSIS — M79.672 PAIN IN LEFT FOOT: ICD-10-CM

## 2025-07-29 DIAGNOSIS — E66.01 MORBID OBESITY WITH BMI OF 40.0-44.9, ADULT (HCC): ICD-10-CM

## 2025-07-29 DIAGNOSIS — M19.072 ARTHRITIS OF LEFT MIDFOOT: Primary | ICD-10-CM

## 2025-07-29 PROCEDURE — 99204 OFFICE O/P NEW MOD 45 MIN: CPT | Performed by: ORTHOPAEDIC SURGERY

## 2025-07-29 PROCEDURE — 73630 X-RAY EXAM OF FOOT: CPT

## 2025-07-29 PROCEDURE — 73620 X-RAY EXAM OF FOOT: CPT

## 2025-08-08 ENCOUNTER — TELEPHONE (OUTPATIENT)
Age: 70
End: 2025-08-08

## 2025-08-08 DIAGNOSIS — M19.071 ARTHRITIS OF RIGHT MIDFOOT: Primary | ICD-10-CM

## 2025-08-08 DIAGNOSIS — M19.072 ARTHRITIS OF LEFT MIDFOOT: Primary | ICD-10-CM

## 2025-08-11 ENCOUNTER — TELEPHONE (OUTPATIENT)
Dept: NON INVASIVE DIAGNOSTICS | Facility: HOSPITAL | Age: 70
End: 2025-08-11

## 2025-08-12 ENCOUNTER — TELEPHONE (OUTPATIENT)
Age: 70
End: 2025-08-12

## 2025-08-18 ENCOUNTER — HOSPITAL ENCOUNTER (OUTPATIENT)
Dept: RADIOLOGY | Facility: HOSPITAL | Age: 70
Discharge: HOME | End: 2025-08-18
Attending: ORTHOPAEDIC SURGERY
Payer: COMMERCIAL

## 2025-08-18 DIAGNOSIS — M19.072 ARTHRITIS OF LEFT MIDFOOT: ICD-10-CM

## 2025-08-18 DIAGNOSIS — M19.071 ARTHRITIS OF RIGHT MIDFOOT: ICD-10-CM

## 2025-08-18 PROCEDURE — 77002 NEEDLE LOCALIZATION BY XRAY: CPT

## 2025-08-18 PROCEDURE — 20600 DRAIN/INJ JOINT/BURSA W/O US: CPT

## 2025-08-18 RX ORDER — ROPIVACAINE HYDROCHLORIDE 2 MG/ML
10 INJECTION, SOLUTION EPIDURAL; INFILTRATION; PERINEURAL ONCE
Status: COMPLETED | OUTPATIENT
Start: 2025-08-18 | End: 2025-08-18

## 2025-08-18 RX ORDER — BETAMETHASONE SODIUM PHOSPHATE AND BETAMETHASONE ACETATE 3; 3 MG/ML; MG/ML
12 INJECTION, SUSPENSION INTRA-ARTICULAR; INTRALESIONAL; INTRAMUSCULAR; SOFT TISSUE ONCE
Status: COMPLETED | OUTPATIENT
Start: 2025-08-18 | End: 2025-08-18

## 2025-08-18 RX ORDER — LIDOCAINE HYDROCHLORIDE 10 MG/ML
5 INJECTION, SOLUTION EPIDURAL; INFILTRATION; INTRACAUDAL; PERINEURAL
Status: COMPLETED | OUTPATIENT
Start: 2025-08-18 | End: 2025-08-18

## 2025-08-18 RX ADMIN — BETAMETHASONE SODIUM PHOSPHATE AND BETAMETHASONE ACETATE 2 MG: 3; 3 INJECTION, SUSPENSION INTRA-ARTICULAR; INTRALESIONAL; INTRAMUSCULAR at 10:15

## 2025-08-18 RX ADMIN — IOHEXOL 1 ML: 300 INJECTION, SOLUTION INTRAVENOUS at 10:15

## 2025-08-18 RX ADMIN — ROPIVACAINE HYDROCHLORIDE 2 ML: 2 INJECTION, SOLUTION EPIDURAL; INFILTRATION at 10:15

## 2025-08-18 RX ADMIN — LIDOCAINE HYDROCHLORIDE 5 ML: 10 INJECTION, SOLUTION EPIDURAL; INFILTRATION; INTRACAUDAL at 10:15

## 2025-08-21 ENCOUNTER — OFFICE VISIT (OUTPATIENT)
Age: 70
End: 2025-08-21

## 2025-08-21 ENCOUNTER — PREP FOR PROCEDURE (OUTPATIENT)
Age: 70
End: 2025-08-21

## 2025-08-21 VITALS
DIASTOLIC BLOOD PRESSURE: 80 MMHG | HEIGHT: 58 IN | OXYGEN SATURATION: 98 % | HEART RATE: 97 BPM | BODY MASS INDEX: 42.61 KG/M2 | SYSTOLIC BLOOD PRESSURE: 138 MMHG | WEIGHT: 203 LBS

## 2025-08-21 DIAGNOSIS — R14.0 BLOATING: ICD-10-CM

## 2025-08-21 DIAGNOSIS — R13.19 ESOPHAGEAL DYSPHAGIA: ICD-10-CM

## 2025-08-21 DIAGNOSIS — K59.09 OTHER CONSTIPATION: ICD-10-CM

## 2025-08-21 DIAGNOSIS — K21.9 GASTROESOPHAGEAL REFLUX DISEASE WITHOUT ESOPHAGITIS: Primary | ICD-10-CM

## 2025-08-21 DIAGNOSIS — R13.10 ODYNOPHAGIA: ICD-10-CM

## 2025-08-21 DIAGNOSIS — R13.14 PHARYNGOESOPHAGEAL DYSPHAGIA: ICD-10-CM
